# Patient Record
Sex: MALE | Race: WHITE | Employment: OTHER | URBAN - METROPOLITAN AREA
[De-identification: names, ages, dates, MRNs, and addresses within clinical notes are randomized per-mention and may not be internally consistent; named-entity substitution may affect disease eponyms.]

---

## 2018-02-11 PROBLEM — G45.4 TRANSIENT GLOBAL AMNESIA: Status: ACTIVE | Noted: 2018-02-11

## 2018-03-22 ENCOUNTER — HOSPITAL ENCOUNTER (OUTPATIENT)
Dept: OTHER | Age: 73
Discharge: HOME | End: 2018-03-22
Attending: UROLOGY | Admitting: UROLOGY

## 2018-03-22 DIAGNOSIS — N20.0 CALCULUS OF KIDNEY: ICD-10-CM

## 2018-04-10 ENCOUNTER — HOSPITAL ENCOUNTER (OUTPATIENT)
Dept: OTHER | Age: 73
Discharge: HOME | End: 2018-04-10
Attending: FAMILY MEDICINE | Admitting: FAMILY MEDICINE

## 2018-04-10 DIAGNOSIS — M54.5 LOW BACK PAIN, UNSPECIFIED BACK PAIN LATERALITY, UNSPECIFIED CHRONICITY, WITH SCIATICA PRESENCE UNSPECIFIED: ICD-10-CM

## 2018-04-17 ENCOUNTER — HOSPITAL ENCOUNTER (OUTPATIENT)
Dept: PHYSICAL THERAPY | Age: 73
Discharge: HOME | End: 2018-04-30
Admitting: FAMILY MEDICINE

## 2018-04-24 ENCOUNTER — HOSPITAL ENCOUNTER (OUTPATIENT)
Dept: PHYSICAL THERAPY | Age: 73
Discharge: HOME | End: 2018-04-25
Admitting: FAMILY MEDICINE

## 2018-05-01 ENCOUNTER — HOSPITAL ENCOUNTER (OUTPATIENT)
Dept: PHYSICAL THERAPY | Age: 73
Discharge: HOME | End: 2018-05-02
Admitting: FAMILY MEDICINE

## 2018-05-01 ENCOUNTER — HOSPITAL ENCOUNTER (OUTPATIENT)
Dept: OTHER | Age: 73
Discharge: HOME | End: 2018-05-08
Attending: FAMILY MEDICINE | Admitting: FAMILY MEDICINE

## 2018-05-08 ENCOUNTER — HOSPITAL ENCOUNTER (OUTPATIENT)
Dept: PHYSICAL THERAPY | Age: 73
Discharge: HOME | End: 2018-05-09
Admitting: FAMILY MEDICINE

## 2019-10-23 ENCOUNTER — HOSPITAL ENCOUNTER (OUTPATIENT)
Dept: CT IMAGING | Age: 74
Discharge: HOME | End: 2019-10-23
Payer: COMMERCIAL

## 2019-10-23 DIAGNOSIS — N20.0 CALCULUS OF KIDNEY: ICD-10-CM

## 2019-10-23 DIAGNOSIS — N23 UNSPECIFIED RENAL COLIC: ICD-10-CM

## 2019-10-23 DIAGNOSIS — N40.3 NODULAR PROSTATE WITH LOWER URINARY TRACT SYMPTOMS: ICD-10-CM

## 2019-10-23 PROCEDURE — 74176 CT ABD & PELVIS W/O CONTRAST: CPT

## 2019-12-15 ENCOUNTER — HOSPITAL ENCOUNTER (OUTPATIENT)
Age: 74
Discharge: HOME | End: 2019-12-15
Payer: COMMERCIAL

## 2019-12-15 ENCOUNTER — HOSPITAL ENCOUNTER (OUTPATIENT)
Dept: GENERAL RADIOLOGY | Age: 74
Discharge: HOME | End: 2019-12-15
Payer: COMMERCIAL

## 2019-12-15 DIAGNOSIS — N20.0 RENAL CALCULUS, RIGHT: ICD-10-CM

## 2019-12-15 PROCEDURE — 74018 RADEX ABDOMEN 1 VIEW: CPT

## 2020-09-16 ENCOUNTER — HOSPITAL ENCOUNTER (OUTPATIENT)
Dept: GENERAL RADIOLOGY | Age: 75
Discharge: HOME | End: 2020-09-16
Payer: COMMERCIAL

## 2020-09-16 ENCOUNTER — HOSPITAL ENCOUNTER (OUTPATIENT)
Age: 75
Discharge: HOME | End: 2020-09-16
Payer: COMMERCIAL

## 2020-09-16 PROCEDURE — 74018 RADEX ABDOMEN 1 VIEW: CPT

## 2020-11-07 ENCOUNTER — APPOINTMENT (OUTPATIENT)
Dept: CT IMAGING | Age: 75
DRG: 694 | End: 2020-11-07
Payer: MEDICARE

## 2020-11-07 ENCOUNTER — APPOINTMENT (OUTPATIENT)
Dept: GENERAL RADIOLOGY | Age: 75
DRG: 694 | End: 2020-11-07
Payer: MEDICARE

## 2020-11-07 ENCOUNTER — HOSPITAL ENCOUNTER (INPATIENT)
Age: 75
LOS: 2 days | Discharge: HOME | DRG: 694 | End: 2020-11-09
Attending: EMERGENCY MEDICINE | Admitting: FAMILY MEDICINE
Payer: MEDICARE

## 2020-11-07 PROBLEM — N20.9 UROLITHIASIS: Status: ACTIVE | Noted: 2020-11-07

## 2020-11-07 PROBLEM — Z87.442 FLANK PAIN WITH HISTORY OF UROLITHIASIS: Status: ACTIVE | Noted: 2020-11-07

## 2020-11-07 PROBLEM — R10.9 FLANK PAIN WITH HISTORY OF UROLITHIASIS: Status: ACTIVE | Noted: 2020-11-07

## 2020-11-07 LAB
A/G RATIO: 1.7 (ref 1.1–2.2)
ALBUMIN SERPL-MCNC: 4.6 G/DL (ref 3.4–5)
ALP BLD-CCNC: 128 U/L (ref 40–129)
ALT SERPL-CCNC: 25 U/L (ref 10–40)
ANION GAP SERPL CALCULATED.3IONS-SCNC: 10 MMOL/L (ref 3–16)
AST SERPL-CCNC: 22 U/L (ref 15–37)
BACTERIA: ABNORMAL /HPF
BASOPHILS ABSOLUTE: 0 K/UL (ref 0–0.2)
BASOPHILS RELATIVE PERCENT: 0.7 %
BILIRUB SERPL-MCNC: 0.7 MG/DL (ref 0–1)
BILIRUBIN URINE: NEGATIVE
BLOOD, URINE: ABNORMAL
BUN BLDV-MCNC: 13 MG/DL (ref 7–20)
CALCIUM SERPL-MCNC: 9 MG/DL (ref 8.3–10.6)
CHLORIDE BLD-SCNC: 103 MMOL/L (ref 99–110)
CLARITY: CLEAR
CO2: 28 MMOL/L (ref 21–32)
COLOR: YELLOW
CREAT SERPL-MCNC: 1 MG/DL (ref 0.8–1.3)
EOSINOPHILS ABSOLUTE: 0 K/UL (ref 0–0.6)
EOSINOPHILS RELATIVE PERCENT: 0.7 %
EPITHELIAL CELLS, UA: ABNORMAL /HPF (ref 0–5)
GFR AFRICAN AMERICAN: >60
GFR NON-AFRICAN AMERICAN: >60
GLOBULIN: 2.7 G/DL
GLUCOSE BLD-MCNC: 140 MG/DL (ref 70–99)
GLUCOSE BLD-MCNC: 167 MG/DL (ref 70–99)
GLUCOSE BLD-MCNC: 207 MG/DL (ref 70–99)
GLUCOSE BLD-MCNC: 254 MG/DL (ref 70–99)
GLUCOSE URINE: 500 MG/DL
HCT VFR BLD CALC: 42 % (ref 40.5–52.5)
HEMOGLOBIN: 14.6 G/DL (ref 13.5–17.5)
KETONES, URINE: NEGATIVE MG/DL
LEUKOCYTE ESTERASE, URINE: NEGATIVE
LIPASE: 27 U/L (ref 13–60)
LYMPHOCYTES ABSOLUTE: 0.7 K/UL (ref 1–5.1)
LYMPHOCYTES RELATIVE PERCENT: 11 %
MCH RBC QN AUTO: 31.5 PG (ref 26–34)
MCHC RBC AUTO-ENTMCNC: 34.7 G/DL (ref 31–36)
MCV RBC AUTO: 90.8 FL (ref 80–100)
MICROSCOPIC EXAMINATION: YES
MONOCYTES ABSOLUTE: 0.4 K/UL (ref 0–1.3)
MONOCYTES RELATIVE PERCENT: 6.5 %
NEUTROPHILS ABSOLUTE: 5.2 K/UL (ref 1.7–7.7)
NEUTROPHILS RELATIVE PERCENT: 81.1 %
NITRITE, URINE: NEGATIVE
PDW BLD-RTO: 13.3 % (ref 12.4–15.4)
PERFORMED ON: ABNORMAL
PH UA: 5.5 (ref 5–8)
PLATELET # BLD: 119 K/UL (ref 135–450)
PMV BLD AUTO: 7.8 FL (ref 5–10.5)
POTASSIUM SERPL-SCNC: 4 MMOL/L (ref 3.5–5.1)
PROTEIN UA: ABNORMAL MG/DL
RBC # BLD: 4.62 M/UL (ref 4.2–5.9)
RBC UA: ABNORMAL /HPF (ref 0–4)
SODIUM BLD-SCNC: 141 MMOL/L (ref 136–145)
SPECIFIC GRAVITY UA: >=1.03 (ref 1–1.03)
TOTAL PROTEIN: 7.3 G/DL (ref 6.4–8.2)
URINE TYPE: ABNORMAL
UROBILINOGEN, URINE: 0.2 E.U./DL
WBC # BLD: 6.5 K/UL (ref 4–11)
WBC UA: ABNORMAL /HPF (ref 0–5)

## 2020-11-07 PROCEDURE — 74176 CT ABD & PELVIS W/O CONTRAST: CPT

## 2020-11-07 PROCEDURE — 6360000002 HC RX W HCPCS: Performed by: EMERGENCY MEDICINE

## 2020-11-07 PROCEDURE — 2580000003 HC RX 258: Performed by: EMERGENCY MEDICINE

## 2020-11-07 PROCEDURE — 85025 COMPLETE CBC W/AUTO DIFF WBC: CPT

## 2020-11-07 PROCEDURE — 96375 TX/PRO/DX INJ NEW DRUG ADDON: CPT

## 2020-11-07 PROCEDURE — 81001 URINALYSIS AUTO W/SCOPE: CPT

## 2020-11-07 PROCEDURE — 96376 TX/PRO/DX INJ SAME DRUG ADON: CPT

## 2020-11-07 PROCEDURE — 1200000000 HC SEMI PRIVATE

## 2020-11-07 PROCEDURE — 96374 THER/PROPH/DIAG INJ IV PUSH: CPT

## 2020-11-07 PROCEDURE — 83690 ASSAY OF LIPASE: CPT

## 2020-11-07 PROCEDURE — 6370000000 HC RX 637 (ALT 250 FOR IP): Performed by: FAMILY MEDICINE

## 2020-11-07 PROCEDURE — 80053 COMPREHEN METABOLIC PANEL: CPT

## 2020-11-07 PROCEDURE — 99285 EMERGENCY DEPT VISIT HI MDM: CPT

## 2020-11-07 PROCEDURE — 2580000003 HC RX 258: Performed by: FAMILY MEDICINE

## 2020-11-07 PROCEDURE — 87086 URINE CULTURE/COLONY COUNT: CPT

## 2020-11-07 RX ORDER — MORPHINE SULFATE 2 MG/ML
2 INJECTION, SOLUTION INTRAMUSCULAR; INTRAVENOUS
Status: DISCONTINUED | OUTPATIENT
Start: 2020-11-07 | End: 2020-11-09 | Stop reason: HOSPADM

## 2020-11-07 RX ORDER — NICOTINE POLACRILEX 4 MG
15 LOZENGE BUCCAL PRN
Status: DISCONTINUED | OUTPATIENT
Start: 2020-11-07 | End: 2020-11-09 | Stop reason: HOSPADM

## 2020-11-07 RX ORDER — ONDANSETRON 2 MG/ML
4 INJECTION INTRAMUSCULAR; INTRAVENOUS EVERY 30 MIN PRN
Status: DISCONTINUED | OUTPATIENT
Start: 2020-11-07 | End: 2020-11-09 | Stop reason: HOSPADM

## 2020-11-07 RX ORDER — POLYETHYLENE GLYCOL 3350 17 G/17G
17 POWDER, FOR SOLUTION ORAL DAILY PRN
Status: DISCONTINUED | OUTPATIENT
Start: 2020-11-07 | End: 2020-11-09 | Stop reason: HOSPADM

## 2020-11-07 RX ORDER — AMLODIPINE BESYLATE 5 MG/1
5 TABLET ORAL DAILY
Status: DISCONTINUED | OUTPATIENT
Start: 2020-11-08 | End: 2020-11-09 | Stop reason: HOSPADM

## 2020-11-07 RX ORDER — ATORVASTATIN CALCIUM 10 MG/1
20 TABLET, FILM COATED ORAL NIGHTLY
Status: DISCONTINUED | OUTPATIENT
Start: 2020-11-08 | End: 2020-11-07

## 2020-11-07 RX ORDER — SODIUM CHLORIDE 9 MG/ML
INJECTION, SOLUTION INTRAVENOUS CONTINUOUS
Status: DISCONTINUED | OUTPATIENT
Start: 2020-11-07 | End: 2020-11-07 | Stop reason: SDUPTHER

## 2020-11-07 RX ORDER — KETOROLAC TROMETHAMINE 30 MG/ML
15 INJECTION, SOLUTION INTRAMUSCULAR; INTRAVENOUS EVERY 6 HOURS PRN
Status: DISCONTINUED | OUTPATIENT
Start: 2020-11-07 | End: 2020-11-09 | Stop reason: HOSPADM

## 2020-11-07 RX ORDER — SODIUM CHLORIDE 0.9 % (FLUSH) 0.9 %
10 SYRINGE (ML) INJECTION EVERY 12 HOURS SCHEDULED
Status: DISCONTINUED | OUTPATIENT
Start: 2020-11-07 | End: 2020-11-09 | Stop reason: HOSPADM

## 2020-11-07 RX ORDER — ATORVASTATIN CALCIUM 10 MG/1
20 TABLET, FILM COATED ORAL DAILY
Status: DISCONTINUED | OUTPATIENT
Start: 2020-11-07 | End: 2020-11-07

## 2020-11-07 RX ORDER — PROMETHAZINE HYDROCHLORIDE 25 MG/1
12.5 TABLET ORAL EVERY 6 HOURS PRN
Status: DISCONTINUED | OUTPATIENT
Start: 2020-11-07 | End: 2020-11-09 | Stop reason: HOSPADM

## 2020-11-07 RX ORDER — ONDANSETRON 2 MG/ML
4 INJECTION INTRAMUSCULAR; INTRAVENOUS EVERY 6 HOURS PRN
Status: DISCONTINUED | OUTPATIENT
Start: 2020-11-07 | End: 2020-11-09 | Stop reason: HOSPADM

## 2020-11-07 RX ORDER — KETOROLAC TROMETHAMINE 30 MG/ML
15 INJECTION, SOLUTION INTRAMUSCULAR; INTRAVENOUS EVERY 6 HOURS
Status: DISCONTINUED | OUTPATIENT
Start: 2020-11-07 | End: 2020-11-07

## 2020-11-07 RX ORDER — AMLODIPINE BESYLATE 5 MG/1
5 TABLET ORAL DAILY
Status: DISCONTINUED | OUTPATIENT
Start: 2020-11-08 | End: 2020-11-07

## 2020-11-07 RX ORDER — SODIUM CHLORIDE 9 MG/ML
INJECTION, SOLUTION INTRAVENOUS CONTINUOUS
Status: DISCONTINUED | OUTPATIENT
Start: 2020-11-07 | End: 2020-11-09 | Stop reason: HOSPADM

## 2020-11-07 RX ORDER — TAMSULOSIN HYDROCHLORIDE 0.4 MG/1
0.4 CAPSULE ORAL DAILY
Status: DISCONTINUED | OUTPATIENT
Start: 2020-11-07 | End: 2020-11-07

## 2020-11-07 RX ORDER — ACETAMINOPHEN 325 MG/1
650 TABLET ORAL EVERY 4 HOURS PRN
Status: DISCONTINUED | OUTPATIENT
Start: 2020-11-07 | End: 2020-11-09 | Stop reason: HOSPADM

## 2020-11-07 RX ORDER — POTASSIUM CITRATE 5 MEQ/1
10 TABLET, EXTENDED RELEASE ORAL
Status: DISCONTINUED | OUTPATIENT
Start: 2020-11-07 | End: 2020-11-09 | Stop reason: HOSPADM

## 2020-11-07 RX ORDER — AMLODIPINE BESYLATE AND ATORVASTATIN CALCIUM 5; 20 MG/1; MG/1
1 TABLET, FILM COATED ORAL DAILY
Status: DISCONTINUED | OUTPATIENT
Start: 2020-11-07 | End: 2020-11-07 | Stop reason: CLARIF

## 2020-11-07 RX ORDER — TAMSULOSIN HYDROCHLORIDE 0.4 MG/1
0.4 CAPSULE ORAL DAILY
Status: DISCONTINUED | OUTPATIENT
Start: 2020-11-07 | End: 2020-11-09 | Stop reason: HOSPADM

## 2020-11-07 RX ORDER — AMLODIPINE BESYLATE 5 MG/1
5 TABLET ORAL DAILY
Status: DISCONTINUED | OUTPATIENT
Start: 2020-11-07 | End: 2020-11-07

## 2020-11-07 RX ORDER — SODIUM CHLORIDE 0.9 % (FLUSH) 0.9 %
10 SYRINGE (ML) INJECTION PRN
Status: DISCONTINUED | OUTPATIENT
Start: 2020-11-07 | End: 2020-11-09 | Stop reason: HOSPADM

## 2020-11-07 RX ORDER — INSULIN GLARGINE 100 [IU]/ML
66 INJECTION, SOLUTION SUBCUTANEOUS NIGHTLY
Status: DISCONTINUED | OUTPATIENT
Start: 2020-11-07 | End: 2020-11-09 | Stop reason: HOSPADM

## 2020-11-07 RX ORDER — HYDROCODONE BITARTRATE AND ACETAMINOPHEN 5; 325 MG/1; MG/1
1 TABLET ORAL EVERY 4 HOURS PRN
Status: DISCONTINUED | OUTPATIENT
Start: 2020-11-07 | End: 2020-11-09 | Stop reason: HOSPADM

## 2020-11-07 RX ORDER — VALSARTAN 80 MG/1
160 TABLET ORAL DAILY
Status: DISCONTINUED | OUTPATIENT
Start: 2020-11-07 | End: 2020-11-07

## 2020-11-07 RX ORDER — ASPIRIN 81 MG/1
81 TABLET, CHEWABLE ORAL DAILY
Status: DISCONTINUED | OUTPATIENT
Start: 2020-11-07 | End: 2020-11-07

## 2020-11-07 RX ORDER — ASPIRIN 81 MG/1
81 TABLET, CHEWABLE ORAL DAILY
Status: DISCONTINUED | OUTPATIENT
Start: 2020-11-07 | End: 2020-11-09 | Stop reason: HOSPADM

## 2020-11-07 RX ORDER — VITAMIN B COMPLEX
1000 TABLET ORAL DAILY
Status: DISCONTINUED | OUTPATIENT
Start: 2020-11-07 | End: 2020-11-09 | Stop reason: HOSPADM

## 2020-11-07 RX ORDER — LOSARTAN POTASSIUM 25 MG/1
50 TABLET ORAL DAILY
Status: DISCONTINUED | OUTPATIENT
Start: 2020-11-07 | End: 2020-11-09 | Stop reason: HOSPADM

## 2020-11-07 RX ORDER — DEXTROSE MONOHYDRATE 25 G/50ML
12.5 INJECTION, SOLUTION INTRAVENOUS PRN
Status: DISCONTINUED | OUTPATIENT
Start: 2020-11-07 | End: 2020-11-09 | Stop reason: HOSPADM

## 2020-11-07 RX ORDER — ATORVASTATIN CALCIUM 10 MG/1
20 TABLET, FILM COATED ORAL NIGHTLY
Status: DISCONTINUED | OUTPATIENT
Start: 2020-11-07 | End: 2020-11-09 | Stop reason: HOSPADM

## 2020-11-07 RX ORDER — DEXTROSE MONOHYDRATE 50 MG/ML
100 INJECTION, SOLUTION INTRAVENOUS PRN
Status: DISCONTINUED | OUTPATIENT
Start: 2020-11-07 | End: 2020-11-09 | Stop reason: HOSPADM

## 2020-11-07 RX ADMIN — HYDROMORPHONE HYDROCHLORIDE 1 MG: 1 INJECTION, SOLUTION INTRAMUSCULAR; INTRAVENOUS; SUBCUTANEOUS at 08:21

## 2020-11-07 RX ADMIN — ONDANSETRON 4 MG: 2 INJECTION INTRAMUSCULAR; INTRAVENOUS at 10:58

## 2020-11-07 RX ADMIN — AMLODIPINE BESYLATE 5 MG: 5 TABLET ORAL at 13:01

## 2020-11-07 RX ADMIN — TAMSULOSIN HYDROCHLORIDE 0.4 MG: 0.4 CAPSULE ORAL at 21:00

## 2020-11-07 RX ADMIN — POTASSIUM CITRATE 10 MEQ: 5 TABLET ORAL at 18:25

## 2020-11-07 RX ADMIN — SODIUM CHLORIDE: 9 INJECTION, SOLUTION INTRAVENOUS at 15:02

## 2020-11-07 RX ADMIN — Medication 1000 UNITS: at 15:11

## 2020-11-07 RX ADMIN — INSULIN GLARGINE 66 UNITS: 100 INJECTION, SOLUTION SUBCUTANEOUS at 20:56

## 2020-11-07 RX ADMIN — ATORVASTATIN CALCIUM 20 MG: 10 TABLET, FILM COATED ORAL at 21:00

## 2020-11-07 RX ADMIN — LOSARTAN POTASSIUM 50 MG: 25 TABLET, FILM COATED ORAL at 16:11

## 2020-11-07 RX ADMIN — INSULIN HUMAN 10 UNITS: 100 INJECTION, SOLUTION PARENTERAL at 18:25

## 2020-11-07 RX ADMIN — HYDROMORPHONE HYDROCHLORIDE 1 MG: 1 INJECTION, SOLUTION INTRAMUSCULAR; INTRAVENOUS; SUBCUTANEOUS at 10:58

## 2020-11-07 RX ADMIN — SODIUM CHLORIDE: 9 INJECTION, SOLUTION INTRAVENOUS at 21:02

## 2020-11-07 RX ADMIN — SODIUM CHLORIDE: 9 INJECTION, SOLUTION INTRAVENOUS at 21:00

## 2020-11-07 RX ADMIN — CEFTRIAXONE SODIUM 1 G: 1 INJECTION, POWDER, FOR SOLUTION INTRAMUSCULAR; INTRAVENOUS at 10:58

## 2020-11-07 RX ADMIN — ONDANSETRON 4 MG: 2 INJECTION INTRAMUSCULAR; INTRAVENOUS at 08:21

## 2020-11-07 ASSESSMENT — PAIN DESCRIPTION - FREQUENCY: FREQUENCY: CONTINUOUS

## 2020-11-07 ASSESSMENT — ENCOUNTER SYMPTOMS
WHEEZING: 0
PHOTOPHOBIA: 0
SHORTNESS OF BREATH: 0
EYE REDNESS: 0
FACIAL SWELLING: 0
COUGH: 0
SINUS PRESSURE: 0
EYE PAIN: 0
TROUBLE SWALLOWING: 0
VOMITING: 1
ABDOMINAL DISTENTION: 0
ANAL BLEEDING: 0
BACK PAIN: 0
DIARRHEA: 0
NAUSEA: 1
RHINORRHEA: 0
BLOOD IN STOOL: 0
SORE THROAT: 0
EYE DISCHARGE: 0
VOICE CHANGE: 0
EYE ITCHING: 0
CONSTIPATION: 0
CHEST TIGHTNESS: 0
ABDOMINAL PAIN: 1
STRIDOR: 0

## 2020-11-07 ASSESSMENT — PAIN SCALES - GENERAL
PAINLEVEL_OUTOF10: 10
PAINLEVEL_OUTOF10: 10
PAINLEVEL_OUTOF10: 2
PAINLEVEL_OUTOF10: 4
PAINLEVEL_OUTOF10: 6
PAINLEVEL_OUTOF10: 0
PAINLEVEL_OUTOF10: 1
PAINLEVEL_OUTOF10: 8

## 2020-11-07 ASSESSMENT — PAIN DESCRIPTION - DESCRIPTORS: DESCRIPTORS: SHARP

## 2020-11-07 ASSESSMENT — PAIN DESCRIPTION - ORIENTATION: ORIENTATION: RIGHT;UPPER;MID

## 2020-11-07 ASSESSMENT — PAIN DESCRIPTION - LOCATION
LOCATION: ABDOMEN

## 2020-11-07 ASSESSMENT — PAIN DESCRIPTION - PAIN TYPE
TYPE: ACUTE PAIN

## 2020-11-07 NOTE — H&P
Inpatient Family Medicine Service   History & Physical        PCP: Kelsie Sadler DO    Date of Admission: 11/7/2020    Date of Service: Pt seen/examined on 11/7/2020 and admitted to Inpatient with expected LOS greater than two midnights due to medical therapy. Chief Complaint:  Right Flank Pain     History Of Present Illness:    Agusto Ness is a 67 y.o. male w/ pmhx DM, HLD, and HTN presents to [facility] with Right Flank pain since 6:00 this am.     His right sided pain began at 0600, when he woke up. He was simply sitting and relaxing when the pain came out of no where. He does have past medical history of kidney stones, but for the most part has never needed an intervention. Pt last saw urology 1 month ago and is scheduled for nephrostomy on 12/4/2020. Pt complaints today include dry heaving but no episodes of emesis and localized pain to the right lateral abdomen/ LRQ. No dysuria or gross hematuria. Most of pain is in the right flank. Had diarrhea/constipation. No CP/SOB. Ambulating without difficulty. Denies any dizziness today. Pain is rated 10/10 and constant. About 1 year ago he had \"US treatment\" of other kidney stones. History of multiple kidney stones as he has gotten older. While in the ED, pt was given pain and nausea medicine which significantly improved his symptoms.   - He received 2 g of Dilaudid and 8 mg of Zofran. Pt states that he had no pain at the time of examination and only slight nausea. Pt was started on Rocephin 1 g daily. Pt also received his home dose of Norvasc 5 mg.      Lab work included:   - UA RBC +, Glucose +  - Normal Lipase  - CBC wnl  - CMP wnl    CT imaging showed:  Very large calculus present inferiorly in the left kidney and   unchanged in appearance and position.  Moderate right-sided hydronephrosis   now present.  Small 4 mm calculus now present in the proximal right ureter   below the UPJ.  Calculus lies at the mid L4 level.  Moderate right   perinephric stranding present.  Tiny nonobstructing calculus in the mid right   kidney.  Other abdominal organs appear normal.       Past Medical History:          Diagnosis Date    Cancer ([address]     prostate    Diabetes mellitus ([address]     Hyperlipidemia     Hypertension     Kidney stone        Past Surgical History:          Procedure Laterality Date    PROSTATE SURGERY         Medications Prior to Admission:         Outpatient Medications Marked as Taking for the 8/10/20 encounter (Office Visit) with Brandi Andersen MD   Medication Sig    amlodipine-atorvastatin 5-20 MG TABS Take 1 tablet by mouth once daily    losartan (COZAAR) 50 MG TABS TAKE 1 TABLET BY MOUTH ONCE DAILY (REPLACES VALSARTAN)    insulin glargine, 1 Unit Dial, (TOUJEO SOLOSTAR) 300 unit/mL SOPN pen Inject 70 units each evening (Patient taking differently: Inject 66 units each evening)    insulin aspart (NOVOLOG FLEXPEN) 100 unit/mL SOPN pen Inject 15 units before each meal (Patient taking differently: Inject 20 units before each meal + 2 units for every 50 that glucose is > 150. Inject 10 units before bedtime snack if eaten.)    Continuous Blood Gluc Sensor (FREESTYLE MARGARITA 14 DAY SENSOR) [facility] Use as directed to monitor glucose levels. Change sensor every 14 days.  ONE TOUCH ULTRASOFT LANCETS MISC USE TO CHECK GLUCOSE TWICE DAILY    glucose blood (ONE TOUCH ULTRA BLUE) STRP Check glucose 2 times daily    Insulin Pen Needle (BD PEN NEEDLE CRYSTAL U/F) 32G X 4 MM MISC Inject insulin up to 3 times daily    potassium citrate (UROCIT-K) 10 MEQ (1080 MG) TBCR Take 10 mEq by mouth 3 (three) times daily with meals.  vitamin D (CHOLECALCIFEROL) 1000 units TABS Take 1,000 Units by mouth daily.  aspirin 81 MG PO CHEW 1 daily    Ibuprofen 600 MG OR TABS 1 tid    FLOMAX 0.4 MG OR CP24 1 cap daily     Allergies:  Patient has no known allergies. Social History:      The patient currently lives at home with wife.     TOBACCO: reports that he has quit smoking. He has never used smokeless tobacco.  ETOH:   reports no history of alcohol use. Family History:      History reviewed. No pertinent family history. REVIEW OF SYSTEMS:   Pertinent positives as noted in the HPI. All other systems reviewed and negative. PHYSICAL EXAM PERFORMED:    BP (!) 153/65   Pulse 66   Temp 97.9 °F (36.6 °C) (Oral)   Resp 16   Ht 5' 7\" (1.702 m)   Wt 210 lb (95.3 kg)   SpO2 91%   BMI 32.89 kg/m²     General appearance:  Pt resting in bed with eyes shut, sleepy after pain medication administration, able to answer questions    HEENT:  Normal cephalic, atraumatic without obvious deformity. Pupils equal, round, and reactive to light. Conjunctivae/corneas clear. Neck: Supple, with full range of motion. Trachea midline. Respiratory:  Normal respiratory effort. Clear to auscultation, bilaterally without Rales/Wheezes/Rhonchi. Cardiovascular:  Regular rate and rhythm with normal S1/S2 without murmurs, rubs or gallops. Abdomen: Soft, non-tender, non-distended with normal bowel sounds. Musculoskeletal:  No clubbing, cyanosis bilaterally. + 1 LE edema. Full range of motion without deformity. Back: No CVA tenderness  Skin: Skin color, texture, turgor normal.  No rashes or lesions. Neurologic:  Neurovascularly intact without any focal sensory/motor deficits. Psychiatric:  Alert and oriented, thought content appropriate, normal insight    Labs:     Recent Labs     11/07/20  0743   WBC 6.5   HGB 14.6   HCT 42.0   *     Recent Labs     11/07/20  0743      K 4.0      CO2 28   BUN 13   CREATININE 1.0   CALCIUM 9.0     Recent Labs     11/07/20  0743   AST 22   ALT 25   BILITOT 0.7   ALKPHOS 128     No results for input(s): INR in the last 72 hours. No results for input(s): Teresa Dalia in the last 72 hours.     Urinalysis:      Lab Results   Component Value Date    NITRU Negative 11/07/2020    WBCUA 3-5 11/07/2020    BACTERIA Rare 11/07/2020    RBCUA 21-50 11/07/2020    BLOODU LARGE 11/07/2020    SPECGRAV >=1.030 11/07/2020    GLUCOSEU 500 11/07/2020       Radiology:       CT ABDOMEN PELVIS WO CONTRAST Additional Contrast? None   Final Result   Moderate right-sided hydronephrosis due to 4 mm calculus in the proximal   right ureter. Large nonobstructing calculus remains inferiorly in the left   kidney. Normal appendix. No other acute abnormality. XR ABDOMEN (KUB) (SINGLE AP VIEW)    (Results Pending)         ASSESSMENT:    Active Hospital Problems    Diagnosis Date Noted    Flank pain with history of urolithiasis [R10.9, Z87.442] 11/07/2020    Urolithiasis [N20.9] 11/07/2020    HTN (hypertension), benign [I10]     Uncontrolled type 2 diabetes mellitus with complication, without long-term current use of insulin (HCC) [E11.8, E11.65]     Dyslipidemia [E78.5]        Glenis Urbina is a 67 y.o. Hospital Day #1 admitted for Flank pain with history of urolithiasis      PLAN:    Flank Pain  Urolithiasis  Patient evaluated by Urology in the ED  Will plan procedure for Monday if stone is not able to pass on own  Vitals and Labs stable  Hydration and Pain control   - Tylenol 650 prn   - Norco 5-325 prn  - Toradol 15 prn  - Zofran 4 prn  -  ml/hr      HTN  DM  HLD  - Continue Home Medications  > Amlodipine 5, Lipitor 20, ASA 81, Lantus 66 u, Novolin 20 TID, K+ 10 TID, Flomax, Vit D        DVT Prophylaxis: Lovenox   Diet: DIET CLEAR LIQUID; Carb Control: 3 carb choices (45 gms)/meal  Code Status: Full Code  PT/OT Eval Status: Pending  Dispo - Inpatient    This patient was seen and evaluated with the resident team and attending, Dr. Manpreet Jordan. Sandrine Rodriguez D.O.    [facility] [place] [address]. Service  [id number]-1  (available by [address] [place] [address])

## 2020-11-07 NOTE — ED PROVIDER NOTES
Justin Ramirez is a 67year old male with a history of kidney stones who presents to the ED with right sided abdominal pain that woke him from sleep at [place]. He has had nausea/vomiting followed by dry heaves since that time. He localizes the pain in the right para-colic gutter area, and he denies radiation or back pain, no testicular pain. He denies hematuria. He rates the pain 8/10. Analgesia is offered. He has a history of a very large left sided kidney stones that is \"too big for lithotripsy\" so he's scheduled to have this surgically removed on December 4th. BP (!) 168/74   Pulse 60   Temp 97.9 °F (36.6 °C) (Oral)   Resp 16   Ht 5' 7\" (1.702 m)   Wt 210 lb (95.3 kg)   SpO2 97%   BMI 32.89 kg/m²     I have reviewed the following from the nursing documentation:      Prior to Admission medications    Medication Sig Start Date End Date Taking?  Authorizing Provider   metFORMIN (GLUCOPHAGE) 500 MG tablet Take 500 mg by mouth 2 times daily (with meals)    Historical Provider, MD   tamsulosin (FLOMAX) 0.4 MG capsule Take 0.4 mg by mouth daily    Historical Provider, MD   glipiZIDE (GLUCOTROL) 10 MG tablet Take 10 mg by mouth 2 times daily (before meals)    Historical Provider, MD   amLODIPine-atorvastatatin (CADUET) 5-20 MG per tablet Take 1 tablet by mouth 6/8/17   Historical Provider, MD   aspirin 81 MG chewable tablet 1 daily    Historical Provider, MD   valsartan (DIOVAN) 160 MG tablet TAKE ONE TABLET BY MOUTH ONCE DAILY 5/9/17   Historical Provider, MD   insulin glargine (LANTUS) 100 UNIT/ML injection vial Inject into the skin 2 times daily    Historical Provider, MD       Allergies as of 11/07/2020    (No Known Allergies)       Past Medical History:   Diagnosis Date    Cancer ([address]     prostate    Diabetes mellitus ([address]     Hyperlipidemia     Hypertension     Kidney stone         Surgical History:   Past Surgical History:   Procedure Laterality Date    PROSTATE SURGERY          Family History:  History reviewed. No pertinent family history. Social History     Socioeconomic History    Marital status:      Spouse name: Not on file    Number of children: Not on file    Years of education: Not on file    Highest education level: Not on file   Occupational History    Not on file   Social Needs    Financial resource strain: Not on file    Food insecurity     Worry: Not on file     Inability: Not on file    Transportation needs     Medical: Not on file     Non-medical: Not on file   Tobacco Use    Smoking status: Former Smoker    Smokeless tobacco: Never Used   Substance and Sexual Activity    Alcohol use: No    Drug use: No    Sexual activity: Not on file   Lifestyle    Physical activity     Days per week: Not on file     Minutes per session: Not on file    Stress: Not on file   Relationships    Social connections     Talks on phone: Not on file     Gets together: Not on file     Attends [employer] service: Not on file     Active member of club or organization: Not on file     Attends meetings of clubs or organizations: Not on file     Relationship status: Not on file    Intimate partner violence     Fear of current or ex partner: Not on file     Emotionally abused: Not on file     Physically abused: Not on file     Forced sexual activity: Not on file   Other Topics Concern    Not on file   Social History Narrative    Not on file         Review of Systems   Constitutional: Negative for activity change, appetite change, chills, diaphoresis, fatigue and fever. HENT: Negative. Negative for congestion, dental problem, ear pain, facial swelling, rhinorrhea, sinus pressure, sneezing, sore throat, tinnitus, trouble swallowing and voice change. Eyes: Negative for photophobia, pain, discharge, redness, itching and visual disturbance. Respiratory: Negative for cough, chest tightness, shortness of breath, wheezing and stridor.     Cardiovascular: Negative for chest pain, palpitations and leg swelling. Gastrointestinal: Positive for abdominal pain (right sided without radiation), nausea and vomiting. Negative for abdominal distention, anal bleeding, blood in stool, constipation and diarrhea. Genitourinary: Negative for difficulty urinating, discharge, dysuria, frequency, hematuria, testicular pain and urgency. Musculoskeletal: Negative for back pain, joint swelling, neck pain and neck stiffness. Skin: Negative for rash and wound. Neurological: Negative for dizziness, syncope, facial asymmetry, speech difficulty, weakness, numbness and headaches. Hematological: Does not bruise/bleed easily. Psychiatric/Behavioral: Negative for agitation, confusion, hallucinations, self-injury, sleep disturbance and suicidal ideas. The patient is not nervous/anxious. All other systems reviewed and are negative. Physical Exam  Vitals signs and nursing note reviewed. Constitutional:       General: He is in acute distress (secondary to pain and nausea). Appearance: He is well-developed. HENT:      Head: Normocephalic and atraumatic. Right Ear: External ear normal.      Left Ear: External ear normal.      Nose: Nose normal.      Mouth/Throat:      Pharynx: No oropharyngeal exudate. Eyes:      General: No scleral icterus. Right eye: No discharge. Left eye: No discharge. Conjunctiva/sclera: Conjunctivae normal.      Pupils: Pupils are equal, round, and reactive to light. Neck:      Musculoskeletal: Normal range of motion and neck supple. Vascular: No JVD. Trachea: No tracheal deviation. Cardiovascular:      Rate and Rhythm: Normal rate and regular rhythm. Heart sounds: Normal heart sounds. No murmur. No friction rub. No gallop. Pulmonary:      Effort: Pulmonary effort is normal. No respiratory distress. Breath sounds: Normal breath sounds. No wheezing or rales.    Abdominal:      General: Bowel sounds are normal. There is no distension. Palpations: Abdomen is soft. There is no mass. Tenderness: There is abdominal tenderness in the right upper quadrant and right lower quadrant. There is no guarding or rebound. Musculoskeletal: Normal range of motion. General: No tenderness. Lymphadenopathy:      Cervical: No cervical adenopathy. Skin:     General: Skin is warm and dry. Coloration: Skin is not pale. Findings: No erythema or rash. Neurological:      Mental Status: He is alert and oriented to person, place, and time. Cranial Nerves: No cranial nerve deficit. Motor: No abnormal muscle tone. Coordination: Coordination normal.      Deep Tendon Reflexes: Reflexes are normal and symmetric. Reflexes normal.   Psychiatric:         Behavior: Behavior normal.         Thought Content:  Thought content normal.         Judgment: Judgment normal.          Procedures     MDM   Results for orders placed or performed during the hospital encounter of 11/07/20   Comprehensive metabolic panel   Result Value Ref Range    Sodium 141 136 - 145 mmol/L    Potassium 4.0 3.5 - 5.1 mmol/L    Chloride 103 99 - 110 mmol/L    CO2 28 21 - 32 mmol/L    Anion Gap 10 3 - 16    Glucose 207 (H) 70 - 99 mg/dL    BUN 13 7 - 20 mg/dL    CREATININE 1.0 0.8 - 1.3 mg/dL    GFR Non-African American >60 >60    GFR African American >60 >60    Calcium 9.0 8.3 - 10.6 mg/dL    Total Protein 7.3 6.4 - 8.2 g/dL    Alb 4.6 3.4 - 5.0 g/dL    Albumin/Globulin Ratio 1.7 1.1 - 2.2    Total Bilirubin 0.7 0.0 - 1.0 mg/dL    Alkaline Phosphatase 128 40 - 129 U/L    ALT 25 10 - 40 U/L    AST 22 15 - 37 U/L    Globulin 2.7 g/dL   CBC auto differential   Result Value Ref Range    WBC 6.5 4.0 - 11.0 K/uL    RBC 4.62 4.20 - 5.90 M/uL    Hemoglobin 14.6 13.5 - 17.5 g/dL    Hematocrit 42.0 40.5 - 52.5 %    MCV 90.8 80.0 - 100.0 fL    MCH 31.5 26.0 - 34.0 pg    MCHC 34.7 31.0 - 36.0 g/dL    RDW 13.3 12.4 - 15.4 %    Platelets 962 (L) 207 - 450 K/uL    MPV 7.8 5.0 - 10.5 fL    Neutrophils % 81.1 %    Lymphocytes % 11.0 %    Monocytes % 6.5 %    Eosinophils % 0.7 %    Basophils % 0.7 %    Neutrophils Absolute 5.2 1.7 - 7.7 K/uL    Lymphocytes Absolute 0.7 (L) 1.0 - 5.1 K/uL    Monocytes Absolute 0.4 0.0 - 1.3 K/uL    Eosinophils Absolute 0.0 0.0 - 0.6 K/uL    Basophils Absolute 0.0 0.0 - 0.2 K/uL   Urinalysis, reflex to microscopic   Result Value Ref Range    Color, UA Yellow Straw/Yellow    Clarity, UA Clear Clear    Glucose, Ur 500 (A) Negative mg/dL    Bilirubin Urine Negative Negative    Ketones, Urine Negative Negative mg/dL    Specific Gravity, UA >=1.030 1.005 - 1.030    Blood, Urine LARGE (A) Negative    pH, UA 5.5 5.0 - 8.0    Protein, UA TRACE (A) Negative mg/dL    Urobilinogen, Urine 0.2 <2.0 E.U./dL    Nitrite, Urine Negative Negative    Leukocyte Esterase, Urine Negative Negative    Microscopic Examination YES     Urine Type NotGiven    Microscopic Urinalysis   Result Value Ref Range    WBC, UA 3-5 0 - 5 /HPF    RBC, UA 21-50 (A) 0 - 4 /HPF    Epithelial Cells, UA 2-5 0 - 5 /HPF    Bacteria, UA Rare (A) None Seen /HPF   Lipase   Result Value Ref Range    Lipase 27.0 13.0 - 60.0 U/L       I spoke with Dr. Krystyna Youngblood resident, Dr. Emy Lynch. We thoroughly discussed the history, physical exam, laboratory and imaging studies, as well as, emergency department course. Based upon that discussion, we've decided to admit Agusto Ness for further observation and evaluation of Leanapryl Mooreayne Schneider's abdominal pain. As I have deemed necessary from their history, physical and studies, I have considered and evaluated Agusto Ness for the following diagnoses:  ACUTE APPENDICITIS, BOWEL OBSTRUCTION, CHOLECYSTITIS, DIVERTICULITIS, INCARCERATED HERNIA, PANCREATITIS, or PERFORATED BOWEL or ULCER. Urology consult is pending at this time. FINAL IMPRESSION  1. Renal colic    2. Ureterolithiasis    3. Obstructive uropathy    4.  Intractable vomiting with nausea, unspecified vomiting type    5. Abdominal pain, right lower quadrant        Vitals:  Blood pressure (!) 160/90, pulse 61, temperature 97.9 °F (36.6 °C), temperature source Oral, resp. rate 18, height 5' 7\" (1.702 m), weight 210 lb (95.3 kg), SpO2 95 %. Radiology  Ct Abdomen Pelvis Wo Contrast Additional Contrast? None    Result Date: 11/7/2020  Moderate right-sided hydronephrosis due to 4 mm calculus in the proximal right ureter. Large nonobstructing calculus remains inferiorly in the left kidney. Normal appendix. No other acute abnormality. 11:15 am Consultation with Dr. Charma Kawasaki. He agrees with current management and will see the patient in consultation.           Bennie Farley MD  11/07/20 Jered Luo MD  11/07/20 7941

## 2020-11-07 NOTE — ED NOTES
1059 Urology admission  [address] called back     Cinthia Wang  11/07/20 1101
342 @ [phone]
Dr. Jerardo Franco at bedside.      Eugenie Parra RN  11/07/20 6509
OK for visitor     Tayler Lester RN  11/07/20 7865
Patient identified as a positive fall risk on the ED triage fall screening. Patient placed in fall precautions which includes:  yellow fall risk bracelet on wrist, yellow socks on feet, \"Be Safe\" sign placed on patient's door, and bed alarm placed under patient/alarm turned on. Patient instructed on importance of not getting out of bed or ambulating without assistance for safety.           Angel Montgomery RN  11/07/20 6712
Patient identified as a positive fall risk on the ED triage fall screening. Patient placed in fall precautions which includes:  yellow fall risk bracelet on wrist, yellow socks on feet, \"Be Safe\" sign placed on patient's door, and bed alarm placed under patient/alarm turned on. Patient instructed on importance of not getting out of bed or ambulating without assistance for safety.           Belkis Fair RN  11/07/20 9680
Breathing spontaneous and unlabored. Breath sounds clear and equal bilaterally with regular rhythm.

## 2020-11-08 ENCOUNTER — APPOINTMENT (OUTPATIENT)
Dept: GENERAL RADIOLOGY | Age: 75
DRG: 694 | End: 2020-11-08
Payer: MEDICARE

## 2020-11-08 LAB
ANION GAP SERPL CALCULATED.3IONS-SCNC: 7 MMOL/L (ref 3–16)
BUN BLDV-MCNC: 12 MG/DL (ref 7–20)
CALCIUM SERPL-MCNC: 8.5 MG/DL (ref 8.3–10.6)
CHLORIDE BLD-SCNC: 108 MMOL/L (ref 99–110)
CO2: 27 MMOL/L (ref 21–32)
CREAT SERPL-MCNC: 0.8 MG/DL (ref 0.8–1.3)
GFR AFRICAN AMERICAN: >60
GFR NON-AFRICAN AMERICAN: >60
GLUCOSE BLD-MCNC: 106 MG/DL (ref 70–99)
GLUCOSE BLD-MCNC: 106 MG/DL (ref 70–99)
GLUCOSE BLD-MCNC: 117 MG/DL (ref 70–99)
GLUCOSE BLD-MCNC: 183 MG/DL (ref 70–99)
GLUCOSE BLD-MCNC: 193 MG/DL (ref 70–99)
GLUCOSE BLD-MCNC: 98 MG/DL (ref 70–99)
PERFORMED ON: ABNORMAL
POTASSIUM REFLEX MAGNESIUM: 3.8 MMOL/L (ref 3.5–5.1)
SODIUM BLD-SCNC: 142 MMOL/L (ref 136–145)
URINE CULTURE, ROUTINE: NORMAL

## 2020-11-08 PROCEDURE — 2580000003 HC RX 258: Performed by: FAMILY MEDICINE

## 2020-11-08 PROCEDURE — 6360000002 HC RX W HCPCS: Performed by: FAMILY MEDICINE

## 2020-11-08 PROCEDURE — 6370000000 HC RX 637 (ALT 250 FOR IP): Performed by: FAMILY MEDICINE

## 2020-11-08 PROCEDURE — 1200000000 HC SEMI PRIVATE

## 2020-11-08 PROCEDURE — 74018 RADEX ABDOMEN 1 VIEW: CPT

## 2020-11-08 PROCEDURE — 80048 BASIC METABOLIC PNL TOTAL CA: CPT

## 2020-11-08 PROCEDURE — 36415 COLL VENOUS BLD VENIPUNCTURE: CPT

## 2020-11-08 RX ADMIN — INSULIN HUMAN 10 UNITS: 100 INJECTION, SOLUTION PARENTERAL at 17:18

## 2020-11-08 RX ADMIN — POTASSIUM CITRATE 10 MEQ: 5 TABLET ORAL at 13:04

## 2020-11-08 RX ADMIN — POTASSIUM CITRATE 10 MEQ: 5 TABLET ORAL at 10:03

## 2020-11-08 RX ADMIN — Medication 10 ML: at 09:14

## 2020-11-08 RX ADMIN — POTASSIUM CITRATE 10 MEQ: 5 TABLET ORAL at 18:46

## 2020-11-08 RX ADMIN — SODIUM CHLORIDE: 9 INJECTION, SOLUTION INTRAVENOUS at 19:01

## 2020-11-08 RX ADMIN — AMLODIPINE BESYLATE 5 MG: 5 TABLET ORAL at 09:14

## 2020-11-08 RX ADMIN — TAMSULOSIN HYDROCHLORIDE 0.4 MG: 0.4 CAPSULE ORAL at 09:13

## 2020-11-08 RX ADMIN — ASPIRIN 81 MG: 81 TABLET, CHEWABLE ORAL at 09:14

## 2020-11-08 RX ADMIN — ATORVASTATIN CALCIUM 20 MG: 10 TABLET, FILM COATED ORAL at 20:03

## 2020-11-08 RX ADMIN — INSULIN HUMAN 10 UNITS: 100 INJECTION, SOLUTION PARENTERAL at 13:02

## 2020-11-08 RX ADMIN — ENOXAPARIN SODIUM 40 MG: 40 INJECTION SUBCUTANEOUS at 10:03

## 2020-11-08 RX ADMIN — CEFTRIAXONE SODIUM 1 G: 1 INJECTION, POWDER, FOR SOLUTION INTRAMUSCULAR; INTRAVENOUS at 00:10

## 2020-11-08 RX ADMIN — LOSARTAN POTASSIUM 50 MG: 25 TABLET, FILM COATED ORAL at 09:14

## 2020-11-08 RX ADMIN — POLYETHYLENE GLYCOL 3350 17 G: 17 POWDER, FOR SOLUTION ORAL at 10:03

## 2020-11-08 RX ADMIN — Medication 1000 UNITS: at 09:14

## 2020-11-08 NOTE — CONSULTS
Patient:  Jennifer Tapia  YOB: 1945   CSN:  [id number]    Referring Provider:  No ref. provider found   Primary Care Provider:  Jacinto Bryan DO       Urology Attending Consult Note     Reason for Consultation:  nephrolithiasis, flank pain    Chief Complaint: \"I was dry-heaving\"    HPI:  Sol Chisholm is a 67 y.o. male who presented with 1day history of severe abdominal pain which prompted visit to the ER. CT showed a 4mm right mid ureter stone. No fevers but +nausea and vomiting. The patient has had kidney stones before and planning LEFT PCNL by Dr. Shannon Kaur in Dec for large left renal, partial stag.         Past Medical History:   Diagnosis Date    Cancer [facility])     prostate    Diabetes mellitus ([facility] [address]     Hyperlipidemia     Hypertension     Kidney stone        Past Surgical History:   Procedure Laterality Date    PROSTATE SURGERY         Medication List reviewed:     Current Facility-Administered Medications   Medication Dose Route Frequency Provider Last Rate Last Dose    ondansetron (ZOFRAN) injection 4 mg  4 mg Intravenous Q30 Min PRN Faith Campoverde MD   4 mg at 11/07/20 0821    ondansetron (ZOFRAN) injection 4 mg  4 mg Intravenous Q30 Min PRN Faith Campoverde MD   4 mg at 11/07/20 1058    0.9 % sodium chloride infusion   Intravenous Continuous Gladies Daryl,  mL/hr at 11/07/20 2102      sodium chloride flush 0.9 % injection 10 mL  10 mL Intravenous 2 times per day Gladies Daryl, DO   10 mL at 11/08/20 0914    sodium chloride flush 0.9 % injection 10 mL  10 mL Intravenous PRN Gladies Daryl, DO        acetaminophen (TYLENOL) tablet 650 mg  650 mg Oral Q4H PRN Gladies Daryl, DO        polyethylene glycol (GLYCOLAX) packet 17 g  17 g Oral Daily PRN Gladies Daryl, DO   17 g at 11/08/20 1003    promethazine (PHENERGAN) tablet 12.5 mg  12.5 mg Oral Q6H PRN Gladies Daryl, DO        Or    ondansetron (ZOFRAN) injection 4 mg  4 mg Intravenous Q6H PRN Gladies Daryl, DO        enoxaparin (LOVENOX) injection 40 mg  40 mg Subcutaneous Daily [facility], DO   40 mg at 11/08/20 1003    cefTRIAXone (ROCEPHIN) 1 g IVPB in 50 mL D5W minibag  1 g Intravenous Q24H [facility], DO   Stopped at 11/08/20 0045    morphine (PF) injection 2 mg  2 mg Intravenous Q2H PRN [facility], DO        HYDROcodone-acetaminophen (NORCO) 5-325 MG per tablet 1 tablet  1 tablet Oral Q4H PRN [facility], DO        glucose (GLUTOSE) 40 % oral gel 15 g  15 g Oral PRN [facility], DO        dextrose 50 % IV solution  12.5 g Intravenous PRN [facility], DO        glucagon (rDNA) injection 1 mg  1 mg Intramuscular PRN [facility], DO        dextrose 5 % solution  100 mL/hr Intravenous PRN [facility], DO        losartan (COZAAR) tablet 50 mg  50 mg Oral Daily [facility], DO   50 mg at 11/08/20 0914    insulin glargine (LANTUS) injection vial 66 Units  66 Units Subcutaneous Nightly [facility], DO   66 Units at 11/07/20 2056    potassium citrate (UROCIT-K) extended release tablet 10 mEq  10 mEq Oral TID [facility], DO   10 mEq at 11/08/20 1003    Vitamin D (CHOLECALCIFEROL) tablet 1,000 Units  1,000 Units Oral Daily [facility], DO   1,000 Units at 11/08/20 0914    ketorolac (TORADOL) injection 15 mg  15 mg Intravenous Q6H PRN [facility], DO        insulin regular (HUMULIN R;NOVOLIN R) injection 10 Units  10 Units Subcutaneous TID [facility], DO   10 Units at 11/07/20 1825    aspirin chewable tablet 81 mg  81 mg Oral Daily [facility], DO   81 mg at 11/08/20 0914    atorvastatin (LIPITOR) tablet 20 mg  20 mg Oral Nightly [facility], DO   20 mg at 11/07/20 2100    And    amLODIPine (NORVASC) tablet 5 mg  5 mg Oral Daily [facility], DO   5 mg at 11/08/20 0914    tamsulosin (FLOMAX) capsule 0.4 mg  0.4 mg Oral Daily Chelo Doshi DO   0.4 mg at 11/08/20 0913       No Known Allergies    History reviewed. No pertinent family history.     Social History     Tobacco Use

## 2020-11-08 NOTE — PROGRESS NOTES
Pt did not need pain control overnight. He is not in any pain on examination. He is eating solid foods and tolerating well, had [place] toast for breakfast.    Denies passing any stones during urination. Still on maintenance fluids of 150 ml/hr NS. Urology to see for plans on Monday. Medications:  Reviewed    Infusion Medications    sodium chloride 150 mL/hr at 11/07/20 2102    dextrose       Scheduled Medications    sodium chloride flush  10 mL Intravenous 2 times per day    enoxaparin  40 mg Subcutaneous Daily    cefTRIAXone (ROCEPHIN) IV  1 g Intravenous Q24H    losartan  50 mg Oral Daily    insulin glargine  66 Units Subcutaneous Nightly    potassium citrate  10 mEq Oral TID WC    Vitamin D  1,000 Units Oral Daily    insulin regular  10 Units Subcutaneous TID AC    aspirin  81 mg Oral Daily    atorvastatin  20 mg Oral Nightly    And    amLODIPine  5 mg Oral Daily    tamsulosin  0.4 mg Oral Daily     PRN Meds: ondansetron, ondansetron, sodium chloride flush, acetaminophen, polyethylene glycol, promethazine **OR** ondansetron, morphine, HYDROcodone 5 mg - acetaminophen, glucose, dextrose, glucagon (rDNA), dextrose, ketorolac      Intake/Output Summary (Last 24 hours) at 11/8/2020 1017  Last data filed at 11/8/2020 1008  Gross per 24 hour   Intake 2389.99 ml   Output 2650 ml   Net -260.01 ml       Physical Exam Performed:    BP (!) 148/68   Pulse 69   Temp 98.1 °F (36.7 °C) (Oral)   Resp 16   Ht 5' 7\" (1.702 m)   Wt 210 lb (95.3 kg)   SpO2 94%   BMI 32.89 kg/m²     General appearance:  Sitting up in chair, breathing comfortably, without pain      HEENT:  Normal cephalic, atraumatic without obvious deformity. Pupils equal, round, and reactive to light. Conjunctivae/corneas clear. Neck: Supple, with full range of motion. Trachea midline. Respiratory:  Normal respiratory effort. Clear to auscultation, bilaterally without Rales/Wheezes/Rhonchi.   Cardiovascular:  Regular rate and rhythm with normal S1/S2 without murmurs, rubs or gallops. Abdomen: Soft, non-tender, non-distended with normal bowel sounds. Musculoskeletal:  No clubbing, cyanosis or LE edema bilaterally. Full range of motion without deformity. Back: No CVA tenderness  Skin: Skin color, texture, turgor normal.  No rashes or lesions. Neurologic:  Neurovascularly intact without any focal sensory/motor deficits. Psychiatric:  Alert and oriented, thought content appropriate, normal insight      Labs:   Recent Labs     11/07/20 0743   WBC 6.5   HGB 14.6   HCT 42.0   *     Recent Labs     11/07/20 0743 11/08/20  0547    142   K 4.0 3.8    108   CO2 28 27   BUN 13 12   CREATININE 1.0 0.8   CALCIUM 9.0 8.5     Recent Labs     11/07/20 0743   AST 22   ALT 25   BILITOT 0.7   ALKPHOS 128     No results for input(s): INR in the last 72 hours. No results for input(s): José Antonio Lager in the last 72 hours. Urinalysis:      Lab Results   Component Value Date    NITRU Negative 11/07/2020    WBCUA 3-5 11/07/2020    BACTERIA Rare 11/07/2020    RBCUA 21-50 11/07/2020    BLOODU LARGE 11/07/2020    SPECGRAV >=1.030 11/07/2020    GLUCOSEU 500 11/07/2020       Radiology:  XR ABDOMEN (KUB) (SINGLE AP VIEW)   Final Result   Poor visualization of previously identified right ureteral calculus, as there   is significant bowel gas in the region. Renal stone protocol CT could be   performed for reassessment as clinically warranted. CT ABDOMEN PELVIS WO CONTRAST Additional Contrast? None   Final Result   Moderate right-sided hydronephrosis due to 4 mm calculus in the proximal   right ureter. Large nonobstructing calculus remains inferiorly in the left   kidney. Normal appendix. No other acute abnormality.              Assessment/Plan:    Active Hospital Problems    Diagnosis Date Noted    Flank pain with history of urolithiasis [R10.9, Z87.442] 11/07/2020    Urolithiasis [N20.9] 11/07/2020    HTN (hypertension), benign [I10]     Uncontrolled type 2 diabetes mellitus with complication, without long-term current use of insulin (HCC) [E11.8, E11.65]     Dyslipidemia [E78.5]      Thong Menendez is a 67 y.o. Hospital Day #2 admitted for Flank pain with history of urolithiasis    Flank Pain  Urolithiasis  Patient evaluated by Urology in the ED  Will plan procedure for Monday if stone is not able to pass on own  Vitals and Labs stable  Hydration and Pain control   - Tylenol 650 prn   - Norco 5-325 prn  - Toradol 15 prn  - Zofran 4 prn  -  ml/hr  > No current pain         HTN  DM  HLD  - Continue Home Medications  > Amlodipine 5, Lipitor 20, ASA 81, Lantus 66 u, Novolin 20 TID, K+ 10 TID, Flomax, Vit D           DVT Prophylaxis: Lovenox   Diet: Carb Control: 3 carb choices (45 gms)/meal  Code Status: Full Code  PT/OT Eval Status: Pending  Dispo - Inpatient     This patient was seen and evaluated with the resident team and attending, Dr. Bee Ervin, D.O.    [facility] [place] [id number]. Service  PGY-1  (available by [facility])

## 2020-11-08 NOTE — PROGRESS NOTES
Pt alert and oriented. Assessment completed as charted. Up in chair; denies pain, nausea or any needs at present time. Call light and bedside table within reach. Bed locked and in lowest position. Will continue to monitor.

## 2020-11-09 ENCOUNTER — APPOINTMENT (OUTPATIENT)
Dept: GENERAL RADIOLOGY | Age: 75
DRG: 694 | End: 2020-11-09
Payer: MEDICARE

## 2020-11-09 ENCOUNTER — ANESTHESIA EVENT (OUTPATIENT)
Dept: OPERATING ROOM | Age: 75
DRG: 694 | End: 2020-11-09
Payer: MEDICARE

## 2020-11-09 ENCOUNTER — ANESTHESIA (OUTPATIENT)
Dept: OPERATING ROOM | Age: 75
DRG: 694 | End: 2020-11-09
Payer: MEDICARE

## 2020-11-09 VITALS
BODY MASS INDEX: 32.96 KG/M2 | HEART RATE: 60 BPM | HEIGHT: 67 IN | SYSTOLIC BLOOD PRESSURE: 152 MMHG | OXYGEN SATURATION: 96 % | WEIGHT: 210 LBS | DIASTOLIC BLOOD PRESSURE: 68 MMHG | TEMPERATURE: 97.6 F | RESPIRATION RATE: 18 BRPM

## 2020-11-09 VITALS
RESPIRATION RATE: 6 BRPM | SYSTOLIC BLOOD PRESSURE: 112 MMHG | DIASTOLIC BLOOD PRESSURE: 58 MMHG | OXYGEN SATURATION: 100 %

## 2020-11-09 PROBLEM — R10.9 FLANK PAIN WITH HISTORY OF UROLITHIASIS: Status: RESOLVED | Noted: 2020-11-07 | Resolved: 2020-11-09

## 2020-11-09 PROBLEM — Z87.442 FLANK PAIN WITH HISTORY OF UROLITHIASIS: Status: RESOLVED | Noted: 2020-11-07 | Resolved: 2020-11-09

## 2020-11-09 LAB
GLUCOSE BLD-MCNC: 147 MG/DL (ref 70–99)
GLUCOSE BLD-MCNC: 167 MG/DL (ref 70–99)
PERFORMED ON: ABNORMAL
PERFORMED ON: ABNORMAL

## 2020-11-09 PROCEDURE — 2580000003 HC RX 258: Performed by: FAMILY MEDICINE

## 2020-11-09 PROCEDURE — 7100000000 HC PACU RECOVERY - FIRST 15 MIN: Performed by: UROLOGY

## 2020-11-09 PROCEDURE — 3209999900 FLUORO FOR SURGICAL PROCEDURES

## 2020-11-09 PROCEDURE — 3600000014 HC SURGERY LEVEL 4 ADDTL 15MIN: Performed by: UROLOGY

## 2020-11-09 PROCEDURE — 3700000000 HC ANESTHESIA ATTENDED CARE: Performed by: UROLOGY

## 2020-11-09 PROCEDURE — 2580000003 HC RX 258: Performed by: NURSE ANESTHETIST, CERTIFIED REGISTERED

## 2020-11-09 PROCEDURE — 3600000012 HC SURGERY LEVEL 2 ADDTL 15MIN: Performed by: UROLOGY

## 2020-11-09 PROCEDURE — 6360000002 HC RX W HCPCS: Performed by: FAMILY MEDICINE

## 2020-11-09 PROCEDURE — 6360000002 HC RX W HCPCS: Performed by: NURSE ANESTHETIST, CERTIFIED REGISTERED

## 2020-11-09 PROCEDURE — 3700000001 HC ADD 15 MINUTES (ANESTHESIA): Performed by: UROLOGY

## 2020-11-09 PROCEDURE — 6360000004 HC RX CONTRAST MEDICATION: Performed by: UROLOGY

## 2020-11-09 PROCEDURE — 88300 SURGICAL PATH GROSS: CPT

## 2020-11-09 PROCEDURE — 2500000003 HC RX 250 WO HCPCS: Performed by: NURSE ANESTHETIST, CERTIFIED REGISTERED

## 2020-11-09 PROCEDURE — 82365 CALCULUS SPECTROSCOPY: CPT

## 2020-11-09 PROCEDURE — 3600000002 HC SURGERY LEVEL 2 BASE: Performed by: UROLOGY

## 2020-11-09 PROCEDURE — 2580000003 HC RX 258: Performed by: UROLOGY

## 2020-11-09 PROCEDURE — 3600000004 HC SURGERY LEVEL 4 BASE: Performed by: UROLOGY

## 2020-11-09 PROCEDURE — 0TCB8ZZ EXTIRPATION OF MATTER FROM BLADDER, VIA NATURAL OR ARTIFICIAL OPENING ENDOSCOPIC: ICD-10-PCS | Performed by: UROLOGY

## 2020-11-09 PROCEDURE — 74420 UROGRAPHY RTRGR +-KUB: CPT

## 2020-11-09 PROCEDURE — 2709999900 HC NON-CHARGEABLE SUPPLY: Performed by: UROLOGY

## 2020-11-09 PROCEDURE — 7100000001 HC PACU RECOVERY - ADDTL 15 MIN: Performed by: UROLOGY

## 2020-11-09 PROCEDURE — 0T7D8ZZ DILATION OF URETHRA, VIA NATURAL OR ARTIFICIAL OPENING ENDOSCOPIC: ICD-10-PCS | Performed by: UROLOGY

## 2020-11-09 PROCEDURE — BT1D1ZZ FLUOROSCOPY OF RIGHT KIDNEY, URETER AND BLADDER USING LOW OSMOLAR CONTRAST: ICD-10-PCS | Performed by: UROLOGY

## 2020-11-09 RX ORDER — MEPERIDINE HYDROCHLORIDE 50 MG/ML
12.5 INJECTION INTRAMUSCULAR; INTRAVENOUS; SUBCUTANEOUS EVERY 5 MIN PRN
Status: DISCONTINUED | OUTPATIENT
Start: 2020-11-09 | End: 2020-11-09 | Stop reason: HOSPADM

## 2020-11-09 RX ORDER — LABETALOL HYDROCHLORIDE 5 MG/ML
5 INJECTION, SOLUTION INTRAVENOUS EVERY 10 MIN PRN
Status: DISCONTINUED | OUTPATIENT
Start: 2020-11-09 | End: 2020-11-09 | Stop reason: HOSPADM

## 2020-11-09 RX ORDER — ONDANSETRON 2 MG/ML
4 INJECTION INTRAMUSCULAR; INTRAVENOUS EVERY 10 MIN PRN
Status: DISCONTINUED | OUTPATIENT
Start: 2020-11-09 | End: 2020-11-09 | Stop reason: HOSPADM

## 2020-11-09 RX ORDER — PROPOFOL 10 MG/ML
INJECTION, EMULSION INTRAVENOUS PRN
Status: DISCONTINUED | OUTPATIENT
Start: 2020-11-09 | End: 2020-11-09 | Stop reason: SDUPTHER

## 2020-11-09 RX ORDER — DEXAMETHASONE SODIUM PHOSPHATE 4 MG/ML
INJECTION, SOLUTION INTRA-ARTICULAR; INTRALESIONAL; INTRAMUSCULAR; INTRAVENOUS; SOFT TISSUE PRN
Status: DISCONTINUED | OUTPATIENT
Start: 2020-11-09 | End: 2020-11-09 | Stop reason: SDUPTHER

## 2020-11-09 RX ORDER — MAGNESIUM HYDROXIDE 1200 MG/15ML
LIQUID ORAL PRN
Status: DISCONTINUED | OUTPATIENT
Start: 2020-11-09 | End: 2020-11-09 | Stop reason: HOSPADM

## 2020-11-09 RX ORDER — ONDANSETRON 2 MG/ML
INJECTION INTRAMUSCULAR; INTRAVENOUS PRN
Status: DISCONTINUED | OUTPATIENT
Start: 2020-11-09 | End: 2020-11-09 | Stop reason: SDUPTHER

## 2020-11-09 RX ORDER — TAMSULOSIN HYDROCHLORIDE 0.4 MG/1
0.4 CAPSULE ORAL DAILY
Qty: 30 CAPSULE | Refills: 3 | Status: SHIPPED | OUTPATIENT
Start: 2020-11-10 | End: 2021-09-27

## 2020-11-09 RX ORDER — FENTANYL CITRATE 50 UG/ML
INJECTION, SOLUTION INTRAMUSCULAR; INTRAVENOUS PRN
Status: DISCONTINUED | OUTPATIENT
Start: 2020-11-09 | End: 2020-11-09 | Stop reason: SDUPTHER

## 2020-11-09 RX ORDER — OXYCODONE HYDROCHLORIDE AND ACETAMINOPHEN 5; 325 MG/1; MG/1
2 TABLET ORAL PRN
Status: DISCONTINUED | OUTPATIENT
Start: 2020-11-09 | End: 2020-11-09 | Stop reason: HOSPADM

## 2020-11-09 RX ORDER — LIDOCAINE HYDROCHLORIDE 10 MG/ML
INJECTION, SOLUTION INFILTRATION; PERINEURAL PRN
Status: DISCONTINUED | OUTPATIENT
Start: 2020-11-09 | End: 2020-11-09 | Stop reason: SDUPTHER

## 2020-11-09 RX ORDER — ASPIRIN 81 MG/1
81 TABLET, CHEWABLE ORAL DAILY
Qty: 30 TABLET | Refills: 3 | Status: SHIPPED | OUTPATIENT
Start: 2020-11-10 | End: 2020-12-01

## 2020-11-09 RX ORDER — GLYCOPYRROLATE 0.2 MG/ML
INJECTION INTRAMUSCULAR; INTRAVENOUS PRN
Status: DISCONTINUED | OUTPATIENT
Start: 2020-11-09 | End: 2020-11-09 | Stop reason: SDUPTHER

## 2020-11-09 RX ORDER — HYDRALAZINE HYDROCHLORIDE 20 MG/ML
5 INJECTION INTRAMUSCULAR; INTRAVENOUS EVERY 10 MIN PRN
Status: DISCONTINUED | OUTPATIENT
Start: 2020-11-09 | End: 2020-11-09 | Stop reason: HOSPADM

## 2020-11-09 RX ORDER — OXYCODONE HYDROCHLORIDE AND ACETAMINOPHEN 5; 325 MG/1; MG/1
1 TABLET ORAL PRN
Status: DISCONTINUED | OUTPATIENT
Start: 2020-11-09 | End: 2020-11-09 | Stop reason: HOSPADM

## 2020-11-09 RX ORDER — SODIUM CHLORIDE 9 MG/ML
INJECTION, SOLUTION INTRAVENOUS CONTINUOUS PRN
Status: DISCONTINUED | OUTPATIENT
Start: 2020-11-09 | End: 2020-11-09 | Stop reason: SDUPTHER

## 2020-11-09 RX ORDER — LOSARTAN POTASSIUM 50 MG/1
50 TABLET ORAL DAILY
Qty: 30 TABLET | Refills: 3 | Status: SHIPPED | OUTPATIENT
Start: 2020-11-10 | End: 2021-09-27

## 2020-11-09 RX ADMIN — ONDANSETRON 4 MG: 2 INJECTION INTRAMUSCULAR; INTRAVENOUS at 12:17

## 2020-11-09 RX ADMIN — GLYCOPYRROLATE 0.2 MG: 0.2 INJECTION, SOLUTION INTRAMUSCULAR; INTRAVENOUS at 12:17

## 2020-11-09 RX ADMIN — FENTANYL CITRATE 50 MCG: 50 INJECTION INTRAMUSCULAR; INTRAVENOUS at 12:07

## 2020-11-09 RX ADMIN — CEFTRIAXONE SODIUM 1 G: 1 INJECTION, POWDER, FOR SOLUTION INTRAMUSCULAR; INTRAVENOUS at 00:51

## 2020-11-09 RX ADMIN — PROPOFOL 200 MG: 10 INJECTION, EMULSION INTRAVENOUS at 12:07

## 2020-11-09 RX ADMIN — SODIUM CHLORIDE: 9 INJECTION, SOLUTION INTRAVENOUS at 09:17

## 2020-11-09 RX ADMIN — SODIUM CHLORIDE: 9 INJECTION, SOLUTION INTRAVENOUS at 11:53

## 2020-11-09 RX ADMIN — DEXAMETHASONE SODIUM PHOSPHATE 4 MG: 4 INJECTION, SOLUTION INTRAMUSCULAR; INTRAVENOUS at 12:17

## 2020-11-09 RX ADMIN — LIDOCAINE HYDROCHLORIDE 40 MG: 10 INJECTION, SOLUTION INFILTRATION; PERINEURAL at 12:07

## 2020-11-09 ASSESSMENT — PULMONARY FUNCTION TESTS
PIF_VALUE: 13
PIF_VALUE: 2
PIF_VALUE: 1
PIF_VALUE: 0
PIF_VALUE: 4
PIF_VALUE: 11
PIF_VALUE: 13
PIF_VALUE: 13
PIF_VALUE: 12
PIF_VALUE: 11
PIF_VALUE: 0
PIF_VALUE: 11
PIF_VALUE: 0
PIF_VALUE: 1
PIF_VALUE: 1
PIF_VALUE: 11
PIF_VALUE: 1
PIF_VALUE: 2
PIF_VALUE: 18
PIF_VALUE: 5
PIF_VALUE: 0
PIF_VALUE: 13
PIF_VALUE: 2
PIF_VALUE: 3
PIF_VALUE: 0
PIF_VALUE: 15
PIF_VALUE: 4
PIF_VALUE: 1
PIF_VALUE: 1
PIF_VALUE: 15
PIF_VALUE: 0
PIF_VALUE: 4
PIF_VALUE: 14
PIF_VALUE: 3
PIF_VALUE: 11
PIF_VALUE: 1
PIF_VALUE: 13
PIF_VALUE: 1
PIF_VALUE: 0
PIF_VALUE: 0
PIF_VALUE: 13
PIF_VALUE: 14
PIF_VALUE: 13
PIF_VALUE: 1
PIF_VALUE: 1
PIF_VALUE: 0

## 2020-11-09 ASSESSMENT — PAIN DESCRIPTION - ONSET: ONSET: OTHER (COMMENT)

## 2020-11-09 ASSESSMENT — PAIN SCALES - GENERAL
PAINLEVEL_OUTOF10: 0
PAINLEVEL_OUTOF10: 0

## 2020-11-09 ASSESSMENT — PAIN DESCRIPTION - PAIN TYPE: TYPE: OTHER (COMMENT)

## 2020-11-09 ASSESSMENT — PAIN DESCRIPTION - DESCRIPTORS: DESCRIPTORS: ACHING

## 2020-11-09 ASSESSMENT — PAIN DESCRIPTION - FREQUENCY: FREQUENCY: OTHER (COMMENT)

## 2020-11-09 ASSESSMENT — PAIN DESCRIPTION - LOCATION: LOCATION: OTHER (COMMENT)

## 2020-11-09 ASSESSMENT — PAIN DESCRIPTION - ORIENTATION: ORIENTATION: RIGHT

## 2020-11-09 NOTE — PROGRESS NOTES
Patient alert and oriented. Remains npo for surgery at noon. Consent signed and in chart. Oral medications held. Insulin held. Patient denies pain. Wife at bedside.

## 2020-11-09 NOTE — PROGRESS NOTES
Inpatient Family Medicine Progress Note      PCP: Crista Proctor DO    Date of Admission: 11/7/2020    Chief Complaint: Right Flank Pain      Hospital Course:   Ramón Mondragon a 67 y. o. male w/ pmhx DM, HLD, and HTN presents to [facility] with Right Flank pain starting at 6:00 am on 11/07/2020.     His right sided pain began at 0600, when he woke up. He was simply sitting and relaxing when the pain came out of no where. He does have past medical history of kidney stones, but for the most part has never needed an intervention.      Pt last saw urology 1 month ago and is scheduled for nephrostomy on 12/4/2020. Pt complaints today include dry heaving but no episodes of emesis and localized pain to the right lateral abdomen/ LRQ. No dysuria or gross hematuria. Most of pain is in the right flank. Had diarrhea/constipation. No CP/SOB. Ambulating without difficulty. Denies any dizziness today. Pain is rated 10/10 and constant. About 1 year ago he had \"US treatment\" of other kidney stones. History of multiple kidney stones as he has gotten older.      While in the ED, pt was given pain and nausea medicine which significantly improved his symptoms.   - He received 2 mg of Dilaudid and 8 mg of Zofran. Pt states that he had no pain at the time of examination and only slight nausea.    Pt was started on Rocephin 1 g daily.      Pt also received his home dose of Norvasc 5 mg.      Lab work included:   - UA RBC +, Glucose +  - Normal Lipase  - CBC wnl  - CMP wnl     CT imaging showed:  Very large calculus present inferiorly in the left kidney and   unchanged in appearance and position.  Moderate right-sided hydronephrosis   now present.  Small 4 mm calculus now present in the proximal right ureter   below the UPJ.  Calculus lies at the mid L4 level.  Moderate right   perinephric stranding present.  Tiny nonobstructing calculus in the mid right   kidney.  Other abdominal organs appear normal.         11/08 : Pt did not need pain control overnight. Eating solid foods and tolerating well. Denies passing any stones during urination. On maintenance fluids of 150 ml/hr NS. NPO overnight. Subjective:     Pt did not need pain control overnight. He is not in any pain on examination. He is NPO for PCNL at 12:00 pm. Evening Glargine held and morning Novolin d/t NPO status. Still on maintenance fluids - 75 ml/hr NS. LE edema improved from yesterday.     Medications:  Reviewed    Infusion Medications    sodium chloride 75 mL/hr at 11/08/20 1901    dextrose       Scheduled Medications    sodium chloride flush  10 mL Intravenous 2 times per day    enoxaparin  40 mg Subcutaneous Daily    cefTRIAXone (ROCEPHIN) IV  1 g Intravenous Q24H    losartan  50 mg Oral Daily    [Held by provider] insulin glargine  66 Units Subcutaneous Nightly    potassium citrate  10 mEq Oral TID WC    Vitamin D  1,000 Units Oral Daily    insulin regular  10 Units Subcutaneous TID AC    aspirin  81 mg Oral Daily    atorvastatin  20 mg Oral Nightly    And    amLODIPine  5 mg Oral Daily    tamsulosin  0.4 mg Oral Daily     PRN Meds: ondansetron, ondansetron, sodium chloride flush, acetaminophen, polyethylene glycol, promethazine **OR** ondansetron, morphine, HYDROcodone 5 mg - acetaminophen, glucose, dextrose, glucagon (rDNA), dextrose, ketorolac      Intake/Output Summary (Last 24 hours) at 11/9/2020 0800  Last data filed at 11/9/2020 0056  Gross per 24 hour   Intake 1920 ml   Output 1350 ml   Net 570 ml       Physical Exam Performed:    BP (!) 155/73   Pulse 66   Temp 97.9 °F (36.6 °C) (Oral)   Resp 16   Ht 5' 7\" (1.702 m)   Wt 210 lb (95.3 kg)   SpO2 97%   BMI 32.89 kg/m²     General appearance:  Sitting up in chair, breathing comfortably, without pain      HEENT:  Normal cephalic, atraumatic without obvious deformity. Pupils equal, round, and reactive to light. Conjunctivae/corneas clear. Neck: Supple, with full range of motion. Trachea midline. Respiratory:  Normal respiratory effort. Clear to auscultation, bilaterally without Rales/Wheezes/Rhonchi. Cardiovascular:  Regular rate and rhythm with normal S1/S2 without murmurs, rubs or gallops. Abdomen: Soft, non-tender, non-distended with normal bowel sounds. Musculoskeletal:  No clubbing or cyanosis. +1 LE edema bilaterally.  Full range of motion without deformity. Back: No CVA tenderness  Skin: Skin color, texture, turgor normal.  No rashes or lesions. Neurologic:  Neurovascularly intact without any focal sensory/motor deficits. Psychiatric:  Alert and oriented, thought content appropriate, normal insight       Labs:   Recent Labs     11/07/20 0743   WBC 6.5   HGB 14.6   HCT 42.0   *     Recent Labs     11/07/20 0743 11/08/20  0547    142   K 4.0 3.8    108   CO2 28 27   BUN 13 12   CREATININE 1.0 0.8   CALCIUM 9.0 8.5     Recent Labs     11/07/20 0743   AST 22   ALT 25   BILITOT 0.7   ALKPHOS 128     No results for input(s): INR in the last 72 hours. No results for input(s): Josh Seed in the last 72 hours. Urinalysis:      Lab Results   Component Value Date    NITRU Negative 11/07/2020    WBCUA 3-5 11/07/2020    BACTERIA Rare 11/07/2020    RBCUA 21-50 11/07/2020    BLOODU LARGE 11/07/2020    SPECGRAV >=1.030 11/07/2020    GLUCOSEU 500 11/07/2020       Radiology:  XR ABDOMEN (KUB) (SINGLE AP VIEW)   Final Result   Poor visualization of previously identified right ureteral calculus, as there   is significant bowel gas in the region. Renal stone protocol CT could be   performed for reassessment as clinically warranted. CT ABDOMEN PELVIS WO CONTRAST Additional Contrast? None   Final Result   Moderate right-sided hydronephrosis due to 4 mm calculus in the proximal   right ureter. Large nonobstructing calculus remains inferiorly in the left   kidney. Normal appendix. No other acute abnormality.            Assessment/Plan:    Active Hospital Problems Diagnosis Date Noted    Flank pain with history of urolithiasis [R10.9, Z87.442] 11/07/2020    Urolithiasis [N20.9] 11/07/2020    HTN (hypertension), benign [I10]     Uncontrolled type 2 diabetes mellitus with complication, without long-term current use of insulin (HCC) [E11.8, E11.65]     Dyslipidemia [E78.5]      Isis ko 75 y.o. Hospital Day #3 admitted for Flank pain with history of urolithiasis. Flank Pain  Urolithiasis  Patient evaluated by Urology in the ED  - > Right hydronephrosis secondary to obstruction from ureteral calculi, as described above  - > Uncontrolled flank pain and nausea    Vitals and Labs stable  Hydration and Pain control   - Tylenol 650 prn   - Norco 5-325 prn  - Toradol 15 prn  - Zofran 4 prn  - NS 75 ml/hr  > No current pain  > PCNL scheduled for 12:00 pm today        HTN  DM  HLD  - Continue Home Medications  > Amlodipine 5, Lipitor 20, ASA 81, Lantus 66 u, Novolin 20 TID, K+ 10 TID, Flomax, Vit D   - Held DM meds d/t NPO     DVT Prophylaxis: Lovenox   Diet: Carb Control: 3 carb choices (45 gms)/meal  Code Status: Full Code  PT/OT Eval Status: NA  Dispo - Inpatient     This patient was seen and evaluated with the resident team and attending, Dr. Roena Kayser, D.O.    [facility] [place] [id number]. Service  PGY-1  (available by [facility])

## 2020-11-09 NOTE — DISCHARGE SUMMARY
Inpatient Family Medicine Service Discharge Summary      Patient ID: Verónica Meza    Patient's PCP: Jhoan Bonilla DO    Admit Date: 11/7/2020   Discharge Date:   11/09/2020    Admitting Physician: Jhoan Bonilla DO  Discharge Physician: Catrachita Mcgee DO     Discharge Diagnoses: Active Hospital Problems    Diagnosis Date Noted    Urolithiasis [N20.9] 11/07/2020    HTN (hypertension), benign [I10]     Uncontrolled type 2 diabetes mellitus with complication, without long-term current use of insulin (HCC) [E11.8, E11.65]     Dyslipidemia [E78.5]        The patient was seen and examined on day of discharge and this discharge summary is in conjunction with any daily progress note from day of discharge. Hospital Course: See Urology Discharge Summary    Sandra ko 67 y. o. male w/ pmhx DM, HLD, and HTN presents to [facility] with Right Flank pain starting at 6:00 am on 11/07/2020. Admitted to [facility] for Pain Control, Nausea Prevention, and IV Fluids. Urology completed cystoscopy 11/09/2020. Pt doing well after procedure and stable for discharge. Physical Exam Performed:     BP (!) 152/68   Pulse 60   Temp 97.6 °F (36.4 °C) (Oral)   Resp 18   Ht 5' 7\" (1.702 m)   Wt 210 lb (95.3 kg)   SpO2 96%   BMI 32.89 kg/m²     General appearance:  Sitting up in chair, breathing comfortably, without pain      HEENT:  Normocephalic, atraumatic without obvious deformity. Pupils equal, round, and reactive to light. Conjunctivae/corneas clear. Neck: Supple, with full range of motion. Trachea midline. Respiratory:  Normal respiratory effort. Clear to auscultation, bilaterally   Cardiovascular:  Regular rate and rhythm with normal S1/S2 without murmurs  Abdomen: Soft, non-tender, non-distended with normal bowel sounds. Musculoskeletal:  No clubbing or cyanosis. +1 LE edema bilaterally.  Full range of motion without deformity.   Back: No CVA tenderness  Skin: Skin color, texture, turgor normal.  No rashes or lesions. Neurologic:  Neurovascularly intact without any focal sensory/motor deficits. Psychiatric:  Alert and oriented, thought content appropriate, normal insight      Labs: For convenience and continuity at follow-up the following most recent labs are provided:      CBC:    Lab Results   Component Value Date    WBC 6.5 11/07/2020    HGB 14.6 11/07/2020    HCT 42.0 11/07/2020     11/07/2020       Renal:    Lab Results   Component Value Date     11/08/2020    K 3.8 11/08/2020     11/08/2020    CO2 27 11/08/2020    BUN 12 11/08/2020    CREATININE 0.8 11/08/2020    CALCIUM 8.5 11/08/2020         Significant Diagnostic Studies    Radiology:   [facility] RETROGRADE PYELOGRAM RIGHT   Final Result   Intraprocedural fluoroscopic spot images as above. See separate procedure   report for more information. XR ABDOMEN (KUB) (SINGLE AP VIEW)   Final Result   Poor visualization of previously identified right ureteral calculus, as there   is significant bowel gas in the region. Renal stone protocol CT could be   performed for reassessment as clinically warranted. CT ABDOMEN PELVIS WO CONTRAST Additional Contrast? None   Final Result   Moderate right-sided hydronephrosis due to 4 mm calculus in the proximal   right ureter. Large nonobstructing calculus remains inferiorly in the left   kidney. Normal appendix. No other acute abnormality.          FLUORO FOR SURGICAL PROCEDURES    (Results Pending)          Consults:     IP CONSULT TO UROLOGY  IP CONSULT TO HOSPITALIST  IP CONSULT TO UROLOGY    Disposition:  Home    Condition at Discharge: Stable    Discharge Instructions/Follow-up:    Follow up PCP and urology     Code Status:  Full Code     Activity: activity as tolerated    Diet: diabetic diet      Discharge Medications:     Current Discharge Medication List           Details   losartan (COZAAR) 50 MG tablet Take 1 tablet by mouth daily  Qty: 30 tablet, Refills: 3

## 2020-11-09 NOTE — DISCHARGE SUMMARY
Urology Discharge Summary      Patient Identification  Liss Bingham is a 67 y.o. male. :  1945  Admit Date:  2020    Discharge date:   No discharge date for patient encounter. Disposition: home    Discharge Diagnoses: right ureteral calculus  Patient Active Problem List   Diagnosis    Transient global amnesia    Acute encephalopathy    Hemispheric carotid artery syndrome    Uncontrolled type 2 diabetes mellitus with complication, without long-term current use of insulin ([address]    HTN (hypertension), benign    Dyslipidemia    Flank pain with history of urolithiasis    Urolithiasis       Surgery: cystoscopy, right ureteroscopy, right retrograde pyelogram, removal of stone from bladder, urethral dilation    Activity:  activity as tolerated    Condition on discharge: good    Follow-up: patient will see Dr. Kami Strong to have a percutaneous nephrolithotomy on the left side in the near future    Hospital course: patient admitted with right flank pain and had a 4 mm stone in the proximal right ureter. He had the above procedure. He will be discharged home today if he is comfortable.     Cam Dangelo MD

## 2020-11-09 NOTE — PROGRESS NOTES
Urology Attending Progress Note      Subjective: Patient states \"no pain\"    67 y.o.male with hx of kidney stones, presented with severe abdominal pain, came to hospital found to have 4mm right mid ureter stone. Patient states he is no longer nauseous today. Denies abdominal pain. Planning for left PCNL by Dr. Marilu Thornton in December for large left renal, partial staghorn calculus. Vitals:  BP (!) 152/67   Pulse 58   Temp 98 °F (36.7 °C) (Oral)   Resp 16   Ht 5' 7\" (1.702 m)   Wt 210 lb (95.3 kg)   SpO2 94%   BMI 32.89 kg/m²   Temp  Av.1 °F (36.7 °C)  Min: 97.9 °F (36.6 °C)  Max: 98.5 °F (36.9 °C)    Intake/Output Summary (Last 24 hours) at 2020 0942  Last data filed at 2020 0056  Gross per 24 hour   Intake 1680 ml   Output 1350 ml   Net 330 ml       Exam:    Well developed, well nourished in no acute distress   Orientated to time and place   Neck is supple, trachea is midline   Respiratory effort is normal without distress   Cardiovascular show no extremity swelling   Abdomen soft, nontender, nondistended, no guarding. No masses or hernias are palpated.     Skin warm and dry, exposed skin shows no abnormal lesions, rashes   Musculoskeletal no digital cyanosis, head normocephalic   Psych shows normal mood and affect, alert and appropriately answers questions   no howe catheter    Labs:  WBC:    Lab Results   Component Value Date    WBC 6.5 2020     Hemoglobin/Hematocrit:    Lab Results   Component Value Date    HGB 14.6 2020    HCT 42.0 2020     BMP:    Lab Results   Component Value Date     2020    K 3.8 2020     2020    CO2 27 2020    BUN 12 2020    LABALBU 4.6 2020    CREATININE 0.8 2020    CALCIUM 8.5 2020    GFRAA >60 2020    LABGLOM >60 2020     PT/INR:    Lab Results   Component Value Date    PROTIME 12.0 2018    INR 1.06 2018     PTT:  No results found for: APTT[APTT    Impression/Plan:   67 y.o.male with hx of kidney stones presented to hospital with severe abdominal pain, has obstructing 4mm right mid ureteral stone. States has no abdominal pain, nausea.  Has been straining his urine     -NPO  -Will have cysto, uretero today with Dr. Wilda Parker PA-C

## 2020-11-09 NOTE — PROGRESS NOTES
Pt alert and oriented. Assessment completed as charted. Pt with no complaints of pain or nausea at present time. Up in chair, resting comfortably. Trace pitting edema to BLE, legs elevated. Denies any needs at present time. Call light and bedside table within reach. Bed locked and in lowest position. Will continue to monitor.

## 2020-11-09 NOTE — PROGRESS NOTES
Gave bedside report to floor nurse and performed 4 eye assessment. Patient vital signs are stable and patient denies any pain. 4 Eyes Skin Assessment     The patient is being assess for   Post-Op Surgical    I agree that 2 RN's have performed a thorough Head to Toe Skin Assessment on the patient. ALL assessment sites listed below have been assessed. Areas assessed by both nurses:   [x]   Head, Face, and Ears   [x]   Shoulders, Back, and Chest, Abdomen  [x]   Arms, Elbows, and Hands   [x]   Coccyx, Sacrum, and Ischium  [x]   Legs, Feet, and Heels        Sacrum area is red but blanchable. **SHARE this note so that the co-signing nurse is able to place an eSignature**    Co-signer eSignature: Electronically signed by Stacy Quiros RN on 11/9/20 at 4:26 PM EST    Does the Patient have Skin Breakdown?   No          Sigifredo Prevention initiated:  NA   Wound Care Orders initiated:  NA      [facility] nurse consulted for Pressure Injury (Stage 3,4, Unstageable, DTI, NWPT, Complex wounds)and New or Established Ostomies:  NA      Primary Nurse eSignature: Electronically signed by Cat Maldonado RN on 11/9/20 at 1:46 PM EST

## 2020-11-09 NOTE — ANESTHESIA POSTPROCEDURE EVALUATION
Department of Anesthesiology  Postprocedure Note    Patient: Jenise Verdin  MRN: [id number]  YOB: 1945  Date of evaluation: 11/9/2020  Time:  4:46 PM     Procedure Summary     Date:  11/09/20 Room / Location:  [address] / [facility]    Anesthesia Start:  5183 Anesthesia Stop:  0987    Procedure:  CYSTOSCOPY,  RIGHT URETEROSCOPY, RIGHT RETRO PYELOGRAM, REMOVAL STONE FROM BLADDER, URETHRAL DILITATION (Right Ureter) Diagnosis:  (RIGHT URETERAL CALCULUS)    Surgeon:  Vonda Poole MD Responsible Provider:  Mikki Barker MD    Anesthesia Type:  general ASA Status:  3          Anesthesia Type: general    Antonella Phase I: Antonella Score: 9    Antonella Phase II:      Last vitals: Reviewed and per EMR flowsheets.        Anesthesia Post Evaluation    Patient location during evaluation: PACU  Level of consciousness: awake  Airway patency: patent  Nausea & Vomiting: no nausea  Complications: no  Cardiovascular status: blood pressure returned to baseline  Respiratory status: acceptable  Hydration status: euvolemic

## 2020-11-09 NOTE — PROGRESS NOTES
Patient returned from surgery. Awake and alert. VSS. Meatus with scant bloody drainage. Voided 30 cc of bloody urine. C/o discomfort when urinating. Wife at bedside, ordered lunch.

## 2020-11-09 NOTE — PROGRESS NOTES
Patient arrived in PACU with an LMA still in place. Vital signs stable. Patient able to follow commands. Patient LMA removed within 3 minutes of arrival to Pacu.

## 2020-11-09 NOTE — ANESTHESIA PRE PROCEDURE
DO   Stopped at 11/09/20 0919       Allergies:  No Known Allergies    Problem List:    Patient Active Problem List   Diagnosis Code    Transient global amnesia G45.4    Acute encephalopathy G93.40    Hemispheric carotid artery syndrome G45.1    Uncontrolled type 2 diabetes mellitus with complication, without long-term current use of insulin (HCC) E11.8, E11.65    HTN (hypertension), benign I10    Dyslipidemia E78.5    Flank pain with history of urolithiasis R10.9, Z87.442    Urolithiasis N20.9       Past Medical History:        Diagnosis Date    Cancer ([facility] [address]     prostate    [facility]     Hyperlipidemia     Hypertension     Kidney stone        Past Surgical History:        Procedure Laterality Date    PROSTATE SURGERY         Social History:    Social History     Tobacco Use    Smoking status: Former Smoker    Smokeless tobacco: Never Used   Substance Use Topics    Alcohol use: No                                Counseling given: Not Answered      Vital Signs (Current):   Vitals:    11/08/20 1502 11/08/20 2251 11/09/20 0915 11/09/20 1110   BP:  (!) 155/73 (!) 152/67 (!) 153/70   Pulse: 65 66 58 60   Resp:  16 16 16   Temp:  97.9 °F (36.6 °C) 98 °F (36.7 °C) 98.3 °F (36.8 °C)   TempSrc:  Oral Oral Temporal   SpO2:  97% 94% 98%   Weight:       Height:                                                  BP Readings from Last 3 Encounters:   11/09/20 (!) 153/70   02/12/18 133/70       NPO Status: Time of last liquid consumption: 2000                        Time of last solid consumption: 2000                        Date of last liquid consumption: 11/08/20                        Date of last solid food consumption: 11/08/20    BMI:   Wt Readings from Last 3 Encounters:   11/07/20 210 lb (95.3 kg)   02/11/18 213 lb (96.6 kg)     Body mass index is 32.89 kg/m².     CBC:   Lab Results   Component Value Date    WBC 6.5 11/07/2020    RBC 4.62 11/07/2020    HGB 14.6 11/07/2020    HCT 42.0 11/07/2020 MCV 90.8 11/07/2020    RDW 13.3 11/07/2020     11/07/2020       CMP:   Lab Results   Component Value Date     11/08/2020    K 3.8 11/08/2020     11/08/2020    CO2 27 11/08/2020    BUN 12 11/08/2020    CREATININE 0.8 11/08/2020    GFRAA >60 11/08/2020    AGRATIO 1.7 11/07/2020    LABGLOM >60 11/08/2020    GLUCOSE 98 11/08/2020    PROT 7.3 11/07/2020    CALCIUM 8.5 11/08/2020    BILITOT 0.7 11/07/2020    ALKPHOS 128 11/07/2020    AST 22 11/07/2020    ALT 25 11/07/2020       POC Tests:   Recent Labs     11/09/20  0703   POCGLU 167*       Coags:   Lab Results   Component Value Date    PROTIME 12.0 02/11/2018    INR 1.06 02/11/2018       HCG (If Applicable): No results found for: PREGTESTUR, PREGSERUM, HCG, HCGQUANT     ABGs: No results found for: PHART, PO2ART, YZL6BKE, JJF1CUP, BEART, S3QPSDPN     Type & Screen (If Applicable):  No results found for: LABABO, LABRH    Drug/Infectious Status (If Applicable):  No results found for: HIV, HEPCAB    COVID-19 Screening (If Applicable): No results found for: COVID19      Anesthesia Evaluation  Patient summary reviewed and Nursing notes reviewed no history of anesthetic complications:   Airway: Mallampati: II  TM distance: >3 FB   Neck ROM: full  Mouth opening: > = 3 FB Dental:    (+) edentulous      Pulmonary:Negative Pulmonary ROS                              Cardiovascular:Negative CV ROS    (+) hypertension:,                   Neuro/Psych:   Negative Neuro/Psych ROS     (-) TIA           GI/Hepatic/Renal: Neg GI/Hepatic/Renal ROS       (-) GERD, liver disease and no renal disease       Endo/Other: Negative Endo/Other ROS   (+) DiabetesType II DM, using insulin, malignancy/cancer (prostate). Abdominal:           Vascular: negative vascular ROS. Anesthesia Plan      general     ASA 3     (I discussed with the patient the risks and benefits of PIV, general anesthesia, IV Narcotics, PACU.  All questions were answered the patient agrees with the plan)  Induction: intravenous. MIPS: Prophylactic antiemetics administered. Anesthetic plan and risks discussed with patient and spouse. Plan discussed with CRNA.                   Mirna Berman MD   11/9/2020

## 2020-11-09 NOTE — OP NOTE
On the right side, I was unable to  pass the ureteroscope any further up the ureter because of angulation of  the ureter over the iliac vessels. At this point, a retrograde pyelogram was performed through the  ureteroscope and this showed free filling of contrast into the renal  pelvis. The ureteroscope was removed and there was flow of contrast out  of the right ureteral orifice with no stone seen and no filling defects  in the right ureter. The bladder was emptied. No stent was placed. The patient tolerated the procedure well, was taken to the recovery room  in stable condition. FINDINGS:  1.  Meatal stenosis dilated with sounds of 24-Pashto. 2.  A 6-mm stone in the lumen of the bladder which was probably the  passed right ureteral calculus. This was removed from the bladder and  sent to Pathology. 3.  Right ureteroscopy could only be performed in the mid right ureter  because of angulation of the ureter over the iliac vessels. 4.  Right retrograde pyelogram showed no stone and no filling defects in  the proximal ureter on the right side. 5.  No stent was placed. FOLLOWUP:  The patient will be discharged home this afternoon if he is  comfortable and he is scheduled for a left percutaneous nephrolithotomy  by Dr. Armida Epstein.     EBL - minimal        Dylan Dorsey MD    D: 11/09/2020 15:00:01       T: 11/09/2020 16:12:07     [id number]  Job#: [id number]     Doc#: [id number]    CC:

## 2020-11-09 NOTE — BRIEF OP NOTE
Brief Postoperative Note      Patient: Sue Nam  YOB: 1945  MRN: [id number]    Date of Procedure: 11/9/2020    Pre-Op Diagnosis: RIGHT URETERAL CALCULUS    Post-Op Diagnosis: Same       Procedure(s):  CYSTOSCOPY; RIGHT URETEROSCOPY, URETHRAL DILATION, REMOVAL OF STONE FROM BLADDER, RIGHT RETROGRADE PYELOGRAM     Surgeon(s):  Rock Anderson MD    Assistant:  Surgical Assistant: Apryl Crocker    Anesthesia: General    Estimated Blood Loss (mL): Minimal    Complications: None    Specimens:   ID Type Source Tests Collected by Time Destination   A : RIGHT URETERAL STONE Tissue Tissue SURGICAL PATHOLOGY Rock Anderson MD 11/9/2020 1224        Implants:  * No implants in log *      Drains: * No LDAs found *    Findings: 6 mm stone in bladder removed  Right ureteroscopy performed to mid ureter but unable to advance scope any higher  Retrograde pyelogram did not show stone or filling defect  No stent placed    Could go home today if comfortable    Electronically signed by Rock Anderson MD on 11/9/2020 at 12:35 PM

## 2020-11-12 LAB
CALCULI COMPOSITION: NORMAL
MASS: 56 MG
STONE DESCRIPTION: NORMAL
STONE NUMBER: 1
STONE SIZE: NORMAL MM

## 2020-11-13 NOTE — PROGRESS NOTES
[address]   Cardiac Consultation    Referring Provider:  Chris Johnson DO     Chief Complaint   Patient presents with    New Patient    Shortness of Breath    Dizziness    Hypertension        History of Present Illness:    Carmelo Gardner is a 67 y.o. male who is here as a NEW patient consult for cardiology, referred by Chris Johnson DO for an abnormal EKG, hypertension, hyperlipidemia, and diabetes. Today he states he has SOB at times. No chest pain. SOB started a few months ago and he thinks this is related to inactivity. 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 SOB resolves when he stops and rest. No assoc chest pain. Has gotten worse. Occurs daily. He has had some leg swelling at times. Patient currently denies any weight gain, palpitations, chest pain, and syncope. He will be having a procedure for kidney stones soon. Past Medical History:   has a past medical history of Cancer ([facility] [address] Diabetes mellitus ([facility] [address] Hyperlipidemia, Hypertension, and Kidney stone. Surgical History:   has a past surgical history that includes Prostate surgery and Cystoscopy (Right, 11/9/2020). Social History:   reports that he has quit smoking. He has never used smokeless tobacco. He reports that he does not drink alcohol or use drugs. Family History:  family history is not on file. Home Medications:  Prior to Admission medications    Medication Sig Start Date End Date Taking?  Authorizing Provider   losartan (COZAAR) 50 MG tablet Take 1 tablet by mouth daily 11/10/20  Yes Radha Cox DO   tamsulosin (FLOMAX) 0.4 MG capsule Take 1 capsule by mouth daily 11/10/20  Yes Radha Cox DO   insulin aspart (NOVOLOG) 100 UNIT/ML injection vial Inject into the skin 3 times daily (before meals)   Yes Historical Provider, MD   Insulin Glargine (TOUJEO SOLOSTAR SC) Inject into the skin   Yes Historical Provider, MD   amLODIPine-atorvastatatin (CADUET) 5-20 MG per tablet Take 1 tablet by mouth 6/8/17  Yes Historical Provider, MD   aspirin 81 MG chewable tablet Take 1 tablet by mouth daily  Patient not taking: Reported on 11/16/2020 11/10/20   Denece Rough, DO        Allergies:  Patient has no known allergies. Review of Systems:   · Constitutional: there has been no unanticipated weight loss. There's been no change in energy level, sleep pattern, or activity level. · Eyes: No visual changes or diplopia. No scleral icterus. · ENT: No Headaches, hearing loss or vertigo. No mouth sores or sore throat. · Cardiovascular: Reviewed in HPI  · Respiratory: No cough or wheezing, no sputum production. No hematemesis. · Gastrointestinal: No abdominal pain, appetite loss, blood in stools. No change in bowel or bladder habits. · Genitourinary: No dysuria, trouble voiding, or hematuria. · Musculoskeletal:  No gait disturbance, weakness or joint complaints. · Integumentary: No rash or pruritis. · Neurological: No headache, diplopia, change in muscle strength, numbness or tingling. No change in gait, balance, coordination, mood, affect, memory, mentation, behavior. · Psychiatric: No anxiety, no depression. · Endocrine: No malaise, fatigue or temperature intolerance. No excessive thirst, fluid intake, or urination. No tremor. · Hematologic/Lymphatic: No abnormal bruising or bleeding, blood clots or swollen lymph nodes. · Allergic/Immunologic: No nasal congestion or hives.     Physical Examination:    Vitals:    11/16/20 1048   BP: 134/68   Pulse: 75   SpO2: 98%        Constitutional and General Appearance: NAD   Respiratory:  · Normal excursion and expansion without use of accessory muscles  · Resp Auscultation: Normal breath sounds without dullness  Cardiovascular:  · The apical impulses not displaced  · Heart tones are crisp, irregular   · Cervical veins are not engorged  · The carotid upstroke is normal in amplitude and contour without delay or bruit  · Normal S1S2, No S3, No Murmur  · Peripheral pulses are symmetrical and full  · There is no clubbing, cyanosis of the extremities. · No edema  · Femoral Arteries: 2+ and equal  · Pedal Pulses: 2+ and equal   Abdomen:  · No masses or tenderness  · Liver/Spleen: No Abnormalities Noted  Neurological/Psychiatric:  · Alert and oriented in all spheres  · Moves all extremities well  · Exhibits normal gait balance and coordination  · No abnormalities of mood, affect, memory, mentation, or behavior are noted    Stress test 2008  IMPRESSION-  Negative graded exercise test at a work load of 7 METS,  heart rate 133 (86% age-predicted maximum heart rate), and a rate  pressure product of 26.6 kilotor per minute.  Patient has no  clinical or electrocardiographic evidence of inducible ischemia and  the Duke treadmill score of 6 and normal recumbent heart rate  recovery both suggest low cardiovascular risk.  Simultaneous  echocardiographic imaging results are reported elsewhere.  It should  be noted the patient did have a very marked hypertensive systolic  blood pressure response for this level of exercise. Stress echocardiogram 9/2008  IMPRESSION-  Normal rest/stress echocardiogram.  Incidental  underlying left ventricular hypertrophy. Assessment:   SPRAGUE - possible angina equivalent or other cardiac   Abnormal EKG - reviewed. Appears to be multifocal atrial rhythm, not a-fib  Hypertension - controlled  Hyperlipemia   Diabetes mellitus       Plan:  Echocardiogram   Chest Xray   Stress test- [employer]   Labs- CBC, CMP, TSH, and BNP  One week cardiac event monitor   Continue current medications   Check blood pressure at home weekly  Regular exercise and following a healthy diet encouraged   Follow up with me in one month       Scribe's attestation:   This note was scribed in the presence of Dr. Kamla Purdy M.D. By Brandon Guy RN    The scribes documentation has been prepared under my direction and personally reviewed by me in its

## 2020-11-16 ENCOUNTER — HOSPITAL ENCOUNTER (OUTPATIENT)
Dept: GENERAL RADIOLOGY | Age: 75
Discharge: HOME | End: 2020-11-16
Payer: COMMERCIAL

## 2020-11-16 ENCOUNTER — OFFICE VISIT (OUTPATIENT)
Dept: CARDIOLOGY CLINIC | Age: 75
End: 2020-11-16
Payer: COMMERCIAL

## 2020-11-16 ENCOUNTER — TELEPHONE (OUTPATIENT)
Dept: CARDIOLOGY CLINIC | Age: 75
End: 2020-11-16

## 2020-11-16 ENCOUNTER — HOSPITAL ENCOUNTER (OUTPATIENT)
Age: 75
Discharge: HOME | End: 2020-11-16
Payer: COMMERCIAL

## 2020-11-16 VITALS
HEIGHT: 67 IN | OXYGEN SATURATION: 98 % | DIASTOLIC BLOOD PRESSURE: 68 MMHG | SYSTOLIC BLOOD PRESSURE: 134 MMHG | HEART RATE: 75 BPM | BODY MASS INDEX: 32.49 KG/M2 | WEIGHT: 207 LBS

## 2020-11-16 PROBLEM — R06.02 SOB (SHORTNESS OF BREATH): Status: ACTIVE | Noted: 2020-11-16

## 2020-11-16 LAB
A/G RATIO: 1.5 (ref 1.1–2.2)
ALBUMIN SERPL-MCNC: 4.3 G/DL (ref 3.4–5)
ALP BLD-CCNC: 124 U/L (ref 40–129)
ALT SERPL-CCNC: 29 U/L (ref 10–40)
ANION GAP SERPL CALCULATED.3IONS-SCNC: 12 MMOL/L (ref 3–16)
AST SERPL-CCNC: 20 U/L (ref 15–37)
BILIRUB SERPL-MCNC: 0.6 MG/DL (ref 0–1)
BUN BLDV-MCNC: 18 MG/DL (ref 7–20)
CALCIUM SERPL-MCNC: 9.3 MG/DL (ref 8.3–10.6)
CHLORIDE BLD-SCNC: 105 MMOL/L (ref 99–110)
CO2: 23 MMOL/L (ref 21–32)
CREAT SERPL-MCNC: 0.9 MG/DL (ref 0.8–1.3)
GFR AFRICAN AMERICAN: >60
GFR NON-AFRICAN AMERICAN: >60
GLOBULIN: 2.9 G/DL
GLUCOSE BLD-MCNC: 63 MG/DL (ref 70–99)
HCT VFR BLD CALC: 40.9 % (ref 40.5–52.5)
HEMOGLOBIN: 14.1 G/DL (ref 13.5–17.5)
MCH RBC QN AUTO: 31.3 PG (ref 26–34)
MCHC RBC AUTO-ENTMCNC: 34.5 G/DL (ref 31–36)
MCV RBC AUTO: 90.8 FL (ref 80–100)
PDW BLD-RTO: 13.5 % (ref 12.4–15.4)
PLATELET # BLD: 145 K/UL (ref 135–450)
PMV BLD AUTO: 8.7 FL (ref 5–10.5)
POTASSIUM SERPL-SCNC: 3.8 MMOL/L (ref 3.5–5.1)
PRO-BNP: 93 PG/ML (ref 0–449)
RBC # BLD: 4.5 M/UL (ref 4.2–5.9)
SODIUM BLD-SCNC: 140 MMOL/L (ref 136–145)
TOTAL PROTEIN: 7.2 G/DL (ref 6.4–8.2)
TSH REFLEX FT4: 3.06 UIU/ML (ref 0.27–4.2)
WBC # BLD: 9.5 K/UL (ref 4–11)

## 2020-11-16 PROCEDURE — 93000 ELECTROCARDIOGRAM COMPLETE: CPT | Performed by: INTERNAL MEDICINE

## 2020-11-16 PROCEDURE — 99204 OFFICE O/P NEW MOD 45 MIN: CPT | Performed by: INTERNAL MEDICINE

## 2020-11-16 PROCEDURE — 83880 ASSAY OF NATRIURETIC PEPTIDE: CPT

## 2020-11-16 PROCEDURE — 85027 COMPLETE CBC AUTOMATED: CPT

## 2020-11-16 PROCEDURE — 84443 ASSAY THYROID STIM HORMONE: CPT

## 2020-11-16 PROCEDURE — 36415 COLL VENOUS BLD VENIPUNCTURE: CPT

## 2020-11-16 PROCEDURE — 71046 X-RAY EXAM CHEST 2 VIEWS: CPT

## 2020-11-16 PROCEDURE — 80053 COMPREHEN METABOLIC PANEL: CPT

## 2020-11-16 NOTE — LETTER
[address]   Cardiac Consultation    Referring Provider:  Earl Greco DO     Chief Complaint   Patient presents with    New Patient    Shortness of Breath    Dizziness    Hypertension        History of Present Illness:    Mansoor Pereira is a 67 y.o. male who is here as a NEW patient consult for cardiology, referred by Earl Greco DO for an abnormal EKG, hypertension, hyperlipidemia, and diabetes. Today he states he has SOB at times. No chest pain. SOB started a few months ago and he thinks this is related to inactivity. 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 SOB resolves when he stops and rest. No assoc chest pain. Has gotten worse. Occurs daily. He has had some leg swelling at times. Patient currently denies any weight gain, palpitations, chest pain, and syncope. He will be having a procedure for kidney stones soon. Past Medical History:   has a past medical history of Cancer ([facility] [address] Diabetes mellitus ([facility] [address] Hyperlipidemia, Hypertension, and Kidney stone. Surgical History:   has a past surgical history that includes Prostate surgery and Cystoscopy (Right, 11/9/2020). Social History:   reports that he has quit smoking. He has never used smokeless tobacco. He reports that he does not drink alcohol or use drugs. Family History:  family history is not on file. Home Medications:  Prior to Admission medications    Medication Sig Start Date End Date Taking?  Authorizing Provider   losartan (COZAAR) 50 MG tablet Take 1 tablet by mouth daily 11/10/20  Yes Maxx Sanchez DO   tamsulosin (FLOMAX) 0.4 MG capsule Take 1 capsule by mouth daily 11/10/20  Yes Maxx Sanchez DO   insulin aspart (NOVOLOG) 100 UNIT/ML injection vial Inject into the skin 3 times daily (before meals)   Yes Historical Provider, MD   Insulin Glargine (TOUJEO SOLOSTAR SC) Inject into the skin   Yes Historical Provider, MD · The carotid upstroke is normal in amplitude and contour without delay or bruit  · Normal S1S2, No S3, No Murmur  · Peripheral pulses are symmetrical and full  · There is no clubbing, cyanosis of the extremities. · No edema  · Femoral Arteries: 2+ and equal  · Pedal Pulses: 2+ and equal   Abdomen:  · No masses or tenderness  · Liver/Spleen: No Abnormalities Noted  Neurological/Psychiatric:  · Alert and oriented in all spheres  · Moves all extremities well  · Exhibits normal gait balance and coordination  · No abnormalities of mood, affect, memory, mentation, or behavior are noted    Stress test 2008  IMPRESSION-  Negative graded exercise test at a work load of 7 METS,  heart rate 133 (86% age-predicted maximum heart rate), and a rate  pressure product of 26.6 kilotor per minute.  Patient has no  clinical or electrocardiographic evidence of inducible ischemia and  the Duke treadmill score of 6 and normal recumbent heart rate  recovery both suggest low cardiovascular risk.  Simultaneous  echocardiographic imaging results are reported elsewhere.  It should  be noted the patient did have a very marked hypertensive systolic  blood pressure response for this level of exercise. Stress echocardiogram 9/2008  IMPRESSION-  Normal rest/stress echocardiogram.  Incidental  underlying left ventricular hypertrophy. Assessment:   SPRAGUE - possible angina equivalent or other cardiac   Abnormal EKG - reviewed. Appears to be multifocal atrial rhythm, not a-fib  Hypertension - controlled  Hyperlipemia   Diabetes mellitus       Plan:  Echocardiogram   Chest Xray   Stress test- [employer]   Labs- CBC, CMP, TSH, and BNP  One week cardiac event monitor   Continue current medications   Check blood pressure at home weekly  Regular exercise and following a healthy diet encouraged   Follow up with me in one month       Scribe's attestation:   This note was scribed in the presence of Dr. Jairo Lester M.D. By Eda Vallejo RN

## 2020-11-17 ENCOUNTER — TELEPHONE (OUTPATIENT)
Dept: CARDIOLOGY CLINIC | Age: 75
End: 2020-11-17

## 2020-11-17 NOTE — TELEPHONE ENCOUNTER
----- Message from Víctor Felder MD sent at 11/16/2020  5:09 PM EST -----  Call  Labs all look ok  No sign CHF

## 2020-11-25 ENCOUNTER — HOSPITAL ENCOUNTER (OUTPATIENT)
Dept: NON INVASIVE DIAGNOSTICS | Age: 75
Discharge: HOME | End: 2020-11-25
Payer: COMMERCIAL

## 2020-11-25 ENCOUNTER — HOSPITAL ENCOUNTER (OUTPATIENT)
Dept: NUCLEAR MEDICINE | Age: 75
Discharge: HOME | End: 2020-11-25
Payer: COMMERCIAL

## 2020-11-25 LAB
LV EF: 55 %
LV EF: 61 %
LVEF MODALITY: NORMAL
LVEF MODALITY: NORMAL

## 2020-11-25 PROCEDURE — 3430000000 HC RX DIAGNOSTIC RADIOPHARMACEUTICAL: Performed by: INTERNAL MEDICINE

## 2020-11-25 PROCEDURE — 78452 HT MUSCLE IMAGE SPECT MULT: CPT

## 2020-11-25 PROCEDURE — 6360000002 HC RX W HCPCS: Performed by: INTERNAL MEDICINE

## 2020-11-25 PROCEDURE — 0298T PR EXT ECG > 48HR TO 21 DAY REVIEW AND INTERPRETATN: CPT | Performed by: INTERNAL MEDICINE

## 2020-11-25 PROCEDURE — A9502 TC99M TETROFOSMIN: HCPCS | Performed by: INTERNAL MEDICINE

## 2020-11-25 PROCEDURE — 93306 TTE W/DOPPLER COMPLETE: CPT

## 2020-11-25 PROCEDURE — 93017 CV STRESS TEST TRACING ONLY: CPT

## 2020-11-25 RX ADMIN — TETROFOSMIN 11.7 MILLICURIE: 1.38 INJECTION, POWDER, LYOPHILIZED, FOR SOLUTION INTRAVENOUS at 07:23

## 2020-11-25 RX ADMIN — REGADENOSON 0.4 MG: 0.08 INJECTION, SOLUTION INTRAVENOUS at 08:38

## 2020-11-25 RX ADMIN — TETROFOSMIN 34 MILLICURIE: 1.38 INJECTION, POWDER, LYOPHILIZED, FOR SOLUTION INTRAVENOUS at 08:38

## 2020-11-25 NOTE — PROGRESS NOTES
Pt completed stress portion of cardiac stress test. Pt is discharged to nuclear department for final scan. Nuclear tech will remove PIV. Discharge instructions given to pt. Pt verbalizes understanding to discharge instructions. Pt denies CP.

## 2020-11-27 NOTE — PATIENT INSTRUCTIONS

## 2020-11-27 NOTE — PROGRESS NOTES
Aman Iglesias received a viral test for COVID-19. They were educated on isolation and quarantine as appropriate. For any symptoms, they were directed to seek care from their PCP, given contact information to establish with a doctor, directed to an urgent care or the emergency room.

## 2020-11-30 ENCOUNTER — OFFICE VISIT (OUTPATIENT)
Dept: PRIMARY CARE CLINIC | Age: 75
End: 2020-11-30
Payer: COMMERCIAL

## 2020-11-30 PROCEDURE — 99211 OFF/OP EST MAY X REQ PHY/QHP: CPT | Performed by: NURSE PRACTITIONER

## 2020-12-01 LAB — SARS-COV-2: NOT DETECTED

## 2020-12-01 RX ORDER — OMEGA-3S/DHA/EPA/FISH OIL/D3 300MG-1000
400 CAPSULE ORAL DAILY
COMMUNITY

## 2020-12-01 NOTE — PROGRESS NOTES
Obstructive Sleep Apnea (ADRIANA) Screening     Patient:  Noemi Ruelas    YOB: 1945      Medical Record #:  [id number]                     Date:  12/1/2020     1. Are you a loud and/or regular snorer? []  Yes       [x] No    2. Have you been observed to gasp or stop breathing during sleep? []  Yes       [x] No    3. Do you feel tired or groggy upon awakening or do you awaken with a headache?           []  Yes       [] No    4. Are you often tired or fatigued during the wake time hours? []  Yes       [] No    5. Do you fall asleep sitting, reading, watching TV or driving? []  Yes       [] No    6. Do you often have problems with memory or concentration? []  Yes       [] No    **If patient's score is ? 3 they are considered high risk for ADRIANA. An Anesthesia provider will evaluate the patient and develop a plan of care the day of surgery. Note:  If the patient's BMI is more than 35 kg m¯² , has neck circumference > 40 cm, and/or high blood pressure the risk is greater (© American [employer]nea Association, 2006).

## 2020-12-03 ENCOUNTER — TELEPHONE (OUTPATIENT)
Dept: CARDIOLOGY CLINIC | Age: 75
End: 2020-12-03

## 2020-12-03 ENCOUNTER — ANESTHESIA EVENT (OUTPATIENT)
Dept: OPERATING ROOM | Age: 75
End: 2020-12-03
Payer: COMMERCIAL

## 2020-12-03 RX ORDER — METOPROLOL SUCCINATE 25 MG/1
25 TABLET, EXTENDED RELEASE ORAL DAILY
Qty: 30 TABLET | Refills: 11 | Status: SHIPPED | OUTPATIENT
Start: 2020-12-03 | End: 2021-12-23

## 2020-12-03 NOTE — TELEPHONE ENCOUNTER
Call  Again - stress test and echo both looked good  However - monitor result show occ fast HRs  Recommend toprol 25 mg daily to reduce episodes

## 2020-12-04 ENCOUNTER — HOSPITAL ENCOUNTER (OUTPATIENT)
Dept: INTERVENTIONAL RADIOLOGY/VASCULAR | Age: 75
Discharge: HOME | End: 2020-12-04
Payer: COMMERCIAL

## 2020-12-04 ENCOUNTER — ANESTHESIA (OUTPATIENT)
Dept: OPERATING ROOM | Age: 75
End: 2020-12-04
Payer: COMMERCIAL

## 2020-12-04 ENCOUNTER — HOSPITAL ENCOUNTER (OUTPATIENT)
Age: 75
Setting detail: OBSERVATION
Discharge: HOME | End: 2020-12-05
Attending: UROLOGY | Admitting: UROLOGY
Payer: COMMERCIAL

## 2020-12-04 ENCOUNTER — HOSPITAL ENCOUNTER (OUTPATIENT)
Dept: GENERAL RADIOLOGY | Age: 75
Discharge: HOME | End: 2020-12-04
Payer: COMMERCIAL

## 2020-12-04 VITALS
SYSTOLIC BLOOD PRESSURE: 119 MMHG | OXYGEN SATURATION: 100 % | RESPIRATION RATE: 7 BRPM | DIASTOLIC BLOOD PRESSURE: 67 MMHG

## 2020-12-04 PROBLEM — N20.0 LEFT RENAL STONE: Status: ACTIVE | Noted: 2020-12-04

## 2020-12-04 LAB
GLUCOSE BLD-MCNC: 187 MG/DL (ref 70–99)
GLUCOSE BLD-MCNC: 198 MG/DL (ref 70–99)
GLUCOSE BLD-MCNC: 249 MG/DL (ref 70–99)
GLUCOSE BLD-MCNC: 364 MG/DL (ref 70–99)
INR BLD: 1.03 (ref 0.86–1.14)
PERFORMED ON: ABNORMAL
PROTHROMBIN TIME: 11.9 SEC (ref 10–13.2)

## 2020-12-04 PROCEDURE — C1726 CATH, BAL DIL, NON-VASCULAR: HCPCS | Performed by: UROLOGY

## 2020-12-04 PROCEDURE — 88300 SURGICAL PATH GROSS: CPT

## 2020-12-04 PROCEDURE — 6370000000 HC RX 637 (ALT 250 FOR IP): Performed by: UROLOGY

## 2020-12-04 PROCEDURE — 2709999900 HC NON-CHARGEABLE SUPPLY: Performed by: UROLOGY

## 2020-12-04 PROCEDURE — 7100000001 HC PACU RECOVERY - ADDTL 15 MIN: Performed by: UROLOGY

## 2020-12-04 PROCEDURE — 2580000003 HC RX 258: Performed by: UROLOGY

## 2020-12-04 PROCEDURE — 3209999900 FLUORO FOR SURGICAL PROCEDURES

## 2020-12-04 PROCEDURE — 50432 PLMT NEPHROSTOMY CATHETER: CPT

## 2020-12-04 PROCEDURE — G0378 HOSPITAL OBSERVATION PER HR: HCPCS

## 2020-12-04 PROCEDURE — 3600000015 HC SURGERY LEVEL 5 ADDTL 15MIN: Performed by: UROLOGY

## 2020-12-04 PROCEDURE — 6360000004 HC RX CONTRAST MEDICATION: Performed by: UROLOGY

## 2020-12-04 PROCEDURE — 7100000000 HC PACU RECOVERY - FIRST 15 MIN: Performed by: UROLOGY

## 2020-12-04 PROCEDURE — 6360000002 HC RX W HCPCS: Performed by: UROLOGY

## 2020-12-04 PROCEDURE — 2500000003 HC RX 250 WO HCPCS: Performed by: NURSE ANESTHETIST, CERTIFIED REGISTERED

## 2020-12-04 PROCEDURE — 82365 CALCULUS SPECTROSCOPY: CPT

## 2020-12-04 PROCEDURE — 2720000010 HC SURG SUPPLY STERILE: Performed by: UROLOGY

## 2020-12-04 PROCEDURE — 3700000001 HC ADD 15 MINUTES (ANESTHESIA): Performed by: UROLOGY

## 2020-12-04 PROCEDURE — 3700000000 HC ANESTHESIA ATTENDED CARE: Performed by: UROLOGY

## 2020-12-04 PROCEDURE — C1894 INTRO/SHEATH, NON-LASER: HCPCS

## 2020-12-04 PROCEDURE — 3600000005 HC SURGERY LEVEL 5 BASE: Performed by: UROLOGY

## 2020-12-04 PROCEDURE — 85610 PROTHROMBIN TIME: CPT

## 2020-12-04 PROCEDURE — 6360000002 HC RX W HCPCS: Performed by: NURSE ANESTHETIST, CERTIFIED REGISTERED

## 2020-12-04 PROCEDURE — 2580000003 HC RX 258: Performed by: NURSE ANESTHETIST, CERTIFIED REGISTERED

## 2020-12-04 PROCEDURE — 6360000002 HC RX W HCPCS: Performed by: ANESTHESIOLOGY

## 2020-12-04 RX ORDER — DIPHENHYDRAMINE HYDROCHLORIDE 50 MG/ML
6.25 INJECTION INTRAMUSCULAR; INTRAVENOUS
Status: DISCONTINUED | OUTPATIENT
Start: 2020-12-04 | End: 2020-12-04 | Stop reason: HOSPADM

## 2020-12-04 RX ORDER — OXYCODONE HYDROCHLORIDE 5 MG/1
5 TABLET ORAL EVERY 4 HOURS PRN
Status: DISCONTINUED | OUTPATIENT
Start: 2020-12-04 | End: 2020-12-05 | Stop reason: HOSPADM

## 2020-12-04 RX ORDER — ONDANSETRON 2 MG/ML
INJECTION INTRAMUSCULAR; INTRAVENOUS PRN
Status: DISCONTINUED | OUTPATIENT
Start: 2020-12-04 | End: 2020-12-04 | Stop reason: SDUPTHER

## 2020-12-04 RX ORDER — PROMETHAZINE HYDROCHLORIDE 25 MG/1
12.5 TABLET ORAL EVERY 6 HOURS PRN
Status: DISCONTINUED | OUTPATIENT
Start: 2020-12-04 | End: 2020-12-05 | Stop reason: HOSPADM

## 2020-12-04 RX ORDER — SODIUM CHLORIDE 0.9 % (FLUSH) 0.9 %
10 SYRINGE (ML) INJECTION PRN
Status: DISCONTINUED | OUTPATIENT
Start: 2020-12-04 | End: 2020-12-05 | Stop reason: HOSPADM

## 2020-12-04 RX ORDER — ONDANSETRON 2 MG/ML
4 INJECTION INTRAMUSCULAR; INTRAVENOUS EVERY 6 HOURS PRN
Status: DISCONTINUED | OUTPATIENT
Start: 2020-12-04 | End: 2020-12-05 | Stop reason: HOSPADM

## 2020-12-04 RX ORDER — METOPROLOL SUCCINATE 25 MG/1
25 TABLET, EXTENDED RELEASE ORAL DAILY
Status: DISCONTINUED | OUTPATIENT
Start: 2020-12-04 | End: 2020-12-05 | Stop reason: HOSPADM

## 2020-12-04 RX ORDER — SODIUM CHLORIDE 0.9 % (FLUSH) 0.9 %
10 SYRINGE (ML) INJECTION EVERY 12 HOURS SCHEDULED
Status: DISCONTINUED | OUTPATIENT
Start: 2020-12-04 | End: 2020-12-05 | Stop reason: HOSPADM

## 2020-12-04 RX ORDER — HYDRALAZINE HYDROCHLORIDE 20 MG/ML
5 INJECTION INTRAMUSCULAR; INTRAVENOUS EVERY 30 MIN PRN
Status: DISCONTINUED | OUTPATIENT
Start: 2020-12-04 | End: 2020-12-04 | Stop reason: HOSPADM

## 2020-12-04 RX ORDER — MAGNESIUM HYDROXIDE 1200 MG/15ML
LIQUID ORAL CONTINUOUS PRN
Status: COMPLETED | OUTPATIENT
Start: 2020-12-04 | End: 2020-12-04

## 2020-12-04 RX ORDER — OXYCODONE HYDROCHLORIDE AND ACETAMINOPHEN 5; 325 MG/1; MG/1
2 TABLET ORAL PRN
Status: DISCONTINUED | OUTPATIENT
Start: 2020-12-04 | End: 2020-12-04 | Stop reason: HOSPADM

## 2020-12-04 RX ORDER — SODIUM CHLORIDE, SODIUM LACTATE, POTASSIUM CHLORIDE, CALCIUM CHLORIDE 600; 310; 30; 20 MG/100ML; MG/100ML; MG/100ML; MG/100ML
INJECTION, SOLUTION INTRAVENOUS CONTINUOUS
Status: DISCONTINUED | OUTPATIENT
Start: 2020-12-04 | End: 2020-12-05 | Stop reason: HOSPADM

## 2020-12-04 RX ORDER — OXYCODONE HYDROCHLORIDE AND ACETAMINOPHEN 5; 325 MG/1; MG/1
1 TABLET ORAL PRN
Status: DISCONTINUED | OUTPATIENT
Start: 2020-12-04 | End: 2020-12-04 | Stop reason: HOSPADM

## 2020-12-04 RX ORDER — SODIUM CHLORIDE, SODIUM LACTATE, POTASSIUM CHLORIDE, CALCIUM CHLORIDE 600; 310; 30; 20 MG/100ML; MG/100ML; MG/100ML; MG/100ML
INJECTION, SOLUTION INTRAVENOUS CONTINUOUS
Status: DISCONTINUED | OUTPATIENT
Start: 2020-12-04 | End: 2020-12-04

## 2020-12-04 RX ORDER — LOSARTAN POTASSIUM 25 MG/1
50 TABLET ORAL DAILY
Status: DISCONTINUED | OUTPATIENT
Start: 2020-12-04 | End: 2020-12-05 | Stop reason: HOSPADM

## 2020-12-04 RX ORDER — AMLODIPINE BESYLATE AND ATORVASTATIN CALCIUM 5; 20 MG/1; MG/1
1 TABLET, FILM COATED ORAL DAILY
Status: DISCONTINUED | OUTPATIENT
Start: 2020-12-04 | End: 2020-12-04 | Stop reason: SDUPTHER

## 2020-12-04 RX ORDER — ONDANSETRON 2 MG/ML
4 INJECTION INTRAMUSCULAR; INTRAVENOUS EVERY 30 MIN PRN
Status: DISCONTINUED | OUTPATIENT
Start: 2020-12-04 | End: 2020-12-04 | Stop reason: HOSPADM

## 2020-12-04 RX ORDER — ROCURONIUM BROMIDE 10 MG/ML
INJECTION, SOLUTION INTRAVENOUS PRN
Status: DISCONTINUED | OUTPATIENT
Start: 2020-12-04 | End: 2020-12-04 | Stop reason: SDUPTHER

## 2020-12-04 RX ORDER — TAMSULOSIN HYDROCHLORIDE 0.4 MG/1
0.4 CAPSULE ORAL DAILY
Status: DISCONTINUED | OUTPATIENT
Start: 2020-12-04 | End: 2020-12-05 | Stop reason: HOSPADM

## 2020-12-04 RX ORDER — MEPERIDINE HYDROCHLORIDE 50 MG/ML
12.5 INJECTION INTRAMUSCULAR; INTRAVENOUS; SUBCUTANEOUS EVERY 5 MIN PRN
Status: DISCONTINUED | OUTPATIENT
Start: 2020-12-04 | End: 2020-12-04 | Stop reason: HOSPADM

## 2020-12-04 RX ORDER — FENTANYL CITRATE 50 UG/ML
INJECTION, SOLUTION INTRAMUSCULAR; INTRAVENOUS PRN
Status: DISCONTINUED | OUTPATIENT
Start: 2020-12-04 | End: 2020-12-04 | Stop reason: SDUPTHER

## 2020-12-04 RX ORDER — LIDOCAINE HYDROCHLORIDE 20 MG/ML
INJECTION, SOLUTION INFILTRATION; PERINEURAL PRN
Status: DISCONTINUED | OUTPATIENT
Start: 2020-12-04 | End: 2020-12-04 | Stop reason: SDUPTHER

## 2020-12-04 RX ORDER — NICOTINE POLACRILEX 4 MG
15 LOZENGE BUCCAL PRN
Status: DISCONTINUED | OUTPATIENT
Start: 2020-12-04 | End: 2020-12-05 | Stop reason: HOSPADM

## 2020-12-04 RX ORDER — HYDROMORPHONE HCL 110MG/55ML
0.5 PATIENT CONTROLLED ANALGESIA SYRINGE INTRAVENOUS
Status: DISCONTINUED | OUTPATIENT
Start: 2020-12-04 | End: 2020-12-05 | Stop reason: HOSPADM

## 2020-12-04 RX ORDER — LABETALOL HYDROCHLORIDE 5 MG/ML
5 INJECTION, SOLUTION INTRAVENOUS
Status: DISCONTINUED | OUTPATIENT
Start: 2020-12-04 | End: 2020-12-04 | Stop reason: HOSPADM

## 2020-12-04 RX ORDER — ATORVASTATIN CALCIUM 10 MG/1
20 TABLET, FILM COATED ORAL NIGHTLY
Status: DISCONTINUED | OUTPATIENT
Start: 2020-12-04 | End: 2020-12-05 | Stop reason: HOSPADM

## 2020-12-04 RX ORDER — DEXTROSE MONOHYDRATE 25 G/50ML
12.5 INJECTION, SOLUTION INTRAVENOUS PRN
Status: DISCONTINUED | OUTPATIENT
Start: 2020-12-04 | End: 2020-12-05 | Stop reason: HOSPADM

## 2020-12-04 RX ORDER — DEXAMETHASONE SODIUM PHOSPHATE 4 MG/ML
INJECTION, SOLUTION INTRA-ARTICULAR; INTRALESIONAL; INTRAMUSCULAR; INTRAVENOUS; SOFT TISSUE PRN
Status: DISCONTINUED | OUTPATIENT
Start: 2020-12-04 | End: 2020-12-04 | Stop reason: SDUPTHER

## 2020-12-04 RX ORDER — AMLODIPINE BESYLATE 5 MG/1
5 TABLET ORAL NIGHTLY
Status: DISCONTINUED | OUTPATIENT
Start: 2020-12-04 | End: 2020-12-05 | Stop reason: HOSPADM

## 2020-12-04 RX ORDER — PROPOFOL 10 MG/ML
INJECTION, EMULSION INTRAVENOUS PRN
Status: DISCONTINUED | OUTPATIENT
Start: 2020-12-04 | End: 2020-12-04 | Stop reason: SDUPTHER

## 2020-12-04 RX ORDER — PHENYLEPHRINE HCL IN 0.9% NACL 1 MG/10 ML
SYRINGE (ML) INTRAVENOUS PRN
Status: DISCONTINUED | OUTPATIENT
Start: 2020-12-04 | End: 2020-12-04 | Stop reason: SDUPTHER

## 2020-12-04 RX ORDER — DEXTROSE MONOHYDRATE 50 MG/ML
100 INJECTION, SOLUTION INTRAVENOUS PRN
Status: DISCONTINUED | OUTPATIENT
Start: 2020-12-04 | End: 2020-12-05 | Stop reason: HOSPADM

## 2020-12-04 RX ORDER — SODIUM CHLORIDE, SODIUM LACTATE, POTASSIUM CHLORIDE, CALCIUM CHLORIDE 600; 310; 30; 20 MG/100ML; MG/100ML; MG/100ML; MG/100ML
INJECTION, SOLUTION INTRAVENOUS CONTINUOUS PRN
Status: DISCONTINUED | OUTPATIENT
Start: 2020-12-04 | End: 2020-12-04 | Stop reason: SDUPTHER

## 2020-12-04 RX ADMIN — DEXAMETHASONE SODIUM PHOSPHATE 8 MG: 4 INJECTION, SOLUTION INTRAMUSCULAR; INTRAVENOUS at 12:44

## 2020-12-04 RX ADMIN — METOPROLOL SUCCINATE 25 MG: 25 TABLET, EXTENDED RELEASE ORAL at 17:27

## 2020-12-04 RX ADMIN — Medication 100 MCG: at 14:19

## 2020-12-04 RX ADMIN — ONDANSETRON 4 MG: 2 INJECTION INTRAMUSCULAR; INTRAVENOUS at 12:44

## 2020-12-04 RX ADMIN — PROMETHAZINE HYDROCHLORIDE 12.5 MG: 25 TABLET ORAL at 23:05

## 2020-12-04 RX ADMIN — HYDROMORPHONE HYDROCHLORIDE 0.5 MG: 1 INJECTION, SOLUTION INTRAMUSCULAR; INTRAVENOUS; SUBCUTANEOUS at 15:15

## 2020-12-04 RX ADMIN — PROPOFOL 150 MG: 10 INJECTION, EMULSION INTRAVENOUS at 12:38

## 2020-12-04 RX ADMIN — IOVERSOL 5 ML: 741 INJECTION INTRA-ARTERIAL; INTRAVENOUS at 13:54

## 2020-12-04 RX ADMIN — INSULIN LISPRO 5 UNITS: 100 INJECTION, SOLUTION INTRAVENOUS; SUBCUTANEOUS at 20:50

## 2020-12-04 RX ADMIN — LOSARTAN POTASSIUM 50 MG: 25 TABLET, FILM COATED ORAL at 17:26

## 2020-12-04 RX ADMIN — SODIUM CHLORIDE, SODIUM LACTATE, POTASSIUM CHLORIDE, AND CALCIUM CHLORIDE: .6; .31; .03; .02 INJECTION, SOLUTION INTRAVENOUS at 12:31

## 2020-12-04 RX ADMIN — ATORVASTATIN CALCIUM 20 MG: 10 TABLET, FILM COATED ORAL at 20:50

## 2020-12-04 RX ADMIN — FENTANYL CITRATE 50 MCG: 50 INJECTION, SOLUTION INTRAMUSCULAR; INTRAVENOUS at 14:08

## 2020-12-04 RX ADMIN — ROCURONIUM BROMIDE 50 MG: 10 SOLUTION INTRAVENOUS at 12:38

## 2020-12-04 RX ADMIN — CEFAZOLIN SODIUM 2 G: 10 INJECTION, POWDER, FOR SOLUTION INTRAVENOUS at 12:48

## 2020-12-04 RX ADMIN — FENTANYL CITRATE 100 MCG: 50 INJECTION, SOLUTION INTRAMUSCULAR; INTRAVENOUS at 12:38

## 2020-12-04 RX ADMIN — SUGAMMADEX 200 MG: 100 INJECTION, SOLUTION INTRAVENOUS at 14:36

## 2020-12-04 RX ADMIN — AMLODIPINE BESYLATE 5 MG: 5 TABLET ORAL at 20:50

## 2020-12-04 RX ADMIN — INSULIN LISPRO 4 UNITS: 100 INJECTION, SOLUTION INTRAVENOUS; SUBCUTANEOUS at 18:33

## 2020-12-04 RX ADMIN — FENTANYL CITRATE 50 MCG: 50 INJECTION, SOLUTION INTRAMUSCULAR; INTRAVENOUS at 13:04

## 2020-12-04 RX ADMIN — TAMSULOSIN HYDROCHLORIDE 0.4 MG: 0.4 CAPSULE ORAL at 17:27

## 2020-12-04 RX ADMIN — SODIUM CHLORIDE, POTASSIUM CHLORIDE, SODIUM LACTATE AND CALCIUM CHLORIDE: 600; 310; 30; 20 INJECTION, SOLUTION INTRAVENOUS at 16:46

## 2020-12-04 RX ADMIN — LIDOCAINE HYDROCHLORIDE 100 MG: 20 INJECTION, SOLUTION INFILTRATION; PERINEURAL at 12:38

## 2020-12-04 RX ADMIN — CEFAZOLIN SODIUM 2 G: 10 INJECTION, POWDER, FOR SOLUTION INTRAVENOUS at 17:27

## 2020-12-04 RX ADMIN — ONDANSETRON 4 MG: 2 INJECTION INTRAMUSCULAR; INTRAVENOUS at 22:07

## 2020-12-04 RX ADMIN — ROCURONIUM BROMIDE 20 MG: 10 SOLUTION INTRAVENOUS at 13:44

## 2020-12-04 RX ADMIN — SODIUM CHLORIDE, PRESERVATIVE FREE 10 ML: 5 INJECTION INTRAVENOUS at 20:54

## 2020-12-04 RX ADMIN — OXYCODONE HYDROCHLORIDE 5 MG: 5 TABLET ORAL at 21:56

## 2020-12-04 ASSESSMENT — PULMONARY FUNCTION TESTS
PIF_VALUE: 20
PIF_VALUE: 19
PIF_VALUE: 20
PIF_VALUE: 20
PIF_VALUE: 19
PIF_VALUE: 19
PIF_VALUE: 24
PIF_VALUE: 20
PIF_VALUE: 20
PIF_VALUE: 3
PIF_VALUE: 20
PIF_VALUE: 20
PIF_VALUE: 23
PIF_VALUE: 20
PIF_VALUE: 19
PIF_VALUE: 24
PIF_VALUE: 19
PIF_VALUE: 20
PIF_VALUE: 19
PIF_VALUE: 19
PIF_VALUE: 17
PIF_VALUE: 19
PIF_VALUE: 1
PIF_VALUE: 19
PIF_VALUE: 24
PIF_VALUE: 20
PIF_VALUE: 18
PIF_VALUE: 18
PIF_VALUE: 19
PIF_VALUE: 19
PIF_VALUE: 20
PIF_VALUE: 19
PIF_VALUE: 20
PIF_VALUE: 3
PIF_VALUE: 21
PIF_VALUE: 18
PIF_VALUE: 19
PIF_VALUE: 19
PIF_VALUE: 18
PIF_VALUE: 32
PIF_VALUE: 19
PIF_VALUE: 20
PIF_VALUE: 19
PIF_VALUE: 20
PIF_VALUE: 20
PIF_VALUE: 10
PIF_VALUE: 17
PIF_VALUE: 19
PIF_VALUE: 18
PIF_VALUE: 19
PIF_VALUE: 22
PIF_VALUE: 19
PIF_VALUE: 22
PIF_VALUE: 18
PIF_VALUE: 18
PIF_VALUE: 16
PIF_VALUE: 20
PIF_VALUE: 20
PIF_VALUE: 18
PIF_VALUE: 20
PIF_VALUE: 19
PIF_VALUE: 1
PIF_VALUE: 3
PIF_VALUE: 16
PIF_VALUE: 18
PIF_VALUE: 19
PIF_VALUE: 20
PIF_VALUE: 21
PIF_VALUE: 20
PIF_VALUE: 19
PIF_VALUE: 20
PIF_VALUE: 15
PIF_VALUE: 20
PIF_VALUE: 1
PIF_VALUE: 13
PIF_VALUE: 20
PIF_VALUE: 18
PIF_VALUE: 20
PIF_VALUE: 19
PIF_VALUE: 20
PIF_VALUE: 20
PIF_VALUE: 18
PIF_VALUE: 20
PIF_VALUE: 19
PIF_VALUE: 20
PIF_VALUE: 19
PIF_VALUE: 19
PIF_VALUE: 20
PIF_VALUE: 19
PIF_VALUE: 23
PIF_VALUE: 18
PIF_VALUE: 19
PIF_VALUE: 18
PIF_VALUE: 22
PIF_VALUE: 19
PIF_VALUE: 20
PIF_VALUE: 4
PIF_VALUE: 2
PIF_VALUE: 1
PIF_VALUE: 17
PIF_VALUE: 20
PIF_VALUE: 20
PIF_VALUE: 17
PIF_VALUE: 19
PIF_VALUE: 17
PIF_VALUE: 19
PIF_VALUE: 20
PIF_VALUE: 19
PIF_VALUE: 19
PIF_VALUE: 3
PIF_VALUE: 19
PIF_VALUE: 17
PIF_VALUE: 19
PIF_VALUE: 17
PIF_VALUE: 20
PIF_VALUE: 23
PIF_VALUE: 20

## 2020-12-04 ASSESSMENT — PAIN DESCRIPTION - LOCATION: LOCATION: LEG

## 2020-12-04 ASSESSMENT — PAIN SCALES - GENERAL
PAINLEVEL_OUTOF10: 0
PAINLEVEL_OUTOF10: 2
PAINLEVEL_OUTOF10: 6
PAINLEVEL_OUTOF10: 7
PAINLEVEL_OUTOF10: 2

## 2020-12-04 ASSESSMENT — PAIN DESCRIPTION - FREQUENCY: FREQUENCY: CONTINUOUS

## 2020-12-04 ASSESSMENT — PAIN DESCRIPTION - ONSET: ONSET: ON-GOING

## 2020-12-04 ASSESSMENT — PAIN DESCRIPTION - DESCRIPTORS: DESCRIPTORS: ACHING

## 2020-12-04 ASSESSMENT — PAIN - FUNCTIONAL ASSESSMENT: PAIN_FUNCTIONAL_ASSESSMENT: 0-10

## 2020-12-04 ASSESSMENT — ENCOUNTER SYMPTOMS: SHORTNESS OF BREATH: 1

## 2020-12-04 ASSESSMENT — PAIN DESCRIPTION - ORIENTATION: ORIENTATION: RIGHT;LEFT

## 2020-12-04 NOTE — PROGRESS NOTES
D: Pt brought to PACU. Report obtained from [address] and CRNA. Pt placed on monitor, VSS, denies pain ir nausea at this time.

## 2020-12-04 NOTE — ANESTHESIA PRE PROCEDURE
Department of Anesthesiology  Preprocedure Note       Name:  Markos Chowdhury   Age:  67 y.o.  :  1945                                          MRN:  [id number]         Date:  2020      Surgeon: Edel Guillermo):  Landen Lara MD    Procedure: Procedure(s):  LEFT PERCUTANEOUS NEPHROLITHOTOMY WITH DR Israel Salamanca TO PLACE NEPHROSTOMY TUBE PRIOR    Medications prior to admission:   Prior to Admission medications    Medication Sig Start Date End Date Taking?  Authorizing Provider   vitamin D3 (CHOLECALCIFEROL) 10 MCG (400 UNIT) TABS tablet Take 400 Units by mouth daily   Yes Historical Provider, MD   metoprolol succinate (TOPROL XL) 25 MG extended release tablet Take 1 tablet by mouth daily 12/3/20   Juarez Wiley MD   losartan (COZAAR) 50 MG tablet Take 1 tablet by mouth daily 11/10/20   Gold Snyder DO   tamsulosin [facility]) 0.4 MG capsule Take 1 capsule by mouth daily 11/10/20   Gold Snyder DO   insulin aspart (NOVOLOG) 100 UNIT/ML injection vial Inject into the skin 3 times daily (before meals) Sliding scale    Historical Provider, MD   Insulin Glargine (TOUJEO SOLOSTAR SC) Inject 66 Units into the skin daily     Historical Provider, MD   amLODIPine-atorvastatatin (CADUET) 5-20 MG per tablet Take 1 tablet by mouth 17   Historical Provider, MD       Current medications:    Current Facility-Administered Medications   Medication Dose Route Frequency Provider Last Rate Last Dose    ceFAZolin (ANCEF) 2 g in dextrose 5 % 100 mL IVPB  2 g Intravenous On Call to [address] MD [place]        lactated ringers infusion   Intravenous Continuous Rodger Cazares MD        lidocaine 1 % (PF) injection 0.1 mL  0.1 mL Intradermal Once PRN Rodger Cazares MD        HYDROmorphone (DILAUDID) injection 0.5 mg  0.5 mg Intravenous Q10 Min PRN Felipe Torres MD        HYDROmorphone (DILAUDID) injection 0.5 mg  0.5 mg Intravenous Q5 Min PRN MD Sanjay Hester oxyCODONE-acetaminophen (PERCOCET) 5-325 MG per tablet 1 tablet  1 tablet Oral PRN Moise Farias MD        Or    oxyCODONE-acetaminophen (PERCOCET) 5-325 MG per tablet 2 tablet  2 tablet Oral PRN Moise Farias MD        diphenhydrAMINE (BENADRYL) injection 6.25 mg  6.25 mg Intravenous Once PRN Moise Farias MD        ondansetron [facility]) injection 4 mg  4 mg Intravenous Q30 Min PRN Moise Farias MD        labetalol (NORMODYNE;TRANDATE) injection 5 mg  5 mg Intravenous Q15 Min PRN Moise Farias MD        hydrALAZINE (APRESOLINE) injection 5 mg  5 mg Intravenous Q30 Min PRN Moise Farias MD        meperidine (DEMEROL) injection 12.5 mg  12.5 mg Intravenous Q5 Min PRN Moise Farias MD           Allergies:  No Known Allergies    Problem List:    Patient Active Problem List   Diagnosis Code    Transient global amnesia G45.4    Acute encephalopathy G93.40    Hemispheric carotid artery syndrome G45.1    Uncontrolled type 2 diabetes mellitus with complication, without long-term current use of insulin (HCC) E11.8, E11.65    HTN (hypertension), benign I10    Dyslipidemia E78.5    Urolithiasis N20.9    SOB (shortness of breath) R06.02       Past Medical History:        Diagnosis Date    Cancer ([facility]     prostate    Diabetes mellitus ([facility]     Hyperlipidemia     Hypertension     Kidney stone        Past Surgical History:        Procedure Laterality Date    CYSTOSCOPY Right 2020    CYSTOSCOPY,  RIGHT URETEROSCOPY, RIGHT RETRO PYELOGRAM, REMOVAL STONE FROM BLADDER, URETHRAL DILITATION performed by Rock Anderson MD at [facility]         Social History:    Social History     Tobacco Use    Smoking status: Former Smoker     Last attempt to quit: 1976     Years since quittin.0    Smokeless tobacco: Never Used   Substance Use Topics    Alcohol use:  No                                Counseling given: Not Answered      Vital Signs (Current):   Vitals:    12/01/20 1542 12/04/20 1033   BP:  (!) 151/71   Pulse:  73   Resp:  16   Temp:  98.4 °F (36.9 °C)   SpO2:  95%   Weight: 208 lb (94.3 kg) 208 lb (94.3 kg)   Height: 5' 8\" (1.727 m) 5' 8\" (1.727 m)                                              BP Readings from Last 3 Encounters:   12/04/20 (!) 151/71   11/16/20 134/68   11/09/20 (!) 152/68       NPO Status:                                                                                 BMI:   Wt Readings from Last 3 Encounters:   12/04/20 208 lb (94.3 kg)   11/16/20 207 lb (93.9 kg)   11/07/20 210 lb (95.3 kg)     Body mass index is 31.63 kg/m². CBC:   Lab Results   Component Value Date    WBC 9.5 11/16/2020    RBC 4.50 11/16/2020    HGB 14.1 11/16/2020    HCT 40.9 11/16/2020    MCV 90.8 11/16/2020    RDW 13.5 11/16/2020     11/16/2020       CMP:   Lab Results   Component Value Date     11/16/2020    K 3.8 11/16/2020    K 3.8 11/08/2020     11/16/2020    CO2 23 11/16/2020    BUN 18 11/16/2020    CREATININE 0.9 11/16/2020    GFRAA >60 11/16/2020    AGRATIO 1.5 11/16/2020    LABGLOM >60 11/16/2020    GLUCOSE 63 11/16/2020    PROT 7.2 11/16/2020    CALCIUM 9.3 11/16/2020    BILITOT 0.6 11/16/2020    ALKPHOS 124 11/16/2020    AST 20 11/16/2020    ALT 29 11/16/2020       POC Tests: No results for input(s): POCGLU, POCNA, POCK, POCCL, POCBUN, POCHEMO, POCHCT in the last 72 hours.     Coags:   Lab Results   Component Value Date    PROTIME 12.0 02/11/2018    INR 1.06 02/11/2018       HCG (If Applicable): No results found for: PREGTESTUR, PREGSERUM, HCG, HCGQUANT     ABGs: No results found for: PHART, PO2ART, IHG3VKP, ZZY7TQA, BEART, L1VSUMBK     Type & Screen (If Applicable):  No results found for: LABABO, LABRH    Drug/Infectious Status (If Applicable):  No results found for: HIV, HEPCAB    COVID-19 Screening (If Applicable):   Lab Results   Component Value Date    COVID19 Not Detected 11/30/2020         Anesthesia Evaluation  Patient summary reviewed and Nursing notes reviewed no history of anesthetic complications:   Airway: Mallampati: II     Neck ROM: full   Dental:          Pulmonary:   (+) shortness of breath:                             Cardiovascular:    (+) hypertension:,                   Neuro/Psych:   (+) TIA,             GI/Hepatic/Renal:             Endo/Other:    (+) Diabetes, . Abdominal:           Vascular:                                        Anesthesia Plan      general     ASA 3     (Medications & allergies reviewed  All available lab & EKG data reviewed)  Induction: intravenous. Anesthetic plan and risks discussed with patient. Plan discussed with CRNA.                   Jonna Anderson MD   12/4/2020

## 2020-12-04 NOTE — PROGRESS NOTES
4 Eyes Skin Assessment     The patient is being assess for   Post-Op Surgical    I agree that 2 RN's have performed a thorough Head to Toe Skin Assessment on the patient. ALL assessment sites listed below have been assessed. Areas assessed by both nurses:   []   Head, Face, and Ears   []   Shoulders, Back, and Chest, Abdomen  []   Arms, Elbows, and Hands   []   Coccyx, Sacrum, and Ischium  []   Legs, Feet, and Heels          **SHARE this note so that the co-signing nurse is able to place an eSignature**    Co-signer eSignature: {Esignature:[id number]}    Does the Patient have Skin Breakdown?   {Blank single:26800::\"No\",\"Yes LDA WOUND CARE was Initiated documentation include the Brenda-wound, Wound Assessment, Measurements, Dressing Treatment, Drainage, and Color\",\"}          Sigifredo Prevention initiated:  {YES/NO:19732}   Wound Care Orders initiated:  {YES/NO:19732}      WOC nurse consulted for Pressure Injury (Stage 3,4, Unstageable, DTI, NWPT, Complex wounds)and New or Established Ostomies:  {YES/NO:19732}      Primary Nurse eSignature: Electronically signed by Ruben Benito RN on 12/4/20 at 4:15 PM EST

## 2020-12-04 NOTE — ANESTHESIA POSTPROCEDURE EVALUATION
Department of Anesthesiology  Postprocedure Note    Patient: Mansoor Pereira  MRN: [id number]  YOB: 1945  Date of evaluation: 12/4/2020  Time:  5:31 PM     Procedure Summary     Date:  12/04/20 Room / Location:  [place] [address] ([facility]) / [facility]    Anesthesia Start:  7713 Anesthesia Stop:  0074    Procedure:  LEFT PERCUTANEOUS NEPHROLITHOTOMY WITH DR Honore Najjar TO PLACE NEPHROSTOMY TUBE PRIOR (Left Flank) Diagnosis:       Calculus of kidney      (CALCULUS OF KIDNEY)    Surgeon:  Lis Santamaria MD Responsible Provider:  Eulalia Moctezuma MD    Anesthesia Type:  general ASA Status:  3          Anesthesia Type: general    Antonella Phase I: Antonella Score: 10    Antonella Phase II:      Last vitals: Reviewed and per EMR flowsheets.        Anesthesia Post Evaluation    Patient location during evaluation: PACU  Level of consciousness: awake  Airway patency: patent  Nausea & Vomiting: no nausea  Complications: no  Cardiovascular status: blood pressure returned to baseline  Respiratory status: acceptable  Hydration status: euvolemic

## 2020-12-04 NOTE — BRIEF OP NOTE
Brief Postoperative Note      Patient: Louann Adorno  YOB: 1945  MRN: [id number]    Date of Procedure: 12/4/2020    Pre-Op Diagnosis: CALCULUS OF KIDNEY    Post-Op Diagnosis: Same       Procedure(s):  LEFT PERCUTANEOUS NEPHROLITHOTOMY WITH DR Lakeisha Mendoza TO PLACE NEPHROSTOMY TUBE PRIOR    Surgeon(s):  MD Moise Sawyer MD    Assistant:  Surgical Assistant: Bessy Núñez RN    Anesthesia: General    Estimated Blood Loss (mL): less than 012     Complications: None    Specimens:   ID Type Source Tests Collected by Time Destination   A : LEFT KIDNEY STONES Tissue Kidney SURGICAL PATHOLOGY Moise Whalen MD 12/4/2020 1422        Implants:  * No implants in log *      Drains:   Nephrostomy 1 Left 18 fr (Active)       Urethral Catheter 16 fr (Active)       Findings: Large renal stones    Electronically signed by Kerri Lerner MD on 12/4/2020 at 2:37 PM

## 2020-12-04 NOTE — PROGRESS NOTES
4 Eyes Skin Assessment     The patient is being assess for  {Blank single:31162::\"Admission\",\"Transfer to New Unit\",\"Post-Op Surgical\",\"Cath Lab Post-Op\",\"Shift Handoff\"}    I agree that 2 RN's have performed a thorough Head to Toe Skin Assessment on the patient. ALL assessment sites listed below have been assessed. Areas assessed by both nurses: [x]   Head, Face, and Ears   [x]   Shoulders, Back, and Chest  [x]   Arms, Elbows, and Hands   [x]   Coccyx, Sacrum, and Ischum  [x]   Legs, Feet, and Heels        Does the Patient have Skin Breakdown?   No         Sigifredo Prevention initiated:  No   Wound Care Orders initiated:  No      [facility] nurse consulted for Pressure Injury (Stage 3,4, Unstageable, DTI, NWPT, and Complex wounds):  No      Nurse 1 eSignature: Electronically signed by Faye Nguyễn RN on 12/4/20 at 5:07 PM EST    **SHARE this note so that the co-signing nurse is able to place an eSignature**    Nurse 2 eSignature: {Esignature:[id number]} Patient Information     Patient Name MRN Joni Escalona [id number] Male 1971      Telephone Encounter by Brenda Robles RN at 1/3/2017  4:31 PM     Author:  Brenda Robles RN Service:  (none) Author Type:  NURS- Registered Nurse     Filed:  1/3/2017  4:38 PM Encounter Date:  1/3/2017 Status:  Signed     :  Brenda Robles RN (NURS- Registered Nurse)            This is a Refill request from: [facility]  Name of Medication: Klonopin 0.5 mg  Quantity requested: 90  Last fill date: 16    Last visit with VU ESPINOZA was on: 2016 in [facility]  PCP:  Vu Espinoza MD  Controlled Substance Agreement:  Unable     Patient was seen on 16 when Klonopin RX was done unsure if was faxed or hard copy given to patient.   [facility] pharmacy state they have not received RX from 16.  Attempted to contact patient's wife and no answer.    Unable to complete prescription refill per RN Medication Refill Policy.................... BRENDA ROBLES RN ....................  1/3/2017   4:32 PM

## 2020-12-04 NOTE — PLAN OF CARE
Problem: Falls - Risk of:  Goal: Will remain free from falls  Description: Will remain free from falls  Outcome: Ongoing     Problem: Pain:  Goal: Pain level will decrease  Description: Pain level will decrease  Outcome: Ongoing    Patient calls appropriately. Bed in lowest position and locked. Nonskid socks on. Call light in reach. Patient will remain free off pain. Abdominal pain currently tolerable 2/10.

## 2020-12-05 VITALS
DIASTOLIC BLOOD PRESSURE: 62 MMHG | RESPIRATION RATE: 16 BRPM | HEIGHT: 68 IN | OXYGEN SATURATION: 95 % | BODY MASS INDEX: 31.52 KG/M2 | WEIGHT: 208 LBS | HEART RATE: 82 BPM | SYSTOLIC BLOOD PRESSURE: 144 MMHG | TEMPERATURE: 98.2 F

## 2020-12-05 LAB
ANION GAP SERPL CALCULATED.3IONS-SCNC: 13 MMOL/L (ref 3–16)
BUN BLDV-MCNC: 21 MG/DL (ref 7–20)
CALCIUM SERPL-MCNC: 8.8 MG/DL (ref 8.3–10.6)
CHLORIDE BLD-SCNC: 99 MMOL/L (ref 99–110)
CO2: 22 MMOL/L (ref 21–32)
CREAT SERPL-MCNC: 1.1 MG/DL (ref 0.8–1.3)
GFR AFRICAN AMERICAN: >60
GFR NON-AFRICAN AMERICAN: >60
GLUCOSE BLD-MCNC: 367 MG/DL (ref 70–99)
GLUCOSE BLD-MCNC: 389 MG/DL (ref 70–99)
HCT VFR BLD CALC: 40.1 % (ref 40.5–52.5)
HEMOGLOBIN: 13.7 G/DL (ref 13.5–17.5)
MCH RBC QN AUTO: 31.4 PG (ref 26–34)
MCHC RBC AUTO-ENTMCNC: 34.1 G/DL (ref 31–36)
MCV RBC AUTO: 92.2 FL (ref 80–100)
PDW BLD-RTO: 13.4 % (ref 12.4–15.4)
PERFORMED ON: ABNORMAL
PLATELET # BLD: 138 K/UL (ref 135–450)
PMV BLD AUTO: 8.2 FL (ref 5–10.5)
POTASSIUM SERPL-SCNC: 4.9 MMOL/L (ref 3.5–5.1)
RBC # BLD: 4.35 M/UL (ref 4.2–5.9)
SODIUM BLD-SCNC: 134 MMOL/L (ref 136–145)
WBC # BLD: 13 K/UL (ref 4–11)

## 2020-12-05 PROCEDURE — G0378 HOSPITAL OBSERVATION PER HR: HCPCS

## 2020-12-05 PROCEDURE — 85027 COMPLETE CBC AUTOMATED: CPT

## 2020-12-05 PROCEDURE — 6370000000 HC RX 637 (ALT 250 FOR IP): Performed by: UROLOGY

## 2020-12-05 PROCEDURE — 2580000003 HC RX 258: Performed by: UROLOGY

## 2020-12-05 PROCEDURE — 80048 BASIC METABOLIC PNL TOTAL CA: CPT

## 2020-12-05 PROCEDURE — 36415 COLL VENOUS BLD VENIPUNCTURE: CPT

## 2020-12-05 PROCEDURE — 6360000002 HC RX W HCPCS: Performed by: UROLOGY

## 2020-12-05 RX ORDER — CIPROFLOXACIN 250 MG/1
250 TABLET, FILM COATED ORAL 2 TIMES DAILY
Qty: 10 TABLET | Refills: 0 | Status: SHIPPED | OUTPATIENT
Start: 2020-12-05 | End: 2020-12-10

## 2020-12-05 RX ORDER — DOCUSATE SODIUM 100 MG/1
100 CAPSULE, LIQUID FILLED ORAL 2 TIMES DAILY
Qty: 60 CAPSULE | Refills: 0 | Status: SHIPPED | OUTPATIENT
Start: 2020-12-05 | End: 2021-01-04

## 2020-12-05 RX ORDER — OXYCODONE HYDROCHLORIDE AND ACETAMINOPHEN 5; 325 MG/1; MG/1
0.5 TABLET ORAL EVERY 4 HOURS PRN
Qty: 20 TABLET | Refills: 0 | Status: SHIPPED | OUTPATIENT
Start: 2020-12-05 | End: 2020-12-12

## 2020-12-05 RX ADMIN — INSULIN LISPRO 10 UNITS: 100 INJECTION, SOLUTION INTRAVENOUS; SUBCUTANEOUS at 08:11

## 2020-12-05 RX ADMIN — METOPROLOL SUCCINATE 25 MG: 25 TABLET, EXTENDED RELEASE ORAL at 08:11

## 2020-12-05 RX ADMIN — LOSARTAN POTASSIUM 50 MG: 25 TABLET, FILM COATED ORAL at 08:10

## 2020-12-05 RX ADMIN — TAMSULOSIN HYDROCHLORIDE 0.4 MG: 0.4 CAPSULE ORAL at 08:11

## 2020-12-05 RX ADMIN — CEFAZOLIN SODIUM 2 G: 10 INJECTION, POWDER, FOR SOLUTION INTRAVENOUS at 01:29

## 2020-12-05 NOTE — CARE COORDINATION
Pt with PCP and insurance. No sig CM hx or PT/OT eval to review. Please call [facility] [phone] if needs arise.

## 2020-12-11 ENCOUNTER — HOSPITAL ENCOUNTER (OUTPATIENT)
Age: 75
Discharge: HOME | End: 2020-12-11
Payer: COMMERCIAL

## 2020-12-11 ENCOUNTER — HOSPITAL ENCOUNTER (OUTPATIENT)
Dept: GENERAL RADIOLOGY | Age: 75
Discharge: HOME | End: 2020-12-11
Payer: COMMERCIAL

## 2020-12-11 PROCEDURE — 74018 RADEX ABDOMEN 1 VIEW: CPT

## 2020-12-11 NOTE — OP NOTE
[facility]                 [facility]                                 OPERATIVE REPORT    PATIENT NAME: Lester Osuna                   :        1945  MED REC NO:   [id number]                          ROOM:         ACCOUNT NO:   [de-identified]                           ADMIT DATE: 2020  PROVIDER:     Morenita Hare MD    DATE OF PROCEDURE:  2020    PREOPERATIVE DIAGNOSIS:  Left kidney stone. POSTOPERATIVE DIAGNOSIS:  Left kidney stone. OPERATIONS PERFORMED:  1. Left percutaneous nephrolithotomy. 2.  Cystourethroscopy with cannulization of the left ureteral orifice  and placement of occlusion balloon. PRIMARY SURGEON:  Morenita Hare MD    ANESTHESIA:  General.    OPERATIVE FINDINGS:  1.  Large kidney stone, left kidney. 2.  History of BPH with resection. 3.  Left hydronephrosis. ESTIMATED BLOOD LOSS:  ______ mL. HISTORY AND INDICATIONS:  This is a 76-year-old white male who has had a  long history of urologic-related issues including kidney stones and BPH. Most recently, his x-rays had shown a very large stone in the left  kidney filling up most of the renal pelvis and lower pole. Options have  been explained to the patient and he elected to proceed forward with a  left percutaneous nephrolithotomy. The risks, benefits and expected  outcomes of the procedure had been discussed. DETAILS OF THE PROCEDURE:  After obtaining informed consent, the patient  was taken to the operative suite. He was given a general anesthetic as  well as intravenous antibiotics. Pneumatic boots have been applied. The first part of the procedure was done while the patient was in a  supine position. After prepping and draping the genitalia, a flexible  cystoscopy was then performed. Evaluation of the patient's bladder had  shown a prior prostate surgery.   The left ureteral orifice was able to be cannulated with a Glidewire and the cystoscope was then backed off  with the Glidewire. Over top of this, a placement of a ureteral  catheter occlusion balloon was then done blindly. Repeat cystoscopy  revealed that the catheter was entering the left ureteral orifice  without complication and a small amount of fluid was placed into the  occlusion balloon. At this point, a Salas catheter was positioned and the patient was then  transferred to the actual operative table in a prone position. All  pressure points well padded. Again pneumatic boots have been applied. Prepping and draping on the left flank was then done. The case was then  handed over to the interventional radiologist.  He was able to proceed  forward with a technique of placing an access catheter and two  guidewires into the patient's kidney in the lower pole calyx. I then  resumed care of the patient after he had completed his procedure. At this point, the Super Stiff guidewire was then used to place an  occlusion balloon. A small cutdown was made in the patient's flank at  the entrance point of the guidewires. The occlusion balloon was then  placed into the patient's renal pelvis and advanced to this area under  fluoroscopic guidance. Fluoroscopy was used throughout this patient's  procedure to help to guide in the care and removal of the stone. After approximately 5 minutes of tamponading the tract, the access  sheath was then placed over the balloon. The balloon was then removed  and nephroscope was advanced. The scope was able to be advanced into  the patient's lower pole calyx of renal pelvis, where large amount of  stone material was identified. Using ultrasonic lithotripter, the stone  was fractured into multiple pieces and these were evacuated with the  suction technique.   Inspection of the calyces as best could be done was  performed and any residual stone material was again treated using ultrasonic lithotripsy. There was oozing of the patient's kidney from  the access site and placement of the occlusion balloon and access  sheath. This made visualization somewhat challenging with some  persistence, I was able to complete the percutaneous nephrolithotomy and  debulk the kidney ______ much stone material as could be identified. A nephrostogram was performed and showed a good passage of contrast down  the ureter with no evidence of any occlusion. Over top of the Super  Stiff guidewire, then a [place]-tip Salas catheter was then positioned  into the patient's renal pelvis and a small amount of fluid was injected  into the balloon to hold this in place. The extra safety guidewire was  then removed along with a Super Stiff guidewire. Repeat nephrostogram  was done and again it showed good placement of the Salas catheter into  the patient's renal pelvis with no significant extravasation. This was  then left to gravity drainage. The suture was sewn to the patient's  skin with 0 silk and dry sterile dressings were applied. Both the Salas  catheter and the nephrostomy tube were then left to gravity drainage. The occlusion balloon had already been removed by Radiology. Overall, the patient tolerated the procedure well. After reversing his  anesthesia, he was then brought back to the postoperative recovery room  in stable condition.         Pepper Chu MD    D: 12/11/2020 6:42:47       T: 12/11/2020 13:12:27     [email or web address]  Job#: [id number]     Doc#: [id number]    CC:

## 2020-12-14 LAB
CALCULI COMPOSITION: NORMAL
MASS: NORMAL MG
STONE DESCRIPTION: NORMAL
STONE NUMBER: NORMAL
STONE SIZE: NORMAL MM

## 2020-12-29 ENCOUNTER — TELEPHONE (OUTPATIENT)
Dept: CARDIOLOGY CLINIC | Age: 75
End: 2020-12-29

## 2021-05-28 ENCOUNTER — HOSPITAL ENCOUNTER (EMERGENCY)
Age: 76
Discharge: HOME | End: 2021-05-28
Payer: COMMERCIAL

## 2021-05-28 ENCOUNTER — APPOINTMENT (OUTPATIENT)
Dept: CT IMAGING | Age: 76
End: 2021-05-28
Payer: COMMERCIAL

## 2021-05-28 ENCOUNTER — APPOINTMENT (OUTPATIENT)
Dept: GENERAL RADIOLOGY | Age: 76
End: 2021-05-28
Payer: COMMERCIAL

## 2021-05-28 VITALS
RESPIRATION RATE: 14 BRPM | BODY MASS INDEX: 32.33 KG/M2 | HEART RATE: 55 BPM | OXYGEN SATURATION: 96 % | SYSTOLIC BLOOD PRESSURE: 148 MMHG | HEIGHT: 67 IN | TEMPERATURE: 97.7 F | DIASTOLIC BLOOD PRESSURE: 56 MMHG | WEIGHT: 206 LBS

## 2021-05-28 DIAGNOSIS — J06.9 UPPER RESPIRATORY TRACT INFECTION, UNSPECIFIED TYPE: ICD-10-CM

## 2021-05-28 DIAGNOSIS — R06.09 DYSPNEA ON EXERTION: Primary | ICD-10-CM

## 2021-05-28 LAB
A/G RATIO: 1.2 (ref 1.1–2.2)
ALBUMIN SERPL-MCNC: 3.8 G/DL (ref 3.4–5)
ALP BLD-CCNC: 134 U/L (ref 40–129)
ALT SERPL-CCNC: 14 U/L (ref 10–40)
AMORPHOUS: NORMAL /HPF
ANION GAP SERPL CALCULATED.3IONS-SCNC: 8 MMOL/L (ref 3–16)
AST SERPL-CCNC: 19 U/L (ref 15–37)
BASE EXCESS VENOUS: -1.3 MMOL/L (ref -3–3)
BASOPHILS ABSOLUTE: 0 K/UL (ref 0–0.2)
BASOPHILS RELATIVE PERCENT: 0.6 %
BILIRUB SERPL-MCNC: 0.3 MG/DL (ref 0–1)
BILIRUBIN URINE: NEGATIVE
BLOOD, URINE: ABNORMAL
BUN BLDV-MCNC: 12 MG/DL (ref 7–20)
CALCIUM SERPL-MCNC: 9.2 MG/DL (ref 8.3–10.6)
CARBOXYHEMOGLOBIN: 0.5 % (ref 0–1.5)
CHLORIDE BLD-SCNC: 103 MMOL/L (ref 99–110)
CLARITY: CLEAR
CO2: 26 MMOL/L (ref 21–32)
COLOR: YELLOW
CREAT SERPL-MCNC: 0.8 MG/DL (ref 0.8–1.3)
EOSINOPHILS ABSOLUTE: 0.1 K/UL (ref 0–0.6)
EOSINOPHILS RELATIVE PERCENT: 2.1 %
GFR AFRICAN AMERICAN: >60
GFR NON-AFRICAN AMERICAN: >60
GLOBULIN: 3.1 G/DL
GLUCOSE BLD-MCNC: 257 MG/DL (ref 70–99)
GLUCOSE URINE: >=1000 MG/DL
HCO3 VENOUS: 23.2 MMOL/L (ref 23–29)
HCT VFR BLD CALC: 35.9 % (ref 40.5–52.5)
HEMOGLOBIN: 12.6 G/DL (ref 13.5–17.5)
KETONES, URINE: NEGATIVE MG/DL
LEUKOCYTE ESTERASE, URINE: NEGATIVE
LYMPHOCYTES ABSOLUTE: 0.8 K/UL (ref 1–5.1)
LYMPHOCYTES RELATIVE PERCENT: 12.1 %
MCH RBC QN AUTO: 31.7 PG (ref 26–34)
MCHC RBC AUTO-ENTMCNC: 35.1 G/DL (ref 31–36)
MCV RBC AUTO: 90.3 FL (ref 80–100)
METHEMOGLOBIN VENOUS: 0.3 %
MICROSCOPIC EXAMINATION: YES
MONOCYTES ABSOLUTE: 0.5 K/UL (ref 0–1.3)
MONOCYTES RELATIVE PERCENT: 6.9 %
NEUTROPHILS ABSOLUTE: 5.2 K/UL (ref 1.7–7.7)
NEUTROPHILS RELATIVE PERCENT: 78.3 %
NITRITE, URINE: NEGATIVE
O2 CONTENT, VEN: 17 VOL %
O2 SAT, VEN: 89 %
O2 THERAPY: ABNORMAL
PCO2, VEN: 38.2 MMHG (ref 40–50)
PDW BLD-RTO: 13.8 % (ref 12.4–15.4)
PH UA: 5.5 (ref 5–8)
PH VENOUS: 7.4 (ref 7.35–7.45)
PLATELET # BLD: 139 K/UL (ref 135–450)
PMV BLD AUTO: 7.4 FL (ref 5–10.5)
PO2, VEN: 59.3 MMHG (ref 25–40)
POTASSIUM REFLEX MAGNESIUM: 4.1 MMOL/L (ref 3.5–5.1)
PRO-BNP: 233 PG/ML (ref 0–449)
PROTEIN UA: NEGATIVE MG/DL
RBC # BLD: 3.97 M/UL (ref 4.2–5.9)
RBC UA: NORMAL /HPF (ref 0–4)
SODIUM BLD-SCNC: 137 MMOL/L (ref 136–145)
SPECIFIC GRAVITY UA: 1.02 (ref 1–1.03)
TCO2 CALC VENOUS: 24 MMOL/L
TOTAL PROTEIN: 6.9 G/DL (ref 6.4–8.2)
TROPONIN: <0.01 NG/ML
URINE REFLEX TO CULTURE: ABNORMAL
URINE TYPE: ABNORMAL
UROBILINOGEN, URINE: 0.2 E.U./DL
WBC # BLD: 6.6 K/UL (ref 4–11)
WBC UA: NORMAL /HPF (ref 0–5)

## 2021-05-28 PROCEDURE — 84484 ASSAY OF TROPONIN QUANT: CPT

## 2021-05-28 PROCEDURE — 6360000004 HC RX CONTRAST MEDICATION: Performed by: NURSE PRACTITIONER

## 2021-05-28 PROCEDURE — 85025 COMPLETE CBC W/AUTO DIFF WBC: CPT

## 2021-05-28 PROCEDURE — 80053 COMPREHEN METABOLIC PANEL: CPT

## 2021-05-28 PROCEDURE — 83880 ASSAY OF NATRIURETIC PEPTIDE: CPT

## 2021-05-28 PROCEDURE — 6370000000 HC RX 637 (ALT 250 FOR IP): Performed by: NURSE PRACTITIONER

## 2021-05-28 PROCEDURE — 99284 EMERGENCY DEPT VISIT MOD MDM: CPT

## 2021-05-28 PROCEDURE — 71260 CT THORAX DX C+: CPT

## 2021-05-28 PROCEDURE — 82803 BLOOD GASES ANY COMBINATION: CPT

## 2021-05-28 PROCEDURE — 81001 URINALYSIS AUTO W/SCOPE: CPT

## 2021-05-28 PROCEDURE — 71045 X-RAY EXAM CHEST 1 VIEW: CPT

## 2021-05-28 RX ORDER — LEVOFLOXACIN 500 MG/1
500 TABLET, FILM COATED ORAL DAILY
Qty: 7 TABLET | Refills: 0 | Status: SHIPPED | OUTPATIENT
Start: 2021-05-28 | End: 2021-06-04

## 2021-05-28 RX ORDER — LEVOFLOXACIN 500 MG/1
500 TABLET, FILM COATED ORAL ONCE
Status: COMPLETED | OUTPATIENT
Start: 2021-05-28 | End: 2021-05-28

## 2021-05-28 RX ORDER — PREDNISONE 10 MG/1
TABLET ORAL
Qty: 44 TABLET | Refills: 0 | Status: SHIPPED | OUTPATIENT
Start: 2021-05-28 | End: 2021-06-07

## 2021-05-28 RX ORDER — ALBUTEROL SULFATE 90 UG/1
2 AEROSOL, METERED RESPIRATORY (INHALATION) EVERY 4 HOURS PRN
Qty: 1 INHALER | Refills: 0 | Status: ON HOLD | OUTPATIENT
Start: 2021-05-28 | End: 2022-08-23

## 2021-05-28 RX ORDER — BENZONATATE 100 MG/1
100 CAPSULE ORAL 3 TIMES DAILY PRN
Qty: 30 CAPSULE | Refills: 0 | Status: SHIPPED | OUTPATIENT
Start: 2021-05-28 | End: 2021-06-07

## 2021-05-28 RX ADMIN — IOPAMIDOL 75 ML: 755 INJECTION, SOLUTION INTRAVENOUS at 19:28

## 2021-05-28 RX ADMIN — MAGNESIUM HYDROXIDE/ALUMINUM HYDROXICE/SIMETHICONE: 120; 1200; 1200 SUSPENSION ORAL at 22:28

## 2021-05-28 RX ADMIN — LEVOFLOXACIN 500 MG: 500 TABLET, FILM COATED ORAL at 22:30

## 2021-05-28 ASSESSMENT — ENCOUNTER SYMPTOMS
BACK PAIN: 0
ABDOMINAL PAIN: 0
COUGH: 0
SHORTNESS OF BREATH: 1
NAUSEA: 0
VOMITING: 0
DIARRHEA: 0
COLOR CHANGE: 0
WHEEZING: 0

## 2021-05-28 NOTE — ED PROVIDER NOTES
**ADVANCED PRACTICE PROVIDER, I HAVE EVALUATED THIS [facility]  ED  EMERGENCY DEPARTMENT ENCOUNTER      Pt Name: Fidel Nelson  ZNS:[id number]  [place] 1945  Date of evaluation: 5/28/2021  Provider: Jackqueline Hammans, APRN - CNP      Chief Complaint:    Chief Complaint   Patient presents with    Shortness of Breath     patient with increasing shortness of breath over the last month or two. Has been seeing PCP, feels that it is worse and is scared.  Anxiety     patient states, \"just feel anxious\"         Nursing Notes, Past Medical Hx, Past Surgical Hx, Social Hx, Allergies, and Family Hx were all reviewed and agreed with or any disagreements were addressed in the HPI.    HPI: (Location, Duration, Timing, Severity, Quality, Assoc Sx, Context, Modifying factors)  This is a  67 y.o. male who presents today with complaints of shortness of breath, patient states he has been dealing with it off and on for the past 2 months, had chest pain about a week ago but is denying any chest pain today. Denies any cough, congestion, fever or chills. No nausea vomiting or diarrhea. 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 He denies any pain on exam.  Denies any concern for COVID-19. 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 He has a history of high blood pressure and hyperlipidemia along with diabetes he has been taking his home medicine as prescribed. He denies any pain in exam, no lightheadedness or dizziness, no headache, no additional complaints, no additional aggravating relieving factors. Patient presents awake, alert and in no acute respiratory distress or toxic appearance.     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 He denies any pain on exam.    Gastrointestinal: Negative for abdominal pain, diarrhea, nausea and vomiting. No nausea vomiting or diarrhea. Genitourinary: Negative for difficulty urinating, dysuria and frequency. Musculoskeletal: Negative for back pain. Skin: Negative for color change. Neurological: Negative for weakness, numbness and headaches. Physical Exam:  Physical Exam  Vitals and nursing note reviewed. Constitutional:       Appearance: He is well-developed. He is not diaphoretic. HENT:      Head: Normocephalic. Right Ear: External ear normal.      Left Ear: External ear normal.   Eyes:      General: No scleral icterus. Right eye: No discharge. Left eye: No discharge. Cardiovascular:      Rate and Rhythm: Normal rate. Comments: Normal S1 and 2, peripheral pulses are 2+, no edema observed  Pulmonary:      Effort: Pulmonary effort is normal. No respiratory distress. Breath sounds: Normal breath sounds. Comments: Lungs are clear anteriorly and posteriorly, patient is not tachypneic or dyspneic, saturations are 97% on room air. Abdominal:      Palpations: Abdomen is soft. Musculoskeletal:         General: Normal range of motion. Cervical back: Normal range of motion and neck supple. Skin:     General: Skin is warm. Capillary Refill: Capillary refill takes less than 2 seconds. Coloration: Skin is not pale. Neurological:      General: No focal deficit present. Mental Status: He is alert and oriented to person, place, and time. GCS: GCS eye subscore is 4. GCS verbal subscore is 5. GCS motor subscore is 6.    Psychiatric:         Behavior: Behavior normal.         MEDICAL DECISION MAKING    Vitals:    Vitals:    05/28/21 2021 05/28/21 2049 05/28/21 2141 05/28/21 2212   BP: (!) 145/56 (!) 150/63 (!) 192/66 (!) 148/56   Pulse: 54 56 56 55   Resp: 15 16 15 14   Temp:       TempSrc:       SpO2: 95% 94% 98% 96%   Weight:       Height:           LABS:  Labs Reviewed   CBC WITH AUTO DIFFERENTIAL - Abnormal; Notable for the following components:       Result Value    RBC 3.97 (*)     Hemoglobin 12.6 (*)     Hematocrit 35.9 (*)     Lymphocytes Absolute 0.8 (*)     All other components within normal limits    Narrative:     Performed at:  [address] [place] [address], Formerly named [facility] [employer]   Phone [phone]   COMPREHENSIVE METABOLIC PANEL W/ REFLEX TO MG FOR LOW K - Abnormal; Notable for the following components:    Glucose 257 (*)     Alkaline Phosphatase 134 (*)     All other components within normal limits    Narrative:     Performed at:  [address] [place] [address], Formerly named [facility] [employer]   Phone [phone]   URINE RT REFLEX TO CULTURE - Abnormal; Notable for the following components:    Glucose, Ur >=1000 (*)     Blood, Urine TRACE-INTACT (*)     All other components within normal limits    Narrative:     Performed at:  [address], Formerly named [facility] [employer]   Phone [phone]   BLOOD GAS, VENOUS - Abnormal; Notable for the following components:    pCO2, Pedro 38.2 (*)     pO2, Pedro 59.3 (*)     All other components within normal limits    Narrative:     Performed at:  [address] [place] [address], [address] [employer]   Phone [phone]   TROPONIN    Narrative:     Performed at:  [address] [place] [address], [address] [employer]   Phone [phone]    Narrative:     Performed at:  [address] [place] [address], Formerly named [facility] [employer]   Phone [phone]   MICROSCOPIC URINALYSIS    Narrative:     Performed at:  [address] [place] [address] Laboratory  [address] [place] [address], [address] [place] [address]   Phone  of labs reviewed and were negative at this time or not returned at the time of this note. RADIOLOGY:   Non-plain film images such as CT, Ultrasound and MRI are read by the radiologist. Jadyn RODRIGUES APRN - CNP have directly visualized the radiologic plain film image(s) with the below findings:      Interpretation per the Radiologist below, if available at the time of this note:    CT CHEST PULMONARY EMBOLISM W CONTRAST   Final Result   1. Partially limited evaluation for subsegmental pulmonary emboli near the   left lung base due to motion. Within this limitation, no findings of   pulmonary embolism are identified   2. Minimal to mild interstitial edema potentially due to congestive heart   failure given mild cardiomegaly. Asymmetric moderate to large left pleural   effusion may be related. 3. Patchy nodular groundglass opacities predominantly the bilateral upper   lobes measure up to 0.8 cm and could represent areas of alveolar edema. A   1.0 cm area of mixed nodular groundglass and consolidative opacity in the   left upper lobe may be related. Alternatively, this could be related to an   infectious, inflammatory, or neoplastic process. Recommend follow-up chest   CT in 3-6 months as below. 4. 1.7 cm nodular consolidative opacity in the right lower lobe potentially   due to rounded atelectasis, an infectious or inflammatory process, or   neoplasia. Recommend follow-up chest CT in 3 months as below. 5. Mild bronchial wall thickening potentially due to pulmonary vascular   congestion, reactive airways disease, or bronchitis.       RECOMMENDATIONS:   Incidental Pulmonary Nodules on CT      - Solid nodules, single, low risk      >8 mm:  Consider CT 3 months, PET/CT, or tissue sampling      - Solid nodules, single, high risk      >8 mm:  Consider CT 3 months, PET/CT, or tissue sampling      - Subsolid nodules, multiple      >=6 mm:  CT at 3-6 months; subsequent management based on the most suspicious   nodule(s)      Reference:  Saúl Boudreaux al.  Guidelines for management of incidental   pulmonary nodules detected on CT images: From the Fleischner Society 2017. Radiology 5106;[id number]. XR CHEST PORTABLE   Final Result   Minimal left basilar atelectasis or early infiltrate and a questionable small   left pleural effusion. Mild subsegmental atelectasis or small infiltrate along the right lung base. MEDICAL DECISION MAKING / ED COURSE:    Because of high probability of sudden clinical deterioration of the patient's condition and risk of further deterioration, critical care time required my full attention to the patient's condition; which included chart data review, documentation, medication ordering, reviewing the patient's old records, reevaluation patient's cardiac, pulmonary and neurological status. Reevaluation of vital signs. Consultations with ED attending and admitting physician. Ordering, interpreting reviewing diagnostic testing. Therefore a critical care time was 22 minutes of direct attention to the patient's condition did not include time spent on procedures. PROCEDURES:   Procedures    None    Patient was given:  Medications   iopamidol (ISOVUE-370) 76 % injection 75 mL (75 mLs Intravenous Given 5/28/21 1928)   aluminum & magnesium hydroxide-simethicone (MAALOX) 30 mL, lidocaine viscous hcl (XYLOCAINE) 5 mL (GI COCKTAIL) ( Oral Given 5/28/21 2228)   levoFLOXacin (LEVAQUIN) tablet 500 mg (500 mg Oral Given 5/28/21 2230)       Patient complaints of shortness of breath, patient states he has been dealing with it off and on for the past 2 months, had chest pain about a week ago but is denying any chest pain today. Denies any cough, congestion, fever or chills. No nausea vomiting or diarrhea.   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 He denies any pain on exam.     After evaluation and examination the patient IV access, blood work, chest x-ray were ordered by nursing staff ordered. Sinus rhythm 60 bpm on bedside monitor. Pulse ox is 97 to 100% on room air, he does not appear in respiratory distress. CBC shows no acute sepsis or anemia. Metabolic panel shows no significant electrolyte disturbances or renal failure however the patient's blood sugar is 257, he is diabetic, kidney function is normal though. Troponin is negative. Blood gas shows no acute acidosis or alkalosis. BNP is 233. Chest x-ray shows minimal left basilar atelectasis or early infiltrate concerning of a small left pleural effusion. Because of this acute findings I ordered a CT chest.  CT chest shows possibly limited evaluation for subsegmental pulmonary emboli. Minimal to mild interstitial edema concerning of possibly CHF however his lung sounds have no rales, he is also not hypoxic and his BNP is normal.  Patchy nodular groundglass opacity predominantly in bilateral upper lobes attentionally infectious inflammatory or neoplastic recommending CT follow-up in 3 to 6 months. However, he does have bronchial wall thickening concerning of bronchitis reactive airway disease. I did have the nursing staff ambulate the patient, he eagerly wants to go home, family is at the bedside, she informs patient was recently in the hospital, he was sent home he followed up with the PCP and was told that he had a UTI and has been on Macrobid since. However, I did add on urinalysis which shows some glucosuria but no infection, no signs of dehydration negative nitrates. I do however believe the patient has underlying reactive airway disease or upper respiratory developing symptoms, however he is not hypoxic, he eagerly wants to go home. I informed the patient's wife to stop the Macrobid and start antibiotic, he was given first dose of Levaquin here. The patient has URI, but does not have HYPOXIA, TACHYCARDIA, TACHYPNEA, VOMITING, SIGNIFICANT CO-MORBIDITIES, TOXICITY, OR SEVERE SEPSIS, thus I consider the discharge disposition reasonable. Therefore, shared medical decision was made to the patient, his family and myself we agreed that he will be discharged home with instructions to return immediately for any worsening or concerning symptoms. He was discharged home with prescription for antibiotics, albuterol inhaler, prednisone and Tessalon Perles. Educated to return immediately for worsening or concerning symptoms. Inform PCP about lung nodules for follow-up and repeat CT in 3 to 6 months. the patient tolerated their visit well. I evaluated the patient. The physician was available for consultation as needed. The patient and / or the family were informed of the results of any tests, a time was given to answer questions, a plan was proposed and they agreed with plan. Patient and family verbalized understanding of discharge instructions and the patient was discharged from the department in stable condition. CLINICAL IMPRESSION:  1. Dyspnea on exertion    2. Lung nodule < 6cm on CT    3.  Upper respiratory tract infection, unspecified type        DISPOSITION Decision To Discharge 05/28/2021 10:26:26 PM      PATIENT REFERRED TO:  Megan Joys, DO  [address] [facility]  [phone]    Schedule an appointment as soon as possible for a visit in 3 days  Follow-up with your family doctor in 3 days for reevaluation    [facility] [address]  [phone]  Go to   If symptoms worsen      DISCHARGE MEDICATIONS:  Discharge Medication List as of 5/28/2021 10:28 PM      START taking these medications    Details   levoFLOXacin (LEVAQUIN) 500 MG tablet Take 1 tablet by mouth daily for 7 days, Disp-7 tablet, R-0Print      albuterol sulfate HFA (PROVENTIL HFA) 108 (90 Base) MCG/ACT inhaler Inhale 2 puffs into the lungs every 4 hours as needed for Wheezing or Shortness of Breath (Space out to every 6 hours as symptoms improve) Space out to every 6 hours as symptoms improve., Disp-1 Inhaler, R-0Print      predniSONE (DELTASONE) 10 MG tablet 60 mg po x 5 days then   40 mg po x 2 days then  20 mg po x 2 days then  10 mg po x 2 days total of 11 days, Disp-44 tablet, R-0Print      benzonatate (TESSALON PERLES) 100 MG capsule Take 1 capsule by mouth 3 times daily as needed for Cough, Disp-30 capsule, R-0Print             DISCONTINUED MEDICATIONS:  Discharge Medication List as of 5/28/2021 10:28 PM                 (Please note the MDM and HPI sections of this note were completed with a voice recognition program.  Efforts were made to edit the dictations but occasionally words are mis-transcribed.)    Electronically signed, PUSHPA Johnson CNP,           PUSHPA Johnson CNP  05/28/21 9721

## 2021-05-29 NOTE — ED NOTES
Pt provided discharge instructions and Rx's - IV removed, VSS, Pt verbalized understanding and was able to ambulate under own power from ED escorted by wife.      Malaika Watson RN  05/28/21 4542

## 2021-05-29 NOTE — ED NOTES
Ambulated PT with Pulse Ox - Pt maintained O2 at 94% and HR at 87 - Upon return to room, Pt did state he felt a little SOB.   MARIALUISA Salamanca made aware     Donnell Quintanilla RN  05/28/21 654 Myron Lucero RN  05/28/21 5937

## 2021-06-03 ENCOUNTER — TELEPHONE (OUTPATIENT)
Dept: CASE MANAGEMENT | Age: 76
End: 2021-06-03

## 2021-06-03 NOTE — TELEPHONE ENCOUNTER
Imaging report CT CHEST PULM 5/28/2021 WITH F/U imaging recommendations routed to PCP Giorgi Gavin DO with [facility].     Thank you,  Josselyn Section RN  [facility]  [phone]

## 2021-09-01 ENCOUNTER — HOSPITAL ENCOUNTER (OUTPATIENT)
Dept: CT IMAGING | Age: 76
Discharge: HOME | End: 2021-09-01
Payer: COMMERCIAL

## 2021-09-01 DIAGNOSIS — R91.1 PULMONARY NODULE: ICD-10-CM

## 2021-09-01 PROCEDURE — 71250 CT THORAX DX C-: CPT

## 2021-09-20 ENCOUNTER — TELEPHONE (OUTPATIENT)
Dept: CARDIOLOGY CLINIC | Age: 76
End: 2021-09-20

## 2021-09-20 ENCOUNTER — TELEPHONE (OUTPATIENT)
Dept: PULMONOLOGY | Age: 76
End: 2021-09-20

## 2021-09-20 NOTE — TELEPHONE ENCOUNTER
Pt has a referral for pleural effusion. Called to schedule pt and spoke with pt's wife. Wife is requesting that pt be seen soon as possible because of the fluid in lungs. Please advise.

## 2021-09-21 NOTE — TELEPHONE ENCOUNTER
Bring him in early next week, I can do thoracentesis as an outpatient after seeing him in clinic.   Based on the patient's wife I have reviewed images, the pleural effusion is not very large

## 2021-09-27 ENCOUNTER — OFFICE VISIT (OUTPATIENT)
Dept: PULMONOLOGY | Age: 76
End: 2021-09-27
Payer: COMMERCIAL

## 2021-09-27 VITALS
SYSTOLIC BLOOD PRESSURE: 147 MMHG | TEMPERATURE: 97.2 F | WEIGHT: 209.4 LBS | BODY MASS INDEX: 32.87 KG/M2 | RESPIRATION RATE: 18 BRPM | HEIGHT: 67 IN | HEART RATE: 64 BPM | DIASTOLIC BLOOD PRESSURE: 63 MMHG | OXYGEN SATURATION: 97 %

## 2021-09-27 DIAGNOSIS — J90 PLEURAL EFFUSION, LEFT: Primary | ICD-10-CM

## 2021-09-27 DIAGNOSIS — R91.8 LUNG NODULES: ICD-10-CM

## 2021-09-27 DIAGNOSIS — Z87.891 FORMER SMOKER: ICD-10-CM

## 2021-09-27 PROCEDURE — 99204 OFFICE O/P NEW MOD 45 MIN: CPT | Performed by: INTERNAL MEDICINE

## 2021-09-27 RX ORDER — M-VIT,TX,IRON,MINS/CALC/FOLIC 27MG-0.4MG
1 TABLET ORAL DAILY
COMMUNITY

## 2021-09-27 RX ORDER — LOSARTAN POTASSIUM 100 MG/1
100 TABLET ORAL DAILY
COMMUNITY

## 2021-09-27 RX ORDER — ATORVASTATIN CALCIUM 20 MG/1
20 TABLET, FILM COATED ORAL DAILY
Status: ON HOLD | COMMUNITY
End: 2022-10-05 | Stop reason: HOSPADM

## 2021-09-27 NOTE — PROGRESS NOTES
Pulmonary Outpatient Note   Heike Bassett MD       2021    1. Pleural effusion, left    2. Lung nodules    3. Former smoker          ASSESSMENT/PLAN:  Left pleural effusion. Images shown to the patient. Definitely needs thoracentesis, will have IR perform thoracentesis. The procedure was discussed with patient including risks of pneumothorax, hemorrhage and respiratory insufficiency. He expressed understanding and agrees to proceed. Lung nodules. Subsolid groundglass nodule is concerning, particularly for adenocarcinoma. Will need follow-up even if pleural fluid is negative. Former smoker. No PFT in EMR, assess shortness of breath after therapeutic thoracentesis. May need PFT. Prophylaxis. Up-to-date with his Pneumovax, recommend COVID-19 vaccine and annual flu shots. RTC to discuss test results. Orders Placed This Encounter   Procedures    US THORACENTESIS    PROTEIN, BODY FLUID    LACTATE DEHYDROGENASE, BODY FLUID    BODY FLUID CELL COUNT WITH DIFFERENTIAL    GLUCOSE, BODY FLUID    CYTOLOGY, NON-GYN       No follow-ups on file. Chief Complaint:   New Patient (NPT pleura effusion r/b Kamilah Kim)       HPI: Landen Olsen is a 67y.o. year old male here for evaluation of pleural effusion. His PCP is Dr. Myra Stratton. Kamilah Kim. His endocrinologist is Dr. Corwin Landon, he sees Dr. Adia Pinon from cardiology. Jacobo Barrera lives in [facility]. He has a history of diabetes mellitus, hypertension, hyperlipidemia, kidney stones, hemispheric carotid syndrome. The patient's father had diabetes mellitus and hypertension, mother had dementia. He has a brother with diabetes mellitus, sister is  from lung cancer. He has a son and a daughter who are healthy. The patient worked as an . He was in the [employer] for 22 months, did not go overseas. The patient smoked up to age 39, quit almost 36 years ago. He started smoking at age 12, about 15-pack-year smoking history.   He does not identified   2. Minimal to mild interstitial edema potentially due to congestive heart   failure given mild cardiomegaly.  Asymmetric moderate to large left pleural   effusion may be related. 3. Patchy nodular groundglass opacities predominantly the bilateral upper   lobes measure up to 0.8 cm and could represent areas of alveolar edema.  A   1.0 cm area of mixed nodular groundglass and consolidative opacity in the   left upper lobe may be related.  Alternatively, this could be related to an   infectious, inflammatory, or neoplastic process.  Recommend follow-up chest   CT in 3-6 months as below. 4. 1.7 cm nodular consolidative opacity in the right lower lobe potentially   due to rounded atelectasis, an infectious or inflammatory process, or   neoplasia.  Recommend follow-up chest CT in 3 months as below. 5. Mild bronchial wall thickening potentially due to pulmonary vascular   congestion, reactive airways disease, or bronchitis. CXR 5/28/2021  Minimal left basilar atelectasis or early infiltrate and a questionable small   left pleural effusion.       Mild subsegmental atelectasis or small infiltrate along the right lung base. There is no PFT in EMR     Echocardiogram 11/25/2020   Left ventricular systolic function is normal with a visually estimated   ejection fraction of 55%. The left ventricle is normal in size with mild septal hypertrophy. No regional wall motion abnormalities are noted. Normal left ventricular diastolic function. Mild bi-atrial enlargement. Mild mitral regurgitation. The IVC is dilated in size (>2.1 cm) but collapses >50% with respiration   consistent with elevated right atrial pressures (8 mmHg) .       Past Medical History:   Diagnosis Date    Cancer [facility])     prostate    Diabetes mellitus ([address]     Hyperlipidemia     Hypertension     Kidney stone        Past Surgical History:   Procedure Laterality Date    BACK SURGERY  2012    lumber, no hardware, \"cleaned it SpO2: 97%   Weight: 209 lb 6.4 oz (95 kg)   Height: 5' 7\" (1.702 m)       Review of Systems   Respiratory: Positive for cough and shortness of breath. Gastrointestinal: Positive for diarrhea. Psychiatric/Behavioral: Positive for sleep disturbance. All other systems reviewed and are negative. Physical Exam  Vitals reviewed. Constitutional:       General: He is not in acute distress. Appearance: He is well-developed. He is not ill-appearing, toxic-appearing or diaphoretic. Comments: Very pleasant, overweight   HENT:      Head: Normocephalic and atraumatic. Nose: Nose normal. No congestion or rhinorrhea. Mouth/Throat:      Mouth: Mucous membranes are moist.      Pharynx: Oropharynx is clear. No oropharyngeal exudate. Comments: Upper and lower dentures, class III airway  Eyes:      General: No scleral icterus. Right eye: No discharge. Left eye: No discharge. Conjunctiva/sclera: Conjunctivae normal.      Pupils: Pupils are equal, round, and reactive to light. Neck:      Thyroid: No thyromegaly. Vascular: No JVD. Trachea: No tracheal deviation. Comments: Relatively short neck  Cardiovascular:      Rate and Rhythm: Normal rate and regular rhythm. Heart sounds: Normal heart sounds. No murmur heard. No friction rub. No gallop. Pulmonary:      Effort: Pulmonary effort is normal. No respiratory distress. Breath sounds: No stridor. No wheezing, rhonchi or rales. Comments: Reduced air entry and dullness to percussion left hemithorax  Abdominal:      Palpations: Abdomen is soft. Tenderness: There is no abdominal tenderness. There is no guarding or rebound. Comments: Protuberant abdomen   Musculoskeletal:      Right lower leg: No edema. Left lower leg: No edema. Comments: Stooped gait   Lymphadenopathy:      Cervical: No cervical adenopathy. Skin:     General: Skin is warm and dry.       Coloration: Skin is not jaundiced or pale. Findings: No bruising, erythema, lesion or rash. Neurological:      Mental Status: He is alert and oriented to person, place, and time. Gait: Gait abnormal (Stooped). Psychiatric:         Behavior: Behavior normal.         Thought Content:  Thought content normal.         Judgment: Judgment normal.

## 2021-09-27 NOTE — PROGRESS NOTES
MA Communication:   The following orders are received by verbal communication from Leo Carvajal MD    Orders include:    Thoracentesis: 09/27/2021 12:30 pm arrival at [facility]  Follow up: 10/07/2021 10:00 am

## 2021-09-27 NOTE — PATIENT INSTRUCTIONS
Remember to bring a list of pulmonary medications and any CPAP or BiPAP machines to your next appointment with the office. Please keep all of your future appointments scheduled by [facility] office. Out of respect for other patients and providers, you may be asked to reschedule your appointment if you arrive later than your scheduled appointment time. Appointments cancelled less than 24hrs in advance will be considered a no show. Patients with three missed appointments within 1 year or four missed appointments within 2 years can be dismissed from the practice. Please be aware that our physicians are required to work in the Intensive Care Unit at [facility].  Your appointment may need to be rescheduled if they are designated to work during your appointment time. You may receive a survey regarding the care you received during your visit. Your input is valuable to us. We encourage you to complete and return your survey. We hope you will choose us in the future for your healthcare needs. Pt instructed of all future appointment dates & times, including radiology, labs, procedures & referrals. If procedures were scheduled preparation instructions provided. Instructions on future appointments with [facility] Pul[facility] were given.

## 2021-09-28 ENCOUNTER — HOSPITAL ENCOUNTER (OUTPATIENT)
Dept: GENERAL RADIOLOGY | Age: 76
Discharge: HOME | End: 2021-09-28
Payer: COMMERCIAL

## 2021-09-28 ENCOUNTER — HOSPITAL ENCOUNTER (OUTPATIENT)
Dept: ULTRASOUND IMAGING | Age: 76
Discharge: HOME | End: 2021-09-28
Payer: COMMERCIAL

## 2021-09-28 ENCOUNTER — HOSPITAL ENCOUNTER (OUTPATIENT)
Age: 76
Discharge: HOME | End: 2021-09-28
Payer: COMMERCIAL

## 2021-09-28 DIAGNOSIS — R91.8 LUNG NODULES: ICD-10-CM

## 2021-09-28 DIAGNOSIS — Z87.891 FORMER SMOKER: ICD-10-CM

## 2021-09-28 DIAGNOSIS — J90 PLEURAL EFFUSION, LEFT: ICD-10-CM

## 2021-09-28 LAB
APPEARANCE FLUID: NORMAL
CELL COUNT FLUID TYPE: NORMAL
CLOT EVALUATION: NORMAL
COLOR FLUID: YELLOW
FLUID PATH CONSULT: YES
FLUID TYPE: NORMAL
GLUCOSE, FLUID: 148 MG/DL
HCT VFR BLD CALC: 41.1 % (ref 40.5–52.5)
HEMOGLOBIN: 14.3 G/DL (ref 13.5–17.5)
INR BLD: 1 (ref 0.88–1.12)
LD, FLUID: 209 U/L
LYMPHOCYTES, BODY FLUID: 33 %
MCH RBC QN AUTO: 31 PG (ref 26–34)
MCHC RBC AUTO-ENTMCNC: 34.8 G/DL (ref 31–36)
MCV RBC AUTO: 89.1 FL (ref 80–100)
MESOTHELIAL FLUID: 62 %
MONONUCLEAR UNIDENTIFIED CELLS FLUID: 1 %
NEUTROPHIL, FLUID: 4 %
NUCLEATED CELLS FLUID: 2024 /CUMM
NUMBER OF CELLS COUNTED FLUID: 100
PDW BLD-RTO: 13.6 % (ref 12.4–15.4)
PLATELET # BLD: 129 K/UL (ref 135–450)
PMV BLD AUTO: 7.8 FL (ref 5–10.5)
PROTEIN FLUID: 4.1 G/DL
PROTHROMBIN TIME: 11.3 SEC (ref 9.9–12.7)
RBC # BLD: 4.61 M/UL (ref 4.2–5.9)
RBC FLUID: 1600 /CUMM
WBC # BLD: 7.8 K/UL (ref 4–11)

## 2021-09-28 PROCEDURE — 83615 LACTATE (LD) (LDH) ENZYME: CPT

## 2021-09-28 PROCEDURE — 2709999900 US THORACENTESIS

## 2021-09-28 PROCEDURE — 32555 ASPIRATE PLEURA W/ IMAGING: CPT

## 2021-09-28 PROCEDURE — 85027 COMPLETE CBC AUTOMATED: CPT

## 2021-09-28 PROCEDURE — 71045 X-RAY EXAM CHEST 1 VIEW: CPT

## 2021-09-28 PROCEDURE — 88341 IMHCHEM/IMCYTCHM EA ADD ANTB: CPT

## 2021-09-28 PROCEDURE — 88184 FLOWCYTOMETRY/ TC 1 MARKER: CPT

## 2021-09-28 PROCEDURE — 36415 COLL VENOUS BLD VENIPUNCTURE: CPT

## 2021-09-28 PROCEDURE — 84157 ASSAY OF PROTEIN OTHER: CPT

## 2021-09-28 PROCEDURE — 85610 PROTHROMBIN TIME: CPT

## 2021-09-28 PROCEDURE — 88305 TISSUE EXAM BY PATHOLOGIST: CPT

## 2021-09-28 PROCEDURE — 88342 IMHCHEM/IMCYTCHM 1ST ANTB: CPT

## 2021-09-28 PROCEDURE — 82945 GLUCOSE OTHER FLUID: CPT

## 2021-09-28 PROCEDURE — 88112 CYTOPATH CELL ENHANCE TECH: CPT

## 2021-09-28 PROCEDURE — 89051 BODY FLUID CELL COUNT: CPT

## 2021-09-28 PROCEDURE — 88185 FLOWCYTOMETRY/TC ADD-ON: CPT

## 2021-09-28 NOTE — BRIEF OP NOTE
Brief Postoperative Note    Nita Mcardle  YOB: 1945  [id number]    Pre-operative Diagnosis:  left Pleural effusion    Post-operative Diagnosis: Same    Procedure: US Guided Thoracentesis    Anesthesia: Local    Surgeons/Assistants: Goldie Barnes MD, MD    Estimated Blood Loss: less than 5    Complications: None    Specimens: Was Obtained: serous Pleural Fluid    Findings: Technically successful left Thoracentesis    Electronically signed by Goldie Barnes MD on 9/28/2021 at 1:34 PM

## 2021-09-29 LAB — PATH CONSULT FLUID: NORMAL

## 2021-09-30 ASSESSMENT — ENCOUNTER SYMPTOMS
SHORTNESS OF BREATH: 1
DIARRHEA: 1
COUGH: 1

## 2021-10-18 ENCOUNTER — HOSPITAL ENCOUNTER (EMERGENCY)
Age: 76
Discharge: HOME | End: 2021-10-18
Attending: EMERGENCY MEDICINE
Payer: MEDICARE

## 2021-10-18 ENCOUNTER — APPOINTMENT (OUTPATIENT)
Dept: CT IMAGING | Age: 76
End: 2021-10-18
Payer: MEDICARE

## 2021-10-18 VITALS
BODY MASS INDEX: 31.67 KG/M2 | OXYGEN SATURATION: 95 % | HEART RATE: 63 BPM | RESPIRATION RATE: 16 BRPM | TEMPERATURE: 98.4 F | DIASTOLIC BLOOD PRESSURE: 89 MMHG | SYSTOLIC BLOOD PRESSURE: 124 MMHG | WEIGHT: 209 LBS | HEIGHT: 68 IN

## 2021-10-18 DIAGNOSIS — N20.0 KIDNEY STONE: Primary | ICD-10-CM

## 2021-10-18 DIAGNOSIS — J90 PLEURAL EFFUSION: ICD-10-CM

## 2021-10-18 LAB
A/G RATIO: 1.4 (ref 1.1–2.2)
ALBUMIN SERPL-MCNC: 4.3 G/DL (ref 3.4–5)
ALP BLD-CCNC: 179 U/L (ref 40–129)
ALT SERPL-CCNC: 24 U/L (ref 10–40)
ANION GAP SERPL CALCULATED.3IONS-SCNC: 13 MMOL/L (ref 3–16)
AST SERPL-CCNC: 27 U/L (ref 15–37)
BACTERIA: ABNORMAL /HPF
BASOPHILS ABSOLUTE: 0 K/UL (ref 0–0.2)
BASOPHILS RELATIVE PERCENT: 0.3 %
BILIRUB SERPL-MCNC: 0.7 MG/DL (ref 0–1)
BILIRUBIN URINE: NEGATIVE
BLOOD, URINE: ABNORMAL
BUN BLDV-MCNC: 20 MG/DL (ref 7–20)
CALCIUM SERPL-MCNC: 9.2 MG/DL (ref 8.3–10.6)
CHLORIDE BLD-SCNC: 102 MMOL/L (ref 99–110)
CLARITY: CLEAR
CO2: 26 MMOL/L (ref 21–32)
COLOR: ABNORMAL
CREAT SERPL-MCNC: 1.2 MG/DL (ref 0.8–1.3)
EOSINOPHILS ABSOLUTE: 0.1 K/UL (ref 0–0.6)
EOSINOPHILS RELATIVE PERCENT: 0.7 %
EPITHELIAL CELLS, UA: ABNORMAL /HPF (ref 0–5)
GFR AFRICAN AMERICAN: >60
GFR NON-AFRICAN AMERICAN: 59
GLOBULIN: 3 G/DL
GLUCOSE BLD-MCNC: 178 MG/DL (ref 70–99)
GLUCOSE URINE: 100 MG/DL
HCT VFR BLD CALC: 42 % (ref 40.5–52.5)
HEMOGLOBIN: 14.7 G/DL (ref 13.5–17.5)
KETONES, URINE: NEGATIVE MG/DL
LEUKOCYTE ESTERASE, URINE: NEGATIVE
LYMPHOCYTES ABSOLUTE: 0.9 K/UL (ref 1–5.1)
LYMPHOCYTES RELATIVE PERCENT: 10 %
MCH RBC QN AUTO: 31.2 PG (ref 26–34)
MCHC RBC AUTO-ENTMCNC: 35.1 G/DL (ref 31–36)
MCV RBC AUTO: 88.8 FL (ref 80–100)
MICROSCOPIC EXAMINATION: YES
MONOCYTES ABSOLUTE: 0.7 K/UL (ref 0–1.3)
MONOCYTES RELATIVE PERCENT: 7.4 %
NEUTROPHILS ABSOLUTE: 7.6 K/UL (ref 1.7–7.7)
NEUTROPHILS RELATIVE PERCENT: 81.6 %
NITRITE, URINE: POSITIVE
PDW BLD-RTO: 13.7 % (ref 12.4–15.4)
PH UA: 5.5 (ref 5–8)
PLATELET # BLD: 124 K/UL (ref 135–450)
PMV BLD AUTO: 7.4 FL (ref 5–10.5)
POTASSIUM REFLEX MAGNESIUM: 4.1 MMOL/L (ref 3.5–5.1)
PROTEIN UA: ABNORMAL MG/DL
RBC # BLD: 4.73 M/UL (ref 4.2–5.9)
RBC UA: >100 /HPF (ref 0–4)
SODIUM BLD-SCNC: 141 MMOL/L (ref 136–145)
SPECIFIC GRAVITY UA: 1.02 (ref 1–1.03)
TOTAL PROTEIN: 7.3 G/DL (ref 6.4–8.2)
URINE TYPE: ABNORMAL
UROBILINOGEN, URINE: 0.2 E.U./DL
WBC # BLD: 9.3 K/UL (ref 4–11)
WBC UA: ABNORMAL /HPF (ref 0–5)

## 2021-10-18 PROCEDURE — 81001 URINALYSIS AUTO W/SCOPE: CPT

## 2021-10-18 PROCEDURE — 74177 CT ABD & PELVIS W/CONTRAST: CPT

## 2021-10-18 PROCEDURE — 51798 US URINE CAPACITY MEASURE: CPT

## 2021-10-18 PROCEDURE — 80053 COMPREHEN METABOLIC PANEL: CPT

## 2021-10-18 PROCEDURE — 6360000004 HC RX CONTRAST MEDICATION: Performed by: EMERGENCY MEDICINE

## 2021-10-18 PROCEDURE — 85025 COMPLETE CBC W/AUTO DIFF WBC: CPT

## 2021-10-18 PROCEDURE — 99283 EMERGENCY DEPT VISIT LOW MDM: CPT

## 2021-10-18 PROCEDURE — 2580000003 HC RX 258: Performed by: EMERGENCY MEDICINE

## 2021-10-18 RX ORDER — CEPHALEXIN 500 MG/1
500 CAPSULE ORAL 2 TIMES DAILY
Qty: 14 CAPSULE | Refills: 0 | Status: SHIPPED | OUTPATIENT
Start: 2021-10-18 | End: 2021-10-25

## 2021-10-18 RX ORDER — 0.9 % SODIUM CHLORIDE 0.9 %
1000 INTRAVENOUS SOLUTION INTRAVENOUS ONCE
Status: COMPLETED | OUTPATIENT
Start: 2021-10-18 | End: 2021-10-18

## 2021-10-18 RX ADMIN — IOPAMIDOL 75 ML: 755 INJECTION, SOLUTION INTRAVENOUS at 05:23

## 2021-10-18 RX ADMIN — SODIUM CHLORIDE 1000 ML: 9 INJECTION, SOLUTION INTRAVENOUS at 05:33

## 2021-10-18 NOTE — ED NOTES
Bladder scan completed. Scan showed 75 ml of urine. Sharath Haji MD made aware.      Meti Lemu  10/18/21 0529

## 2021-10-18 NOTE — ED PROVIDER NOTES
[address] [place] [address]  ED    CHIEF COMPLAINT  Emesis (\"Woke up with the dry heaves\") and Urinary Retention       HISTORY OF PRESENT ILLNESS  Yun West is a 67 y.o. male who presents to the ED with complaint of \"I think I'm having a kidney stone\". Symptoms started around 0200 today. Complains of suprapubic pain, dull, was 5/10 intensity, improved on arrival to ED, no exacerbating or ameliorating factors, associated with difficulty urinating. Denies fever, chest pain, SOB. Complains of nausea and dry heaves. Denies dysuria, hematuria. Feels similar to previous episodes of kidney stones. Has required surgical intervention for kidney stones previously. No other complaints, modifying factors or associated symptoms. I have reviewed the following from the nursing documentation:    Past Medical History:   Diagnosis Date    Cancer [facility])     prostate    Diabetes mellitus ([facility] [address]     Hyperlipidemia     Hypertension     Kidney stone     Lung cancer ([facility] [address] 10/02/2021    Stage 4     Past Surgical History:   Procedure Laterality Date    BACK SURGERY  2012    lumber, no hardware, \"cleaned it out\"    CYSTOSCOPY Right 2020    CYSTOSCOPY,  RIGHT URETEROSCOPY, RIGHT RETRO PYELOGRAM, REMOVAL STONE FROM BLADDER, URETHRAL DILITATION performed by Vitor Boo MD at [facility] NEPHROLITHOTOMY Left 2020    LEFT PERCUTANEOUS NEPHROLITHOTOMY WITH DR Tavares Rodriguez TO PLACE NEPHROSTOMY TUBE PRIOR performed by Zakia Ramirez MD at [place] [address]       History reviewed. No pertinent family history.   Social History     Socioeconomic History    Marital status:      Spouse name: Not on file    Number of children: Not on file    Years of education: Not on file    Highest education level: Not on file   Occupational History    Not on file   Tobacco Use    Smoking status: Former Smoker     Start date: 3/22/1960     Quit date: 1976     Years since quittin.9    Smokeless tobacco: Never Used   Vaping Use    Vaping Use: Never used   Substance and Sexual Activity    Alcohol use: No    Drug use: No    Sexual activity: Not on file   Other Topics Concern    Not on file   Social History Narrative    Not on file     Social Determinants of Health     Financial Resource Strain:     Difficulty of Paying Living Expenses:    Food Insecurity:     Worried About Running Out of Food in the Last Year:     [address] in the Last Year:    Transportation Needs:     Lack of Transportation (Medical):  Lack of Transportation (Non-Medical):    Physical Activity:     Days of Exercise per Week:     Minutes of Exercise per Session:    Stress:     Feeling of Stress :    Social Connections:     Frequency of Communication with Friends and Family:     Frequency of Social Gatherings with Friends and Family:     Attends Buddhism Services:     Active Member of Clubs or Organizations:     Attends Club or Organization Meetings:     Marital Status:    Intimate Partner Violence:     Fear of Current or Ex-Partner:     Emotionally Abused:     Physically Abused:     Sexually Abused:      No current facility-administered medications for this encounter. Current Outpatient Medications   Medication Sig Dispense Refill    cephALEXin (KEFLEX) 500 MG capsule Take 1 capsule by mouth 2 times daily for 7 days 14 capsule 0    atorvastatin (LIPITOR) 20 MG tablet Take 25 mg by mouth daily      losartan (COZAAR) 100 MG tablet Take 100 mg by mouth daily      Multiple Vitamins-Minerals (THERAPEUTIC MULTIVITAMIN-MINERALS) tablet Take 1 tablet by mouth daily      albuterol sulfate HFA (PROVENTIL HFA) 108 (90 Base) MCG/ACT inhaler Inhale 2 puffs into the lungs every 4 hours as needed for Wheezing or Shortness of Breath (Space out to every 6 hours as symptoms improve) Space out to every 6 hours as symptoms improve.  1 Inhaler 0    metoprolol succinate (TOPROL XL) 25 MG extended release tablet Take 1 tablet by mouth daily 30 tablet 11    vitamin D3 (CHOLECALCIFEROL) 10 MCG (400 UNIT) TABS tablet Take 400 Units by mouth daily      insulin aspart (NOVOLOG) 100 UNIT/ML injection vial Inject into the skin 3 times daily (before meals) Sliding scale      Insulin Glargine (TOUJEO SOLOSTAR SC) Inject 70 Units into the skin daily        No Known Allergies    REVIEW OF SYSTEMS  10 systems reviewed, pertinent positives and negatives per HPI, otherwise noted to be negative. PHYSICAL EXAM  ED Triage Vitals [10/18/21 0433]   BP Temp Temp src Pulse Resp SpO2 Height Weight   (!) 185/89 98.4 °F (36.9 °C) -- 77 18 96 % 5' 8\" (1.727 m) 209 lb (94.8 kg)     General appearance: Awake and alert. Cooperative. No acute distress. HENT: Normocephalic. Atraumatic. Mucous membranes are moist.  Neck: Supple. Eyes: PERRL. EOMI. Heart/Chest: RRR. No murmurs. Lungs: Respirations unlabored. CTAB. Good air exchange. Speaking comfortably in full sentences. Abdomen: Soft. Non-tender. Non-distended. No rebound or guarding. Musculoskeletal: No extremity edema. No deformity. No tenderness in the extremities. All extremities neurovascularly intact. Skin: Warm and dry. No acute rashes. Neurological: Alert and oriented. CN II-XII intact. Strength 5/5 bilateral upper and lower extremities. Sensation intact to light touch. Gait normal.  Psychiatric: Mood/affect: normal      LABS  I have reviewed all labs for this visit.    Results for orders placed or performed during the hospital encounter of 10/18/21   CBC auto differential   Result Value Ref Range    WBC 9.3 4.0 - 11.0 K/uL    RBC 4.73 4.20 - 5.90 M/uL    Hemoglobin 14.7 13.5 - 17.5 g/dL    Hematocrit 42.0 40.5 - 52.5 %    MCV 88.8 80.0 - 100.0 fL    MCH 31.2 26.0 - 34.0 pg    MCHC 35.1 31.0 - 36.0 g/dL    RDW 13.7 12.4 - 15.4 %    Platelets 574 (L) 590 - 450 K/uL    MPV 7.4 5.0 - 10.5 fL    Neutrophils % 81.6 %    Lymphocytes % 10.0 %    Monocytes % 7.4 %    Eosinophils % 0.7 % Basophils % 0.3 %    Neutrophils Absolute 7.6 1.7 - 7.7 K/uL    Lymphocytes Absolute 0.9 (L) 1.0 - 5.1 K/uL    Monocytes Absolute 0.7 0.0 - 1.3 K/uL    Eosinophils Absolute 0.1 0.0 - 0.6 K/uL    Basophils Absolute 0.0 0.0 - 0.2 K/uL   Comprehensive Metabolic Panel w/ Reflex to MG   Result Value Ref Range    Sodium 141 136 - 145 mmol/L    Potassium reflex Magnesium 4.1 3.5 - 5.1 mmol/L    Chloride 102 99 - 110 mmol/L    CO2 26 21 - 32 mmol/L    Anion Gap 13 3 - 16    Glucose 178 (H) 70 - 99 mg/dL    BUN 20 7 - 20 mg/dL    CREATININE 1.2 0.8 - 1.3 mg/dL    GFR Non- 59 (A) >60    GFR African American >60 >60    Calcium 9.2 8.3 - 10.6 mg/dL    Total Protein 7.3 6.4 - 8.2 g/dL    Albumin 4.3 3.4 - 5.0 g/dL    Albumin/Globulin Ratio 1.4 1.1 - 2.2    Total Bilirubin 0.7 0.0 - 1.0 mg/dL    Alkaline Phosphatase 179 (H) 40 - 129 U/L    ALT 24 10 - 40 U/L    AST 27 15 - 37 U/L    Globulin 3.0 Not Established g/dL   Urinalysis, reflex to microscopic   Result Value Ref Range    Color, UA DARK YELLOW (A) Straw/Yellow    Clarity, UA Clear Clear    Glucose, Ur 100 (A) Negative mg/dL    Bilirubin Urine Negative Negative    Ketones, Urine Negative Negative mg/dL    Specific Gravity, UA 1.025 1.005 - 1.030    Blood, Urine LARGE (A) Negative    pH, UA 5.5 5.0 - 8.0    Protein, UA TRACE (A) Negative mg/dL    Urobilinogen, Urine 0.2 <2.0 E.U./dL    Nitrite, Urine POSITIVE (A) Negative    Leukocyte Esterase, Urine Negative Negative    Microscopic Examination YES     Urine Type NotGiven    Microscopic Urinalysis   Result Value Ref Range    WBC, UA 6-9 (A) 0 - 5 /HPF    RBC, UA >100 (A) 0 - 4 /HPF    Epithelial Cells, UA 2-5 0 - 5 /HPF    Bacteria, UA 2+ (A) None Seen /HPF       RADIOLOGY  I have reviewed all radiographic studies for this visit. CT ABDOMEN PELVIS W IV CONTRAST Additional Contrast? None   Final Result   1. Left hydronephrosis and hydroureter are present with delayed enhancement   left kidney.   There are 2 small calculi within the urinary bladder suggesting   recently passed left calculi. Cannot assess for urinary bladder wall   thickness and correlate with urinalysis. 2.  Multiple calculi remain in the left kidney and punctate calculus in the   right kidney. 3.  The mild stranding adjacent to the descending and sigmoid colon is   thought to be secondary to the left renal obstruction rather than primary   diverticulitis. 4.  Severe degenerative changes in the lumbar spine with previous laminectomy   at L4 and L5. There is still bony narrowing of the canal at these levels due   to the severity of the facet arthropathy. 5.  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 6.  Moderate left pleural effusion. ED COURSE/MDM  Patient seen and evaluated. Old records reviewed. Labs and imaging reviewed and results discussed with patient/family to extent possible. This is a 71-year-old male who presents with suprapubic abdominal pain, nausea, and dry heaves. On arrival the patient is somewhat hypertensive with otherwise reassuring vital signs. Afebrile and nontoxic. Abdominal exam is benign. Renal panel creatinine 1.2 from baseline 0.8. Favor prerenal.  IV fluids administered. CBC no leukocytosis or anemia. Urinalysis nitrite positive but only 6-9 white blood cells. Greater than 100 red blood cells. CT abdomen / pelvis shows left hydronephrosis and hydroureter with 2 small calculi in the urinary bladder. This suggests recently passed left ureteral calculus. There are additional calculi in the left kidney and right kidney. I note mild stranding adjacent to the descending and sigmoid colon favored secondary to left-sided renal obstruction as opposed to diverticulitis. I note degenerative changes in the lumbar spine with previous laminectomy. I note moderate left pleural effusion.   Patient recently underwent thoracentesis which was unfortunately found to

## 2021-10-25 ENCOUNTER — HOSPITAL ENCOUNTER (OUTPATIENT)
Dept: MRI IMAGING | Age: 76
Discharge: HOME | End: 2021-10-25
Payer: COMMERCIAL

## 2021-10-25 DIAGNOSIS — C34.82 MALIGNANT NEOPLASM OF OVERLAPPING SITES OF LEFT BRONCHUS AND LUNG (HCC): ICD-10-CM

## 2021-10-25 PROCEDURE — A9579 GAD-BASE MR CONTRAST NOS,1ML: HCPCS | Performed by: INTERNAL MEDICINE

## 2021-10-25 PROCEDURE — 70553 MRI BRAIN STEM W/O & W/DYE: CPT

## 2021-10-25 PROCEDURE — 6360000004 HC RX CONTRAST MEDICATION: Performed by: INTERNAL MEDICINE

## 2021-10-25 RX ADMIN — GADOTERIDOL 19 ML: 279.3 INJECTION, SOLUTION INTRAVENOUS at 13:21

## 2021-10-28 ENCOUNTER — TELEPHONE (OUTPATIENT)
Dept: SURGERY | Age: 76
End: 2021-10-28

## 2021-10-28 NOTE — TELEPHONE ENCOUNTER
Called and spoke w/ pts wife - Pt scheduled for a Surgical Port Placement [facility]) w/ Dr Artis Driver on 11/4/21 @ 10:45am arrival 8:45am [facility] - NPO after midnight romina b/f surgery - Pt will need  day of surgery - Cardiac Clearance faxed to Dr Yong Carter office - [facility] completed pre-op H&P (see media) - Pts wife understood and agreed to above noted - Surgery instructions emailed to pt: [email or web address] (see media)

## 2021-10-28 NOTE — PROGRESS NOTES
Carrie Loup    Age 67 y.o.    male    1945    MRN [id number]    11/4/2021  Arrival Time_____________  OR Time____________[address]     Procedure(s):  SURGICAL PORT PLACEMENT                      Monitor Anesthesia Care     Surgeon(s):  Osmar Mota, MD      DAY ADMIT ___  SDS/OP ___  OUTPT IN BED ___         Phone [phone] (home)    PCP _____________________ Phone_________________ Epic ( ) Epic CE ( ) Appt ________    ADDITIONAL INFO __________________________________ Cardio/Consult _____________    NOTES _____________________________________________________________________    ____________________________________________________________________________    PAT APPT DATE:________ TIME: ________  FAXED QAD: _______  (__) H&P w/ hospitalist  ____________________________________________________________________________    COVID TEST: Date/Location______________        NURSING HISTORY COMPLETE: _______  (__) CBC       (__) W/ DIFF ___________  (__)  ECHO    __________  (__) Hgb A1C    ___________  (__) CHEST X RAY   __________  (__) LIPID PROFILE  ___________  (__) EKG   __________  (__) PT/PTT   ___________  (__) PFT's   __________  (__) BMP   ___________  (__) CAROTIDS  __________  (__) CMP   ___________  (__) VEIN MAPPING  __________  (__) U/A   ___________  (__) HISTORY & PHYSICAL __________  (__) URINE C & S  ___________  (__) CARDIAC CLEARANCE __________  (__) U/A W/ FLEX  ___________  (__) PULM.  CLEARANCE __________  (__) SERUM PREGNANCY ___________  (__) Check Epic DOS orders __________  (__) TYPE & SCREEN ________ repeat ( ) (__)  __________________ __________  (__) ALBUMIN   ___________  (__)  __________________ __________  (__) TRANSFERRIN  ___________  (__)  __________________ __________  (__) LIVER PROFILE  ___________  (__)  __________________ __________  (__) CARBOXY HGB  ___________  (__) URINE PREG DOS __________  (__) NICOTINE & MET.  ___________  (__) BLOOD SUGAR DOS __________  (__) PREALBUMIN  ___________    (__) MRSA NASAL SWAB ___________  (__) BLOOD THINNERS __________  (__) ACE/ ARBS: _____________________    (__) BETABLOCKERS ___________________

## 2021-10-29 ENCOUNTER — HOSPITAL ENCOUNTER (OUTPATIENT)
Age: 76
Discharge: HOME | End: 2021-10-29
Payer: COMMERCIAL

## 2021-10-29 ENCOUNTER — TELEPHONE (OUTPATIENT)
Dept: CARDIOLOGY CLINIC | Age: 76
End: 2021-10-29

## 2021-10-29 PROCEDURE — U0005 INFEC AGEN DETEC AMPLI PROBE: HCPCS

## 2021-10-29 PROCEDURE — U0003 INFECTIOUS AGENT DETECTION BY NUCLEIC ACID (DNA OR RNA); SEVERE ACUTE RESPIRATORY SYNDROME CORONAVIRUS 2 (SARS-COV-2) (CORONAVIRUS DISEASE [COVID-19]), AMPLIFIED PROBE TECHNIQUE, MAKING USE OF HIGH THROUGHPUT TECHNOLOGIES AS DESCRIBED BY CMS-2020-01-R: HCPCS

## 2021-10-29 NOTE — TELEPHONE ENCOUNTER
[facility] requests clearance for upcoming port placement on 11/4/21. MAC anesthesia will be used. Please advise on clearance.     Fax letter [phone]

## 2021-10-30 LAB — SARS-COV-2, PCR: NOT DETECTED

## 2021-11-01 ENCOUNTER — TELEPHONE (OUTPATIENT)
Dept: SURGERY | Age: 76
End: 2021-11-01

## 2021-11-01 NOTE — TELEPHONE ENCOUNTER
Pts wife called asking if Dr Delmy Jung could do pts H&P for his upcoming Port Placement - I explained pt did not need a pre-op physical since JENNIFFER TIAN already did one when they sent his Port Placement order over and we will just use that one - She asked if pt needed an EKG - I informed that she would need to call [facility]/PCP and ask them if they want an EKG b/f pts scheduled surgery - Cardiac Clearance received from Dr Cardenas's office (see notes)

## 2021-11-02 ENCOUNTER — HOSPITAL ENCOUNTER (OUTPATIENT)
Dept: GENERAL RADIOLOGY | Age: 76
Discharge: HOME | End: 2021-11-02
Payer: COMMERCIAL

## 2021-11-02 ENCOUNTER — HOSPITAL ENCOUNTER (OUTPATIENT)
Dept: ULTRASOUND IMAGING | Age: 76
Discharge: HOME | End: 2021-11-02
Payer: COMMERCIAL

## 2021-11-02 ENCOUNTER — ANESTHESIA EVENT (OUTPATIENT)
Dept: OPERATING ROOM | Age: 76
End: 2021-11-02
Payer: COMMERCIAL

## 2021-11-02 ENCOUNTER — HOSPITAL ENCOUNTER (OUTPATIENT)
Age: 76
Discharge: HOME | End: 2021-11-02
Payer: COMMERCIAL

## 2021-11-02 DIAGNOSIS — J91.0 PLEURAL EFFUSION, MALIGNANT: ICD-10-CM

## 2021-11-02 DIAGNOSIS — C34.82 MALIGNANT NEOPLASM OF OVERLAPPING SITES OF LEFT BRONCHUS AND LUNG (HCC): ICD-10-CM

## 2021-11-02 LAB
INR BLD: 1.04 (ref 0.88–1.12)
PLATELET # BLD: 124 K/UL (ref 135–450)
PROTHROMBIN TIME: 11.8 SEC (ref 9.9–12.7)

## 2021-11-02 PROCEDURE — 36415 COLL VENOUS BLD VENIPUNCTURE: CPT

## 2021-11-02 PROCEDURE — 85049 AUTOMATED PLATELET COUNT: CPT

## 2021-11-02 PROCEDURE — 85610 PROTHROMBIN TIME: CPT

## 2021-11-02 PROCEDURE — 71045 X-RAY EXAM CHEST 1 VIEW: CPT

## 2021-11-02 PROCEDURE — 2709999900 US THORACENTESIS

## 2021-11-02 NOTE — BRIEF OP NOTE
Brief Postoperative Note    Marcia Flores  YOB: 1945  [id number]    Pre-operative Diagnosis:  left Pleural effusion    Post-operative Diagnosis: Same    Procedure: US Guided Thoracentesis    Anesthesia: Local    Surgeons/Assistants: Kyle Lewis MD, MD    Estimated Blood Loss: less than 5    Complications: None    Specimens: Was Obtained: serous Pleural Fluid    Findings: Technically successful left Thoracentesis    Electronically signed by Kyle Lewis MD on 11/2/2021 at 11:06 AM

## 2021-11-02 NOTE — PROGRESS NOTES
Jerene Kind    Age 67 y.o.    male    1945    MRN [id number]    11/4/2021  Arrival Time_____________  OR Time____________[address]     Procedure(s):  SURGICAL PORT PLACEMENT                      Monitor Anesthesia Care    Surgeon(s):  Ulyssesisra Vitalezuleyma, MD       Phone [phone] (home)     [address]  Cell         Work  _____________________________________________________________________  _____________________________________________________________________  _____________________________________________________________________  _____________________________________________________________________  _____________________________________________________________________    PCP _____________________________ Phone_________________     H&P__________________Bringing      Chart            Epic   DOS      Called________  EKG__________________Bringing      Chart            Epic   DOS      Called________  LAB__________________ Bringing      Chart            Epic   DOS      Called________  Cardiac Clearance_______Bringing      Chart            Epic      DOS      Called________    Cardiologist________________________ Phone___________________________    ? Spiritism concerns / Waiver on Chart            PAT Communications________________  ? Pre-op Instructions Given [place]          _________________________________  ? Directions to Surgery Center                          _________________________________  ? Transportation Home_______________      __________________________________  ?  Crutches/Walker__________________        __________________________________    ________Pre-op Orders   _______Transcribed    _______Wt.  ________Pharmacy          _______SCD  ______VTE     ______TED Rachel Damme  _______  Surgery Consent    _______  Anesthesia Consent         COVID DATE______________LOCATION________________ RESULT__________

## 2021-11-04 ENCOUNTER — ANESTHESIA (OUTPATIENT)
Dept: OPERATING ROOM | Age: 76
End: 2021-11-04
Payer: COMMERCIAL

## 2021-11-04 ENCOUNTER — HOSPITAL ENCOUNTER (OUTPATIENT)
Age: 76
Setting detail: OUTPATIENT SURGERY
Discharge: HOME | End: 2021-11-04
Attending: SURGERY | Admitting: SURGERY
Payer: COMMERCIAL

## 2021-11-04 ENCOUNTER — APPOINTMENT (OUTPATIENT)
Dept: GENERAL RADIOLOGY | Age: 76
End: 2021-11-04
Attending: SURGERY
Payer: COMMERCIAL

## 2021-11-04 VITALS — SYSTOLIC BLOOD PRESSURE: 106 MMHG | DIASTOLIC BLOOD PRESSURE: 56 MMHG | OXYGEN SATURATION: 92 %

## 2021-11-04 VITALS
WEIGHT: 206 LBS | BODY MASS INDEX: 29.49 KG/M2 | RESPIRATION RATE: 16 BRPM | OXYGEN SATURATION: 96 % | DIASTOLIC BLOOD PRESSURE: 56 MMHG | TEMPERATURE: 98.2 F | HEIGHT: 70 IN | HEART RATE: 61 BPM | SYSTOLIC BLOOD PRESSURE: 116 MMHG

## 2021-11-04 DIAGNOSIS — G89.18 POSTOPERATIVE PAIN: Primary | ICD-10-CM

## 2021-11-04 PROBLEM — C34.90 LUNG CANCER (HCC): Status: ACTIVE | Noted: 2021-10-02

## 2021-11-04 LAB
EKG ATRIAL RATE: 64 BPM
EKG DIAGNOSIS: NORMAL
EKG P AXIS: 44 DEGREES
EKG P-R INTERVAL: 172 MS
EKG Q-T INTERVAL: 430 MS
EKG QRS DURATION: 104 MS
EKG QTC CALCULATION (BAZETT): 443 MS
EKG R AXIS: 53 DEGREES
EKG T AXIS: 28 DEGREES
EKG VENTRICULAR RATE: 64 BPM
GLUCOSE BLD-MCNC: 125 MG/DL (ref 70–99)
GLUCOSE BLD-MCNC: 143 MG/DL (ref 70–99)
PERFORMED ON: ABNORMAL
PERFORMED ON: ABNORMAL

## 2021-11-04 PROCEDURE — 2580000003 HC RX 258: Performed by: NURSE ANESTHETIST, CERTIFIED REGISTERED

## 2021-11-04 PROCEDURE — 2500000003 HC RX 250 WO HCPCS: Performed by: NURSE ANESTHETIST, CERTIFIED REGISTERED

## 2021-11-04 PROCEDURE — 3600000012 HC SURGERY LEVEL 2 ADDTL 15MIN: Performed by: SURGERY

## 2021-11-04 PROCEDURE — 2709999900 HC NON-CHARGEABLE SUPPLY: Performed by: SURGERY

## 2021-11-04 PROCEDURE — 6370000000 HC RX 637 (ALT 250 FOR IP)

## 2021-11-04 PROCEDURE — 76937 US GUIDE VASCULAR ACCESS: CPT | Performed by: SURGERY

## 2021-11-04 PROCEDURE — 93010 ELECTROCARDIOGRAM REPORT: CPT | Performed by: INTERNAL MEDICINE

## 2021-11-04 PROCEDURE — 77001 FLUOROGUIDE FOR VEIN DEVICE: CPT

## 2021-11-04 PROCEDURE — 2580000003 HC RX 258: Performed by: SURGERY

## 2021-11-04 PROCEDURE — 6360000002 HC RX W HCPCS: Performed by: SURGERY

## 2021-11-04 PROCEDURE — C1788 PORT, INDWELLING, IMP: HCPCS | Performed by: SURGERY

## 2021-11-04 PROCEDURE — 36561 INSERT TUNNELED CV CATH: CPT | Performed by: SURGERY

## 2021-11-04 PROCEDURE — 3700000000 HC ANESTHESIA ATTENDED CARE: Performed by: SURGERY

## 2021-11-04 PROCEDURE — 2580000003 HC RX 258: Performed by: ANESTHESIOLOGY

## 2021-11-04 PROCEDURE — 7100000010 HC PHASE II RECOVERY - FIRST 15 MIN: Performed by: SURGERY

## 2021-11-04 PROCEDURE — 6360000002 HC RX W HCPCS: Performed by: NURSE ANESTHETIST, CERTIFIED REGISTERED

## 2021-11-04 PROCEDURE — 3600000002 HC SURGERY LEVEL 2 BASE: Performed by: SURGERY

## 2021-11-04 PROCEDURE — 93005 ELECTROCARDIOGRAM TRACING: CPT | Performed by: ANESTHESIOLOGY

## 2021-11-04 PROCEDURE — 3700000001 HC ADD 15 MINUTES (ANESTHESIA): Performed by: SURGERY

## 2021-11-04 PROCEDURE — 77001 FLUOROGUIDE FOR VEIN DEVICE: CPT | Performed by: SURGERY

## 2021-11-04 PROCEDURE — 2500000003 HC RX 250 WO HCPCS: Performed by: SURGERY

## 2021-11-04 PROCEDURE — 7100000011 HC PHASE II RECOVERY - ADDTL 15 MIN: Performed by: SURGERY

## 2021-11-04 DEVICE — PORT INFUS OD2.7MM ID1.5MM INTRO 8FR TI POLYUR CATH DETACH CT80STPD] ANGIODYNAMICS INC]
Type: IMPLANTABLE DEVICE | Status: NON-FUNCTIONAL
Removed: 2022-10-04

## 2021-11-04 RX ORDER — MIDAZOLAM HYDROCHLORIDE 1 MG/ML
INJECTION INTRAMUSCULAR; INTRAVENOUS PRN
Status: DISCONTINUED | OUTPATIENT
Start: 2021-11-04 | End: 2021-11-04 | Stop reason: SDUPTHER

## 2021-11-04 RX ORDER — SODIUM CHLORIDE 0.9 % (FLUSH) 0.9 %
10 SYRINGE (ML) INJECTION EVERY 12 HOURS SCHEDULED
Status: DISCONTINUED | OUTPATIENT
Start: 2021-11-04 | End: 2021-11-04 | Stop reason: HOSPADM

## 2021-11-04 RX ORDER — LABETALOL HYDROCHLORIDE 5 MG/ML
5 INJECTION, SOLUTION INTRAVENOUS EVERY 10 MIN PRN
Status: DISCONTINUED | OUTPATIENT
Start: 2021-11-04 | End: 2021-11-04 | Stop reason: HOSPADM

## 2021-11-04 RX ORDER — HYDRALAZINE HYDROCHLORIDE 20 MG/ML
5 INJECTION INTRAMUSCULAR; INTRAVENOUS EVERY 10 MIN PRN
Status: DISCONTINUED | OUTPATIENT
Start: 2021-11-04 | End: 2021-11-04 | Stop reason: HOSPADM

## 2021-11-04 RX ORDER — METOPROLOL SUCCINATE 25 MG/1
25 TABLET, EXTENDED RELEASE ORAL DAILY
Status: DISCONTINUED | OUTPATIENT
Start: 2021-11-04 | End: 2021-11-04 | Stop reason: HOSPADM

## 2021-11-04 RX ORDER — DIPHENHYDRAMINE HYDROCHLORIDE 50 MG/ML
12.5 INJECTION INTRAMUSCULAR; INTRAVENOUS
Status: DISCONTINUED | OUTPATIENT
Start: 2021-11-04 | End: 2021-11-04 | Stop reason: HOSPADM

## 2021-11-04 RX ORDER — SODIUM CHLORIDE 9 MG/ML
25 INJECTION, SOLUTION INTRAVENOUS PRN
Status: DISCONTINUED | OUTPATIENT
Start: 2021-11-04 | End: 2021-11-04 | Stop reason: HOSPADM

## 2021-11-04 RX ORDER — SODIUM CHLORIDE 0.9 % (FLUSH) 0.9 %
5-40 SYRINGE (ML) INJECTION PRN
Status: DISCONTINUED | OUTPATIENT
Start: 2021-11-04 | End: 2021-11-04 | Stop reason: HOSPADM

## 2021-11-04 RX ORDER — SODIUM CHLORIDE 0.9 % (FLUSH) 0.9 %
5-40 SYRINGE (ML) INJECTION EVERY 12 HOURS SCHEDULED
Status: DISCONTINUED | OUTPATIENT
Start: 2021-11-04 | End: 2021-11-04 | Stop reason: HOSPADM

## 2021-11-04 RX ORDER — OXYCODONE HYDROCHLORIDE AND ACETAMINOPHEN 5; 325 MG/1; MG/1
1 TABLET ORAL PRN
Status: DISCONTINUED | OUTPATIENT
Start: 2021-11-04 | End: 2021-11-04 | Stop reason: HOSPADM

## 2021-11-04 RX ORDER — GLYCOPYRROLATE 0.2 MG/ML
INJECTION INTRAMUSCULAR; INTRAVENOUS PRN
Status: DISCONTINUED | OUTPATIENT
Start: 2021-11-04 | End: 2021-11-04 | Stop reason: SDUPTHER

## 2021-11-04 RX ORDER — PROPOFOL 10 MG/ML
INJECTION, EMULSION INTRAVENOUS CONTINUOUS PRN
Status: DISCONTINUED | OUTPATIENT
Start: 2021-11-04 | End: 2021-11-04 | Stop reason: SDUPTHER

## 2021-11-04 RX ORDER — SODIUM CHLORIDE, SODIUM LACTATE, POTASSIUM CHLORIDE, CALCIUM CHLORIDE 600; 310; 30; 20 MG/100ML; MG/100ML; MG/100ML; MG/100ML
INJECTION, SOLUTION INTRAVENOUS CONTINUOUS
Status: DISCONTINUED | OUTPATIENT
Start: 2021-11-04 | End: 2021-11-04 | Stop reason: HOSPADM

## 2021-11-04 RX ORDER — OXYCODONE HYDROCHLORIDE AND ACETAMINOPHEN 5; 325 MG/1; MG/1
2 TABLET ORAL PRN
Status: DISCONTINUED | OUTPATIENT
Start: 2021-11-04 | End: 2021-11-04 | Stop reason: HOSPADM

## 2021-11-04 RX ORDER — LIDOCAINE HYDROCHLORIDE 10 MG/ML
1 INJECTION, SOLUTION EPIDURAL; INFILTRATION; INTRACAUDAL; PERINEURAL
Status: DISCONTINUED | OUTPATIENT
Start: 2021-11-04 | End: 2021-11-04 | Stop reason: HOSPADM

## 2021-11-04 RX ORDER — MORPHINE SULFATE 2 MG/ML
2 INJECTION, SOLUTION INTRAMUSCULAR; INTRAVENOUS EVERY 5 MIN PRN
Status: DISCONTINUED | OUTPATIENT
Start: 2021-11-04 | End: 2021-11-04 | Stop reason: HOSPADM

## 2021-11-04 RX ORDER — MORPHINE SULFATE 2 MG/ML
1 INJECTION, SOLUTION INTRAMUSCULAR; INTRAVENOUS EVERY 5 MIN PRN
Status: DISCONTINUED | OUTPATIENT
Start: 2021-11-04 | End: 2021-11-04 | Stop reason: HOSPADM

## 2021-11-04 RX ORDER — LIDOCAINE HYDROCHLORIDE 20 MG/ML
INJECTION, SOLUTION INFILTRATION; PERINEURAL PRN
Status: DISCONTINUED | OUTPATIENT
Start: 2021-11-04 | End: 2021-11-04 | Stop reason: SDUPTHER

## 2021-11-04 RX ORDER — SODIUM CHLORIDE, SODIUM LACTATE, POTASSIUM CHLORIDE, CALCIUM CHLORIDE 600; 310; 30; 20 MG/100ML; MG/100ML; MG/100ML; MG/100ML
INJECTION, SOLUTION INTRAVENOUS CONTINUOUS PRN
Status: DISCONTINUED | OUTPATIENT
Start: 2021-11-04 | End: 2021-11-04 | Stop reason: SDUPTHER

## 2021-11-04 RX ORDER — OXYCODONE HYDROCHLORIDE AND ACETAMINOPHEN 5; 325 MG/1; MG/1
1 TABLET ORAL EVERY 4 HOURS PRN
Qty: 8 TABLET | Refills: 0 | Status: SHIPPED | OUTPATIENT
Start: 2021-11-04 | End: 2021-11-07

## 2021-11-04 RX ORDER — PROMETHAZINE HYDROCHLORIDE 25 MG/ML
6.25 INJECTION, SOLUTION INTRAMUSCULAR; INTRAVENOUS
Status: DISCONTINUED | OUTPATIENT
Start: 2021-11-04 | End: 2021-11-04 | Stop reason: HOSPADM

## 2021-11-04 RX ORDER — ONDANSETRON 2 MG/ML
4 INJECTION INTRAMUSCULAR; INTRAVENOUS PRN
Status: DISCONTINUED | OUTPATIENT
Start: 2021-11-04 | End: 2021-11-04 | Stop reason: HOSPADM

## 2021-11-04 RX ORDER — METOPROLOL SUCCINATE 25 MG/1
TABLET, EXTENDED RELEASE ORAL
Status: COMPLETED
Start: 2021-11-04 | End: 2021-11-04

## 2021-11-04 RX ORDER — MEPERIDINE HYDROCHLORIDE 50 MG/ML
12.5 INJECTION INTRAMUSCULAR; INTRAVENOUS; SUBCUTANEOUS EVERY 5 MIN PRN
Status: DISCONTINUED | OUTPATIENT
Start: 2021-11-04 | End: 2021-11-04 | Stop reason: HOSPADM

## 2021-11-04 RX ORDER — SODIUM CHLORIDE 0.9 % (FLUSH) 0.9 %
10 SYRINGE (ML) INJECTION PRN
Status: DISCONTINUED | OUTPATIENT
Start: 2021-11-04 | End: 2021-11-04 | Stop reason: HOSPADM

## 2021-11-04 RX ADMIN — GLYCOPYRROLATE 0.1 MG: 0.2 INJECTION, SOLUTION INTRAMUSCULAR; INTRAVENOUS at 11:46

## 2021-11-04 RX ADMIN — CEFAZOLIN 2000 MG: 10 INJECTION, POWDER, FOR SOLUTION INTRAVENOUS at 11:47

## 2021-11-04 RX ADMIN — METOPROLOL SUCCINATE 25 MG: 25 TABLET, EXTENDED RELEASE ORAL at 10:03

## 2021-11-04 RX ADMIN — SODIUM CHLORIDE, SODIUM LACTATE, POTASSIUM CHLORIDE, AND CALCIUM CHLORIDE: .6; .31; .03; .02 INJECTION, SOLUTION INTRAVENOUS at 11:47

## 2021-11-04 RX ADMIN — MIDAZOLAM HYDROCHLORIDE 0.5 MG: 2 INJECTION, SOLUTION INTRAMUSCULAR; INTRAVENOUS at 11:49

## 2021-11-04 RX ADMIN — LIDOCAINE HYDROCHLORIDE 60 MG: 20 INJECTION, SOLUTION INFILTRATION; PERINEURAL at 11:49

## 2021-11-04 RX ADMIN — SODIUM CHLORIDE, POTASSIUM CHLORIDE, SODIUM LACTATE AND CALCIUM CHLORIDE: 600; 310; 30; 20 INJECTION, SOLUTION INTRAVENOUS at 09:55

## 2021-11-04 RX ADMIN — PROPOFOL 100 MCG/KG/MIN: 10 INJECTION, EMULSION INTRAVENOUS at 11:49

## 2021-11-04 ASSESSMENT — PULMONARY FUNCTION TESTS
PIF_VALUE: 0
PIF_VALUE: 1
PIF_VALUE: 0
PIF_VALUE: 1
PIF_VALUE: 0
PIF_VALUE: 0
PIF_VALUE: 1
PIF_VALUE: 1
PIF_VALUE: 0
PIF_VALUE: 1
PIF_VALUE: 1
PIF_VALUE: 0

## 2021-11-04 ASSESSMENT — ENCOUNTER SYMPTOMS: SHORTNESS OF BREATH: 1

## 2021-11-04 ASSESSMENT — PAIN - FUNCTIONAL ASSESSMENT: PAIN_FUNCTIONAL_ASSESSMENT: 0-10

## 2021-11-04 NOTE — OP NOTE
Operative Note      Patient: Yun West  YOB: 1945  MRN: [id number]    Date of Procedure: 11/4/2021    Pre-Op Diagnosis: NON SMALL CELL LUNG CANCER    Post-Op Diagnosis: Same       Procedure(s):  SURGICAL PORT PLACEMENT    Surgeon(s):  Arelis Savage MD    Assistant:   Surgical Assistant: Kendrick Thacker    Anesthesia: Monitor Anesthesia Care    Estimated Blood Loss (mL): less than 50     Complications: None    Specimens:   * No specimens in log *    Implants:  Implant Name Type Inv. Item Serial No.  Lot No. LRB No. Used Action   PORT INFUS OD2.7MM ID1. 5MM INTRO 8FR TI POLYUR CATH DETACH [[id number]] [[employer]]  PORT INFUS OD2.7MM ID1. 5MM INTRO 8FR TI POLYUR CATH DETACH [[id number]] [[employer]]  [employer] [id number] N/A 1 Implanted         Drains: * No LDAs found *    Findings: R IJ portacath, tip in distal SVC    Detailed Description of Procedure:         PROCEDURE IN DETAIL: The patient was taken to the operative suite and was placed on the table in a supine position, the arms pulled downward, and the head turned to the left. The neck, chest, and shoulders were prepped with Betadine and draped in a sterile fashion. Using the portable ultrasound, the right internal jugular vein was readily identified. Local anesthesia was infiltrated over the vein. The jugular vein was then accessed without difficulty under ultrasound guidance. The guidewire passed readily into vein. Fluoroscopy confirmed the wire to be passing down the vena cava past the heart. A 3cm incision was now made on the chest wall below the junction of the middle and lateral thirds of the clavicle after infiltrating with local anesthesia. Dissection was carried down to the pectoral fascia and a pocket created along the fascia. Next, the subcutaneous tissues from the chest incision up over the clavicle were infiltrated with local anesthesia.   We continued infiltrating with local up to the wire site on the neck. The tunnelling device was then passed from the chest incision up to the wire site in the subcutaneous plane. The catheter tubing was now brought down from the neck to the chest incision. The dilator and sheath were then passed over the wire into the vein without difficulty. The dilator and wire were removed. The catheter tubing was inserted into the sheath and then the sheath cracked and removed. Fluoroscopy was used to position the catheter such that the tip was in the distal superior vena cava just above the heart. The catheter easily aspirated blood and was flushed with heparinized saline. The catheter was then cut at 24 cm and attached to the port. The port was placed into the pocket and secured to the chest wall w/ two 2-0 Prolene sutures. The port was accessed with a Tirado needle, and once again it aspirated easily and was flushed again w/ heparinized saline. Final fluoroscopic imaging confirmed good position of the port and catheter. Chest wall incision was closed with 3-0 Vicryl subcutaneous followed by 4-0 Monocryl subcuticular sutures. The patient tolerated the procedure well. Sponge, needle, and instrument counts were correct.      Antony Verdugo MD  11/4/2021  12:45 PM          Electronically signed by Antony Verdugo MD on 11/4/2021 at 12:45 PM

## 2021-11-04 NOTE — ANESTHESIA PRE PROCEDURE
injection 10 mL  10 mL IntraVENous PRN Marlena Gonzalez MD        0.9 % sodium chloride infusion  25 mL IntraVENous PRN Marlena Gonzalez MD        lidocaine PF 1 % injection 1 mL  1 mL IntraDERmal Once PRN Marlena Gonzalez MD        sodium chloride flush 0.9 % injection 5-40 mL  5-40 mL IntraVENous 2 times per day Sheron Keller MD        sodium chloride flush 0.9 % injection 5-40 mL  5-40 mL IntraVENous PRN Sheron Keller MD        0.9 % sodium chloride infusion  25 mL IntraVENous PRN Sheron Keller MD        ceFAZolin (ANCEF) 2000 mg in dextrose 5 % 100 mL IVPB  2,000 mg IntraVENous On Call to [address] [place] [address], MD           Allergies:  No Known Allergies    Problem List:    Patient Active Problem List   Diagnosis Code    Transient global amnesia G45.4    Acute encephalopathy G93.40    Hemispheric carotid artery syndrome G45.1    Uncontrolled type 2 diabetes mellitus with complication, without long-term current use of insulin (HCC) E11.8, E11.65    HTN (hypertension), benign I10    Dyslipidemia E78.5    Urolithiasis N20.9    SOB (shortness of breath) R06.02    Left renal stone N20.0       Past Medical History:        Diagnosis Date    Cancer ([facility] [address]     prostate    Diabetes mellitus ([facility] [address]     Hyperlipidemia     Hypertension     Kidney stone     Lung cancer ([facility] [address] 10/02/2021    Stage 4    Lung cancer ([facility] [address]        Past Surgical History:        Procedure Laterality Date    BACK SURGERY  2012    lumber, no hardware, \"cleaned it out\"    CYSTOSCOPY Right 11/9/2020    CYSTOSCOPY,  RIGHT URETEROSCOPY, RIGHT RETRO PYELOGRAM, REMOVAL STONE FROM BLADDER, URETHRAL DILITATION performed by Nino Mcburney, MD at [facility] NEPHROLITHOTOMY Left 12/4/2020    LEFT PERCUTANEOUS NEPHROLITHOTOMY WITH DR Toby Muller TO PLACE NEPHROSTOMY TUBE PRIOR performed by Edgar Connelly MD at [address] [place] [address] Right     ROTATOR CUFF       Social History:    Social History     Tobacco Use    Smoking status: Former Smoker     Start date: 3/22/1960     Quit date: 1976     Years since quittin.9    Smokeless tobacco: Never Used   Substance Use Topics    Alcohol use: No                                Counseling given: Not Answered      Vital Signs (Current):   Vitals:    21 1239 21 0936   BP:  (!) 150/74   Pulse:  64   Resp:  18   Temp:  98.6 °F (37 °C)   TempSrc:  Temporal   SpO2:  96%   Weight: 208 lb (94.3 kg) 206 lb (93.4 kg)   Height: 5' 7\" (1.702 m) 5' 10\" (1.778 m)                                              BP Readings from Last 3 Encounters:   21 (!) 150/74   10/18/21 124/89   21 (!) 147/63       NPO Status: Time of last liquid consumption: 2200                        Time of last solid consumption: 1800                        Date of last liquid consumption: 21                        Date of last solid food consumption: 21    BMI:   Wt Readings from Last 3 Encounters:   21 206 lb (93.4 kg)   10/18/21 209 lb (94.8 kg)   21 209 lb 6.4 oz (95 kg)     Body mass index is 29.56 kg/m². CBC:   Lab Results   Component Value Date    WBC 9.3 10/18/2021    RBC 4.73 10/18/2021    HGB 14.7 10/18/2021    HCT 42.0 10/18/2021    MCV 88.8 10/18/2021    RDW 13.7 10/18/2021     2021       CMP:   Lab Results   Component Value Date     10/18/2021    K 4.1 10/18/2021     10/18/2021    CO2 26 10/18/2021    BUN 20 10/18/2021    CREATININE 1.2 10/18/2021    GFRAA >60 10/18/2021    AGRATIO 1.4 10/18/2021    LABGLOM 59 10/18/2021    GLUCOSE 178 10/18/2021    PROT 7.3 10/18/2021    CALCIUM 9.2 10/18/2021    BILITOT 0.7 10/18/2021    ALKPHOS 179 10/18/2021    AST 27 10/18/2021    ALT 24 10/18/2021       POC Tests: No results for input(s): POCGLU, POCNA, POCK, POCCL, POCBUN, POCHEMO, POCHCT in the last 72 hours.     Coags:   Lab Results   Component Value Date    PROTIME 11.8 2021    INR 1.04 2021 HCG (If Applicable): No results found for: PREGTESTUR, PREGSERUM, HCG, HCGQUANT     ABGs: No results found for: PHART, PO2ART, XVC5PKY, JKZ1SLI, BEART, U2WCIUON     Type & Screen (If Applicable):  No results found for: LABABO, LABRH    Drug/Infectious Status (If Applicable):  No results found for: HIV, HEPCAB    COVID-19 Screening (If Applicable):   Lab Results   Component Value Date    COVID19 Not Detected 10/29/2021           Anesthesia Evaluation  Patient summary reviewed no history of anesthetic complications:   Airway: Mallampati: III  TM distance: >3 FB   Neck ROM: full  Mouth opening: > = 3 FB Dental: normal exam   (+) upper dentures and lower dentures      Pulmonary:Negative Pulmonary ROS and normal exam  breath sounds clear to auscultation  (+) shortness of breath:                             Cardiovascular:Negative CV ROS  Exercise tolerance: good (>4 METS),   (+) hypertension:,         Rhythm: regular  Rate: normal                    Neuro/Psych:   Negative Neuro/Psych ROS  (+) TIA,             GI/Hepatic/Renal: Neg GI/Hepatic/Renal ROS            Endo/Other: Negative Endo/Other ROS   (+) Diabetes, malignancy/cancer. Abdominal:             Vascular: negative vascular ROS. Other Findings:          Pre-Operative Diagnosis: Malignant neoplasm of overlapping sites of left lung ([facility] [address] [C34.82]    67 y.o.   BMI:  Body mass index is 29.56 kg/m².      Vitals:    21 1239 21 0936   BP:  (!) 150/74   Pulse:  64   Resp:  18   Temp:  98.6 °F (37 °C)   TempSrc:  Temporal   SpO2:  96%   Weight: 208 lb (94.3 kg) 206 lb (93.4 kg)   Height: 5' 7\" (1.702 m) 5' 10\" (1.778 m)       No Known Allergies    Social History     Tobacco Use    Smoking status: Former Smoker     Start date: 3/22/1960     Quit date: 1976     Years since quittin.9    Smokeless tobacco: Never Used   Substance Use Topics    Alcohol use: No       LABS:    CBC  Lab Results   Component Value Date/Time WBC 9.3 10/18/2021 04:35 AM    HGB 14.7 10/18/2021 04:35 AM    HCT 42.0 10/18/2021 04:35 AM     (L) 11/02/2021 10:13 AM     RENAL  Lab Results   Component Value Date/Time     10/18/2021 04:35 AM    K 4.1 10/18/2021 04:35 AM     10/18/2021 04:35 AM    CO2 26 10/18/2021 04:35 AM    BUN 20 10/18/2021 04:35 AM    CREATININE 1.2 10/18/2021 04:35 AM    GLUCOSE 178 (H) 10/18/2021 04:35 AM     COAGS  Lab Results   Component Value Date/Time    PROTIME 11.8 11/02/2021 10:13 AM    INR 1.04 11/02/2021 10:13 AM          Anesthesia Plan      MAC     ASA 3                                 Jeramy West MD   11/4/2021

## 2021-11-04 NOTE — H&P
I have reviewed the H&P (from [facility] in Media tab) and examined this patient. I have reviewed with the patient and /or family the risks, benefits and alternatives to this procedure and they seem to understand and agree to proceed.   Sydnie Reyes MD

## 2021-11-04 NOTE — PROGRESS NOTES
POCT      Blood Glucose: 143        (Normal Range 70-99)    PT:      (Normal Range 9.8 - 13)    INR:      (Normal Range 0.86-1.14)

## 2021-11-04 NOTE — PROGRESS NOTES
POCT      Blood Glucose:125      (Normal Range 70-99)    PT:      (Normal Range 9.8 - 13)    INR:      (Normal Range 0.86-1.14)

## 2021-11-04 NOTE — ANESTHESIA POSTPROCEDURE EVALUATION
Department of Anesthesiology  Postprocedure Note    Patient: Tori Cochran  MRN: [id number]  YOB: 1945  Date of evaluation: 11/4/2021  Time:  4:26 PM     Procedure Summary     Date: 11/04/21 Room / Location: [address] [place] [address] 01 / [facility]    Anesthesia Start: 1147 Anesthesia Stop: 7295    Procedure: SURGICAL PORT PLACEMENT (N/A ) Diagnosis:       Malignant neoplasm of overlapping sites of left lung ([address]      (NON SMALL CELL LUNG CANCER)    Surgeons: Annabelle Yuen MD Responsible Provider: Marc Parikh MD    Anesthesia Type: MAC ASA Status: 3          Anesthesia Type: MAC    Antonella Phase I: Antonella Score: 10    Antonella Phase II: Antonella Score: 7    Last vitals: Reviewed and per EMR flowsheets. Anesthesia Post Evaluation    Patient location during evaluation: PACU  Patient participation: complete - patient participated  Level of consciousness: awake and alert  Airway patency: patent  Nausea & Vomiting: no nausea and no vomiting  Complications: no  Cardiovascular status: blood pressure returned to baseline  Respiratory status: acceptable  Hydration status: euvolemic  Comments: VSS on transfer to phase 2 recovery. No anesthetic complications.

## 2021-11-29 ENCOUNTER — HOSPITAL ENCOUNTER (OUTPATIENT)
Dept: GENERAL RADIOLOGY | Age: 76
Discharge: HOME | End: 2021-11-29
Payer: COMMERCIAL

## 2021-11-29 ENCOUNTER — HOSPITAL ENCOUNTER (OUTPATIENT)
Age: 76
Discharge: HOME | End: 2021-11-29
Payer: COMMERCIAL

## 2021-11-29 DIAGNOSIS — C34.90 NON-SMALL CELL LUNG CANCER, UNSPECIFIED LATERALITY (HCC): ICD-10-CM

## 2021-11-29 DIAGNOSIS — J91.0 MALIGNANT PLEURAL EFFUSION: ICD-10-CM

## 2021-11-29 PROCEDURE — 71046 X-RAY EXAM CHEST 2 VIEWS: CPT

## 2021-11-30 ENCOUNTER — HOSPITAL ENCOUNTER (OUTPATIENT)
Dept: GENERAL RADIOLOGY | Age: 76
Discharge: HOME | End: 2021-11-30
Payer: COMMERCIAL

## 2021-11-30 ENCOUNTER — HOSPITAL ENCOUNTER (OUTPATIENT)
Dept: ULTRASOUND IMAGING | Age: 76
Discharge: HOME | End: 2021-11-30
Payer: COMMERCIAL

## 2021-11-30 DIAGNOSIS — C34.32 MALIGNANT NEOPLASM OF LOWER LOBE, LEFT BRONCHUS OR LUNG (HCC): ICD-10-CM

## 2021-11-30 PROCEDURE — 71045 X-RAY EXAM CHEST 1 VIEW: CPT

## 2021-11-30 PROCEDURE — 2709999900 US THORACENTESIS

## 2021-12-20 ENCOUNTER — HOSPITAL ENCOUNTER (OUTPATIENT)
Dept: GENERAL RADIOLOGY | Age: 76
Discharge: HOME | End: 2021-12-20
Payer: COMMERCIAL

## 2021-12-20 ENCOUNTER — HOSPITAL ENCOUNTER (OUTPATIENT)
Age: 76
Discharge: HOME | End: 2021-12-20
Payer: COMMERCIAL

## 2021-12-20 DIAGNOSIS — C34.90 NON-SMALL CELL LUNG CANCER, UNSPECIFIED LATERALITY (HCC): ICD-10-CM

## 2021-12-20 PROCEDURE — 71046 X-RAY EXAM CHEST 2 VIEWS: CPT

## 2021-12-21 ENCOUNTER — TELEPHONE (OUTPATIENT)
Dept: CARDIOLOGY CLINIC | Age: 76
End: 2021-12-21

## 2021-12-21 NOTE — TELEPHONE ENCOUNTER
Called and left a message for patient to call back. Needs a one year follow up for refills. Also want to verify diose of Metoprolol.

## 2021-12-23 RX ORDER — METOPROLOL SUCCINATE 25 MG/1
TABLET, EXTENDED RELEASE ORAL
Qty: 30 TABLET | Refills: 11 | Status: ON HOLD | OUTPATIENT
Start: 2021-12-23 | End: 2022-10-05 | Stop reason: HOSPADM

## 2022-01-03 ENCOUNTER — HOSPITAL ENCOUNTER (OUTPATIENT)
Age: 77
Discharge: HOME | End: 2022-01-03
Payer: COMMERCIAL

## 2022-01-03 ENCOUNTER — HOSPITAL ENCOUNTER (OUTPATIENT)
Dept: GENERAL RADIOLOGY | Age: 77
Discharge: HOME | End: 2022-01-03
Payer: COMMERCIAL

## 2022-01-03 DIAGNOSIS — C34.90 NON-SMALL CELL LUNG CANCER, UNSPECIFIED LATERALITY (HCC): ICD-10-CM

## 2022-01-03 DIAGNOSIS — R06.02 SOBOE (SHORTNESS OF BREATH ON EXERTION): ICD-10-CM

## 2022-01-03 PROCEDURE — 71046 X-RAY EXAM CHEST 2 VIEWS: CPT

## 2022-01-05 ENCOUNTER — HOSPITAL ENCOUNTER (OUTPATIENT)
Dept: GENERAL RADIOLOGY | Age: 77
Discharge: HOME | End: 2022-01-05
Payer: COMMERCIAL

## 2022-01-05 ENCOUNTER — HOSPITAL ENCOUNTER (OUTPATIENT)
Dept: ULTRASOUND IMAGING | Age: 77
Discharge: HOME | End: 2022-01-05
Payer: COMMERCIAL

## 2022-01-05 ENCOUNTER — HOSPITAL ENCOUNTER (OUTPATIENT)
Age: 77
Discharge: HOME | End: 2022-01-05
Payer: COMMERCIAL

## 2022-01-05 DIAGNOSIS — C34.82 MALIGNANT NEOPLASM OF OVERLAPPING SITES OF LEFT BRONCHUS AND LUNG (HCC): ICD-10-CM

## 2022-01-05 DIAGNOSIS — J91.0 MALIGNANT PLEURAL EFFUSION: ICD-10-CM

## 2022-01-05 LAB
INR BLD: 1.06 (ref 0.88–1.12)
PLATELET # BLD: 105 K/UL (ref 135–450)
PROTHROMBIN TIME: 12 SEC (ref 9.9–12.7)

## 2022-01-05 PROCEDURE — 85610 PROTHROMBIN TIME: CPT

## 2022-01-05 PROCEDURE — 71045 X-RAY EXAM CHEST 1 VIEW: CPT

## 2022-01-05 PROCEDURE — 36415 COLL VENOUS BLD VENIPUNCTURE: CPT

## 2022-01-05 PROCEDURE — 85049 AUTOMATED PLATELET COUNT: CPT

## 2022-01-05 PROCEDURE — 32555 ASPIRATE PLEURA W/ IMAGING: CPT

## 2022-01-05 NOTE — BRIEF OP NOTE
Brief Postoperative Note    Gene Emery  YOB: 1945  [id number]    Pre-operative Diagnosis:  left Pleural effusion    Post-operative Diagnosis: Same    Procedure: US Guided Thoracentesis    Anesthesia: Local    Surgeons/Assistants: Kennedy Sanchez MD, MD    Estimated Blood Loss: less than 5    Complications: None    Specimens: Was Obtained: serosanguinous Pleural Fluid    Findings: Technically successful left Thoracentesis    Electronically signed by Kennedy Sanchez MD on 1/5/2022 at 1:29 PM

## 2022-01-18 ENCOUNTER — HOSPITAL ENCOUNTER (OUTPATIENT)
Age: 77
Discharge: HOME | End: 2022-01-18
Payer: COMMERCIAL

## 2022-01-18 LAB
ABO/RH: NORMAL
ANTIBODY SCREEN: NORMAL

## 2022-01-18 PROCEDURE — 86923 COMPATIBILITY TEST ELECTRIC: CPT

## 2022-01-18 PROCEDURE — 86850 RBC ANTIBODY SCREEN: CPT

## 2022-01-18 PROCEDURE — 86900 BLOOD TYPING SEROLOGIC ABO: CPT

## 2022-01-18 PROCEDURE — 86901 BLOOD TYPING SEROLOGIC RH(D): CPT

## 2022-01-19 ENCOUNTER — HOSPITAL ENCOUNTER (OUTPATIENT)
Dept: NURSING | Age: 77
Setting detail: INFUSION SERIES
Discharge: HOME | End: 2022-01-19
Payer: COMMERCIAL

## 2022-01-19 VITALS
RESPIRATION RATE: 18 BRPM | SYSTOLIC BLOOD PRESSURE: 144 MMHG | TEMPERATURE: 98.6 F | DIASTOLIC BLOOD PRESSURE: 53 MMHG | HEART RATE: 77 BPM | HEIGHT: 67 IN | WEIGHT: 204 LBS | BODY MASS INDEX: 32.02 KG/M2

## 2022-01-19 LAB
BLOOD BANK DISPENSE STATUS: NORMAL
BLOOD BANK PRODUCT CODE: NORMAL
BPU ID: NORMAL
DESCRIPTION BLOOD BANK: NORMAL

## 2022-01-19 PROCEDURE — 99203 OFFICE O/P NEW LOW 30 MIN: CPT

## 2022-01-19 PROCEDURE — G0463 HOSPITAL OUTPT CLINIC VISIT: HCPCS

## 2022-01-19 PROCEDURE — P9016 RBC LEUKOCYTES REDUCED: HCPCS

## 2022-01-19 PROCEDURE — 6370000000 HC RX 637 (ALT 250 FOR IP): Performed by: NURSE PRACTITIONER

## 2022-01-19 PROCEDURE — 36430 TRANSFUSION BLD/BLD COMPNT: CPT

## 2022-01-19 RX ORDER — ACETAMINOPHEN 325 MG/1
650 TABLET ORAL ONCE
Status: COMPLETED | OUTPATIENT
Start: 2022-01-19 | End: 2022-01-19

## 2022-01-19 RX ORDER — DIPHENHYDRAMINE HCL 25 MG
25 TABLET ORAL ONCE
Status: DISCONTINUED | OUTPATIENT
Start: 2022-01-19 | End: 2022-01-20 | Stop reason: HOSPADM

## 2022-01-19 RX ADMIN — ACETAMINOPHEN 650 MG: 325 TABLET ORAL at 08:18

## 2022-01-19 ASSESSMENT — PAIN SCALES - GENERAL: PAINLEVEL_OUTOF10: 0

## 2022-01-19 NOTE — PROGRESS NOTES
Blood tubing clear  Portacath  flushed with 20 cc NS using start stop method then port was de accessed without difficulty  Dressing applied to site  Site unremarkable  Pt skyla well   pt was up to BR with stand by assist  Pt reports the dizziness is gone but he still feels weak   Discharge instructions reviewed with pt  Understanding verbalized and copy was given  Pt was then discharged via w/c per myself in stable condition to waiting ride with wife

## 2022-01-19 NOTE — PROGRESS NOTES
Have been with pt for first 15 mins Blood infusing well without any signs of adverse reactions  Pt without c/o's  Pt watching TV  pt refused offer for breakfast stating that he has already eaten  Will continue to [employer]

## 2022-01-19 NOTE — PROGRESS NOTES
No changes noted  Blood continues to infuse  without any signs of adverse reactions   Pt without c/o's  Pt has been resting quietly with eyes closed when not disturbed  Will continue to monitor  Wife remains at side

## 2022-01-19 NOTE — PROGRESS NOTES
Pt here via w/c for 1unit of blood Current H&H is 6.8 & 20.1  Pt reports being very weak and tired and very dizzy  Pt reports \" I'm afraid I am going to fall when I walk I am so dizzy \"   Pt without any c/o's at this time  Blood transfusion process explained to patient and his wife and all questions were answered to his satisfaction then blood consent was obtained  Pt has a portacath in his right chest  Port was accessed per policy without difficulty with a 20 ga 1 in felipe needle   Excellent blood return obtained and it  flushes easily with 20 cc NS using start stop method  Pt skyla well  Pre meds of tylenol 650 mg given  Pt refused the benadryl but stated if he develops any issues he will take it then  Unit of PRBC's started  Pt skyla well  Will stay with pt for 15 mins and monitor for any adverse reactions  Wife at side

## 2022-01-22 ENCOUNTER — APPOINTMENT (OUTPATIENT)
Dept: CT IMAGING | Age: 77
DRG: 180 | End: 2022-01-22
Payer: MEDICARE

## 2022-01-22 ENCOUNTER — HOSPITAL ENCOUNTER (INPATIENT)
Age: 77
LOS: 2 days | Discharge: HOME | DRG: 180 | End: 2022-01-24
Attending: STUDENT IN AN ORGANIZED HEALTH CARE EDUCATION/TRAINING PROGRAM | Admitting: INTERNAL MEDICINE
Payer: MEDICARE

## 2022-01-22 ENCOUNTER — HOSPITAL ENCOUNTER (OUTPATIENT)
Dept: ONCOLOGY | Age: 77
End: 2022-01-22

## 2022-01-22 ENCOUNTER — APPOINTMENT (OUTPATIENT)
Dept: GENERAL RADIOLOGY | Age: 77
DRG: 180 | End: 2022-01-22
Payer: MEDICARE

## 2022-01-22 DIAGNOSIS — J90 LOCULATED PLEURAL EFFUSION: Primary | ICD-10-CM

## 2022-01-22 DIAGNOSIS — D64.9 ANEMIA, UNSPECIFIED TYPE: ICD-10-CM

## 2022-01-22 PROBLEM — R06.02 SHORTNESS OF BREATH: Status: ACTIVE | Noted: 2022-01-22

## 2022-01-22 LAB
ABO/RH: NORMAL
ANION GAP SERPL CALCULATED.3IONS-SCNC: 9 MMOL/L (ref 3–16)
ANTIBODY SCREEN: NORMAL
BASOPHILS ABSOLUTE: 0 K/UL (ref 0–0.2)
BASOPHILS RELATIVE PERCENT: 0.1 %
BLOOD BANK DISPENSE STATUS: NORMAL
BLOOD BANK PRODUCT CODE: NORMAL
BPU ID: NORMAL
BUN BLDV-MCNC: 25 MG/DL (ref 7–20)
CALCIUM SERPL-MCNC: 9 MG/DL (ref 8.3–10.6)
CHLORIDE BLD-SCNC: 102 MMOL/L (ref 99–110)
CO2: 24 MMOL/L (ref 21–32)
CREAT SERPL-MCNC: 1.2 MG/DL (ref 0.8–1.3)
DESCRIPTION BLOOD BANK: NORMAL
EOSINOPHILS ABSOLUTE: 0 K/UL (ref 0–0.6)
EOSINOPHILS RELATIVE PERCENT: 0.6 %
GFR AFRICAN AMERICAN: >60
GFR NON-AFRICAN AMERICAN: 59
GLUCOSE BLD-MCNC: 366 MG/DL (ref 70–99)
GLUCOSE BLD-MCNC: 381 MG/DL (ref 70–99)
GLUCOSE BLD-MCNC: 89 MG/DL (ref 70–99)
HCT VFR BLD CALC: 20.6 % (ref 40.5–52.5)
HEMOGLOBIN: 7 G/DL (ref 13.5–17.5)
INR BLD: 1.14 (ref 0.88–1.12)
LYMPHOCYTES ABSOLUTE: 0.4 K/UL (ref 1–5.1)
LYMPHOCYTES RELATIVE PERCENT: 16.2 %
MCH RBC QN AUTO: 31.1 PG (ref 26–34)
MCHC RBC AUTO-ENTMCNC: 33.8 G/DL (ref 31–36)
MCV RBC AUTO: 92.2 FL (ref 80–100)
MONOCYTES ABSOLUTE: 0.4 K/UL (ref 0–1.3)
MONOCYTES RELATIVE PERCENT: 15.6 %
NEUTROPHILS ABSOLUTE: 1.8 K/UL (ref 1.7–7.7)
NEUTROPHILS RELATIVE PERCENT: 67.5 %
PDW BLD-RTO: 17.3 % (ref 12.4–15.4)
PERFORMED ON: ABNORMAL
PERFORMED ON: ABNORMAL
PLATELET # BLD: 57 K/UL (ref 135–450)
PMV BLD AUTO: 7.2 FL (ref 5–10.5)
POTASSIUM REFLEX MAGNESIUM: 4.1 MMOL/L (ref 3.5–5.1)
PROCALCITONIN: 0.12 NG/ML (ref 0–0.15)
PROTHROMBIN TIME: 13 SEC (ref 9.9–12.7)
RBC # BLD: 2.24 M/UL (ref 4.2–5.9)
SARS-COV-2, NAAT: NOT DETECTED
SODIUM BLD-SCNC: 135 MMOL/L (ref 136–145)
WBC # BLD: 2.7 K/UL (ref 4–11)

## 2022-01-22 PROCEDURE — 6360000002 HC RX W HCPCS: Performed by: STUDENT IN AN ORGANIZED HEALTH CARE EDUCATION/TRAINING PROGRAM

## 2022-01-22 PROCEDURE — 71045 X-RAY EXAM CHEST 1 VIEW: CPT

## 2022-01-22 PROCEDURE — 6370000000 HC RX 637 (ALT 250 FOR IP): Performed by: STUDENT IN AN ORGANIZED HEALTH CARE EDUCATION/TRAINING PROGRAM

## 2022-01-22 PROCEDURE — 86901 BLOOD TYPING SEROLOGIC RH(D): CPT

## 2022-01-22 PROCEDURE — P9016 RBC LEUKOCYTES REDUCED: HCPCS

## 2022-01-22 PROCEDURE — 2580000003 HC RX 258: Performed by: STUDENT IN AN ORGANIZED HEALTH CARE EDUCATION/TRAINING PROGRAM

## 2022-01-22 PROCEDURE — 2060000000 HC ICU INTERMEDIATE R&B

## 2022-01-22 PROCEDURE — 85025 COMPLETE CBC W/AUTO DIFF WBC: CPT

## 2022-01-22 PROCEDURE — 87635 SARS-COV-2 COVID-19 AMP PRB: CPT

## 2022-01-22 PROCEDURE — 36415 COLL VENOUS BLD VENIPUNCTURE: CPT

## 2022-01-22 PROCEDURE — 36430 TRANSFUSION BLD/BLD COMPNT: CPT

## 2022-01-22 PROCEDURE — 87040 BLOOD CULTURE FOR BACTERIA: CPT

## 2022-01-22 PROCEDURE — 86850 RBC ANTIBODY SCREEN: CPT

## 2022-01-22 PROCEDURE — 86900 BLOOD TYPING SEROLOGIC ABO: CPT

## 2022-01-22 PROCEDURE — 80048 BASIC METABOLIC PNL TOTAL CA: CPT

## 2022-01-22 PROCEDURE — 99283 EMERGENCY DEPT VISIT LOW MDM: CPT

## 2022-01-22 PROCEDURE — 85610 PROTHROMBIN TIME: CPT

## 2022-01-22 PROCEDURE — 84145 PROCALCITONIN (PCT): CPT

## 2022-01-22 PROCEDURE — 86923 COMPATIBILITY TEST ELECTRIC: CPT

## 2022-01-22 PROCEDURE — 71250 CT THORAX DX C-: CPT

## 2022-01-22 RX ORDER — POLYETHYLENE GLYCOL 3350 17 G/17G
17 POWDER, FOR SOLUTION ORAL DAILY PRN
Status: DISCONTINUED | OUTPATIENT
Start: 2022-01-22 | End: 2022-01-24 | Stop reason: HOSPADM

## 2022-01-22 RX ORDER — METOPROLOL SUCCINATE 25 MG/1
25 TABLET, EXTENDED RELEASE ORAL DAILY
Status: DISCONTINUED | OUTPATIENT
Start: 2022-01-23 | End: 2022-01-24 | Stop reason: HOSPADM

## 2022-01-22 RX ORDER — DEXTROSE MONOHYDRATE 25 G/50ML
12.5 INJECTION, SOLUTION INTRAVENOUS PRN
Status: DISCONTINUED | OUTPATIENT
Start: 2022-01-22 | End: 2022-01-22 | Stop reason: SDUPTHER

## 2022-01-22 RX ORDER — METOPROLOL SUCCINATE 25 MG/1
25 TABLET, EXTENDED RELEASE ORAL DAILY
Status: DISCONTINUED | OUTPATIENT
Start: 2022-01-22 | End: 2022-01-22

## 2022-01-22 RX ORDER — ACETAMINOPHEN 325 MG/1
650 TABLET ORAL EVERY 6 HOURS PRN
Status: DISCONTINUED | OUTPATIENT
Start: 2022-01-22 | End: 2022-01-24 | Stop reason: HOSPADM

## 2022-01-22 RX ORDER — SODIUM CHLORIDE 9 MG/ML
INJECTION, SOLUTION INTRAVENOUS PRN
Status: DISCONTINUED | OUTPATIENT
Start: 2022-01-22 | End: 2022-01-24 | Stop reason: HOSPADM

## 2022-01-22 RX ORDER — ATORVASTATIN CALCIUM 20 MG/1
20 TABLET, FILM COATED ORAL NIGHTLY
Status: DISCONTINUED | OUTPATIENT
Start: 2022-01-22 | End: 2022-01-22

## 2022-01-22 RX ORDER — INSULIN LISPRO 100 [IU]/ML
0-6 INJECTION, SOLUTION INTRAVENOUS; SUBCUTANEOUS NIGHTLY
Status: DISCONTINUED | OUTPATIENT
Start: 2022-01-22 | End: 2022-01-24 | Stop reason: HOSPADM

## 2022-01-22 RX ORDER — HEPARIN SODIUM 5000 [USP'U]/ML
5000 INJECTION, SOLUTION INTRAVENOUS; SUBCUTANEOUS EVERY 8 HOURS SCHEDULED
Status: DISCONTINUED | OUTPATIENT
Start: 2022-01-22 | End: 2022-01-24 | Stop reason: HOSPADM

## 2022-01-22 RX ORDER — ONDANSETRON 2 MG/ML
4 INJECTION INTRAMUSCULAR; INTRAVENOUS EVERY 6 HOURS PRN
Status: DISCONTINUED | OUTPATIENT
Start: 2022-01-22 | End: 2022-01-24 | Stop reason: HOSPADM

## 2022-01-22 RX ORDER — DEXTROSE MONOHYDRATE 50 MG/ML
100 INJECTION, SOLUTION INTRAVENOUS PRN
Status: DISCONTINUED | OUTPATIENT
Start: 2022-01-22 | End: 2022-01-24 | Stop reason: HOSPADM

## 2022-01-22 RX ORDER — NICOTINE POLACRILEX 4 MG
15 LOZENGE BUCCAL PRN
Status: DISCONTINUED | OUTPATIENT
Start: 2022-01-22 | End: 2022-01-24 | Stop reason: HOSPADM

## 2022-01-22 RX ORDER — LOSARTAN POTASSIUM 100 MG/1
100 TABLET ORAL DAILY
Status: DISCONTINUED | OUTPATIENT
Start: 2022-01-23 | End: 2022-01-24 | Stop reason: HOSPADM

## 2022-01-22 RX ORDER — ONDANSETRON 4 MG/1
4 TABLET, ORALLY DISINTEGRATING ORAL EVERY 8 HOURS PRN
Status: DISCONTINUED | OUTPATIENT
Start: 2022-01-22 | End: 2022-01-24 | Stop reason: HOSPADM

## 2022-01-22 RX ORDER — ACETAMINOPHEN 650 MG/1
650 SUPPOSITORY RECTAL EVERY 6 HOURS PRN
Status: DISCONTINUED | OUTPATIENT
Start: 2022-01-22 | End: 2022-01-24 | Stop reason: HOSPADM

## 2022-01-22 RX ORDER — SODIUM CHLORIDE 0.9 % (FLUSH) 0.9 %
5-40 SYRINGE (ML) INJECTION EVERY 12 HOURS SCHEDULED
Status: DISCONTINUED | OUTPATIENT
Start: 2022-01-22 | End: 2022-01-24 | Stop reason: HOSPADM

## 2022-01-22 RX ORDER — LOSARTAN POTASSIUM 100 MG/1
100 TABLET ORAL DAILY
Status: DISCONTINUED | OUTPATIENT
Start: 2022-01-22 | End: 2022-01-22

## 2022-01-22 RX ORDER — SODIUM CHLORIDE 0.9 % (FLUSH) 0.9 %
5-40 SYRINGE (ML) INJECTION PRN
Status: DISCONTINUED | OUTPATIENT
Start: 2022-01-22 | End: 2022-01-24 | Stop reason: HOSPADM

## 2022-01-22 RX ORDER — SODIUM CHLORIDE 9 MG/ML
25 INJECTION, SOLUTION INTRAVENOUS PRN
Status: DISCONTINUED | OUTPATIENT
Start: 2022-01-22 | End: 2022-01-24 | Stop reason: HOSPADM

## 2022-01-22 RX ORDER — INSULIN LISPRO 100 [IU]/ML
0-12 INJECTION, SOLUTION INTRAVENOUS; SUBCUTANEOUS
Status: DISCONTINUED | OUTPATIENT
Start: 2022-01-22 | End: 2022-01-24 | Stop reason: HOSPADM

## 2022-01-22 RX ORDER — ATORVASTATIN CALCIUM 20 MG/1
20 TABLET, FILM COATED ORAL DAILY
Status: DISCONTINUED | OUTPATIENT
Start: 2022-01-23 | End: 2022-01-24 | Stop reason: HOSPADM

## 2022-01-22 RX ADMIN — SODIUM CHLORIDE, PRESERVATIVE FREE 10 ML: 5 INJECTION INTRAVENOUS at 22:41

## 2022-01-22 RX ADMIN — INSULIN GLARGINE 46 UNITS: 100 INJECTION, SOLUTION SUBCUTANEOUS at 22:22

## 2022-01-22 RX ADMIN — VANCOMYCIN HYDROCHLORIDE 1500 MG: 10 INJECTION, POWDER, LYOPHILIZED, FOR SOLUTION INTRAVENOUS at 20:00

## 2022-01-22 RX ADMIN — INSULIN LISPRO 5 UNITS: 100 INJECTION, SOLUTION INTRAVENOUS; SUBCUTANEOUS at 22:21

## 2022-01-22 RX ADMIN — CEFEPIME HYDROCHLORIDE 1000 MG: 1 INJECTION, POWDER, FOR SOLUTION INTRAMUSCULAR; INTRAVENOUS at 19:10

## 2022-01-22 ASSESSMENT — ENCOUNTER SYMPTOMS
ALLERGIC/IMMUNOLOGIC NEGATIVE: 1
VOMITING: 0
NAUSEA: 0
EYES NEGATIVE: 1
ABDOMINAL PAIN: 0
BLOOD IN STOOL: 0
DIARRHEA: 0
WHEEZING: 0
SHORTNESS OF BREATH: 1

## 2022-01-22 ASSESSMENT — PAIN SCALES - GENERAL
PAINLEVEL_OUTOF10: 0
PAINLEVEL_OUTOF10: 0

## 2022-01-22 NOTE — ED NOTES
Pt sent to ED from [facility] for low hgb and needing blood transfusion. Right chest wall portacath accessed by [facility]. Blood drawn from port after 10 ml waste.       Arun Singh RN  01/22/22 [id number]

## 2022-01-22 NOTE — ED PROVIDER NOTES
[address] [facility] [address] ENCOUNTER          EM RESIDENT NOTE       Date of evaluation: 1/22/2022    Chief Complaint     Shortness of Breath (pt states that he has a HX of lung CA and has frequent infusions and flud being drawn off of the spots in lungs.)      History of Present Illness     Michael Moise is a 67 y.o. male history of lung cancer actively undergoing chemotherapy, chronic anemia, chronic pler hypertension, hyperlipidemia, diabetes who presents to the emergency department with complaint of shortness of breath in the setting of anemia. The patient reports that on Wednesday of last week he received a blood transfusion. He states that he was feeling well for approximately 24 to 48 hours after the blood transfusion, but is now feeling short of breath again, he feels like he felt before getting his transfusion. He also feels very unsteady due to lightheadedness. No dark tarry stools or blood in emesis. He has not had any vomiting. He has not had any syncope. No chest pain at this time. No fevers. Per RN report, the patient was seen at [facility] and had Hb of 6.8. Review of Systems     Review of Systems   Constitutional: Positive for activity change and fatigue. Negative for fever. HENT: Negative. Eyes: Negative. Respiratory: Positive for shortness of breath. Cardiovascular: Negative for chest pain and leg swelling. Gastrointestinal: Negative for blood in stool. Endocrine: Negative. Genitourinary: Negative. Musculoskeletal: Negative. Allergic/Immunologic: Negative. Neurological: Positive for light-headedness. Negative for syncope. Hematological: Negative. Past Medical, Surgical, Family, and Social History     He has a past medical history of Cancer ([address] Diabetes mellitus ([address] Hyperlipidemia, Hypertension, Kidney stone, Lung cancer ([facility] [address] and Lung cancer ([facility] [address] He has a past surgical history that includes Prostate surgery;  Cystoscopy (Right, 11/9/2020); back surgery (2012); NEPHROLITHOTOMY (Left, 12/4/2020); shoulder surgery (Right); and Port Surgery (N/A, 11/4/2021). His family history is not on file. He reports that he quit smoking about 45 years ago. He started smoking about 61 years ago. He has never used smokeless tobacco. He reports that he does not drink alcohol and does not use drugs. Medications     Previous Medications    ALBUTEROL SULFATE HFA (PROVENTIL HFA) 108 (90 BASE) MCG/ACT INHALER    Inhale 2 puffs into the lungs every 4 hours as needed for Wheezing or Shortness of Breath (Space out to every 6 hours as symptoms improve) Space out to every 6 hours as symptoms improve. ATORVASTATIN (LIPITOR) 20 MG TABLET    Take 20 mg by mouth daily     INSULIN ASPART (NOVOLOG) 100 UNIT/ML INJECTION VIAL    Inject into the skin 3 times daily (before meals) Sliding scale    INSULIN GLARGINE (TOUJEO SOLOSTAR SC)    Inject 70 Units into the skin nightly     LOSARTAN (COZAAR) 100 MG TABLET    Take 100 mg by mouth daily    METOPROLOL SUCCINATE (TOPROL XL) 25 MG EXTENDED RELEASE TABLET    Take 1 tablet by mouth once daily    MULTIPLE VITAMINS-MINERALS (THERAPEUTIC MULTIVITAMIN-MINERALS) TABLET    Take 1 tablet by mouth daily    VITAMIN D3 (CHOLECALCIFEROL) 10 MCG (400 UNIT) TABS TABLET    Take 400 Units by mouth daily       Allergies     He has No Known Allergies. Physical Exam     INITIAL VITALS: BP: (!) 175/59, Temp: 97.7 °F (36.5 °C), Pulse: 85, Resp: 18, SpO2: 100 %   Physical Exam  Constitutional:       General: He is not in acute distress. Comments: Chronically ill-appearing. HENT:      Head: Normocephalic and atraumatic. Mouth/Throat:      Mouth: Mucous membranes are moist.   Eyes:      Extraocular Movements: Extraocular movements intact. Pupils: Pupils are equal, round, and reactive to light. Comments: Conjunctival pallor. Cardiovascular:      Rate and Rhythm: Normal rate and regular rhythm.    Pulmonary:      Effort: Pulmonary effort is normal.      Breath sounds: Normal breath sounds. Comments: Port right chest is clean/dry/intact. Musculoskeletal:      Right lower leg: No edema. Left lower leg: No edema. Skin:     General: Skin is warm and dry. Capillary Refill: Capillary refill takes less than 2 seconds. Coloration: Skin is pale. Neurological:      General: No focal deficit present. Mental Status: He is alert and oriented to person, place, and time. Psychiatric:         Mood and Affect: Mood normal.         Behavior: Behavior normal.         DiagnosticResults       RADIOLOGY:  CT CHEST WO CONTRAST   Final Result      1. Moderate loculated left pleural effusion at the left lung base and along the left upper lobe. This may represent a malignant effusion and/or metastatic disease. 2.  Widespread sclerotic lesions throughout the visualized osseous structures likely representing metastatic disease. 3.  Mild left hydronephrosis, partially imaged. 4.  1.3 cm hypodense lesion in the right hepatic lobe highly suspicious for metastatic disease. XR CHEST PORTABLE   Final Result   1. Left-sided effusion with possible loculation along left lateral chest. Associated left lower lung airspace opacity.           LABS:   Results for orders placed or performed during the hospital encounter of 01/22/22   CBC Auto Differential   Result Value Ref Range    WBC 2.7 (L) 4.0 - 11.0 K/uL    RBC 2.24 (L) 4.20 - 5.90 M/uL    Hemoglobin 7.0 (L) 13.5 - 17.5 g/dL    Hematocrit 20.6 (LL) 40.5 - 52.5 %    MCV 92.2 80.0 - 100.0 fL    MCH 31.1 26.0 - 34.0 pg    MCHC 33.8 31.0 - 36.0 g/dL    RDW 17.3 (H) 12.4 - 15.4 %    Platelets 57 (L) 901 - 450 K/uL    MPV 7.2 5.0 - 10.5 fL    Neutrophils % 67.5 %    Lymphocytes % 16.2 %    Monocytes % 15.6 %    Eosinophils % 0.6 %    Basophils % 0.1 %    Neutrophils Absolute 1.8 1.7 - 7.7 K/uL    Lymphocytes Absolute 0.4 (L) 1.0 - 5.1 K/uL    Monocytes Absolute 0.4 0.0 - 1.3 K/uL Eosinophils Absolute 0.0 0.0 - 0.6 K/uL    Basophils Absolute 0.0 0.0 - 0.2 K/uL   Basic Metabolic Panel w/ Reflex to MG   Result Value Ref Range    Sodium 135 (L) 136 - 145 mmol/L    Potassium reflex Magnesium 4.1 3.5 - 5.1 mmol/L    Chloride 102 99 - 110 mmol/L    CO2 24 21 - 32 mmol/L    Anion Gap 9 3 - 16    Glucose 89 70 - 99 mg/dL    BUN 25 (H) 7 - 20 mg/dL    CREATININE 1.2 0.8 - 1.3 mg/dL    GFR Non- 59 (A) >60    GFR African American >60 >60    Calcium 9.0 8.3 - 10.6 mg/dL   PT - INR   Result Value Ref Range    Protime 13.0 (H) 9.9 - 12.7 sec    INR 1.14 (H) 0.88 - 1.12   TYPE AND SCREEN   Result Value Ref Range    ABO/Rh O POS     Antibody Screen NEG    PREPARE RBC (CROSSMATCH), 1 Units   Result Value Ref Range    Product Code Blood Bank [id number]     Description Blood Bank Red Blood Cells, Leuko-reduced     Unit Number [id number]     Dispense Status Blood Bank issued          RECENT VITALS:  BP: (!) 151/58, Temp: 97.6 °F (36.4 °C), Pulse: 88,Resp: 15, SpO2: 96 %     Procedures     None    ED Course     Nursing Notes, Past Medical Hx, Past Surgical Hx, Social Hx, Allergies, and Family Hx were reviewed. The patient was given the followingmedications:  Orders Placed This Encounter   Medications    0.9 % sodium chloride infusion    cefepime (MAXIPIME) 1000 mg IVPB minibag     Order Specific Question:   Antimicrobial Indications     Answer:   Pneumonia (CAP)    vancomycin (VANCOCIN) 1,500 mg in dextrose 5 % 250 mL IVPB     Order Specific Question:   Antimicrobial Indications     Answer:   Pneumonia (CAP)       CONSULTS:  IP CONSULT TO INTERVENTIONAL RADIOLOGY  IP CONSULT TO ONCOLOGY  IP CONSULT TO GENERAL SURGERY  IP CONSULT TO HOSPITALIST    MEDICAL DECISION MAKING / ASSESSMENT / Jairo Mia is a 67 y.o. male with a history of non-small cell lung cancer of the lung with recurrent pleural effusions who presents to the emergency department with anemia.  Patient has a hemoglobin of seven, was 6.8 per report at outside laboratory studies. Given that the patient is symptomatic, will treat with 1 unit of blood. Patient is also noted to be leukopenic to 2.6 and thrombocytopenic to 57. Chest x-ray was obtained given his shortness of breath in the setting of known pleural effusion. Pleural effusion noted to be loculated on chest x-ray, therefore CT scan without contrast was obtained which showed evidence of moderate loculated fluid. Given that the patient is leukopenic and has a moderate loculated fluid collection of the left pleura, I am concerned for possible infection and the patient was given IV antibiotics with cefepime and vancomycin. I spoke with interventional radiology, who recommends discussion for a large bore chest tube with general surgery. I do not feel that the patient emergently needs a chest tube at this time as he is hemodynamically stable without oxygen requirement. General surgery will evaluate the patient for surgical chest tube. I spoke with hospitalist, who will admit the patient to their service. This patient was also evaluated by the attending physician. All care plans were discussed and agreed upon. Clinical Impression     1. Loculated pleural effusion    2. Anemia, unspecified type        Disposition     PATIENT REFERRED TO:  No follow-up provider specified.     DISCHARGE MEDICATIONS:  New Prescriptions    No medications on file       [facility] hospitalist     Clara Horvath MD  Resident  01/22/22 [id number]

## 2022-01-22 NOTE — H&P
Internal Medicine  PGY 1  History & Physical      CC: SOB    History Obtained From:  patient, electronic medical record    HISTORY OF PRESENT ILLNESS:  Aparna Fong is a 77M with a PMH significant for lung cancer actively undergoing chemotherapy, chronic anemia, hypertension, HLD, DMT2 who presents with SOB in the setting of anemia. On Wednesday last week he received a blood transfusion. And he was feeling well for 24-48 hours until his SOB returned. Endoreses lightheadedness. Patient was seen at [facility] and had a hgb of 6.8. Denies dark, tarry stools, hematemesis, emesis, nausea, syncope, CP, fever. In the ED, Patient had Hgb of 7, but due to being symptomatic was given 1unit pRBC. CXR obtained which reveals loculation pleural effusion, leading to obtaining a CT without contrast obtained which showed evidence of moderate loculated fluid. Patient is leukopenic and with the fluid collection, concern for pneumonia, patient was started with IV cefepime and vancomycin. Consulted IR who recommended discussion for chest tube with general surgery. Not emergent at this time for chest tube placement. Patient to be admitted for further evaluation and management.      Past Medical History:        Diagnosis Date    Cancer [facility])     prostate    Diabetes mellitus ([facility]     Hyperlipidemia     Hypertension     Kidney stone     Lung cancer ([facility] 10/02/2021    Stage 4    Lung cancer ([facility]    ·     Past Surgical History:        Procedure Laterality Date    BACK SURGERY  2012    lumber, no hardware, \"cleaned it out\"    CYSTOSCOPY Right 11/9/2020    CYSTOSCOPY,  RIGHT URETEROSCOPY, RIGHT RETRO PYELOGRAM, REMOVAL STONE FROM BLADDER, URETHRAL DILITATION performed by Sushant Torre MD at [facility] NEPHROLITHOTOMY Left 12/4/2020    LEFT PERCUTANEOUS NEPHROLITHOTOMY WITH DR Mandie Blackwell TO PLACE NEPHROSTOMY TUBE PRIOR performed by Theron Saldana MD at [address] [address] N/A 11/4/2021    SURGICAL PORT PLACEMENT performed by James Ramires MD at [address] Right     ROTATOR CUFF   ·     Medications Priorto Admission:    · Not in a hospital admission. Allergies:  Patient has no known allergies. Social History:   · TOBACCO:   reports that he quit smoking about 45 years ago. He started smoking about 61 years ago. He has never used smokeless tobacco.  · ETOH:   reports no history of alcohol use. · DRUGS : None  · Patient currently lives alone  ·   Family History:   · History reviewed. No pertinent family history. Review of Systems   Constitutional: Positive for activity change and fatigue. HENT: Negative. Eyes: Negative. Respiratory: Positive for shortness of breath. Negative for wheezing. Cardiovascular: Negative for chest pain. Gastrointestinal: Negative for abdominal pain, diarrhea, nausea and vomiting. Genitourinary: Negative for dysuria. Neurological: Positive for light-headedness. Negative for dizziness, weakness and headaches. ROS: A 10 point review of systems was conducted, significant findings as noted in HPI. Physical Exam  Constitutional:       General: He is not in acute distress. Appearance: He is not ill-appearing or toxic-appearing. HENT:      Head: Normocephalic and atraumatic. Right Ear: External ear normal.      Left Ear: External ear normal.      Nose: Nose normal.      Mouth/Throat:      Mouth: Mucous membranes are moist.      Pharynx: Oropharynx is clear. Eyes:      General:         Right eye: No discharge. Left eye: No discharge. Pupils: Pupils are equal, round, and reactive to light. Cardiovascular:      Rate and Rhythm: Normal rate and regular rhythm. Heart sounds: Normal heart sounds. No murmur heard. No friction rub. No gallop. Pulmonary:      Effort: Pulmonary effort is normal. No respiratory distress. Breath sounds: No stridor. No wheezing, rhonchi or rales. Abdominal:      General: Bowel sounds are normal. There is no distension. Palpations: There is no mass. Tenderness: There is no abdominal tenderness. There is no guarding or rebound. Hernia: No hernia is present. Musculoskeletal:      Cervical back: Normal range of motion. Right lower leg: No edema. Left lower leg: No edema. Skin:     General: Skin is warm and dry. Neurological:      Mental Status: He is alert and oriented to person, place, and time. Mental status is at baseline. Psychiatric:         Mood and Affect: Mood normal.         Behavior: Behavior normal.         Thought Content: Thought content normal.         Judgment: Judgment normal.       Physical exam:       Vitals:    01/22/22 1705   BP: (!) 151/58   Pulse: 88   Resp: 15   Temp: 97.6 °F (36.4 °C)   SpO2: 96%       DATA:    Labs:  CBC:   Recent Labs     01/22/22  1326   WBC 2.7*   HGB 7.0*   HCT 20.6*   PLT 57*       BMP:   Recent Labs     01/22/22  1600   *   K 4.1      CO2 24   BUN 25*   CREATININE 1.2   GLUCOSE 89     LFT's: No results for input(s): AST, ALT, ALB, BILITOT, ALKPHOS in the last 72 hours. Troponin: No results for input(s): TROPONINI in the last 72 hours. BNP:No results for input(s): BNP in the last 72 hours. ABGs: No results for input(s): PHART, YHI4YGB, PO2ART in the last 72 hours. INR:   Recent Labs     01/22/22  1600   INR 1.14*       U/A:No results for input(s): NITRITE, COLORU, PHUR, LABCAST, WBCUA, RBCUA, MUCUS, TRICHOMONAS, YEAST, BACTERIA, CLARITYU, SPECGRAV, LEUKOCYTESUR, UROBILINOGEN, BILIRUBINUR, BLOODU, GLUCOSEU, AMORPHOUS in the last 72 hours. Invalid input(s): KETONESU    CT CHEST WO CONTRAST   Final Result      1. Moderate loculated left pleural effusion at the left lung base and along the left upper lobe. This may represent a malignant effusion and/or metastatic disease.    2.  Widespread sclerotic lesions throughout the visualized osseous structures likely representing metastatic disease. 3.  Mild left hydronephrosis, partially imaged. 4.  1.3 cm hypodense lesion in the right hepatic lobe highly suspicious for metastatic disease. XR CHEST PORTABLE   Final Result   1. Left-sided effusion with possible loculation along left lateral chest. Associated left lower lung airspace opacity. ASSESSMENT AND PLAN:  Adalid Chapa is a 67 y.o. male, who presented with Shortness of Breath    Shortness of Breath; Loculated L sided pleural effusion 2/2 malignancy vs chemotherapy vs low suspicion infectious  Hx NSCLC of lung with recurrent pleural effusion  CXR reveals loculation pleural effusion  CT without contrast obtained which showed evidence of L sided moderate loculated fluid. Widespread sclerotic lesions deangelo of metastasis disease. R hepatic lobe deangelo of metastatic disease.   - Continue Vancomycin and Cefepime  - BCx2, legionella, Strep pneumo, procalcitonin pending  - IR consulted by ED who recommended GS placement of large bore chest tube. - General Surgery Consulted, appreciate recs if chest tube is warranted. - HemeOnc Consulted, appreciate recs. follows with Dr. Jd Fang [facility]    Acute on Chronic Normocytic Anemia; Pancytopenia 2/2 to likely chemotherapy  Hgb of 7, 6.8 per outside lab studies. WBC at 2.7 on presentation  - Given 1u pRBC due to symptomatic SOB  - Continue to monitor q8hr H&H, transfuse hgb<8 and symptomatic.   - Continue to monitor daily labs    HTN  - Continue home losartan, metoprolol    HLD   - Continue home Statin    Diabetes Mellitus Type 2  Holding home DM medications  - Started on 46u Lantus   - MDSSI  - Hypoglycemia protocol  - POCT glucose check q6hr AC/HS    Will discuss with attending physician Dr. Janet Rolon MD    Code Status:Full code  FEN: No diet orders on file  PPX: SQH  DISPO: [address] [place] [address],  PGY-1  1/22/2022,  6:16 PM

## 2022-01-22 NOTE — CONSULTS
Cardiothoracic Surgery   Resident Consult Note    Reason for Consult: SOB in setting of anemia and known small loculated pleural effusion    History of Present Illness:   Murali Collins is a 67 y.o. male with stage 4 non-small cell lung cancer for which he is undergoing chemotherapy with pembrolizumab, pemetrexed, and carboplatin (last treatment on 1/10/22) who presents today secondary to shortness of breath. The patient reports dyspnea on exertion after walking only short distances, which he has experienced in the past with relief following thoracentesis. He has undergone thoracentesis on three previous occasions (9/28/21, 11/2/21, and 1/5/22). The patient reports overall fatigue and weakness in addition to his dyspnea on exertion since his last chemotherapy session. He reports no new cough, no fevers, no chills, no nausea, and no vomiting. Past Medical History:        Diagnosis Date    Cancer [facility])     prostate    Diabetes mellitus ([facility]     Hyperlipidemia     Hypertension     Kidney stone     Lung cancer ([facility] 10/02/2021    Stage 4    Lung cancer [facility])      Past Surgical History:        Procedure Laterality Date    BACK SURGERY  2012    lumber, no hardware, \"cleaned it out\"    CYSTOSCOPY Right 11/9/2020    CYSTOSCOPY,  RIGHT URETEROSCOPY, RIGHT RETRO PYELOGRAM, REMOVAL STONE FROM BLADDER, URETHRAL DILITATION performed by Bianca Easley MD at [facility] NEPHROLITHOTOMY Left 12/4/2020    LEFT PERCUTANEOUS NEPHROLITHOTOMY WITH DR Kitty Mills TO PLACE NEPHROSTOMY TUBE PRIOR performed by Sigifredo Cummings MD at [address] N/A 11/4/2021    SURGICAL PORT PLACEMENT performed by Marcin De La Rosa MD at [address] [facility] Right     ROTATOR CUFF     Allergies:  Patient has no known allergies. Medications:   Home Meds  No current facility-administered medications on file prior to encounter.      Current Outpatient Medications on File Prior to Encounter   Medication Sig Dispense Refill    metoprolol succinate (TOPROL XL) 25 MG extended release tablet Take 1 tablet by mouth once daily 30 tablet 11    atorvastatin (LIPITOR) 20 MG tablet Take 20 mg by mouth daily       losartan (COZAAR) 100 MG tablet Take 100 mg by mouth daily      Multiple Vitamins-Minerals (THERAPEUTIC MULTIVITAMIN-MINERALS) tablet Take 1 tablet by mouth daily      albuterol sulfate HFA (PROVENTIL HFA) 108 (90 Base) MCG/ACT inhaler Inhale 2 puffs into the lungs every 4 hours as needed for Wheezing or Shortness of Breath (Space out to every 6 hours as symptoms improve) Space out to every 6 hours as symptoms improve. 1 Inhaler 0    vitamin D3 (CHOLECALCIFEROL) 10 MCG (400 UNIT) TABS tablet Take 400 Units by mouth daily      insulin aspart (NOVOLOG) 100 UNIT/ML injection vial Inject into the skin 3 times daily (before meals) Sliding scale      Insulin Glargine (TOUJEO SOLOSTAR SC) Inject 70 Units into the skin nightly          Current Meds  0.9 % sodium chloride infusion, PRN  cefepime (MAXIPIME) 1000 mg IVPB minibag, Once  vancomycin (VANCOCIN) 1,500 mg in dextrose 5 % 250 mL IVPB, Once      Family History:   History reviewed. No pertinent family history. Social History:   TOBACCO:   reports that he quit smoking about 45 years ago. He started smoking about 61 years ago. He has never used smokeless tobacco.  ETOH:   reports no history of alcohol use. DRUGS:   reports no history of drug use. Review of Systems:   A 14 point review of systems was conducted and all pertinent positives and negatives were included in the HPI.     Physical exam:    Vitals:    01/22/22 1643 01/22/22 1645 01/22/22 1705 01/22/22 1820   BP: (!) 142/60 (!) 147/64 (!) 151/58 (!) 161/65   Pulse: 85 85 88 85   Resp: 17 18 15 18   Temp: 97.4 °F (36.3 °C)  97.6 °F (36.4 °C) 97.6 °F (36.4 °C)   TempSrc:   Oral Oral   SpO2:  99% 96% 100%   Weight:       Height:         General Appearance: Alert, no acute distress  HEENT: Normocephalic/atraumatic, no scleral icterus, EOM grossly intact, trachea midline, airway patent, neck supple  Chest/Lungs: Normal effort, breathing room air, no conversational dyspnea, dullness to percussion of left lateral chest wall  Cardiovascular: Regular rate and rhythm, hypertensive   Abdomen: Soft, non-tender, non-distended  Skin: warm and dry, no rashes  Extremities: no edema, no cyanosis  Neuro: A&Ox3, no gross focal deficits, sensation intact    Labs:    CBC:   Recent Labs     01/22/22  1326   WBC 2.7*   HGB 7.0*   HCT 20.6*   MCV 92.2   PLT 57*     BMP:   Recent Labs     01/22/22  1600   *   K 4.1      CO2 24   BUN 25*   CREATININE 1.2     PT/INR:   Recent Labs     01/22/22  1600   PROTIME 13.0*   INR 1.14*     APTT: No results for input(s): APTT in the last 72 hours. Liver Profile:   Lab Results   Component Value Date    AST 27 10/18/2021    ALT 24 10/18/2021    BILITOT 0.7 10/18/2021    ALKPHOS 179 10/18/2021   No results found for: CHOL, HDL, TRIG  UA:   Lab Results   Component Value Date    COLORU DARK YELLOW 10/18/2021    PHUR 5.5 10/18/2021    WBCUA 6-9 10/18/2021    RBCUA >100 10/18/2021    BACTERIA 2+ 10/18/2021    CLARITYU Clear 10/18/2021    SPECGRAV 1.025 10/18/2021    LEUKOCYTESUR Negative 10/18/2021    UROBILINOGEN 0.2 10/18/2021    BILIRUBINUR Negative 10/18/2021    BLOODU LARGE 10/18/2021    GLUCOSEU 100 10/18/2021    AMORPHOUS Rare 05/28/2021     Imaging:   CT CHEST WO CONTRAST   Final Result      1. Moderate loculated left pleural effusion at the left lung base and along the left upper lobe. This may represent a malignant effusion and/or metastatic disease. 2.  Widespread sclerotic lesions throughout the visualized osseous structures likely representing metastatic disease. 3.  Mild left hydronephrosis, partially imaged. 4.  1.3 cm hypodense lesion in the right hepatic lobe highly suspicious for metastatic disease.          XR CHEST PORTABLE   Final Result   1. Left-sided effusion with possible loculation along left lateral chest. Associated left lower lung airspace opacity. Assessment/Plan: This is a 67 y.o. male with stage 4 non-small cell lung cancer for which he is undergoing chemotherapy with pembrolizumab, pemetrexed, and carboplatin (last treatment on 1/10/22) and has undergone thoracentesis on three previous occasions for recurrent malignant pleural effusions (9/28/21, 11/2/21, and 1/5/22) who presents today secondary to shortness of breath and fatigue since his last chemotherapy treatment.  The patient was found to have a recurrent loculated pleural effusion on non-contrast CT of the chest performed by the ED for which Cardiothoracic Surgery has been consulted to evaluate for chest tube placement.     - No indication for emergent or urgent chest tube placement, as the patient is saturating 100% on room air.   - Given recurrent nature of malignant pleural effusions and loculation would recommend VATS with PluerX catheter placement early next week  - The patient was discussed with Dr. Fili Vega who is in agreement   - Patient will need to have his thrombocytopenia corrected prior to any surgical intervention      Erika Sandoval MD, MPH  PGY-3 General Surgery  01/22/22  7:36 PM

## 2022-01-22 NOTE — ED PROVIDER NOTES
ED Attending Attestation Note     Date of evaluation: 1/22/2022    This patient was seen by the resident. I have seen and examined the patient, agree with the workup, evaluation, management and diagnosis. The care plan has been discussed. I have reviewed the ECG and concur with the resident's interpretation. My assessment reveals 39-year-old male with past medical history of lung cancer on chemotherapy presenting with chief complaint of fatigue, shortness of breath, generalized weakness. Patient states that he required a blood transfusion due to anemia from the chemotherapy on Wednesday, got 1 unit. States symptoms reoccurred today, hemoglobin drawn by [facility] demonstrated hemoglobin of less than 7 referred to the ED. With a history of recurrent pleural effusions, some shortness of breath with exertion.   Blood require transfusion, possible admission for recurrent pleural effusions if worsening on imaging     Jenifer Colby MD  01/22/22 0711

## 2022-01-23 ENCOUNTER — APPOINTMENT (OUTPATIENT)
Dept: CT IMAGING | Age: 77
DRG: 180 | End: 2022-01-23
Payer: MEDICARE

## 2022-01-23 LAB
ANION GAP SERPL CALCULATED.3IONS-SCNC: 11 MMOL/L (ref 3–16)
BASOPHILS ABSOLUTE: 0 K/UL (ref 0–0.2)
BASOPHILS RELATIVE PERCENT: 0.1 %
BUN BLDV-MCNC: 23 MG/DL (ref 7–20)
CALCIUM SERPL-MCNC: 8.6 MG/DL (ref 8.3–10.6)
CHLORIDE BLD-SCNC: 101 MMOL/L (ref 99–110)
CO2: 23 MMOL/L (ref 21–32)
CREAT SERPL-MCNC: 1.2 MG/DL (ref 0.8–1.3)
EOSINOPHILS ABSOLUTE: 0 K/UL (ref 0–0.6)
EOSINOPHILS RELATIVE PERCENT: 0.9 %
GFR AFRICAN AMERICAN: >60
GFR NON-AFRICAN AMERICAN: 59
GLUCOSE BLD-MCNC: 208 MG/DL (ref 70–99)
GLUCOSE BLD-MCNC: 222 MG/DL (ref 70–99)
GLUCOSE BLD-MCNC: 241 MG/DL (ref 70–99)
GLUCOSE BLD-MCNC: 291 MG/DL (ref 70–99)
HCT VFR BLD CALC: 22.2 % (ref 40.5–52.5)
HCT VFR BLD CALC: 22.8 % (ref 40.5–52.5)
HEMOGLOBIN: 7.7 G/DL (ref 13.5–17.5)
HEMOGLOBIN: 7.8 G/DL (ref 13.5–17.5)
LYMPHOCYTES ABSOLUTE: 0.5 K/UL (ref 1–5.1)
LYMPHOCYTES RELATIVE PERCENT: 17.4 %
MCH RBC QN AUTO: 31.2 PG (ref 26–34)
MCHC RBC AUTO-ENTMCNC: 34.3 G/DL (ref 31–36)
MCV RBC AUTO: 90.8 FL (ref 80–100)
MONOCYTES ABSOLUTE: 0.4 K/UL (ref 0–1.3)
MONOCYTES RELATIVE PERCENT: 13.9 %
NEUTROPHILS ABSOLUTE: 1.8 K/UL (ref 1.7–7.7)
NEUTROPHILS RELATIVE PERCENT: 67.7 %
PDW BLD-RTO: 17 % (ref 12.4–15.4)
PERFORMED ON: ABNORMAL
PLATELET # BLD: 55 K/UL (ref 135–450)
PMV BLD AUTO: 7.1 FL (ref 5–10.5)
POTASSIUM SERPL-SCNC: 4.2 MMOL/L (ref 3.5–5.1)
RBC # BLD: 2.51 M/UL (ref 4.2–5.9)
SODIUM BLD-SCNC: 135 MMOL/L (ref 136–145)
VANCOMYCIN RANDOM: 14.6 UG/ML
WBC # BLD: 2.6 K/UL (ref 4–11)

## 2022-01-23 PROCEDURE — 6360000002 HC RX W HCPCS: Performed by: STUDENT IN AN ORGANIZED HEALTH CARE EDUCATION/TRAINING PROGRAM

## 2022-01-23 PROCEDURE — 2060000000 HC ICU INTERMEDIATE R&B

## 2022-01-23 PROCEDURE — 85025 COMPLETE CBC W/AUTO DIFF WBC: CPT

## 2022-01-23 PROCEDURE — 2580000003 HC RX 258: Performed by: STUDENT IN AN ORGANIZED HEALTH CARE EDUCATION/TRAINING PROGRAM

## 2022-01-23 PROCEDURE — 71260 CT THORAX DX C+: CPT

## 2022-01-23 PROCEDURE — 6370000000 HC RX 637 (ALT 250 FOR IP): Performed by: INTERNAL MEDICINE

## 2022-01-23 PROCEDURE — 87449 NOS EACH ORGANISM AG IA: CPT

## 2022-01-23 PROCEDURE — 80048 BASIC METABOLIC PNL TOTAL CA: CPT

## 2022-01-23 PROCEDURE — 6360000004 HC RX CONTRAST MEDICATION: Performed by: INTERNAL MEDICINE

## 2022-01-23 PROCEDURE — 80202 ASSAY OF VANCOMYCIN: CPT

## 2022-01-23 PROCEDURE — 85014 HEMATOCRIT: CPT

## 2022-01-23 PROCEDURE — 36591 DRAW BLOOD OFF VENOUS DEVICE: CPT

## 2022-01-23 PROCEDURE — 6370000000 HC RX 637 (ALT 250 FOR IP): Performed by: STUDENT IN AN ORGANIZED HEALTH CARE EDUCATION/TRAINING PROGRAM

## 2022-01-23 PROCEDURE — 85018 HEMOGLOBIN: CPT

## 2022-01-23 RX ORDER — FOLIC ACID 1 MG/1
1 TABLET ORAL DAILY
COMMUNITY

## 2022-01-23 RX ORDER — FOLIC ACID 1 MG/1
1 TABLET ORAL DAILY
Status: DISCONTINUED | OUTPATIENT
Start: 2022-01-23 | End: 2022-01-24 | Stop reason: HOSPADM

## 2022-01-23 RX ADMIN — HEPARIN SODIUM 5000 UNITS: 5000 INJECTION INTRAVENOUS; SUBCUTANEOUS at 21:40

## 2022-01-23 RX ADMIN — INSULIN LISPRO 4 UNITS: 100 INJECTION, SOLUTION INTRAVENOUS; SUBCUTANEOUS at 08:20

## 2022-01-23 RX ADMIN — METOPROLOL SUCCINATE 25 MG: 25 TABLET, EXTENDED RELEASE ORAL at 08:17

## 2022-01-23 RX ADMIN — SODIUM CHLORIDE, PRESERVATIVE FREE 10 ML: 5 INJECTION INTRAVENOUS at 08:25

## 2022-01-23 RX ADMIN — SODIUM CHLORIDE, PRESERVATIVE FREE 10 ML: 5 INJECTION INTRAVENOUS at 21:46

## 2022-01-23 RX ADMIN — LOSARTAN POTASSIUM 100 MG: 100 TABLET, FILM COATED ORAL at 08:18

## 2022-01-23 RX ADMIN — CEFEPIME HYDROCHLORIDE 2000 MG: 2 INJECTION, POWDER, FOR SOLUTION INTRAVENOUS at 03:22

## 2022-01-23 RX ADMIN — INSULIN GLARGINE 46 UNITS: 100 INJECTION, SOLUTION SUBCUTANEOUS at 21:39

## 2022-01-23 RX ADMIN — SODIUM CHLORIDE 100 ML: 9 INJECTION, SOLUTION INTRAVENOUS at 03:21

## 2022-01-23 RX ADMIN — FOLIC ACID 1 MG: 1 TABLET ORAL at 10:47

## 2022-01-23 RX ADMIN — INSULIN LISPRO 3 UNITS: 100 INJECTION, SOLUTION INTRAVENOUS; SUBCUTANEOUS at 21:39

## 2022-01-23 RX ADMIN — INSULIN LISPRO 4 UNITS: 100 INJECTION, SOLUTION INTRAVENOUS; SUBCUTANEOUS at 12:16

## 2022-01-23 RX ADMIN — HEPARIN SODIUM 5000 UNITS: 5000 INJECTION INTRAVENOUS; SUBCUTANEOUS at 15:00

## 2022-01-23 RX ADMIN — ATORVASTATIN CALCIUM 20 MG: 20 TABLET, FILM COATED ORAL at 08:17

## 2022-01-23 RX ADMIN — IOPAMIDOL 80 ML: 755 INJECTION, SOLUTION INTRAVENOUS at 18:36

## 2022-01-23 ASSESSMENT — ENCOUNTER SYMPTOMS
BACK PAIN: 0
COLOR CHANGE: 0
COUGH: 0
CONSTIPATION: 0
ABDOMINAL DISTENTION: 0
GASTROINTESTINAL NEGATIVE: 1
BLOOD IN STOOL: 0
PHOTOPHOBIA: 0
SHORTNESS OF BREATH: 1
CHEST TIGHTNESS: 0
EYES NEGATIVE: 1
DIARRHEA: 0
NAUSEA: 0
VOMITING: 0
RHINORRHEA: 0
ABDOMINAL PAIN: 0

## 2022-01-23 ASSESSMENT — PAIN SCALES - GENERAL
PAINLEVEL_OUTOF10: 0

## 2022-01-23 NOTE — PROGRESS NOTES
Pharmacy Note - Extended Infusion Beta-Lactam Adjustment    Cefepime ordered for treatment of Pneumonia (HAP). Per [facility] Renal Dose Adjustment Policy and Extended Infusion Beta-Lactam Policy, Cefepime will be changed to Cefepime mg every 12 hours. Estimated Creatinine Clearance: Estimated Creatinine Clearance: 56 mL/min (based on SCr of 1.2 mg/dL). Rationale for Adjustment: Agent is renally eliminated and demonstrates time-dependent effect on bacterial eradication. Extended-infusion dosing strategy aims to enhance microbiologic and clinical efficacy. Pharmacy will continue to monitor cultures and sensitivities (where available) and adjust dose as necessary. Please call with any questions.   Oscar Hernandez, PharmD  Ext [phone]

## 2022-01-23 NOTE — PROGRESS NOTES
4 Eyes Admission Assessment     I agree as the admission nurse that 2 RN's have performed a thorough Head to Toe Skin Assessment on the patient. ALL assessment sites listed below have been assessed on admission. Areas assessed by both nurses: ***  [x]   Head, Face, and Ears   [x]   Shoulders, Back, and Chest  [x]   Arms, Elbows, and Hands   [x]   Coccyx, Sacrum, and Ischium  [x]   Legs, Feet, and Heels        Does the Patient have Skin Breakdown?   No         Sigifredo Prevention initiated:  NA   Wound Care Orders initiated:  NA      [facility] nurse consulted for Pressure Injury (Stage 3,4, Unstageable, DTI, NWPT, and Complex wounds) or Sigifredo score 18 or lower:  NA      Nurse 1 eSignature: Electronically signed by Shadi Mancini RN on 1/23/22 at 6:05 AM EST    **SHARE this note so that the co-signing nurse is able to place an eSignature**    Nurse 2 eSignature: {Esignature:[id number]}

## 2022-01-23 NOTE — CONSULTS
[facility]  Consult Note      Requesting Physician: Mariana Jackson MD    CHIEF COMPLAINT:  SOB    HISTORY OF PRESENT ILLNESS:  Mr. Aleena Rich  is a 67 y.o. male we are seeing in consultation for metastatic lung cancer. Past Medical History:        Diagnosis Date    Cancer [facility])     prostate    Diabetes mellitus ([facility] [place] [address]     Hyperlipidemia     Hypertension     Kidney stone     Lung cancer ([facility] [place] [address] 10/02/2021    Stage 4    Lung cancer [facility])        Past Surgical History:        Procedure Laterality Date    BACK SURGERY  2012    lumber, no hardware, \"cleaned it out\"    CYSTOSCOPY Right 2020    CYSTOSCOPY,  RIGHT URETEROSCOPY, RIGHT RETRO PYELOGRAM, REMOVAL STONE FROM BLADDER, URETHRAL DILITATION performed by Jaskaran Segundo MD at [facility] NEPHROLITHOTOMY Left 2020    LEFT PERCUTANEOUS NEPHROLITHOTOMY WITH DR Chetna Rubi TO PLACE NEPHROSTOMY TUBE PRIOR performed by Jeanette Mcneill MD at [address] N/A 2021    SURGICAL PORT PLACEMENT performed by Bladimir Dubois MD at [address] [place] [address] Right     ROTATOR CUFF     Oncology History  (Recent Office Note)  Patient Name: Sindy Keenan  Patient : 1945  Patient MRN: [id number]    Primary Oncologist: Rashad Perez  Referring Physician: Tellis Runner, DO, Ena Reels    Date of Service: 2022      Chief Complaint  Non-small cell lung cancer (disorder) ( Stage Date: 2021, Stage IVB (Primary, Unspecified part of left bronchus or lung, TX, NX, M1c)-Clinical  Date of Dx:2021 Disease State: Initial diagnosis; Metastatic Sites: Bone; Metastatic Sites: Liver; Metastatic Sites: Pleura;  Histopathologic Type: Adenocarcinoma; ROS1 Gene: Negative; KRAS gene: G12V; RET gene mutation: RET fusion negative; BRAF Mutation: Wild-type; MET gene mutation: Not detected; PD-L1: 1-49% Expression; EGFR Expression: Negative; ALK (FISH): Negative; TRK gene: Unknown; )      Problem List  Non-small cell lung cancer (disorder) ( Stage Date: 2021, Stage IVB (Primary, Unspecified part of left bronchus or lung, TX, NX, M1c)-Clinical  Date of Dx:2021 Disease State: Initial diagnosis; Metastatic Sites: Bone; Metastatic Sites: Liver; Metastatic Sites: Pleura; Histopathologic Type: Adenocarcinoma; ROS1 Gene: Negative; KRAS gene: G12V; RET gene mutation: RET fusion negative; BRAF Mutation: Wild-type; MET gene mutation: Not detected; PD-L1: 1-49% Expression; EGFR Expression: Negative; ALK (FISH): Negative; TRK gene: Unknown; )  Agranulocytosis due to cancer chemotherapy  Anemia  Effects of immunotherapy  Malignant pleural effusion  SOBOE - Shortness of breath on exertion  Secondary malignant neoplasm of bone (disorder)  Secondary malignant neoplasm of liver (disorder)  Thrombocytopenia    HPI  Stage IV adenocarcinoma of the lun. Seen in the [facility] ER for dyspnea, 2021. He was discharged from the ER. A. Labs:  i. WBC 6.6 K/mcL, HGB 12.6 g/dL, HCT 35.9%,  K/mcL, ANC 5.2 K/mcL, MCV 90.3 fL.  ii. Creat 0.8 mg/dL, Ca 9.2 mg/dL, alb 3.8 g/dL, t bili 0.3 mg/dL. B. Imaging:  i. CTPA, 2021, showed no evidence of a PE. A large left pleural effusion was present. A 1.7 cm area of rounded atelectasis was present in the LLL. Scattered granulomas were present as well as calcified left hilar and left lower paratracheal LNs.    2. Follow up CT, 2021, showed stable findings including a large left pleural effusion. 3. Seen in consultation by Dr. Rosamaria Gorman, 2021. Quit smoking at age 39. An ultrasound-guided left-sided thoracentesis was ordered. A. Ultrasound-guided thoracentesis, 2021, yielded 1 L of clear yellow fluid. Cytology showed metastatic adenocarcinoma. IHC was consistent with a lung primary. 4. Seen for initial medical oncology consultation, 10/13/2021.   Ordered a PET/CT, MRI of the brain, and Caris tumor profiling of the cytology from the thoracentesis on 2021 was ordered. A. PET/CT, 10/21/2021, showed a 2.2 x 1.4 cm opacity in the superior segment of the left lower lobe with max SUV 8.0.  A 1.7 x 1.4 cm right hepatic lobe metastasis with peak SUV 13.6 was present. Multiple hypermetabolic bone mets involving the thoracolumbar spine and pelvis were present. B. MRI of the brain, 10/25/2021, was negative. 5. Right IJ port placed, 2021, with Dr. Arlyn Pickering. Molecular testin. Caris tumor profiling of the specimen collected on 2021 showed:  A. MSI-Stable. B. MMR proficient by IHC  C. TMB-Low (2 Muts/Mb)  D. PD-L1 (22c3, TPS) 5%. PD-L1 (28-8) 5%. PD-L1 () Neg.  E. ALEX [id number]/ (pathogenic variant)  F. FGF3 amplified  G. KRAS p.G12V (pathogenic variant)      PMHx:  1. Essential hypertension  2. Hyperlipidemia  3. DM2  4. GERD     Previous Therapies  As above. Current Therapy  Pembrolizumab + Pemetrexed + Carboplatin Q21D Cycle Length: 21 Number Cycles: 8 Start: C1D1 on 2021 Assoc Dx: Non-small cell lung cancer (disorder) LOT: 1st Line Metastatic or Recurrent Stage: IVB 2021 C4 D1  Pembrolizumab IV, 200 mg  Prochlorperazine Oral, 10 mg q6h PRN  Ondansetron Oral, 8 mg q8h PRN  Pemetrexed IV,  mg, Dose modified  Carboplatin IV,  mg, Dose modified  Sodium Chloride IV 0.9 %,  mL, Dose modified  Cyanocobalamin IM, HELD: 1000 mcg  Cyanocobalamin IM,  mcg  Dexamethasone Oral, 4 mg bid  Folic Acid Oral, 1 mg qday  Vitamin B12 with Pemetrexed Cycle Length: 1 Number Cycles: 1 Start: Santhosh Sa on 10/27/2021 Assoc Dx: Non-small cell lung cancer (disorder) LOT: Other 10/27/2021 C1 D1  Cyanocobalamin IM,  mcg  Zoledronic acid (Zometa) Q28D Cycle Length: 42 Number Cycles: 4 Start: Santhosh Lockett on 2021 Assoc Dx:  Secondary malignant neoplasm of bone (disorder) LOT: Other 2021 C3 D1  Zoledronic Acid IV (Zometa), 3 mg      Interval History  He is here today in the weekend clinic for a sick visit. He is feeling dizzy and weak. He had PRBC's on 1/18/2022 but still does not feel better. Appetite is good, he is staying hydrated. No falls. He is short of breath with exertion. Spo2 100% RA. He feels unsteady on his feet. No fevers or infectious complaints. No chest pains. No nausea, vomiting or diarrhea. No blood per patient bowels or urine. Review of Systems  Constitutional:  No weight loss, No fever, No chills, No night sweats.   Energy level fair   Eyes:  No impairment or change in vision  ENT / Mouth:  No pain, abnormal ulceration, bleeding, nasal drip or change in voice or hearing  Cardiovascular:  No chest pain, palpitations, new edema, or calf discomfort  Respiratory:  No pain, hemoptysis, change to breathing  Breast:  No pain, discharge, change in appearance or texture  Gastrointestinal:  No pain, cramping, jaundice, change to eating and bowel habits  Urinary:  No pain, bleeding or change in continence  Genitalia: No pain, bleeding or discharge  Musculoskeletal:  No redness, pain, edema or weakness  Skin:  No pruritus, rash, change to nodules or lesions  Neurologic:  No discomfort, change in mental status, speech, sensory or motor activity  Psychiatric:  No change in concentration or change to affect or mood  Endocrine:  No hot flashes, increased thirst, or change to urine production  Hematologic: No petechiae, ecchymosis or bleeding  Lymphatic:  No lymphadenopathy or lymphedema  Allergy / Immunologic:  No eczema, hives, frequent or recurrent infections      Vital Signs  Blood pressure: 122/60, Pulse: 76, Temperature: 98 F, Respirations: 16, O2 sat: 100%, At Rest, Room Air, Pain Scale: 0, Height: 68 in, Weight: 209.8 lb, BSA: 2.14, BMI: 31.9 kg/m2    Physical Exam    CONSTITUTIONAL: awake, alert, cooperative  EYES:  sclera clear and conjunctiva normal  ENT: Normocephalic, without obvious abnormality, atraumatic  NECK: supple, symmetrical, no jugular venous distension and mg/dL 198 (H) 222 (H) 284 (H)   364 (H) 306 (H)      BUN mg/dL 24 (H) 22 22   18 23 (H)      Creatinine mg/dL 1.3 (H) 1.1 1.6 (H)   1.1 1.0      Sodium mmol/L 135 140 137   141 138      Potassium mmol/L 4.5 5.3 (H) 4.9   4.3 4.2      Chloride mmol/L 108 107 103   106 107      CO2 mmol/L 25 26 30   27 28      Calcium mg/dL 9.5 8.8 9.6   9.7 9.4      Albumin g/dL 3.1 (L) 3.1 (L) 3.0 (L)   3.3 3.4      Total protein g/dL 6.6 6.6 6.7   6.7 6.8      Bilirubin, total mg/dL 0.7 0.7 0.7   0.7 0.7      Alkaline phosphatase U/L 130 (H) 128 127   132 (H) 147 (H)      AST/SGOT U/L 32 38 33   44 (H) 32      ALT/SGPT U/L 37 35 28   62 (H) 40      GFR non-African American, estimated mL/min/1.73m2 53.5 (L) >60.0 42.1 (L)   >60.0 >60.0      GFR African American, estimated mL/min/1.73m2 >60.0 >60.0 51.0 (L)   >60.0 >60.0    Lab Results 01/22/2022 01/18/2022 01/10/2022 01/05/2022 12/20/2021 11/29/2021    Anemia Labs         Iron / FE ug/dL 78      TIBC ug/dL 253      Ferritin ng/mL 207.2      Iron, % saturation % 31      Vitamin B12 pg/mL 313      Folate, serum ng/mL 24.00    Imaging  CT CHEST PULMONARY EMBOLISM W CONTRAST (5/28/2021): Impression  1. Partially limited evaluation for subsegmental pulmonary emboli near the  left lung base due to motion. Within this limitation, no findings of  pulmonary embolism are identified  2. Minimal to mild interstitial edema potentially due to congestive heart  failure given mild cardiomegaly. Asymmetric moderate to large left pleural  effusion may be related. 3. Patchy nodular groundglass opacities predominantly the bilateral upper  lobes measure up to 0.8 cm and could represent areas of alveolar edema. A  1.0 cm area of mixed nodular groundglass and consolidative opacity in the  left upper lobe may be related. Alternatively, this could be related to an  infectious, inflammatory, or neoplastic process. Recommend follow-up chest  CT in 3-6 months as below.   4. 1.7 cm nodular consolidative opacity in the right lower lobe potentially  due to rounded atelectasis, an infectious or inflammatory process, or  neoplasia. Recommend follow-up chest CT in 3 months as below. 5. Mild bronchial wall thickening potentially due to pulmonary vascular  congestion, reactive airways disease, or bronchitis. CT CHEST WO CONTRAST (9/01/2021): Impression  1. Stable bilateral upper lobe nodules, the largest a 1.2 cm sub solid nodule  in the left upper lobe. Recommend follow-up chest CT in 1 year  2. Persistent large left pleural effusion. Stable rounded opacity in the  superior segment of the left lower lobe strongly favored to represent rounded  atelectasis related to adjacent pleural disease      PET/CT (10/21/2021): IMPRESSION:  1. Hypermetabolic ovoid nodular opacity in the superior segment left lower lobe  consistent with primary malignancy marginating the left hilum. 3.  Hypermetabolic right neck lesion likely metastatic disease, favored over primary  malignancy. 3.  Numerous hypermetabolic osseous lesions consistent with metastatic disease. 4.  Moderate left pleural effusion  5. Renal calculi. MRI BRAIN W WO CONTRAST (10/25/2021): Impression  1. No acute intracranial abnormality. No acute infarct. 2. No evidence of intracranial metastatic disease. 3. Mild global parenchymal volume loss with mild chronic microvascular  ischemic changes. 4. Mucosal thickening of the right maxillary sinus. OCM - Patient Care Management    Pain, if applicable:        Performance Status: ECOG 2 In bed <50% of the time. Ambulatory and capable of all self-care, but unable to carry out any work activities. Up and about more than 50% of waking hours. (Date: 01/22/2022)     Depression Status: Was screened;  Outcome positive: No; Screening Date: 01/10/2022; Screening Tool: Patient Health Questionnaire (PHQ9)    Psycho-social PHQ-9 Follow-up Plan (if applicable): NA    Research    Would you like this patient screened for clinical trial eligibility? If yes, please enter the order for \"Research: Screen for Eligibility\"    Assessment & Plan      1. Pancytopenia. He is here today in the weekend clinic with continued weakness and dizziness. No syncopal episodes. No fevers. No obvious bleeding. He received PRBC's on 1/18/2022 for a HGB of 6.8. He is HGB 6.6 today and symptomatic. PLT 55. WBC 2.6.  BP stable. SPO2 100% RA. Related to time constraints with the infusion center he was sent to the ER for PRBC's and further work up. Charge RN notified in the ER. I felt he was unsafe to go home and return tomorrow, he was unsteady upon rising and attempting to take one step. His wife is with him. 2. Stage IV adenocarcinoma of the LLL of the lung - This patient was seen for dyspnea in the [facility] ER on 5/28/2021. A CTPA at that time showed no evidence of a PE. A large left-sided pleural effusion was present. A 1.7 cm area of rounded atelectasis in the LLL was present. Scattered granulomas were present as well as calcified left hilar and left lower lobe paratracheal lymph nodes. He was discharged home from the ER. A follow-up CT of the chest on 9/01/2021 showed stable findings. As a consequence, he was seen in consultation by Dr. Paco Eden on 9/27/2021 and an ultrasound-guided paracentesis was ordered. A left-sided ultrasound-guided paracentesis was performed on 9/28/2021, which yielded 1 L of clear yellow fluid. Cytology showed a metastatic adenocarcinoma. IHC was consistent with a lung primary. A PET/CT on 10/21/2021 showed a 2.2 x 1.4 cm opacity in the superior segment of the left lower lobe with max SUV 8.0.  A 1.7 x 1.4 cm right hepatic lobe metastasis with peak SUV 13.6 was present. Multiple hypermetabolic bone mets involving the thoracolumbar spine and pelvis were present. An MRI of the brain on 10/25/2021 was negative. He started Carbo/Alimta/Pembro 11/8/21.      3. Prevention of SRE - Started Zometa 11/08/2021.    4. Malignant left-sided pleural effusion - PRN thoracentesis for now. So far, he has had a thoracentesis on 9/28 (1L), 11/02/2021 (1.2L). 1/5/2022 (1.5 L). He has crackles left lung mid-base. Assess in the ER. Time Based Itemization    A total of 40 minutes was spent on today's patient encounter. If applicable, non-patient-facing activities:     (  x )   Preparing to see the patient and reviewing records     (  x )   Individual interpretation of results      (  x )   Discussion or coordination of care with other health care professionals- ER, G2      ( x  )   Ordering of unique tests, medications, or procedures     ( x  )   Documentation within the EHR   . Recent imaging and labs were reviewed and discussed with the patient. Return to clinic:  Follow up after ER. Patient was instructed to stop at our  to make their next appointment before leaving. They will call in the interim with any questions/concerns/new symptoms. This plan was discussed with the patient and they verbalized understanding and acceptance of the plan. No MD was available for consultation for this visit. I have recommended that the patient follow CDC guidelines for prevention of COVID-19 infection. Rosalba Mckeon NP  [facility], [facility]  Phone: [phone]  Fax: [phone]  Online: [email or web address]     Note Recipient:  Yuridia Zarate (Referring)  [address] [facility] [address] [place],[facility] [address] MD Gerry Swain DO (Referring)    Electronically signed by Jules Capellan MD 01/22/2022 16:56 EST      Current Medications:  Current Facility-Administered Medications: 0.9 % sodium chloride infusion, , IntraVENous, PRN  sodium chloride flush 0.9 % injection 5-40 mL, 5-40 mL, IntraVENous, 2 times per day  sodium chloride flush 0.9 % injection 5-40 mL, 5-40 mL, IntraVENous, PRN  0.9 % sodium chloride infusion, 25 mL, IntraVENous, PRN  ondansetron (ZOFRAN-ODT) disintegrating tablet 4 mg, 4 mg, Oral, Q8H PRN **OR** ondansetron (ZOFRAN) injection 4 mg, 4 mg, IntraVENous, Q6H PRN  polyethylene glycol (GLYCOLAX) packet 17 g, 17 g, Oral, Daily PRN  acetaminophen (TYLENOL) tablet 650 mg, 650 mg, Oral, Q6H PRN **OR** acetaminophen (TYLENOL) suppository 650 mg, 650 mg, Rectal, Q6H PRN  cefepime (MAXIPIME) 2000 mg IVPB minibag, 2,000 mg, IntraVENous, Q12H  heparin (porcine) injection 5,000 Units, 5,000 Units, SubCUTAneous, 3 times per day  glucose (GLUTOSE) 40 % oral gel 15 g, 15 g, Oral, PRN  glucagon (rDNA) injection 1 mg, 1 mg, IntraMUSCular, PRN  dextrose 5 % solution, 100 mL/hr, IntraVENous, PRN  insulin lispro (1 Unit Dial) 0-12 Units, 0-12 Units, SubCUTAneous, TID WC  insulin lispro (1 Unit Dial) 0-6 Units, 0-6 Units, SubCUTAneous, Nightly  insulin glargine (LANTUS;BASAGLAR) injection pen 46 Units, 46 Units, SubCUTAneous, Nightly  dextrose bolus (hypoglycemia) 10% 250 mL, 250 mL, IntraVENous, PRN  atorvastatin (LIPITOR) tablet 20 mg, 20 mg, Oral, Daily  losartan (COZAAR) tablet 100 mg, 100 mg, Oral, Daily  metoprolol succinate (TOPROL XL) extended release tablet 25 mg, 25 mg, Oral, Daily  vancomycin (VANCOCIN) 1,500 mg in dextrose 5 % 250 mL IVPB, 1,500 mg, IntraVENous, Q24H    Allergies:  Patient has no known allergies.     Social History:  Social History     Socioeconomic History    Marital status:      Spouse name: Not on file    Number of children: Not on file    Years of education: Not on file    Highest education level: Not on file   Occupational History    Not on file   Tobacco Use    Smoking status: Former Smoker     Start date: 3/22/1960     Quit date: 1976     Years since quittin.1    Smokeless tobacco: Never Used   Vaping Use    Vaping Use: Never used   Substance and Sexual Activity    Alcohol use: No    Drug use: No    Sexual activity: Not on file   Other Topics Concern    Not on file   Social History Narrative    Not on file     Social Determinants of Health     Financial Resource Strain:     Difficulty of Paying Living Expenses: Not on file   Food Insecurity:     Worried About Running Out of Food in the Last Year: Not on file    Ran Out of Food in the Last Year: Not on file   Transportation Needs:     Lack of Transportation (Medical): Not on file    Lack of Transportation (Non-Medical): Not on file   Physical Activity:     Days of Exercise per Week: Not on file    Minutes of Exercise per Session: Not on file   Stress:     Feeling of Stress : Not on file   Social Connections:     Frequency of Communication with Friends and Family: Not on file    Frequency of Social Gatherings with Friends and Family: Not on file    Attends [employer] Services: Not on file    Active Member of [address] [place] [address] [email or web address] or Organizations: Not on file    Attends Club or Organization Meetings: Not on file    Marital Status: Not on file   Intimate Partner Violence:     Fear of Current or Ex-Partner: Not on file    Emotionally Abused: Not on file    Physically Abused: Not on file    Sexually Abused: Not on file   Housing Stability:     Unable to Pay for Housing in the Last Year: Not on file    Number of [place] in the Last Year: Not on file    Unstable Housing in the Last Year: Not on file       Family History:     History reviewed. No pertinent family history. REVIEW OF SYSTEMS:    Review of Systems   Constitutional: Positive for fatigue (improved). Negative for fever. HENT: Negative. Eyes: Negative. Respiratory: Positive for shortness of breath (improved). Cardiovascular: Negative. Gastrointestinal: Negative. Genitourinary: Negative. Musculoskeletal: Negative. Neurological: Negative. Hematological: Negative.          PHYSICAL EXAM:      Vitals:  BP (!) 159/74   Pulse 79   Temp 98.6 °F (37 °C) (Oral)   Resp 20   Ht 5' 7\" (1.702 m)   Wt 204 lb (92.5 kg)   SpO2 97%   BMI 31.95 kg/m²       Intake/Output Summary (Last 24 hours) at 1/23/2022 0835  Last data filed at 1/23/2022 0322  Gross per 24 hour   Intake 660.83 ml   Output 200 ml   Net 460.83 ml       Physical Exam  Constitutional:       General: He is not in acute distress. Eyes:      General: No scleral icterus. Cardiovascular:      Rate and Rhythm: Normal rate and regular rhythm. Heart sounds: No murmur heard. No gallop. Pulmonary:      Breath sounds: No wheezing or rales. Abdominal:      Palpations: Abdomen is soft. Tenderness: There is no abdominal tenderness. Musculoskeletal:         General: No swelling. Lymphadenopathy:      Cervical: No cervical adenopathy. Skin:     General: Skin is warm and dry. Findings: No rash. Neurological:      General: No focal deficit present. Mental Status: He is alert and oriented to person, place, and time.          DATA:  Recent Labs     01/23/22  0332 01/22/22  1326 01/05/22  1217   WBC 2.6* 2.7*  --    NEUTROABS 1.8 1.8  --    LYMPHOPCT 17.4 16.2  --    RBC 2.51* 2.24*  --    HGB 7.8* 7.0*  --    HCT 22.8* 20.6*  --    MCV 90.8 92.2  --    MCH 31.2 31.1  --    MCHC 34.3 33.8  --    RDW 17.0* 17.3*  --    PLT 55* 57* 105*       Lab Results   Component Value Date     (L) 01/23/2022    K 4.2 01/23/2022     01/23/2022    CO2 23 01/23/2022    GLUCOSE 222 (H) 01/23/2022    BUN 23 (H) 01/23/2022    CREATININE 1.2 01/23/2022    LABGLOM 59 (A) 01/23/2022    GFRAA >60 01/23/2022    CALCIUM 8.6 01/23/2022    PROT 7.3 10/18/2021    LABALBU 4.3 10/18/2021    AGRATIO 1.4 10/18/2021    BILITOT 0.7 10/18/2021    ALKPHOS 179 (H) 10/18/2021    ALT 24 10/18/2021    AST 27 10/18/2021    GLOB 3.0 10/18/2021    MG 1.70 (L) 02/12/2018       Lab Results   Component Value Date    PROT 7.3 10/18/2021    PROT 6.9 05/28/2021    PROT 7.2 11/16/2020    INR 1.14 (H) 01/22/2022    INR 1.06 01/05/2022    INR 1.04 11/02/2021     No results found for: APTT    TUMOR MARKERS: No results found for: PSA, CEA, , RS9314,       Radiology Review:  XR CHEST (2 VW)    Result Date: 1/3/2022  EXAM: XR Chest, 2 Views EXAM DATE/TIME: 1/3/2022 1:37 pm CLINICAL HISTORY: ORDERING SYSTEM PROVIDED  Non-small cell lung cancer, unspecified laterality ([facility] [address]  TECHNOLOGIST PROVIDED HISTORY:  Reason for Exam: lung ca, sob, eval for chronic fluid in left lung TECHNIQUE: Frontal and lateral views of the chest. COMPARISON: 12/20/2021 FINDINGS: Limitations:  A suboptimal inspiration limits the study. Lungs:  See below. Pleural space:  Left basilar opacity suggest probable pleural effusion with left basilar lung atelectasis and not significantly changed. New masslike opacity in the lateral left mid lung field likely represents loculated pleural fluid or fluid within the fissure given the short interval since the prior study and appearance on lateral view. No pneumothorax. Heart:  No acute findings. No cardiomegaly. Mediastinum:  No acute findings. Bones/joints:  No acute findings. Tubes, lines and devices:  Right-sided chest port is unchanged. 1.  Left basilar opacity suggest probable pleural effusion with left basilar lung atelectasis and not significantly changed. 2.  New masslike opacity in the lateral left mid lung field likely represents loculated pleural fluid or fluid within the fissure given the short interval since the prior study and appearance on lateral view. CT CHEST WO CONTRAST    Result Date: 1/22/2022  EXAM: CT CHEST WITHOUT CONTRAST INDICATION: Loculated pleural effusion COMPARISON: None TECHNIQUE: CT of the chest was performed without contrast. Axial images, multiplanar reformatted images and axial maximum intensity projection images provided for review. Up-to-date CT equipment and radiation dose reduction techniques were employed. CONTRAST: None. FINDINGS: There is a moderate loculated pleural effusion along the posterolateral left upper chest and left fissure as well as along the left base. There is mild left lower lobe atelectasis. No focal pulmonary nodule is seen. There is no pneumothorax.  Right IJ approach port terminates in the distal SVC. The heart size is normal without pericardial effusion. The trachea is patent and midline. The nonenhanced mediastinal vascular structures are unremarkable. No mediastinal lymphadenopathy is seen. The osseous thorax is intact. Numerous small sclerotic lesions are present along the bilateral ribs and thoracic spine, most prominently along the left seventh lateral rib and T6 and L2 vertebral bodies likely representing metastatic disease. There is partially is mild left hydronephrosis. A 1.3 cm hyperdense lesion in the right hepatic lobe is suspicious for metastatic disease. 1.  Moderate loculated left pleural effusion at the left lung base and along the left upper lobe. This may represent a malignant effusion and/or metastatic disease. 2.  Widespread sclerotic lesions throughout the visualized osseous structures likely representing metastatic disease. 3.  Mild left hydronephrosis, partially imaged. 4.  1.3 cm hypodense lesion in the right hepatic lobe highly suspicious for metastatic disease. XR CHEST PORTABLE    Result Date: 1/22/2022  History: Evaluate pleural effusion. AP chest. COMPARISON: None. FINDINGS: Right-sided power injectable port with tip in the midsuperior vena cava. Right lung is clear. Blunting left costophrenic angle compatible with left-sided effusion. Opacity along the left lateral chest along the pleura suggesting a loculated effusion or pleural mass. Left basilar opacity which may reflect fluid along the fissure or left basilar atelectasis or pneumonia. 1. Left-sided effusion with possible loculation along left lateral chest. Associated left lower lung airspace opacity. XR CHEST PORTABLE    Result Date: 1/5/2022  EXAMINATION: ONE XRAY VIEW OF THE CHEST 1/5/2022 1:21 pm COMPARISON: Chest radiograph January 3, 2022 and priors.  HISTORY: ORDERING SYSTEM PROVIDED HISTORY: Malignant neoplasm of overlapping sites of left bronchus and lung [facility]) TECHNOLOGIST PROVIDED HISTORY: Reason for Exam: post thoracentesis FINDINGS: Left-sided pleural effusion is decreased status post thoracentesis. Persistent small left pleural effusion and left basilar consolidation is again seen. No pneumothorax. Right lung remains clear. Right internal jugular central venous catheter tip overlies the upper SVC. Cardiac and mediastinal contours are without acute process. No acute osseous abnormality. No evidence of pneumothorax status post left thoracentesis. US THORACENTESIS Which side should the procedure be performed? Left    Result Date: 1/5/2022  PROCEDURE: Nichelle Lord LEFT THORACENTESIS 1/5/2022 HISTORY: ORDERING SYSTEM PROVIDED HISTORY: Malignant neoplasm of overlapping sites of left bronchus and lung [facility]) TECHNOLOGIST PROVIDED HISTORY: Which side should the procedure be performed? ->Left Reason for exam:-> increased SOB with exertion TECHNIQUE: Informed consent was obtained after a detailed explanation of the procedure including risks, benefits, and alternatives. Universal protocol was performed. The left chest was prepped and draped in sterile fashion and local anesthesia was achieved with lidocaine. A 5 Polish needle sheath was advanced under ultrasound guidance into pleural effusion and thoracentesis was performed. The patient tolerated the procedure well. FINDINGS: A total of 1.5 L shahriar fluid was removed. Successful ultrasound guided left thoracentesis.          Problem List  Patient Active Problem List   Diagnosis    Transient global amnesia    Acute encephalopathy    Hemispheric carotid artery syndrome    Uncontrolled type 2 diabetes mellitus with complication, without long-term current use of insulin (HCC)    HTN (hypertension), benign    Dyslipidemia    Urolithiasis    SOB (shortness of breath)    Left renal stone    Lung cancer ([facility] [address]    Shortness of breath         IMPRESSION/RECOMMENDATIONS:  Metastatic lung cancer  - Bone and liver metastases; malignant pleural effusion  - Initial metastatic therapy with Carbo/Alimta/Pembrolizumab. Cycle 4 was delivered Dalton 10, 2022. He is currently pancytopenic emerging from his sugar. He did not receive Neulasta. - Current scans not compared to previous (Sept 2021 CT; Oct 2021 PET)  - Need comparison for an accurate assessment of response to treatment    Pleural Effusion  - Malignant; secondary to metastatic lung cancer  - Contributing to dyspnea  - He has had 3 previous thoracenteses, most recent Jan 5, 2022  - He may benefit from PleurX vs VATS w/ PleurX    Pancytopenia  - Secondary to chemotherapy  - Did not receive Neulasta  - Not currently neutropenic ([address])  - He appears to be emerging from his sugar  - Transfuse for Hgb less than 7    ID  - Cultures pending; pyuria and nitrites in urine  - Receiving empiric Cefepime    Discussed with patient. Thank you for the consultation.     Percy Severino MD  1/23/2022  8:35 AM

## 2022-01-23 NOTE — PROGRESS NOTES
Progress Note    Admit Date: 1/22/2022  Day: 2  Diet: ADULT DIET; Regular    CC: SOB    Interval history: NAEON. Patient received a unit of PRBCs with improvement of symptoms. Currently patient still getting SOB on ambulation, no SOB at rest. Lung sounds distant over left lung. Patient prefers to have thoracentesis over PleurX. Patient is aware that his effusion is likely to re accumulate. HPI: Bambi Reeder is a 77M with a PMH significant for lung cancer actively undergoing chemotherapy, chronic anemia, hypertension, HLD, DMT2 who presents with SOB in the setting of anemia. On Wednesday last week he received a blood transfusion. And he was feeling well for 24-48 hours until his SOB returned. Endoreses lightheadedness. Patient was seen at [facility] ER and had a hgb of 6.8. Denies dark, tarry stools, hematemesis, emesis, nausea, syncope, CP, fever.      In the ED, Patient had Hgb of 7, but due to being symptomatic was given 1unit pRBC. CXR obtained which reveals loculation pleural effusion, leading to obtaining a CT without contrast obtained which showed evidence of moderate loculated fluid. Patient is leukopenic and with the fluid collection, concern for pneumonia, patient was started with IV cefepime and vancomycin. Consulted IR who recommended discussion for chest tube with general surgery. Not emergent at this time for chest tube placement. Patient to be admitted for further evaluation and management.      Medications:     Scheduled Meds:   sodium chloride flush  5-40 mL IntraVENous 2 times per day    cefepime  2,000 mg IntraVENous Q12H    heparin (porcine)  5,000 Units SubCUTAneous 3 times per day    insulin lispro  0-12 Units SubCUTAneous TID     insulin lispro  0-6 Units SubCUTAneous Nightly    insulin glargine  46 Units SubCUTAneous Nightly    atorvastatin  20 mg Oral Daily    losartan  100 mg Oral Daily    metoprolol succinate  25 mg Oral Daily    vancomycin  1,500 mg IntraVENous Q24H     Continuous Infusions:   sodium chloride      sodium chloride 100 mL (01/23/22 0321)    dextrose       PRN Meds:sodium chloride, sodium chloride flush, sodium chloride, ondansetron **OR** ondansetron, polyethylene glycol, acetaminophen **OR** acetaminophen, glucose, glucagon (rDNA), dextrose, dextrose bolus (hypoglycemia)    Objective:   Vitals:   T-max:  Patient Vitals for the past 8 hrs:   BP Temp Temp src Pulse Resp SpO2   01/23/22 0314 (!) 167/74 97.9 °F (36.6 °C) Oral 82 16 97 %   01/22/22 2320 (!) 155/79 97.5 °F (36.4 °C) Oral 86 20 98 %       Intake/Output Summary (Last 24 hours) at 1/23/2022 0715  Last data filed at 1/23/2022 0322  Gross per 24 hour   Intake 660.83 ml   Output 200 ml   Net 460.83 ml       Review of Systems   Constitutional: Negative for appetite change, chills, diaphoresis and fatigue. HENT: Negative for congestion, ear pain, rhinorrhea and tinnitus. Eyes: Negative for photophobia and visual disturbance. Respiratory: Positive for shortness of breath. Negative for cough and chest tightness. Cardiovascular: Negative for chest pain, palpitations and leg swelling. Gastrointestinal: Negative for abdominal distention, abdominal pain, blood in stool, constipation, diarrhea, nausea and vomiting. Genitourinary: Negative for difficulty urinating and dysuria. Musculoskeletal: Negative for arthralgias, back pain, joint swelling and myalgias. Skin: Negative for color change, pallor and rash. Neurological: Negative for dizziness, facial asymmetry, weakness, light-headedness, numbness and headaches. Psychiatric/Behavioral: Negative for agitation and confusion. The patient is not nervous/anxious. Physical Exam  Constitutional:       General: He is not in acute distress. Appearance: Normal appearance. He is not ill-appearing or toxic-appearing. HENT:      Head: Normocephalic and atraumatic.       Right Ear: External ear normal.      Left Ear: External ear normal.      Nose: Nose normal. No rhinorrhea. Mouth/Throat:      Mouth: Mucous membranes are moist.   Eyes:      General: No scleral icterus. Extraocular Movements: Extraocular movements intact. Pupils: Pupils are equal, round, and reactive to light. Cardiovascular:      Rate and Rhythm: Normal rate and regular rhythm. Pulses: Normal pulses. Heart sounds: Normal heart sounds. No murmur heard. Pulmonary:      Effort: Pulmonary effort is normal. No respiratory distress. Breath sounds: No stridor. No wheezing, rhonchi or rales. Comments: Lung sounds distant over left lung  Abdominal:      General: Abdomen is flat. There is no distension. Palpations: Abdomen is soft. Tenderness: There is no abdominal tenderness. There is no guarding or rebound. Musculoskeletal:         General: No swelling. Normal range of motion. Cervical back: Normal range of motion. No rigidity. Right lower leg: Edema present. Left lower leg: Edema present. Lymphadenopathy:      Cervical: No cervical adenopathy. Skin:     General: Skin is warm and dry. Capillary Refill: Capillary refill takes less than 2 seconds. Coloration: Skin is not jaundiced or pale. Findings: No bruising. Neurological:      Mental Status: He is alert and oriented to person, place, and time. Cranial Nerves: No cranial nerve deficit. Sensory: No sensory deficit. Motor: No weakness. Gait: Gait normal.   Psychiatric:         Mood and Affect: Mood normal.         Behavior: Behavior normal.         Thought Content:  Thought content normal.         Judgment: Judgment normal.         LABS:    CBC:   Recent Labs     01/22/22  1326 01/23/22  0332   WBC 2.7* 2.6*   HGB 7.0* 7.8*   HCT 20.6* 22.8*   PLT 57* 55*   MCV 92.2 90.8     Renal:    Recent Labs     01/22/22  1600 01/23/22  0333   * 135*   K 4.1 4.2    101   CO2 24 23   BUN 25* 23*   CREATININE 1.2 1.2   GLUCOSE 89 222*   CALCIUM 9.0 8.6 ANIONGAP 9 11       Cultures: SARS-CoV2 not detected by NAAT; Legionella Ag pending; BCx pending    -----------------------------------------------------------------       RAD:   CT CHEST WO CONTRAST   Final Result      1. Moderate loculated left pleural effusion at the left lung base and along the left upper lobe. This may represent a malignant effusion and/or metastatic disease. 2.  Widespread sclerotic lesions throughout the visualized osseous structures likely representing metastatic disease. 3.  Mild left hydronephrosis, partially imaged. 4.  1.3 cm hypodense lesion in the right hepatic lobe highly suspicious for metastatic disease. XR CHEST PORTABLE   Final Result   1. Left-sided effusion with possible loculation along left lateral chest. Associated left lower lung airspace opacity. Assessment/Plan:   Saadia Sebastian is a 67 y.o. male with a PMH significant for lung cancer actively undergoing chemotherapy, chronic anemia, hypertension, HLD and DMT2 who presented with Shortness of Breath. Shortness of Breath  Loculated L pleural effusion 2/2 malignancy vs chemotherapy vs low suspicion infectious  Hx NSCLC of lung with recurrent pleural effusion  CXR reveals loculation pleural effusion. CT without contrast obtained which showed evidence of L sided moderate loculated fluid. Widespread sclerotic lesions deangelo of metastasis disease. R hepatic lobe suspicious of metastatic disease. Very low index of suspicion for PNA - lungs sound clear, though left lung sounds are distant. The patient does not appear acutely ill. Procalcitonin 0.12.  - Discontinue Vancomycin and Cefepime  - BCx2, legionella, Strep pneumo pending  - IR consulted, to perform thoracentesis  - HemeOnc Consulted, appreciate recs. follows with Dr. Aniceto [facility]     Acute on Chronic Normocytic Anemia  Pancytopenia 2/2 likely chemotherapy  Hgb of 7, 6.8 per outside lab studies. WBC at 2.7 on presentation.  Given 1u pRBC d/t SOB.  - Continue to monitor q8hr H&H, transfuse hgb<8 and symptomatic.   - Continue to monitor daily labs     HTN  - Continue home losartan, metoprolol     HLD   - Continue home Statin     Diabetes Mellitus Type 2  Holding home DM medications  - 46u Lantus   - MDSSI  - Hypoglycemia protocol   - POCT glucose check q6hr AC/HS       Code Status:Full code  FEN: adult regular diet, 3cc  PPX: SQH  DISPO: MARY Muller MD, PGY-1  01/23/22  7:15 AM    This patient has been staffed and discussed with Dr. Rosalia Kramer MD.

## 2022-01-23 NOTE — CONSULTS
Clinical Pharmacy Progress Note    Vancomycin has been discontinued. Pharmacy will sign off. Please re-consult pharmacy if Vancomycin dosing is wanted in the future. Thanks for consulting pharmacy!   Oskar Raines PharmD  Pharmacy Resident   Please call with questions [phone]  1/23/2022 1:18 PM

## 2022-01-23 NOTE — PLAN OF CARE
Problem: Falls - Risk of:  Goal: Will remain free from falls  Description: Will remain free from falls  Outcome: Ongoing  Note: Orthostatic vital signs obtained at start of shift - see flowsheet for details. Pt does not meet criteria for orthostasis. Pt is a High fall risk. See Marlyce Gander Fall Score and ABCDS Injury Risk assessments.   + Screening for Orthostasis and/or + High Fall Risk per LAGUERRE/ABCDS: Explained fall risk precautions to pt and family and rationale behind their use to keep the patient safe. Pt bed is in low position, side rails up, call light and belongings are in reach. Fall wristband applied and present on pts wrist.  Bed alarm on. Pt encouraged to call for assistance. Will continue with hourly rounds for PO intake, pain needs, toileting and repositioning as needed. Problem: Bleeding:  Goal: Will show no signs and symptoms of excessive bleeding  Description: Will show no signs and symptoms of excessive bleeding  Outcome: Ongoing  Note: Patient's hemoglobin this AM:   Recent Labs     01/23/22  0332   HGB 7.8*     Patient's platelet count this AM:   Recent Labs     01/23/22  0332   PLT 55*    Thrombocytopenia Precautions in place. Patient showing no signs or symptoms of active bleeding. Transfusion not indicated at this time. Patient verbalizes understanding of all instructions. Will continue to assess and implement POC. Call light within reach and hourly rounding in place. Problem: Infection - Central Venous Catheter-Associated Bloodstream Infection:  Goal: Will show no infection signs and symptoms  Description: Will show no infection signs and symptoms  Outcome: Ongoing  Note: CVC site remains free of signs/symptoms of infection. No drainage, edema, erythema, pain, or warmth noted at site. Dressing changes continue per protocol and on an as needed basis - see flowsheet.           Problem: Discharge Planning:  Goal: Discharged to appropriate level of care  Description: Discharged to appropriate level of care  Outcome: Ongoing  Note: Pt aware of current POC. Will continue to monitor. Problem: Breathing Pattern - Ineffective:  Goal: Ability to achieve and maintain a regular respiratory rate will improve  Description: Ability to achieve and maintain a regular respiratory rate will improve  Note: Pt remains >90% SPO2 on room air with spot checks. Pt exhibits dyspnea upon movement. Will continue to monitor.

## 2022-01-23 NOTE — PROGRESS NOTES
Clinical Pharmacy Progress Note    Vancomycin - Management by Pharmacy    Consult Date(s): 1/22/22  Consulting Provider(s): Mj Dick Do    Assessment / Plan    HAP - Vancomycin   Concurrent Antimicrobials: Cefepime   Day of Vanc Therapy: 1   Current Dosing Method: Bayesian-Guided AUC Dosing   Therapeutic Goal: 400-600 mg/L*hr   Current Dose / Frequency: N/A   Plan / Rationale:  o Will order vancomycin 1500 mg IV Q24H scheduled tomorrow at 2000.  o Patient received vancomycin 1500 mg IV at 2000 on 1/22 while in ED.  o Bayesian-guided software predicts  mg/L*hr and trough 15.6 mg/L on this regimen.  Will continue to monitor clinical condition and make adjustments to regimen as appropriate. Thank you for consulting Pharmacy! Scar Duron - PharmD Candidate [id number] [id number]  1/22/2022 8:51 PM        Interval update:     Subjective/Objective: Mr. Tomasz Ramachandran is a 67 y.o. male with a PMHx significant for lung cancer (actively undergoing chemo), chronic anemia, HTN, HLD, and T2DM, admitted for shortness of breath w/ anemia. Pharmacy has been consulted to manage vancomycin. Height:   Ht Readings from Last 1 Encounters:   01/22/22 5' 7\" (1.702 m)     Weight:   Wt Readings from Last 1 Encounters:   01/22/22 204 lb (92.5 kg)       Current & Prior Antimicrobial Regimen(s):     Cefepime 2 g Q12H    Level(s) / Doses:    Date Time Dose Level / Type of Level Interpretation                 Note: Serum levels collected for AUC-based dosing may be high if collected in close proximity to the dose administered. This is not necessarily an indicator of toxicity. Cultures & Sensitivities:    Date Site Micro Susceptibility / Result   1/22 Nasal COVID-19, Rapid Not detected   1/22 Blood x 2  In process   1/22 Nasal MRSA DNA swab In process     Labs / Ancillary Data:    Estimated Creatinine Clearance: 56 mL/min (based on SCr of 1.2 mg/dL).     Recent Labs     01/22/22  1326 01/22/22  1600   CREATININE --  1.2   BUN  --  25*   WBC 2.7*  --        Additional Lab Values / Findings of Note:    Procalcitonin:   Recent Labs     01/22/22  1600   PROCAL 0.12

## 2022-01-23 NOTE — PROGRESS NOTES
Cardiothoracic Surgery   Daily Progress Note  Patient: Saul Gloria      CC: SOB in setting of anemia and known small loculated pleural effusion    SUBJECTIVE:   Patient rested well overnight. Patient denies any SOB, afebrile and HDS.     ROS:   A 14 point review of systems was conducted, significant findings as noted above. All other systems negative. OBJECTIVE:    PHYSICAL EXAM:    Vitals:    01/22/22 2030 01/22/22 2112 01/22/22 2320 01/23/22 0314   BP: (!) 142/59 (!) 176/60 (!) 155/79 (!) 167/74   Pulse: 85 88 86 82   Resp: 14 16 20 16   Temp:  98.2 °F (36.8 °C) 97.5 °F (36.4 °C) 97.9 °F (36.6 °C)   TempSrc:  Oral Oral Oral   SpO2: 98% 98% 98% 97%   Weight:       Height:           General Appearance: Alert, no acute distress  HEENT: Normocephalic/atraumatic, no scleral icterus, EOM grossly intact, trachea midline, airway patent, neck supple  Chest/Lungs: Normal effort, breathing room air, no conversational dyspnea, dullness to percussion of left lateral chest wall  Cardiovascular: Regular rate and rhythm, hypertensive   Abdomen: Soft, non-tender, non-distended  Skin: warm and dry, no rashes  Extremities: no edema, no cyanosis  Neuro: A&Ox3, no gross focal deficits, sensation intact    LABS:   Recent Labs     01/22/22  1326 01/23/22  0332   WBC 2.7* 2.6*   HGB 7.0* 7.8*   HCT 20.6* 22.8*   MCV 92.2 90.8   PLT 57* 55*        Recent Labs     01/22/22  1600 01/23/22  0333   * 135*   K 4.1 4.2    101   CO2 24 23   BUN 25* 23*   CREATININE 1.2 1.2      No results for input(s): AST, ALT, ALB, BILIDIR, BILITOT, ALKPHOS in the last 72 hours. No results for input(s): LIPASE, AMYLASE in the last 72 hours. Recent Labs     01/22/22  1600   INR 1.14*      No results for input(s): CKTOTAL, CKMB, CKMBINDEX, TROPONINI in the last 72 hours.       ASSESSMENT & PLAN:   This is a 67 y.o. male with stage 4 non-small cell lung cancer for which he is undergoing chemotherapy with pembrolizumab, pemetrexed, and carboplatin (last treatment on 1/10/22) and has undergone thoracentesis on three previous occasions for recurrent malignant pleural effusions (9/28/21, 11/2/21, and 1/5/22) who presents today secondary to shortness of breath and fatigue since his last chemotherapy treatment. The patient was found to have a recurrent loculated pleural effusion on non-contrast CT of the chest performed by the ED for which Cardiothoracic Surgery has been consulted to evaluate for chest tube placement.     - No indication for emergent or urgent chest tube placement, as the patient is saturating 100% on room air.   - Given recurrent nature of malignant pleural effusions and loculation will discuss possible VATS with PluerX catheter placement early next week, however patient states he is okay with continuing thoracentesis as needed.    - Patient will need to have his thrombocytopenia corrected prior to any surgical intervention     Syeda Acosta DO, MS  PGY1, General Surgery  01/23/22  7:44 AM  [phone]

## 2022-01-23 NOTE — PROGRESS NOTES
POC:     Malignant pleural effusion management options were discussed with patients and Dr. Issac Martinez. These include intermittent thoracocentesis, pleurX catheter placement, or VATS with pleurodesis. Risks and benefits of these options were discussed and the patient opted for thoracocentesis if needed. If patient would like to consider pleurX catheter placement in the future, please contact us. Discharge instruction with Dr. Melania Castillo clinic information were provided.      Lisa Palomo MD  General Surgery Resident  01/23/22 9:57 AM  [phone]

## 2022-01-24 ENCOUNTER — APPOINTMENT (OUTPATIENT)
Dept: ULTRASOUND IMAGING | Age: 77
DRG: 180 | End: 2022-01-24
Payer: MEDICARE

## 2022-01-24 ENCOUNTER — APPOINTMENT (OUTPATIENT)
Dept: VASCULAR LAB | Age: 77
DRG: 180 | End: 2022-01-24
Payer: MEDICARE

## 2022-01-24 VITALS
TEMPERATURE: 97.5 F | SYSTOLIC BLOOD PRESSURE: 124 MMHG | HEIGHT: 67 IN | OXYGEN SATURATION: 97 % | DIASTOLIC BLOOD PRESSURE: 59 MMHG | WEIGHT: 204 LBS | HEART RATE: 69 BPM | BODY MASS INDEX: 32.02 KG/M2 | RESPIRATION RATE: 16 BRPM

## 2022-01-24 LAB
ANION GAP SERPL CALCULATED.3IONS-SCNC: 8 MMOL/L (ref 3–16)
BASOPHILS ABSOLUTE: 0 K/UL (ref 0–0.2)
BASOPHILS RELATIVE PERCENT: 0.1 %
BUN BLDV-MCNC: 24 MG/DL (ref 7–20)
CALCIUM SERPL-MCNC: 8.5 MG/DL (ref 8.3–10.6)
CHLORIDE BLD-SCNC: 102 MMOL/L (ref 99–110)
CO2: 23 MMOL/L (ref 21–32)
CREAT SERPL-MCNC: 1.2 MG/DL (ref 0.8–1.3)
EOSINOPHILS ABSOLUTE: 0 K/UL (ref 0–0.6)
EOSINOPHILS RELATIVE PERCENT: 0.9 %
GFR AFRICAN AMERICAN: >60
GFR NON-AFRICAN AMERICAN: 59
GLUCOSE BLD-MCNC: 113 MG/DL (ref 70–99)
GLUCOSE BLD-MCNC: 135 MG/DL (ref 70–99)
GLUCOSE BLD-MCNC: 137 MG/DL (ref 70–99)
GLUCOSE BLD-MCNC: 175 MG/DL (ref 70–99)
HCT VFR BLD CALC: 21.5 % (ref 40.5–52.5)
HEMOGLOBIN: 7.4 G/DL (ref 13.5–17.5)
INR BLD: 1.11 (ref 0.88–1.12)
L. PNEUMOPHILA SEROGP 1 UR AG: NORMAL
LYMPHOCYTES ABSOLUTE: 0.4 K/UL (ref 1–5.1)
LYMPHOCYTES RELATIVE PERCENT: 13.6 %
MAGNESIUM: 1.7 MG/DL (ref 1.8–2.4)
MCH RBC QN AUTO: 31.2 PG (ref 26–34)
MCHC RBC AUTO-ENTMCNC: 34.3 G/DL (ref 31–36)
MCV RBC AUTO: 91 FL (ref 80–100)
MONOCYTES ABSOLUTE: 0.4 K/UL (ref 0–1.3)
MONOCYTES RELATIVE PERCENT: 12.8 %
NEUTROPHILS ABSOLUTE: 2.2 K/UL (ref 1.7–7.7)
NEUTROPHILS RELATIVE PERCENT: 72.6 %
PDW BLD-RTO: 16.7 % (ref 12.4–15.4)
PERFORMED ON: ABNORMAL
PHOSPHORUS: 2.7 MG/DL (ref 2.5–4.9)
PLATELET # BLD: 56 K/UL (ref 135–450)
PMV BLD AUTO: 7.2 FL (ref 5–10.5)
POTASSIUM SERPL-SCNC: 4.2 MMOL/L (ref 3.5–5.1)
PROTHROMBIN TIME: 12.6 SEC (ref 9.9–12.7)
RBC # BLD: 2.36 M/UL (ref 4.2–5.9)
SODIUM BLD-SCNC: 133 MMOL/L (ref 136–145)
WBC # BLD: 3.1 K/UL (ref 4–11)

## 2022-01-24 PROCEDURE — 85610 PROTHROMBIN TIME: CPT

## 2022-01-24 PROCEDURE — 6360000002 HC RX W HCPCS: Performed by: INTERNAL MEDICINE

## 2022-01-24 PROCEDURE — 6370000000 HC RX 637 (ALT 250 FOR IP): Performed by: STUDENT IN AN ORGANIZED HEALTH CARE EDUCATION/TRAINING PROGRAM

## 2022-01-24 PROCEDURE — 93970 EXTREMITY STUDY: CPT

## 2022-01-24 PROCEDURE — 6360000002 HC RX W HCPCS: Performed by: STUDENT IN AN ORGANIZED HEALTH CARE EDUCATION/TRAINING PROGRAM

## 2022-01-24 PROCEDURE — 85025 COMPLETE CBC W/AUTO DIFF WBC: CPT

## 2022-01-24 PROCEDURE — 6370000000 HC RX 637 (ALT 250 FOR IP): Performed by: INTERNAL MEDICINE

## 2022-01-24 PROCEDURE — 2580000003 HC RX 258: Performed by: STUDENT IN AN ORGANIZED HEALTH CARE EDUCATION/TRAINING PROGRAM

## 2022-01-24 PROCEDURE — 99223 1ST HOSP IP/OBS HIGH 75: CPT | Performed by: INTERNAL MEDICINE

## 2022-01-24 PROCEDURE — 36591 DRAW BLOOD OFF VENOUS DEVICE: CPT

## 2022-01-24 PROCEDURE — 83735 ASSAY OF MAGNESIUM: CPT

## 2022-01-24 PROCEDURE — 84100 ASSAY OF PHOSPHORUS: CPT

## 2022-01-24 PROCEDURE — 80048 BASIC METABOLIC PNL TOTAL CA: CPT

## 2022-01-24 RX ORDER — HEPARIN SODIUM (PORCINE) LOCK FLUSH IV SOLN 100 UNIT/ML 100 UNIT/ML
500 SOLUTION INTRAVENOUS PRN
Status: DISCONTINUED | OUTPATIENT
Start: 2022-01-24 | End: 2022-01-24 | Stop reason: HOSPADM

## 2022-01-24 RX ORDER — LANOLIN ALCOHOL/MO/W.PET/CERES
400 CREAM (GRAM) TOPICAL 2 TIMES DAILY
Status: DISCONTINUED | OUTPATIENT
Start: 2022-01-24 | End: 2022-01-24

## 2022-01-24 RX ORDER — MAGNESIUM SULFATE IN WATER 40 MG/ML
2000 INJECTION, SOLUTION INTRAVENOUS ONCE
Status: COMPLETED | OUTPATIENT
Start: 2022-01-24 | End: 2022-01-24

## 2022-01-24 RX ADMIN — MAGNESIUM SULFATE HEPTAHYDRATE 2000 MG: 2 INJECTION, SOLUTION INTRAVENOUS at 11:58

## 2022-01-24 RX ADMIN — Medication 500 UNITS: at 17:54

## 2022-01-24 RX ADMIN — INSULIN LISPRO 2 UNITS: 100 INJECTION, SOLUTION INTRAVENOUS; SUBCUTANEOUS at 16:38

## 2022-01-24 RX ADMIN — LOSARTAN POTASSIUM 100 MG: 100 TABLET, FILM COATED ORAL at 08:31

## 2022-01-24 RX ADMIN — HEPARIN SODIUM 5000 UNITS: 5000 INJECTION INTRAVENOUS; SUBCUTANEOUS at 06:23

## 2022-01-24 RX ADMIN — METOPROLOL SUCCINATE 25 MG: 25 TABLET, EXTENDED RELEASE ORAL at 08:31

## 2022-01-24 RX ADMIN — ATORVASTATIN CALCIUM 20 MG: 20 TABLET, FILM COATED ORAL at 08:31

## 2022-01-24 RX ADMIN — FOLIC ACID 1 MG: 1 TABLET ORAL at 08:31

## 2022-01-24 RX ADMIN — HEPARIN SODIUM 5000 UNITS: 5000 INJECTION INTRAVENOUS; SUBCUTANEOUS at 14:49

## 2022-01-24 RX ADMIN — SODIUM CHLORIDE, PRESERVATIVE FREE 10 ML: 5 INJECTION INTRAVENOUS at 08:32

## 2022-01-24 ASSESSMENT — PAIN SCALES - GENERAL
PAINLEVEL_OUTOF10: 0

## 2022-01-24 ASSESSMENT — ENCOUNTER SYMPTOMS
COLOR CHANGE: 0
PHOTOPHOBIA: 0
RHINORRHEA: 0
DIARRHEA: 0
CHEST TIGHTNESS: 0
NAUSEA: 0
COUGH: 0
BACK PAIN: 0
BLOOD IN STOOL: 0
CONSTIPATION: 0
ABDOMINAL PAIN: 0
VOMITING: 0
SHORTNESS OF BREATH: 1
ABDOMINAL DISTENTION: 0

## 2022-01-24 NOTE — PROGRESS NOTES
PT'S WIFE WOULD LIKE TO DISCUSS THORACENTESIS OPTIONS WITH MD PRIOR TO ANY INTERVENTION. SHE PLANS TO SPEAK WITH ONCOLOGIST DR. Francesca Dillon IN THE AM TO OBTAIN HIS RECOMMENDATIONS FOR PLEURX DRAIN.

## 2022-01-24 NOTE — DISCHARGE SUMMARY
INTERNAL MEDICINE DEPARTMENT AT [address] [place] [address]  DISCHARGE SUMMARY    Patient ID: Olivia Kumar                                             Discharge Date: 1/24/2022   Patient's PCP: Shonda Field DO                                          Discharge Physician: Meet Muller MD MD  Admit Date: 1/22/2022   Admitting Physician: Rosalia Kramer MD    PROBLEMS DURING HOSPITALIZATION:  Present on Admission:   Shortness of breath      DISCHARGE DIAGNOSES:  1. Shortness of breath secondary to loculated left pleural effusion likely secondary to malignancy  2. Non-small cell lung cancer with recurrent pleural effusion  3. Acute on chronic normocytic anemia  4. Pancytopenia likely secondary to chemotherapy  5. Hypertension  6. Hyperlipidemia  7. Diabetes mellitus type 2    HPI: Olivia Kumar is a 77M with a PMH significant for lung cancer actively undergoing chemotherapy, chronic anemia, hypertension, HLD, DMT2 who presents with SOB in the setting of anemia. On Wednesday last week he received a blood transfusion. And he was feeling well for 24-48 hours until his SOB returned. Endoreses lightheadedness. Patient was seen at [facility] and had a hgb of 6.8. Denies dark, tarry stools, hematemesis, emesis, nausea, syncope, CP, fever.      In the ED, Patient had Hgb of 7, but due to being symptomatic was given 1unit pRBC. CXR obtained which reveals loculation pleural effusion, leading to obtaining a CT without contrast obtained which showed evidence of moderate loculated fluid. Patient is leukopenic and with the fluid collection, concern for pneumonia, patient was started with IV cefepime and vancomycin. Consulted IR who recommended discussion for chest tube with general surgery.  Not emergent at this time for chest tube placement. Patient was admitted for further evaluation and management.     The following issues were addressed during hospitalization:    Shortness of Breath  Loculated L pleural effusion 2/2 malignancy vs chemotherapy vs low suspicion infectious  Hx NSCLC of lung with recurrent pleural effusion  CXR reveals loculation pleural effusion. CT without contrast obtained which showed evidence of L sided moderate loculated fluid. Widespread sclerotic lesions deangelo of metastasis disease. R hepatic lobe suspicious of metastatic disease. Very low index of suspicion for PNA - lungs sound clear, though left lung sounds are distant. The patient does not appear acutely ill. Procalcitonin 0.12.  - Patient is electing to pursue surgical drainage of his pleural effusion as an outpatient; please contact Dr. Charmain Oppenheim at [phone]     Acute on Chronic Normocytic Anemia  Pancytopenia 2/2 likely chemotherapy  Hgb of 7, 6.8 per outside lab studies. WBC at 2.7 on presentation. Given 1u pRBC d/t SOB. - Patient received 1 unit of packed red blood cells with improvement of symptoms     HTN  - Continued home losartan, metoprolol     HLD   - Continued home Statin     Diabetes Mellitus Type 2  Holding home DM medications  - 46u Lantus   - Medium dose sliding scale insulin  - Hypoglycemia protocol   - POCT glucose check q6hr AC/HS      The patient is discharged home in stable medical condition to follow up with his pulmonologist within 1 week. Please contact Dr. Charmain Oppenheim at [phone] to plan thoracentesis in 2-3 weeks or if symptoms worsen. You will likely require thoracentesis every 4-6 weeks without a PleurX catheter. Please follow up with your primary care physician in 1 week as well. Please continue taking your medications as prescribed. Thank you for visiting [facility]!      Physical Exam:  BP (!) 124/59   Pulse 69   Temp 97.5 °F (36.4 °C) (Oral)   Resp 16   Ht 5' 7\" (1.702 m)   Wt 204 lb (92.5 kg)   SpO2 97%   BMI 31.95 kg/m²     Physical Exam  Constitutional:       General: He is not in acute distress. Appearance: Normal appearance. He is not ill-appearing or toxic-appearing.    HENT:      Head: Normocephalic and atraumatic. Right Ear: External ear normal.      Left Ear: External ear normal.      Nose: Nose normal. No rhinorrhea. Mouth/Throat:      Mouth: Mucous membranes are moist.   Eyes:      General: No scleral icterus. Extraocular Movements: Extraocular movements intact. Pupils: Pupils are equal, round, and reactive to light. Cardiovascular:      Rate and Rhythm: Normal rate and regular rhythm. Pulses: Normal pulses. Heart sounds: Normal heart sounds. No murmur heard. Pulmonary:      Effort: Pulmonary effort is normal. No respiratory distress. Breath sounds: No stridor. No wheezing, rhonchi or rales. Comments: Lung sounds distant over left lung  Abdominal:      General: Abdomen is flat. There is no distension. Palpations: Abdomen is soft. Tenderness: There is no abdominal tenderness. There is no guarding or rebound. Musculoskeletal:         General: No swelling. Normal range of motion. Cervical back: Normal range of motion. No rigidity. Right lower leg: Edema present. Left lower leg: Edema present. Lymphadenopathy:      Cervical: No cervical adenopathy. Skin:     General: Skin is warm and dry. Capillary Refill: Capillary refill takes less than 2 seconds. Coloration: Skin is not jaundiced or pale. Findings: No bruising. Neurological:      Mental Status: He is alert and oriented to person, place, and time. Cranial Nerves: No cranial nerve deficit. Sensory: No sensory deficit. Motor: No weakness. Gait: Gait normal.   Psychiatric:         Mood and Affect: Mood normal.         Behavior: Behavior normal.         Thought Content:  Thought content normal.         Judgment: Judgment normal.        Consults: pulmonary/intensive care  Significant Diagnostic Studies:  chest x-ray, chest CT without contrast, CT chest/abdomen/pelvis  Treatments: IV hydration, insulin: Lispro and Lantus and packed red blood cells  Disposition: home  Discharged Condition: Stable  Follow Up: Primary Care Physician & pulmonologist in one week, thoracic surgeon in 2-3 weeks    DISCHARGE MEDICATION:     Medication List      CONTINUE taking these medications    albuterol sulfate  (90 Base) MCG/ACT inhaler  Commonly known as: Proventil HFA  Inhale 2 puffs into the lungs every 4 hours as needed for Wheezing or Shortness of Breath (Space out to every 6 hours as symptoms improve) Space out to every 6 hours as symptoms improve.      atorvastatin 20 MG tablet  Commonly known as: LIPITOR     folic acid 1 MG tablet  Commonly known as: FOLVITE     losartan 100 MG tablet  Commonly known as: COZAAR     metoprolol succinate 25 MG extended release tablet  Commonly known as: TOPROL XL  Take 1 tablet by mouth once daily     NovoLOG 100 UNIT/ML injection vial  Generic drug: insulin aspart     therapeutic multivitamin-minerals tablet     TOUJEO SOLOSTAR SC     vitamin D3 10 MCG (400 UNIT) Tabs tablet  Commonly known as: CHOLECALCIFEROL          Activity: activity as tolerated  Diet: diabetic diet  Wound Care: as directed    Time Spent on discharge is more than 30 minutes    Signed:  Britney Tong MD,  PGY-1  Internal Medicine Resident  1/24/2022

## 2022-01-24 NOTE — PLAN OF CARE
Problem: Falls - Risk of:  Goal: Will remain free from falls  Description: Will remain free from falls  1/24/2022 0050 by Devyn Olson RN  Outcome: Ongoing  Note:  Pt is a High fall risk. See Marlyce Gander Fall Score and ABCDS Injury Risk assessments.   + Screening for Orthostasis and/or + High Fall Risk per LAGUERRE/ABCDS: Explained fall risk precautions to pt and rationale behind their use to keep the patient safe. Pt bed is in low position, side rails up, call light and belongings are in reach. Chair Alarm on. Pt encouraged to call for assistance. Will continue with hourly rounds for PO intake, pain needs, toileting and repositioning as needed. Problem: Infection - Central Venous Catheter-Associated Bloodstream Infection:  Goal: Will show no infection signs and symptoms  Description: Will show no infection signs and symptoms  1/24/2022 0050 by Devyn Olson RN  Outcome: Ongoing  Note: CVC site remains free of signs/symptoms of infection. No drainage, edema, erythema, pain, or warmth noted at site. Dressing changes continue per protocol and on an as needed basis - see flowsheet. Problem: Breathing Pattern - Ineffective:  Goal: Ability to achieve and maintain a regular respiratory rate will improve  Description: Ability to achieve and maintain a regular respiratory rate will improve  1/24/2022 0050 by Devyn Olson RN  Note: Pt respiratory is WNL. Pt instructed to call for new/increasing shortness of breath/difficulty breathing. Pt verbalized understanding. Will continue to monitor. Problem: Skin Integrity:  Goal: Will show no infection signs and symptoms  Description: Will show no infection signs and symptoms  1/24/2022 0050 by Devyn Olson RN  Outcome: Ongoing  Note: CVC site remains free of signs/symptoms of infection. No drainage, edema, erythema, pain, or warmth noted at site. Dressing changes continue per protocol and on an as needed basis - see flowsheet.

## 2022-01-24 NOTE — PLAN OF CARE
Problem: Falls - Risk of:  Goal: Will remain free from falls  Description: Will remain free from falls  Outcome: Ongoing  Pt is a High fall risk. See [employer] Fall Score and ABCDS Injury Risk assessments.   + Screening for Orthostasis and/or + High Fall Risk per LAGUERRE/ABCDS: Explained fall risk precautions to pt and family and rationale behind their use to keep the patient safe. Pt bed is in low position, side rails up, call light and belongings are in reach. Fall wristband applied and present on pts wrist.  Chair Alarm on. Pt encouraged to call for assistance. Will continue with hourly rounds for PO intake, pain needs, toileting and repositioning as needed. Problem: Bleeding:  Goal: Will show no signs and symptoms of excessive bleeding  Description: Will show no signs and symptoms of excessive bleeding  Outcome: Ongoing   Patient's hemoglobin this AM:   Recent Labs     01/23/22  1140   HGB 7.7*     Patient's platelet count this AM:   Recent Labs     01/23/22  0332   PLT 55*    Thrombocytopenia Precautions in place. Patient showing no signs or symptoms of active bleeding. Transfusion not indicated at this time. Patient verbalizes understanding of all instructions. Will continue to assess and implement POC. Call light within reach and hourly rounding in place. REVIEWED BLEEDING PRECAUTIONS WITH PT AND HIS WIFE. BOTH VERBALIZE UNDERSTANDING. Problem: Infection - Central Venous Catheter-Associated Bloodstream Infection:  Goal: Will show no infection signs and symptoms  Description: Will show no infection signs and symptoms  Outcome: Ongoing   CVC site remains free of signs/symptoms of infection. No drainage, edema, erythema, pain, or warmth noted at site. Dressing changes continue per protocol and on an as needed basis - see flowsheet. Compliant with [facility] Bath Protocol:  Performed CHG bath today per [facility] protocol utilizing CHG solution in the shower.   CVC site cleansed with CHG wipe over dressing, skin

## 2022-01-24 NOTE — PLAN OF CARE
Problem: Falls - Risk of:  Goal: Will remain free from falls  Description: Will remain free from falls  Outcome: Ongoing  Orthostatic vital signs obtained at start of shift - see flowsheet for details. Pt does not meet criteria for orthostasis. Pt is a Med fall risk. See [facility] Fall Score and ABCDS Injury Risk assessments. Problem: Bleeding:  Goal: Will show no signs and symptoms of excessive bleeding  Description: Will show no signs and symptoms of excessive bleeding  Outcome: Ongoing  Patient's hemoglobin this AM:   Recent Labs     01/24/22  0355   HGB 7.4*     Patient's platelet count this AM:   Recent Labs     01/24/22  0355   PLT 56*    Thrombocytopenia Precautions in place. Patient showing no signs or symptoms of active bleeding. Transfusion not indicated at this time. Patient verbalizes understanding of all instructions. Will continue to assess and implement POC. Call light within reach and hourly rounding in place. Problem: Infection - Central Venous Catheter-Associated Bloodstream Infection:  Goal: Will show no infection signs and symptoms  Description: Will show no infection signs and symptoms  Outcome: Ongoing  CVC site remains free of signs/symptoms of infection. No drainage, edema, erythema, pain, or warmth noted at site. Dressing changes continue per protocol and on an as needed basis - see flowsheet. Compliant with [facility] Bath Protocol:  Performed CHG bath today per [facility] protocol utilizing Bed bath with CHG wipes. CVC site cleansed with CHG wipe over dressing, skin surrounding dressing, and first 6\" of IV tubing. Pt tolerated well. Continued to encourage daily CHG bathing per [employer] protocol. Problem: Discharge Planning:  Goal: Discharged to appropriate level of care  Description: Discharged to appropriate level of care  Outcome: Ongoing  Pt verbalized understanding of POC. Will update patient on any changes to POC.       Problem: Breathing Pattern - Ineffective:  Goal: Ability to achieve and maintain a regular respiratory rate will improve  Description: Ability to achieve and maintain a regular respiratory rate will improve  Outcome: Ongoing  Pt breathing was normal this shift. Will continue to monitor. Problem: Skin Integrity:  Goal: Will show no infection signs and symptoms  Description: Will show no infection signs and symptoms  Outcome: Ongoing  Pt did not show signs of new infection this shift. Will continue to monitor. Problem: Venous Thromboembolism:  Goal: Will show no signs or symptoms of venous thromboembolism  Description: Will show no signs or symptoms of venous thromboembolism  Outcome: Ongoing  Pt did not show signs of thromboembolism this shift. Will continue to monitor.

## 2022-01-24 NOTE — PROGRESS NOTES
Progress Note    Admit Date: 1/22/2022  Day: 3  Diet: ADULT DIET; Regular; 3 carb choices (45 gm/meal)    CC: SOB    Interval history: NAEON. Patient agrees today to PleurX catheter placement. Discussed benefits and risks. No acute complaints this morning. He is resting comfortably in his recliner. No SOB at rest, CP, HA, N/V/D/C. HPI: Sanjana Chavarria is a 77M with a PMH significant for lung cancer actively undergoing chemotherapy, chronic anemia, hypertension, HLD, DMT2 who presents with SOB in the setting of anemia. On Wednesday last week he received a blood transfusion. And he was feeling well for 24-48 hours until his SOB returned. Endoreses lightheadedness. Patient was seen at [facility] and had a hgb of 6.8. Denies dark, tarry stools, hematemesis, emesis, nausea, syncope, CP, fever.      In the ED, Patient had Hgb of 7, but due to being symptomatic was given 1unit pRBC. CXR obtained which reveals loculation pleural effusion, leading to obtaining a CT without contrast obtained which showed evidence of moderate loculated fluid. Patient is leukopenic and with the fluid collection, concern for pneumonia, patient was started with IV cefepime and vancomycin. Consulted IR who recommended discussion for chest tube with general surgery. Not emergent at this time for chest tube placement. Patient to be admitted for further evaluation and management.      Medications:     Scheduled Meds:   magnesium oxide  400 mg Oral BID    folic acid  1 mg Oral Daily    sodium chloride flush  5-40 mL IntraVENous 2 times per day    heparin (porcine)  5,000 Units SubCUTAneous 3 times per day    insulin lispro  0-12 Units SubCUTAneous TID     insulin lispro  0-6 Units SubCUTAneous Nightly    insulin glargine  46 Units SubCUTAneous Nightly    atorvastatin  20 mg Oral Daily    losartan  100 mg Oral Daily    metoprolol succinate  25 mg Oral Daily     Continuous Infusions:   sodium chloride      sodium chloride 100 mL (01/23/22 0321)    dextrose       PRN Meds:iohexol, sodium chloride, sodium chloride flush, sodium chloride, ondansetron **OR** ondansetron, polyethylene glycol, acetaminophen **OR** acetaminophen, glucose, glucagon (rDNA), dextrose, dextrose bolus (hypoglycemia)    Objective:   Vitals:   T-max:  Patient Vitals for the past 8 hrs:   BP Temp Temp src Pulse Resp SpO2   01/24/22 0825 (!) 129/56 98.8 °F (37.1 °C) Oral 80 16 96 %   01/24/22 0348 (!) 149/62 97.9 °F (36.6 °C) Oral 80 18 98 %       Intake/Output Summary (Last 24 hours) at 1/24/2022 0834  Last data filed at 1/24/2022 0348  Gross per 24 hour   Intake 1720 ml   Output 200 ml   Net 1520 ml       Review of Systems   Constitutional: Negative for appetite change, chills, diaphoresis and fatigue. HENT: Negative for congestion, ear pain, rhinorrhea and tinnitus. Eyes: Negative for photophobia and visual disturbance. Respiratory: Positive for shortness of breath. Negative for cough and chest tightness. Cardiovascular: Negative for chest pain, palpitations and leg swelling. Gastrointestinal: Negative for abdominal distention, abdominal pain, blood in stool, constipation, diarrhea, nausea and vomiting. Genitourinary: Negative for difficulty urinating and dysuria. Musculoskeletal: Negative for arthralgias, back pain, joint swelling and myalgias. Skin: Negative for color change, pallor and rash. Neurological: Negative for dizziness, facial asymmetry, weakness, light-headedness, numbness and headaches. Psychiatric/Behavioral: Negative for agitation and confusion. The patient is not nervous/anxious. Physical Exam  Constitutional:       General: He is not in acute distress. Appearance: Normal appearance. He is not ill-appearing or toxic-appearing. HENT:      Head: Normocephalic and atraumatic. Right Ear: External ear normal.      Left Ear: External ear normal.      Nose: Nose normal. No rhinorrhea.       Mouth/Throat:      Mouth: Mucous 01/24/22  0355   * 135* 133*   K 4.1 4.2 4.2    101 102   CO2 24 23 23   BUN 25* 23* 24*   CREATININE 1.2 1.2 1.2   GLUCOSE 89 222* 137*   CALCIUM 9.0 8.6 8.5   MG  --   --  1.70*   PHOS  --   --  2.7   ANIONGAP 9 11 8       Cultures: SARS-CoV2 not detected by NAAT; Legionella Ag pending; BCx pending    -----------------------------------------------------------------       RAD:   CT CHEST ABDOMEN PELVIS W CONTRAST   Final Result      CHEST:   1.  1.8 x 1.5 cm nodule in the left lower lobe superior segment correlating with hypermetabolic nodule on prior PET/CT. Not significantly changed in size compared with 10/21/2021, but remains concerning for malignancy. 2.  No lymphadenopathy. 3.  Left moderate loculated pleural effusion with associated left basilar atelectasis. 4.  Multifocal sclerotic osseous metastatic disease similar to recent priors. ABDOMEN AND PELVIS:   1.  Obstructing calculus in the proximal ureter with mild upstream hydronephrosis and slightly diminished left nephrogram.   2.  Additional bilateral nonobstructive nephrolithiasis. 3.  1.8 cm hypodense lesion in the anterior right hepatic lobe consistent with hepatic metastasis and is not significantly changed. A few additional smaller hypodense lesions are new/more conspicuous compared to prior CT from 10/18/2021 concerning for    worsening hepatic metastasis. 4.  Multifocal sclerotic osseous metastasis. These lesions demonstrate increased sclerosis. This is indeterminate for worsening metastasis or healing changes related to treatment. No definite new lesions. CT CHEST WO CONTRAST   Final Result      1. Moderate loculated left pleural effusion at the left lung base and along the left upper lobe. This may represent a malignant effusion and/or metastatic disease. 2.  Widespread sclerotic lesions throughout the visualized osseous structures likely representing metastatic disease.    3.  Mild left hydronephrosis, partially imaged. 4.  1.3 cm hypodense lesion in the right hepatic lobe highly suspicious for metastatic disease. XR CHEST PORTABLE   Final Result   1. Left-sided effusion with possible loculation along left lateral chest. Associated left lower lung airspace opacity. US THORACENTESIS Which side should the procedure be performed? Left    (Results Pending)   VL Extremity Venous Bilateral    (Results Pending)           Assessment/Plan:   Kelli Andrews is a 67 y.o. male with a PMH significant for lung cancer actively undergoing chemotherapy, chronic anemia, hypertension, HLD and DMT2 who presented with Shortness of Breath. Shortness of Breath  Loculated L pleural effusion 2/2 malignancy vs chemotherapy vs low suspicion infectious  Hx NSCLC of lung with recurrent pleural effusion  CXR reveals loculation pleural effusion. CT without contrast obtained which showed evidence of L sided moderate loculated fluid. Widespread sclerotic lesions deangelo of metastasis disease. R hepatic lobe suspicious of metastatic disease. Very low index of suspicion for PNA - lungs sound clear, though left lung sounds are distant. The patient does not appear acutely ill. Procalcitonin 0.12.  - Discontinue Vancomycin and Cefepime  - BCx2, legionella, Strep pneumo pending  - IR consulted, to perform thoracentesis and PleurX placement  - pH level of pleuritic fluid  - HemeOnc Consulted, appreciate recs. follows with Dr. Zaheer ABAD [place]     Acute on Chronic Normocytic Anemia  Pancytopenia 2/2 likely chemotherapy  Hgb of 7, 6.8 per outside lab studies. WBC at 2.7 on presentation. Given 1u pRBC d/t SOB.   - Continue to monitor q8hr H&H, transfuse hgb<8 and symptomatic.   - Continue to monitor daily labs     HTN  - Continue home losartan, metoprolol     HLD   - Continue home Statin     Diabetes Mellitus Type 2  Holding home DM medications  - 46u Lantus   - MDSSI  - Hypoglycemia protocol   - POCT glucose check q6hr AC/HS       Code Status:Full code  FEN: adult regular diet, 3cc  PPX: SQH  DISPO: IP    Anjali Hauser MD, PGY-1  01/24/22  8:34 AM    This patient has been staffed and discussed with Dr. Luis Carlos Vilchis MD.    Patient seen and examined, labs and imaging studies reviewed, agree with assessment and plan as outlined above. Continue with current care and plan. Discussed case with patients nurse, discussed case with care team, discussed plan.       Luis Carlos Vilchis MD [id number]

## 2022-01-24 NOTE — CONSULTS
Pulmonary Consult Note      Reason for Consult: malignant pleural effusion  Requesting Physician: Dr. Pastor Jenkins  Subjective:   No acute events overnight. Patient seen and examined at been side. Patient's chart and notes were reviewed. Currently on ORA SpO2 >95%. Patient reports SOB has Improved. Patient denies chest pain, chills, nausea, vomiting He denies urinary frequency, dysuria. Patient is hemodynamically stable and afebrile. CHIEF COMPLAINT / HPI:                Chinyere Langston is a 67 y.o. male with PMH as below notable for lung cancer, actively undergoing chemotherapy, chronic anemia, HTN, HLD, DM2 who presented 1/22 with SOB in the setting of anemia. In the ED, CT without contrast showed moderate loculated fluid. Pt was leukopenic with the fluid collection, concern for pneumonia, he was started on cefepime and vanc.  CT wo contrast showed evidence of moderate loculated, he wa luekopenic    On admission Hg was 7, he was transfused 1 u of pRBCs    Pulmonology service was consulted for further workup and management of malignant pleural effusion    Past Medical History:      Diagnosis Date    Cancer [facility])     prostate    [facility] mellitus ([facility] [address]     Hyperlipidemia     Hypertension     Kidney stone     Lung cancer ([facility] [address] 10/02/2021    Stage 4    Lung cancer ([facility] [address]       Past Surgical History:        Procedure Laterality Date    BACK SURGERY  2012    lumber, no hardware, \"cleaned it out\"    CYSTOSCOPY Right 11/9/2020    CYSTOSCOPY,  RIGHT URETEROSCOPY, RIGHT RETRO PYELOGRAM, REMOVAL STONE FROM BLADDER, URETHRAL DILITATION performed by Spenser Mark MD at [facility] NEPHROLITHOTOMY Left 12/4/2020    LEFT PERCUTANEOUS NEPHROLITHOTOMY WITH DR Christian Camargo TO PLACE NEPHROSTOMY TUBE PRIOR performed by Katie Macdonald MD at [address] N/A 11/4/2021    SURGICAL PORT PLACEMENT performed by Jignesh Trujillo MD at [facility] normal.         DATA:    Old records have been reviewed  CBC with Differential:    Lab Results   Component Value Date    WBC 3.1 01/24/2022    RBC 2.36 01/24/2022    HGB 7.4 01/24/2022    HCT 21.5 01/24/2022    PLT 56 01/24/2022    MCV 91.0 01/24/2022    MCH 31.2 01/24/2022    MCHC 34.3 01/24/2022    RDW 16.7 01/24/2022    LYMPHOPCT 13.6 01/24/2022    MONOPCT 12.8 01/24/2022    BASOPCT 0.1 01/24/2022    MONOSABS 0.4 01/24/2022    LYMPHSABS 0.4 01/24/2022    EOSABS 0.0 01/24/2022    BASOSABS 0.0 01/24/2022     BMP:    Lab Results   Component Value Date     01/24/2022    K 4.2 01/24/2022    K 4.1 01/22/2022     01/24/2022    CO2 23 01/24/2022    BUN 24 01/24/2022    CREATININE 1.2 01/24/2022    CALCIUM 8.5 01/24/2022    GFRAA >60 01/24/2022    LABGLOM 59 01/24/2022    GLUCOSE 137 01/24/2022     Hepatic Function Panel:    Lab Results   Component Value Date    ALKPHOS 179 10/18/2021    ALT 24 10/18/2021    AST 27 10/18/2021    PROT 7.3 10/18/2021    BILITOT 0.7 10/18/2021     ABG:  No results found for: BVJ5QEM, BEART, U6QJGKWK, PHART, THGBART, MEQ6MNM, PO2ART, ECQ4ARS    Cultures:   Blood Culture:    Sputum Culture:        Radiology Review:  All pertinent images / reports were reviewed as a part of this visit. Imaging reveals the following:  CT CHEST ABDOMEN PELVIS W CONTRAST   Final Result      CHEST:   1.  1.8 x 1.5 cm nodule in the left lower lobe superior segment correlating with hypermetabolic nodule on prior PET/CT. Not significantly changed in size compared with 10/21/2021, but remains concerning for malignancy. 2.  No lymphadenopathy. 3.  Left moderate loculated pleural effusion with associated left basilar atelectasis. 4.  Multifocal sclerotic osseous metastatic disease similar to recent priors.       ABDOMEN AND PELVIS:   1.  Obstructing calculus in the proximal ureter with mild upstream hydronephrosis and slightly diminished left nephrogram.   2.  Additional bilateral nonobstructive nephrolithiasis. 3.  1.8 cm hypodense lesion in the anterior right hepatic lobe consistent with hepatic metastasis and is not significantly changed. A few additional smaller hypodense lesions are new/more conspicuous compared to prior CT from 10/18/2021 concerning for    worsening hepatic metastasis. 4.  Multifocal sclerotic osseous metastasis. These lesions demonstrate increased sclerosis. This is indeterminate for worsening metastasis or healing changes related to treatment. No definite new lesions. CT CHEST WO CONTRAST   Final Result      1. Moderate loculated left pleural effusion at the left lung base and along the left upper lobe. This may represent a malignant effusion and/or metastatic disease. 2.  Widespread sclerotic lesions throughout the visualized osseous structures likely representing metastatic disease. 3.  Mild left hydronephrosis, partially imaged. 4.  1.3 cm hypodense lesion in the right hepatic lobe highly suspicious for metastatic disease. XR CHEST PORTABLE   Final Result   1. Left-sided effusion with possible loculation along left lateral chest. Associated left lower lung airspace opacity. US THORACENTESIS Which side should the procedure be performed? Left    (Results Pending)   VL Extremity Venous Bilateral    (Results Pending)      Other diagnostic test:      Doppler Studies: Venous Doppler: no evidence of of superficial or deep DVT    Assessment/Plan   67 y.o. male with stage 4 small cell lung cancer with malignant pleural effusion. Pt had to have 3 thoracentesis in the past. He comes in with SOB and anemia with recurrent left moderated loculated pleural effusion with assoc left basilar ateletasis. Pt agrees to have Pleurx catheter placed. IR consulted for placement per primary team.    Await fluid studies       Thank you for the opportunity to participate in [address]. Plan and assessment discussed with attending, Dr. Daniel Robbins.      Sera Stringer Aries Brady MD, PGY1  1/24/2022  10:23 AM       Patient was seen, examined and discussed with Dr. Becky Segovia. I agree with the history of present illness, past medical/surgical histories, family history, social history, medication list and allergies as listed. The review of systems is as noted above. My physical exam confirms the findings listed   Chart was reviewed including labs, CT scan and medical records confirm the findings noted      Malignant pleural effusion, it is moderate in size and recurrent. However it is requiring thoracentesis infrequently. He had thoracentesis on 9/28/21, 11/2/21 and 1/5/22. SOB is better after blood transfusion. I discussed with him pros and cons of repeated thoracentesis vs pleurx catheter placement. He prefers not to have pleurx at this time, however he is agreeable to any recommendation we give him. Since reaccumulation rate is not fast it is OK to go with thoracentesis once every 4-6 weeks for now. It it is needed more frequently it is always possible to change plans and place pleurx catheter. Discussed with Dr. Gunner Khanna and with Dr. Alicia Almaraz  OK to d/c home from my perspective. Will arrange for f/u in 2 weeks.

## 2022-01-25 ENCOUNTER — TELEPHONE (OUTPATIENT)
Dept: PULMONOLOGY | Age: 77
End: 2022-01-25

## 2022-01-25 NOTE — TELEPHONE ENCOUNTER
----- Message from Quiana Bell MD sent at 1/24/2022  5:27 PM EST -----  Can you please arrange for f/u in 2 weeks    Thanks

## 2022-01-26 LAB — BLOOD CULTURE, ROUTINE: NORMAL

## 2022-01-27 LAB — CULTURE, BLOOD 2: NORMAL

## 2022-02-14 ENCOUNTER — TELEPHONE (OUTPATIENT)
Dept: CARDIOLOGY CLINIC | Age: 77
End: 2022-02-14

## 2022-03-01 ENCOUNTER — OFFICE VISIT (OUTPATIENT)
Dept: CARDIOLOGY CLINIC | Age: 77
End: 2022-03-01
Payer: COMMERCIAL

## 2022-03-01 VITALS
WEIGHT: 200 LBS | DIASTOLIC BLOOD PRESSURE: 58 MMHG | HEIGHT: 67 IN | SYSTOLIC BLOOD PRESSURE: 150 MMHG | HEART RATE: 90 BPM | BODY MASS INDEX: 31.39 KG/M2 | OXYGEN SATURATION: 99 %

## 2022-03-01 DIAGNOSIS — R06.02 SHORTNESS OF BREATH: Primary | ICD-10-CM

## 2022-03-01 DIAGNOSIS — I10 HTN (HYPERTENSION), BENIGN: ICD-10-CM

## 2022-03-01 PROCEDURE — 99214 OFFICE O/P EST MOD 30 MIN: CPT | Performed by: INTERNAL MEDICINE

## 2022-03-01 NOTE — PATIENT INSTRUCTIONS
Plan:  Cardiac medications reviewed including indications and pertinent side effects- no refills needed    Check blood pressure and heart rate at home a few times per week- keep a log with dates and times and bring to office visit   Regular exercise and following a healthy diet encouraged   Follow up with me in 1 year

## 2022-03-01 NOTE — PROGRESS NOTES
[facility]   Cardiac Consultation    Referring Provider:  Sanjay Fabian DO     No chief complaint on file. Danette Bobby   1945    History of Present Illness:    Danette Bobby is a 67 y.o. male who is here today for follow up for a past medical medical history of an abnormal EKG, hypertension, hyperlipidemia, and diabetes. An echocardiogram from 11/25/2020 showed an EF of 55%. Stress test was normal.    Today he states he has been diagnosed with lung cancer since his last visit. He states he continues to have SOB which is worse since his cancer diagnosis. He is currently in phase 2  Of chemotherapy. Blood pressure at home and other visits is less than 140/90. Patient currently denies any weight gain, edema, palpitations, chest pain, dizziness, and syncope. Past Medical History:   has a past medical history of Cancer ([facility] [address] Diabetes mellitus ([facility] [address] Hyperlipidemia, Hypertension, Kidney stone, Lung cancer ([facility] [address] and Lung cancer ([facility] [address] Surgical History:   has a past surgical history that includes Prostate surgery; Cystoscopy (Right, 11/9/2020); back surgery (2012); NEPHROLITHOTOMY (Left, 12/4/2020); shoulder surgery (Right); and Port Surgery (N/A, 11/4/2021). Social History:   reports that he quit smoking about 45 years ago. He started smoking about 61 years ago. He has never used smokeless tobacco. He reports that he does not drink alcohol and does not use drugs. Family History:  family history is not on file. Home Medications:  Prior to Admission medications    Medication Sig Start Date End Date Taking?  Authorizing Provider   folic acid (FOLVITE) 1 MG tablet Take 1 mg by mouth daily    Historical Provider, MD   metoprolol succinate (TOPROL XL) 25 MG extended release tablet Take 1 tablet by mouth once daily 12/23/21   Yefri Naranjo MD   atorvastatin (LIPITOR) 20 MG tablet Take 20 mg by mouth daily     Historical Provider, MD   losartan (COZAAR) 100 MG tablet Take 100 mg by mouth daily    Historical Provider, MD   Multiple Vitamins-Minerals (THERAPEUTIC MULTIVITAMIN-MINERALS) tablet Take 1 tablet by mouth daily    Historical Provider, MD   albuterol sulfate HFA (PROVENTIL HFA) 108 (90 Base) MCG/ACT inhaler Inhale 2 puffs into the lungs every 4 hours as needed for Wheezing or Shortness of Breath (Space out to every 6 hours as symptoms improve) Space out to every 6 hours as symptoms improve. 5/28/21   Jadyn Arnold, APRN - CNP   vitamin D3 (CHOLECALCIFEROL) 10 MCG (400 UNIT) TABS tablet Take 400 Units by mouth daily    Historical Provider, MD   insulin aspart (NOVOLOG) 100 UNIT/ML injection vial Inject into the skin 3 times daily (before meals) Sliding scale    Historical Provider, MD   Insulin Glargine (TOUJEO SOLOSTAR SC) Inject 70 Units into the skin nightly     Historical Provider, MD        Allergies:  Patient has no known allergies. Review of Systems:   · Constitutional: there has been no unanticipated weight loss. There's been no change in energy level, sleep pattern, or activity level. · Eyes: No visual changes or diplopia. No scleral icterus. · ENT: No Headaches, hearing loss or vertigo. No mouth sores or sore throat. · Cardiovascular: Reviewed in HPI  · Respiratory: No cough or wheezing, no sputum production. No hematemesis. · Gastrointestinal: No abdominal pain, appetite loss, blood in stools. No change in bowel or bladder habits. · Genitourinary: No dysuria, trouble voiding, or hematuria. · Musculoskeletal:  No gait disturbance, weakness or joint complaints. · Integumentary: No rash or pruritis. · Neurological: No headache, diplopia, change in muscle strength, numbness or tingling. No change in gait, balance, coordination, mood, affect, memory, mentation, behavior. · Psychiatric: No anxiety, no depression. · Endocrine: No malaise, fatigue or temperature intolerance. No excessive thirst, fluid intake, or urination.  No tremor. · Hematologic/Lymphatic: No abnormal bruising or bleeding, blood clots or swollen lymph nodes. · Allergic/Immunologic: No nasal congestion or hives. Physical Examination:    There were no vitals filed for this visit. Constitutional and General Appearance: NAD   Respiratory:  · Normal excursion and expansion without use of accessory muscles  · Resp Auscultation: Normal breath sounds without dullness  Cardiovascular:  · The apical impulses not displaced  · Heart tones are crisp, irregular   · Cervical veins are not engorged  · The carotid upstroke is normal in amplitude and contour without delay or bruit  · Normal S1S2, No S3, No Murmur  · Peripheral pulses are symmetrical and full  · There is no clubbing, cyanosis of the extremities. · No edema  · Femoral Arteries: 2+ and equal  · Pedal Pulses: 2+ and equal   Abdomen:  · No masses or tenderness  · Liver/Spleen: No Abnormalities Noted  Neurological/Psychiatric:  · Alert and oriented in all spheres  · Moves all extremities well  · Exhibits normal gait balance and coordination  · No abnormalities of mood, affect, memory, mentation, or behavior are noted    Stress test 2008  IMPRESSION-  Negative graded exercise test at a work load of 7 METS,  heart rate 133 (86% age-predicted maximum heart rate), and a rate  pressure product of 26.6 kilotor per minute.  Patient has no  clinical or electrocardiographic evidence of inducible ischemia and  the Duke treadmill score of 6 and normal recumbent heart rate  recovery both suggest low cardiovascular risk.  Simultaneous  echocardiographic imaging results are reported elsewhere.  It should  be noted the patient did have a very marked hypertensive systolic  blood pressure response for this level of exercise. Stress echocardiogram 9/2008  IMPRESSION-  Normal rest/stress echocardiogram.  Incidental  underlying left ventricular hypertrophy.     Echocardiogram 11/25/2020  Summary   Technically difficult examination due to poor apical windows. Left ventricular systolic function is normal with a visually estimated   ejection fraction of 55%. The left ventricle is normal in size with mild septal hypertrophy. No regional wall motion abnormalities are noted. Normal left ventricular diastolic function. Mild bi-atrial enlargement. Mild mitral regurgitation. The IVC is dilated in size (>2.1 cm) but collapses >50% with respiration   consistent with elevated right atrial pressures (8 mmHg) . Stress test 11/25/2020  Conclusions    Summary  There is uniform isotope uptake at stress and rest. There is no evidence of myocardial ischemia or scar. Normal post-stress LVEF of 61%. Chest Xray 1/22/2022  Left-sided effusion with possible loculation along left lateral chest. Associated left lower lung airspace opacity. Cardiac event monitor 11/16-11/23/2020  Average heart rate 49 ()       Assessment:   SOB - due to pulm disease. worse  Abnormal EKG -  Appears to be multifocal atrial rhythm, not a-fib  Hypertension - elev today but controlled at home  Hyperlipemia - stable  Diabetes mellitus   Mild mitral regurgitation - stable. Pleural effusion- Left-sided effusion with possible loculation along left lateral chest  Lung cancer - currently chemo    Plan:  Cardiac medications reviewed including indications and pertinent side effects- no changes to BP meds at this time. Will monitor. no refills needed    Check blood pressure and heart rate at home a few times per week- keep a log with dates and times and bring to office visit   Regular exercise and following a healthy diet encouraged   Follow up with me in 1 year     Jazlynibalejandrina's attestation: This note was scribed in the presence of Dr. Thor Oleary M.D. By Noemi De Luna RN    The scribes documentation has been prepared under my direction and personally reviewed by me in its entirety.   I confirm that the note above accurately reflects all work, treatment, procedures, and medical decision making performed by me. Dr. Lizabeth Enamorado MD    Thank you for allowing me to participate in the care of this individual.      Awa Clark.  Efrem Walters M.D., Mady Schaffer

## 2022-04-22 ENCOUNTER — HOSPITAL ENCOUNTER (OUTPATIENT)
Dept: CT IMAGING | Age: 77
Discharge: HOME | End: 2022-04-22
Payer: COMMERCIAL

## 2022-04-22 DIAGNOSIS — C34.82 MALIGNANT NEOPLASM OF OVERLAPPING SITES OF LEFT BRONCHUS AND LUNG (HCC): ICD-10-CM

## 2022-04-22 PROCEDURE — 71260 CT THORAX DX C+: CPT

## 2022-04-22 PROCEDURE — 6360000004 HC RX CONTRAST MEDICATION: Performed by: INTERNAL MEDICINE

## 2022-04-22 RX ADMIN — IOHEXOL 50 ML: 240 INJECTION, SOLUTION INTRATHECAL; INTRAVASCULAR; INTRAVENOUS; ORAL at 09:51

## 2022-04-22 RX ADMIN — IOPAMIDOL 75 ML: 755 INJECTION, SOLUTION INTRAVENOUS at 09:51

## 2022-08-04 ENCOUNTER — HOSPITAL ENCOUNTER (OUTPATIENT)
Dept: CT IMAGING | Age: 77
Discharge: HOME | End: 2022-08-04
Payer: COMMERCIAL

## 2022-08-04 DIAGNOSIS — C34.82 MALIGNANT NEOPLASM OF OVERLAPPING SITES OF LEFT BRONCHUS AND LUNG (HCC): ICD-10-CM

## 2022-08-04 PROCEDURE — 74177 CT ABD & PELVIS W/CONTRAST: CPT

## 2022-08-04 PROCEDURE — 6360000004 HC RX CONTRAST MEDICATION: Performed by: NURSE PRACTITIONER

## 2022-08-04 RX ADMIN — IOPAMIDOL 75 ML: 755 INJECTION, SOLUTION INTRAVENOUS at 10:13

## 2022-08-04 RX ADMIN — IOHEXOL 50 ML: 240 INJECTION, SOLUTION INTRATHECAL; INTRAVASCULAR; INTRAVENOUS; ORAL at 10:13

## 2022-08-22 ENCOUNTER — HOSPITAL ENCOUNTER (INPATIENT)
Age: 77
LOS: 2 days | Discharge: HOME HEALTH | DRG: 871 | End: 2022-08-25
Attending: EMERGENCY MEDICINE | Admitting: INTERNAL MEDICINE
Payer: MEDICARE

## 2022-08-22 ENCOUNTER — APPOINTMENT (OUTPATIENT)
Dept: GENERAL RADIOLOGY | Age: 77
DRG: 871 | End: 2022-08-22
Payer: MEDICARE

## 2022-08-22 DIAGNOSIS — D61.818 PANCYTOPENIA (HCC): ICD-10-CM

## 2022-08-22 DIAGNOSIS — A41.9 SEPTICEMIA (HCC): Primary | ICD-10-CM

## 2022-08-22 DIAGNOSIS — D70.9 NEUTROPENIA, UNSPECIFIED TYPE (HCC): ICD-10-CM

## 2022-08-22 DIAGNOSIS — J18.9 PNEUMONIA OF LEFT LOWER LOBE DUE TO INFECTIOUS ORGANISM: ICD-10-CM

## 2022-08-22 DIAGNOSIS — N39.0 URINARY TRACT INFECTION WITH HEMATURIA, SITE UNSPECIFIED: ICD-10-CM

## 2022-08-22 DIAGNOSIS — R00.0 TACHYCARDIA: ICD-10-CM

## 2022-08-22 DIAGNOSIS — C34.90 MALIGNANT NEOPLASM OF LUNG, UNSPECIFIED LATERALITY, UNSPECIFIED PART OF LUNG (HCC): ICD-10-CM

## 2022-08-22 DIAGNOSIS — R31.9 URINARY TRACT INFECTION WITH HEMATURIA, SITE UNSPECIFIED: ICD-10-CM

## 2022-08-22 LAB
A/G RATIO: 1.3 (ref 1.1–2.2)
ALBUMIN SERPL-MCNC: 3.9 G/DL (ref 3.4–5)
ALP BLD-CCNC: 155 U/L (ref 40–129)
ALT SERPL-CCNC: 23 U/L (ref 10–40)
ANION GAP SERPL CALCULATED.3IONS-SCNC: 13 MMOL/L (ref 3–16)
AST SERPL-CCNC: 28 U/L (ref 15–37)
BASOPHILS ABSOLUTE: 0 K/UL (ref 0–0.2)
BASOPHILS RELATIVE PERCENT: 0.3 %
BILIRUB SERPL-MCNC: 0.8 MG/DL (ref 0–1)
BUN BLDV-MCNC: 17 MG/DL (ref 7–20)
CALCIUM SERPL-MCNC: 8.8 MG/DL (ref 8.3–10.6)
CHLORIDE BLD-SCNC: 99 MMOL/L (ref 99–110)
CO2: 21 MMOL/L (ref 21–32)
CREAT SERPL-MCNC: 1 MG/DL (ref 0.8–1.3)
EOSINOPHILS ABSOLUTE: 0 K/UL (ref 0–0.6)
EOSINOPHILS RELATIVE PERCENT: 0.7 %
GFR AFRICAN AMERICAN: >60
GFR NON-AFRICAN AMERICAN: >60
GLUCOSE BLD-MCNC: 215 MG/DL (ref 70–99)
GLUCOSE BLD-MCNC: 225 MG/DL (ref 70–99)
HCT VFR BLD CALC: 29.9 % (ref 40.5–52.5)
HEMATOLOGY PATH CONSULT: YES
HEMOGLOBIN: 10.2 G/DL (ref 13.5–17.5)
LYMPHOCYTES ABSOLUTE: 0.2 K/UL (ref 1–5.1)
LYMPHOCYTES RELATIVE PERCENT: 11.2 %
MCH RBC QN AUTO: 29.8 PG (ref 26–34)
MCHC RBC AUTO-ENTMCNC: 34.3 G/DL (ref 31–36)
MCV RBC AUTO: 86.8 FL (ref 80–100)
MONOCYTES ABSOLUTE: 0.3 K/UL (ref 0–1.3)
MONOCYTES RELATIVE PERCENT: 15.9 %
NEUTROPHILS ABSOLUTE: 1.3 K/UL (ref 1.7–7.7)
NEUTROPHILS RELATIVE PERCENT: 71.9 %
PDW BLD-RTO: 15.8 % (ref 12.4–15.4)
PERFORMED ON: ABNORMAL
PLATELET # BLD: 77 K/UL (ref 135–450)
PLATELET SLIDE REVIEW: ABNORMAL
PMV BLD AUTO: 6.9 FL (ref 5–10.5)
POTASSIUM REFLEX MAGNESIUM: 4.7 MMOL/L (ref 3.5–5.1)
RBC # BLD: 3.44 M/UL (ref 4.2–5.9)
SLIDE REVIEW: ABNORMAL
SODIUM BLD-SCNC: 133 MMOL/L (ref 136–145)
TOTAL PROTEIN: 7 G/DL (ref 6.4–8.2)
WBC # BLD: 1.8 K/UL (ref 4–11)

## 2022-08-22 PROCEDURE — 99285 EMERGENCY DEPT VISIT HI MDM: CPT

## 2022-08-22 PROCEDURE — 74018 RADEX ABDOMEN 1 VIEW: CPT

## 2022-08-22 PROCEDURE — 96365 THER/PROPH/DIAG IV INF INIT: CPT

## 2022-08-22 PROCEDURE — 80053 COMPREHEN METABOLIC PANEL: CPT

## 2022-08-22 PROCEDURE — 96375 TX/PRO/DX INJ NEW DRUG ADDON: CPT

## 2022-08-22 PROCEDURE — 85025 COMPLETE CBC W/AUTO DIFF WBC: CPT

## 2022-08-22 PROCEDURE — 36415 COLL VENOUS BLD VENIPUNCTURE: CPT

## 2022-08-22 ASSESSMENT — PAIN - FUNCTIONAL ASSESSMENT: PAIN_FUNCTIONAL_ASSESSMENT: NONE - DENIES PAIN

## 2022-08-23 ENCOUNTER — APPOINTMENT (OUTPATIENT)
Dept: GENERAL RADIOLOGY | Age: 77
DRG: 871 | End: 2022-08-23
Payer: MEDICARE

## 2022-08-23 PROBLEM — J15.69 GRAM-NEGATIVE PNEUMONIA: Status: ACTIVE | Noted: 2022-08-23

## 2022-08-23 PROBLEM — N39.0 UTI (URINARY TRACT INFECTION): Status: ACTIVE | Noted: 2022-08-23

## 2022-08-23 PROBLEM — J15.6 GRAM-NEGATIVE PNEUMONIA (HCC): Status: ACTIVE | Noted: 2022-08-23

## 2022-08-23 PROBLEM — A41.9 SEPSIS (HCC): Status: ACTIVE | Noted: 2022-08-23

## 2022-08-23 LAB
ANION GAP SERPL CALCULATED.3IONS-SCNC: 13 MMOL/L (ref 3–16)
BACTERIA: ABNORMAL /HPF
BILIRUBIN URINE: NEGATIVE
BLOOD, URINE: ABNORMAL
BUN BLDV-MCNC: 22 MG/DL (ref 7–20)
CALCIUM SERPL-MCNC: 8.5 MG/DL (ref 8.3–10.6)
CHLORIDE BLD-SCNC: 99 MMOL/L (ref 99–110)
CLARITY: CLEAR
CO2: 20 MMOL/L (ref 21–32)
COLOR: YELLOW
CREAT SERPL-MCNC: 1.4 MG/DL (ref 0.8–1.3)
EPITHELIAL CELLS, UA: ABNORMAL /HPF (ref 0–5)
GFR AFRICAN AMERICAN: 59
GFR NON-AFRICAN AMERICAN: 49
GLUCOSE BLD-MCNC: 202 MG/DL (ref 70–99)
GLUCOSE BLD-MCNC: 260 MG/DL (ref 70–99)
GLUCOSE BLD-MCNC: 264 MG/DL (ref 70–99)
GLUCOSE BLD-MCNC: 354 MG/DL (ref 70–99)
GLUCOSE URINE: 100 MG/DL
HEMATOLOGY PATH CONSULT: NORMAL
KETONES, URINE: NEGATIVE MG/DL
LACTIC ACID: 3.5 MMOL/L (ref 0.4–2)
LACTIC ACID: 3.6 MMOL/L (ref 0.4–2)
LEUKOCYTE ESTERASE, URINE: ABNORMAL
MAGNESIUM: 1.4 MG/DL (ref 1.8–2.4)
MICROSCOPIC EXAMINATION: YES
MUCUS: ABNORMAL /LPF
NITRITE, URINE: NEGATIVE
PERFORMED ON: ABNORMAL
PH UA: 5.5 (ref 5–8)
POTASSIUM REFLEX MAGNESIUM: 3.1 MMOL/L (ref 3.5–5.1)
PROCALCITONIN: 53.52 NG/ML (ref 0–0.15)
PROTEIN UA: 30 MG/DL
RBC UA: ABNORMAL /HPF (ref 0–4)
REPORT: NORMAL
SODIUM BLD-SCNC: 132 MMOL/L (ref 136–145)
SPECIFIC GRAVITY UA: 1.02 (ref 1–1.03)
URINE REFLEX TO CULTURE: YES
URINE TYPE: ABNORMAL
UROBILINOGEN, URINE: 0.2 E.U./DL
WBC UA: ABNORMAL /HPF (ref 0–5)

## 2022-08-23 PROCEDURE — 6370000000 HC RX 637 (ALT 250 FOR IP): Performed by: INTERNAL MEDICINE

## 2022-08-23 PROCEDURE — 94640 AIRWAY INHALATION TREATMENT: CPT

## 2022-08-23 PROCEDURE — 84145 PROCALCITONIN (PCT): CPT

## 2022-08-23 PROCEDURE — 6360000002 HC RX W HCPCS: Performed by: INTERNAL MEDICINE

## 2022-08-23 PROCEDURE — 2060000000 HC ICU INTERMEDIATE R&B

## 2022-08-23 PROCEDURE — 97166 OT EVAL MOD COMPLEX 45 MIN: CPT

## 2022-08-23 PROCEDURE — 80048 BASIC METABOLIC PNL TOTAL CA: CPT

## 2022-08-23 PROCEDURE — 87186 SC STD MICRODIL/AGAR DIL: CPT

## 2022-08-23 PROCEDURE — 87150 DNA/RNA AMPLIFIED PROBE: CPT

## 2022-08-23 PROCEDURE — 97110 THERAPEUTIC EXERCISES: CPT

## 2022-08-23 PROCEDURE — 87086 URINE CULTURE/COLONY COUNT: CPT

## 2022-08-23 PROCEDURE — 97162 PT EVAL MOD COMPLEX 30 MIN: CPT

## 2022-08-23 PROCEDURE — 2580000003 HC RX 258: Performed by: INTERNAL MEDICINE

## 2022-08-23 PROCEDURE — 2580000003 HC RX 258: Performed by: EMERGENCY MEDICINE

## 2022-08-23 PROCEDURE — 6360000002 HC RX W HCPCS: Performed by: EMERGENCY MEDICINE

## 2022-08-23 PROCEDURE — 87040 BLOOD CULTURE FOR BACTERIA: CPT

## 2022-08-23 PROCEDURE — 83735 ASSAY OF MAGNESIUM: CPT

## 2022-08-23 PROCEDURE — 83605 ASSAY OF LACTIC ACID: CPT

## 2022-08-23 PROCEDURE — 97116 GAIT TRAINING THERAPY: CPT

## 2022-08-23 PROCEDURE — 97535 SELF CARE MNGMENT TRAINING: CPT

## 2022-08-23 PROCEDURE — 81001 URINALYSIS AUTO W/SCOPE: CPT

## 2022-08-23 PROCEDURE — 71045 X-RAY EXAM CHEST 1 VIEW: CPT

## 2022-08-23 RX ORDER — OMEGA-3S/DHA/EPA/FISH OIL/D3 300MG-1000
400 CAPSULE ORAL DAILY
Status: DISCONTINUED | OUTPATIENT
Start: 2022-08-23 | End: 2022-08-25 | Stop reason: HOSPADM

## 2022-08-23 RX ORDER — ENOXAPARIN SODIUM 100 MG/ML
40 INJECTION SUBCUTANEOUS DAILY
Status: CANCELLED | OUTPATIENT
Start: 2022-08-23

## 2022-08-23 RX ORDER — DEXTROSE MONOHYDRATE 100 MG/ML
INJECTION, SOLUTION INTRAVENOUS CONTINUOUS PRN
Status: DISCONTINUED | OUTPATIENT
Start: 2022-08-23 | End: 2022-08-25 | Stop reason: HOSPADM

## 2022-08-23 RX ORDER — INSULIN LISPRO 100 [IU]/ML
0-4 INJECTION, SOLUTION INTRAVENOUS; SUBCUTANEOUS NIGHTLY
Status: DISCONTINUED | OUTPATIENT
Start: 2022-08-23 | End: 2022-08-25 | Stop reason: HOSPADM

## 2022-08-23 RX ORDER — LOSARTAN POTASSIUM 100 MG/1
100 TABLET ORAL DAILY
Status: DISCONTINUED | OUTPATIENT
Start: 2022-08-23 | End: 2022-08-25 | Stop reason: HOSPADM

## 2022-08-23 RX ORDER — SODIUM CHLORIDE 9 MG/ML
INJECTION, SOLUTION INTRAVENOUS PRN
Status: DISCONTINUED | OUTPATIENT
Start: 2022-08-23 | End: 2022-08-25 | Stop reason: HOSPADM

## 2022-08-23 RX ORDER — ONDANSETRON 2 MG/ML
4 INJECTION INTRAMUSCULAR; INTRAVENOUS EVERY 6 HOURS PRN
Status: DISCONTINUED | OUTPATIENT
Start: 2022-08-23 | End: 2022-08-25 | Stop reason: HOSPADM

## 2022-08-23 RX ORDER — FOLIC ACID 1 MG/1
1 TABLET ORAL DAILY
Status: DISCONTINUED | OUTPATIENT
Start: 2022-08-23 | End: 2022-08-25 | Stop reason: HOSPADM

## 2022-08-23 RX ORDER — SODIUM CHLORIDE 9 MG/ML
INJECTION, SOLUTION INTRAVENOUS CONTINUOUS
Status: DISCONTINUED | OUTPATIENT
Start: 2022-08-23 | End: 2022-08-24

## 2022-08-23 RX ORDER — METOPROLOL SUCCINATE 25 MG/1
25 TABLET, EXTENDED RELEASE ORAL DAILY
Status: DISCONTINUED | OUTPATIENT
Start: 2022-08-23 | End: 2022-08-25 | Stop reason: HOSPADM

## 2022-08-23 RX ORDER — ONDANSETRON 2 MG/ML
4 INJECTION INTRAMUSCULAR; INTRAVENOUS ONCE
Status: COMPLETED | OUTPATIENT
Start: 2022-08-23 | End: 2022-08-23

## 2022-08-23 RX ORDER — 0.9 % SODIUM CHLORIDE 0.9 %
1000 INTRAVENOUS SOLUTION INTRAVENOUS ONCE
Status: COMPLETED | OUTPATIENT
Start: 2022-08-23 | End: 2022-08-23

## 2022-08-23 RX ORDER — POTASSIUM CHLORIDE 20 MEQ/1
40 TABLET, EXTENDED RELEASE ORAL ONCE
Status: COMPLETED | OUTPATIENT
Start: 2022-08-23 | End: 2022-08-23

## 2022-08-23 RX ORDER — ACETAMINOPHEN 325 MG/1
650 TABLET ORAL EVERY 6 HOURS PRN
Status: DISCONTINUED | OUTPATIENT
Start: 2022-08-23 | End: 2022-08-25 | Stop reason: HOSPADM

## 2022-08-23 RX ORDER — ACETAMINOPHEN 650 MG/1
650 SUPPOSITORY RECTAL EVERY 6 HOURS PRN
Status: DISCONTINUED | OUTPATIENT
Start: 2022-08-23 | End: 2022-08-25 | Stop reason: HOSPADM

## 2022-08-23 RX ORDER — IPRATROPIUM BROMIDE AND ALBUTEROL SULFATE 2.5; .5 MG/3ML; MG/3ML
1 SOLUTION RESPIRATORY (INHALATION)
Status: DISCONTINUED | OUTPATIENT
Start: 2022-08-23 | End: 2022-08-25

## 2022-08-23 RX ORDER — POLYETHYLENE GLYCOL 3350 17 G/17G
17 POWDER, FOR SOLUTION ORAL DAILY PRN
Status: DISCONTINUED | OUTPATIENT
Start: 2022-08-23 | End: 2022-08-25 | Stop reason: HOSPADM

## 2022-08-23 RX ORDER — MAGNESIUM SULFATE IN WATER 40 MG/ML
4000 INJECTION, SOLUTION INTRAVENOUS ONCE
Status: COMPLETED | OUTPATIENT
Start: 2022-08-23 | End: 2022-08-23

## 2022-08-23 RX ORDER — ATORVASTATIN CALCIUM 10 MG/1
20 TABLET, FILM COATED ORAL DAILY
Status: DISCONTINUED | OUTPATIENT
Start: 2022-08-23 | End: 2022-08-25 | Stop reason: HOSPADM

## 2022-08-23 RX ORDER — ONDANSETRON 4 MG/1
4 TABLET, ORALLY DISINTEGRATING ORAL EVERY 8 HOURS PRN
Status: DISCONTINUED | OUTPATIENT
Start: 2022-08-23 | End: 2022-08-25 | Stop reason: HOSPADM

## 2022-08-23 RX ORDER — INSULIN GLARGINE 100 [IU]/ML
70 INJECTION, SOLUTION SUBCUTANEOUS NIGHTLY
Status: DISCONTINUED | OUTPATIENT
Start: 2022-08-23 | End: 2022-08-25 | Stop reason: HOSPADM

## 2022-08-23 RX ORDER — INSULIN LISPRO 100 [IU]/ML
0-8 INJECTION, SOLUTION INTRAVENOUS; SUBCUTANEOUS
Status: DISCONTINUED | OUTPATIENT
Start: 2022-08-23 | End: 2022-08-25 | Stop reason: HOSPADM

## 2022-08-23 RX ORDER — M-VIT,TX,IRON,MINS/CALC/FOLIC 27MG-0.4MG
1 TABLET ORAL DAILY
Status: DISCONTINUED | OUTPATIENT
Start: 2022-08-23 | End: 2022-08-25 | Stop reason: HOSPADM

## 2022-08-23 RX ORDER — SODIUM CHLORIDE 0.9 % (FLUSH) 0.9 %
5-40 SYRINGE (ML) INJECTION EVERY 12 HOURS SCHEDULED
Status: DISCONTINUED | OUTPATIENT
Start: 2022-08-23 | End: 2022-08-25 | Stop reason: HOSPADM

## 2022-08-23 RX ORDER — LEVOFLOXACIN 5 MG/ML
750 INJECTION, SOLUTION INTRAVENOUS EVERY 24 HOURS
Status: DISCONTINUED | OUTPATIENT
Start: 2022-08-23 | End: 2022-08-25 | Stop reason: HOSPADM

## 2022-08-23 RX ORDER — SODIUM CHLORIDE 0.9 % (FLUSH) 0.9 %
5-40 SYRINGE (ML) INJECTION PRN
Status: DISCONTINUED | OUTPATIENT
Start: 2022-08-23 | End: 2022-08-25 | Stop reason: HOSPADM

## 2022-08-23 RX ADMIN — IPRATROPIUM BROMIDE AND ALBUTEROL SULFATE 1 AMPULE: 2.5; .5 SOLUTION RESPIRATORY (INHALATION) at 15:28

## 2022-08-23 RX ADMIN — FOLIC ACID 1 MG: 1 TABLET ORAL at 08:48

## 2022-08-23 RX ADMIN — SODIUM CHLORIDE: 9 INJECTION, SOLUTION INTRAVENOUS at 08:52

## 2022-08-23 RX ADMIN — SODIUM CHLORIDE: 9 INJECTION, SOLUTION INTRAVENOUS at 20:32

## 2022-08-23 RX ADMIN — CEFEPIME 2000 MG: 2 INJECTION, POWDER, FOR SOLUTION INTRAVENOUS at 14:42

## 2022-08-23 RX ADMIN — SODIUM CHLORIDE: 9 INJECTION, SOLUTION INTRAVENOUS at 14:40

## 2022-08-23 RX ADMIN — CHOLECALCIFEROL TAB 10 MCG (400 UNIT) 400 UNITS: 10 TAB at 08:48

## 2022-08-23 RX ADMIN — Medication 1 TABLET: at 08:48

## 2022-08-23 RX ADMIN — LEVOFLOXACIN 750 MG: 5 INJECTION, SOLUTION INTRAVENOUS at 05:54

## 2022-08-23 RX ADMIN — INSULIN LISPRO 4 UNITS: 100 INJECTION, SOLUTION INTRAVENOUS; SUBCUTANEOUS at 17:14

## 2022-08-23 RX ADMIN — ONDANSETRON 4 MG: 2 INJECTION INTRAMUSCULAR; INTRAVENOUS at 01:33

## 2022-08-23 RX ADMIN — POTASSIUM CHLORIDE 40 MEQ: 1500 TABLET, EXTENDED RELEASE ORAL at 10:31

## 2022-08-23 RX ADMIN — SODIUM CHLORIDE 1000 ML: 9 INJECTION, SOLUTION INTRAVENOUS at 01:33

## 2022-08-23 RX ADMIN — INSULIN GLARGINE 70 UNITS: 100 INJECTION, SOLUTION SUBCUTANEOUS at 20:34

## 2022-08-23 RX ADMIN — CEFEPIME 2000 MG: 2 INJECTION, POWDER, FOR SOLUTION INTRAVENOUS at 08:53

## 2022-08-23 RX ADMIN — VANCOMYCIN HYDROCHLORIDE 1000 MG: 1 INJECTION, POWDER, LYOPHILIZED, FOR SOLUTION INTRAVENOUS at 03:47

## 2022-08-23 RX ADMIN — SODIUM CHLORIDE: 9 INJECTION, SOLUTION INTRAVENOUS at 05:52

## 2022-08-23 RX ADMIN — VANCOMYCIN HYDROCHLORIDE 750 MG: 750 INJECTION, POWDER, LYOPHILIZED, FOR SOLUTION INTRAVENOUS at 18:55

## 2022-08-23 RX ADMIN — ACETAMINOPHEN 650 MG: 325 TABLET ORAL at 05:39

## 2022-08-23 RX ADMIN — IPRATROPIUM BROMIDE AND ALBUTEROL SULFATE 1 AMPULE: 2.5; .5 SOLUTION RESPIRATORY (INHALATION) at 20:00

## 2022-08-23 RX ADMIN — ATORVASTATIN CALCIUM 20 MG: 10 TABLET, FILM COATED ORAL at 08:48

## 2022-08-23 RX ADMIN — POLYETHYLENE GLYCOL 3350 17 G: 17 POWDER, FOR SOLUTION ORAL at 10:49

## 2022-08-23 RX ADMIN — SODIUM CHLORIDE: 9 INJECTION, SOLUTION INTRAVENOUS at 10:28

## 2022-08-23 RX ADMIN — Medication 10 ML: at 08:48

## 2022-08-23 RX ADMIN — MAGNESIUM SULFATE HEPTAHYDRATE 4000 MG: 40 INJECTION, SOLUTION INTRAVENOUS at 10:31

## 2022-08-23 RX ADMIN — INSULIN LISPRO 4 UNITS: 100 INJECTION, SOLUTION INTRAVENOUS; SUBCUTANEOUS at 20:34

## 2022-08-23 RX ADMIN — CEFTRIAXONE SODIUM 1000 MG: 1 INJECTION, POWDER, FOR SOLUTION INTRAMUSCULAR; INTRAVENOUS at 02:14

## 2022-08-23 ASSESSMENT — PAIN SCALES - GENERAL
PAINLEVEL_OUTOF10: 0

## 2022-08-23 ASSESSMENT — ENCOUNTER SYMPTOMS
SHORTNESS OF BREATH: 0
ABDOMINAL PAIN: 0
COUGH: 1
NAUSEA: 0
VOMITING: 0

## 2022-08-23 NOTE — PROGRESS NOTES
Paged Dr. Bandar Fong \"blood cultures positive \"Serratia marcescens\" in all four tubes.  Thanks\"

## 2022-08-23 NOTE — PROGRESS NOTES
Occupational Therapy  [address]  Occupational Therapy Initial Assessment    Name: Liz Sheldon  : 1945  MRN: [id number]  Date of Service: 2022    Discharge Recommendations:  Home with Home health OT, 24 hour supervision or assist  OT Equipment Recommendations  Equipment Needed: No       Patient Diagnosis(es): The primary encounter diagnosis was Septicemia ([address] Diagnoses of Pancytopenia ([address] Neutropenia, unspecified type ([address] Tachycardia, Malignant neoplasm of lung, unspecified laterality, unspecified part of lung ([address] Pneumonia of left lower lobe due to infectious organism, and Urinary tract infection with hematuria, site unspecified were also pertinent to this visit. Past Medical History:  has a past medical history of Cancer ([address] Diabetes mellitus ([address] Hyperlipidemia, Hypertension, Kidney stone, Lung cancer ([address] and Lung cancer ([address] Past Surgical History:  has a past surgical history that includes Prostate surgery; Cystoscopy (Right, 2020); back surgery (2012); NEPHROLITHOTOMY (Left, 2020); shoulder surgery (Right); and Port Surgery (N/A, 2021). Treatment Diagnosis: impaired mobility      Assessment   Performance deficits / Impairments: Decreased functional mobility ; Decreased strength;Decreased endurance;Decreased safe awareness;Decreased ADL status; Decreased balance;Decreased fine motor control;Decreased coordination  Assessment: Pt is a 64 yo male admitted to [facility] with a dx of pneumonia. Pt lives in a 2 story home with his wife and reports being min-mod A for dressing/toileting and IND with bathing and mobility without AD at baseline. Currently pt requiring CGA for functional mobility with [facility] and B  strength defiits noted. Pt most limited by fatigue. Pt would benefit from skilled occupational therapy services during acute hospital stay.  Recommend pt to return home with 24H supv/assist + HHOT once medically stable. Treatment Diagnosis: impaired mobility  Prognosis: Good  Decision Making: Medium Complexity  REQUIRES OT FOLLOW-UP: Yes  Activity Tolerance  Activity Tolerance: Patient limited by fatigue  Activity Tolerance Comments: Pt demo's SOB and fatigue throughout session. Vitals WFL.         Plan   Plan  Times per Week: 3-5x  Current Treatment Recommendations: Strengthening, Balance training, Gait training, Functional mobility training, Endurance training, Safety education & training, Pain management, Equipment evaluation, education, & procurement, Positioning, Patient/Caregiver education & training, Home management training, Self-Care / ADL     Restrictions  Restrictions/Precautions  Restrictions/Precautions: Fall Risk, General Precautions  Position Activity Restriction  Other position/activity restrictions: tele, IV, up with assist    Subjective   General  Patient assessed for rehabilitation services?: Yes     Social/Functional History  Social/Functional History  Lives With: Spouse  Type of Home: House  Home Layout: Two level, Able to Live on Main level with bedroom/bathroom (does not go upstairs)  Home Access: Stairs to enter without rails  Entrance Stairs - Number of Steps: 2 ANAMARIA  Bathroom Shower/Tub: Walk-in shower  Bathroom Toilet: Handicap height  Bathroom Equipment: Built-in shower seat, Grab bars in shower, Hand-held shower  Home Equipment: Rosy Goldmann, standard (does not use AD)  Has the patient had two or more falls in the past year or any fall with injury in the past year?: No  Receives Help From: Family  ADL Assistance: Needs assistance  Bath: Independent  Dressing: Minimal assistance (sock management)  Grooming: Independent  Feeding: Independent  Toileting: Needs assistance (min A for posterior pericare)  Homemaking Responsibilities:  (wife completes IADLs)  Meal Prep Responsibility: No  Laundry Responsibility: No  Cleaning Responsibility: No  Shopping Responsibility: No  Ambulation Assistance: Independent  Transfer Assistance: Independent  Active : Yes  Mode of Transportation: Truck, SUV (typically ride in [place], difficulty performing transfers for truck)  Occupation: Retired  Type of Occupation: iron work  Leisure & Hobbies: watch tv  Additional Comments: Pt reports he receives chemo treatments every 3 weeks with most recent treatment 1 week ago. Pt endorsing generalized fatigue and weakness. Objective   Heart Rate: 82  Heart Rate Source: Monitor  BP: (!) 122/44  BP Location: Right upper arm  BP Method: Automatic  Patient Position: Sitting  MAP (Calculated): 70  Resp: 16  SpO2: 97 %  O2 Device: None (Room air)          Observation/Palpation  Posture: Fair  Body Mechanics: flexed posture, pt reporting baseline d/t arthritis  Safety Devices  Type of Devices: All fall risk precautions in place;Call light within reach; Chair alarm in place;Gait belt;Nurse notified; Left in chair  Bed Mobility Training  Bed Mobility Training: No (pt seated at start and end of session)  Balance  Sitting: Intact  Standing: Impaired  Standing - Static: Good  Standing - Dynamic: Fair  Transfer Training  Transfer Training: Yes  Overall Level of Assistance: Contact-guard assistance  Interventions: Safety awareness training; Tactile cues; Verbal cues  Sit to Stand: Contact-guard assistance (from EOB and chair)  Stand to Sit: Contact-guard assistance  Gait Training  Gait Training: Yes  Gait  Overall Level of Assistance: Contact-guard assistance  Interventions: Safety awareness training; Tactile cues; Verbal cues  Base of Support: Narrowed  Step Length: Right shortened; Other (comment)  Gait Abnormalities:  (Forward flexed posture)  Distance (ft): 45 Feet (10 + 20 + 45)     AROM: Generally decreased, functional (decreased AROM in digits, pt unable to perform opposition with digits 1 and 4/5 in B hands)  Strength: Generally decreased, functional (Grossly WFL(4/5) with the excepetion of  strength 3+/5 (difficulty with container management))  Coordination: Generally decreased, functional  Tone: Normal  Sensation: Impaired (N/T in R digits 3 and 5 finger tips only)  ADL  Grooming: Contact guard assistance  Grooming Skilled Clinical Factors: in stance at sink to brush teeth and hands  LE Dressing: Minimal assistance  LE Dressing Skilled Clinical Factors: threading B feet through pants, pt able to reach towards feet while seated to pull up to thighs and pull over waist in stance with CGA provided for dynamic standing balane  Toileting Skilled Clinical Factors: Pt declining toileting this date  Additional Comments: Pt declining further ADLs     Activity Tolerance  Activity Tolerance: Patient tolerated treatment well        Vision  Vision: Within Functional Limits  Hearing  Hearing: Exceptions to [facility]  Hearing Exceptions: Hard of hearing/hearing concerns; No hearing aid  Cognition  Overall Cognitive Status: WFL  Orientation  Overall Orientation Status: Within Normal Limits  Orientation Level: Oriented X4               A/AROM Exercises: x10 BUE exercises while seated in chair  Education Given To: Patient  Education Provided: Role of Therapy;Plan of Care;Transfer Training  Education Method: Verbal  Barriers to Learning: None  Education Outcome: Verbalized understanding          AM-PAC Score  AM-[facility] Inpatient Daily Activity Raw Score: 16 (08/23/22 1622)  AM-PAC Inpatient ADL T-Scale Score : 35.96 (08/23/22 1622)  ADL Inpatient CMS 0-100% Score: 53.32 (08/23/22 1622)  ADL Inpatient CMS G-Code Modifier : CK (08/23/22 1622)      Goals  Short Term Goals  Time Frame for Short term goals: 1 week (8/30) unless noted  Short Term Goal 1: Pt will complete x15 BUE exercises  Short Term Goal 2: Pt will complete ambulatory toilet transfers with supv  Short Term Goal 3: Pt will be educated on energy conservation techinques to implement upon discharge  Patient Goals   Patient goals : to return home       Therapy Time   Individual Concurrent Group Co-treatment Time In 1347         Time Out 1420         Minutes 33         Timed Code Treatment Minutes: 23 Minutes (10 min eval)     If pt is unable to be seen after this session, please let this note serve as discharge summary. Please see case management note for discharge disposition. Thank you.       Titi Hernandez, OT

## 2022-08-23 NOTE — ED PROVIDER NOTES
Emergency Department Provider Note  Location: [facility] A2 CARD TELEMETRY  8/22/2022     Patient Identification  Marilin Garcia is a 65 y.o. male    Chief Complaint  Fatigue (Fatigue, weakness over the last week since chemo treatment for lung cancer on last Monday, sent in by PCP) and Nausea      HPI    (History provided by patient)    Patient is a 65 y.o. male with a significant PMHx of lung disease metastatic lung cancer, diabetes, hyperlipidemia, and other comorbidities as listed below, that presents emergency department today with concerns of neutropenia by his hematology oncologist.  Patient has had a slight cough, and what wife describes as rigors, denies any known fevers. No nausea, vomiting, diarrhea. Poor p.o. intake, and has not drink much fluid over the past couple of days. No injuries. No falls. Patient is not confused, per wife bedside. Not in any pain today in the emergency department. Patient is alert, conversational, in no acute pulmonary distress. Recent antibiotics. Recently restarted mouth; last round last week. I ;have reviewed the following nursing documentation:  Allergies: No Known Allergies    Past medical history:  has a past medical history of Cancer ([address] Diabetes mellitus ([facility] Hyperlipidemia, Hypertension, Kidney stone, Lung cancer ([address] (10/02/2021), and Lung cancer ([facility] [address] [facility] Past surgical history:  has a past surgical history that includes Prostate surgery; Cystoscopy (Right, 11/9/2020); back surgery (2012); NEPHROLITHOTOMY (Left, 12/4/2020); shoulder surgery (Right); and Port Surgery (N/A, 11/4/2021). Home medications:   Prior to Admission medications    Medication Sig Start Date End Date Taking?  Authorizing Provider   folic acid (FOLVITE) 1 MG tablet Take 1 mg by mouth daily    Historical Provider, MD   metoprolol succinate (TOPROL XL) 25 MG extended release tablet Take 1 tablet by mouth once daily 12/23/21   Megan Nam MD   atorvastatin (LIPITOR) 20 MG tablet Take 20 mg by mouth daily     Historical Provider, MD   losartan (COZAAR) 100 MG tablet Take 100 mg by mouth daily    Historical Provider, MD   Multiple Vitamins-Minerals (THERAPEUTIC MULTIVITAMIN-MINERALS) tablet Take 1 tablet by mouth daily    Historical Provider, MD   albuterol sulfate HFA (PROVENTIL HFA) 108 (90 Base) MCG/ACT inhaler Inhale 2 puffs into the lungs every 4 hours as needed for Wheezing or Shortness of Breath (Space out to every 6 hours as symptoms improve) Space out to every 6 hours as symptoms improve. Patient not taking: Reported on 3/1/2022 5/28/21   Jadyn Arnold, APRN - CNP   vitamin D3 (CHOLECALCIFEROL) 10 MCG (400 UNIT) TABS tablet Take 400 Units by mouth daily    Historical Provider, MD   insulin aspart (NOVOLOG) 100 UNIT/ML injection vial Inject into the skin 3 times daily (before meals) Sliding scale    Historical Provider, MD   Insulin Glargine (TOUJEO SOLOSTAR SC) Inject 70 Units into the skin nightly     Historical Provider, MD       Social history:  reports that he quit smoking about 45 years ago. He started smoking about 62 years ago. He has never used smokeless tobacco. He reports that he does not drink alcohol and does not use drugs. Family history:  History reviewed. No pertinent family history. ROS  Review of Systems   Constitutional:  Positive for appetite change, chills and fatigue. Negative for activity change and fever. HENT:  Negative for congestion and postnasal drip. Respiratory:  Positive for cough. Negative for shortness of breath. Cardiovascular:  Negative for chest pain. Gastrointestinal:  Negative for abdominal pain, nausea and vomiting. Genitourinary:  Negative for difficulty urinating and flank pain. Skin:  Negative for rash and wound. Neurological:  Negative for dizziness and light-headedness.        Exam  Vitals:    08/23/22 0356 08/23/22 0430 08/23/22 0512 08/23/22 0810   BP: (!) 107/53 (!) 111/55 (!) 102/49 91/61   Pulse: David Hogan 103 (!) 101 (!) 101 95   Resp: 24 23 18 18   Temp:  (!) 100.6 °F (38.1 °C) 99.1 °F (37.3 °C) 97.5 °F (36.4 °C)   TempSrc:  Oral Oral Oral   SpO2: 94% 94% 95% 96%   Weight:       Height:             Physical Exam  Constitutional:       Appearance: Normal appearance. He is normal weight. He is ill-appearing. He is not diaphoretic. Comments: Appears as if not feeling well, however interactive, conversational, pleasant, and in no acute clinical cardiopulmonary distress. HENT:      Head: Normocephalic and atraumatic. Right Ear: External ear normal.      Left Ear: External ear normal.      Nose: Nose normal.      Mouth/Throat:      Mouth: Mucous membranes are moist.      Pharynx: Oropharynx is clear. Eyes:      General:         Right eye: No discharge. Left eye: No discharge. Conjunctiva/sclera: Conjunctivae normal.      Pupils: Pupils are equal, round, and reactive to light. Cardiovascular:      Rate and Rhythm: Regular rhythm. Tachycardia present. Comments: Intermittently tachycardic to 105 on cardiac monitoring  Pulmonary:      Effort: Pulmonary effort is normal. No respiratory distress. Breath sounds: Rhonchi (R>L, mild, bases) present. Abdominal:      General: Abdomen is flat. There is no distension. Palpations: Abdomen is soft. Musculoskeletal:         General: No swelling or tenderness. Cervical back: Normal range of motion and neck supple. Skin:     General: Skin is warm and dry. Findings: No rash. Neurological:      General: No focal deficit present. Mental Status: He is alert and oriented to person, place, and time. Psychiatric:         Mood and Affect: Mood normal.         Thought Content:  Thought content normal.     ED Course    ED Medication Orders (From admission, onward)      Start Ordered     Status Ordering Provider    08/23/22 2100 08/23/22 0503  insulin glargine (LANTUS) injection vial 70 Units  NIGHTLY         Acknowledged EVELIN NOREEN    08/23/22 0530 08/23/22 0509  0.9 % sodium chloride infusion  CONTINUOUS         Last MAR action: New Bag - by CALIN DUPONT on 08/23/22 at [address], [place]    08/23/22 0530 08/23/22 0509  levoFLOXacin (LEVAQUIN) 750 MG/150ML infusion 750 mg  EVERY 24 HOURS        Question Answer Comment   Antimicrobial Indications Pneumonia (CAP)    CAP duration of therapy 7 days        Last MAR action: Stopped - by Sofía Mike on 08/23/22 at [address] NOREEN    08/23/22 0509 08/23/22 0509  sodium chloride flush 0.9 % injection 5-40 mL  PRN         Acknowledged WATERS CORAZONSIMIN LEIGHEE    08/23/22 0509 08/23/22 0509  0.9 % sodium chloride infusion  PRN         Acknowledged WATERS CORAZON NOREEN    08/23/22 0509 08/23/22 0509  ondansetron (ZOFRAN-ODT) disintegrating tablet 4 mg  EVERY 8 HOURS PRN        See Hyperspace for full Linked Orders Report. Acknowledged WATERS CORAZON NOREEN    08/23/22 0509 08/23/22 0509  ondansetron (ZOFRAN) injection 4 mg  EVERY 6 HOURS PRN        See Hyperspace for full Linked Orders Report. Acknowledged WATERS CORAZON NOREEN    08/23/22 0509 08/23/22 0509  polyethylene glycol (GLYCOLAX) packet 17 g  DAILY PRN         Acknowledged WATERS CORAZON NOREEN    08/23/22 0509 08/23/22 0509  acetaminophen (TYLENOL) tablet 650 mg  EVERY 6 HOURS PRN        See Hyperspace for full Linked Orders Report. Last MAR action: Given - by CALIN DUPONT on 08/23/22 at [address], [place]    08/23/22 0509 08/23/22 0509  acetaminophen (TYLENOL) suppository 650 mg  EVERY 6 HOURS PRN        See Hyperspace for full Linked Orders Report.     Last MAR action: See Alternative - by CALIN DUPONT on 08/23/22 at [address] NOREEN    08/23/22 0330 08/23/22 0322  vancomycin 1000 mg IVPB in 250 mL D5W addavial  ONCE        Question:  Antimicrobial Indications  Answer:  Pneumonia (CAP)    Last MAR action: Stopped - by CALIN DUPONT on 08/23/22 at [facility], [place]    08/23/22 5563 08/23/22 0152  cefTRIAXone (ROCEPHIN) 1,000 mg in dextrose 5 % 50 mL IVPB mini-bag  ONCE        Question:  Antimicrobial Indications  Answer:  Bloodstream Infection    Last MAR action: Stopped - by CHARLEY CRAIN on 08/23/22 at [address]    08/23/22 0130 08/23/22 0125  0.9 % sodium chloride bolus  ONCE         Last MAR action: Stopped - by CHARLEY CRAIN on 08/23/22 at [id number]    08/23/22 0130 08/23/22 0125  ondansetron (ZOFRAN) injection 4 mg  ONCE         Last MAR action: Given - by CHARLEY CRAIN on 08/23/22 at [address], [place]              Radiology  XR ABDOMEN (KUB) (SINGLE AP VIEW)    Result Date: 8/23/2022  EXAMINATION: ONE SUPINE XRAY VIEW(S) OF THE ABDOMEN 8/22/2022 11:46 pm COMPARISON: 12/11/2020 HISTORY: ORDERING SYSTEM PROVIDED HISTORY: possible constipation TECHNOLOGIST PROVIDED HISTORY: Reason for exam:->possible constipation FINDINGS: Degenerative changes are seen in the spine and pelvis. No acute fracture is identified. No osseous destructive process. No significant stool volume. No free air is identified. Mild stool noted. Osseous metastatic disease is again identified as demonstrated on recent CT imaging. Mild stool volume in the colon, without free air or constipation. XR CHEST PORTABLE    Result Date: 8/23/2022  EXAMINATION: ONE XRAY VIEW OF THE CHEST 8/23/2022 2:23 am COMPARISON: 06/05/2022, recent CT chest on 08/04/2022 HISTORY: ORDERING SYSTEM PROVIDED HISTORY: r/o PNA TECHNOLOGIST PROVIDED HISTORY: Reason for exam:->r/o PNA Reason for Exam: r/o PNA FINDINGS: Cardiac and mediastinal silhouettes appear overall stable. There is a right-sided chest port. Right basilar atelectasis. Within the left lung, there is interval improvement in a left basilar pleural effusion when compared to the previous examination. The known mass in the medial lung base at the left lower lobe is not well seen radiographically.   There is a new focal nodular opacity projecting over the left lung base which may represent a small volume of fluid within the left major fissure or possible focal pneumonic infiltrate. 1. Interval development of discoid atelectasis in the right lung base. 2. Improved pleural effusion at the left lung base, with a focal left basilar infiltrate which may represent atelectasis or pneumonia. There is a lateral nodular component which could represent a small amount of fluid within the fissure. This can be reassessed on follow-up radiographs or later CT imaging for patient's cancer follow-up studies.       Labs  Results for orders placed or performed during the hospital encounter of 08/22/22   Comprehensive Metabolic Panel w/ Reflex to MG   Result Value Ref Range    Sodium 133 (L) 136 - 145 mmol/L    Potassium reflex Magnesium 4.7 3.5 - 5.1 mmol/L    Chloride 99 99 - 110 mmol/L    CO2 21 21 - 32 mmol/L    Anion Gap 13 3 - 16    Glucose 215 (H) 70 - 99 mg/dL    BUN 17 7 - 20 mg/dL    Creatinine 1.0 0.8 - 1.3 mg/dL    GFR Non-African American >60 >60    GFR African American >60 >60    Calcium 8.8 8.3 - 10.6 mg/dL    Total Protein 7.0 6.4 - 8.2 g/dL    Albumin 3.9 3.4 - 5.0 g/dL    Albumin/Globulin Ratio 1.3 1.1 - 2.2    Total Bilirubin 0.8 0.0 - 1.0 mg/dL    Alkaline Phosphatase 155 (H) 40 - 129 U/L    ALT 23 10 - 40 U/L    AST 28 15 - 37 U/L   Urinalysis with Reflex to Culture    Specimen: Urine   Result Value Ref Range    Color, UA Yellow Straw/Yellow    Clarity, UA Clear Clear    Glucose, Ur 100 (A) Negative mg/dL    Bilirubin Urine Negative Negative    Ketones, Urine Negative Negative mg/dL    Specific Gravity, UA 1.020 1.005 - 1.030    Blood, Urine TRACE-LYSED (A) Negative    pH, UA 5.5 5.0 - 8.0    Protein, UA 30 (A) Negative mg/dL    Urobilinogen, Urine 0.2 <2.0 E.U./dL    Nitrite, Urine Negative Negative    Leukocyte Esterase, Urine LARGE (A) Negative    Microscopic Examination YES     Urine Type NotGiven     Urine Reflex to Culture Yes    CBC with Auto Differential reviewed. - Patient is a 65 y.o. male with symptoms of the low white blood cell count in the setting of recent chemotherapy initiation, sent in by hematology oncology. Presenting with a cough. Slightly tachycardic today in the ED. Not febrile. Patient's white blood cell count is down to 1.8, but pancytopenic, with low platelets at 77, low hematocrit at 29.9. Neutropenic with an absolute neutrophil count of 1.3. Chest x-ray with a small volume of fluid in the left lung base with possible focal pneumonia. Lactic acidosis to 3.6. Patient is septic, neutropenic, chemotherapy patient. KUB ordered as patient has been having some constipation, however without free air or obstruction. urinalysis eventually results with concerns for UTI. IV hydration provided, though not in septic shock not requiring 30 cc/kg fluid bolus. He is fluid responsive today in the emergency department with 1 L. Discussed with hospitalist, who is available to evaluate patient, and place admission orders today in the ED. Remained stable for transfer to the floor. Patient and family agree with plan.       Medications   atorvastatin (LIPITOR) tablet 20 mg (has no administration in time range)   folic acid (FOLVITE) tablet 1 mg (has no administration in time range)   insulin glargine (LANTUS) injection vial 70 Units (has no administration in time range)   losartan (COZAAR) tablet 100 mg (has no administration in time range)   metoprolol succinate (TOPROL XL) extended release tablet 25 mg (has no administration in time range)   therapeutic multivitamin-minerals 1 tablet (has no administration in time range)   vitamin D3 (CHOLECALCIFEROL) tablet 400 Units (has no administration in time range)   sodium chloride flush 0.9 % injection 5-40 mL (has no administration in time range)   sodium chloride flush 0.9 % injection 5-40 mL (has no administration in time range)   0.9 % sodium chloride infusion (has no administration in time range) ondansetron (ZOFRAN-ODT) disintegrating tablet 4 mg (has no administration in time range)     Or   ondansetron (ZOFRAN) injection 4 mg (has no administration in time range)   polyethylene glycol (GLYCOLAX) packet 17 g (has no administration in time range)   acetaminophen (TYLENOL) tablet 650 mg (650 mg Oral Given 8/23/22 0539)     Or   acetaminophen (TYLENOL) suppository 650 mg ( Rectal See Alternative 8/23/22 0539)   0.9 % sodium chloride infusion ( IntraVENous New Bag 8/23/22 0552)   ipratropium-albuterol (DUONEB) nebulizer solution 1 ampule (1 ampule Inhalation Not Given 8/23/22 0748)   cefepime (MAXIPIME) 2000 mg IVPB minibag (has no administration in time range)   cefepime (MAXIPIME) 2000 mg IVPB minibag (has no administration in time range)   levoFLOXacin (LEVAQUIN) 750 MG/150ML infusion 750 mg (0 mg IntraVENous Stopped 8/23/22 0755)   vancomycin (VANCOCIN) 750 mg in dextrose 5 % 250 mL IVPB (has no administration in time range)   0.9 % sodium chloride bolus (0 mLs IntraVENous Stopped 8/23/22 0404)   ondansetron (ZOFRAN) injection 4 mg (4 mg IntraVENous Given 8/23/22 0133)   cefTRIAXone (ROCEPHIN) 1,000 mg in dextrose 5 % 50 mL IVPB mini-bag (0 mg IntraVENous Stopped 8/23/22 0250)   vancomycin 1000 mg IVPB in 250 mL D5W addavial (0 mg IntraVENous Stopped 8/23/22 0510)       - I discussed the results, including any incidental findings, with patient and spouse/SO. Questions answered. Patient/family agreeable to plan and express understanding of plan. Disposition:  Admit to telemetry in fair condition. Is this patient to be included in the SEP-1 Core Measure due to severe sepsis or septic shock? No   Exclusion criteria - the patient is NOT to be included for SEP-1 Core Measure due to:  May have criteria for sepsis, but does not meet criteria for severe sepsis or septic shock    Consult this encounter: hospitalist    Clinical Impression:  1. Septicemia ([address]    2. Pancytopenia ([address]    3.  Neutropenia, unspecified type ([address]    4. Tachycardia    5. Malignant neoplasm of lung, unspecified laterality, unspecified part of lung ([address]    6. Pneumonia of left lower lobe due to infectious organism    7. Urinary tract infection with hematuria, site unspecified        Blood pressure 91/61, pulse 95, temperature 97.5 °F (36.4 °C), temperature source Oral, resp. rate 18, height 5' 9\" (1.753 m), weight 198 lb (89.8 kg), SpO2 96 %. Maria R Ramos, DO, am the primary attending of record and contributed the majority of evaluation and treatment of emergent care for this encounter. This chart was generated in part by using Dragon Dictation system and may contain errors related to that system including errors in grammar, punctuation, and spelling, as well as words and phrases that may be inappropriate. If there are any questions or concerns please feel free to contact the dictating provider for clarification.      CRISTY RAMOS, [address] DO  08/23/22 9221

## 2022-08-23 NOTE — ED NOTES
Patient called out needing to use bathroom. Patient ambulated to restroom and used call light in rest room. Patient reports \"I cannot go to bathroom I am in too much pain and shaking\" Patient requesting pain medication, writer informed patient and wife that they need MD to see to be able to order medications. Patient oral temp 98.9 F, skin warm and dry to touch. Provider aware.      Michelle Hameed RN  08/22/22 7925

## 2022-08-23 NOTE — CARE COORDINATION
CASE MANAGEMENT INITIAL ASSESSMENT      Reviewed chart and completed assessment with patient and wife       Health Care Decision Maker :   Primary Decision Maker: Mikey Thayer - Spouse - [phone]          Can this person be reached and be able to respond quickly, such as within a few minutes or hours? Yes      Admit date/status: 8/23/22 Inpatient   Diagnosis: PNA   Is this a Readmission?:  No      Insurance: Medicare, [place]    Precert required for SNF: No       3 night stay required: Yes    Living arrangements, Adls, care needs, prior to admission: live in a house with his wife     Durable Medical Equipment at home: none    Services in the home and/or outpatient, prior to admission: none    Current PCP: Judy Souza MD                     Transportation needs:  patient drives      Dialysis Facility (if applicable)   Name:  Address:  Dialysis Schedule:  Phone:  Fax:    PT/OT recs: home care     Hospital Exemption Notification (HEN): not initiated     Barriers to discharge: none    Plan/comments: Patient is independent at home. Discussed rec's of home care and they are in agreement with this plan and have no preference in home care agency. Spoke to Shea Mark with [facility] and notified of referral.  She states they are able to follow at discharge for home care needs.      ECOC on chart for MD signature

## 2022-08-23 NOTE — VCC REMOTE MONITORING
Attempted to contact primary RN regarding the sepsis bundle.      Rafael Scheuermann MSN, RN, [address] [place] [address] RN   Call back # [phone]

## 2022-08-23 NOTE — CONSULTS
Pharmacy to Dose Vancomycin    Dx: PNA  Goal trough = 15-20  Pt wt = 89.8 kg  Recent Labs     08/22/22  2043   CREATININE 1.0     Recent Labs     08/22/22  2234   WBC 1.8*     Regimen: 750 mg IV every 12 hours.   Exposure target: AUC24 (range)400-600 mg/L.hr   AUC24,ss: 453 mg/L.hr  Probability of AUC24 > 400: 64 %  Ctrough,ss: 15.4 mg/L  Probability of Ctrough,ss > 20: 24 %  Probability of nephrotoxicity (Lodise SAL 2009): 11 %  Vancomycin trough: 8/24 Fito Antonio [facility] 8/23/2022 5:36 AM

## 2022-08-23 NOTE — VCC REMOTE MONITORING
Attempted to contact primary RN regarding this patient. Left message with Bedside RN.      Erasto Dial MSN, RN, [address] [place] [address] RN   Call back # [phone]

## 2022-08-23 NOTE — PLAN OF CARE
Problem: Discharge Planning  Goal: Discharge to home or other facility with appropriate resources  Outcome: Progressing  Flowsheets (Taken 8/23/2022 0546 by Jessica Carpenter RN)  Discharge to home or other facility with appropriate resources: Identify barriers to discharge with patient and caregiver     Problem: Safety - Adult  Goal: Free from fall injury  Outcome: Progressing  Note: Pt will remain free from falls throughout hospital stay. Fall precautions in place, bed alarm on, bed in lowest position with wheels locked and side rails 2/4 up. Room door open and hourly rounding completed. Will continue to monitor throughout shift. Problem: Pain  Goal: Verbalizes/displays adequate comfort level or baseline comfort level  Outcome: Progressing  Note: Pt will be satisfied with pain control. Pt uses numeric pain rating scale with reassessments after pain med administration. Will continue to monitor progression throughout shift.

## 2022-08-23 NOTE — PROGRESS NOTES
Physical Therapy  Facility/Department: [facility]  Physical Therapy Initial Assessment    Name: Rosa Jordan  : 1945  MRN: [id number]  Date of Service: 2022    Discharge Recommendations:  Home with Home health PT, 24 hour supervision or assist   PT Equipment Recommendations  Equipment Needed: No      Patient Diagnosis(es): The primary encounter diagnosis was Septicemia ([address] Diagnoses of Pancytopenia ([address] Neutropenia, unspecified type ([address] Tachycardia, Malignant neoplasm of lung, unspecified laterality, unspecified part of lung ([address] Pneumonia of left lower lobe due to infectious organism, and Urinary tract infection with hematuria, site unspecified were also pertinent to this visit. Past Medical History:  has a past medical history of Cancer ([address] Diabetes mellitus ([address] Hyperlipidemia, Hypertension, Kidney stone, Lung cancer ([address] and Lung cancer ([address] Past Surgical History:  has a past surgical history that includes Prostate surgery; Cystoscopy (Right, 2020); back surgery (2012); NEPHROLITHOTOMY (Left, 2020); shoulder surgery (Right); and Port Surgery (N/A, 2021). Assessment   Body Structures, Functions, Activity Limitations Requiring Skilled Therapeutic Intervention: Decreased functional mobility ; Decreased endurance;Decreased strength;Decreased balance;Decreased posture  Assessment: Pt is 64 yo male who presents with diagnosis of PNA. Pt indep with mobility at baseline. Increased weakness and fatigue d/t chemo treatments recently. Grossly CGA for mobility without device. Pt would benefit from continued skilled therapy to address deficits. Recommend home with home PT at d/c.   Treatment Diagnosis: impaired functional mobility  Specific Instructions for Next Treatment: progress mobility as tolerated  Therapy Prognosis: Good  Decision Making: Medium Complexity  Requires PT Follow-Up: Yes  Activity Tolerance  Activity Tolerance: Patient tolerated treatment well     Plan   Plan  Plan: 3-5 times per week  Specific Instructions for Next Treatment: progress mobility as tolerated  Current Treatment Recommendations: Strengthening, ROM, Balance training, Functional mobility training, Patient/Caregiver education & training, Therapeutic activities, Safety education & training, Home exercise program, Neuromuscular re-education, Stair training, Gait training, Equipment evaluation, education, & procurement  Safety Devices  Type of Devices: All fall risk precautions in place, Call light within reach, Chair alarm in place, Gait belt, Nurse notified, Left in chair     Restrictions  Restrictions/Precautions  Restrictions/Precautions: Fall Risk, General Precautions  Position Activity Restriction  Other position/activity restrictions: tele, IV, up with assist     Subjective   General  Chart Reviewed: Yes  Patient assessed for rehabilitation services?: Yes  Additional Pertinent Hx: Lung Cancer with chemo  Response To Previous Treatment: Not applicable  Family / Caregiver Present: Yes (wife)  Referring Practitioner: Dr. Jerad Castaneda MD  Referral Date : 08/23/22  Diagnosis: PNA  Follows Commands: Within Functional Limits  General Comment  Comments: Pt seated EOB at start of session.  RN  cleared pt for therapy  Subjective  Subjective: pt agreeable to therapy, Denies pain         Social/Functional History  Social/Functional History  Lives With: Spouse  Type of Home: House  Home Layout: Two level, Able to Live on Main level with bedroom/bathroom (does not go upstairs)  Home Access: Stairs to enter without rails  Entrance Stairs - Number of Steps: 2 ANAMARIA  Bathroom Shower/Tub: Walk-in shower  Bathroom Toilet: Handicap height  Bathroom Equipment: Built-in shower seat, Grab bars in shower, Hand-held shower  Home Equipment: deven [place]ley (does not use AD)  Has the patient had two or more falls in the past year or any fall with injury in the past year?: No  Receives Help From: Family  ADL Assistance: Needs assistance  Bath: Independent  Dressing: Minimal assistance (sock management)  Grooming: Independent  Feeding: Independent  Toileting: Needs assistance (min A for posterior pericare)  Homemaking Responsibilities:  (wife completes IADLs)  Meal Prep Responsibility: No  Laundry Responsibility: No  Cleaning Responsibility: No  Shopping Responsibility: No  Ambulation Assistance: Independent  Transfer Assistance: Independent  Active : Yes  Mode of Transportation: Truck, SUV (typically ride in [employer], difficulty performing transfers for truck)  Occupation: Retired  Type of Occupation: iron work  Leisure & Hobbies: watch tv  Additional Comments: Pt reports he receives chemo treatments every 3 weeks with most recent treatment 1 week ago. Pt endorsing generalized fatigue and weakness. Vision/Hearing  Vision  Vision: Within Functional Limits  Hearing  Hearing: Exceptions to [facility]  Hearing Exceptions: Hard of hearing/hearing concerns; No hearing aid    Cognition   Orientation  Overall Orientation Status: Within Normal Limits     Objective   Heart Rate: 84  Heart Rate Source: Monitor  BP: (!) 108/50  BP Location: Right upper arm  BP Method: Automatic  Patient Position: Sitting  MAP (Calculated): 69.33  Resp: 18  SpO2: 94 %  O2 Device: None (Room air)        Gross Assessment  AROM: Generally decreased, functional  Strength: Generally decreased, functional                 Bed Mobility Training  Bed Mobility Training: No (pt seated at start and end of session)    Balance  Sitting: Intact  Standing: Impaired  Standing - Static: Good  Standing - Dynamic: Fair    Transfer Training  Transfer Training: Yes  Overall Level of Assistance: Contact-guard assistance  Interventions: Safety awareness training; Tactile cues; Verbal cues  Sit to Stand: Contact-guard assistance (from EOB and chair)  Stand to Sit: Contact-guard assistance  Cues for safe and hand placement with sitting and standing and for eccentric control with sitting. Gait Training  Gait Training: Yes  Gait  Overall Level of Assistance: Contact-guard assistance  Interventions: Safety awareness training; Tactile cues; Verbal cues  Base of Support: Narrowed  Step Length: Right shortened; Other (comment)  Gait Abnormalities:  (Forward flexed posture)  Distance (ft): 45 Feet (10 + 20 + 45)  Pt ambulated second trial of 20 ft with RW. Max cues for safety including posture and to maintain DEYVI within RW. Improved balance and safety without device this date. AM-PAC Score  AM-PAC Inpatient Mobility Raw Score : 18 (08/23/22 1515)  AM-PAC Inpatient T-Scale Score : 43.63 (08/23/22 1515)  Mobility Inpatient CMS 0-100% Score: 46.58 (08/23/22 1515)  Mobility Inpatient CMS G-Code Modifier : CK (08/23/22 1515)          Goals  Short Term Goals  Time Frame for Short term goals: 1 week (8/30) unless otherwise specified  Short term goal 1: Pt will be mod I with bed mobility. Short term goal 2: Pt will be supervision with transfers. Short term goal 3: Pt will ambulate 50 ft with supervision and LRAD. Short term goal 4: Pt will negotiate 2 stairs with rail and SBA. Short term goal 5: 8/27: Pt will participate in 12-15 reps of BLE exercises to promote strength and activity tolerance. Patient Goals   Patient goals : \"to go home\"       Education  Patient Education  Education Given To: Patient  Education Provided: Role of Therapy;Plan of Care;Transfer Training  Education Method: Verbal  Barriers to Learning: None  Education Outcome: Verbalized understanding      Therapy Time   Individual Concurrent Group Co-treatment   Time In [phone]         Time Out 1420         Minutes 33         Timed Code Treatment Minutes: Sukhwinder, PT, DPT  If pt is unable to be seen after this session, please let this note serve as discharge summary. Please see case management note for discharge disposition. Thank you.

## 2022-08-23 NOTE — H&P
Internal Medicine History and Physical    Pt evaluated on:  8/23/2022    CHIEF COMPLAINT:  cough, weakness    History of Present Illness: This is a 65 y.o. WM with PMHx sig for DM2, HTN, HLD, lung cancer stage IV who presents with weakness and worsening cough. Denies fevers, but feels weak and unable to eat. He last had chemo a week ago and his counts have been dropping.     Past Medical History:   Diagnosis Date    Cancer [facility])     prostate    Diabetes mellitus ([address]     Hyperlipidemia     Hypertension     Kidney stone     Lung cancer ([address] 10/02/2021    Stage 4    Lung cancer ([address]      Active Ambulatory Problems     Diagnosis Date Noted    Transient global amnesia 02/11/2018    Acute encephalopathy     Hemispheric carotid artery syndrome     Uncontrolled type 2 diabetes mellitus with complication, without long-term current use of insulin (HCC)     HTN (hypertension), benign     Dyslipidemia     Urolithiasis 11/07/2020    SOB (shortness of breath) 11/16/2020    Left renal stone 12/04/2020    Lung cancer ([address] 10/02/2021    Shortness of breath 01/22/2022     Resolved Ambulatory Problems     Diagnosis Date Noted    Flank pain with history of urolithiasis 11/07/2020     Past Medical History:   Diagnosis Date    Cancer ([address]     Diabetes mellitus ([address]     Hyperlipidemia     Hypertension     Kidney stone          Past Medical History:   Diagnosis Date    Cancer ([address]     prostate    Diabetes mellitus ([address]     Hyperlipidemia     Hypertension     Kidney stone     Lung cancer ([address] 10/02/2021    Stage 4    Lung cancer ([address]      Past Surgical History:   Procedure Laterality Date    BACK SURGERY  2012    lumber, no hardware, \"cleaned it out\"    CYSTOSCOPY Right 11/9/2020    CYSTOSCOPY,  RIGHT URETEROSCOPY, RIGHT RETRO PYELOGRAM, REMOVAL STONE FROM BLADDER, URETHRAL DILITATION performed by Victoriano Lion MD at [facility]    NEPHROLITHOTOMY Left 12/4/2020    LEFT PERCUTANEOUS NEPHROLITHOTOMY WITH DR Dulce Barrett TO PLACE NEPHROSTOMY TUBE PRIOR performed by Marilu Castro MD at [facility] N/A 11/4/2021    SURGICAL PORT PLACEMENT performed by Jacobo Connolly MD at [facility] Right     ROTATOR CUFF         Medications Prior to Admission:    Not in a hospital admission. Current Facility-Administered Medications   Medication Dose Route Frequency Provider Last Rate Last Admin    vancomycin 1000 mg IVPB in 250 mL D5W addavial  1,000 mg IntraVENous Once Hoda Green,  mL/hr at 08/23/22 0347 1,000 mg at 08/23/22 2496     Current Outpatient Medications   Medication Sig Dispense Refill    folic acid (FOLVITE) 1 MG tablet Take 1 mg by mouth daily      metoprolol succinate (TOPROL XL) 25 MG extended release tablet Take 1 tablet by mouth once daily 30 tablet 11    atorvastatin (LIPITOR) 20 MG tablet Take 20 mg by mouth daily       losartan (COZAAR) 100 MG tablet Take 100 mg by mouth daily      Multiple Vitamins-Minerals (THERAPEUTIC MULTIVITAMIN-MINERALS) tablet Take 1 tablet by mouth daily      albuterol sulfate HFA (PROVENTIL HFA) 108 (90 Base) MCG/ACT inhaler Inhale 2 puffs into the lungs every 4 hours as needed for Wheezing or Shortness of Breath (Space out to every 6 hours as symptoms improve) Space out to every 6 hours as symptoms improve. (Patient not taking: Reported on 3/1/2022) 1 Inhaler 0    vitamin D3 (CHOLECALCIFEROL) 10 MCG (400 UNIT) TABS tablet Take 400 Units by mouth daily      insulin aspart (NOVOLOG) 100 UNIT/ML injection vial Inject into the skin 3 times daily (before meals) Sliding scale      Insulin Glargine (TOUJEO SOLOSTAR SC) Inject 70 Units into the skin nightly          Prior to Admission medications    Medication Sig Start Date End Date Taking?  Authorizing Provider   folic acid (FOLVITE) 1 MG tablet Take 1 mg by mouth daily    Historical Provider, MD   metoprolol succinate (TOPROL XL) 25 MG extended release tablet Take 1 tablet by mouth once daily 12/23/21   Darnell Black MD   atorvastatin (LIPITOR) 20 MG tablet Take 20 mg by mouth daily     Historical Provider, MD   losartan (COZAAR) 100 MG tablet Take 100 mg by mouth daily    Historical Provider, MD   Multiple Vitamins-Minerals (THERAPEUTIC MULTIVITAMIN-MINERALS) tablet Take 1 tablet by mouth daily    Historical Provider, MD   albuterol sulfate HFA (PROVENTIL HFA) 108 (90 Base) MCG/ACT inhaler Inhale 2 puffs into the lungs every 4 hours as needed for Wheezing or Shortness of Breath (Space out to every 6 hours as symptoms improve) Space out to every 6 hours as symptoms improve. Patient not taking: Reported on 3/1/2022 5/28/21   Jadyn Arnold, APRN - CNP   vitamin D3 (CHOLECALCIFEROL) 10 MCG (400 UNIT) TABS tablet Take 400 Units by mouth daily    Historical Provider, MD   insulin aspart (NOVOLOG) 100 UNIT/ML injection vial Inject into the skin 3 times daily (before meals) Sliding scale    Historical Provider, MD   Insulin Glargine (TOUJEO SOLOSTAR SC) Inject 70 Units into the skin nightly     Historical Provider, MD         Allergies:    Patient has no known allergies. Social History:      reports that he quit smoking about 45 years ago. He started smoking about 62 years ago. He has never used smokeless tobacco. He reports that he does not drink alcohol and does not use drugs. Family History:   History reviewed. No pertinent family history. Fam hx reviewed and not contributing towards current medical issues    REVIEW OF SYSTEMS:  As above in the HPI. All other review of systems were asked and were negative except for cough, no sputum, weakness, chills. PHYSICAL EXAM:    Vitals:  BP (!) 107/53   Pulse (!) 103   Temp 98.6 °F (37 °C) (Oral)   Resp 24   Ht 5' 9\" (1.753 m)   Wt 198 lb (89.8 kg)   SpO2 94%   BMI 29.24 kg/m²     General:  ill appearing  HEENT:  Normocephalic, atraumatic. Pupils equal, round, reactive to light. No scleral icterus.   No conjunctival injection. Normal lips, teeth, and gums. No nasal discharge. Neck:  Supple  Heart:  Normal s1/s2, RRR, no murmurs, gallops, or rubs. Trace pitting leg edema  Lungs:  diminished bilaterally, no wheeze, no rales, some rhonchi, no use of accessory muscles  Abd: bowel sounds present, soft, nontender, nondistended, no masses  Extrem:  No clubbing, cyanosis,  trace pitting leg edema, 2+ pedal pulses, brisk capillary refill  Skin:  Warm and dry, no open lesions or rash,  pale color/perfusion  Psych:  A&O x 3, appropriate mood and affect. Neuro: grossly intact, moves all four extremities. .    Breast: deferred  Rectal: deferred  Genitalia:  deferred    LABS:  Lab Results   Component Value Date/Time    WBC 1.8 08/22/2022 10:34 PM    RBC 3.44 08/22/2022 10:34 PM    HGB 10.2 08/22/2022 10:34 PM    HCT 29.9 08/22/2022 10:34 PM    MCV 86.8 08/22/2022 10:34 PM    MCH 29.8 08/22/2022 10:34 PM    MCHC 34.3 08/22/2022 10:34 PM    RDW 15.8 08/22/2022 10:34 PM    PLT 77 08/22/2022 10:34 PM    MPV 6.9 08/22/2022 10:34 PM     Lab Results   Component Value Date/Time     08/22/2022 08:43 PM    K 4.7 08/22/2022 08:43 PM    CL 99 08/22/2022 08:43 PM    CO2 21 08/22/2022 08:43 PM    BUN 17 08/22/2022 08:43 PM    CREATININE 1.0 08/22/2022 08:43 PM    GFRAA >60 08/22/2022 08:43 PM    AGRATIO 1.3 08/22/2022 08:43 PM    LABGLOM >60 08/22/2022 08:43 PM    GLUCOSE 215 08/22/2022 08:43 PM    PROT 7.0 08/22/2022 08:43 PM    LABALBU 3.9 08/22/2022 08:43 PM    CALCIUM 8.8 08/22/2022 08:43 PM    BILITOT 0.8 08/22/2022 08:43 PM    ALKPHOS 155 08/22/2022 08:43 PM    AST 28 08/22/2022 08:43 PM    ALT 23 08/22/2022 08:43 PM     Lab Results   Component Value Date/Time    PROTIME 12.6 01/24/2022 02:13 PM    INR 1.11 01/24/2022 02:13 PM     No results for input(s): CKTOTAL, CKMB, CKMBINDEX, TROPONINI in the last 72 hours.   Lab Results   Component Value Date/Time    NITRU Negative 08/23/2022 03:17 AM    COLORU Yellow 08/23/2022 03:17 AM    PHUR 5.5 08/23/2022 03:17 AM    WBCUA  08/23/2022 03:17 AM    RBCUA 0-2 08/23/2022 03:17 AM    MUCUS 1+ 08/23/2022 03:17 AM    BACTERIA 2+ 08/23/2022 03:17 AM    CLARITYU Clear 08/23/2022 03:17 AM    SPECGRAV 1.020 08/23/2022 03:17 AM    LEUKOCYTESUR LARGE 08/23/2022 03:17 AM    UROBILINOGEN 0.2 08/23/2022 03:17 AM    BILIRUBINUR Negative 08/23/2022 03:17 AM    BLOODU TRACE-LYSED 08/23/2022 03:17 AM    GLUCOSEU 100 08/23/2022 03:17 AM    KETUA Negative 08/23/2022 03:17 AM    AMORPHOUS Rare 05/28/2021 07:06 PM     Lab Results   Component Value Date/Time    MG 1.70 01/24/2022 03:55 AM    PHOS 2.7 01/24/2022 03:55 AM     Lab Results   Component Value Date/Time    LABA1C 7.7 02/11/2018 02:48 PM     No results found for: TSH, UXEMMXHI95, FOLATE, FERRITIN, IRON, TIBC, PTRFSAT, RETICCOUNT, TSH, FREET4  No results found for: TRIG, HDL, LDLCALC, LDLDIRECT, LABVLDL  Lab Results   Component Value Date/Time    LIPASE 27.0 11/07/2020 07:43 AM     No results found for: BNP  Lab Results   Component Value Date/Time    LACTA 3.6 08/23/2022 02:14 AM     No results found for: PHART, PH, HBW8GNF, PCO2, PO2ART, PO2, FOG3JQB, HCO3, BEART, BE, THGBART, THB, ONZ4PHG, G7WEBOTC, O2SAT  No results found for: LABAMPH, BARBSCNU, LABBENZ, CANNAB, COCAINESCRN, LABMETH, OPIATESCREENURINE, PHENCYCLIDINESCREENURINE, PPXUR, ETOH  No results found for: DDIMER  No results found for: VITD25      RADIOLOGY:  XR ABDOMEN (KUB) (SINGLE AP VIEW)    Result Date: 8/23/2022  EXAMINATION: ONE SUPINE XRAY VIEW(S) OF THE ABDOMEN 8/22/2022 11:46 pm COMPARISON: 12/11/2020 HISTORY: ORDERING SYSTEM PROVIDED HISTORY: possible constipation TECHNOLOGIST PROVIDED HISTORY: Reason for exam:->possible constipation FINDINGS: Degenerative changes are seen in the spine and pelvis. No acute fracture is identified. No osseous destructive process. No significant stool volume. No free air is identified. Mild stool noted.   Osseous metastatic disease is again identified as demonstrated on recent CT imaging. Mild stool volume in the colon, without free air or constipation. XR CHEST PORTABLE    Result Date: 8/23/2022  EXAMINATION: ONE XRAY VIEW OF THE CHEST 8/23/2022 2:23 am COMPARISON: 06/05/2022, recent CT chest on 08/04/2022 HISTORY: ORDERING SYSTEM PROVIDED HISTORY: r/o PNA TECHNOLOGIST PROVIDED HISTORY: Reason for exam:->r/o PNA Reason for Exam: r/o PNA FINDINGS: Cardiac and mediastinal silhouettes appear overall stable. There is a right-sided chest port. Right basilar atelectasis. Within the left lung, there is interval improvement in a left basilar pleural effusion when compared to the previous examination. The known mass in the medial lung base at the left lower lobe is not well seen radiographically. There is a new focal nodular opacity projecting over the left lung base which may represent a small volume of fluid within the left major fissure or possible focal pneumonic infiltrate. 1. Interval development of discoid atelectasis in the right lung base. 2. Improved pleural effusion at the left lung base, with a focal left basilar infiltrate which may represent atelectasis or pneumonia. There is a lateral nodular component which could represent a small amount of fluid within the fissure. This can be reassessed on follow-up radiographs or later CT imaging for patient's cancer follow-up studies. CT CHEST ABDOMEN PELVIS W CONTRAST    Result Date: 8/10/2022  EXAMINATION: CT OF THE CHEST, ABDOMEN, AND PELVIS WITH CONTRAST 8/4/2022 8:42 am TECHNIQUE: CT of the chest, abdomen and pelvis was performed with the administration of intravenous contrast. Multiplanar reformatted images are provided for review. Automated exposure control, iterative reconstruction, and/or weight based adjustment of the mA/kV was utilized to reduce the radiation dose to as low as reasonably achievable.  COMPARISON: 04/22/2022, 10/21/2021, 10/18/2021, 09/01/2021, 05/28/2021, 02/11/2018 nodules, consistent with progressive metastatic disease. As an example, there is a now 9 mm nodule along the left major fissure, image 60 of series 2, increased in size from 04/22/2022 when it measured approximately 7 mm. There has been increase in size of a now 10 mm metastatic nodule within the left lower lobe, image 73 of series 2, formerly measuring approximately 8 mm on 04/22/2022. Additional scattered subcentimeter pleural and parenchymal nodules have also slightly increased in size. Multiple scattered subcentimeter ground-glass and solid nodules throughout the right lung are stable and unchanged from 04/22/2022. Partial left lower lobe airspace consolidation likely reflects passive atelectasis, less likely aspiration or pneumonia. No additional focus of airspace consolidation is identified. There is no evidence of a pneumothorax. 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 However, there has been progression of multiple sclerotic foci within the T7 vertebral body, which have increased in size in comparison with the prior exam of 04/22/2022. There are multiple stable osteoblastic lesions throughout the sternum, bilateral clavicles, proximal right humerus, right scapula, and bilateral ribs, which are all stable and unchanged. There is no pathologic fracture. There is a stable right-sided Port-A-Cath. Abdomen/Pelvis: Organs: There has been slight interval progression of intrahepatic metastatic disease in comparison with the study of 04/22/2022. Namely, a majority of the patient's scattered intrahepatic nodules have slightly increased in size. As an example, there is a 2.8 x 1.9 cm intrahepatic metastasis within the lateral right hepatic lobe, image 50 of series 3, which measured approximately 2.2 x 1.3 cm on 04/22/2022. The portal vein is patent.   There is a stable small low-attenuation lesion within the inferior spleen, unchanged from prior studies, likely a small cyst or hemangioma. The spleen is otherwise unremarkable. The pancreas is normal.  The gallbladder is normal.  There is a stable 11 mm right adrenal adenoma, unchanged from 02/11/2018. The left adrenal gland is unremarkable. There are multiple stable scattered low-attenuation foci throughout the bilateral renal parenchyma, most likely benign cysts. There are stable nonobstructing calculi within both kidneys, The largest measuring approximately 10 mm within the left kidney lower pole. There is no evidence of a ureteral calculus or hydronephrosis. GI/Bowel: Evaluation of the hollow GI tract demonstrates no evidence of abnormal bowel wall thickening, dilatation, or obstruction. The appendix is normal.  There is mild colonic diverticulosis. Pelvis: The urinary bladder is normal.  There are brachytherapy seeds within the prostate, which is otherwise unremarkable. There is no free pelvic fluid. No pathologic pelvic lymphadenopathy is identified. Incidental note is made of bilateral hydroceles. Peritoneum/Retroperitoneum: No intraperitoneal free air or free fluid is identified. There is no evidence of ascites, omental caking, or peritoneal implants. No pathologic lymphadenopathy is identified. There is atherosclerotic disease of the abdominal aorta, without evidence of aneurysm. Bones/Soft Tissues: There are postsurgical changes involving the lumbar spine. Multiple osteoblastic lesions throughout the lumbar spine and pelvis are similar to 04/22/2022. No pathologic fracture is identified. There is multilevel degenerative change throughout the lumbar spine.      1. Findings are most consistent with slight interval progression of the patient's metastatic lung cancer, with slight increase in size of the patient's primary left lower lobe nodule, as well as progression of lymphangitic spread of tumor throughout the left lung base, and slight increase in size of multiple left-sided metastatic pleural and parenchymal metastases. 2. Slight interval progression of suspected metastatic left hilar and left cardiophrenic angle lymphadenopathy. 3. Stable moderate primarily subpulmonic left pleural effusion. 4. Slight interval progression of intrahepatic metastatic disease. 5. Slight interval progression of osteoblastic metastases within the thoracic spine. PHYSICIAN CERTIFICATION    I certify that Marina Coronado is expected to be hospitalized for >2 midnights based on the following assessment and plan:      ASSESSMENT:      Patient Active Problem List   Diagnosis    Transient global amnesia    Acute encephalopathy    Hemispheric carotid artery syndrome    Uncontrolled type 2 diabetes mellitus with complication, without long-term current use of insulin (HCC)    HTN (hypertension), benign    Dyslipidemia    Urolithiasis    SOB (shortness of breath)    Left renal stone    Lung cancer ([facility]    Shortness of breath    Gram-negative pneumonia (HCC)    Sepsis ([facility]    UTI (urinary tract infection)       Principal Problem:    Gram-negative pneumonia (HCC)  Active Problems:    Sepsis ([facility]    UTI (urinary tract infection)    Uncontrolled type 2 diabetes mellitus with complication, without long-term current use of insulin (HCC)    HTN (hypertension), benign    Lung cancer ([facility]  Resolved Problems:    * No resolved hospital problems.  *      PLAN:     Gram neg pneumonia in immunocompromised pt - cefepime, levaquin and vanco  Sepsis POA based on tachycardia, leukopenia, tachypnea, lactic acidosis, documented UTI And possible pneumonia  UTI - may be more of culprit than pneumonia  HTN - continue with toprol XL and cozaar  DM2 on insulin - uncontrolled presently  Pancytopenia due to chemo - continue to monitor counts daily      DVT prophylaxis Yes:  SCDs due to thrombocytopenia  GI prophylaxis no  Antibiotic prophylaxis:  No due to immunocompromised state    Code status Full code        Please note that over 50 minutes was spent in evaluating the patient, review of records and results, discussion with staff/family, etc.    Electronically signed by Vivienne Waller MD on 8/23/2022 at 4:25 AM

## 2022-08-23 NOTE — RT PROTOCOL NOTE
RT Inhaler-Nebulizer Bronchodilator Protocol Note    There is a bronchodilator order in the chart from a provider indicating to follow the RT Bronchodilator Protocol and there is an Initiate RT Inhaler-Nebulizer Bronchodilator Protocol order as well (see protocol at bottom of note). CXR Findings:  XR CHEST PORTABLE    Result Date: 8/23/2022  1. Interval development of discoid atelectasis in the right lung base. 2. Improved pleural effusion at the left lung base, with a focal left basilar infiltrate which may represent atelectasis or pneumonia. There is a lateral nodular component which could represent a small amount of fluid within the fissure. This can be reassessed on follow-up radiographs or later CT imaging for patient's cancer follow-up studies. The findings from the last RT Protocol Assessment were as follows:   History Pulmonary Disease: Smoker 15 pack years or more  Respiratory Pattern: Regular pattern and RR 12-20 bpm  Breath Sounds: Slightly diminished and/or crackles  Cough: Strong, spontaneous, non-productive  Indication for Bronchodilator Therapy: None  Bronchodilator Assessment Score: 3    Aerosolized bronchodilator medication orders have been revised according to the RT Inhaler-Nebulizer Bronchodilator Protocol below. Respiratory Therapist to perform RT Therapy Protocol Assessment initially then follow the protocol. Repeat RT Therapy Protocol Assessment PRN for score 0-3 or on second treatment, BID, and PRN for scores above 3. No Indications - adjust the frequency to every 6 hours PRN wheezing or bronchospasm, if no treatments needed after 48 hours then discontinue using Per Protocol order mode. If indication present, adjust the RT bronchodilator orders based on the Bronchodilator Assessment Score as indicated below.   Use Inhaler orders unless patient has one or more of the following: on home nebulizer, not able to hold breath for 10 seconds, is not alert and oriented, cannot activate and use MDI correctly, or respiratory rate 25 breaths per minute or more, then use the equivalent nebulizer order(s) with same Frequency and PRN reasons based on the score. If a patient is on this medication at home then do not decrease Frequency below that used at home. 0-3 - enter or revise RT bronchodilator order(s) to equivalent RT Bronchodilator order with Frequency of every 4 hours PRN for wheezing or increased work of breathing using Per Protocol order mode. 4-6 - enter or revise RT Bronchodilator order(s) to two equivalent RT bronchodilator orders with one order with BID Frequency and one order with Frequency of every 4 hours PRN wheezing or increased work of breathing using Per Protocol order mode. 7-10 - enter or revise RT Bronchodilator order(s) to two equivalent RT bronchodilator orders with one order with TID Frequency and one order with Frequency of every 4 hours PRN wheezing or increased work of breathing using Per Protocol order mode. 11-13 - enter or revise RT Bronchodilator order(s) to one equivalent RT bronchodilator order with QID Frequency and an Albuterol order with Frequency of every 4 hours PRN wheezing or increased work of breathing using Per Protocol order mode. Greater than 13 - enter or revise RT Bronchodilator order(s) to one equivalent RT bronchodilator order with every 4 hours Frequency and an Albuterol order with Frequency of every 2 hours PRN wheezing or increased work of breathing using Per Protocol order mode. RT to enter RT Home Evaluation for COPD & MDI Assessment order using Per Protocol order mode.     Electronically signed by Kimberlee Curry on 8/23/2022 at 3:38 PM

## 2022-08-23 NOTE — PROGRESS NOTES
Hospitalist Progress Note    Date of Admission: 8/22/2022    Chief Complaint: Cough, weakness    Hospital Course: This is a 65 y.o. WM with PMHx sig for DM2, HTN, HLD, lung cancer stage IV who presents with weakness and worsening cough. Denies fevers, but feels weak and unable to eat. He last had chemo a week ago and his counts have been dropping. Subjective: Ongoing cough but no SOB or hypoxia. No CP, abdominal pain, N/V or diarrhea. Labs:   Recent Labs     08/22/22  2234 08/24/22  0505   WBC 1.8* 2.8*   HGB 10.2* 7.5*   HCT 29.9* 21.9*   PLT 77* 52*     Recent Labs     08/22/22 2043 08/23/22  0550 08/24/22  0505   * 132* 134*   K 4.7 3.1* 3.8   CL 99 99 104   CO2 21 20* 23   BUN 17 22* 20   CREATININE 1.0 1.4* 1.0   CALCIUM 8.8 8.5 8.2*     Recent Labs     08/22/22 2043   AST 28   ALT 23   BILITOT 0.8   ALKPHOS 155*     No results for input(s): INR in the last 72 hours. Physical Exam Performed:    BP (!) 114/47   Pulse 88   Temp 98.2 °F (36.8 °C) (Oral)   Resp 18   Ht 5' 9\" (1.753 m)   Wt 198 lb 3.2 oz (89.9 kg)   SpO2 98%   BMI 29.27 kg/m²     General appearance:  No apparent distress, appears stated age and cooperative. HEENT:  Pupils equal, round, and reactive to light. Conjunctivae/corneas clear. Neck:  Supple, no jugular venous distention. Trachea midline with full range of motion. Respiratory:  Normal respiratory effort. Clear to auscultation, bilaterally without Rales/Wheezes/Rhonchi. Cardiovascular:  Regular rate and rhythm with normal S1/S2 without murmurs, rubs or gallops. Abdomen:  Soft, non-tender, non-distended with normal bowel sounds. Musculoskelatal:  No clubbing, cyanosis or edema bilaterally. Full range of motion without deformity. Neurologic:  Neurovascularly intact without any focal sensory/motor deficits.  Cranial nerves: II-XII intact, grossly non-focal.  Psychiatric:  Alert and oriented, thought content appropriate, normal insight  Skin:  Skin color, texture, turgor normal.  No rashes or lesions. Capillary Refill:  Brisk,< 3 seconds   Peripheral Pulses:  +2 palpable, equal bilaterally       Assessment/Plan:    Active Hospital Problems    Diagnosis     Gram-negative pneumonia ([facility] [J15.6]      Priority: Medium    Sepsis ([facility] [address] [A41.9]      Priority: Medium    UTI (urinary tract infection) [N39.0]      Priority: Medium    Lung cancer ([facility] [address] [C34.90]     Uncontrolled type 2 diabetes mellitus with complication, without long-term current use of insulin (HCC) [E11.8, E11.65]     HTN (hypertension), benign [I10]      Sepsis d/t Serratia Bacteremia in setting of Neutropenia and recent Chemotherapy:   Underlying etiology uncertain; although does have some subacute cough, does not have convincing imaging findings. Bacterial translocation in setting of neutropenia seems most plausible given organism identified. UTI possible given UA findings, however, final UCx was negative. Continue empiric Abx pending final BCx  Fevers improved  Neutropenia resolved    HTN, controlled - continue with toprol XL and cozaar    DM2 on insulin - uncontrolled. Continue basal bolus Insulin regimen    Pancytopenia due to chemo - Improving. Monitor    [facility] - Oncology following. DVT prophylaxis:  SCDs due to thrombocytopenia  Diet: ADULT DIET;  Regular; 4 carb choices (60 gm/meal)  Code Status: Full Code  PT/OT Eval Status: NA    Dispo - Anticipate DC home tomorrow after BCx sensitivities finalized     Dev Guan MD

## 2022-08-23 NOTE — ED NOTES
Pt transported to A2 with all belongings in stable condition at this time     Leticia Verdugo RN  08/23/22 1529

## 2022-08-24 LAB
ANION GAP SERPL CALCULATED.3IONS-SCNC: 7 MMOL/L (ref 3–16)
BASOPHILS ABSOLUTE: 0 K/UL (ref 0–0.2)
BASOPHILS RELATIVE PERCENT: 0 %
BUN BLDV-MCNC: 20 MG/DL (ref 7–20)
CALCIUM SERPL-MCNC: 8.2 MG/DL (ref 8.3–10.6)
CHLORIDE BLD-SCNC: 104 MMOL/L (ref 99–110)
CO2: 23 MMOL/L (ref 21–32)
CREAT SERPL-MCNC: 1 MG/DL (ref 0.8–1.3)
EOSINOPHILS ABSOLUTE: 0 K/UL (ref 0–0.6)
EOSINOPHILS RELATIVE PERCENT: 1 %
GFR AFRICAN AMERICAN: >60
GFR NON-AFRICAN AMERICAN: >60
GLUCOSE BLD-MCNC: 171 MG/DL (ref 70–99)
GLUCOSE BLD-MCNC: 179 MG/DL (ref 70–99)
GLUCOSE BLD-MCNC: 208 MG/DL (ref 70–99)
GLUCOSE BLD-MCNC: 297 MG/DL (ref 70–99)
GLUCOSE BLD-MCNC: 297 MG/DL (ref 70–99)
HCT VFR BLD CALC: 21.9 % (ref 40.5–52.5)
HEMATOLOGY PATH CONSULT: NO
HEMOGLOBIN: 7.5 G/DL (ref 13.5–17.5)
LYMPHOCYTES ABSOLUTE: 0.4 K/UL (ref 1–5.1)
LYMPHOCYTES RELATIVE PERCENT: 14 %
MCH RBC QN AUTO: 29.2 PG (ref 26–34)
MCHC RBC AUTO-ENTMCNC: 34.1 G/DL (ref 31–36)
MCV RBC AUTO: 85.6 FL (ref 80–100)
MONOCYTES ABSOLUTE: 0 K/UL (ref 0–1.3)
MONOCYTES RELATIVE PERCENT: 0 %
NEUTROPHILS ABSOLUTE: 2.4 K/UL (ref 1.7–7.7)
NEUTROPHILS RELATIVE PERCENT: 85 %
PDW BLD-RTO: 15.8 % (ref 12.4–15.4)
PERFORMED ON: ABNORMAL
PLATELET # BLD: 52 K/UL (ref 135–450)
PLATELET SLIDE REVIEW: ABNORMAL
PMV BLD AUTO: 7.5 FL (ref 5–10.5)
POLYCHROMASIA: ABNORMAL
POTASSIUM REFLEX MAGNESIUM: 3.8 MMOL/L (ref 3.5–5.1)
RBC # BLD: 2.56 M/UL (ref 4.2–5.9)
SLIDE REVIEW: ABNORMAL
SODIUM BLD-SCNC: 134 MMOL/L (ref 136–145)
URINE CULTURE, ROUTINE: NORMAL
VANCOMYCIN TROUGH: 9.7 UG/ML (ref 10–20)
WBC # BLD: 2.8 K/UL (ref 4–11)

## 2022-08-24 PROCEDURE — 6360000002 HC RX W HCPCS: Performed by: INTERNAL MEDICINE

## 2022-08-24 PROCEDURE — 6370000000 HC RX 637 (ALT 250 FOR IP): Performed by: INTERNAL MEDICINE

## 2022-08-24 PROCEDURE — 97110 THERAPEUTIC EXERCISES: CPT

## 2022-08-24 PROCEDURE — 94640 AIRWAY INHALATION TREATMENT: CPT

## 2022-08-24 PROCEDURE — 2580000003 HC RX 258: Performed by: INTERNAL MEDICINE

## 2022-08-24 PROCEDURE — 85025 COMPLETE CBC W/AUTO DIFF WBC: CPT

## 2022-08-24 PROCEDURE — 80202 ASSAY OF VANCOMYCIN: CPT

## 2022-08-24 PROCEDURE — 97116 GAIT TRAINING THERAPY: CPT

## 2022-08-24 PROCEDURE — 80048 BASIC METABOLIC PNL TOTAL CA: CPT

## 2022-08-24 PROCEDURE — 97530 THERAPEUTIC ACTIVITIES: CPT

## 2022-08-24 PROCEDURE — 2060000000 HC ICU INTERMEDIATE R&B

## 2022-08-24 RX ORDER — INSULIN LISPRO 100 [IU]/ML
5 INJECTION, SOLUTION INTRAVENOUS; SUBCUTANEOUS
Status: DISCONTINUED | OUTPATIENT
Start: 2022-08-24 | End: 2022-08-25 | Stop reason: HOSPADM

## 2022-08-24 RX ADMIN — INSULIN LISPRO 4 UNITS: 100 INJECTION, SOLUTION INTRAVENOUS; SUBCUTANEOUS at 12:02

## 2022-08-24 RX ADMIN — SODIUM CHLORIDE: 9 INJECTION, SOLUTION INTRAVENOUS at 15:05

## 2022-08-24 RX ADMIN — FOLIC ACID 1 MG: 1 TABLET ORAL at 09:45

## 2022-08-24 RX ADMIN — INSULIN LISPRO 5 UNITS: 100 INJECTION, SOLUTION INTRAVENOUS; SUBCUTANEOUS at 16:49

## 2022-08-24 RX ADMIN — IPRATROPIUM BROMIDE AND ALBUTEROL SULFATE 1 AMPULE: 2.5; .5 SOLUTION RESPIRATORY (INHALATION) at 07:38

## 2022-08-24 RX ADMIN — CHOLECALCIFEROL TAB 10 MCG (400 UNIT) 400 UNITS: 10 TAB at 09:45

## 2022-08-24 RX ADMIN — CEFEPIME 2000 MG: 2 INJECTION, POWDER, FOR SOLUTION INTRAVENOUS at 15:06

## 2022-08-24 RX ADMIN — Medication 1 TABLET: at 09:44

## 2022-08-24 RX ADMIN — INSULIN GLARGINE 70 UNITS: 100 INJECTION, SOLUTION SUBCUTANEOUS at 20:27

## 2022-08-24 RX ADMIN — Medication 10 ML: at 09:45

## 2022-08-24 RX ADMIN — ATORVASTATIN CALCIUM 20 MG: 10 TABLET, FILM COATED ORAL at 09:45

## 2022-08-24 RX ADMIN — VANCOMYCIN HYDROCHLORIDE 750 MG: 750 INJECTION, POWDER, LYOPHILIZED, FOR SOLUTION INTRAVENOUS at 09:47

## 2022-08-24 RX ADMIN — INSULIN LISPRO 2 UNITS: 100 INJECTION, SOLUTION INTRAVENOUS; SUBCUTANEOUS at 16:49

## 2022-08-24 RX ADMIN — IPRATROPIUM BROMIDE AND ALBUTEROL SULFATE 1 AMPULE: 2.5; .5 SOLUTION RESPIRATORY (INHALATION) at 19:50

## 2022-08-24 RX ADMIN — IPRATROPIUM BROMIDE AND ALBUTEROL SULFATE 1 AMPULE: 2.5; .5 SOLUTION RESPIRATORY (INHALATION) at 11:32

## 2022-08-24 RX ADMIN — LEVOFLOXACIN 750 MG: 5 INJECTION, SOLUTION INTRAVENOUS at 05:08

## 2022-08-24 RX ADMIN — CEFEPIME 2000 MG: 2 INJECTION, POWDER, FOR SOLUTION INTRAVENOUS at 03:56

## 2022-08-24 RX ADMIN — VANCOMYCIN HYDROCHLORIDE 750 MG: 750 INJECTION, POWDER, LYOPHILIZED, FOR SOLUTION INTRAVENOUS at 20:36

## 2022-08-24 NOTE — PLAN OF CARE
Problem: Safety - Adult  Goal: Free from fall injury  8/23/2022 2229 by Pam Galaviz RN  Outcome: Progressing  Note: Pt will remain free from falls throughout hospital stay. Fall precautions in place, bed alarm on, bed in lowest position with wheels locked and side rails 2/4 up. Room door open and hourly rounding completed. Will continue to monitor throughout shift. 8/23/2022 1111 by Franky Rodriguez RN  Outcome: Progressing  Note: Pt will remain free from falls throughout hospital stay. Fall precautions in place, bed alarm on, bed in lowest position with wheels locked and side rails 2/4 up. Room door open and hourly rounding completed. Will continue to monitor throughout shift.

## 2022-08-24 NOTE — CONSULTS
ONCOLOGY HEMATOLOGY CARE CONSULT NOTE      Requesting Physician:  Dr. Melissa Goddard:  Met NSCLC        HISTORY OF PRESENT ILLNESS:      Mr. Mandie Brown  is a 65 y.o. male we are seeing in consultation for metastatic NSCLC. Relevant oncologic history is outlined below:    Stage IV adenocarcinoma of the lun. Seen in the [facility] ER for dyspnea, 2021. He was discharged from the ER. A. Labs:  i. WBC 6.6 K/mcL, HGB 12.6 g/dL, HCT 35.9%,  K/mcL, ANC 5.2 K/mcL, MCV 90.3 fL.  ii. Creat 0.8 mg/dL, Ca 9.2 mg/dL, alb 3.8 g/dL, t bili 0.3 mg/dL. B. Imaging:  i. CTPA, 2021, showed no evidence of a PE. A large left pleural effusion was present. A 1.7 cm area of rounded atelectasis was present in the LLL. Scattered granulomas were present as well as calcified left hilar and left lower paratracheal LNs.    2. Follow up CT, 2021, showed stable findings including a large left pleural effusion. 3. Seen in consultation by Dr. Isabelle Nice, 2021. Quit smoking at age 39. An ultrasound-guided left-sided thoracentesis was ordered. A. Ultrasound-guided thoracentesis, 2021, yielded 1 L of clear yellow fluid. Cytology showed metastatic adenocarcinoma. IHC was consistent with a lung primary. 4. Seen for initial medical oncology consultation, 10/13/2021. Ordered a PET/CT, MRI of the brain, and Caris tumor profiling of the cytology from the thoracentesis on 2021 was ordered. A. PET/CT, 10/21/2021, showed a 2.2 x 1.4 cm opacity in the superior segment of the left lower lobe with max SUV 8.0.  A 1.7 x 1.4 cm right hepatic lobe metastasis with peak SUV 13.6 was present. Multiple hypermetabolic bone mets involving the thoracolumbar spine and pelvis were present. B. MRI of the brain, 10/25/2021, was negative. 5. Right IJ port placed, 2021, with Dr. Lindsay Kahn. 6. Received Carbo/Alimta/Pembro x 4 between 2021 - 1/10/2022.   Received maintenance Pembro between  - 2022. Treatment was discontinued after a CT on 2022 showed slight interval progression in the LLL, left hilum, liver, thoracic spine. 7. Started Alimta, 8/15/2022. Molecular testin. Daniel tumor profiling of the specimen collected on 2021 showed:  A. MSI-Stable. B. MMR proficient by IHC  C. TMB-Low (2 Muts/Mb)  D. PD-L1 (22c3, TPS) 5%. PD-L1 (28-8) 5%. PD-L1 () Neg.  E. ALEX [id number]/ (pathogenic variant)  F. FGF3 amplified  G. KRAS p.G12V (pathogenic variant)    He was admitted to [facility] on 2022 with fever. Blood cultures x 2 collected on 2022 grew Serratia marcescens. He is on Cefepime and Levaquin. He denies mouth sores or diarrhea. He reports that his rigors are better.     Past Medical History:    Past Medical History:   Diagnosis Date    Cancer ([facility]     prostate    Diabetes mellitus ([facility]     Hyperlipidemia     Hypertension     Kidney stone     Lung cancer ([facility] 10/02/2021    Stage 4    Lung cancer [facility])      Past Surgical History:    Past Surgical History:   Procedure Laterality Date    BACK SURGERY      lumber, no hardware, \"cleaned it out\"    CYSTOSCOPY Right 2020    CYSTOSCOPY,  RIGHT URETEROSCOPY, RIGHT RETRO PYELOGRAM, REMOVAL STONE FROM BLADDER, URETHRAL DILITATION performed by Bipin Jeff MD at [facility]    NEPHROLITHOTOMY Left 2020    LEFT PERCUTANEOUS NEPHROLITHOTOMY WITH DR Krysten Wolfe TO PLACE NEPHROSTOMY TUBE PRIOR performed by Jennifer Ram MD at [facility] N/A 2021    SURGICAL PORT PLACEMENT performed by Ty Moreno MD at [address] [place] [address] Right     ROTATOR CUFF       Current Medications:    Current Facility-Administered Medications   Medication Dose Route Frequency Provider Last Rate Last Admin    atorvastatin (LIPITOR) tablet 20 mg  20 mg Oral Daily Norberto Severs, MD   20 mg at  2637    folic acid (FOLVITE) tablet 1 mg  1 mg Oral Daily Vivienne Waller MD   1 mg at 08/23/22 0848    insulin glargine (LANTUS) injection vial 70 Units  70 Units SubCUTAneous Nightly Vivienne Waller MD   70 Units at 08/23/22 2034    losartan (COZAAR) tablet 100 mg  100 mg Oral Daily Vivienne Waller MD        metoprolol succinate (TOPROL XL) extended release tablet 25 mg  25 mg Oral Daily Vivienne Waller MD        therapeutic multivitamin-minerals 1 tablet  1 tablet Oral Daily Vivienne Waller MD   1 tablet at 08/23/22 0848    vitamin D3 (CHOLECALCIFEROL) tablet 400 Units  400 Units Oral Daily Vivienne Waller MD   400 Units at 08/23/22 0848    sodium chloride flush 0.9 % injection 5-40 mL  5-40 mL IntraVENous 2 times per day Vivienne Waller MD   10 mL at 08/23/22 0848    sodium chloride flush 0.9 % injection 5-40 mL  5-40 mL IntraVENous PRN Vivienne Waller MD        0.9 % sodium chloride infusion   IntraVENous PRN Vivienne Waller MD   Stopped at 08/23/22 1442    ondansetron (ZOFRAN-ODT) disintegrating tablet 4 mg  4 mg Oral Q8H PRN Vivienne Waller MD        Or    ondansetron [facility]) injection 4 mg  4 mg IntraVENous Q6H PRN Vivienne Waller MD        polyethylene glycol (GLYCOLAX) packet 17 g  17 g Oral Daily PRN Vivienne Waller MD   17 g at 08/23/22 1049    acetaminophen (TYLENOL) tablet 650 mg  650 mg Oral Q6H PRN Vivienne Waller MD   650 mg at 08/23/22 6246    Or    acetaminophen (TYLENOL) suppository 650 mg  650 mg Rectal Q6H PRN Vivienne Waller MD        0.9 % sodium chloride infusion   IntraVENous Continuous Vivienne Waller MD 75 mL/hr at 08/23/22 2032 New Bag at 08/23/22 2032    ipratropium-albuterol (DUONEB) nebulizer solution 1 ampule  1 ampule Inhalation Q4H WA Vivienne Waller MD   1 ampule at 08/23/22 2000    levoFLOXacin (LEVAQUIN) 750 MG/150ML infusion 750 mg  750 mg IntraVENous Q24H Vivienne Waller  mL/hr at 08/24/22 0508 750 mg at 08/24/22 7198 vancomycin (VANCOCIN) 750 mg in dextrose 5 % 250 mL IVPB  750 mg IntraVENous Q12H Felipe Campo MD   Stopped at 22    cefepime (MAXIPIME) 2000 mg IVPB minibag  2,000 mg IntraVENous Q12H Fabian Parra MD 12.5 mL/hr at 22 0356 2,000 mg at 22 0356    glucose chewable tablet 16 g  4 tablet Oral PRN Fabian Parra MD        dextrose bolus 10% 125 mL  125 mL IntraVENous PRN Fabian Parra MD        Or    dextrose bolus 10% 250 mL  250 mL IntraVENous PRN Fabian Parra MD        glucagon (rDNA) injection 1 mg  1 mg SubCUTAneous PRN Fabian Parra MD        dextrose 10 % infusion   IntraVENous Continuous PRN Fabian Parra MD        insulin lispro (HUMALOG) injection vial 0-8 Units  0-8 Units SubCUTAneous TID WC Fabian Parra MD   4 Units at 22 1714    insulin lispro (HUMALOG) injection vial 0-4 Units  0-4 Units SubCUTAneous Nightly Fabian Parra MD   4 Units at 224     Allergies:  No Known Allergies    Social History:   Social History     Socioeconomic History    Marital status:      Spouse name: Not on file    Number of children: Not on file    Years of education: Not on file    Highest education level: Not on file   Occupational History    Not on file   Tobacco Use    Smoking status: Former     Types: Cigarettes     Start date: 3/22/1960     Quit date: 1976     Years since quittin.7    Smokeless tobacco: Never   Vaping Use    Vaping Use: Never used   Substance and Sexual Activity    Alcohol use: No    Drug use: No    Sexual activity: Not on file   Other Topics Concern    Not on file   Social History Narrative    Not on file     Social Determinants of Health     Financial Resource Strain: Not on file   Food Insecurity: Not on file   Transportation Needs: Not on file   Physical Activity: Not on file   Stress: Not on file   Social Connections: Not on file   Intimate Partner Violence: Not on file   Housing Stability: Not on file Family History:     History reviewed. No pertinent family history. REVIEW OF SYSTEMS:    Review of Systems    PHYSICAL EXAM:      Vitals:  /64   Pulse 85   Temp 98.2 °F (36.8 °C) (Oral)   Resp 16   Ht 5' 9\" (1.753 m)   Wt 198 lb 3.2 oz (89.9 kg)   SpO2 95%   BMI 29.27 kg/m²     CONSTITUTIONAL:  awake, alert, cooperative, no apparent distress, and appears stated age NAD  EYES:  pupils equal, round and reactive to light, extra ocular muscles intact,sclera clear, conjunctiva normal  NECK:  Supple, symmetrical, trachea midline, no adenopathy, thyroid symmetric, not enlarged and no tenderness, skin normal  HEMATOLOGIC/LYMPHATICS:  no cervical lymphadenopathy, nosupraclavicular lymphadenopathy, no axillary lymphadenopathy and no inguinal lymphadenopathy  LUNGS:  No increased work of breathing, good air exchange, clear to auscultation bilaterally, no crackles or wheezing  CARDIOVASCULAR:  , regular rate and rhythm, normal S1 and S2, no S3 or S4, and no murmur noted  ABDOMEN:  No scars, normal bowel sounds, soft, non-distended, non-tender, no masses palpated, no hepatosplenomegally      MUSCULOSKELETAL:  There is no redness, warmth, or swelling of the joints. Full range of motion noted. Motor strength is 5 out of 5 all extremities bilaterally. NEUROLOGIC:  Awake, alert, oriented to name, place andtime. Cranial nerves II-XII are grossly intact. Motor is 5 out of 5 bilaterally. SKIN:  no bruising or bleeding      DATA:    PT/INR:    Recent Labs     08/22/22 2043   PROT 7.0     PTT:No results for input(s): APTT in the last 72 hours.   CMP:    Lab Results   Component Value Date/Time     08/24/2022 05:05 AM    K 3.8 08/24/2022 05:05 AM     08/24/2022 05:05 AM    CO2 23 08/24/2022 05:05 AM    BUN 20 08/24/2022 05:05 AM    PROT 7.0 08/22/2022 08:43 PM     Magnesium:    Lab Results   Component Value Date/Time    MG 1.40 08/23/2022 05:50 AM     Phosphorus:  No components found for: PO4  Calcium:  No results found for: CA  CBC:    Lab Results   Component Value Date/Time    WBC 2.8 08/24/2022 05:05 AM    RBC 2.56 08/24/2022 05:05 AM    HGB 7.5 08/24/2022 05:05 AM    HCT 21.9 08/24/2022 05:05 AM    MCV 85.6 08/24/2022 05:05 AM    RDW 15.8 08/24/2022 05:05 AM    PLT 52 08/24/2022 05:05 AM     DIFF:    Lab Results   Component Value Date/Time    MCV 85.6 08/24/2022 05:05 AM    RDW 15.8 08/24/2022 05:05 AM      LDH:  @labcrnt(LDH)@  Uric Acid:  @labcrnt(URIC)@    Radiology Review:  XR ABDOMEN (KUB) (8/22/2022): Impression   Mild stool volume in the colon, without free air or constipation. XR CHEST PORTABLE (8/23/2022): Impression   1. Interval development of discoid atelectasis in the right lung base. 2. Improved pleural effusion at the left lung base, with a focal left basilar   infiltrate which may represent atelectasis or pneumonia. There is a lateral   nodular component which could represent a small amount of fluid within the   fissure. This can be reassessed on follow-up radiographs or later CT imaging   for patient's cancer follow-up studies. Problem List  Patient Active Problem List   Diagnosis    Transient global amnesia    Acute encephalopathy    Hemispheric carotid artery syndrome    Uncontrolled type 2 diabetes mellitus with complication, without long-term current use of insulin (HCC)    HTN (hypertension), benign    Dyslipidemia    Urolithiasis    SOB (shortness of breath)    Left renal stone    Lung cancer (HCC)    Shortness of breath    Gram-negative pneumonia (HCC)    Sepsis ([facility] [address]    UTI (urinary tract infection)       IMPRESSION/RECOMMENDATIONS:  1.) Met adenocarcinoma of the left lung  - Just started Alimta on 8/15/2022. - Due for Alimta #2 on 9/06/2022.    2.) Chemo-induced pancytopenia  - Transfused for HGB <7 g/dL or PLT <10 K/mcL. - Continue folic acid daily, as Alimta is a folic acid inhibitor. 3.) Serratia marcescens bacteremia  - On Levaquin.   - Afebrile last 24

## 2022-08-24 NOTE — CARE COORDINATION
[facility]    Referral received from  to follow for home care services. I will follow for needs, and speak with patient to verify demos.     Sarah Steven RN, BSN CTN  [facility] [phone]

## 2022-08-24 NOTE — PROGRESS NOTES
NP paged, \"FYI pt blood sugar was over 349, it was 354. He got 4 units of humalog and 70 lantus.  Thank you\"

## 2022-08-24 NOTE — PROGRESS NOTES
Dr. Colletta Gallery paged \"Patient /47. He has been running soft bp's since yesterday as well. Would you like me to hold any medications this morning? Please advise. Thank you. \" Awaiting response and orders.  Electronically signed by Rahel Wells RN on 8/24/22 at 8:43 AM EDT

## 2022-08-24 NOTE — PLAN OF CARE
Problem: Discharge Planning  Goal: Discharge to home or other facility with appropriate resources  Outcome: Progressing     Problem: Safety - Adult  Goal: Free from fall injury  8/24/2022 1042 by Tanja Payne RN  Outcome: Progressing     Problem: Pain  Goal: Verbalizes/displays adequate comfort level or baseline comfort level  Outcome: Progressing

## 2022-08-24 NOTE — DISCHARGE INSTR - COC
Continuity of Care Form    Patient Name: Yun West   :  1945  MRN:  [id number]    Admit date:  2022  Discharge date:  ***    Code Status Order: Full Code   Advance Directives:     Admitting Physician:  Peter Salazar MD  PCP: Joaquina Zurita DO    Discharging Nurse: [facility] Unit/Room#: 0205/0205-02  Discharging Unit Phone Number: ***    Emergency Contact:   Extended Emergency Contact Information  Primary Emergency Contact: [place], [place]  Address: [facility] [address] Phone: [phone]  Work Phone: [phone]  Mobile Phone: [phone]  Relation: Spouse  Secondary Emergency Contact: [address] [place] [address] Phone: [phone]  Relation: Child    Past Surgical History:  Past Surgical History:   Procedure Laterality Date    BACK SURGERY      lumber, no hardware, \"cleaned it out\"    CYSTOSCOPY Right 2020    CYSTOSCOPY,  RIGHT URETEROSCOPY, RIGHT RETRO PYELOGRAM, REMOVAL STONE FROM BLADDER, URETHRAL DILITATION performed by Vitor Boo MD at [facility]    NEPHROLITHOTOMY Left 2020    LEFT PERCUTANEOUS NEPHROLITHOTOMY WITH DR Tavares Rodriguez TO PLACE NEPHROSTOMY TUBE PRIOR performed by Zakia Ramirez MD at [facility] N/A 2021    SURGICAL PORT PLACEMENT performed by Arelis Savage MD at [address] [place] [address] Right     ROTATOR CUFF       Immunization History:   Immunization History   Administered Date(s) Administered    Pneumococcal Polysaccharide (Cxlijinqp74) 2008, 2012    Td vaccine (adult) 2016    Tdap (Boostrix, Adacel) 2008       Active Problems:  Patient Active Problem List   Diagnosis Code    Transient global amnesia G45.4    Acute encephalopathy G93.40    Hemispheric carotid artery syndrome G45.1    Uncontrolled type 2 diabetes mellitus with complication, without long-term current use of insulin ([facility] [address] E11.8, E11.65    HTN (hypertension), benign I10    Dyslipidemia E78.5    Urolithiasis N20.9    SOB (shortness of breath) R06.02    Left renal stone N20.0    Lung cancer (HCC) C34.90    Shortness of breath R06.02    Gram-negative pneumonia (HCC) J15.6    Sepsis (HCC) A41.9    UTI (urinary tract infection) N39.0       Isolation/Infection:   Isolation            No Isolation          Patient Infection Status       Infection Onset Added Last Indicated Last Indicated By Review Planned Expiration Resolved Resolved By    None active    Resolved    COVID-19 (Rule Out) 01/22/22 01/22/22 01/22/22 COVID-19, Rapid (Ordered)   01/22/22 Rule-Out Test Resulted    COVID-19 (Rule Out) 10/29/21 10/29/21 10/29/21 COVID-19 (Ordered)   10/30/21 Rule-Out Test Resulted            Nurse Assessment:  Last Vital Signs: BP (!) 125/52   Pulse 93   Temp 98.1 °F (36.7 °C) (Oral)   Resp 20   Ht 5' 9\" (1.753 m)   Wt 198 lb 3.2 oz (89.9 kg)   SpO2 98%   BMI 29.27 kg/m²     Last documented pain score (0-10 scale): Pain Level: 0  Last Weight:   Wt Readings from Last 1 Encounters:   08/24/22 198 lb 3.2 oz (89.9 kg)     Mental Status:  {IP PT MENTAL STATUS:20030}    IV Access:  { FAN IV ACCESS:712825078}    Nursing Mobility/ADLs:  Walking   {P DME DMIT:839879158}  Transfer  {CHP DME KPAO:879607561}  Bathing  {CHP DME KWFU:262146364}  Dressing  {CHP DME GGAN:879827618}  Toileting  {P DME DRVT:268398234}  Feeding  {P DME OIFO:134796347}  Med Admin  {P DME LEHT:316079861}  Med Delivery   { FAN MED Delivery:913463948}    Wound Care Documentation and Therapy:  Incision 12/04/20 Back Left (Active)   Number of days: 970       Incision 11/04/21 Internal jugular Right (Active)   Number of days: 292        Elimination:  Continence:    Bowel: {YES / ZL:99273}  Bladder: {YES / UE:28835}  Urinary Catheter: {Urinary Catheter:857551693}   Colostomy/Ileostomy/Ileal Conduit: {YES / EY:99501}       Date of Last BM: ***    Intake/Output Summary (Last 24 hours) at 2022 1143  Last data filed at 2022 0912  Gross per 24 hour   Intake 1006.34 ml   Output 0 ml   Net 1006.34 ml     I/O last 3 completed shifts: In: 2262 [P. O.:600;  I.V.:8.5; IV Piggyback:1653.5]  Out: 0     Safety Concerns:     { FAN Safety Concerns:338067381}    Impairments/Disabilities:      [facility] Impairments/Disabilities:412163222}    Nutrition Therapy:  Current Nutrition Therapy:   [facility] Diet List:393823256}    Routes of Feeding: {CHP DME Other Feedings:489157595}  Liquids: {Slp liquid thickness:69217}  Daily Fluid Restriction: {CHP DME Yes amt example:678638941}  Last Modified Barium Swallow with Video (Video Swallowing Test): {Done Not Done UFOA:101201184}    Treatments at the Time of Hospital Discharge:   Respiratory Treatments: ***  Oxygen Therapy:  {Therapy; copd oxygen:39516}  Ventilator:    { CC Vent JVBV:192503548}    Rehab Therapies: Physical Therapy, Occupational Therapy, and Nurse; HRS if patient agreeable; therapy soc approved if needed  Weight Bearing Status/Restrictions: [facility] Weight Bearin}  Other Medical Equipment (for information only, NOT a DME order):  {EQUIPMENT:190466249}  Other Treatments: HOME HEALTH CARE: LEVEL 3 [address] [place] [address], [address] agency to establish plan of care for patient over 60 day period   Nursing  Initial home SN evaluation visit to occur within 24-48 hours for:  1)  medication management  2)  VS and clinical assessment  3)  S&S chronic disease exacerbation education + when to contact MD/NP  4)  care coordination  Medication Reconciliation during 1st SN visit  PT/OT/Speech   Evaluations in home within 24-48 hours of discharge to include DME and home safety   Frontload therapy 5 days, then 3x a week   OT to evaluate if patient has [address] needs for personal care    evaluation within 24-48 hours to evaluate resources & insurance for potential AL, [place], LTC, and Medicaid options   Palliative Care referral within 5 days of hospital discharge   PCP Visit scheduled within 3 - 7 days of hospital discharge    [address] (If patient is agreeable and meets guidelines)      Patient's personal belongings (please select all that are sent with patient):  {CHP DME Belongings:638263094}    RN SIGNATURE:  {Esignature:610559174}    CASE MANAGEMENT/SOCIAL WORK SECTION    Inpatient Status Date: ***    Readmission Risk Assessment Score:  Readmission Risk              Risk of Unplanned Readmission:  14           Discharging to Facility/ Agency   Name: [facility]   Address:  Phone: [phone]  Fax:    Dialysis Facility (if applicable)   Name:  Address:  Dialysis Schedule:  Phone:  Fax:    / signature: Electronically signed by Radha Singer RN on 8/25/22 at 1:08 PM EDT    PHYSICIAN SECTION    Prognosis: Good    Condition at Discharge: Stable    Rehab Potential (if transferring to Rehab): Good    Recommended Follow-up, Labs or Other Treatments After Discharge:    [address] for 4 more days         Physician Certification: I certify the above information and transfer of Adrian Lee  is necessary for the continuing treatment of the diagnosis listed and that he requires Home Care for greater 30 days.      Update Admission H&P: No change in H&P    PHYSICIAN SIGNATURE:  Electronically signed by Santo Gonzalez MD on 8/25/22 at 11:16 AM EDT

## 2022-08-24 NOTE — PROGRESS NOTES
Occupational Therapy  Facility/Department: [facility] A2 CARD TELEMETRY  Daily Treatment Note  NAME: Maame Adams  : 1945  MRN: [id number]    Date of Service: 2022    Discharge Recommendations:  Home with Home health OT, 24 hour supervision or assist  OT Equipment Recommendations  Equipment Needed: No      Patient Diagnosis(es): The primary encounter diagnosis was Septicemia ([facility] [address] Diagnoses of Pancytopenia ([facility] [address] Neutropenia, unspecified type ([facility] [address] Tachycardia, Malignant neoplasm of lung, unspecified laterality, unspecified part of lung ([facility] [address] Pneumonia of left lower lobe due to infectious organism, and Urinary tract infection with hematuria, site unspecified were also pertinent to this visit. Assessment    Assessment: Maame Adams is progressing in therapy steadily, currently limited by generalized weakness. Demo'd fair safety awareness, requiring cues for pacing and energy conservation. Functional Mobility: SBA sit <>stand, up to supervision while ambulating household distance  ADL: declined  TA: Shoulder ROM with static hold at wall, BUE TherEx while in chair  Activity was tolerated fairly well, pt required frequent rest breaks but able to cont with therapy. Continue OT POC to address performance deficits in ADLs and functional mobility. AM-PAC Score  AM-PAC Inpatient Daily Activity Raw Score: 18 (22)  AM-PAC Inpatient ADL T-Scale Score : 38.66 (22)  ADL Inpatient CMS 0-100% Score: 46.65 (22)  ADL Inpatient CMS G-Code Modifier : CK (22)    Activity Tolerance: Patient tolerated treatment well  Discharge Recommendations: Home with Home health OT;24 hour supervision or assist  Equipment Needed: No      Plan   Plan  Times per Week: 3-5x  Current Treatment Recommendations: Strengthening;Balance training;Gait training;Functional mobility training; Endurance training; Safety education & training;Pain management;Equipment evaluation, education, & procurement;Positioning;Patient/Caregiver education & training;Home management training;Self-Care / ADL     Restrictions  Restrictions/Precautions  Restrictions/Precautions: Fall Risk;General Precautions  Position Activity Restriction  Other position/activity restrictions: tele, IV, up with assist    Subjective   Subjective  Subjective: pt in chair at beginning of session, declining need to complete ADLs but agreeable to ther ex; RN approved  Pain: denied pain  Orientation  Overall Orientation Status: Within Normal Limits  Orientation Level: Oriented X4  Pain: Pt does not indicate pain  Cognition  Overall Cognitive Status: WFL        Objective    Vitals: BP: 128/55, SPO2 99% on RA, HR 87      Bed Mobility Training  Bed Mobility Training: No  Transfer Training  Transfer Training: Yes  Overall Level of Assistance: Stand-by assistance  Sit to Stand: Stand-by assistance  Stand to Sit: Stand-by assistance  Gait Training  Gait Training: Yes  Gait  Overall Level of Assistance: Contact-guard assistance;Stand-by assistance (CGA-SBA)  Interventions: Safety awareness training;Verbal cues  Base of Support: Narrowed  Speed/Latisha: Slow  Step Length: Left shortened;Right shortened  Distance (ft):  (2x 75 ft)  Assistive Device: Gait belt     ADL  Additional Comments: Pt declining ADLs  OT Exercises  A/AROM Exercises: x5 shoulder flexion at wall to ~120* with 10 second hold; x5 horizontal abduction at wall with 10 second hold BUE; x15 elbow flexion/extension and wrist flexion/extension seated in chair  Dynamic Standing Balance Exercises: ambulation of houshold distance with up to supervision level of A, pt will good insight into energy level and when to return to room  Postural Correction Exercises: tactile and verbal cues for upright posture including chin tuck and neck extension into therapist's hand held near occipital area  Breathing Techniques: cues for PLB with exertion     Safety Devices  Type of Devices:  All fall risk precautions in place;Call light within reach; Chair alarm in place;Gait belt;Nurse notified; Left in chair;Patient at risk for falls  Restraints  Restraints Initially in Place: No     Patient Education  Education Given To: Patient  Education Provided: Role of Therapy;Plan of Care;Transfer Training;Precautions  Education Provided Comments: disease specific: posture correct techniques  Education Method: Verbal  Barriers to Learning: None  Education Outcome: Verbalized understanding    Goals  Short Term Goals  Time Frame for Short term goals: 1 week (8/30) unless noted  Short Term Goal 1: Pt will complete x15 BUE exercises- goal progressing 8/24  Short Term Goal 2: Pt will complete ambulatory toilet transfers with supv  Short Term Goal 3: Pt will be educated on energy conservation techinques to implement upon discharge- goal progressing 8/24  Patient Goals   Patient goals : to return home       Therapy Time   Individual Concurrent Group Co-treatment   Time In 1505         Time Out 1528         Minutes 23         Timed Code Treatment Minutes: 23 Minutes     If pt is unable to be seen after this session, please let this note serve as discharge summary. Please see case management note for discharge disposition. Thank you.    Peter Landry OT

## 2022-08-24 NOTE — PROGRESS NOTES
Physical Therapy  Facility/Department: [facility] A2 CARD TELEMETRY  Daily Treatment Note  NAME: Shayan Cheng  : 1945  MRN: [id number]    Date of Service: 2022    Discharge Recommendations:  Home with Home health PT, 24 hour supervision or assist   PT Equipment Recommendations  Equipment Needed: No    Patient Diagnosis(es): The primary encounter diagnosis was Septicemia ([facility] [address] Diagnoses of Pancytopenia ([facility] [address] Neutropenia, unspecified type ([facility] [address] Tachycardia, Malignant neoplasm of lung, unspecified laterality, unspecified part of lung ([facility] [address] Pneumonia of left lower lobe due to infectious organism, and Urinary tract infection with hematuria, site unspecified were also pertinent to this visit. Assessment   Assessment: Patient seen for gait and LE strengthening. Patient cleared by RN for therapy participation this date. Patient agreeable to therapy. Patient completed transfers with CGA and ambulates 2x 75 ft without AD with CGA-SBA and increased trunk flexion. Pt completes seated HEP for strengthening and endurance. P.T .will continue to follow throughout LOS. Recommending DC to home with 24/ and home PT. Activity Tolerance: Patient tolerated treatment well  Equipment Needed: No     Plan    Plan  Plan: 3-5 times per week  Specific Instructions for Next Treatment: progress mobility as tolerated  Current Treatment Recommendations: Strengthening;ROM;Balance training;Functional mobility training;Patient/Caregiver education & training; Therapeutic activities; Safety education & training;Home exercise program;Neuromuscular re-education;Stair training;Gait training;Equipment evaluation, education, & procurement     Restrictions  Restrictions/Precautions  Restrictions/Precautions: Fall Risk, General Precautions  Position Activity Restriction  Other position/activity restrictions: tele, IV, up with assist     Subjective    Subjective  Subjective: Pt in recliner, agreeable to therapy  Pain: Pt does not indicate pain  Orientation  Overall Orientation Status: Within Normal Limits     Objective   Vitals 98%, 98 bpm, 127/49     Bed Mobility Training  Bed Mobility Training: No  Transfer Training  Transfer Training: Yes  Overall Level of Assistance: Contact-guard assistance  Sit to Stand: Contact-guard assistance  Stand to Sit: Contact-guard assistance  Gait Training  Gait Training: Yes  Gait  Overall Level of Assistance: Contact-guard assistance;Stand-by assistance (CGA-SBA)  Interventions: Safety awareness training;Verbal cues  Base of Support: Narrowed  Speed/Latisha: Slow  Step Length: Left shortened;Right shortened  Distance (ft):  (2x 75 ft)  Assistive Device: Gait belt     PT Exercises  Exercise Treatment: 1x 15 BLE LAQ, seated marches, and AP     Safety Devices  Type of Devices: All fall risk precautions in place;Call light within reach; Chair alarm in place;Gait belt;Nurse notified; Left in chair;Patient at risk for falls  Restraints  Restraints Initially in Place: No       Goals  Short Term Goals  Time Frame for Short term goals: 1 week (8/30) unless otherwise specified  Short term goal 1: Pt will be mod I with bed mobility. Short term goal 2: Pt will be supervision with transfers. Short term goal 3: Pt will ambulate 50 ft with supervision and LRAD. Short term goal 4: Pt will negotiate 2 stairs with rail and SBA. Short term goal 5: 8/27: Pt will participate in 12-15 reps of BLE exercises to promote strength and activity tolerance.   Patient Goals   Patient goals : \"to go home\"    Education  Patient Education  Education Given To: Patient  Education Provided: Role of Therapy;Plan of Care;Transfer Training;Home Exercise Program;Precautions;Orientation  Education Provided Comments: Pt educated on importance of progressive ambulation and HEP - demos understanding  Education Method: Verbal  Barriers to Learning: None  Education Outcome: Verbalized understanding    Therapy Time   Individual Concurrent Group Co-treatment Time In 0950         Time Out 1013         Minutes 23         Timed Code Treatment Minutes: 23 Minutes     If pt is unable to be seen after this session, please let this note serve as discharge summary. Please see case management note for discharge disposition. Thank you.     Marge Victoria, PT

## 2022-08-24 NOTE — PLAN OF CARE
Problem: Discharge Planning  Goal: Discharge to home or other facility with appropriate resources  8/24/2022 1045 by Pedro Holcomb RN  Outcome: Progressing   Pt will have accuchecks before meals and at bedtime with sliding scale insulin in place for coverage. Will continue to monitor for signs and symptoms of hypoglycemia and hyperglycemia throughout shift.

## 2022-08-25 VITALS
HEART RATE: 91 BPM | OXYGEN SATURATION: 100 % | HEIGHT: 69 IN | SYSTOLIC BLOOD PRESSURE: 131 MMHG | RESPIRATION RATE: 16 BRPM | TEMPERATURE: 98.2 F | BODY MASS INDEX: 29.18 KG/M2 | WEIGHT: 197 LBS | DIASTOLIC BLOOD PRESSURE: 56 MMHG

## 2022-08-25 LAB
ANION GAP SERPL CALCULATED.3IONS-SCNC: 8 MMOL/L (ref 3–16)
ANISOCYTOSIS: ABNORMAL
BANDED NEUTROPHILS RELATIVE PERCENT: 4 % (ref 0–7)
BASOPHILS ABSOLUTE: 0 K/UL (ref 0–0.2)
BASOPHILS RELATIVE PERCENT: 0 %
BLOOD CULTURE, ROUTINE: ABNORMAL
BLOOD CULTURE, ROUTINE: ABNORMAL
BUN BLDV-MCNC: 17 MG/DL (ref 7–20)
CALCIUM SERPL-MCNC: 7.6 MG/DL (ref 8.3–10.6)
CHLORIDE BLD-SCNC: 103 MMOL/L (ref 99–110)
CO2: 24 MMOL/L (ref 21–32)
CREAT SERPL-MCNC: 0.9 MG/DL (ref 0.8–1.3)
CULTURE, BLOOD 2: ABNORMAL
EKG ATRIAL RATE: 83 BPM
EKG DIAGNOSIS: NORMAL
EKG P AXIS: 74 DEGREES
EKG P-R INTERVAL: 152 MS
EKG Q-T INTERVAL: 388 MS
EKG QRS DURATION: 100 MS
EKG QTC CALCULATION (BAZETT): 455 MS
EKG R AXIS: 76 DEGREES
EKG T AXIS: 53 DEGREES
EKG VENTRICULAR RATE: 83 BPM
EOSINOPHILS ABSOLUTE: 0 K/UL (ref 0–0.6)
EOSINOPHILS RELATIVE PERCENT: 1 %
GFR AFRICAN AMERICAN: >60
GFR NON-AFRICAN AMERICAN: >60
GLUCOSE BLD-MCNC: 172 MG/DL (ref 70–99)
GLUCOSE BLD-MCNC: 184 MG/DL (ref 70–99)
GLUCOSE BLD-MCNC: 240 MG/DL (ref 70–99)
HCT VFR BLD CALC: 23.6 % (ref 40.5–52.5)
HEMATOLOGY PATH CONSULT: NO
HEMOGLOBIN: 7.9 G/DL (ref 13.5–17.5)
LYMPHOCYTES ABSOLUTE: 0.4 K/UL (ref 1–5.1)
LYMPHOCYTES RELATIVE PERCENT: 11 %
MCH RBC QN AUTO: 28.7 PG (ref 26–34)
MCHC RBC AUTO-ENTMCNC: 33.6 G/DL (ref 31–36)
MCV RBC AUTO: 85.5 FL (ref 80–100)
MONOCYTES ABSOLUTE: 0.3 K/UL (ref 0–1.3)
MONOCYTES RELATIVE PERCENT: 8 %
NEUTROPHILS ABSOLUTE: 2.6 K/UL (ref 1.7–7.7)
NEUTROPHILS RELATIVE PERCENT: 76 %
ORGANISM: ABNORMAL
OVALOCYTES: ABNORMAL
PDW BLD-RTO: 15.8 % (ref 12.4–15.4)
PERFORMED ON: ABNORMAL
PERFORMED ON: ABNORMAL
PLATELET # BLD: 57 K/UL (ref 135–450)
PLATELET SLIDE REVIEW: ABNORMAL
PMV BLD AUTO: 7.8 FL (ref 5–10.5)
POLYCHROMASIA: ABNORMAL
POTASSIUM REFLEX MAGNESIUM: 4.2 MMOL/L (ref 3.5–5.1)
RBC # BLD: 2.77 M/UL (ref 4.2–5.9)
SODIUM BLD-SCNC: 135 MMOL/L (ref 136–145)
WBC # BLD: 3.2 K/UL (ref 4–11)

## 2022-08-25 PROCEDURE — 6360000002 HC RX W HCPCS: Performed by: INTERNAL MEDICINE

## 2022-08-25 PROCEDURE — 80048 BASIC METABOLIC PNL TOTAL CA: CPT

## 2022-08-25 PROCEDURE — 93010 ELECTROCARDIOGRAM REPORT: CPT | Performed by: INTERNAL MEDICINE

## 2022-08-25 PROCEDURE — 94640 AIRWAY INHALATION TREATMENT: CPT

## 2022-08-25 PROCEDURE — 85025 COMPLETE CBC W/AUTO DIFF WBC: CPT

## 2022-08-25 PROCEDURE — 2580000003 HC RX 258: Performed by: INTERNAL MEDICINE

## 2022-08-25 PROCEDURE — 6370000000 HC RX 637 (ALT 250 FOR IP): Performed by: INTERNAL MEDICINE

## 2022-08-25 PROCEDURE — 93005 ELECTROCARDIOGRAM TRACING: CPT | Performed by: INTERNAL MEDICINE

## 2022-08-25 RX ORDER — IPRATROPIUM BROMIDE AND ALBUTEROL SULFATE 2.5; .5 MG/3ML; MG/3ML
1 SOLUTION RESPIRATORY (INHALATION) EVERY 4 HOURS PRN
Status: DISCONTINUED | OUTPATIENT
Start: 2022-08-25 | End: 2022-08-25 | Stop reason: HOSPADM

## 2022-08-25 RX ORDER — LEVOFLOXACIN 750 MG/1
750 TABLET ORAL DAILY
Qty: 4 TABLET | Refills: 0 | Status: SHIPPED | OUTPATIENT
Start: 2022-08-25 | End: 2022-08-29

## 2022-08-25 RX ORDER — LACTOBACILLUS RHAMNOSUS GG 10B CELL
1 CAPSULE ORAL DAILY
Qty: 7 CAPSULE | Refills: 0 | Status: SHIPPED | OUTPATIENT
Start: 2022-08-25 | End: 2022-09-01

## 2022-08-25 RX ADMIN — VANCOMYCIN HYDROCHLORIDE 750 MG: 750 INJECTION, POWDER, LYOPHILIZED, FOR SOLUTION INTRAVENOUS at 08:41

## 2022-08-25 RX ADMIN — FOLIC ACID 1 MG: 1 TABLET ORAL at 08:36

## 2022-08-25 RX ADMIN — CEFEPIME 2000 MG: 2 INJECTION, POWDER, FOR SOLUTION INTRAVENOUS at 04:08

## 2022-08-25 RX ADMIN — Medication 1 TABLET: at 08:36

## 2022-08-25 RX ADMIN — INSULIN LISPRO 5 UNITS: 100 INJECTION, SOLUTION INTRAVENOUS; SUBCUTANEOUS at 12:03

## 2022-08-25 RX ADMIN — IPRATROPIUM BROMIDE AND ALBUTEROL SULFATE 1 AMPULE: 2.5; .5 SOLUTION RESPIRATORY (INHALATION) at 07:48

## 2022-08-25 RX ADMIN — CHOLECALCIFEROL TAB 10 MCG (400 UNIT) 400 UNITS: 10 TAB at 08:35

## 2022-08-25 RX ADMIN — LEVOFLOXACIN 750 MG: 5 INJECTION, SOLUTION INTRAVENOUS at 06:39

## 2022-08-25 RX ADMIN — ATORVASTATIN CALCIUM 20 MG: 10 TABLET, FILM COATED ORAL at 08:36

## 2022-08-25 RX ADMIN — INSULIN LISPRO 2 UNITS: 100 INJECTION, SOLUTION INTRAVENOUS; SUBCUTANEOUS at 12:03

## 2022-08-25 NOTE — PROGRESS NOTES
Pt d/c'd home. Removed R chest port and stopped bleeding. Catheter intact. Pt tolerated well. No redness noted at site. Notified [facility] and removed tele box. Reviewed d/c instructions, home meds, and  f/u information utilizing teach-back method. Scripts for  given to patient. Patient verbalized understanding. Patient wheeled to front entrance with wife and all belongings.

## 2022-08-25 NOTE — ONCOLOGY
ONCOLOGY HEMATOLOGY CARE PROGRESS NOTE      SUBJECTIVE:     Afebrile and on room air. ROS:   The remaining 10 point review of symptoms is unremarkable. OBJECTIVE        Physical    VITALS:  /71   Pulse 87   Temp 98 °F (36.7 °C) (Oral)   Resp 16   Ht 5' 9\" (1.753 m)   Wt 197 lb (89.4 kg)   SpO2 98%   BMI 29.09 kg/m²   TEMPERATURE:  Current - Temp: 98 °F (36.7 °C); Max - Temp  Av.2 °F (36.8 °C)  Min: 97.9 °F (36.6 °C)  Max: 98.5 °F (36.9 °C)  PULSE OXIMETRY RANGE: SpO2  Av.6 %  Min: 96 %  Max: 98 %  24HR INTAKE/OUTPUT:    Intake/Output Summary (Last 24 hours) at 2022 5125  Last data filed at 2022 1735  Gross per 24 hour   Intake 600 ml   Output 0 ml   Net 600 ml       CONSTITUTIONAL:  awake, alert, cooperative, no apparent distress, HEENT oral pharynx , no scleral icterus  HEMATOLOGIC/LYMPHATICS:  no cervical lymphadenopathy, no supraclavicular lymphadenopathy, no axillary lymphadenopathy and no inguinal lymphadenopathy  LUNGS:  No increased work of breathing, good air exchange, clear to auscultation bilaterally, no crackles or wheezing  CARDIOVASCULAR:  , regular rate and rhythm, normal S1 and S2, no S3 or S4, and no murmur noted  ABDOMEN:  No scars, normal bowel sounds, soft, non-distended, non-tender, no masses palpated, no hepatosplenomegally  MUSCULOSKELETAL:  There is no redness, warmth, or swelling of the joints. EXTREMETIES: No clubbing cynosis or edema  NEUROLOGIC:  Awake, alert, oriented to name, place and time. Cranial nerves II-XII are grossly intact. Motor is 5 out of 5 bilaterally.    SKIN:  no bruising or bleeding      Data      Recent Labs     22  2234 22  0505 22  0400   WBC 1.8* 2.8* 3.2*   HGB 10.2* 7.5* 7.9*   HCT 29.9* 21.9* 23.6*   PLT 77* 52* 57*   MCV 86.8 85.6 85.5        Recent Labs     22  0550 22  0505 22  0400   * 134* 135*   K 3.1* 3.8 4.2   CL 99 104 103   CO2 20* 23 24 BUN 22* 20 17   CREATININE 1.4* 1.0 0.9     Recent Labs     08/22/22 2043   AST 28   ALT 23   BILITOT 0.8   ALKPHOS 155*       Magnesium:    Lab Results   Component Value Date/Time    MG 1.40 08/23/2022 05:50 AM    MG 1.70 01/24/2022 03:55 AM    MG 1.70 02/12/2018 05:37 AM         Problem List  Patient Active Problem List   Diagnosis    Transient global amnesia    Acute encephalopathy    Hemispheric carotid artery syndrome    Uncontrolled type 2 diabetes mellitus with complication, without long-term current use of insulin (HCC)    HTN (hypertension), benign    Dyslipidemia    Urolithiasis    SOB (shortness of breath)    Left renal stone    Lung cancer (HCC)    Shortness of breath    Gram-negative pneumonia (HCC)    Sepsis ([facility] [address]    UTI (urinary tract infection)       ASSESSMENT AND PLAN    1.) Met adenocarcinoma of the left lung  - Just started Alimta on 8/15/2022. - Due for Alimta #2 on 9/06/2022.     2.) Chemo-induced pancytopenia  - Transfused for HGB <7 g/dL or PLT <10 K/mcL. - Continue folic acid daily, as Alimta is a folic acid inhibitor.  - Counts are improving daily. 3.) Serratia marcescens bacteremia  - On Levaquin. Based on the susceptibility, this should be fine. - Afebrile last 24 hours. 4.) DM2  - On Lantus and Humalog. ONCOLOGIC DISPOSITION:  The WBC/ANC appear to be improving and he is afebrile. Could be safely discharged on Levaquin x7 days from my point of view.     Chiki Santizo MD  Please contact through [facility]

## 2022-08-25 NOTE — PLAN OF CARE
Problem: Safety - Adult  Goal: Free from fall injury  8/24/2022 2143 by Néstor Garcia RN  Outcome: Progressing  Note: Pt will remain free from falls throughout hospital stay. Fall precautions in place, bed alarm on, bed in lowest position with wheels locked and side rails 2/4 up. Room door open and hourly rounding completed. Will continue to monitor throughout shift.

## 2022-08-25 NOTE — PROGRESS NOTES
08/25/22 1111   RT Protocol   History Pulmonary Disease 1   Respiratory pattern 0   Breath sounds 0   Cough 0   Indications for Bronchodilator Therapy None   Bronchodilator Assessment Score 1

## 2022-08-25 NOTE — CARE COORDINATION
CASE MANAGEMENT DISCHARGE SUMMARY      Discharge to: home with [facility]     IMM given: (date) 8/23/22    New Durable Medical Equipment ordered/agency:     Transportation: private - wife       Confirmed discharge plan with: patient/wife/RN/Ariela with [facility]      Patient: yes     Family:  yes    Name:Miranda  Contact number: [phone]     Facility/Agency, name:  [facility] faxed   Phone number for report to facility: [phone]     RN, name: Destini Pope     Note: Discharging nurse to complete [facility], reconcile AVS, and place final copy with patient's discharge packet.

## 2022-08-25 NOTE — DISCHARGE SUMMARY
Hospital Medicine Discharge Summary    Patient: Tamir Sanz     Age: 65 y.o. Gender: male  : 1945   MRN: [id number]  Code status: Full code    Admit Date: 2022   Discharge Date: 2022    Disposition:  Home    Condition at Discharge: Stable    Primary Care Provider: Gayle Patel DO    Admitting Physician: Steve Baxter MD  Discharge Physician: Lamonte Bliss MD     Discharge Diagnoses: Active Hospital Problems    Diagnosis     Gram-negative pneumonia ([facility] [address] [J15.6]      Priority: Medium    Sepsis ([facility] [address] [A41.9]      Priority: Medium    Lung cancer ([facility] [address] [C34.90]     Uncontrolled type 2 diabetes mellitus with complication, without long-term current use of insulin (HCC) [E11.8, E11.65]     HTN (hypertension), benign [I10]        Hospital Course:   65 y.o. WM with PMHx sig for DM2, HTN, HLD, lung cancer stage IV who presents with weakness and worsening cough. Denies fevers, but feels weak and unable to eat. He last had chemo a week ago and his counts have been dropping. Assessment/Plan:    Sepsis d/t Serratia Bacteremia in setting of Neutropenia and recent Chemotherapy:   Underlying etiology uncertain; although does have some subacute cough, does not have convincing imaging findings. Bacterial translocation in setting of neutropenia seems most plausible given organism identified. UTI possible given UA findings, however, final UCx was negative. Recommend course of Levaquin at PR. Fevers improved  Neutropenia resolved    ? Arrhythmia: Tele reported as Afib, however, EKG showed only SR with sinus arrhythmia. HTN, controlled - continue with toprol XL and cozaar    DM2 on insulin - uncontrolled. Continue basal bolus Insulin regimen    Pancytopenia due to chemo - Improving. Monitor    Lung Cancer - Oncology following.        Exam:   BP (!) 131/56   Pulse 91   Temp 98.2 °F (36.8 °C) (Oral)   Resp 16   Ht 5' 9\" (1.753 m)   Wt 197 lb (89.4 kg)   SpO2 100%   BMI 29.09 kg/m²   General appearance:  No apparent distress, appears stated age and cooperative. HEENT:  Pupils equal, round, and reactive to light. Conjunctivae/corneas clear. Neck:  Supple, no jugular venous distention. Trachea midline with full range of motion. Respiratory:  Normal respiratory effort. Clear to auscultation, bilaterally without Rales/Wheezes/Rhonchi. Cardiovascular:  Regular rate and rhythm with normal S1/S2 without murmurs, rubs or gallops. Abdomen:  Soft, non-tender, non-distended with normal bowel sounds. Musculoskelatal:  No clubbing, cyanosis or edema bilaterally. Full range of motion without deformity. Neurologic:  Neurovascularly intact without any focal sensory/motor deficits. Cranial nerves: II-XII intact, grossly non-focal.  Psychiatric:  Alert and oriented, thought content appropriate, normal insight  Skin:  Skin color, texture, turgor normal.  No rashes or lesions. Capillary Refill:  Brisk,< 3 seconds   Peripheral Pulses:  +2 palpable, equal bilaterally     Patient Discharge Instructions: Follow up:  1. Primary Care Provider Adriana Morrissey DO in the next 1-2 weeks.       Discharge Medications:   Discharge Medication List as of 8/25/2022 12:47 PM        START taking these medications    Details   levoFLOXacin (LEVAQUIN) 750 MG tablet Take 1 tablet by mouth daily for 4 days, Disp-4 tablet, R-0Normal      lactobacillus (CULTURELLE) capsule Take 1 capsule by mouth daily for 7 days, Disp-7 capsule, R-0Normal           Discharge Medication List as of 8/25/2022 12:47 PM        Discharge Medication List as of 8/25/2022 12:47 PM        CONTINUE these medications which have NOT CHANGED    Details   folic acid (FOLVITE) 1 MG tablet Take 1 mg by mouth dailyHistorical Med      metoprolol succinate (TOPROL XL) 25 MG extended release tablet Take 1 tablet by mouth once daily, Disp-30 tablet, R-11Normal      atorvastatin (LIPITOR) 20 MG tablet Take 20 mg by mouth daily Historical Med      losartan (COZAAR) 100 MG tablet Take 100 mg by mouth dailyHistorical Med      Multiple Vitamins-Minerals (THERAPEUTIC MULTIVITAMIN-MINERALS) tablet Take 1 tablet by mouth dailyHistorical Med      vitamin D3 (CHOLECALCIFEROL) 10 MCG (400 UNIT) TABS tablet Take 400 Units by mouth dailyHistorical Med      insulin aspart (NOVOLOG) 100 UNIT/ML injection vial Inject into the skin 3 times daily (before meals) Sliding scaleHistorical Med      Insulin Glargine (TOUJEO SOLOSTAR SC) Inject 70 Units into the skin nightly Historical Med           Discharge Medication List as of 8/25/2022 12:47 PM        STOP taking these medications       albuterol sulfate HFA (PROVENTIL HFA) 108 (90 Base) MCG/ACT inhaler Comments:   Reason for Stopping:                 Significant Test Results    No results found. Consults:     IP CONSULT TO ONCOLOGY  IP CONSULT TO PHARMACY  IP CONSULT TO HOME CARE NEEDS    Labs: For convenience and continuity at follow-up the following most recent labs are provided:    Lab Results   Component Value Date/Time    WBC 3.2 08/25/2022 04:00 AM    HGB 7.9 08/25/2022 04:00 AM    HCT 23.6 08/25/2022 04:00 AM    MCV 85.5 08/25/2022 04:00 AM    PLT 57 08/25/2022 04:00 AM     08/25/2022 04:00 AM    K 4.2 08/25/2022 04:00 AM     08/25/2022 04:00 AM    CO2 24 08/25/2022 04:00 AM    BUN 17 08/25/2022 04:00 AM    CREATININE 0.9 08/25/2022 04:00 AM    CALCIUM 7.6 08/25/2022 04:00 AM    PHOS 2.7 01/24/2022 03:55 AM    TROPONINI <0.01 05/28/2021 05:51 PM    ALKPHOS 155 08/22/2022 08:43 PM    ALT 23 08/22/2022 08:43 PM    AST 28 08/22/2022 08:43 PM    BILITOT 0.8 08/22/2022 08:43 PM    LABALBU 3.9 08/22/2022 08:43 PM    LABA1C 7.7 02/11/2018 02:48 PM     Lab Results   Component Value Date    INR 1.11 01/24/2022    INR 1.14 (H) 01/22/2022    INR 1.06 01/05/2022         The patient was seen and examined on day of discharge and this discharge summary is in conjunction with any daily progress note from day of discharge.  Time spent on discharge is more than 30 minutes in the examination, evaluation, counseling and review of medications and discharge plan.       Signed:    Gavino Mack MD   9/25/2022

## 2022-08-25 NOTE — CARE COORDINATION
[facility]    DC order noted, all docs needed have been faxed to [facility] for home care services.     Home care to see patient within 24-48 hrs    Rinaldo Mohs RN, BSN CTN  [facility] [phone]

## 2022-08-25 NOTE — PROGRESS NOTES
Dr. Colletta Gallery paged, \"At 0600 I noticed that pt was in Afib per Tele monitor. I called [facility] and they said pt had been going in and out of AFib all day and all night. Pt does not have history of Afib so I got an EKG that read Normal sinus with sinus arrythmia and right bundle branch block. Pt stated that he was having some sharp chest pain on and off with movement overnight, pt stated that it felt muscular in nature. I just wanted to make you aware. Thanks!  \"

## 2022-08-25 NOTE — PLAN OF CARE
Problem: Safety - Adult  Goal: Free from fall injury  8/24/2022 2143 by Nery Hoffman RN  Outcome: Progressing  Note: Pt will remain free from falls throughout hospital stay. Fall precautions in place, bed alarm on, bed in lowest position with wheels locked and side rails 2/4 up. Room door open and hourly rounding completed. Will continue to monitor throughout shift.

## 2022-08-25 NOTE — HOME CARE
Marcelino Vee will require the following home care treatments or therapies: Skilled Nursing, vital signs, medication compliance and education, PT/OT, wound care, teaching and management of medical conditions, etc.  Home care will be necessary because of Bacteremia, Lung Cancer and deconditioning. The patient is in agreement to receiving home care.

## 2022-08-25 NOTE — RT PROTOCOL NOTE
RT Inhaler-Nebulizer Bronchodilator Protocol Note    There is a bronchodilator order in the chart from a provider indicating to follow the RT Bronchodilator Protocol and there is an Initiate RT Inhaler-Nebulizer Bronchodilator Protocol order as well (see protocol at bottom of note). CXR Findings:  No results found. The findings from the last RT Protocol Assessment were as follows:   History Pulmonary Disease: Smoker 15 pack years or more  Respiratory Pattern: Regular pattern and RR 12-20 bpm  Breath Sounds: Clear breath sounds  Cough: Strong, spontaneous, non-productive  Indication for Bronchodilator Therapy: None  Bronchodilator Assessment Score: 1    Aerosolized bronchodilator medication orders have been revised according to the RT Inhaler-Nebulizer Bronchodilator Protocol below. Respiratory Therapist to perform RT Therapy Protocol Assessment initially then follow the protocol. Repeat RT Therapy Protocol Assessment PRN for score 0-3 or on second treatment, BID, and PRN for scores above 3. No Indications - adjust the frequency to every 6 hours PRN wheezing or bronchospasm, if no treatments needed after 48 hours then discontinue using Per Protocol order mode. If indication present, adjust the RT bronchodilator orders based on the Bronchodilator Assessment Score as indicated below. Use Inhaler orders unless patient has one or more of the following: on home nebulizer, not able to hold breath for 10 seconds, is not alert and oriented, cannot activate and use MDI correctly, or respiratory rate 25 breaths per minute or more, then use the equivalent nebulizer order(s) with same Frequency and PRN reasons based on the score. If a patient is on this medication at home then do not decrease Frequency below that used at home.     0-3 - enter or revise RT bronchodilator order(s) to equivalent RT Bronchodilator order with Frequency of every 4 hours PRN for wheezing or increased work of breathing using Per Protocol order mode. 4-6 - enter or revise RT Bronchodilator order(s) to two equivalent RT bronchodilator orders with one order with BID Frequency and one order with Frequency of every 4 hours PRN wheezing or increased work of breathing using Per Protocol order mode. 7-10 - enter or revise RT Bronchodilator order(s) to two equivalent RT bronchodilator orders with one order with TID Frequency and one order with Frequency of every 4 hours PRN wheezing or increased work of breathing using Per Protocol order mode. 11-13 - enter or revise RT Bronchodilator order(s) to one equivalent RT bronchodilator order with QID Frequency and an Albuterol order with Frequency of every 4 hours PRN wheezing or increased work of breathing using Per Protocol order mode. Greater than 13 - enter or revise RT Bronchodilator order(s) to one equivalent RT bronchodilator order with every 4 hours Frequency and an Albuterol order with Frequency of every 2 hours PRN wheezing or increased work of breathing using Per Protocol order mode. RT to enter RT Home Evaluation for COPD & MDI Assessment order using Per Protocol order mode.     Electronically signed by Agustina Bauman RCP on 8/25/2022 at 11:12 AM

## 2022-09-22 PROBLEM — N39.0 UTI (URINARY TRACT INFECTION): Status: RESOLVED | Noted: 2022-08-23 | Resolved: 2022-09-22

## 2022-10-01 ENCOUNTER — APPOINTMENT (OUTPATIENT)
Dept: CT IMAGING | Age: 77
DRG: 314 | End: 2022-10-01
Payer: MEDICARE

## 2022-10-01 ENCOUNTER — APPOINTMENT (OUTPATIENT)
Dept: GENERAL RADIOLOGY | Age: 77
DRG: 314 | End: 2022-10-01
Payer: MEDICARE

## 2022-10-01 ENCOUNTER — HOSPITAL ENCOUNTER (INPATIENT)
Age: 77
LOS: 4 days | Discharge: HOME | DRG: 314 | End: 2022-10-06
Attending: EMERGENCY MEDICINE | Admitting: INTERNAL MEDICINE
Payer: MEDICARE

## 2022-10-01 DIAGNOSIS — T80.212A PORT OR RESERVOIR INFECTION, INITIAL ENCOUNTER: ICD-10-CM

## 2022-10-01 DIAGNOSIS — D61.818 PANCYTOPENIA (HCC): ICD-10-CM

## 2022-10-01 DIAGNOSIS — R78.81 GRAM-NEGATIVE BACTEREMIA: ICD-10-CM

## 2022-10-01 DIAGNOSIS — R50.9 FEVER, UNSPECIFIED FEVER CAUSE: ICD-10-CM

## 2022-10-01 DIAGNOSIS — Z79.899 IMMUNOCOMPROMISED STATE DUE TO DRUG THERAPY (HCC): Primary | ICD-10-CM

## 2022-10-01 DIAGNOSIS — D84.821 IMMUNOCOMPROMISED STATE DUE TO DRUG THERAPY (HCC): Primary | ICD-10-CM

## 2022-10-01 DIAGNOSIS — R77.8 ELEVATED TROPONIN: ICD-10-CM

## 2022-10-01 DIAGNOSIS — J90 PLEURAL EFFUSION: ICD-10-CM

## 2022-10-01 LAB
A/G RATIO: 1.1 (ref 1.1–2.2)
ALBUMIN SERPL-MCNC: 3 G/DL (ref 3.4–5)
ALP BLD-CCNC: 122 U/L (ref 40–129)
ALT SERPL-CCNC: 18 U/L (ref 10–40)
ANION GAP SERPL CALCULATED.3IONS-SCNC: 12 MMOL/L (ref 3–16)
AST SERPL-CCNC: 25 U/L (ref 15–37)
BASOPHILS ABSOLUTE: 0 K/UL (ref 0–0.2)
BASOPHILS RELATIVE PERCENT: 0.3 %
BILIRUB SERPL-MCNC: 0.7 MG/DL (ref 0–1)
BILIRUBIN URINE: ABNORMAL
BLOOD, URINE: ABNORMAL
BUN BLDV-MCNC: 17 MG/DL (ref 7–20)
CALCIUM SERPL-MCNC: 7.6 MG/DL (ref 8.3–10.6)
CHLORIDE BLD-SCNC: 100 MMOL/L (ref 99–110)
CLARITY: CLEAR
CO2: 24 MMOL/L (ref 21–32)
COLOR: YELLOW
CREAT SERPL-MCNC: 1 MG/DL (ref 0.8–1.3)
EOSINOPHILS ABSOLUTE: 0 K/UL (ref 0–0.6)
EOSINOPHILS RELATIVE PERCENT: 0.6 %
EPITHELIAL CELLS, UA: ABNORMAL /HPF (ref 0–5)
FINE CASTS, UA: ABNORMAL /LPF (ref 0–2)
GFR AFRICAN AMERICAN: >60
GFR NON-AFRICAN AMERICAN: >60
GLUCOSE BLD-MCNC: 166 MG/DL (ref 70–99)
GLUCOSE URINE: 100 MG/DL
HCT VFR BLD CALC: 25.1 % (ref 40.5–52.5)
HEMOGLOBIN: 8.2 G/DL (ref 13.5–17.5)
HYALINE CASTS: ABNORMAL /LPF (ref 0–2)
INFLUENZA A: NOT DETECTED
INFLUENZA B: NOT DETECTED
KETONES, URINE: NEGATIVE MG/DL
LACTIC ACID: 1.9 MMOL/L (ref 0.4–2)
LEUKOCYTE ESTERASE, URINE: NEGATIVE
LYMPHOCYTES ABSOLUTE: 0.2 K/UL (ref 1–5.1)
LYMPHOCYTES RELATIVE PERCENT: 8.2 %
MCH RBC QN AUTO: 28 PG (ref 26–34)
MCHC RBC AUTO-ENTMCNC: 32.6 G/DL (ref 31–36)
MCV RBC AUTO: 86 FL (ref 80–100)
MICROSCOPIC EXAMINATION: YES
MONOCYTES ABSOLUTE: 0 K/UL (ref 0–1.3)
MONOCYTES RELATIVE PERCENT: 1 %
MUCUS: ABNORMAL /LPF
NEUTROPHILS ABSOLUTE: 2.2 K/UL (ref 1.7–7.7)
NEUTROPHILS RELATIVE PERCENT: 89.9 %
NITRITE, URINE: NEGATIVE
PDW BLD-RTO: 17.4 % (ref 12.4–15.4)
PH UA: 5.5 (ref 5–8)
PLATELET # BLD: 126 K/UL (ref 135–450)
PMV BLD AUTO: 6.9 FL (ref 5–10.5)
POTASSIUM SERPL-SCNC: 3.6 MMOL/L (ref 3.5–5.1)
PRO-BNP: 257 PG/ML (ref 0–449)
PROTEIN UA: 100 MG/DL
RBC # BLD: 2.92 M/UL (ref 4.2–5.9)
RBC UA: ABNORMAL /HPF (ref 0–4)
SARS-COV-2 RNA, RT PCR: NOT DETECTED
SODIUM BLD-SCNC: 136 MMOL/L (ref 136–145)
SPECIFIC GRAVITY UA: >=1.03 (ref 1–1.03)
TOTAL PROTEIN: 5.7 G/DL (ref 6.4–8.2)
TROPONIN: 0.02 NG/ML
URINE TYPE: ABNORMAL
UROBILINOGEN, URINE: 0.2 E.U./DL
WBC # BLD: 2.4 K/UL (ref 4–11)
WBC UA: ABNORMAL /HPF (ref 0–5)

## 2022-10-01 PROCEDURE — 87040 BLOOD CULTURE FOR BACTERIA: CPT

## 2022-10-01 PROCEDURE — 83880 ASSAY OF NATRIURETIC PEPTIDE: CPT

## 2022-10-01 PROCEDURE — 87186 SC STD MICRODIL/AGAR DIL: CPT

## 2022-10-01 PROCEDURE — 87150 DNA/RNA AMPLIFIED PROBE: CPT

## 2022-10-01 PROCEDURE — 87636 SARSCOV2 & INF A&B AMP PRB: CPT

## 2022-10-01 PROCEDURE — 81001 URINALYSIS AUTO W/SCOPE: CPT

## 2022-10-01 PROCEDURE — 93005 ELECTROCARDIOGRAM TRACING: CPT | Performed by: PHYSICIAN ASSISTANT

## 2022-10-01 PROCEDURE — 96367 TX/PROPH/DG ADDL SEQ IV INF: CPT

## 2022-10-01 PROCEDURE — 84484 ASSAY OF TROPONIN QUANT: CPT

## 2022-10-01 PROCEDURE — 85025 COMPLETE CBC W/AUTO DIFF WBC: CPT

## 2022-10-01 PROCEDURE — 83605 ASSAY OF LACTIC ACID: CPT

## 2022-10-01 PROCEDURE — 80053 COMPREHEN METABOLIC PANEL: CPT

## 2022-10-01 PROCEDURE — 71045 X-RAY EXAM CHEST 1 VIEW: CPT

## 2022-10-01 PROCEDURE — 96365 THER/PROPH/DIAG IV INF INIT: CPT

## 2022-10-01 PROCEDURE — 99285 EMERGENCY DEPT VISIT HI MDM: CPT

## 2022-10-01 RX ORDER — 0.9 % SODIUM CHLORIDE 0.9 %
1000 INTRAVENOUS SOLUTION INTRAVENOUS ONCE
Status: COMPLETED | OUTPATIENT
Start: 2022-10-01 | End: 2022-10-02

## 2022-10-01 RX ORDER — TAMSULOSIN HYDROCHLORIDE 0.4 MG/1
0.4 CAPSULE ORAL DAILY
COMMUNITY

## 2022-10-01 RX ORDER — ASPIRIN 81 MG/1
324 TABLET, CHEWABLE ORAL ONCE
Status: COMPLETED | OUTPATIENT
Start: 2022-10-01 | End: 2022-10-02

## 2022-10-01 ASSESSMENT — PAIN - FUNCTIONAL ASSESSMENT: PAIN_FUNCTIONAL_ASSESSMENT: NONE - DENIES PAIN

## 2022-10-02 ENCOUNTER — APPOINTMENT (OUTPATIENT)
Dept: CT IMAGING | Age: 77
DRG: 314 | End: 2022-10-02
Payer: MEDICARE

## 2022-10-02 PROBLEM — D70.9 NEUTROPENIA (HCC): Status: ACTIVE | Noted: 2022-10-02

## 2022-10-02 PROBLEM — R79.89 ELEVATED TROPONIN: Status: ACTIVE | Noted: 2022-10-02

## 2022-10-02 PROBLEM — C34.91 CANCER OF RIGHT LUNG (HCC): Status: ACTIVE | Noted: 2022-10-02

## 2022-10-02 PROBLEM — E11.65 HYPERGLYCEMIA DUE TO TYPE 2 DIABETES MELLITUS (HCC): Status: ACTIVE | Noted: 2022-10-02

## 2022-10-02 PROBLEM — R77.8 ELEVATED TROPONIN: Status: ACTIVE | Noted: 2022-10-02

## 2022-10-02 LAB
ANION GAP SERPL CALCULATED.3IONS-SCNC: 9 MMOL/L (ref 3–16)
ANTI-XA UNFRAC HEPARIN: 0.41 IU/ML (ref 0.3–0.7)
ANTI-XA UNFRAC HEPARIN: 0.43 IU/ML (ref 0.3–0.7)
ANTI-XA UNFRAC HEPARIN: <0.1 IU/ML (ref 0.3–0.7)
APTT: 35.3 SEC (ref 23–34.3)
BUN BLDV-MCNC: 16 MG/DL (ref 7–20)
CALCIUM SERPL-MCNC: 7.3 MG/DL (ref 8.3–10.6)
CHLORIDE BLD-SCNC: 101 MMOL/L (ref 99–110)
CO2: 23 MMOL/L (ref 21–32)
CREAT SERPL-MCNC: 0.8 MG/DL (ref 0.8–1.3)
EKG ATRIAL RATE: 91 BPM
EKG DIAGNOSIS: NORMAL
EKG P AXIS: 92 DEGREES
EKG P-R INTERVAL: 160 MS
EKG Q-T INTERVAL: 380 MS
EKG QRS DURATION: 102 MS
EKG QTC CALCULATION (BAZETT): 467 MS
EKG R AXIS: 67 DEGREES
EKG T AXIS: 40 DEGREES
EKG VENTRICULAR RATE: 91 BPM
GFR AFRICAN AMERICAN: >60
GFR NON-AFRICAN AMERICAN: >60
GLUCOSE BLD-MCNC: 128 MG/DL (ref 70–99)
GLUCOSE BLD-MCNC: 149 MG/DL (ref 70–99)
GLUCOSE BLD-MCNC: 198 MG/DL (ref 70–99)
GLUCOSE BLD-MCNC: 235 MG/DL (ref 70–99)
GLUCOSE BLD-MCNC: 336 MG/DL (ref 70–99)
INR BLD: 1.15 (ref 0.87–1.14)
LACTIC ACID, SEPSIS: 1.9 MMOL/L (ref 0.4–1.9)
LACTIC ACID: 1.7 MMOL/L (ref 0.4–2)
PERFORMED ON: ABNORMAL
POTASSIUM REFLEX MAGNESIUM: 3.6 MMOL/L (ref 3.5–5.1)
PROCALCITONIN: 2.14 NG/ML (ref 0–0.15)
PROTHROMBIN TIME: 14.6 SEC (ref 11.7–14.5)
REPORT: NORMAL
SODIUM BLD-SCNC: 133 MMOL/L (ref 136–145)
TROPONIN: 0.11 NG/ML

## 2022-10-02 PROCEDURE — 6360000002 HC RX W HCPCS: Performed by: NURSE PRACTITIONER

## 2022-10-02 PROCEDURE — 36591 DRAW BLOOD OFF VENOUS DEVICE: CPT

## 2022-10-02 PROCEDURE — 6370000000 HC RX 637 (ALT 250 FOR IP): Performed by: PHYSICIAN ASSISTANT

## 2022-10-02 PROCEDURE — 2580000003 HC RX 258: Performed by: PHYSICIAN ASSISTANT

## 2022-10-02 PROCEDURE — 6370000000 HC RX 637 (ALT 250 FOR IP): Performed by: INTERNAL MEDICINE

## 2022-10-02 PROCEDURE — 71260 CT THORAX DX C+: CPT | Performed by: PHYSICIAN ASSISTANT

## 2022-10-02 PROCEDURE — 87040 BLOOD CULTURE FOR BACTERIA: CPT

## 2022-10-02 PROCEDURE — 84145 PROCALCITONIN (PCT): CPT

## 2022-10-02 PROCEDURE — 99222 1ST HOSP IP/OBS MODERATE 55: CPT | Performed by: INTERNAL MEDICINE

## 2022-10-02 PROCEDURE — 36415 COLL VENOUS BLD VENIPUNCTURE: CPT

## 2022-10-02 PROCEDURE — 6370000000 HC RX 637 (ALT 250 FOR IP): Performed by: NURSE PRACTITIONER

## 2022-10-02 PROCEDURE — 6360000004 HC RX CONTRAST MEDICATION: Performed by: PHYSICIAN ASSISTANT

## 2022-10-02 PROCEDURE — 84484 ASSAY OF TROPONIN QUANT: CPT

## 2022-10-02 PROCEDURE — 85610 PROTHROMBIN TIME: CPT

## 2022-10-02 PROCEDURE — 2580000003 HC RX 258: Performed by: NURSE PRACTITIONER

## 2022-10-02 PROCEDURE — 85730 THROMBOPLASTIN TIME PARTIAL: CPT

## 2022-10-02 PROCEDURE — 83605 ASSAY OF LACTIC ACID: CPT

## 2022-10-02 PROCEDURE — 6360000002 HC RX W HCPCS: Performed by: PHYSICIAN ASSISTANT

## 2022-10-02 PROCEDURE — 93010 ELECTROCARDIOGRAM REPORT: CPT | Performed by: INTERNAL MEDICINE

## 2022-10-02 PROCEDURE — 80048 BASIC METABOLIC PNL TOTAL CA: CPT

## 2022-10-02 PROCEDURE — 85520 HEPARIN ASSAY: CPT

## 2022-10-02 PROCEDURE — 1200000000 HC SEMI PRIVATE

## 2022-10-02 RX ORDER — INSULIN LISPRO 100 [IU]/ML
0-4 INJECTION, SOLUTION INTRAVENOUS; SUBCUTANEOUS
Status: DISCONTINUED | OUTPATIENT
Start: 2022-10-02 | End: 2022-10-06 | Stop reason: HOSPADM

## 2022-10-02 RX ORDER — SODIUM CHLORIDE 0.9 % (FLUSH) 0.9 %
5-40 SYRINGE (ML) INJECTION EVERY 12 HOURS SCHEDULED
Status: DISCONTINUED | OUTPATIENT
Start: 2022-10-02 | End: 2022-10-05

## 2022-10-02 RX ORDER — SODIUM CHLORIDE 9 MG/ML
INJECTION, SOLUTION INTRAVENOUS CONTINUOUS
Status: DISCONTINUED | OUTPATIENT
Start: 2022-10-02 | End: 2022-10-05

## 2022-10-02 RX ORDER — HEPARIN SODIUM 1000 [USP'U]/ML
4000 INJECTION, SOLUTION INTRAVENOUS; SUBCUTANEOUS ONCE
Status: COMPLETED | OUTPATIENT
Start: 2022-10-02 | End: 2022-10-02

## 2022-10-02 RX ORDER — ACETAMINOPHEN 650 MG/1
650 SUPPOSITORY RECTAL EVERY 6 HOURS PRN
Status: DISCONTINUED | OUTPATIENT
Start: 2022-10-02 | End: 2022-10-06 | Stop reason: HOSPADM

## 2022-10-02 RX ORDER — HEPARIN SODIUM 10000 [USP'U]/100ML
1360 INJECTION, SOLUTION INTRAVENOUS CONTINUOUS
Status: DISPENSED | OUTPATIENT
Start: 2022-10-02 | End: 2022-10-04

## 2022-10-02 RX ORDER — HEPARIN SODIUM 1000 [USP'U]/ML
2000 INJECTION, SOLUTION INTRAVENOUS; SUBCUTANEOUS PRN
Status: DISCONTINUED | OUTPATIENT
Start: 2022-10-02 | End: 2022-10-04 | Stop reason: ALTCHOICE

## 2022-10-02 RX ORDER — SODIUM CHLORIDE 0.9 % (FLUSH) 0.9 %
5-40 SYRINGE (ML) INJECTION PRN
Status: DISCONTINUED | OUTPATIENT
Start: 2022-10-02 | End: 2022-10-05

## 2022-10-02 RX ORDER — INSULIN LISPRO 100 [IU]/ML
0-4 INJECTION, SOLUTION INTRAVENOUS; SUBCUTANEOUS NIGHTLY
Status: DISCONTINUED | OUTPATIENT
Start: 2022-10-02 | End: 2022-10-06 | Stop reason: HOSPADM

## 2022-10-02 RX ORDER — METOPROLOL SUCCINATE 25 MG/1
25 TABLET, EXTENDED RELEASE ORAL DAILY
Status: DISCONTINUED | OUTPATIENT
Start: 2022-10-02 | End: 2022-10-04

## 2022-10-02 RX ORDER — ACETAMINOPHEN 325 MG/1
650 TABLET ORAL EVERY 6 HOURS PRN
Status: DISCONTINUED | OUTPATIENT
Start: 2022-10-02 | End: 2022-10-06 | Stop reason: HOSPADM

## 2022-10-02 RX ORDER — SODIUM CHLORIDE 9 MG/ML
INJECTION, SOLUTION INTRAVENOUS PRN
Status: DISCONTINUED | OUTPATIENT
Start: 2022-10-02 | End: 2022-10-06 | Stop reason: HOSPADM

## 2022-10-02 RX ORDER — ASPIRIN 81 MG/1
81 TABLET ORAL DAILY
Status: DISCONTINUED | OUTPATIENT
Start: 2022-10-02 | End: 2022-10-06 | Stop reason: HOSPADM

## 2022-10-02 RX ORDER — LOSARTAN POTASSIUM 100 MG/1
100 TABLET ORAL DAILY
Status: DISCONTINUED | OUTPATIENT
Start: 2022-10-02 | End: 2022-10-06 | Stop reason: HOSPADM

## 2022-10-02 RX ORDER — ENOXAPARIN SODIUM 100 MG/ML
40 INJECTION SUBCUTANEOUS DAILY
Status: DISCONTINUED | OUTPATIENT
Start: 2022-10-02 | End: 2022-10-02

## 2022-10-02 RX ORDER — ATORVASTATIN CALCIUM 40 MG/1
40 TABLET, FILM COATED ORAL NIGHTLY
Status: DISCONTINUED | OUTPATIENT
Start: 2022-10-02 | End: 2022-10-06 | Stop reason: HOSPADM

## 2022-10-02 RX ORDER — FOLIC ACID 1 MG/1
1 TABLET ORAL DAILY
Status: DISCONTINUED | OUTPATIENT
Start: 2022-10-02 | End: 2022-10-06 | Stop reason: HOSPADM

## 2022-10-02 RX ORDER — HEPARIN SODIUM 1000 [USP'U]/ML
4000 INJECTION, SOLUTION INTRAVENOUS; SUBCUTANEOUS PRN
Status: DISCONTINUED | OUTPATIENT
Start: 2022-10-02 | End: 2022-10-04 | Stop reason: ALTCHOICE

## 2022-10-02 RX ORDER — INSULIN GLARGINE 100 [IU]/ML
56 INJECTION, SOLUTION SUBCUTANEOUS NIGHTLY
Status: DISCONTINUED | OUTPATIENT
Start: 2022-10-02 | End: 2022-10-05

## 2022-10-02 RX ORDER — DEXTROSE MONOHYDRATE 100 MG/ML
INJECTION, SOLUTION INTRAVENOUS CONTINUOUS PRN
Status: DISCONTINUED | OUTPATIENT
Start: 2022-10-02 | End: 2022-10-06 | Stop reason: HOSPADM

## 2022-10-02 RX ADMIN — HEPARIN SODIUM 1000 UNITS/HR: 10000 INJECTION, SOLUTION INTRAVENOUS at 05:17

## 2022-10-02 RX ADMIN — VANCOMYCIN HYDROCHLORIDE 1000 MG: 1 INJECTION, POWDER, LYOPHILIZED, FOR SOLUTION INTRAVENOUS at 00:55

## 2022-10-02 RX ADMIN — CEFEPIME 2000 MG: 2 INJECTION, POWDER, FOR SOLUTION INTRAVENOUS at 08:25

## 2022-10-02 RX ADMIN — ATORVASTATIN CALCIUM 40 MG: 40 TABLET, FILM COATED ORAL at 21:10

## 2022-10-02 RX ADMIN — LOSARTAN POTASSIUM 100 MG: 100 TABLET, FILM COATED ORAL at 08:23

## 2022-10-02 RX ADMIN — Medication 10 ML: at 21:10

## 2022-10-02 RX ADMIN — SODIUM CHLORIDE 1000 ML: 9 INJECTION, SOLUTION INTRAVENOUS at 00:00

## 2022-10-02 RX ADMIN — ASPIRIN 81 MG: 81 TABLET, COATED ORAL at 13:19

## 2022-10-02 RX ADMIN — IOPAMIDOL 75 ML: 755 INJECTION, SOLUTION INTRAVENOUS at 00:47

## 2022-10-02 RX ADMIN — FOLIC ACID 1 MG: 1 TABLET ORAL at 10:45

## 2022-10-02 RX ADMIN — CEFEPIME 2000 MG: 2 INJECTION, POWDER, FOR SOLUTION INTRAVENOUS at 16:05

## 2022-10-02 RX ADMIN — SODIUM CHLORIDE: 9 INJECTION, SOLUTION INTRAVENOUS at 03:38

## 2022-10-02 RX ADMIN — METOPROLOL SUCCINATE 25 MG: 25 TABLET, EXTENDED RELEASE ORAL at 08:23

## 2022-10-02 RX ADMIN — CEFEPIME 2000 MG: 2 INJECTION, POWDER, FOR SOLUTION INTRAVENOUS at 00:01

## 2022-10-02 RX ADMIN — INSULIN LISPRO 3 UNITS: 100 INJECTION, SOLUTION INTRAVENOUS; SUBCUTANEOUS at 17:01

## 2022-10-02 RX ADMIN — VANCOMYCIN HYDROCHLORIDE 1000 MG: 1 INJECTION, POWDER, LYOPHILIZED, FOR SOLUTION INTRAVENOUS at 13:23

## 2022-10-02 RX ADMIN — INSULIN GLARGINE 56 UNITS: 100 INJECTION, SOLUTION SUBCUTANEOUS at 21:10

## 2022-10-02 RX ADMIN — ASPIRIN 81 MG 324 MG: 81 TABLET ORAL at 00:02

## 2022-10-02 RX ADMIN — HEPARIN SODIUM 4000 UNITS: 1000 INJECTION INTRAVENOUS; SUBCUTANEOUS at 05:15

## 2022-10-02 NOTE — ED PROVIDER NOTES
I independently performed a history and physical on Kimberlee Robles. All diagnostic, treatment, and disposition decisions were made by myself in conjunction with the advanced practice provider. See ELANA's note for complete documentation for this encounter. I reviewed pertinent nurse's notes, triage notes, vital signs, past medical history, family and social history, medications, and allergies. Complete review of systems was conducted by the mid-level provider and/or myself. Review of systems is negative except as documented in the history of present illness. EKG: Twelve-lead EKG as read and interpreted by myself shows: Normal sinus rhythm at a rate of 91 bpm, FL interval and QTc normal.  Slightly prolonged QRS. No acute ischemic findings, there might be slight changes ST segment morphology in the anterior and precordial leads compared to prior EKG. MDM:    Elderly male who comes in for fever. The patient is immunocompromised. Basic laboratory studies are ordered he is placed on cardiac low-vision pulse oximetry monitoring. IV fluids given. Patient is given antipyretics. Because of his immunocompromise state broad-spectrum antibiotics are initiated. Patient is followed in no acute distress he is not septic appearing his respirations are nonlabored his heart rate is tachycardic on my exam.  His work-up is remarkable for pancytopenia, the patient has a pleural effusion. This is likely the source of the patient's symptoms however this is uncertain. In any event this patient will need to be admitted to the hospital for further care as he is high risk for decompensation. Is this patient to be included in the SEP-1 Core Measure due to severe sepsis or septic shock?    No   Exclusion criteria - the patient is NOT to be included for SEP-1 Core Measure due to:  May have criteria for sepsis, but does not meet criteria for severe sepsis or septic shock    Total Critical Care time was 20 minutes, excluding separately reportable procedures. There was a high probability of clinically significant/life threatening deterioration in the patient's condition which required my urgent intervention. Not concomitant with critical care time provided by ELANA. FINAL IMPRESSION     1. Immunocompromised state due to drug therapy ([facility] [address]    2. Fever, unspecified fever cause    3. Pleural effusion    4. Elevated troponin    5. Pancytopenia ([facility] [address]         DISPOSITION/FOLLOW-UP PLAN    DISPOSITION Admitted 10/02/2022 02:31:31 AM      No follow-up provider specified. Electronically signed by: Bethanie Seay M.D.   I am the primary physician of record         Brando Nam MD  10/02/22 0322

## 2022-10-02 NOTE — CONSULTS
[id number]    Fever  SOB  PNA  Sepsis  Elev trop  HTN  HLD  Pleural eff - chronic   DM  NSCLC  Pancytopenia   Echo and stress test 11/2020 w/o ischemia.  Normal LVEF    Echo  Repeat trop  Stress test

## 2022-10-02 NOTE — CONSULTS
Pharmacy to Dose Vancomycin    Dx: sepsis  Goal trough = 15-20  Pt wt = 89.2 kg  Recent Labs     10/01/22  2156   CREATININE 1.0     Recent Labs     10/01/22  2156   WBC 2.4*     Regimen: 750 mg IV every 12 hours. Start time: 12:55 on 10/02/2022  Exposure target: AUC24 (range)400-600 mg/L.hr   AUC24,ss: 456 mg/L.hr  Probability of AUC24 > 400: 65 %  Ctrough,ss: 15.5 mg/L  Probability of Ctrough,ss > 20: 25 %  Probability of nephrotoxicity (Lodise SAL 2009): 11 %  Vancomycin trough: 10/3 1200  Katrin [facility], [employer] 10/2/2022 3:47 AM    Day 1  Estimated Creatinine Clearance: 84 mL/min (based on SCr of 0.8 mg/dL). Current regimen: vancomycin 750 mg q12h  Calc AUC: 390  Plan: Increase dose to 1000 mg q12h and check trough 10/3 @ 1200  AUC: Austin BelloD 10/2/2022 9:41 AM    Day 2  Estimated Creatinine Clearance: 75 mL/min (based on SCr of 0.9 mg/dL).   Vancomycin 1000 mg q12h    Level today at 210 Mandeep Wilbertolew Cochran PharmD  10/3/2022 at 10:03 AM

## 2022-10-02 NOTE — ED NOTES
Pt transported to A2 with all belongings in stable condition at this time     Vimal Rebollar RN  10/02/22 9517

## 2022-10-02 NOTE — VCC REMOTE MONITORING
Attempted to contact primary RN regarding this patient. Left message with Marlyse Apley RN. Thank you.         Hamlet Knight RN, [address]  Call Back # [phone]

## 2022-10-02 NOTE — CONSULTS
HEMATOLOGY/ONCOLOGY CONSULTATION:     10/2/2022 11:05 AM    REASON FOR CONSULT: Lung cancer    PROVIDERS:  Shellie Guzman DO    CHIEF COMPLAINT:     Chief Complaint   Patient presents with    Fever     States chemo earlier in the week. Today \"feeling warm and my temperature was 100. 9. \"        HISTORY OF PRESENT ILLNESS:     HPI:  65 WM with stage IV NSCLC who is followed by my partner Dr. Tanisha Hernández. He is currently on maintenance chemotherapy with Alimta. He last had treatment on 9/27/22. He also has a pmh HTN, HLD, DM2 and prostate cancer. He presented to the ER with fever, chills and generalized weakness. His WBC is 2.4 but he is not neutropenic. He has been admitted for pneumonia and mild troponin elevation.      PAST MEDICAL HISTORY:     Past Medical History:   Diagnosis Date    Cancer ([facility] [address]     prostate    Diabetes mellitus ([facility] [address]     Hyperlipidemia     Hypertension     Kidney stone     Lung cancer ([facility] [address] 10/02/2021    Stage 4    Lung cancer ([facility] [address]        PAST SURGICAL HISTORY:        Past Surgical History:   Procedure Laterality Date    BACK SURGERY  2012    lumber, no hardware, \"cleaned it out\"    CYSTOSCOPY Right 11/9/2020    CYSTOSCOPY,  RIGHT URETEROSCOPY, RIGHT RETRO PYELOGRAM, REMOVAL STONE FROM BLADDER, URETHRAL DILITATION performed by Tierra Singh MD at [facility] 1    NEPHROLITHOTOMY Left 12/4/2020    LEFT PERCUTANEOUS NEPHROLITHOTOMY WITH DR Ramsey Later TO PLACE NEPHROSTOMY TUBE PRIOR performed by Geneva Reeder MD at [address] N/A 11/4/2021    SURGICAL PORT PLACEMENT performed by Ana Maria Rolon MD at [address] [facility] Right     ROTATOR CUFF       SOCIAL HISTORY:     Social History     Socioeconomic History    Marital status:      Spouse name: Not on file    Number of children: Not on file    Years of education: Not on file    Highest education level: Not on file   Occupational History    Not on file   Tobacco Use    Smoking status: Former     Types: Cigarettes     Start date: 3/22/1960     Quit date: 1976     Years since quittin.8    Smokeless tobacco: Never   Vaping Use    Vaping Use: Never used   Substance and Sexual Activity    Alcohol use: No    Drug use: No    Sexual activity: Not on file   Other Topics Concern    Not on file   Social History Narrative    Not on file     Social Determinants of Health     Financial Resource Strain: Not on file   Food Insecurity: Not on file   Transportation Needs: Not on file   Physical Activity: Not on file   Stress: Not on file   Social Connections: Not on file   Intimate Partner Violence: Not on file   Housing Stability: Not on file       FAMILY HISTORY:     History reviewed. No pertinent family history. ALLERGIES:     Allergies as of 10/01/2022    (No Known Allergies)       MEDICATIONS:     No current facility-administered medications on file prior to encounter. Current Outpatient Medications on File Prior to Encounter   Medication Sig Dispense Refill    tamsulosin (FLOMAX) 0.4 MG capsule Take 0.4 mg by mouth daily      folic acid (FOLVITE) 1 MG tablet Take 1 mg by mouth daily      metoprolol succinate (TOPROL XL) 25 MG extended release tablet Take 1 tablet by mouth once daily 30 tablet 11    atorvastatin (LIPITOR) 20 MG tablet Take 20 mg by mouth daily       losartan (COZAAR) 100 MG tablet Take 100 mg by mouth daily      Multiple Vitamins-Minerals (THERAPEUTIC MULTIVITAMIN-MINERALS) tablet Take 1 tablet by mouth daily      vitamin D3 (CHOLECALCIFEROL) 10 MCG (400 UNIT) TABS tablet Take 400 Units by mouth daily      insulin aspart (NOVOLOG) 100 UNIT/ML injection vial Inject into the skin 3 times daily (before meals) Sliding scale      Insulin Glargine (TOUJEO SOLOSTAR SC) Inject 70 Units into the skin nightly        REVIEW OF SYSTEMS:       Constitutional: Denies fever, sweats, weight loss     Eyes: No visual changes or diplopia. No scleral icterus.   ENT: No Headaches, hearing loss or vertigo. No mouth sores or sore throat. Cardiovascular: No chest pain, dyspnea on exertion, palpitations or loss of consciousness. Respiratory: No cough or wheezing, no sputum production. No hemoptysis. .    Gastrointestinal: No abdominal pain, appetite loss, blood in stools. No change in bowel habits. Genitourinary: No dysuria, trouble voiding, or hematuria. Musculoskeletal:  Generalized weakness. No joint complaints. Integumentary: No rash or pruritis. Neurological: No headache, diplopia. No change in gait, balance, or coordination. No paresthesias. Endocrine: No temperature intolerance. No excessive thirst, fluid intake, or urination. Hematologic/Lymphatic: No abnormal bruising or ecchymoses, blood clots or swollen lymph nodes. Allergic/Immunologic: No nasal congestion or hives.        PHYSICAL EXAM:       Vitals:    10/02/22 0823   BP: (!) 115/51   Pulse: 77   Resp:    Temp:    SpO2:        General appearance: alert and cooperative  Head: Normocephalic, without obvious abnormality, atraumatic  Neck: No palpable lymphadenopathy in supraclavicular or cervical chains  Lungs: Clear to auscultation bilaterally, no audible rales, wheezes or crackles  Heart: Regular rate and rhythm, S1, S2 normal  Abdomen: Soft, non-tender; bowel sounds normal; no masses,  no organomegaly  Extremities: without cyanosis, clubbing, edema or asymmetry  Skin: No jaundice, purpura or petechiae      LABS:     Lab Results   Component Value Date    WBC 2.4 (L) 10/01/2022    HGB 8.2 (L) 10/01/2022    HCT 25.1 (L) 10/01/2022    MCV 86.0 10/01/2022     (L) 10/01/2022       Lab Results   Component Value Date    GLUCOSE 149 (H) 10/02/2022    BUN 16 10/02/2022    CREATININE 0.8 10/02/2022    K 3.6 10/02/2022    PHOS 2.7 01/24/2022       Lab Results   Component Value Date    ALKPHOS 122 10/01/2022    ALT 18 10/01/2022    AST 25 10/01/2022    BILITOT 0.7 10/01/2022    PROT 5.7 (L) 10/01/2022       IMAGING:     XR CHEST PORTABLE  Result Date: 10/1/2022  Stable small left pleural effusion with adjacent left basilar opacity. CT CHEST PULMONARY EMBOLISM W CONTRAST  Result Date: 10/2/2022  No evidence of pulmonary embolism. Moderate size pleural effusion which may be loculated at the left lower pleural cavity. Mild area of consolidation at the basal segment of the left lower lobe which could represent atelectasis versus airspace disease process. Mild scattered patchy opacities which are nonspecific but could indicate an infectious/inflammatory etiology. RECOMMENDATIONS: Unavailable       STAGING:     Cancer Staging  IV    ASSESSMENT:     Problem List Items Addressed This Visit       Elevated troponin     Other Visit Diagnoses       Immunocompromised state due to drug therapy ([facility] [address]    -  Primary    Fever, unspecified fever cause        Pleural effusion        Pancytopenia (HCC)                      PLAN:     Pneumonia  - abx per IM  - on Vanc and Cefepime  - FU cultures  - not currently neutropenic    2. Stage IV NSCLC  - on maintenance chemotherapy with Alimta  - last treatment 9/27/22  - needs to continue home folic acid - ordered  - monitor CBC after chemo  - transfuse if Hgb < 7    3.  Elevated troponin  - cardiology consult pending    Ibrahima Simmons MD

## 2022-10-02 NOTE — PLAN OF CARE
HOSPITAL MEDICINE  - BRIEF NOTE    Seen by my colleague overnight. History/record reviewed, patient seen and examined. - CT chest showed loculated left-sided pleural effusion. Patient reports that he had multiple as needed thoracentesis in the past.  Chart review suggested last thoracentesis was on January 5, 2022 as per oncology. Patient currently saturating mid 90s on room air. Reportedly malignant pleural effusion in the past.  Will discuss with oncology regarding need for repeat as needed thoracentesis. Continue Current care .  Will follow subspeciality Andrew Gregg MD  Hospitalist Physician

## 2022-10-02 NOTE — H&P
Hospital Medicine History & Physical      PCP: Tamiko Mahan DO    Date of Admission: 10/2/2022    Time of Service: 0230    Date of Service: Pt seen/examined on 10/02/2022 and admitted to Inpatient with expected LOS greater than two midnights due to medical therapy. Historian: Self, ED documentation    Chief Concern:    Chief Complaint   Patient presents with    Fever     States chemo earlier in the week. Today \"feeling warm and my temperature was 100. 9. \"        History Of Present Illness:      Jennifer Shipman is a 41-year-old male with a significant past medical history of hypertension, hyperlipidemia, type 2 diabetes, remote history of prostate cancer, active non-small cell lung adenocarcinoma right lung who is undergoing chemotherapy with single-agent pemetrexed and being followed by Dr. Skye Us. His last round of chemotherapy was this past Tuesday, states he tolerated well, and usually has some symptoms by the following Saturday. He states yesterday he woke up feeling fatigued, developed fevers throughout the day, and then some shortness of breath. He decided to come here for an evaluation due to the shortness of breath. He denies any chest pain, chest congestion, or cough. Upon arrival, he was evaluated with laboratory studies, EKG, chest x-ray, and CT of the chest PE protocol. CT of the chest was negative for any PE, moderate sized pleural effusion which may be loculated left lower pleural cavity, mild area of consolidation at the basal segment of left lower lobe representing possible atelectasis versus disease process. EKG was negative for any acute findings; incomplete right bundle branch block present which is not new. Pertinent findings on laboratory studies tonight show an unremarkable chemistry panel, elevated blood sugar 166, serum calcium 7.6, proBNP 257, initial troponin 0.02, albumin 3.0. Cell count showed leukopenia at 2.4, H/H 8.2/25.1, and platelets 914.   Flu and COVID testing were both negative. Moderate blood was noted on urinalysis with 11-20 urinary RBCs on microscopy. Given the clinically present sepsis and neutropenic, broad spectrum antibiotics were initiated with Vancomycin and cefepime. Hospital team was consulted to admit this patient for highly-suspicious left lower lobe pneumonia and sepsis. Past Medical History:          Diagnosis Date    Cancer [facility])     prostate    Diabetes mellitus ([facility]     Hyperlipidemia     Hypertension     Kidney stone     Lung cancer ([facility] 10/02/2021    Stage 4    Lung cancer [facility])      Past Surgical History:          Procedure Laterality Date    BACK SURGERY  2012    lumber, no hardware, \"cleaned it out\"    CYSTOSCOPY Right 11/9/2020    CYSTOSCOPY,  RIGHT URETEROSCOPY, RIGHT RETRO PYELOGRAM, REMOVAL STONE FROM BLADDER, URETHRAL DILITATION performed by Neil Carpenter MD at [facility]    NEPHROLITHOTOMY Left 12/4/2020    LEFT PERCUTANEOUS NEPHROLITHOTOMY WITH DR Evette Diego TO PLACE NEPHROSTOMY TUBE PRIOR performed by Blanca Alfaro MD at [facility] N/A 11/4/2021    SURGICAL PORT PLACEMENT performed by Janette Randall MD at [address] [place] [address] Right     ROTATOR CUFF       Medications Prior to Admission:      Prior to Admission medications    Medication Sig Start Date End Date Taking?  Authorizing Provider   tamsulosin (FLOMAX) 0.4 MG capsule Take 0.4 mg by mouth daily   Yes Historical Provider, MD   folic acid (FOLVITE) 1 MG tablet Take 1 mg by mouth daily    Historical Provider, MD   metoprolol succinate (TOPROL XL) 25 MG extended release tablet Take 1 tablet by mouth once daily 12/23/21   Viral Sandoval MD   atorvastatin (LIPITOR) 20 MG tablet Take 20 mg by mouth daily     Historical Provider, MD   losartan (COZAAR) 100 MG tablet Take 100 mg by mouth daily    Historical Provider, MD   Multiple Vitamins-Minerals (THERAPEUTIC MULTIVITAMIN-MINERALS) tablet Take 1 tablet by mouth daily Historical Provider, MD   vitamin D3 (CHOLECALCIFEROL) 10 MCG (400 UNIT) TABS tablet Take 400 Units by mouth daily    Historical Provider, MD   insulin aspart (NOVOLOG) 100 UNIT/ML injection vial Inject into the skin 3 times daily (before meals) Sliding scale    Historical Provider, MD   Insulin Glargine (TOUJEO SOLOSTAR SC) Inject 70 Units into the skin nightly     Historical Provider, MD       Allergies:  Patient has no known allergies. Social History:      The patient currently lives at home with family. TOBACCO:   reports that he quit smoking about 45 years ago. He started smoking about 62 years ago. He has never used smokeless tobacco.  ETOH:   reports no history of alcohol use. E-cigarette/Vaping       Questions Responses    E-cigarette/Vaping Use Never User    Start Date     Passive Exposure     Quit Date     Counseling Given     Comments           Family History:      Reviewed in detail at bedside with patient, none to report. History reviewed. No pertinent family history. REVIEW OF SYSTEMS COMPLETED:   Pertinent positives as noted in the HPI. All other systems reviewed and negative. PHYSICAL EXAM PERFORMED:    BP (!) 116/48   Pulse 97   Temp 99.9 °F (37.7 °C) (Oral)   Resp 22   SpO2 96%     General appearance:  No apparent distress, appears stated age and cooperative. HEENT:  Normal cephalic, atraumatic without obvious deformity. Pupils equal, round, and reactive to light. Extra ocular muscles intact. Conjunctivae/corneas clear. Neck: Supple, with full range of motion. No jugular venous distention. Trachea midline. Respiratory:  Normal respiratory effort. Diminished in all airfields, bibasilar crackles appreciated  Cardiovascular:  Regular rate and rhythm with normal S1/S2 without murmurs, rubs or gallops. No LE edema. Abdomen: Soft, non-tender, non-distended with normal bowel sounds. Musculoskeletal:  No clubbing, cyanosis or edema bilaterally.   Full range of motion without deformity. Skin: Skin color, texture, turgor normal.  No rashes or lesions. Neurologic:  Neurovascularly intact without any focal sensory/motor deficits. Cranial nerves: II-XII intact, grossly non-focal.  Psychiatric:  Alert and oriented, thought content appropriate, normal insight  Capillary Refill: Brisk,3 seconds, normal  Peripheral Pulses: +2 palpable, equal bilaterally       Labs:     Recent Labs     10/01/22  2156   WBC 2.4*   HGB 8.2*   HCT 25.1*   *     Recent Labs     10/01/22  2156      K 3.6      CO2 24   BUN 17   CREATININE 1.0   CALCIUM 7.6*     Recent Labs     10/01/22  2156   AST 25   ALT 18   BILITOT 0.7   ALKPHOS 122     No results for input(s): INR in the last 72 hours. Recent Labs     10/01/22  2156 10/02/22  0256   TROPONINI 0.02* 0.11*       Urinalysis:      Lab Results   Component Value Date/Time    NITRU Negative 10/01/2022 11:27 PM    [address] 10/01/2022 11:27 PM    BACTERIA 2+ 08/23/2022 03:17 AM    RBCUA 11-20 10/01/2022 11:27 PM    BLOODU MODERATE 10/01/2022 11:27 PM    SPECGRAV >=1.030 10/01/2022 11:27 PM    GLUCOSEU 100 10/01/2022 11:27 PM     Radiology:     I have reviewed the radiographic results for this encounter with the following interpretation(s); see report(s) below:    CT CHEST PULMONARY EMBOLISM W CONTRAST   Final Result   No evidence of pulmonary embolism. Moderate size pleural effusion which may be loculated at the left lower   pleural cavity. Mild area of consolidation at the basal segment of the left   lower lobe which could represent atelectasis versus airspace disease process. Mild scattered patchy opacities which are nonspecific but could indicate an   infectious/inflammatory etiology. RECOMMENDATIONS:   Unavailable         XR CHEST PORTABLE   Final Result   Stable small left pleural effusion with adjacent left basilar opacity.              EKG:  I have reviewed the EKG with the following interpretation in the absence of a cardiologist: NSR, 91 bpm, no acute ST-segment or T-wave deviations, iRBBB noted, chronic; non-acute EKG. Consults:    IP CONSULT TO HOSPITALIST  PHARMACY TO DOSE VANCOMYCIN  IP CONSULT TO CARDIOLOGY  IP CONSULT TO ONCOLOGY    ASSESSMENT:    Active Hospital Problems    Diagnosis Date Noted    Neutropenia ([facility] [D70.9] 10/02/2022     Priority: High    Elevated troponin [R77.8] 10/02/2022     Priority: High    Sepsis ([facility] [A41.9] 08/23/2022     Priority: High    Hyperglycemia due to type 2 diabetes mellitus ([facility] [E11.65] 10/02/2022     Priority: Medium    Cancer of right lung ([facility] [C34.91] 10/02/2022     Priority: Medium    HTN (hypertension), benign [I10]      Priority: Medium       PLAN:    Left Lung Pneumonia/Sepsis  - Probably loculated pneumonia of LLL  - +SIRS with tachycardia, fever, leukopenia; source: lung  - Continue Vancocin and cefepime; pharmacy to follow Vancocin  - Blood and sputum cultures pending  - Lactic negative, no hypotension, no evidence of hypoperfusion at this time    Hyperglycemia/Type 2 DM  - Continue home dose of Lantus  - Low dose SSI AC/HS   - BG goal < 180 in the setting of sepsis    Elevated Troponin  - Troponin 0.02 -> 0.11  - Clinically equivocal, no active CP, overall symptoms global, more consistent with infectious process  - Given the  LLL PNA, possibly demand ischemia  - Start heparin gtt for presumed NSTEMI  - Consulted Cardiology for AM  - Trend troponin values    Right Lung NSCLC  - Consulted Oncology to follow, followed by Dr. Marleny Spence  - Last treatment Tuesday, 9/27/2022  Pancytopenia  - Monitor CBC closely, particularly while on heparin infusion    Hematuria  - Noted on UA, chronic      DVT Prophylaxis: Heparin gtt  SRMB Prophylaxis: Protonix  Diet: ADULT DIET; Regular; 4 carb choices (60 gm/meal);  Low Fat/Low Chol/High Fiber/2 gm Na  Code Status: Full Code    PT/OT Eval Status: N/A at this time    Dispo - 2-3 days per clinical improvement    Angelia Grade, APRN - CNP    Thank you Sheryl Garcia, DO for the opportunity to be involved in this patient's care.  If you have any questions or concerns please feel free to contact me at [phone].---Anticipated co-signer, Dr. Genie Sheikh    (Please note that portions of this note were completed with a voice recognition program. Efforts were made to edit the dictations but occasionally words are mis-transcribed.)

## 2022-10-02 NOTE — PLAN OF CARE
Problem: Discharge Planning  Goal: Discharge to home or other facility with appropriate resources  Outcome: Progressing  Flowsheets (Taken 10/2/2022 0747)  Discharge to home or other facility with appropriate resources:   Identify barriers to discharge with patient and caregiver   Arrange for needed discharge resources and transportation as appropriate   Identify discharge learning needs (meds, wound care, etc)   Arrange for interpreters to assist at discharge as needed   Refer to discharge planning if patient needs post-hospital services based on physician order or complex needs related to functional status, cognitive ability or social support system     Problem: Safety - Adult  Goal: Free from fall injury  10/2/2022 0934 by Mathieu Campo RN  Outcome: Progressing  10/2/2022 0617 by Bonnie Estrella RN  Outcome: Progressing     Problem: Skin/Tissue Integrity  Goal: Absence of new skin breakdown  Description: 1. Monitor for areas of redness and/or skin breakdown  2. Assess vascular access sites hourly  3. Every 4-6 hours minimum:  Change oxygen saturation probe site  4. Every 4-6 hours:  If on nasal continuous positive airway pressure, respiratory therapy assess nares and determine need for appliance change or resting period.   10/2/2022 0934 by Mathieu Campo RN  Outcome: Progressing  10/2/2022 0617 by Bonnie Estrella RN  Outcome: Progressing

## 2022-10-02 NOTE — PROGRESS NOTES
LAST [facility] OFFICE NOTE 22:    Patient Name: Prema Mathis  Patient : 1945  Patient MRN: [id number]    Primary Oncologist: Kanu Tyler  Referring Physician: Surekha Castaneda DO, Jeryl Lovings    Date of Service: 2022      Chief Complaint  Non-small cell lung cancer (disorder) ( Stage Date: 2021, Stage IVB (Primary, Unspecified part of left bronchus or lung, TX, NX, M1c)-Clinical  Date of Dx:2021 Disease State: Initial diagnosis; Metastatic Sites: Bone; Metastatic Sites: Liver; Metastatic Sites: Pleura; Histopathologic Type: Adenocarcinoma; ROS1 Gene: Negative; KRAS gene: G12V; RET gene mutation: RET fusion negative; BRAF Mutation: Wild-type; MET gene mutation: Not detected; PD-L1: 1-49% Expression; EGFR Expression: Negative; ALK (FISH): Negative; TRK gene: Unknown; )      Problem List  Non-small cell lung cancer (disorder) ( Stage Date: 2021, Stage IVB (Primary, Unspecified part of left bronchus or lung, TX, NX, M1c)-Clinical  Date of Dx:2021 Disease State: Initial diagnosis; Metastatic Sites: Bone; Metastatic Sites: Liver; Metastatic Sites: Pleura; Histopathologic Type: Adenocarcinoma; ROS1 Gene: Negative; KRAS gene: G12V; RET gene mutation: RET fusion negative; BRAF Mutation: Wild-type; MET gene mutation: Not detected; PD-L1: 1-49% Expression; EGFR Expression: Negative; ALK (FISH): Negative; TRK gene: Unknown; )  Abscess of buttock (disorder)  Agranulocytosis due to cancer chemotherapy  Anemia  Cough  Diabetes mellitus (disorder)  Effects of immunotherapy  Fingernail infection  General observation of patient  Hyperlipidemia (disorder)  Malignant pleural effusion  Pancytopenia (disorder)  SOBOE - Shortness of breath on exertion  Secondary malignant neoplasm of bone (disorder)  Secondary malignant neoplasm of liver (disorder)  Thrombocytopenia    HPI  Stage IV adenocarcinoma of the lun. Seen in the [facility] ER for dyspnea, 2021. He was discharged from the ER.   A. Labs:  i. WBC 6.6 K/mcL, HGB 12.6 g/dL, HCT 35.9%,  K/mcL, ANC 5.2 K/mcL, MCV 90.3 fL.  ii. Creat 0.8 mg/dL, Ca 9.2 mg/dL, alb 3.8 g/dL, t bili 0.3 mg/dL. B. Imaging:  i. CTPA, 2021, showed no evidence of a PE. A large left pleural effusion was present. A 1.7 cm area of rounded atelectasis was present in the LLL. Scattered granulomas were present as well as calcified left hilar and left lower paratracheal LNs.    2. Follow up CT, 2021, showed stable findings including a large left pleural effusion. 3. Seen in consultation by Dr. Pawel Spence, 2021. Quit smoking at age 39. An ultrasound-guided left-sided thoracentesis was ordered. A. Ultrasound-guided thoracentesis, 2021, yielded 1 L of clear yellow fluid. Cytology showed metastatic adenocarcinoma. IHC was consistent with a lung primary. 4. Seen for initial medical oncology consultation, 10/13/2021. Ordered a PET/CT, MRI of the brain, and Caris tumor profiling of the cytology from the thoracentesis on 2021 was ordered. A. PET/CT, 10/21/2021, showed a 2.2 x 1.4 cm opacity in the superior segment of the left lower lobe with max SUV 8.0.  A 1.7 x 1.4 cm right hepatic lobe metastasis with peak SUV 13.6 was present. Multiple hypermetabolic bone mets involving the thoracolumbar spine and pelvis were present. B. MRI of the brain, 10/25/2021, was negative. 5. Right IJ port placed, 2021, with Dr. Indy Noyola. 6. Received Carbo/Alimta/Pembro x 4 between 2021 - 1/10/2022. Received maintenance Pembro between  - 2022. Treatment was discontinued after a CT on 2022 showed slight interval progression in the LLL, left hilum, liver, thoracic spine. Molecular testin. Caris tumor profiling of the specimen collected on 2021 showed:  A. MSI-Stable. B. MMR proficient by IHC  C. TMB-Low (2 Muts/Mb)  D. PD-L1 (22c3, TPS) 5%. PD-L1 (28-8) 5%.   PD-L1 () Neg.  E. ALEX [id number]/ (pathogenic variant)  F. FGF3 amplified  G. KRAS p.G12V (pathogenic variant)      PMHx:  1. Essential hypertension  2. Hyperlipidemia  3. DM2  4. GERD     Previous Therapies  As above. Current Therapy  Pembrolizumab + Pemetrexed + Carboplatin Q21D Cycle Length: 21 Number Cycles: 20 Start: C1D1 on 2021 Assoc Dx: Non-small cell lung cancer (disorder) LOT: 1st Line Metastatic or Recurrent Stage: IVB 2021 C4 D1  Pembrolizumab IV,  mg  Prochlorperazine Oral, 10 mg q6h PRN  Ondansetron Oral, 8 mg q8h PRN  Pemetrexed IV, 832 mg (400 mg/m2)  Pemetrexed IV,  mg, Dose modified  Carboplatin IV,  mg, Dose modified  Sodium Chloride IV 0.9 %,  mL, Dose modified  Cyanocobalamin IM, 1000 mcg qday  Cyanocobalamin IM, HELD: 1000 mcg  Cyanocobalamin IM,  mcg  Dexamethasone Oral, 4 mg bid  Folic Acid Oral, 1 mg qday  Vitamin B12 with Pemetrexed Cycle Length: 1 Number Cycles: 1 Start: Jerri aMyer on 10/27/2021 Assoc Dx: Non-small cell lung cancer (disorder) LOT: Other 10/27/2021 C1 D1  Cyanocobalamin IM,  mcg  Zoledronic acid (Zometa) Q28D Cycle Length: 42 Number Cycles: 11 Start: Jerri Mayer on 2021 Assoc Dx: Secondary malignant neoplasm of bone (disorder) LOT: Other 2022 C5 D1  Zoledronic Acid IV (Zometa), 3 mg  Vitamin B12 with Pemetrexed Cycle Length: 1 Number Cycles: 1 Start: Jerri Mayer on 2022 Assoc Dx: Non-small cell lung cancer (disorder) LOT: Other 2022 C1 D1  Cyanocobalamin IM,  mcg  Folic Acid Oral, 1 mg qday  Dexamethasone Oral, 4 mg bid      Interval History  The patient is a 76-year-old male who is here today for follow-up and treatment of an adenocarcinoma of the LLL of the lung. He last had a CT of the chest/abdomen/pelvis on 2022, which showed slight interval progression in the lungs, liver, bones. He started 2nd line Alimta on 8/15/2022. He is here today (22) for Alimta #3. Donna Santo feels well today.   He complains of fatigue with exertion which has not changed significantly. He denies cough or chest pain. He denies N/V/D. No rash or skin changes. Review of Systems  Constitutional:  No weight loss, No fever, No chills, No night sweats. Energy level good.   Eyes:  No impairment or change in vision  ENT / Mouth:  No pain, abnormal ulceration, bleeding, nasal drip or change in voice or hearing  Cardiovascular:  No chest pain, palpitations, new edema, or calf discomfort  Respiratory:  No pain, hemoptysis, change to breathing  Breast:  No pain, discharge, change in appearance or texture  Gastrointestinal:  No pain, cramping, jaundice, change to eating and bowel habits  Urinary:  No pain, bleeding or change in continence  Genitalia: No pain, bleeding or discharge  Musculoskeletal:  No redness, pain, edema or weakness  Skin:  No pruritus, rash, change to nodules or lesions  Neurologic:  No discomfort, change in mental status, speech, sensory or motor activity  Psychiatric:  No change in concentration or change to affect or mood  Endocrine:  No hot flashes, increased thirst, or change to urine production  Hematologic: No petechiae, ecchymosis or bleeding  Lymphatic:  No lymphadenopathy or lymphedema  Allergy / Immunologic:  No eczema, hives, frequent or recurrent infections      Vital Signs  Blood pressure: 165/71, Pulse: 74, Temperature: 97 F, Respirations: 12, O2 sat: , Pain Scale: 0, Height: 68 in, Weight: 201.4 lb, BSA: 2.09, BMI: 30.62 kg/m2    Physical Exam    CONSTITUTIONAL: awake, alert, cooperative, no apparent distress   EYES: pupils equal, round, sclera clear and conjunctiva normal  ENT: Normocephalic, without obvious abnormality, atraumatic  NECK: supple, symmetrical  HEMATOLOGIC/LYMPHATIC: no cervical, supraclavicular or axillary lymphadenopathy   LUNGS: no increased work of breathing and clear to auscultation   CARDIOVASCULAR: regular rate and rhythm, normal S1 and S2, no murmur noted  ABDOMEN: normal bowel sounds x 4, soft, non-distended, non-tender, no masses palpated, no hepatosplenomegaly   MUSCULOSKELETAL: full range of motion noted  NEUROLOGIC: awake, alert, oriented to name, place and time. Motor skills grossly intact. SKIN: Normal skin color, texture, turgor and no jaundice.  appears intact  EXTREMITIES: Without cyanosis, clubbing, edema or asymmetry        Labs  CBC  Lab Results 09/27/2022 09/15/2022 09/06/2022 08/25/2022 08/15/2022 08/08/2022    CBC                 WBC x 10^3/uL 8.0 2.9 (L) 9.9 (H)   13.4 (H) 6.0      RBC x 10^6/uL 2.93 (L) 3.35 (L) 3.32 (L)   3.61 (L) 3.50 (L)      HGB g/dL 8.5 (L) 9.5 (L) 9.5 (L)   10.5 (L) 10.2 (L)      HCT % 26.8 (L) 29.3 (L) 29.2 (L)   31.7 (L) 31.2 (L)      MCV fL 91.5 87.5 88.0   87.8 89.1      MCH pg 29.0 28.4 28.6   29.1 29.1      MCHC g/dL 31.7 (L) 32.4 32.5   33.1 32.7      RDW-CV, % 15.9 (H) 14.9 (H) 14.9 (H)   14.3 14.7 (H)      PLT x 10^3/uL 182.0 106.0 (L) 261.0   157.0 (L) 149.0 (L)      Matt % 90.4 (H) 55.1 90.5 (H)   92.2 (H) 78.3 (H)      LY % 6.1 (L) 24.4 5.1 (L)   4.3 (L) 13.2 (L)      MO % 3.4 (L) 19.5 (H) 4.4 (L)   3.4 (L) 7.0      EO % 0.0 (L) 0.7 (L) 0.0 (L)   0.0 (L) 1.3      Matt # (ANC) x 10^3/uL 7.26 (H) 1.58 (L) 8.96 (H)   12.38 (H) 4.67      BA % 0.1 (L) 0.3 0.0 (L)   0.1 (L) 0.2      MO # x 10^3/uL 0.27 (L) 0.56 0.44   0.45 0.42      EO # x 10^3/uL 0.00 (L) 0.02 (L) 0.00 (L)   0.00 (L) 0.08      BA # x 10^3/uL 0.01 0.01 0.00 (L)   0.01 0.01      LY # x 10^3/uL 0.49 (L) 0.70 (L) 0.50 (L)   0.57 (L) 0.79 (L)  CMP  Lab Results 09/27/2022 09/15/2022 09/06/2022 08/25/2022 08/15/2022 08/08/2022    Chemistries                 Glucose mg/dL   188 (H) 291 (H)   230 (H) 115      BUN mg/dL   13 17   21 14      Creatinine mg/dL   0.88 0.8   0.9 0.8      BUN/Creatinine ratio   14.8             Sodium mmol/L   140 138   140 142      Potassium mmol/L   3.9 4.7   4.4 4.3      Chloride mmol/L   104 101   100 104      CO2 mmol/L   25.3 26   27 30      Anion gap, mmol/L   10.7             Calcium mg/dL   8.5 (L) 9.6   9.0 9.6      Albumin g/dL   2.9 (L) 3.2 (L)   3.5 3.4      Total protein g/dL   6.2 7.3   7.3 6.9      A/G ratio   0.9 (L)             Bilirubin, total mg/dL   0.5 0.6   0.6 0.5      Alkaline phosphatase U/L   142 (H) 152 (H)   133 (H) 126      AST/SGOT U/L   50 (H) 36   26 23      ALT/SGPT U/L   42 44   25 21      GFR non-African American, estimated mL/min/1.73m2   >60.0 >60.0   >60.0 >60.0      GFR African American, estimated mL/min/1.73m2   >60.0 >60.0   >60.0 >60.0  Lab Results 09/27/2022 09/15/2022 09/06/2022 08/25/2022 08/15/2022 08/08/2022    Tumor Markers             Lab Results 09/27/2022 09/15/2022 09/06/2022 08/25/2022 08/15/2022 08/08/2022    Coags             Lab Results 09/27/2022 09/15/2022 09/06/2022 08/25/2022 08/15/2022 08/08/2022    Immunohematology             Lab Results 09/27/2022 09/15/2022 09/06/2022 08/25/2022 08/15/2022 08/08/2022    Anemia Labs               Imaging  CT CHEST PULMONARY EMBOLISM W CONTRAST (5/28/2021): Impression  1. Partially limited evaluation for subsegmental pulmonary emboli near the  left lung base due to motion. Within this limitation, no findings of  pulmonary embolism are identified  2. Minimal to mild interstitial edema potentially due to congestive heart  failure given mild cardiomegaly. Asymmetric moderate to large left pleural  effusion may be related. 3. Patchy nodular groundglass opacities predominantly the bilateral upper  lobes measure up to 0.8 cm and could represent areas of alveolar edema. A  1.0 cm area of mixed nodular groundglass and consolidative opacity in the  left upper lobe may be related. Alternatively, this could be related to an  infectious, inflammatory, or neoplastic process. Recommend follow-up chest  CT in 3-6 months as below. 4. 1.7 cm nodular consolidative opacity in the right lower lobe potentially  due to rounded atelectasis, an infectious or inflammatory process, or  neoplasia.   Recommend follow-up chest metastatic left hilar and left  cardiophrenic angle lymphadenopathy. 3. Stable moderate primarily subpulmonic left pleural effusion. 4. Slight interval progression of intrahepatic metastatic disease. 5. Slight interval progression of osteoblastic metastases within the thoracic  spine. OCM - Patient Care Management    Pain, if applicable:        Performance Status: ECOG 1 Symptoms, but ambulatory. Restricted in physically strenuous activity, but ambulatory and able to carry out work of a light or sedentary nature (e.g., light housework, office work). (Date: 09/27/2022)     Depression Status: Was screened; Outcome positive: No; Screening Date: 07/18/2022; Screening Tool: Patient Health Questionnaire [facility]); Total depression score: 2    Psycho-social PHQ-9 Follow-up Plan (if applicable):     Research    Would you like this patient screened for clinical trial eligibility? If yes, please enter the order for \"Research: Screen for Eligibility\"    Assessment & Plan    1. Stage IV adenocarcinoma of the LLL of the lung - This patient was seen for dyspnea in the [facility] ER on 5/28/2021. A CTPA at that time showed no evidence of a PE. A large left-sided pleural effusion was present. A 1.7 cm area of rounded atelectasis in the LLL was present. Scattered granulomas were present as well as calcified left hilar and left lower lobe paratracheal lymph nodes. He was discharged home from the ER. A follow-up CT of the chest on 9/01/2021 showed stable findings. As a consequence, he was seen in consultation by Dr. Mary Jane Elder on 9/27/2021 and an ultrasound-guided paracentesis was ordered. A left-sided ultrasound-guided paracentesis was performed on 9/28/2021, which yielded 1 L of clear yellow fluid. Cytology showed a metastatic adenocarcinoma. IHC was consistent with a lung primary.     A PET/CT on 10/21/2021 showed a 2.2 x 1.4 cm opacity in the superior segment of the left lower lobe with max SUV 8.0.  A 1.7 x 1.4 cm right hepatic lobe metastasis with peak SUV 13.6 was present. Multiple hypermetabolic bone mets involving the thoracolumbar spine and pelvis were present. An MRI of the brain on 10/25/2021 was negative. He received Carbo/Alimta/Pembro x 4 cycles between 11/08/2021 - 1/10/2022. He received maintenance Pembrolizumab between 1/31 - 7/19/2022. Treatment was discontinued after a CT on 8/12/2022 showed slight interval progression in the LLL, left hilum, liver, thoracic spine. He started Alimta on 8/15/2022. Status post Alimta #3. CBC reviewed and acceptable today. Hemoglobin is stable decreased at 8.5. Iron studies ordered. 2. Prevention of SRE - Started Zoledronic acid on 11/08/2021. Last received on 8/15/2022. Due next today, 9/27/2022.     3. Malignant left-sided pleural effusion - Has been receiving PRN thoracentesis. So far, he has had a thoracentesis on 9/28 (1L), 11/02/2021 (1.2L), 11/30/2021 (1.6 L), 1/05/2022 (1.5 L). 4. Skin tear/boil of the left buttocks - He was seen by dermatologist and was prescribed topical medication. This issues is resolved. 5. Fingernail infection - Following with ortho for nail avulsion, debridement, culture. Follow up scheduled 07/20/22, advised to keep gauze intact. 6.  Generalized pain-Occurred for 2-3 days after treatment. Will call if pain returns. 7. Plan -  Give Alimta #3. Continue folic acid. RTO 3 weeks for MD and Alimta #4.

## 2022-10-02 NOTE — CONSULTS
Pharmacy to Manage Heparin Infusion per [facility]    Dx: elevated troponin  Pt wt = 89.2 kg  Baseline aPTT =     Oral factor Xa-inhibitors may alter and elevate anti-Xa levels used for unfractionated heparin monitoring. As a result, anti-Xa monitoring is not accurate while Xa-inhibitor activity is detectable. Utilize aPTT monitoring when patient received an oral factor Xa-inhibitor (apixaban, betrixaban, edoxaban or rivaroxaban) within 72 hours prior to admission (please document last administration time). The goal is to allow a washout of oral factor Xa-inhibitors by using aPTT for 72 hours, then change to ant-Xa levels for UFH. Heparin (weight-based) Infusion: CAD/STEMI/NSTEMI/UA/AFib)   Heparin 60 units/kg IVP bolus (max 4,000 units) followed by Heparin infusion at 12 units/kg/hr (recommended max initial rate: 1000 units/hr). Recheck anti-Xa (unless aPTT being used) in 6 hours. Goal anti-Xa 0.3-0.7 IU/mL  Goal aPTT =  seconds. Mil Brown Pharm D.10/2/2022 4:32 AM    10/2 1055  Anti-Xa - 0.43  Continue heparin infusion at 1000 units/hr  Next anti-Xa at [address], PharmD 10/2/2022 11:49 AM    10/2  Anti-Xa = 0.41 at 1705. Continue heparin gtt at 1000 units/hr. Check Anti-Xa daily starting 10/3.   Poornima Hawthorne, PharmD  10/2/2022 5:55 PM    10/3 0700  Anti-Xa - 0.28  Give 2000 unit heparin bolus and increase heparin infusion to 1180 units/hr  Next anti-X at [address], PharmD 10/3/2022 8:06 AM    10/3 1348  Anti-xa - 0.34  Continue heparin infusion at 1180 units/hr  Next anti-Xa at [employer], PharmD 10/3/2022 2:40 PM    10/3 2048  Anti-Xa level = 0.16  Give 2000 unit bolus and increase rate to 1360 units/hr  Heparin is scheduled to stop 10/4 @ [address]

## 2022-10-02 NOTE — PROGRESS NOTES
VSS on RA. Assessment as charted. Hep gtt at 10ml\hr   NS gtt at 50ml\hr   NSR w\ PACs on tele. Cardiology\Oncology to see pt today.    Will continue to monitor

## 2022-10-02 NOTE — [facility] REMOTE MONITORING
Attempted to contact primary RN regarding the sepsis bundle @ [phone]. Thank you.         Hayde Cintron RN, [address]  Call Back # [phone]

## 2022-10-02 NOTE — CONSULTS
[address] [place] [address] [facility]                                  CONSULTATION    PATIENT NAME: River Pickering                   :        1945  MED REC NO:   [id number]                          ROOM:       0214  ACCOUNT NO:   [de-identified]                           ADMIT DATE: 10/01/2022  PROVIDER:     Basil Chester MD    CONSULT DATE:  10/02/2022    REASON FOR CONSULTATION:  Elevated troponin. HISTORY OF PRESENT ILLNESS:  A 65-year-old male who presented to the  hospital with chief complaint of fever. He is undergoing chemotherapy  for lung cancer. He developed fever and for this reason, he came to the  hospital.  He also complains of fatigue. He has had similar fatigue in  the past.  He also noticed shortness of breath. He had shortness of  breath in the past.  No associated chest pain, cough, or congestion. Shortness of breath is mild, worse with activity. He has had similar  symptoms in the past.  It is now improved. PAST MEDICAL HISTORY:  1.  Non-small cell lung cancer for which he is undergoing chemotherapy. 2.  Hypertension. 3.  Hyperlipidemia. 4.  Diabetes. 5.  Chronic pleural effusion. SOCIAL HISTORY:  Quit smoking over 40 years ago. FAMILY HISTORY:  Positive for heart disease, nonspecific. REVIEW OF SYSTEMS:  No chills. No focal neurologic symptoms. No  headache or visual changes. No palpitations. No syncope. No pain. No  rash. All other systems are negative except as present illness. ALLERGIES:  See list in the chart, which I have reviewed. MEDICATIONS:  See list in the chart, which I have reviewed. PHYSICAL EXAMINATION:  VITALS:  Blood pressure 121/51, heart rate 76, respirations 18, and  temperature 98.7. He is 99% saturated on room air. GENERAL:  A well-developed, well-nourished white male, in no acute  distress. HEENT:  Normocephalic and atraumatic.   Oropharynx clear.  Moist mucous  membranes. NECK:  Supple. CHEST:  Clear. CARDIAC:  Regular S1 and S2. There is no S3 or S4 gallop. There is no  significant murmur. Jugular venous pressure is normal.  Carotids are 2+  and symmetric without bruit. ABDOMEN:  Soft and nontender. Positive bowel sounds. EXTREMITIES:  No cyanosis or edema. NEUROLOGIC:  Grossly nonfocal.  SKIN:  Warm and dry. PSYCHIATRIC:  Affect, calm. Peripheral pulses are intact. Capillary  refill is normal.    DIAGNOSTIC DATA:  Telemetry, normal sinus rhythm. EKG, normal sinus  rhythm, incomplete right bundle branch block, nonspecific ST-segment  abnormalities. Troponin 0.11. Hemoglobin 8.2. Creatinine 0.8. IMPRESSION:  1. Shortness of breath likely multifactorial, consider angina  equivalent. 2.  Fever. 3.  Possible pneumonia. 4.  Possible sepsis. 5.  Elevated troponin. He has had an elevated troponin in the past when  he has been ill. However, on this episode, his troponin is out of  proportion to what expect as well as what he has had in the past.   Therefore, need to consider the possibility of acute coronary syndrome. The patient has no angina. His EKG does not show acute ischemia or  injury pattern. 6.  Hypertension. 7.  Hyperlipidemia. 8.  Chronic pleural effusion. 9.  Diabetes. 10.  Non-small cell lung cancer for which he is undergoing chemotherapy. 11.  Pancytopenia. 12.  Stress test and echocardiogram in 11/2020 shows normal LV function  and no ischemia. RECOMMENDATION:  Aspirin and statin. Stress test.        JOSELYN Rosa MD    D: 10/02/2022 12:15:56       T: 10/02/2022 14:24:14     [id number]  Job#: [id number]     Doc#: [id number]    CC:

## 2022-10-02 NOTE — ED PROVIDER NOTES
[facility] Emergency Department    CHIEF COMPLAINT  Fever (States chemo earlier in the week. Today \"feeling warm and my temperature was 100. 9. \" )      [address] [facility]  I have seen and evaluated this patient with my supervising physician, Dr. Bharat Bettencourt. HISTORY OF PRESENT ILLNESS  Lynsey Dorsey is a 65 y.o. male who presents to the ED complaining of 1 day history of fatigue and chills. Patient accompanied by wife today for evaluation. Patient states that throughout the day he has had chills. Wife reports low-grade fever which she was given Tylenol for earlier today. He denies any complaints outside of fatigue and chills otherwise. No headaches, lightheadedness, dizziness or confusion. Denies nasal congestion or sore throat. Does have history of stage IV lung cancer. Currently undergoing chemotherapy. He does not feel short of breath. He denies cough or congestion. No chest pain. He is not denies any abdominal pain. Has had no nausea, vomiting or diarrhea. No recent travel, trauma or surgery. No sick contacts. No urinary symptoms. No other complaints, modifying factors or associated symptoms. Nursing notes reviewed.    Past Medical History:   Diagnosis Date    Cancer [facility])     prostate    Diabetes mellitus ([facility]     Hyperlipidemia     Hypertension     Kidney stone     Lung cancer ([facility] 10/02/2021    Stage 4    Lung cancer [facility])      Past Surgical History:   Procedure Laterality Date    BACK SURGERY  2012    lumber, no hardware, \"cleaned it out\"    CYSTOSCOPY Right 11/9/2020    CYSTOSCOPY,  RIGHT URETEROSCOPY, RIGHT RETRO PYELOGRAM, REMOVAL STONE FROM BLADDER, URETHRAL DILITATION performed by Elijah Bustamante MD at [facility] 1    NEPHROLITHOTOMY Left 12/4/2020    LEFT PERCUTANEOUS NEPHROLITHOTOMY WITH DR Meka Hernandez TO PLACE NEPHROSTOMY TUBE PRIOR performed by Trudy Alvarado MD at [facility] 5 11/4/2021    SURGICAL PORT PLACEMENT performed by Danyell Dougherty MD at [address] [place] [address] Right     ROTATOR CUFF     History reviewed. No pertinent family history. Social History     Socioeconomic History    Marital status:      Spouse name: Not on file    Number of children: Not on file    Years of education: Not on file    Highest education level: Not on file   Occupational History    Not on file   Tobacco Use    Smoking status: Former     Types: Cigarettes     Start date: 3/22/1960     Quit date: 1976     Years since quittin.8    Smokeless tobacco: Never   Vaping Use    Vaping Use: Never used   Substance and Sexual Activity    Alcohol use: No    Drug use: No    Sexual activity: Not on file   Other Topics Concern    Not on file   Social History Narrative    Not on file     Social Determinants of Health     Financial Resource Strain: Not on file   Food Insecurity: Not on file   Transportation Needs: Not on file   Physical Activity: Not on file   Stress: Not on file   Social Connections: Not on file   Intimate Partner Violence: Not on file   Housing Stability: Not on file     No current facility-administered medications for this encounter.      Current Outpatient Medications   Medication Sig Dispense Refill    tamsulosin (FLOMAX) 0.4 MG capsule Take 0.4 mg by mouth daily      folic acid (FOLVITE) 1 MG tablet Take 1 mg by mouth daily      metoprolol succinate (TOPROL XL) 25 MG extended release tablet Take 1 tablet by mouth once daily 30 tablet 11    atorvastatin (LIPITOR) 20 MG tablet Take 20 mg by mouth daily       losartan (COZAAR) 100 MG tablet Take 100 mg by mouth daily      Multiple Vitamins-Minerals (THERAPEUTIC MULTIVITAMIN-MINERALS) tablet Take 1 tablet by mouth daily      vitamin D3 (CHOLECALCIFEROL) 10 MCG (400 UNIT) TABS tablet Take 400 Units by mouth daily      insulin aspart (NOVOLOG) 100 UNIT/ML injection vial Inject into the skin 3 times daily (before meals) Sliding scale Insulin Glargine (TOUJEO SOLOSTAR SC) Inject 70 Units into the skin nightly        No Known Allergies    REVIEW OF SYSTEMS  10 systems reviewed, pertinent positives per HPI otherwise noted to be negative    PHYSICAL EXAM  BP (!) 147/60   Pulse (!) 104   Temp 99 °F (37.2 °C) (Oral)   Resp 22   SpO2 96%   GENERAL APPEARANCE: Awake and alert. Cooperative. No acute distress. HEAD: Normocephalic. Atraumatic. EYES: PERRL. EOM's grossly intact. ENT: Mucous membranes are moist.   NECK: Supple. No meningismus. HEART: Tachycardic. No murmurs. No murmurs, rubs or gallops. LUNGS: Respirations unlabored. CTAB. Good air exchange. Speaking comfortably in full sentences. No wheezing, rhonchi, rales. ABDOMEN: Soft. Non-distended. Non-tender. No guarding or rebound. Negative Newman's, McBurney's and Rovsing's. No fluid waves or ascites. No hernias or masses. Bowel sounds normal in all quadrants. No CVA tenderness. EXTREMITIES: +1 pitting peripheral edema bilaterally. No associated redness or warmth. No palpable cords or masses. . Moves all extremities equally. All extremities neurovascularly intact. SKIN: Warm and dry. No acute rashes. NEUROLOGICAL: Alert and oriented. CN's 2-12 intact. No gross facial drooping. Strength 5/5, sensation intact. PSYCHIATRIC: Normal mood and affect. RADIOLOGY  XR CHEST PORTABLE    Result Date: 10/1/2022  EXAMINATION: ONE XRAY VIEW OF THE CHEST 10/1/2022 9:30 pm COMPARISON: 08/23/2022 HISTORY: ORDERING SYSTEM PROVIDED HISTORY: sob TECHNOLOGIST PROVIDED HISTORY: Reason for exam:->sob Reason for Exam: Fever Initial encounter FINDINGS: Right chest port is stable in positioning. The patient is rotated. Trace left pleural effusion with stable left basilar opacity. Right lung is clear. No pneumothorax. No new focal consolidation. Stable cardiac silhouette. No overt pulmonary edema. The osseous structures are stable.      Stable small left pleural effusion with adjacent left basilar opacity. ED COURSE  Pain control was not required while here in the emergency department. Triage vitals showing tachycardia 115. Blood pressure of 99/46. Repeat 147/60. I CBC with a leukopenia at 2.4.  H&H at 8.2 and 25.1 respectively. Platelets of 338 with absolute neutrophils of 0.2. CMP unremarkable. Lactate negative and blood cultures x2 pending. Initiated on broad-spectrum antibiotics for neutropenic fever. COVID and flu negative. BNP was negative. Patient did have an elevated troponin at 0.02. Given dose of aspirin. Became tachycardic and was given IV fluids. Remains afebrile. Chest x-ray is showing small left pleural effusion with adjacent basilar opacity. EKG consistent with a normal sinus rhythm. No evidence for acute ischemic change. CT PE study pending. Suspect patient will require admission. Please refer to Dr. Diane Benitez documentation regarding ED course, evaluation, treatment, medical decision making, and disposition for this patient. CRITICAL CARE TIME  35 Minutes of critical care time spent not including separately billable procedures.     [facility]  Results for orders placed or performed during the hospital encounter of 10/01/22   COVID-19 & Influenza Combo    Specimen: Nasopharyngeal Swab   Result Value Ref Range    SARS-CoV-2 RNA, RT PCR NOT DETECTED NOT DETECTED    INFLUENZA A NOT DETECTED NOT DETECTED    INFLUENZA B NOT DETECTED NOT DETECTED   CBC with Auto Differential   Result Value Ref Range    WBC 2.4 (L) 4.0 - 11.0 K/uL    RBC 2.92 (L) 4.20 - 5.90 M/uL    Hemoglobin 8.2 (L) 13.5 - 17.5 g/dL    Hematocrit 25.1 (L) 40.5 - 52.5 %    MCV 86.0 80.0 - 100.0 fL    MCH 28.0 26.0 - 34.0 pg    MCHC 32.6 31.0 - 36.0 g/dL    RDW 17.4 (H) 12.4 - 15.4 %    Platelets 574 (L) 610 - 450 K/uL    MPV 6.9 5.0 - 10.5 fL    Neutrophils % 89.9 %    Lymphocytes % 8.2 %    Monocytes % 1.0 %    Eosinophils % 0.6 %    Basophils % 0.3 %    Neutrophils Absolute 2.2 1.7 - 7.7 K/uL Lymphocytes Absolute 0.2 (L) 1.0 - 5.1 K/uL    Monocytes Absolute 0.0 0.0 - 1.3 K/uL    Eosinophils Absolute 0.0 0.0 - 0.6 K/uL    Basophils Absolute 0.0 0.0 - 0.2 K/uL   Comprehensive Metabolic Panel   Result Value Ref Range    Sodium 136 136 - 145 mmol/L    Potassium 3.6 3.5 - 5.1 mmol/L    Chloride 100 99 - 110 mmol/L    CO2 24 21 - 32 mmol/L    Anion Gap 12 3 - 16    Glucose 166 (H) 70 - 99 mg/dL    BUN 17 7 - 20 mg/dL    Creatinine 1.0 0.8 - 1.3 mg/dL    GFR Non-African American >60 >60    GFR African American >60 >60    Calcium 7.6 (L) 8.3 - 10.6 mg/dL    Total Protein 5.7 (L) 6.4 - 8.2 g/dL    Albumin 3.0 (L) 3.4 - 5.0 g/dL    Albumin/Globulin Ratio 1.1 1.1 - 2.2    Total Bilirubin 0.7 0.0 - 1.0 mg/dL    Alkaline Phosphatase 122 40 - 129 U/L    ALT 18 10 - 40 U/L    AST 25 15 - 37 U/L   Urinalysis   Result Value Ref Range    Color, UA Yellow Straw/Yellow    Clarity, UA Clear Clear    Glucose, Ur 100 (A) Negative mg/dL    Bilirubin Urine SMALL (A) Negative    Ketones, Urine Negative Negative mg/dL    Specific Gravity, UA >=1.030 1.005 - 1.030    Blood, Urine MODERATE (A) Negative    pH, UA 5.5 5.0 - 8.0    Protein,  (A) Negative mg/dL    Urobilinogen, Urine 0.2 <2.0 E.U./dL    Nitrite, Urine Negative Negative    Leukocyte Esterase, Urine Negative Negative    Microscopic Examination YES     Urine Type NotGiven    Lactic Acid   Result Value Ref Range    Lactic Acid 1.9 0.4 - 2.0 mmol/L   Troponin   Result Value Ref Range    Troponin 0.02 (H) <0.01 ng/mL   Brain Natriuretic Peptide   Result Value Ref Range    Pro- 0 - 449 pg/mL   Microscopic Urinalysis   Result Value Ref Range    Hyaline Casts, UA 0-2 0 - 2 /LPF    Fine Casts, UA 3-5 (A) 0 - 2 /LPF    Mucus, UA Rare (A) None Seen /LPF    WBC, UA 3-5 0 - 5 /HPF    RBC, UA 11-20 (A) 0 - 4 /HPF    Epithelial Cells, UA 2-5 0 - 5 /HPF   Lactic Acid   Result Value Ref Range    Lactic Acid 1.7 0.4 - 2.0 mmol/L   Troponin   Result Value Ref Range    Troponin 0.11 (H) <0.01 ng/mL   Lactate, Sepsis   Result Value Ref Range    Lactic Acid, Sepsis 1.9 0.4 - 1.9 mmol/L   Basic Metabolic Panel w/ Reflex to MG   Result Value Ref Range    Sodium 133 (L) 136 - 145 mmol/L    Potassium reflex Magnesium 3.6 3.5 - 5.1 mmol/L    Chloride 101 99 - 110 mmol/L    CO2 23 21 - 32 mmol/L    Anion Gap 9 3 - 16    Glucose 149 (H) 70 - 99 mg/dL    BUN 16 7 - 20 mg/dL    Creatinine 0.8 0.8 - 1.3 mg/dL    GFR Non-African American >60 >60    GFR African American >60 >60    Calcium 7.3 (L) 8.3 - 10.6 mg/dL   Procalcitonin   Result Value Ref Range    Procalcitonin 2.14 (H) 0.00 - 0.15 ng/mL   APTT   Result Value Ref Range    aPTT 35.3 (H) 23.0 - 34.3 sec   Protime-INR   Result Value Ref Range    Protime 14.6 (H) 11.7 - 14.5 sec    INR 1.15 (H) 0.87 - 1.14   Anti-Xa, Unfractionated Heparin   Result Value Ref Range    Anti-XA Unfrac Heparin <0.10 (L) 0.30 - 0.70 IU/mL   Anti-Xa, Unfractionated Heparin   Result Value Ref Range    Anti-XA Unfrac Heparin 0.43 0.30 - 0.70 IU/mL   POCT Glucose   Result Value Ref Range    POC Glucose 128 (H) 70 - 99 mg/dl    Performed on ACCU-CHEK    POCT Glucose   Result Value Ref Range    POC Glucose 198 (H) 70 - 99 mg/dl    Performed on ACCU-CHEK    EKG 12 Lead   Result Value Ref Range    Ventricular Rate 91 BPM    Atrial Rate 91 BPM    P-R Interval 160 ms    QRS Duration 102 ms    Q-T Interval 380 ms    QTc Calculation (Bazett) 467 ms    P Axis 92 degrees    R Axis 67 degrees    T Axis 40 degrees    Diagnosis       Normal sinus rhythm with sinus arrhythmiaRSR' or QR pattern in V1 suggests right ventricular conduction delayConfirmed by Pj Bradshaw MD ([id number]) on 10/2/2022 2:35:03 PM     I spoke with Dr. Eyad Villafana who spoke with the hospitalist. We thoroughly discussed the history, physical exam, laboratory and imaging studies, as well as, emergency department course.  Based upon that discussion, we've decided to discharge Emilee Soriano to the hospital for further observation, evaluation, and treatment. Final Impression  1. Immunocompromised state due to drug therapy ([address]    2. Fever, unspecified fever cause    3. Pleural effusion    4. Elevated troponin    5. Pancytopenia (HCC)      Blood pressure (!) 121/51, pulse 76, temperature 97.7 °F (36.5 °C), temperature source Oral, resp. rate 18, weight 196 lb 10.4 oz (89.2 kg), SpO2 99 %. DISPOSITION  Patient was admitted to the hospital in stable condition.           Isiah Nava  10/02/22 1526

## 2022-10-03 ENCOUNTER — APPOINTMENT (OUTPATIENT)
Dept: NUCLEAR MEDICINE | Age: 77
DRG: 314 | End: 2022-10-03
Payer: MEDICARE

## 2022-10-03 PROBLEM — Z79.899 IMMUNOCOMPROMISED STATE DUE TO DRUG THERAPY (HCC): Status: ACTIVE | Noted: 2022-10-03

## 2022-10-03 PROBLEM — D61.818 PANCYTOPENIA (HCC): Status: ACTIVE | Noted: 2022-10-03

## 2022-10-03 PROBLEM — D84.821 IMMUNOCOMPROMISED STATE DUE TO DRUG THERAPY (HCC): Status: ACTIVE | Noted: 2022-10-03

## 2022-10-03 LAB
ANION GAP SERPL CALCULATED.3IONS-SCNC: 9 MMOL/L (ref 3–16)
ANTI-XA UNFRAC HEPARIN: 0.16 IU/ML (ref 0.3–0.7)
ANTI-XA UNFRAC HEPARIN: 0.28 IU/ML (ref 0.3–0.7)
ANTI-XA UNFRAC HEPARIN: 0.34 IU/ML (ref 0.3–0.7)
BASOPHILS ABSOLUTE: 0 K/UL (ref 0–0.2)
BASOPHILS RELATIVE PERCENT: 0 %
BUN BLDV-MCNC: 12 MG/DL (ref 7–20)
CALCIUM SERPL-MCNC: 7.4 MG/DL (ref 8.3–10.6)
CHLORIDE BLD-SCNC: 102 MMOL/L (ref 99–110)
CO2: 24 MMOL/L (ref 21–32)
CREAT SERPL-MCNC: 0.9 MG/DL (ref 0.8–1.3)
EOSINOPHILS ABSOLUTE: 0 K/UL (ref 0–0.6)
EOSINOPHILS RELATIVE PERCENT: 3 %
GFR AFRICAN AMERICAN: >60
GFR NON-AFRICAN AMERICAN: >60
GLUCOSE BLD-MCNC: 105 MG/DL (ref 70–99)
GLUCOSE BLD-MCNC: 115 MG/DL (ref 70–99)
GLUCOSE BLD-MCNC: 204 MG/DL (ref 70–99)
GLUCOSE BLD-MCNC: 325 MG/DL (ref 70–99)
HCT VFR BLD CALC: 21.4 % (ref 40.5–52.5)
HEMATOLOGY PATH CONSULT: NORMAL
HEMATOLOGY PATH CONSULT: YES
HEMOGLOBIN: 7.2 G/DL (ref 13.5–17.5)
LV EF: 60 %
LVEF MODALITY: NORMAL
LYMPHOCYTES ABSOLUTE: 0.4 K/UL (ref 1–5.1)
LYMPHOCYTES RELATIVE PERCENT: 34 %
MCH RBC QN AUTO: 28.3 PG (ref 26–34)
MCHC RBC AUTO-ENTMCNC: 33.6 G/DL (ref 31–36)
MCV RBC AUTO: 84.3 FL (ref 80–100)
MONOCYTES ABSOLUTE: 0 K/UL (ref 0–1.3)
MONOCYTES RELATIVE PERCENT: 4 %
NEUTROPHILS ABSOLUTE: 0.7 K/UL (ref 1.7–7.7)
NEUTROPHILS RELATIVE PERCENT: 59 %
PDW BLD-RTO: 17.7 % (ref 12.4–15.4)
PERFORMED ON: ABNORMAL
PLATELET # BLD: 96 K/UL (ref 135–450)
PMV BLD AUTO: 6.9 FL (ref 5–10.5)
POTASSIUM REFLEX MAGNESIUM: 3.7 MMOL/L (ref 3.5–5.1)
RBC # BLD: 2.54 M/UL (ref 4.2–5.9)
SLIDE REVIEW: ABNORMAL
SODIUM BLD-SCNC: 135 MMOL/L (ref 136–145)
WBC # BLD: 1.2 K/UL (ref 4–11)

## 2022-10-03 PROCEDURE — 6370000000 HC RX 637 (ALT 250 FOR IP): Performed by: INTERNAL MEDICINE

## 2022-10-03 PROCEDURE — 93017 CV STRESS TEST TRACING ONLY: CPT

## 2022-10-03 PROCEDURE — 6370000000 HC RX 637 (ALT 250 FOR IP): Performed by: NURSE PRACTITIONER

## 2022-10-03 PROCEDURE — 85520 HEPARIN ASSAY: CPT

## 2022-10-03 PROCEDURE — 78452 HT MUSCLE IMAGE SPECT MULT: CPT

## 2022-10-03 PROCEDURE — 1200000000 HC SEMI PRIVATE

## 2022-10-03 PROCEDURE — 99222 1ST HOSP IP/OBS MODERATE 55: CPT | Performed by: INTERNAL MEDICINE

## 2022-10-03 PROCEDURE — 36415 COLL VENOUS BLD VENIPUNCTURE: CPT

## 2022-10-03 PROCEDURE — 6360000002 HC RX W HCPCS: Performed by: INTERNAL MEDICINE

## 2022-10-03 PROCEDURE — 6360000002 HC RX W HCPCS: Performed by: NURSE PRACTITIONER

## 2022-10-03 PROCEDURE — 3430000000 HC RX DIAGNOSTIC RADIOPHARMACEUTICAL: Performed by: INTERNAL MEDICINE

## 2022-10-03 PROCEDURE — 85025 COMPLETE CBC W/AUTO DIFF WBC: CPT

## 2022-10-03 PROCEDURE — A9502 TC99M TETROFOSMIN: HCPCS | Performed by: INTERNAL MEDICINE

## 2022-10-03 PROCEDURE — 80048 BASIC METABOLIC PNL TOTAL CA: CPT

## 2022-10-03 PROCEDURE — 99232 SBSQ HOSP IP/OBS MODERATE 35: CPT | Performed by: NURSE PRACTITIONER

## 2022-10-03 PROCEDURE — 2580000003 HC RX 258: Performed by: NURSE PRACTITIONER

## 2022-10-03 RX ORDER — HEPARIN SODIUM 1000 [USP'U]/ML
2000 INJECTION, SOLUTION INTRAVENOUS; SUBCUTANEOUS ONCE
Status: COMPLETED | OUTPATIENT
Start: 2022-10-03 | End: 2022-10-03

## 2022-10-03 RX ADMIN — ASPIRIN 81 MG: 81 TABLET, COATED ORAL at 08:47

## 2022-10-03 RX ADMIN — TETROFOSMIN 32.2 MILLICURIE: 1.38 INJECTION, POWDER, LYOPHILIZED, FOR SOLUTION INTRAVENOUS at 13:19

## 2022-10-03 RX ADMIN — HEPARIN SODIUM 2000 UNITS: 1000 INJECTION INTRAVENOUS; SUBCUTANEOUS at 08:33

## 2022-10-03 RX ADMIN — INSULIN LISPRO 4 UNITS: 100 INJECTION, SOLUTION INTRAVENOUS; SUBCUTANEOUS at 20:57

## 2022-10-03 RX ADMIN — FOLIC ACID 1 MG: 1 TABLET ORAL at 08:47

## 2022-10-03 RX ADMIN — Medication 10 ML: at 21:03

## 2022-10-03 RX ADMIN — Medication 10 ML: at 08:47

## 2022-10-03 RX ADMIN — ATORVASTATIN CALCIUM 40 MG: 40 TABLET, FILM COATED ORAL at 20:52

## 2022-10-03 RX ADMIN — VANCOMYCIN HYDROCHLORIDE 1000 MG: 1 INJECTION, POWDER, LYOPHILIZED, FOR SOLUTION INTRAVENOUS at 04:31

## 2022-10-03 RX ADMIN — REGADENOSON 0.4 MG: 0.08 INJECTION, SOLUTION INTRAVENOUS at 13:19

## 2022-10-03 RX ADMIN — CEFEPIME 2000 MG: 2 INJECTION, POWDER, FOR SOLUTION INTRAVENOUS at 00:28

## 2022-10-03 RX ADMIN — LOSARTAN POTASSIUM 100 MG: 100 TABLET, FILM COATED ORAL at 08:47

## 2022-10-03 RX ADMIN — INSULIN GLARGINE 56 UNITS: 100 INJECTION, SOLUTION SUBCUTANEOUS at 20:58

## 2022-10-03 RX ADMIN — HEPARIN SODIUM 2000 UNITS: 1000 INJECTION INTRAVENOUS; SUBCUTANEOUS at 22:18

## 2022-10-03 RX ADMIN — TETROFOSMIN 11.2 MILLICURIE: 1.38 INJECTION, POWDER, LYOPHILIZED, FOR SOLUTION INTRAVENOUS at 09:45

## 2022-10-03 RX ADMIN — HEPARIN SODIUM 1000 UNITS/HR: 10000 INJECTION, SOLUTION INTRAVENOUS at 06:28

## 2022-10-03 NOTE — PROGRESS NOTES
Physician Progress Note      PATIENT:               Jessica Ledbetter  CSN #:                  [id number]  :                       1945  ADMIT DATE:       10/1/2022 8:21 PM  [address] [place] DATE:  RESPONDING  PROVIDER #:        Opal Albert MD          QUERY TEXT:    Patient admitted with sepsis and has documented pancytopenia. If possible,   please document in progress notes and discharge summary further specificity   regarding pancytopenia:    The medical record reflects the following:  Risk Factors: s/p chemo , Stage IV Lung cancer, sepsis  Clinical Indicators: WBC 2.4, RBC 2.9, PLT 96, pancytopenia noted  Treatment: serial labs, Oncology consult, supportive care    Thank you,  Jesus Muse RN, CDS  [email or web address]  Options provided:  -- Antineoplastic (chemotherapy) induced pancytopenia  -- Other - I will add my own diagnosis  -- Disagree - Not applicable / Not valid  -- Disagree - Clinically unable to determine / Unknown  -- Refer to Clinical Documentation Reviewer    PROVIDER RESPONSE TEXT:    Patient has antineoplastic (chemotherapy) induced pancytopenia.     Query created by: Adriana Castro on 10/3/2022 2:26 PM      Electronically signed by:  Opal Albert MD 10/3/2022 2:59 PM

## 2022-10-03 NOTE — PROGRESS NOTES
A [facility] [facility] Myoview stress test was completed on this patient. The patient tolerated the procedure well.

## 2022-10-03 NOTE — PROGRESS NOTES
VSS on RA  Assessment as charted   NSR on tele   IV abx given this shift   NS gtt at 50ml\hr   NPO since midnight   Stress test\Echo today  Uneventful shift

## 2022-10-03 NOTE — PROGRESS NOTES
[address]   Daily Progress Note      Admit Date:  10/1/2022    Reason for follow up visit: Elevated troponin    CC: \"I feel okay. I haven't had any chest pain or SOB. \"    66 y/o male with PMH significant for [place] lung cancer, HTN, hyperlipidemia and diabetes mellitus who was admitted to [facility] after presenting with fever, fatigue and SOB. He is currently undergoing chemo for lung CA. He was noted to have an elevated troponin and cardiology asked to evaluate. Interval History:  Pt. seen and examined; records reviewed  BP stable. Remains on room air  Denies chest pain or SOB  Remains SR-ST with PVC's  Remains on heparin    Subjective:  Pt with no acute overnight cardiac events. Denies chest pain, SOB, cough, palpitations or dizziness    Review of Systems:   Constitutional: no unanticipated weight loss. There's been no change in energy level, sleep pattern, or activity level. No fevers, chills. Eyes: No visual changes or diplopia. No scleral icterus. ENT: No Headaches, hearing loss or vertigo. No mouth sores or sore throat. Cardiovascular: as reviewed in HPI  Respiratory: No cough or wheezing, no sputum production. No hematemesis. Gastrointestinal: No abdominal pain, appetite loss, blood in stools. No change in bowel or bladder habits. Genitourinary: No dysuria, trouble voiding, or hematuria. Musculoskeletal:  No gait disturbance, no joint complaints. Integumentary: No rash or pruritis. Neurological: No headache, diplopia, change in muscle strength, numbness or tingling. Psychiatric: No anxiety or depression. Endocrine: No temperature intolerance. No excessive thirst, fluid intake, or urination. No tremor. Hematologic/Lymphatic: No abnormal bruising or bleeding, blood clots or swollen lymph nodes. Allergic/Immunologic: No nasal congestion or hives.     Objective:   /63   Pulse 86   Temp 98.2 °F (36.8 °C) (Oral)   Resp 16   Wt 196 lb 10.4 oz (89.2 kg)   SpO2 98%   BMI 29.04 kg/m²     Intake/Output Summary (Last 24 hours) at 10/3/2022 1456  Last data filed at 10/2/2022 2100  Gross per 24 hour   Intake 158 ml   Output --   Net 158 ml     Wt Readings from Last 3 Encounters:   10/02/22 196 lb 10.4 oz (89.2 kg)   08/25/22 197 lb (89.4 kg)   03/01/22 200 lb (90.7 kg)       Physical Exam:  General: In no acute distress. Awake, alert, and oriented X4. Resting in bed  Skin:  Warm and dry. No new appearing rashes or lesions. Neck:  Supple. No JVD or carotid bruit appreciated. Chest: Lungs clear to auscultation. No wheezes/rhonchi/rales  Cardiovascular:  RRR. Normal S1 and S2. No murmur/gallop  or rub  Abdomen:  soft, nontender, nondistended, +bowel sounds. No hepatomegaly  Extremities:  No LE edema. No clubbing or cyanosis. 2+ bilateral radial/carotid/DP/PT pulses.  Cap refill brisk    Medications:    [START ON 10/4/2022] cefepime  2,000 mg IntraVENous Q12H    insulin glargine  56 Units SubCUTAneous Nightly    losartan  100 mg Oral Daily    metoprolol succinate  25 mg Oral Daily    sodium chloride flush  5-40 mL IntraVENous 2 times per day    insulin lispro  0-4 Units SubCUTAneous TID WC    insulin lispro  0-4 Units SubCUTAneous Nightly    folic acid  1 mg Oral Daily    aspirin  81 mg Oral Daily    atorvastatin  40 mg Oral Nightly      sodium chloride      dextrose      sodium chloride 50 mL/hr at 10/02/22 0338    heparin (PORCINE) Infusion 1,180 Units/hr (10/03/22 0835)     sodium chloride flush, sodium chloride, acetaminophen **OR** acetaminophen, glucose, dextrose bolus **OR** dextrose bolus, glucagon (rDNA), dextrose, heparin (porcine), heparin (porcine), perflutren lipid microspheres    Lab Data:  CBC:   Recent Labs     10/01/22  2156 10/03/22  0700   WBC 2.4* 1.2*   HGB 8.2* 7.2*   * 96*     BMP:    Recent Labs     10/01/22  2156 10/02/22  0520 10/03/22  0700    133* 135*   K 3.6 3.6 3.7   CO2 24 23 24   BUN 17 16 12   CREATININE 1.0 0.8 0.9     LIVR:   Recent Labs 10/01/22  2156   AST 25   ALT 18     INR:    Recent Labs     10/02/22  0520   INR 1.15*     APTT:   Recent Labs     10/02/22  0520   APTT 35.3*      Latest Reference Range & Units 10/1/22 21:56 10/2/22 02:56   Troponin <0.01 ng/mL 0.02 (H) 0.11 (H)   (H): Data is abnormally high    ECG:  Sinus rhythm with RBBB; nonspecific ST segment    10/3/2022 Lexiscan-Myoview: pending    10/3/2022 Echo: pending    10/2/2022 CTPA:  No evidence of pulmonary embolism. Moderate size pleural effusion which may be loculated at the left lower   pleural cavity. Mild area of consolidation at the basal segment of the left   lower lobe which could represent atelectasis versus airspace disease process. Mild scattered patchy opacities which are nonspecific but could indicate an   infectious/inflammatory etiology. 11/25/2020 Lexiscan-Myoview: There is uniform isotope uptake at stress and rest. There is no evidence of    myocardial ischemia or scar. Normal post-stress LVEF of 61%. 11/25/2020 Echo:   Technically difficult examination due to poor apical windows. Left ventricular systolic function is normal with a visually estimated   ejection fraction of 55%. The left ventricle is normal in size with mild septal hypertrophy. No regional wall motion abnormalities are noted. Normal left ventricular diastolic function. Mild bi-atrial enlargement. Mild mitral regurgitation. The IVC is dilated in size (>2.1 cm) but collapses >50% with respiration   consistent with elevated right atrial pressures (8 mmHg) . Telemetry: Sinus rhythm-sinus tachycardia; PVC's  (Personally reviewed)    Assessment/Plan:    1) Elevated troponin  -c/w ?  NSTEMI  -has H/O elevated troponin  -stress test and echo pending  -remains on heparin (will plan to discontinue in AM)  -no ischemic changes on ECG  -nothing to suggest angina  -continue ASA,  statin and BB    2) Fever  -resolved  -ID now following    3) Hypertension  -BP controlled  -continue medical management    4) Anemia  -Hgb decreasing  -will stop heparin    5) Hyperlipidemia  -continue statin    6) NSC lung CA-stage IV  -undergoing chemo      Electronically signed by PUSHPA Staples CNP on 10/3/2022 at 2:56 PM

## 2022-10-03 NOTE — CONSULTS
Infectious Diseases   Consult Note      Reason for Consult:   Serratia bacteremia     Requesting Physician:    Dr. Andi Verdugo    Date of Admission: 10/1/2022  Subjective:   CHIEF COMPLAINT:   none given       HPI:    Rhett Sheldon is a 78yoM with history of stage IV lung cancer actively treated with pemetrexed last given 9/27/22  History of prostate cancer, DM, HLD, HTN, nephrolithiasis                 Admission 8/22-8/25/22 with sepsis syndrome and Serratia bacteremia. Source of bacteremia uncertain. No repeat BC collected. Improved with IV abx and discharged with 4d po levofloxacin. ED 10/1/22 - cc fever, chills x1d duration. WBC was low at 2.4 with ANC 2200 and   Procalcitonin was elevated. UA negative, no pyuria   CT with moderate, possible loculated L pleural effusion     Today, AF   On RA   WBC continues to fall, today is 1.2 with     Two of 2 sets of BC collected on admission are again positive for Serratia marcescens, CAT Pending. Both sets were collected through the port. A BC collected peripherally early omn 10/2/22 is negative to date    He denies chest pain, cough, GI/ symptoms. Port not tender, red.          Current abx:  Cefepime 2g q8  Vanc 1g q12       Past Surgical History:       Diagnosis Date    Cancer ([address]     prostate    Diabetes mellitus ([address]     Hyperlipidemia     Hypertension     Kidney stone     Lung cancer ([address] 10/02/2021    Stage 4    Lung cancer ([address]          Procedure Laterality Date    BACK SURGERY  2012    lumber, no hardware, \"cleaned it out\"    CYSTOSCOPY Right 11/9/2020    CYSTOSCOPY,  RIGHT URETEROSCOPY, RIGHT RETRO PYELOGRAM, REMOVAL STONE FROM BLADDER, URETHRAL DILITATION performed by Keyla Barrios MD at [facility] 1    NEPHROLITHOTOMY Left 12/4/2020    LEFT PERCUTANEOUS NEPHROLITHOTOMY WITH DR Sunday Barber TO PLACE NEPHROSTOMY TUBE PRIOR performed by Eugenio Pastrana MD at [facility] 5 11/4/2021    SURGICAL PORT PLACEMENT performed by Lencho Jackson MD at [facility] Right     ROTATOR CUFF       Social History:    TOBACCO:   reports that he quit smoking about 45 years ago. He started smoking about 62 years ago. He has never used smokeless tobacco.  ETOH:   reports no history of alcohol use. There is no history of illicit drug use or other significant epidemiologic exposures. Family History:   History reviewed. No pertinent family history. There is no family history of autoimmune diseases or significant infectious diseases.       Current Medications:    Current Facility-Administered Medications: insulin glargine (LANTUS) injection vial 56 Units, 56 Units, SubCUTAneous, Nightly  losartan (COZAAR) tablet 100 mg, 100 mg, Oral, Daily  metoprolol succinate (TOPROL XL) extended release tablet 25 mg, 25 mg, Oral, Daily  sodium chloride flush 0.9 % injection 5-40 mL, 5-40 mL, IntraVENous, 2 times per day  sodium chloride flush 0.9 % injection 5-40 mL, 5-40 mL, IntraVENous, PRN  0.9 % sodium chloride infusion, , IntraVENous, PRN  acetaminophen (TYLENOL) tablet 650 mg, 650 mg, Oral, Q6H PRN **OR** acetaminophen (TYLENOL) suppository 650 mg, 650 mg, Rectal, Q6H PRN  glucose chewable tablet 16 g, 4 tablet, Oral, PRN  dextrose bolus 10% 125 mL, 125 mL, IntraVENous, PRN **OR** dextrose bolus 10% 250 mL, 250 mL, IntraVENous, PRN  glucagon (rDNA) injection 1 mg, 1 mg, SubCUTAneous, PRN  dextrose 10 % infusion, , IntraVENous, Continuous PRN  0.9 % sodium chloride infusion, , IntraVENous, Continuous  cefepime (MAXIPIME) 2000 mg IVPB minibag, 2,000 mg, IntraVENous, Q8H  insulin lispro (HUMALOG) injection vial 0-4 Units, 0-4 Units, SubCUTAneous, TID WC  insulin lispro (HUMALOG) injection vial 0-4 Units, 0-4 Units, SubCUTAneous, Nightly  heparin (porcine) injection 4,000 Units, 4,000 Units, IntraVENous, PRN  heparin (porcine) injection 2,000 Units, 2,000 Units, IntraVENous, PRN  heparin 25,000 units in dextrose 5% 250 mL (premix) infusion, 1,180 Units/hr, IntraVENous, Continuous  vancomycin 1000 mg IVPB in 250 mL D5W addavial, 1,000 mg, IntraVENous, G72D  folic acid (FOLVITE) tablet 1 mg, 1 mg, Oral, Daily  aspirin EC tablet 81 mg, 81 mg, Oral, Daily  atorvastatin (LIPITOR) tablet 40 mg, 40 mg, Oral, Nightly  perflutren lipid microspheres (DEFINITY) injection 1.65 mg, 1.5 mL, IntraVENous, ONCE PRN      No Known Allergies       REVIEW OF SYSTEMS:    CONSTITUTIONAL:   per HPI  EYES:  negative for blurred vision, eye discharge, visual disturbance and icterus  HEENT:  negative for acute hearing loss, tinnitus, ear drainage, sinus pressure, nasal congestion, epistaxis and snoring  RESPIRATORY:  No cough, shortness of breath, hemoptysis  CARDIOVASCULAR:  negative for chest pain, palpitations, exertional chest pressure/discomfort, syncope  GASTROINTESTINAL:  negative for nausea, vomiting, diarrhea, constipation, blood in stool and abdominal pain  GENITOURINARY:  negative for frequency, dysuria, urinary incontinence, decreased urine volume, and hematuria  HEMATOLOGIC/LYMPHATIC:  negative for easy bruising, bleeding and lymphadenopathy  ALLERGIC/IMMUNOLOGIC:  negative for angioedema, anaphylaxis and drug reactions  ENDOCRINE:  negative for weight changes and diabetic symptoms including polyuria, polydipsia and polyphagia  MUSCULOSKELETAL:  negative for acute joint swelling, decreased range of motion and muscle weakness  NEUROLOGICAL:  negative for headaches, slurred speech, unilateral weakness  PSYCHIATRIC/BEHAVIORAL: negative for hallucinations, behavioral problems, confusion and agitation.        Objective:   PHYSICAL EXAM:      VITALS:  /61   Pulse 73   Temp 98 °F (36.7 °C) (Oral)   Resp 18   Wt 196 lb 10.4 oz (89.2 kg)   SpO2 98%   BMI 29.04 kg/m²      24HR INTAKE/OUTPUT:    Intake/Output Summary (Last 24 hours) at 10/3/2022 1324  Last data filed at 10/2/2022 2100  Gross per 24 hour   Intake 939.58 ml Output 400 ml   Net 539.58 ml     CONSTITUTIONAL:  Awake, alert, cooperative, no apparent distress, and appears stated age  [de-identified]: NCAT, PERRL, EOMI. Sclera white, conjunctiva full. OP with moist mucosal membranes, no thrush, tongue protrudes midline  NECK:  Supple, symmetrical, trachea midline, no adenopathy  LUNGS:  no increased work of breathing  Decreased breath sounds L chest  Upper lobes clear   CARDIOVASCULAR:  RRR without murmur  ABDOMEN:  normal bowel sounds, soft, flat, NT  PSYCHIATRIC: Oriented to person place and time. No obvious depression or anxiety. MUSCULOSKELETAL: No obvious misalignment or effusion of the joints. No clubbing, cyanosis of the digits. SKIN:  normal skin color, texture, turgor and no redness, warmth, or swelling. No palpable nodules or stigmata of embolic phenomenon  NEUROLOGIC: nonfocal exam  ACCESS:  port accessed R chest.  No local erythema or tenderness       DATA:    Old records have been reviewed    CBC:  Recent Labs     10/01/22  2156 10/03/22  0700   WBC 2.4* 1.2*   RBC 2.92* 2.54*   HGB 8.2* 7.2*   HCT 25.1* 21.4*   * 96*   MCV 86.0 84.3   MCH 28.0 28.3   MCHC 32.6 33.6   RDW 17.4* 17.7*      BMP:  Recent Labs     10/01/22  2156 10/02/22  0520 10/03/22  0700    133* 135*   K 3.6 3.6 3.7    101 102   CO2 24 23 24   BUN 17 16 12   CREATININE 1.0 0.8 0.9   CALCIUM 7.6* 7.3* 7.4*   GLUCOSE 166* 149* 115*        Cultures:   8/22 BC x2 w Serratia marcescens R only to cefazolin   10/1 BC x2 S marcescens, CAT pending, collected form port  10/2 BC x1 NGTD, collected peripheral site       Radiology Review:  All pertinent images / reports were reviewed as a part of this visit. CT chest 10/2/22  Impression   No evidence of pulmonary embolism. Moderate size pleural effusion which may be loculated at the left lower   pleural cavity.  Mild area of consolidation at the basal segment of the left   lower lobe which could represent atelectasis versus airspace disease process. Mild scattered patchy opacities which are nonspecific but could indicate an   infectious/inflammatory etiology. Assessment:     Patient Active Problem List   Diagnosis    Transient global amnesia    Acute encephalopathy    Hemispheric carotid artery syndrome    Uncontrolled type 2 diabetes mellitus with complication, without long-term current use of insulin    HTN (hypertension), benign    Dyslipidemia    Urolithiasis    SOB (shortness of breath)    Left renal stone    Lung cancer (HCC)    Shortness of breath    Gram-negative pneumonia (HCC)    Sepsis (HCC)    Hyperglycemia due to type 2 diabetes mellitus (HCC)    Neutropenia (HCC)    Cancer of right lung (HCC)    Elevated troponin       Stage IV lung cancer treated with pemetrexed last given 9/27/22    Admitted 10/1/22 with acute febrile illness   Found to have relapsed Serratia bacteremia   Same problem back in 8/2022 treated with short course IV/po abx   Lacking focal symptoms to suggest an alternative source of bacteremia, this appears to be primary CLABSI from the port     Evolving neutropenia from antineoplastic therapy     L pleural effusion, chronic, malignant     NKDA       -continue cefepime, dose reduced   -recommend port removal since he has failed medical treatment once already. He wants to discuss with Oncology prior to involving a Surgeon    -echo for completeness of evaluation, though GN endocarditis is unusual   -follow-up pending BC, last set collected (peripherally) is negative to date   -G-SCF if ok w Oncology     All questions addressed  Will follow          Sasha Michele M.D. Thank you for the opportunity to participate in the care of your patient.     Please do not hesitate to contact me:   [phone] office

## 2022-10-03 NOTE — ONCOLOGY
ONCOLOGY HEMATOLOGY CARE PROGRESS NOTE      SUBJECTIVE:     He feels improved. ROS:   The remaining 10 point review of symptoms is unremarkable. OBJECTIVE        Physical    VITALS:  BP (!) 132/55   Pulse 75   Temp 98.1 °F (36.7 °C)   Resp 18   Wt 196 lb 10.4 oz (89.2 kg)   SpO2 96%   BMI 29.04 kg/m²   TEMPERATURE:  Current - Temp: 98.1 °F (36.7 °C); Max - Temp  Av.2 °F (36.8 °C)  Min: 97.7 °F (36.5 °C)  Max: 98.8 °F (37.1 °C)  PULSE OXIMETRY RANGE: SpO2  Av.3 %  Min: 95 %  Max: 99 %  24HR INTAKE/OUTPUT:    Intake/Output Summary (Last 24 hours) at 10/3/2022 6101  Last data filed at 10/2/2022 2100  Gross per 24 hour   Intake 1259.58 ml   Output 400 ml   Net 859.58 ml       CONSTITUTIONAL:  awake, alert, cooperative, no apparent distress, HEENT oral pharynx , no scleral icterus  HEMATOLOGIC/LYMPHATICS:  no cervical lymphadenopathy, no supraclavicular lymphadenopathy, no axillary lymphadenopathy and no inguinal lymphadenopathy  LUNGS:  No increased work of breathing, good air exchange, clear to auscultation bilaterally, no crackles or wheezing  CARDIOVASCULAR:  , regular rate and rhythm, normal S1 and S2, no S3 or S4, and no murmur noted  ABDOMEN:  No scars, normal bowel sounds, soft, non-distended, non-tender, no masses palpated, no hepatosplenomegally  MUSCULOSKELETAL:  There is no redness, warmth, or swelling of the joints. EXTREMETIES: No clubbing cynosis or edema  NEUROLOGIC:  Awake, alert, oriented to name, place and time. Cranial nerves II-XII are grossly intact. Motor is 5 out of 5 bilaterally.    SKIN:  no bruising or bleeding      Data      Recent Labs     10/01/22  2156   WBC 2.4*   HGB 8.2*   HCT 25.1*   *   MCV 86.0        Recent Labs     10/01/22  2156 10/02/22  0520    133*   K 3.6 3.6    101   CO2 24 23   BUN 17 16   CREATININE 1.0 0.8     Recent Labs     10/01/22  2156   AST 25   ALT 18   BILITOT 0.7   ALKPHOS 122 Magnesium:    Lab Results   Component Value Date/Time    MG 1.40 08/23/2022 05:50 AM    MG 1.70 01/24/2022 03:55 AM    MG 1.70 02/12/2018 05:37 AM         Problem List  Patient Active Problem List   Diagnosis    Transient global amnesia    Acute encephalopathy    Hemispheric carotid artery syndrome    Uncontrolled type 2 diabetes mellitus with complication, without long-term current use of insulin    HTN (hypertension), benign    Dyslipidemia    Urolithiasis    SOB (shortness of breath)    Left renal stone    Lung cancer (HCC)    Shortness of breath    Gram-negative pneumonia (HCC)    Sepsis ([facility]    Hyperglycemia due to type 2 diabetes mellitus ([facility]    Neutropenia ([facility]    Cancer of right lung (HCC)    Elevated troponin       ASSESSMENT AND PLAN     Pneumonia  - abx per IM  - on Vanc and Cefepime  - blood culture collected on 10/01/2022 grew Serratia marcescens  - not currently neutropenic  - check cbc daily     2. Stage IV NSCLC  - on maintenance chemotherapy with Alim[facility]  - last treatment 9/27/22  - due next on 10/18/2022  - needs to continue home folic acid - ordered  - monitor CBC after chemo  - transfuse if Hgb < 7     3.  Elevated troponin  - cardiology recommended ASA/statin and stress test      ONCOLOGIC DISPOSITION:    Manohar Adams MD  Please contact through [address]

## 2022-10-03 NOTE — ACP (ADVANCE CARE PLANNING)
Advance Care Planning     Advance Care Planning Activator (Inpatient)  Conversation Note      Date of ACP Conversation: 10/3/2022     Conversation Conducted with: patient and wife     ACP Activator: Annie Alvarado RN      Health Care Decision Maker:     Current Designated Health Care Decision Maker:     Primary Decision Maker: Kym Hawkins - Spouse - [phone]        Care Preferences    Ventilation: \"If you were in your present state of health and suddenly became very ill and were unable to breathe on your own, what would your preference be about the use of a ventilator (breathing machine) if it were available to you? \"      Would the patient desire the use of ventilator (breathing machine)?: yes    \"If your health worsens and it becomes clear that your chance of recovery is unlikely, what would your preference be about the use of a ventilator (breathing machine) if it were available to you? \"     Would the patient desire the use of ventilator (breathing machine)?: Yes      Resuscitation  \"CPR works best to restart the heart when there is a sudden event, like a heart attack, in someone who is otherwise healthy. Unfortunately, CPR does not typically restart the heart for people who have serious health conditions or who are very sick. \"    \"In the event your heart stopped as a result of an underlying serious health condition, would you want attempts to be made to restart your heart (answer \"yes\" for attempt to resuscitate) or would you prefer a natural death (answer \"no\" for do not attempt to resuscitate)? \" yes       [x] Yes   [] No   Educated Patient / Katarzyna Grooms regarding differences between Advance Directives and portable DNR orders.     Length of ACP Conversation in minutes:  5    Conversation Outcomes:  [x] ACP discussion completed  [] Existing advance directive reviewed with patient; no changes to patient's previously recorded wishes  [] New Advance Directive completed  [] Portable Do Not Rescitate prepared for Provider review and signature  [] POLST/POST/MOLST/MOST prepared for Provider review and signature      Follow-up plan:    [] Schedule follow-up conversation to continue planning  [x] Referred individual to Provider for additional questions/concerns   [] Advised patient/agent/surrogate to review completed ACP document and update if needed with changes in condition, patient preferences or care setting    [] This note routed to one or more involved healthcare providers

## 2022-10-03 NOTE — CARE COORDINATION
CASE MANAGEMENT INITIAL ASSESSMENT      Reviewed chart and completed assessment with patient and wife       Health Care Decision Maker :   Primary Decision Maker: Shukri Conway - Spouse - [phone]          Can this person be reached and be able to respond quickly, such as within a few minutes or hours? Yes      Admit date/status: 10/2/22 Inpatient   Diagnosis: pleural effustion    Is this a Readmission?:  Yes      Insurance: Medicare, [address]    Precert required for SNF: No       3 night stay required: No    Living arrangements, Adls, care needs, prior to admission: lives in a house with his wife    Durable Medical Equipment at home: none    Services in the home and/or outpatient, prior to admission: none    Current PCP: Kassandra Valentino MD                     Transportation needs:  patient drives      Dialysis Facility (if applicable)   Name:  Address:  Dialysis Schedule:  Phone:  Fax:    PT/OT recs: [address] [place] Notification (HEN): not initiated     Barriers to discharge: none    Plan/comments: Patient is independent at home. Patient was discharged last admission with [facility], however, patient was not  homebound so was not appropriate for home care. Patient and wife declining needing home care following this hospitalization. Encouraged them to please notify CM should they change their mind. They verbalized understanding. Please notify CM should any additional needs arise.      ECOC on chart for MD signature

## 2022-10-03 NOTE — PROGRESS NOTES
Hospitalist Progress Note      PCP: Waldo Hines DO    Date of Admission: 10/1/2022    Chief Complaint: Fever     Hospital Course: Reviewed H&P     Subjective: Patient seen and examined with wife at bedside. Reports feeling slightly better this morning. Occasional cough but denies any sputum production. Denies nausea vomiting . diarrhea. Noted blood cultures positive for Serratia -currently on empiric vancomycin/cefepime. Requested ID consultation to assist with management . -Reviewed oncology note-patient has history of multiple left-sided ultrasound-guided thoracentesis in the past last in January 2022 and was found to be malignant pleural effusion secondary to adenocarcinoma of the lung. Given no symptoms (patient currently saturating fine on room air would not consider repeat thoracentesis at this time) . We will follow clinically.   -Noted cardiology evaluated and recommended stress test -follow results.       Medications:  Reviewed    Infusion Medications    sodium chloride      dextrose      sodium chloride 50 mL/hr at 10/02/22 0338    heparin (PORCINE) Infusion 1,180 Units/hr (10/03/22 0835)     Scheduled Medications    [START ON 10/4/2022] cefepime  2,000 mg IntraVENous Q12H    insulin glargine  56 Units SubCUTAneous Nightly    losartan  100 mg Oral Daily    metoprolol succinate  25 mg Oral Daily    sodium chloride flush  5-40 mL IntraVENous 2 times per day    insulin lispro  0-4 Units SubCUTAneous TID WC    insulin lispro  0-4 Units SubCUTAneous Nightly    folic acid  1 mg Oral Daily    aspirin  81 mg Oral Daily    atorvastatin  40 mg Oral Nightly     PRN Meds: sodium chloride flush, sodium chloride, acetaminophen **OR** acetaminophen, glucose, dextrose bolus **OR** dextrose bolus, glucagon (rDNA), dextrose, heparin (porcine), heparin (porcine), perflutren lipid microspheres      Intake/Output Summary (Last 24 hours) at 10/3/2022 1500  Last data filed at 10/2/2022 2100  Gross per 24 hour Intake 158 ml   Output --   Net 158 ml       Physical Exam Performed:    /63   Pulse 86   Temp 98.2 °F (36.8 °C) (Oral)   Resp 16   Wt 196 lb 10.4 oz (89.2 kg)   SpO2 98%   BMI 29.04 kg/m²     General appearance: Elderly male in No apparent distress, appears stated age and cooperative. HEENT: Pupils equal, round, and reactive to light. Conjunctivae/corneas clear. Neck: Supple, with full range of motion. No jugular venous distention. Trachea midline. Respiratory:  Normal respiratory effort. Diminished breath sounds at bases  (L > R )  cardiovascular: Regular rate and rhythm with normal S1/S2 without murmurs, rubs or gallops. Abdomen: Soft, non-tender, non-distended with normal bowel sounds. Musculoskeletal: No clubbing, cyanosis or edema bilaterally. Full range of motion without deformity. Skin: Skin color, texture, turgor normal.  No rashes or lesions. Neurologic:  Neurovascularly intact without any focal sensory/motor deficits.  Cranial nerves: II-XII intact, grossly non-focal.  Psychiatric: Alert and oriented, thought content appropriate, normal insight  Capillary Refill: Brisk,< 3 seconds   Peripheral Pulses: +2 palpable, equal bilaterally       Labs:   Recent Labs     10/01/22  2156 10/03/22  0700   WBC 2.4* 1.2*   HGB 8.2* 7.2*   HCT 25.1* 21.4*   * 96*     Recent Labs     10/01/22  2156 10/02/22  0520 10/03/22  0700    133* 135*   K 3.6 3.6 3.7    101 102   CO2 24 23 24   BUN 17 16 12   CREATININE 1.0 0.8 0.9   CALCIUM 7.6* 7.3* 7.4*     Recent Labs     10/01/22  2156   AST 25   ALT 18   BILITOT 0.7   ALKPHOS 122     Recent Labs     10/02/22  0520   INR 1.15*     Recent Labs     10/01/22  2156 10/02/22  0256   TROPONINI 0.02* 0.11*       Urinalysis:    Lab Results   Component Value Date/Time    NITRU Negative 10/01/2022 11:27 PM    [address] 10/01/2022 11:27 PM    BACTERIA 2+ 08/23/2022 03:17 AM    RBCUA 11-20 10/01/2022 11:27 PM    BLOODU MODERATE 10/01/2022 11:27 PM SPECGRAV >=1.030 10/01/2022 11:27 PM    GLUCOSEU 100 10/01/2022 11:27 PM       Radiology:  CT CHEST PULMONARY EMBOLISM W CONTRAST   Final Result   No evidence of pulmonary embolism. Moderate size pleural effusion which may be loculated at the left lower   pleural cavity. Mild area of consolidation at the basal segment of the left   lower lobe which could represent atelectasis versus airspace disease process. Mild scattered patchy opacities which are nonspecific but could indicate an   infectious/inflammatory etiology. RECOMMENDATIONS:   Unavailable         XR CHEST PORTABLE   Final Result   Stable small left pleural effusion with adjacent left basilar opacity. NM Cardiac Stress Test Nuclear Imaging    (Results Pending)     Consults:  IP CONSULT TO HOSPITALIST  PHARMACY TO DOSE VANCOMYCIN  IP CONSULT TO CARDIOLOGY  IP CONSULT TO ONCOLOGY  IP CONSULT TO INFECTIOUS DISEASES      Active Hospital Problems    Diagnosis Date Noted    Hyperglycemia due to type 2 diabetes mellitus ([address] [E11.65] 10/02/2022     Priority: Medium    Neutropenia ([address] [D70.9] 10/02/2022     Priority: Medium    Cancer of right lung ([address] [C34.91] 10/02/2022     Priority: Medium    Elevated troponin [R77.8] 10/02/2022     Priority: Medium    Sepsis ([address] [A41.9] 08/23/2022     Priority: Medium    HTN (hypertension), benign [I10]      Assessment/Plan:  Left Lung Pneumonia/Sepsis  - Probably loculated pneumonia of LLL  - +SIRS with tachycardia, fever, leukopenia; source: lung  - Continue Vancocin and cefepime; pharmacy to dose Vancomycin  - Blood cultures positive for Serratia marcescens. - Lactic negative, no hypotension, no evidence of hypoperfusion at this time  -Patient noted to have left-sided malignant pleural effusion with history of thoracentesis in the past.  No need of any thoracentesis at this time given patient w/o hypoxia and saturating fine on room air.      Hyperglycemia/Type 2 DM  - Continue home dose of Lantus  - Low dose SSI AC/HS   - BG goal < 180 in the setting of sepsis     Elevated Troponin POA  - Troponin 0.02 -> 0.11  - Clinically equivocal, no active CP, overall symptoms global, more consistent with infectious process  - Given the  LLL PNA, possibly demand ischemia  - Started heparin gtt for presumed NSTEMI  - Consulted Cardiology -  evaluated and recommended NM stress test - Will follow       Right Lung NSCLC  - Consulted Oncology to follow, followed by Dr. Bull Leroy  - Last treatment Tuesday, 9/27/2022    Chemotherapy-induced pancytopenia  - Monitor CBC closely, particularly while on heparin infusion     Hematuria  - Noted on UA, chronic     DVT Prophylaxis: IV heparin GTT   Diet: Diet NPO   Code Status: Full Code    PT/OT Eval Status: Consulted    Dispo -likely 2 days pending clinical improvement       The note was completed using Dragon -speech recognition software & EMR  . Every effort was made to ensure accuracy; however, inadvertent computerized transcription errors may be present.     Elpidio De Leon MD

## 2022-10-04 ENCOUNTER — ANESTHESIA (OUTPATIENT)
Dept: OPERATING ROOM | Age: 77
DRG: 314 | End: 2022-10-04
Payer: MEDICARE

## 2022-10-04 ENCOUNTER — ANESTHESIA EVENT (OUTPATIENT)
Dept: OPERATING ROOM | Age: 77
DRG: 314 | End: 2022-10-04
Payer: MEDICARE

## 2022-10-04 PROBLEM — R78.81 GRAM-NEGATIVE BACTEREMIA: Status: ACTIVE | Noted: 2022-10-04

## 2022-10-04 LAB
ABO/RH: NORMAL
ANION GAP SERPL CALCULATED.3IONS-SCNC: 8 MMOL/L (ref 3–16)
ANISOCYTOSIS: ABNORMAL
ANTI-XA UNFRAC HEPARIN: <0.1 IU/ML (ref 0.3–0.7)
ANTI-XA UNFRAC HEPARIN: >1.1 IU/ML (ref 0.3–0.7)
ANTIBODY SCREEN: NORMAL
BANDED NEUTROPHILS RELATIVE PERCENT: 4 % (ref 0–7)
BASOPHILS ABSOLUTE: 0 K/UL (ref 0–0.2)
BASOPHILS RELATIVE PERCENT: 1 %
BLOOD BANK DISPENSE STATUS: NORMAL
BLOOD BANK PRODUCT CODE: NORMAL
BLOOD CULTURE, ROUTINE: ABNORMAL
BLOOD CULTURE, ROUTINE: ABNORMAL
BPU ID: NORMAL
BUN BLDV-MCNC: 12 MG/DL (ref 7–20)
CALCIUM SERPL-MCNC: 7.6 MG/DL (ref 8.3–10.6)
CHLORIDE BLD-SCNC: 103 MMOL/L (ref 99–110)
CO2: 25 MMOL/L (ref 21–32)
CREAT SERPL-MCNC: 0.7 MG/DL (ref 0.8–1.3)
CULTURE, BLOOD 2: ABNORMAL
DESCRIPTION BLOOD BANK: NORMAL
EOSINOPHILS ABSOLUTE: 0 K/UL (ref 0–0.6)
EOSINOPHILS RELATIVE PERCENT: 4 %
GFR AFRICAN AMERICAN: >60
GFR NON-AFRICAN AMERICAN: >60
GLUCOSE BLD-MCNC: 123 MG/DL (ref 70–99)
GLUCOSE BLD-MCNC: 221 MG/DL (ref 70–99)
GLUCOSE BLD-MCNC: 235 MG/DL (ref 70–99)
GLUCOSE BLD-MCNC: 56 MG/DL (ref 70–99)
GLUCOSE BLD-MCNC: 58 MG/DL (ref 70–99)
GLUCOSE BLD-MCNC: 63 MG/DL (ref 70–99)
GLUCOSE BLD-MCNC: 87 MG/DL (ref 70–99)
HCT VFR BLD CALC: 18.4 % (ref 40.5–52.5)
HCT VFR BLD CALC: 25.4 % (ref 40.5–52.5)
HEMATOLOGY PATH CONSULT: NO
HEMOGLOBIN: 6.3 G/DL (ref 13.5–17.5)
HEMOGLOBIN: 8.6 G/DL (ref 13.5–17.5)
LV EF: 55 %
LVEF MODALITY: NORMAL
LYMPHOCYTES ABSOLUTE: 0.2 K/UL (ref 1–5.1)
LYMPHOCYTES RELATIVE PERCENT: 23 %
MAGNESIUM: 1.7 MG/DL (ref 1.8–2.4)
MCH RBC QN AUTO: 28.5 PG (ref 26–34)
MCHC RBC AUTO-ENTMCNC: 34.3 G/DL (ref 31–36)
MCV RBC AUTO: 83.1 FL (ref 80–100)
MONOCYTES ABSOLUTE: 0.1 K/UL (ref 0–1.3)
MONOCYTES RELATIVE PERCENT: 13 %
NEUTROPHILS ABSOLUTE: 0.5 K/UL (ref 1.7–7.7)
NEUTROPHILS RELATIVE PERCENT: 55 %
ORGANISM: ABNORMAL
PDW BLD-RTO: 17.7 % (ref 12.4–15.4)
PERFORMED ON: ABNORMAL
PERFORMED ON: NORMAL
PLATELET # BLD: 76 K/UL (ref 135–450)
PLATELET SLIDE REVIEW: ABNORMAL
PMV BLD AUTO: 6.7 FL (ref 5–10.5)
POTASSIUM REFLEX MAGNESIUM: 3.1 MMOL/L (ref 3.5–5.1)
RBC # BLD: 2.21 M/UL (ref 4.2–5.9)
SLIDE REVIEW: ABNORMAL
SODIUM BLD-SCNC: 136 MMOL/L (ref 136–145)
WBC # BLD: 0.8 K/UL (ref 4–11)

## 2022-10-04 PROCEDURE — 6360000002 HC RX W HCPCS: Performed by: INTERNAL MEDICINE

## 2022-10-04 PROCEDURE — 6370000000 HC RX 637 (ALT 250 FOR IP): Performed by: NURSE PRACTITIONER

## 2022-10-04 PROCEDURE — 0JPV0WZ REMOVAL OF TOTALLY IMPLANTABLE VASCULAR ACCESS DEVICE FROM UPPER EXTREMITY SUBCUTANEOUS TISSUE AND FASCIA, OPEN APPROACH: ICD-10-PCS | Performed by: SURGERY

## 2022-10-04 PROCEDURE — 2580000003 HC RX 258: Performed by: INTERNAL MEDICINE

## 2022-10-04 PROCEDURE — 86900 BLOOD TYPING SEROLOGIC ABO: CPT

## 2022-10-04 PROCEDURE — 36590 REMOVAL TUNNELED CV CATH: CPT | Performed by: SURGERY

## 2022-10-04 PROCEDURE — 6370000000 HC RX 637 (ALT 250 FOR IP): Performed by: SURGERY

## 2022-10-04 PROCEDURE — 7100000011 HC PHASE II RECOVERY - ADDTL 15 MIN: Performed by: SURGERY

## 2022-10-04 PROCEDURE — 99232 SBSQ HOSP IP/OBS MODERATE 35: CPT | Performed by: INTERNAL MEDICINE

## 2022-10-04 PROCEDURE — 85025 COMPLETE CBC W/AUTO DIFF WBC: CPT

## 2022-10-04 PROCEDURE — 80048 BASIC METABOLIC PNL TOTAL CA: CPT

## 2022-10-04 PROCEDURE — 83735 ASSAY OF MAGNESIUM: CPT

## 2022-10-04 PROCEDURE — 2709999900 HC NON-CHARGEABLE SUPPLY: Performed by: SURGERY

## 2022-10-04 PROCEDURE — 87070 CULTURE OTHR SPECIMN AEROBIC: CPT

## 2022-10-04 PROCEDURE — 3600000012 HC SURGERY LEVEL 2 ADDTL 15MIN: Performed by: SURGERY

## 2022-10-04 PROCEDURE — C8929 TTE W OR WO FOL WCON,DOPPLER: HCPCS

## 2022-10-04 PROCEDURE — 6360000002 HC RX W HCPCS: Performed by: NURSE ANESTHETIST, CERTIFIED REGISTERED

## 2022-10-04 PROCEDURE — 7100000010 HC PHASE II RECOVERY - FIRST 15 MIN: Performed by: SURGERY

## 2022-10-04 PROCEDURE — 6370000000 HC RX 637 (ALT 250 FOR IP): Performed by: INTERNAL MEDICINE

## 2022-10-04 PROCEDURE — 2580000003 HC RX 258: Performed by: NURSE ANESTHETIST, CERTIFIED REGISTERED

## 2022-10-04 PROCEDURE — 3700000001 HC ADD 15 MINUTES (ANESTHESIA): Performed by: SURGERY

## 2022-10-04 PROCEDURE — 2580000003 HC RX 258: Performed by: NURSE PRACTITIONER

## 2022-10-04 PROCEDURE — 86850 RBC ANTIBODY SCREEN: CPT

## 2022-10-04 PROCEDURE — 3700000000 HC ANESTHESIA ATTENDED CARE: Performed by: SURGERY

## 2022-10-04 PROCEDURE — 86923 COMPATIBILITY TEST ELECTRIC: CPT

## 2022-10-04 PROCEDURE — 36415 COLL VENOUS BLD VENIPUNCTURE: CPT

## 2022-10-04 PROCEDURE — 3600000002 HC SURGERY LEVEL 2 BASE: Performed by: SURGERY

## 2022-10-04 PROCEDURE — 85014 HEMATOCRIT: CPT

## 2022-10-04 PROCEDURE — 2500000003 HC RX 250 WO HCPCS: Performed by: SURGERY

## 2022-10-04 PROCEDURE — 36430 TRANSFUSION BLD/BLD COMPNT: CPT

## 2022-10-04 PROCEDURE — 85018 HEMOGLOBIN: CPT

## 2022-10-04 PROCEDURE — 85520 HEPARIN ASSAY: CPT

## 2022-10-04 PROCEDURE — 99233 SBSQ HOSP IP/OBS HIGH 50: CPT | Performed by: NURSE PRACTITIONER

## 2022-10-04 PROCEDURE — 2500000003 HC RX 250 WO HCPCS: Performed by: NURSE ANESTHETIST, CERTIFIED REGISTERED

## 2022-10-04 PROCEDURE — 2580000003 HC RX 258: Performed by: ANESTHESIOLOGY

## 2022-10-04 PROCEDURE — 86901 BLOOD TYPING SEROLOGIC RH(D): CPT

## 2022-10-04 PROCEDURE — P9016 RBC LEUKOCYTES REDUCED: HCPCS

## 2022-10-04 PROCEDURE — 99221 1ST HOSP IP/OBS SF/LOW 40: CPT | Performed by: SURGERY

## 2022-10-04 PROCEDURE — 1200000000 HC SEMI PRIVATE

## 2022-10-04 RX ORDER — BUPIVACAINE HYDROCHLORIDE AND EPINEPHRINE 5; 5 MG/ML; UG/ML
INJECTION, SOLUTION PERINEURAL PRN
Status: DISCONTINUED | OUTPATIENT
Start: 2022-10-04 | End: 2022-10-04 | Stop reason: ALTCHOICE

## 2022-10-04 RX ORDER — ONDANSETRON 2 MG/ML
4 INJECTION INTRAMUSCULAR; INTRAVENOUS EVERY 30 MIN PRN
Status: DISCONTINUED | OUTPATIENT
Start: 2022-10-04 | End: 2022-10-05

## 2022-10-04 RX ORDER — MEPERIDINE HYDROCHLORIDE 50 MG/ML
12.5 INJECTION INTRAMUSCULAR; INTRAVENOUS; SUBCUTANEOUS EVERY 5 MIN PRN
Status: DISCONTINUED | OUTPATIENT
Start: 2022-10-04 | End: 2022-10-05

## 2022-10-04 RX ORDER — OXYCODONE HYDROCHLORIDE 5 MG/1
5 TABLET ORAL
Status: DISCONTINUED | OUTPATIENT
Start: 2022-10-04 | End: 2022-10-05

## 2022-10-04 RX ORDER — SODIUM CHLORIDE 9 MG/ML
INJECTION, SOLUTION INTRAVENOUS PRN
Status: DISCONTINUED | OUTPATIENT
Start: 2022-10-04 | End: 2022-10-06 | Stop reason: HOSPADM

## 2022-10-04 RX ORDER — DIPHENHYDRAMINE HYDROCHLORIDE 50 MG/ML
6.25 INJECTION INTRAMUSCULAR; INTRAVENOUS
Status: DISCONTINUED | OUTPATIENT
Start: 2022-10-04 | End: 2022-10-05

## 2022-10-04 RX ORDER — PROPOFOL 10 MG/ML
INJECTION, EMULSION INTRAVENOUS PRN
Status: DISCONTINUED | OUTPATIENT
Start: 2022-10-04 | End: 2022-10-04 | Stop reason: SDUPTHER

## 2022-10-04 RX ORDER — SODIUM CHLORIDE 0.9 % (FLUSH) 0.9 %
5-40 SYRINGE (ML) INJECTION EVERY 12 HOURS SCHEDULED
Status: DISCONTINUED | OUTPATIENT
Start: 2022-10-04 | End: 2022-10-06 | Stop reason: HOSPADM

## 2022-10-04 RX ORDER — HYDROMORPHONE HCL 110MG/55ML
0.5 PATIENT CONTROLLED ANALGESIA SYRINGE INTRAVENOUS EVERY 5 MIN PRN
Status: DISCONTINUED | OUTPATIENT
Start: 2022-10-04 | End: 2022-10-05

## 2022-10-04 RX ORDER — HYDROMORPHONE HCL 110MG/55ML
0.5 PATIENT CONTROLLED ANALGESIA SYRINGE INTRAVENOUS EVERY 10 MIN PRN
Status: DISCONTINUED | OUTPATIENT
Start: 2022-10-04 | End: 2022-10-05

## 2022-10-04 RX ORDER — MIDAZOLAM HYDROCHLORIDE 1 MG/ML
2 INJECTION INTRAMUSCULAR; INTRAVENOUS
Status: DISCONTINUED | OUTPATIENT
Start: 2022-10-04 | End: 2022-10-05

## 2022-10-04 RX ORDER — SODIUM CHLORIDE 9 MG/ML
25 INJECTION, SOLUTION INTRAVENOUS PRN
Status: DISCONTINUED | OUTPATIENT
Start: 2022-10-04 | End: 2022-10-06 | Stop reason: HOSPADM

## 2022-10-04 RX ORDER — METOPROLOL SUCCINATE 50 MG/1
50 TABLET, EXTENDED RELEASE ORAL DAILY
Status: DISCONTINUED | OUTPATIENT
Start: 2022-10-04 | End: 2022-10-06 | Stop reason: HOSPADM

## 2022-10-04 RX ORDER — SODIUM CHLORIDE 0.9 % (FLUSH) 0.9 %
5-40 SYRINGE (ML) INJECTION PRN
Status: DISCONTINUED | OUTPATIENT
Start: 2022-10-04 | End: 2022-10-05

## 2022-10-04 RX ORDER — AMLODIPINE BESYLATE 5 MG/1
5 TABLET ORAL DAILY
Status: DISCONTINUED | OUTPATIENT
Start: 2022-10-04 | End: 2022-10-06 | Stop reason: HOSPADM

## 2022-10-04 RX ORDER — SODIUM CHLORIDE, SODIUM LACTATE, POTASSIUM CHLORIDE, CALCIUM CHLORIDE 600; 310; 30; 20 MG/100ML; MG/100ML; MG/100ML; MG/100ML
INJECTION, SOLUTION INTRAVENOUS CONTINUOUS PRN
Status: DISCONTINUED | OUTPATIENT
Start: 2022-10-04 | End: 2022-10-04 | Stop reason: SDUPTHER

## 2022-10-04 RX ORDER — LIDOCAINE HYDROCHLORIDE 20 MG/ML
INJECTION, SOLUTION INFILTRATION; PERINEURAL PRN
Status: DISCONTINUED | OUTPATIENT
Start: 2022-10-04 | End: 2022-10-04 | Stop reason: SDUPTHER

## 2022-10-04 RX ORDER — OXYCODONE HYDROCHLORIDE 5 MG/1
5 TABLET ORAL EVERY 4 HOURS PRN
Status: DISCONTINUED | OUTPATIENT
Start: 2022-10-04 | End: 2022-10-06 | Stop reason: HOSPADM

## 2022-10-04 RX ADMIN — ATORVASTATIN CALCIUM 40 MG: 40 TABLET, FILM COATED ORAL at 21:18

## 2022-10-04 RX ADMIN — PROPOFOL 30 MG: 10 INJECTION, EMULSION INTRAVENOUS at 15:05

## 2022-10-04 RX ADMIN — HEPARIN SODIUM 1360 UNITS/HR: 10000 INJECTION, SOLUTION INTRAVENOUS at 04:17

## 2022-10-04 RX ADMIN — PROPOFOL 30 MG: 10 INJECTION, EMULSION INTRAVENOUS at 15:13

## 2022-10-04 RX ADMIN — FILGRASTIM-AAFI 480 MCG: 480 INJECTION, SOLUTION SUBCUTANEOUS at 10:07

## 2022-10-04 RX ADMIN — LOSARTAN POTASSIUM 100 MG: 100 TABLET, FILM COATED ORAL at 08:59

## 2022-10-04 RX ADMIN — CEFEPIME 2000 MG: 2 INJECTION, POWDER, FOR SOLUTION INTRAVENOUS at 01:24

## 2022-10-04 RX ADMIN — Medication 16 G: at 08:44

## 2022-10-04 RX ADMIN — SODIUM CHLORIDE: 9 INJECTION, SOLUTION INTRAVENOUS at 01:19

## 2022-10-04 RX ADMIN — FOLIC ACID 1 MG: 1 TABLET ORAL at 08:59

## 2022-10-04 RX ADMIN — CEFEPIME 2000 MG: 2 INJECTION, POWDER, FOR SOLUTION INTRAVENOUS at 14:03

## 2022-10-04 RX ADMIN — Medication 10 ML: at 21:22

## 2022-10-04 RX ADMIN — SODIUM CHLORIDE, SODIUM LACTATE, POTASSIUM CHLORIDE, AND CALCIUM CHLORIDE: .6; .31; .03; .02 INJECTION, SOLUTION INTRAVENOUS at 15:00

## 2022-10-04 RX ADMIN — LIDOCAINE HYDROCHLORIDE 60 MG: 20 INJECTION, SOLUTION INFILTRATION; PERINEURAL at 15:05

## 2022-10-04 RX ADMIN — INSULIN GLARGINE 56 UNITS: 100 INJECTION, SOLUTION SUBCUTANEOUS at 21:19

## 2022-10-04 RX ADMIN — ASPIRIN 81 MG: 81 TABLET, COATED ORAL at 08:59

## 2022-10-04 RX ADMIN — PROPOFOL 20 MG: 10 INJECTION, EMULSION INTRAVENOUS at 15:09

## 2022-10-04 RX ADMIN — AMLODIPINE BESYLATE 5 MG: 5 TABLET ORAL at 08:59

## 2022-10-04 RX ADMIN — METOPROLOL SUCCINATE 50 MG: 50 TABLET, EXTENDED RELEASE ORAL at 08:59

## 2022-10-04 ASSESSMENT — ENCOUNTER SYMPTOMS: SHORTNESS OF BREATH: 1

## 2022-10-04 ASSESSMENT — PAIN - FUNCTIONAL ASSESSMENT: PAIN_FUNCTIONAL_ASSESSMENT: 0-10

## 2022-10-04 NOTE — ANESTHESIA PRE PROCEDURE
Department of Anesthesiology  Preprocedure Note       Name:  Dolly Coker   Age:  65 y.o.  :  1945                                          MRN:  [id number]         Date:  10/4/2022      Surgeon: Erica Rodriguez):  Erik Shea MD    Procedure: Procedure(s):  PORT REMOVAL    Medications prior to admission:   Prior to Admission medications    Medication Sig Start Date End Date Taking?  Authorizing Provider   tamsulosin (FLOMAX) 0.4 MG capsule Take 0.4 mg by mouth daily   Yes Historical Provider, MD   folic acid (FOLVITE) 1 MG tablet Take 1 mg by mouth daily    Historical Provider, MD   metoprolol succinate (TOPROL XL) 25 MG extended release tablet Take 1 tablet by mouth once daily 21   Idania Andrea MD   atorvastatin (LIPITOR) 20 MG tablet Take 20 mg by mouth daily     Historical Provider, MD   losartan (COZAAR) 100 MG tablet Take 100 mg by mouth daily    Historical Provider, MD   Multiple Vitamins-Minerals (THERAPEUTIC MULTIVITAMIN-MINERALS) tablet Take 1 tablet by mouth daily    Historical Provider, MD   vitamin D3 (CHOLECALCIFEROL) 10 MCG (400 UNIT) TABS tablet Take 400 Units by mouth daily    Historical Provider, MD   insulin aspart (NOVOLOG) 100 UNIT/ML injection vial Inject into the skin 3 times daily (before meals) Sliding scale    Historical Provider, MD   Insulin Glargine (TOUJEO SOLOSTAR SC) Inject 70 Units into the skin nightly     Historical Provider, MD       Current medications:    Current Facility-Administered Medications   Medication Dose Route Frequency Provider Last Rate Last Admin    filgrastim-aafi (NIVESTYM) injection 480 mcg  480 mcg SubCUTAneous Daily Brianne Proctor MD   480 mcg at 10/04/22 1007    metoprolol succinate (TOPROL XL) extended release tablet 50 mg  50 mg Oral Daily Rubina Nik, APRN - CNP   50 mg at 10/04/22 0859    amLODIPine (NORVASC) tablet 5 mg  5 mg Oral Daily Rubina Nik, APRN - CNP   5 mg at 10/04/22 0859    0.9 % sodium chloride mg at 10/04/22 0859    aspirin EC tablet 81 mg  81 mg Oral Daily Blanca Posey MD   81 mg at 10/04/22 7527    atorvastatin (LIPITOR) tablet 40 mg  40 mg Oral Nightly Blanca Posey MD   40 mg at 10/03/22 2052    perflutren lipid microspheres (DEFINITY) injection 1.65 mg  1.5 mL IntraVENous ONCE PRN Blanca Posey MD           Allergies:  No Known Allergies    Problem List:    Patient Active Problem List   Diagnosis Code    Transient global amnesia G45.4    Acute encephalopathy G93.40    Hemispheric carotid artery syndrome G45.1    Uncontrolled type 2 diabetes mellitus with complication, without long-term current use of insulin [id number]    HTN (hypertension), benign I10    Dyslipidemia E78.5    Urolithiasis N20.9    SOB (shortness of breath) R06.02    Left renal stone N20.0    Lung cancer ([facility] C34.90    Shortness of breath R06.02    Gram-negative pneumonia (HCC) J15.6    Sepsis ([facility] A41.9    Hyperglycemia due to type 2 diabetes mellitus ([address] E11.65    Neutropenia (HCC) D70.9    Cancer of right lung (HCC) C34.91    Elevated troponin R77.8    Immunocompromised state due to drug therapy ([address] D84.821, Z79.899    Pancytopenia ([address] D61.818    Gram-negative bacteremia R78.81       Past Medical History:        Diagnosis Date    Cancer ([facility]     prostate    Diabetes mellitus ([facility]     Hyperlipidemia     Hypertension     Kidney stone     Lung cancer ([facility] 10/02/2021    Stage 4    Lung cancer ([facility]        Past Surgical History:        Procedure Laterality Date    BACK SURGERY      lumber, no hardware, \"cleaned it out\"    CYSTOSCOPY Right 2020    CYSTOSCOPY,  RIGHT URETEROSCOPY, RIGHT RETRO PYELOGRAM, REMOVAL STONE FROM BLADDER, URETHRAL DILITATION performed by Moses Herman MD at [facility] NEPHROLITHOTOMY Left 2020    LEFT PERCUTANEOUS NEPHROLITHOTOMY WITH DR Heriberto Burch TO PLACE NEPHROSTOMY TUBE PRIOR performed by Milo Morgan MD at [facility] PORT SURGERY N/A 2021    SURGICAL PORT PLACEMENT performed by Michelle Tobar MD at [address] Right     ROTATOR CUFF       Social History:    Social History     Tobacco Use    Smoking status: Former     Types: Cigarettes     Start date: 3/22/1960     Quit date: 1976     Years since quittin.8    Smokeless tobacco: Never   Substance Use Topics    Alcohol use: No                                Counseling given: Not Answered      Vital Signs (Current):   Vitals:    10/04/22 0512 10/04/22 0745 10/04/22 1222 10/04/22 1245   BP:  (!) 159/65 (!) 118/50 (!) 134/57   Pulse:  83 75 77   Resp:  18 16 16   Temp:  97.9 °F (36.6 °C) 98.2 °F (36.8 °C) 97.7 °F (36.5 °C)   TempSrc:  Oral Oral Oral   SpO2:  96% 99% 100%   Weight: 194 lb 12.8 oz (88.4 kg)                                                 BP Readings from Last 3 Encounters:   10/04/22 (!) 134/57   22 (!) 131/56   22 (!) 150/58       NPO Status:                                                                                 BMI:   Wt Readings from Last 3 Encounters:   10/04/22 194 lb 12.8 oz (88.4 kg)   22 197 lb (89.4 kg)   22 200 lb (90.7 kg)     Body mass index is 28.77 kg/m².     CBC:   Lab Results   Component Value Date/Time    WBC 0.8 10/04/2022 05:50 AM    RBC 2.21 10/04/2022 05:50 AM    HGB 6.3 10/04/2022 05:50 AM    HCT 18.4 10/04/2022 05:50 AM    MCV 83.1 10/04/2022 05:50 AM    RDW 17.7 10/04/2022 05:50 AM    PLT 76 10/04/2022 05:50 AM       CMP:   Lab Results   Component Value Date/Time     10/04/2022 05:50 AM    K 3.1 10/04/2022 05:50 AM     10/04/2022 05:50 AM    CO2 25 10/04/2022 05:50 AM    BUN 12 10/04/2022 05:50 AM    CREATININE 0.7 10/04/2022 05:50 AM    GFRAA >60 10/04/2022 05:50 AM    AGRATIO 1.1 10/01/2022 09:56 PM    LABGLOM >60 10/04/2022 05:50 AM    GLUCOSE 63 10/04/2022 05:50 AM    PROT 5.7 10/01/2022 09:56 PM    CALCIUM 7.6 10/04/2022 05:50 AM BILITOT 0.7 10/01/2022 09:56 PM    ALKPHOS 122 10/01/2022 09:56 PM    AST 25 10/01/2022 09:56 PM    ALT 18 10/01/2022 09:56 PM       POC Tests:   Recent Labs     10/04/22  1149   POCGLU 235*       Coags:   Lab Results   Component Value Date/Time    PROTIME 14.6 10/02/2022 05:20 AM    INR 1.15 10/02/2022 05:20 AM    APTT 35.3 10/02/2022 05:20 AM       HCG (If Applicable): No results found for: PREGTESTUR, PREGSERUM, HCG, HCGQUANT     ABGs: No results found for: PHART, PO2ART, SQL7TFN, KGK3LRE, BEART, S8TQJNBM     Type & Screen (If Applicable):  No results found for: LABABO, LABRH    Drug/Infectious Status (If Applicable):  No results found for: HIV, HEPCAB    COVID-19 Screening (If Applicable):   Lab Results   Component Value Date/Time    COVID19 NOT DETECTED 10/01/2022 09:56 PM    COVID19 Not Detected 10/29/2021 11:44 AM           Anesthesia Evaluation  Patient summary reviewed and Nursing notes reviewed no history of anesthetic complications:   Airway: Mallampati: II     Neck ROM: full     Dental:          Pulmonary:   (+) pneumonia:  shortness of breath:                             Cardiovascular:    (+) hypertension:,                   Neuro/Psych:   (+) TIA,             GI/Hepatic/Renal:             Endo/Other:    (+) Diabetes, . Abdominal:             Vascular: Other Findings:           Anesthesia Plan      MAC     ASA 4     (Medications & allergies reviewed  All available lab & EKG data reviewed)  Induction: intravenous. Anesthetic plan and risks discussed with patient. Plan discussed with CRNA.                     Tg Coleman MD   10/4/2022

## 2022-10-04 NOTE — PROGRESS NOTES
Hospitalist Progress Note      PCP: Juan Pablo Gan DO    Date of Admission: 10/1/2022    Chief Complaint: Fever     Hospital Course: Reviewed H&P     Subjective: Patient seen and examined with wife at bedside. Reports feeling slightly better this morning. Occasional cough but denies any sputum production. Denies nausea vomiting . diarrhea. Noted ID recs to DC Port . Oncology consulted General surgery for port removal . blood cultures positive for Serratia -currently on empiric vancomycin/cefepime. ID recommended  to  Continue cefepime and dose reduced . Noted oncology started on Neupogen since 10/3/2022. Pancytopenia worsened this morning with hemoglobin 6.7. Ordered 1 unit of PRBC transfusion.  -Reviewed oncology note-patient has history of multiple left-sided ultrasound-guided thoracentesis in the past last in January 2022 and was found to be malignant pleural effusion secondary to adenocarcinoma of the lung. Given no symptoms (patient currently saturating fine on room air would not consider repeat thoracentesis at this time) . We will follow clinically. -Reviewed stress test results -reported abnormal with moderate basal to mid inferior ischemia extending into basal lateral wall. Will defer further evaluation/management to cardiology. Noted echocardiogram order will follow.       Medications:  Reviewed    Infusion Medications    sodium chloride      sodium chloride      dextrose      sodium chloride 50 mL/hr at 10/04/22 0749     Scheduled Medications    filgrastim-aafi  480 mcg SubCUTAneous Daily    metoprolol succinate  50 mg Oral Daily    amLODIPine  5 mg Oral Daily    cefepime  2,000 mg IntraVENous Q12H    insulin glargine  56 Units SubCUTAneous Nightly    losartan  100 mg Oral Daily    sodium chloride flush  5-40 mL IntraVENous 2 times per day    insulin lispro  0-4 Units SubCUTAneous TID WC    insulin lispro  0-4 Units SubCUTAneous Nightly    folic acid  1 mg Oral Daily    aspirin  81 mg Oral Daily    atorvastatin  40 mg Oral Nightly     PRN Meds: sodium chloride, sodium chloride flush, sodium chloride, acetaminophen **OR** acetaminophen, glucose, dextrose bolus **OR** dextrose bolus, glucagon (rDNA), dextrose, perflutren lipid microspheres      Intake/Output Summary (Last 24 hours) at 10/4/2022 1600  Last data filed at 10/4/2022 1525  Gross per 24 hour   Intake 1458.38 ml   Output --   Net 1458.38 ml         Physical Exam Performed:    BP (!) 109/50   Pulse 76   Temp 98.1 °F (36.7 °C) (Temporal)   Resp 15   Wt 194 lb 12.8 oz (88.4 kg)   SpO2 98%   BMI 28.77 kg/m²     General appearance: Elderly male in No apparent distress, appears stated age and cooperative. HEENT: Pupils equal, round, and reactive to light. Conjunctivae/corneas clear. Neck: Supple, with full range of motion. No jugular venous distention. Trachea midline. Respiratory:  Normal respiratory effort. Diminished breath sounds at bases  (L > R )  cardiovascular: Regular rate and rhythm with normal S1/S2 without murmurs, rubs or gallops. Abdomen: Soft, non-tender, non-distended with normal bowel sounds. Musculoskeletal: No clubbing, cyanosis or edema bilaterally. Full range of motion without deformity. Skin: Skin color, texture, turgor normal.  No rashes or lesions. Neurologic:  Neurovascularly intact without any focal sensory/motor deficits.  Cranial nerves: II-XII intact, grossly non-focal.  Psychiatric: Alert and oriented, thought content appropriate, normal insight  Capillary Refill: Brisk,< 3 seconds   Peripheral Pulses: +2 palpable, equal bilaterally       Labs:   Recent Labs     10/01/22  2156 10/03/22  0700 10/04/22  0550   WBC 2.4* 1.2* 0.8*   HGB 8.2* 7.2* 6.3*   HCT 25.1* 21.4* 18.4*   * 96* 76*       Recent Labs     10/02/22  0520 10/03/22  0700 10/04/22  0550   * 135* 136   K 3.6 3.7 3.1*    102 103   CO2 23 24 25   BUN 16 12 12   CREATININE 0.8 0.9 0.7*   CALCIUM 7.3* 7.4* 7.6* Recent Labs     10/01/22  2156   AST 25   ALT 18   BILITOT 0.7   ALKPHOS 122       Recent Labs     10/02/22  0520   INR 1.15*       Recent Labs     10/01/22  2156 10/02/22  0256   TROPONINI 0.02* 0.11*         Urinalysis:    Lab Results   Component Value Date/Time    NITRU Negative 10/01/2022 11:27 PM    [address] 10/01/2022 11:27 PM    BACTERIA 2+ 08/23/2022 03:17 AM    RBCUA 11-20 10/01/2022 11:27 PM    BLOODU MODERATE 10/01/2022 11:27 PM    SPECGRAV >=1.030 10/01/2022 11:27 PM    GLUCOSEU 100 10/01/2022 11:27 PM       Radiology:  NM Cardiac Stress Test Nuclear Imaging   Final Result      CT CHEST PULMONARY EMBOLISM W CONTRAST   Final Result   No evidence of pulmonary embolism. Moderate size pleural effusion which may be loculated at the left lower   pleural cavity. Mild area of consolidation at the basal segment of the left   lower lobe which could represent atelectasis versus airspace disease process. Mild scattered patchy opacities which are nonspecific but could indicate an   infectious/inflammatory etiology. RECOMMENDATIONS:   Unavailable         XR CHEST PORTABLE   Final Result   Stable small left pleural effusion with adjacent left basilar opacity.            Consults:  IP CONSULT TO HOSPITALIST  PHARMACY TO DOSE VANCOMYCIN  IP CONSULT TO CARDIOLOGY  IP CONSULT TO ONCOLOGY  IP CONSULT TO INFECTIOUS DISEASES  IP CONSULT TO [address] [place] [address] Drive Problems    Diagnosis Date Noted    Gram-negative bacteremia [R78.81] 10/04/2022     Priority: Medium    Immunocompromised state due to drug therapy ([address] [H91.564, Z79.899] 10/03/2022     Priority: Medium    Pancytopenia ([address] [D61.818] 10/03/2022     Priority: Medium    Hyperglycemia due to type 2 diabetes mellitus ([address] [E11.65] 10/02/2022     Priority: Medium    Neutropenia ([address] [D70.9] 10/02/2022     Priority: Medium    Cancer of right lung ([address] [C34.91] 10/02/2022     Priority: Medium    Elevated troponin [R77.8] 10/02/2022 Priority: Medium    Sepsis ([facility] [A41.9] 08/23/2022     Priority: Medium    HTN (hypertension), benign [I10]      Assessment/Plan:  Left Lung Pneumonia/Sepsis  - Probably loculated pneumonia of LLL  - +SIRS with tachycardia, fever, leukopenia; source: lung  - Continue Vancocin and cefepime; pharmacy to dose Vancomycin  - Blood cultures positive for Serratia marcescens. ID consulted 10/3/22 -recommended port removal and advised to continue cefepime (at decreased dose) ; DC'd vancomycin  - Lactic negative, no hypotension, no evidence of hypoperfusion at this time  -Patient noted to have left-sided malignant pleural effusion with history of thoracentesis in the past.  No need of any thoracentesis at this time given patient w/o hypoxia and saturating fine on room air.  - GS consulted for port removal and patient kept NPO     Hyperglycemia/Type 2 DM  - Continue home dose of Lantus  - Low dose SSI AC/HS   - BG goal < 180 in the setting of sepsis     Elevated Troponin POA  - Troponin 0.02 -> 0.11  - Clinically equivocal, no active CP, overall symptoms global, more consistent with infectious process  - Given the  LLL PNA, possibly demand ischemia  - Started heparin gtt for presumed NSTEMI  - Consulted Cardiology -  evaluated and recommended NM stress test - Will follow       Right Lung NSCLC  - Consulted Oncology to follow, followed by Dr. Les Sharpe  - Last treatment Tuesday, 9/27/2022    Chemotherapy-induced pancytopenia  -   Started on Neupogen by oncology on 10/3/2022. Monitored CBC closely . Continue neutropenic precautions given  at this time. Hematuria  - Noted on UA, chronic . Improved. DVT Prophylaxis: IV heparin GTT   Diet: Diet NPO   Code Status: Full Code    PT/OT Eval Status: Consulted    Dispo -likely 2 days pending clinical improvement       The note was completed using Dragon -speech recognition software & EMR  .  Every effort was made to ensure accuracy; however, inadvertent computerized transcription errors may be present.     Neil Donis MD

## 2022-10-04 NOTE — PROGRESS NOTES
Infectious Disease Follow up Notes    CC :  Mey CLABSI     Antibiotics:   Cefepime 2g q12    Admit Date:   10/1/2022  Hospital Day: 4    Subjective:   He remains AF   Resting, says he feels ok today. No focal complaints. Coughing infrequently and not more than typical.  No / complaints. Getting unit of blood via PIV.       Objective:     Patient Vitals for the past 8 hrs:   BP Temp Temp src Pulse Resp SpO2   10/04/22 1245 (!) 134/57 97.7 °F (36.5 °C) Oral 77 16 100 %   10/04/22 1222 (!) 118/50 98.2 °F (36.8 °C) Oral 75 16 99 %   10/04/22 0745 (!) 159/65 97.9 °F (36.6 °C) Oral 83 18 96 %       EXAM:  General:   alert, oriented, NAD    HEENT:   NCAT, PERRL, sclera anicteric  NECK:   Supple, symmetrical   LUNGS:   CTA erica upper lobes  CV:   RRR without murmur   ABD:   soft, flat, NT   EXT: No focal rash   No stigmata of emboli        LINE:    Port R chest   PIV        Scheduled Meds:   filgrastim-aafi  480 mcg SubCUTAneous Daily    metoprolol succinate  50 mg Oral Daily    amLODIPine  5 mg Oral Daily    cefepime  2,000 mg IntraVENous Q12H    insulin glargine  56 Units SubCUTAneous Nightly    losartan  100 mg Oral Daily    sodium chloride flush  5-40 mL IntraVENous 2 times per day    insulin lispro  0-4 Units SubCUTAneous TID WC    insulin lispro  0-4 Units SubCUTAneous Nightly    folic acid  1 mg Oral Daily    aspirin  81 mg Oral Daily    atorvastatin  40 mg Oral Nightly       Continuous Infusions:   sodium chloride      sodium chloride      dextrose      sodium chloride 50 mL/hr at 10/04/22 0749          Data Review:    Lab Results   Component Value Date    WBC 0.8 (LL) 10/04/2022    HGB 6.3 (LL) 10/04/2022    HCT 18.4 (LL) 10/04/2022    MCV 83.1 10/04/2022    PLT 76 (L) 10/04/2022     Lab Results   Component Value Date    CREATININE 0.7 (L) 10/04/2022    BUN 12 10/04/2022     10/04/2022    K 3.1 (L) 10/04/2022     10/04/2022    CO2 25 10/04/2022       Hepatic Function Panel:   Lab Results   Component Value Date/Time    ALKPHOS 122 10/01/2022 09:56 PM    ALT 18 10/01/2022 09:56 PM    AST 25 10/01/2022 09:56 PM    PROT 5.7 10/01/2022 09:56 PM    BILITOT 0.7 10/01/2022 09:56 PM    LABALBU 3.0 10/01/2022 09:56 PM       Cultures:   8/22     BC x2 w Serratia marcescens R only to cefazolin   10/1     BC x2 S marcescens collected form port, same sensitivity profile   10/2     BC x1 NGTD, collected peripheral site         Radiology Review:  All pertinent images / reports were reviewed as a part of this visit. CT chest 10/2/22  Impression   No evidence of pulmonary embolism. Moderate size pleural effusion which may be loculated at the left lower   pleural cavity. Mild area of consolidation at the basal segment of the left   lower lobe which could represent atelectasis versus airspace disease process. Mild scattered patchy opacities which are nonspecific but could indicate an   infectious/inflammatory etiology.         Assessment:     Patient Active Problem List    Diagnosis Date Noted    Immunocompromised state due to drug therapy ([address] 10/03/2022     Priority: Medium    Pancytopenia ([address] 10/03/2022     Priority: Medium    Hyperglycemia due to type 2 diabetes mellitus ([address] 10/02/2022     Priority: Medium    Neutropenia ([address] 10/02/2022     Priority: Medium    Cancer of right lung ([address] 10/02/2022     Priority: Medium    Elevated troponin 10/02/2022     Priority: Medium    Gram-negative pneumonia ([address] 08/23/2022     Priority: Medium    Sepsis ([address] 08/23/2022     Priority: Medium    Shortness of breath 01/22/2022    Lung cancer ([address] 10/02/2021     Overview Note:     Stage 4      Left renal stone 12/04/2020    SOB (shortness of breath) 11/16/2020    Urolithiasis 11/07/2020    Acute encephalopathy     Hemispheric carotid artery syndrome     Uncontrolled type 2 diabetes mellitus with complication, without long-term current use of insulin     HTN (hypertension), benign     Dyslipidemia     Transient global amnesia 02/11/2018       Stage IV lung cancer treated with pemetrexed last given 9/27/22     Admitted 10/1/22 with acute febrile illness   Found to have relapsed Serratia bacteremia, presumed CLBASI   Same problem back in 8/2022 treated with short course IV/po abx   Lacking focal symptoms to suggest an alternative primary or metastatic sites of infection   Repeat BC collected peripherally is negative to date  Echo completed, pending   For port removal      Evolving neutropenia from antineoplastic therapy   Getting G-CSF      L pleural effusion, chronic, malignant      NKDA      Plan:     IV cefepime for now   Repeat BC in AM  Port removal planned for later today    Anticipate home with po cipro to complete the course of abx for this infection    Port replacement to be delayed pending completion of abx and resolution of infection       Discussed with patient/family, all questions answered        Easter Cogan, MD  Phone: [phone]   Fax : [phone]

## 2022-10-04 NOTE — ACP (ADVANCE CARE PLANNING)
Advanced Care Planning Note. Purpose of Encounter: Advanced care planning in light of left lung pneumonia with sepsis secondary to Serratia bacteremia, elevated troponin, right lung non-small cell lung cancer with recurrent malignant left pleural effusion, chemotherapy induced pancytopenia. Parties in attendance:   Patient Leigh Ann Tony), Dr. Eddi Carmona  (myself) ; Wife Amelia Mast   Decisional Capacity: Yes     Subjective/Patient Story: Patient/Family understands that his medical condition continues to deteriorate. She/he no longer wishes further curative interventions, including hospitalization, if there is no long term benefit :No  Patient/Family wishes to continue further interventions, patient will re-evaluate at a later time: Yes     Subjective and Objective Medical history :   Patient seen and examined   General appearance: Elderly male in No apparent distress, appears stated age and cooperative. HEENT: Pupils equal, round, and reactive to light. Conjunctivae/corneas clear. Neck: Supple, with full range of motion. No jugular venous distention. Trachea midline. Respiratory:  Normal respiratory effort. Diminished breath sounds at bases  (L > R )  cardiovascular: Regular rate and rhythm with normal S1/S2 without murmurs, rubs or gallops. Abdomen: Soft, non-tender, non-distended with normal bowel sounds. Musculoskeletal: No clubbing, cyanosis or edema bilaterally. Full range of motion without deformity. Skin: Skin color, texture, turgor normal.  No rashes or lesions. Neurologic:  Neurovascularly intact without any focal sensory/motor deficits.  Cranial nerves: II-XII intact, grossly non-focal. Forgetful at times   Psychiatric: Alert and oriented, thought content appropriate, normal insight  Capillary Refill: Brisk,< 3 seconds   Peripheral Pulses: +2 palpable, equal bilaterally     The patient has appointed the following active healthcare agents:    Primary Decision MakerTariq Clements - Spouse - [phone]    The Patient has the following current code status:    Code Status: Full Code    Visit Documentation:  I discussed Advance Care Planning with Rena Mcgill today which included the importance of making their choices for care and treatment in the case of a health event that adversely affects their decision-making abilities. He has not completed the Advance Care Directives. He has an active health care agent at this time. Rena Mcgill was encouraged to complete the declaration forms and provide a signed copy of his medical records. I advised patient we would continue this discussion at future visits. Goals of Care Determinations: Patient/Family wishes to focus on aggressive care including CPR, intubation, chest compressions, defibrillation and IV medications in the event of cardiac arrest.  Patient's wife Erin Morton stated that she would like to make some changes but not at the current time. Plan:  As above     Code Status: At this time patient/Family  wishes to be full code. Time Spent on Advanced Planning Documents: 16 mins. The following items were considered in medical decision making:  Independent review of images , Review / order clinical lab tests. Review / order radiology tests, Decision to obtain old records. Advanced Care Planning Documents: Completed advances directives, advanced directives in chart.       Severo Shackleton, MD  10/3/2022

## 2022-10-04 NOTE — PROGRESS NOTES
[facility]   Daily Progress Note      Admit Date:  10/1/2022    Reason for follow up visit: Elevated troponin    CC: \"I'm doing okay. I have some SOB at times, but that isn't new. \"    66 y/o male with PMH significant for [place] lung cancer, HTN, hyperlipidemia and diabetes mellitus who was admitted to [facility] after presenting with fever, fatigue and SOB. He is currently undergoing chemo for lung CA. He was noted to have an elevated troponin and cardiology asked to evaluate. Interval History:  Pt. seen and examined; records reviewed  BP elevated at times  Denies chest pain. Denies SOB this AM  Heparin dc'd this AM  Stress Myoview reviewed with pt and his wife  Echo still pending  Port to be removed today    Subjective:  Pt with no acute overnight cardiac events. Denies chest pain, cough, palpitations or dizziness  + chronic SOB    Review of Systems:   Constitutional: no unanticipated weight loss. There's been no change in energy level, sleep pattern, or activity level. No fevers, chills. Eyes: No visual changes or diplopia. No scleral icterus. ENT: No Headaches, hearing loss or vertigo. No mouth sores or sore throat. Cardiovascular: as reviewed in HPI  Respiratory: No cough or wheezing, no sputum production. No hematemesis. Gastrointestinal: No abdominal pain, appetite loss, blood in stools. No change in bowel or bladder habits. Genitourinary: No dysuria, trouble voiding, or hematuria. Musculoskeletal:  No gait disturbance, no joint complaints. Integumentary: No rash or pruritis. Neurological: No headache, diplopia, change in muscle strength, numbness or tingling. Psychiatric: No anxiety or depression. Endocrine: No temperature intolerance. No excessive thirst, fluid intake, or urination. No tremor. Hematologic/Lymphatic: No abnormal bruising or bleeding, blood clots or swollen lymph nodes. Allergic/Immunologic: No nasal congestion or hives.     Objective:   BP (!) 159/65   Pulse 83 Temp 97.9 °F (36.6 °C) (Oral)   Resp 18   Wt 194 lb 12.8 oz (88.4 kg)   SpO2 96%   BMI 28.77 kg/m²     Intake/Output Summary (Last 24 hours) at 10/4/2022 0812  Last data filed at 10/4/2022 0749  Gross per 24 hour   Intake 1258.38 ml   Output --   Net 1258.38 ml     Wt Readings from Last 3 Encounters:   10/04/22 194 lb 12.8 oz (88.4 kg)   08/25/22 197 lb (89.4 kg)   03/01/22 200 lb (90.7 kg)       Physical Exam:  General: In no acute distress. Awake, alert, and oriented X4. Sitting on side of bed  Skin:  Warm and dry. No new appearing rashes or lesions. Neck:  Supple. No JVD or carotid bruit appreciated. Chest: Lungs with decreased breath sounds in L posterior base. No wheezes/rhonchi/rales  Cardiovascular:  RRR. Normal S1 and S2. No murmur/gallop or rub   Abdomen:  soft, nontender, nondistended, +bowel sounds. No hepatomegaly  Extremities:  No LE edema. No clubbing or cyanosis. 2+ bilateral radial/carotid/DP/PT pulses; cap refill brisk.      Medications:    filgrastim-aafi  480 mcg SubCUTAneous Daily    cefepime  2,000 mg IntraVENous Q12H    insulin glargine  56 Units SubCUTAneous Nightly    losartan  100 mg Oral Daily    metoprolol succinate  25 mg Oral Daily    sodium chloride flush  5-40 mL IntraVENous 2 times per day    insulin lispro  0-4 Units SubCUTAneous TID WC    insulin lispro  0-4 Units SubCUTAneous Nightly    folic acid  1 mg Oral Daily    aspirin  81 mg Oral Daily    atorvastatin  40 mg Oral Nightly      sodium chloride      dextrose      sodium chloride 50 mL/hr at 10/04/22 0749     sodium chloride flush, sodium chloride, acetaminophen **OR** acetaminophen, glucose, dextrose bolus **OR** dextrose bolus, glucagon (rDNA), dextrose, heparin (porcine), heparin (porcine), perflutren lipid microspheres    Lab Data:  CBC:   Recent Labs     10/01/22  2156 10/03/22  0700 10/04/22  0550   WBC 2.4* 1.2* 0.8*   HGB 8.2* 7.2* 6.3*   * 96* 76*     BMP:    Recent Labs     10/02/22  0520 10/03/22  0700 10/04/22  0550   * 135* 136   K 3.6 3.7 3.1*   CO2 23 24 25   BUN 16 12 12   CREATININE 0.8 0.9 0.7*     LIVR:   Recent Labs     10/01/22  2156   AST 25   ALT 18     INR:    Recent Labs     10/02/22  0520   INR 1.15*     APTT:   Recent Labs     10/02/22  0520   APTT 35.3*       10/4/2022 Echo:    10/2/2022 Lexiscan-Myoview:  Normal LVEF >60%    Normal wall motion    Moderate base to mid inferior ischemia, extends to basal lateral wall        Overall, this would be considered an abnormal, intermediate risk, study         10/1/2022 CTPA:  No evidence of pulmonary embolism. Moderate size pleural effusion which may be loculated at the left lower   pleural cavity. Mild area of consolidation at the basal segment of the left   lower lobe which could represent atelectasis versus airspace disease process. Mild scattered patchy opacities which are nonspecific but could indicate an   infectious/inflammatory etiology. Echo 11/25/2020:   Technically difficult examination due to poor apical windows. Left ventricular systolic function is normal with a visually estimated   ejection fraction of 55%. The left ventricle is normal in size with mild septal hypertrophy. No regional wall motion abnormalities are noted. Normal left ventricular diastolic function. Mild bi-atrial enlargement. Mild mitral regurgitation. The IVC is dilated in size (>2.1 cm) but collapses >50% with respiration   consistent with elevated right atrial pressures (8 mmHg) .      Telemetry: Sinus rhythm with PAC's  (Personally reviewed)    Assessment/Plan:    1) Elevated troponin  -c/w  NSTEMI  -has H/O elevated troponin  -inferior ischemia on Myoview: given his multiple comorbidities will continue with conservative medical management (d/W Dr. Ricki Cranker)  -echo pending  -nothing to suggest angina  -continue ASA,  statin and BB     2) Fever  -resolved  -ID now following  -plan to remove Port today (site of infection)     3) Hypertension  -needs tighter control  -goal BP < 130/80  -continue medical management with adjustments     4) Anemia  -pancytopenia  -Rx per Primary team and Hem-onc     5) Hyperlipidemia  -continue statin     6) [place] lung CA-stage IV  -undergoing chemo    Lengthy discussion with Mr. Teo Santoyo and his wife regarding his stress test results and increased risk for invasive evaluation given his thrombocytopenia, lung CA, etc. Will continue with medical management.       Electronically signed by PUSHPA Mitchell CNP on 10/4/2022 at 8:12 AM

## 2022-10-04 NOTE — PROGRESS NOTES
Patient arrived in PACU at this time and placed on monitor. Report received from [id number]  and Joleen. Will continue to monitor. O2 at  2L nasal cannula.

## 2022-10-04 NOTE — ONCOLOGY
ONCOLOGY HEMATOLOGY CARE PROGRESS NOTE      SUBJECTIVE:     Afebrile and on room air. ROS:   The remaining 10 point review of symptoms is unremarkable. OBJECTIVE        Physical    VITALS:  BP (!) 150/72   Pulse 84   Temp 97.9 °F (36.6 °C) (Oral)   Resp 20   Wt 194 lb 12.8 oz (88.4 kg)   SpO2 97%   BMI 28.77 kg/m²   TEMPERATURE:  Current - Temp: 97.9 °F (36.6 °C); Max - Temp  Av.1 °F (36.7 °C)  Min: 97.9 °F (36.6 °C)  Max: 98.2 °F (36.8 °C)  PULSE OXIMETRY RANGE: SpO2  Av.8 %  Min: 94 %  Max: 98 %  24HR INTAKE/OUTPUT:    Intake/Output Summary (Last 24 hours) at 10/4/2022 314  Last data filed at 10/3/2022 2103  Gross per 24 hour   Intake 250 ml   Output --   Net 250 ml         CONSTITUTIONAL:  awake, alert, cooperative, no apparent distress, HEENT oral pharynx , no scleral icterus  HEMATOLOGIC/LYMPHATICS:  no cervical lymphadenopathy, no supraclavicular lymphadenopathy, no axillary lymphadenopathy and no inguinal lymphadenopathy  LUNGS:  No increased work of breathing, good air exchange, clear to auscultation bilaterally, no crackles or wheezing  CARDIOVASCULAR:  , regular rate and rhythm, normal S1 and S2, no S3 or S4, and no murmur noted  ABDOMEN:  No scars, normal bowel sounds, soft, non-distended, non-tender, no masses palpated, no hepatosplenomegally  MUSCULOSKELETAL:  There is no redness, warmth, or swelling of the joints. EXTREMETIES: No clubbing cynosis or edema  NEUROLOGIC:  Awake, alert, oriented to name, place and time. Cranial nerves II-XII are grossly intact. Motor is 5 out of 5 bilaterally.    SKIN:  no bruising or bleeding      Data      Recent Labs     10/01/22  2156 10/03/22  0700 10/04/22  0550   WBC 2.4* 1.2* 0.8*   HGB 8.2* 7.2* 6.3*   HCT 25.1* 21.4* 18.4*   * 96* 76*   MCV 86.0 84.3 83.1          Recent Labs     10/02/22  0520 10/03/22  0700 10/04/22  0550   * 135* 136   K 3.6 3.7 3.1*    102 103   CO2 23 24 25   BUN 16 12 12   CREATININE 0.8 0.9 0.7*       Recent Labs     10/01/22  2156   AST 25   ALT 18   BILITOT 0.7   ALKPHOS 122         Magnesium:    Lab Results   Component Value Date/Time    MG 1.70 10/04/2022 05:50 AM    MG 1.40 08/23/2022 05:50 AM    MG 1.70 01/24/2022 03:55 AM         Problem List  Patient Active Problem List   Diagnosis    Transient global amnesia    Acute encephalopathy    Hemispheric carotid artery syndrome    Uncontrolled type 2 diabetes mellitus with complication, without long-term current use of insulin    HTN (hypertension), benign    Dyslipidemia    Urolithiasis    SOB (shortness of breath)    Left renal stone    Lung cancer (HCC)    Shortness of breath    Gram-negative pneumonia (HCC)    Sepsis ([facility] [address]    Hyperglycemia due to type 2 diabetes mellitus (HCC)    Neutropenia (HCC)    Cancer of right lung (HCC)    Elevated troponin    Immunocompromised state due to drug therapy ([facility] [address]    Pancytopenia ([facility] [address]       ASSESSMENT AND PLAN     Pneumonia  - abx per IM  - on Vanc and Cefepime  - blood culture collected on 10/01/2022 grew Serratia marcescens  - check cbc daily  - Started Filgrastim on 10/03.  - Dr. Aren Zapata has recommended that the port be removed. Will consult surgery. 2. Stage IV NSCLC  - on maintenance chemotherapy with Alimta  - last treatment 9/27/22  - due next on 10/18/2022  - needs to continue home folic acid - ordered  - monitor CBC after chemo  - transfuse if Hgb < 7     3. Elevated troponin  - cardiology recommended ASA/statin  - Intermediate risk nuclear stress test.  LVEF >60%.       ONCOLOGIC DISPOSITION:    Mendel Mason MD  Please contact through [facility]

## 2022-10-04 NOTE — OP NOTE
Date of Surgery: 10/4/22    Preop Dx:  INfected portacath    Postop Dx:  same    Procedure:  Removal of right internal jugular portacath    Surgeon:  Pramod Vogel    Assistant:      Anesthesia:  Mac, local    EBL:   <50ml    Specimen:  catheter tip for culture    Complications: none    Drains/Lines: none    Indications:  66 yo with bacteremia and associated port infection. Description:  Patient was given adequate description of the risks and rewards of the procedure, including bleeding, infection and freely consented. Pt was given appropriate antibiotics and brought to the OR. Pt was placed in supine position. Prepped and draped in usual sterile fashion. Local anesthetic injected over prior incision site. Incision made along prior scar. Using blunt dissection and electrocautery scar tissue around port was dissected free. Tacking sutures were cut. Port removed. Pressure held at venotomy site for ten minutes. Catheter tract closed with 3-0 vicryl. Wound irrigated with normal saline. Wound closed with 3-0 vicryl and 4-0 monocryl. Sterile dressing placed. All suture, sponge and instrument count correct times two at end of case. Transferred to PACU in stable condition.     Shorty Shirley MD,

## 2022-10-04 NOTE — CONSULTS
Department of General Surgery Consult    PATIENT NAME: Carla Nunn OF BIRTH: 1945    ADMISSION DATE: 10/1/2022  8:21 PM      TODAY'S DATE: 10/4/2022    Reason for Consult:  infected portacath    Chief Complaint: fever    Requesting Physician:  Batsheva    HISTORY OF PRESENT ILLNESS:              The patient is a 65 y.o. male who presents with fever. Undergoing chemotherapy for lung cancer. Recent fevers, fatigue and sob. Workup has shown bacteremia with concern for portcath involvement. .    Past Medical History:        Diagnosis Date    Cancer ([facility] [address]     prostate    Diabetes mellitus ([facility] [address]     Hyperlipidemia     Hypertension     Kidney stone     Lung cancer ([facility] [address] 10/02/2021    Stage 4    Lung cancer ([facility] [address]        Past Surgical History:        Procedure Laterality Date    BACK SURGERY  2012    lumber, no hardware, \"cleaned it out\"    CYSTOSCOPY Right 11/9/2020    CYSTOSCOPY,  RIGHT URETEROSCOPY, RIGHT RETRO PYELOGRAM, REMOVAL STONE FROM BLADDER, URETHRAL DILITATION performed by Joseline Mcgraw MD at [facility] 1    NEPHROLITHOTOMY Left 12/4/2020    LEFT PERCUTANEOUS NEPHROLITHOTOMY WITH DR Russell Velez TO PLACE NEPHROSTOMY TUBE PRIOR performed by Vadim Marquez MD at [facility] N/A 11/4/2021    SURGICAL PORT PLACEMENT performed by Jerrell Verde MD at [address] [place] [address] Right     ROTATOR CUFF       Current Medications:   Current Facility-Administered Medications: filgrastim-aafi (NIVESTYM) injection 480 mcg, 480 mcg, SubCUTAneous, Daily  metoprolol succinate (TOPROL XL) extended release tablet 50 mg, 50 mg, Oral, Daily  amLODIPine (NORVASC) tablet 5 mg, 5 mg, Oral, Daily  0.9 % sodium chloride infusion, , IntraVENous, PRN  cefepime (MAXIPIME) 2000 mg IVPB minibag, 2,000 mg, IntraVENous, Q12H  insulin glargine (LANTUS) injection vial 56 Units, 56 Units, SubCUTAneous, Nightly  losartan (COZAAR) tablet 100 mg, 100 mg, Oral, Daily  sodium chloride flush 0.9 % injection 5-40 mL, 5-40 mL, IntraVENous, 2 times per day  sodium chloride flush 0.9 % injection 5-40 mL, 5-40 mL, IntraVENous, PRN  0.9 % sodium chloride infusion, , IntraVENous, PRN  acetaminophen (TYLENOL) tablet 650 mg, 650 mg, Oral, Q6H PRN **OR** acetaminophen (TYLENOL) suppository 650 mg, 650 mg, Rectal, Q6H PRN  glucose chewable tablet 16 g, 4 tablet, Oral, PRN  dextrose bolus 10% 125 mL, 125 mL, IntraVENous, PRN **OR** dextrose bolus 10% 250 mL, 250 mL, IntraVENous, PRN  glucagon (rDNA) injection 1 mg, 1 mg, SubCUTAneous, PRN  dextrose 10 % infusion, , IntraVENous, Continuous PRN  0.9 % sodium chloride infusion, , IntraVENous, Continuous  insulin lispro (HUMALOG) injection vial 0-4 Units, 0-4 Units, SubCUTAneous, TID WC  insulin lispro (HUMALOG) injection vial 0-4 Units, 0-4 Units, SubCUTAneous, Nightly  folic acid (FOLVITE) tablet 1 mg, 1 mg, Oral, Daily  aspirin EC tablet 81 mg, 81 mg, Oral, Daily  atorvastatin (LIPITOR) tablet 40 mg, 40 mg, Oral, Nightly  perflutren lipid microspheres (DEFINITY) injection 1.65 mg, 1.5 mL, IntraVENous, ONCE PRN  Prior to Admission medications    Medication Sig Start Date End Date Taking?  Authorizing Provider   tamsulosin (FLOMAX) 0.4 MG capsule Take 0.4 mg by mouth daily   Yes Historical Provider, MD   folic acid (FOLVITE) 1 MG tablet Take 1 mg by mouth daily    Historical Provider, MD   metoprolol succinate (TOPROL XL) 25 MG extended release tablet Take 1 tablet by mouth once daily 12/23/21   Zhang Caba MD   atorvastatin (LIPITOR) 20 MG tablet Take 20 mg by mouth daily     Historical Provider, MD   losartan (COZAAR) 100 MG tablet Take 100 mg by mouth daily    Historical Provider, MD   Multiple Vitamins-Minerals (THERAPEUTIC MULTIVITAMIN-MINERALS) tablet Take 1 tablet by mouth daily    Historical Provider, MD   vitamin D3 (CHOLECALCIFEROL) 10 MCG (400 UNIT) TABS tablet Take 400 Units by mouth daily    Historical Provider, MD   insulin aspart (NOVOLOG) 100 UNIT/ML injection vial Inject into the skin 3 times daily (before meals) Sliding scale    Historical Provider, MD   Insulin Glargine (TOUJEO SOLOSTAR SC) Inject 70 Units into the skin nightly     Historical Provider, MD        Allergies:  Patient has no known allergies. Social History:   Social History     Socioeconomic History    Marital status:      Spouse name: Not on file    Number of children: Not on file    Years of education: Not on file    Highest education level: Not on file   Occupational History    Not on file   Tobacco Use    Smoking status: Former     Types: Cigarettes     Start date: 3/22/1960     Quit date: 1976     Years since quittin.8    Smokeless tobacco: Never   Vaping Use    Vaping Use: Never used   Substance and Sexual Activity    Alcohol use: No    Drug use: No    Sexual activity: Not on file   Other Topics Concern    Not on file   Social History Narrative    Not on file     Social Determinants of Health     Financial Resource Strain: Not on file   Food Insecurity: Not on file   Transportation Needs: Not on file   Physical Activity: Not on file   Stress: Not on file   Social Connections: Not on file   Intimate Partner Violence: Not on file   Housing Stability: Not on file         Family History:    History reviewed. No pertinent family history. REVIEW OF SYSTEMS:  CONSTITUTIONAL:  positive for  fevers and fatigue  HEENT:  negative  RESPIRATORY:  SOB  CARDIOVASCULAR:  negative  GASTROINTESTINAL:  negative  GENITOURINARY:  negative  HEMATOLOGIC/LYMPHATIC:  negative  NEUROLOGICAL:  Negative  * All other ROS reviewed and negative. PHYSICAL EXAM:  VITALS:  BP (!) 134/57   Pulse 77   Temp 97.7 °F (36.5 °C) (Oral)   Resp 16   Wt 194 lb 12.8 oz (88.4 kg)   SpO2 100%   BMI 28.77 kg/m²   24HR INTAKE/OUTPUT:    I/O last 3 completed shifts: In: 290 [P.O.:270;  I.V.:20]  Out: -   I/O this shift:  In: 1008.4 [I.V.:608.4; IV Piggyback:400]  Out: - CONSTITUTIONAL:  alert, no apparent distress and normal weight  EYES:  PERRL, sclera clear  ENT:  Normocephalic,atraumatic, without obvious abnormality  NECK:  supple, symmetrical, trachea midline  LUNGS: Resp effort easy and unlabored, no crackles or wheezing  CARDIOVASCULAR:  NO JVD, regular rate  ABDOMEN:  , normal bowel sounds, soft, non-distended, non-tender,   MUSCULOSKELETAL: No clubbing or cyanosis, 0+ pitting edema lower extremities  NEUROLOGIC:  Mental Status Exam:  Level of Alertness:   awake  PSYCHIATRIC:   person, place, time  SKIN:  port site no erythema    DATA:    CBC:   Recent Labs     10/01/22  2156 10/03/22  0700 10/04/22  0550   WBC 2.4* 1.2* 0.8*   HGB 8.2* 7.2* 6.3*   HCT 25.1* 21.4* 18.4*   * 96* 76*     BMP:    Recent Labs     10/02/22  0520 10/03/22  0700 10/04/22  0550   * 135* 136   K 3.6 3.7 3.1*    102 103   CO2 23 24 25   BUN 16 12 12   CREATININE 0.8 0.9 0.7*   GLUCOSE 149* 115* 63*     Hepatic:   Recent Labs     10/01/22  2156   AST 25   ALT 18   BILITOT 0.7   ALKPHOS 122     Mag:      Recent Labs     10/04/22  0550   MG 1.70*      Phos:   No results for input(s): PHOS in the last 72 hours. INR:   Recent Labs     10/02/22  0520   INR 1.15*       Radiology Review: Images personally reviewed by me. NA      IMPRESSION/RECOMMENDATIONS:    66 yo with bacteremia associated with portacath  Discussed their diagnosis and indications for removal.  Risks of operation and typical recovery reviewed. Plan for OR today.     Jacobo Rhodes MD      Electronically signed by Jenna Hill, [address] [place] [address]  [phone]

## 2022-10-04 NOTE — CONSENT
Informed Consent for Blood Component Transfusion Note    I have discussed with the patient and spouse the rationale for blood component transfusion; its benefits in treating or preventing fatigue, organ damage, or death; and its risk which includes mild transfusion reactions, rare risk of blood borne infection, or more serious but rare reactions. I have discussed the alternatives to transfusion, including the risk and consequences of not receiving transfusion. The patient and spouse had an opportunity to ask questions and had agreed to proceed with transfusion of blood components.     Electronically signed by Kenroy Ovalle MD on 10/4/22 at 9:06 AM EDT

## 2022-10-05 LAB
ANION GAP SERPL CALCULATED.3IONS-SCNC: 9 MMOL/L (ref 3–16)
ANISOCYTOSIS: ABNORMAL
BANDED NEUTROPHILS RELATIVE PERCENT: 38 % (ref 0–7)
BASOPHILS ABSOLUTE: 0 K/UL (ref 0–0.2)
BASOPHILS RELATIVE PERCENT: 0 %
BUN BLDV-MCNC: 9 MG/DL (ref 7–20)
CALCIUM SERPL-MCNC: 8.2 MG/DL (ref 8.3–10.6)
CHLORIDE BLD-SCNC: 102 MMOL/L (ref 99–110)
CO2: 26 MMOL/L (ref 21–32)
CREAT SERPL-MCNC: 0.7 MG/DL (ref 0.8–1.3)
EOSINOPHILS ABSOLUTE: 0.1 K/UL (ref 0–0.6)
EOSINOPHILS RELATIVE PERCENT: 2 %
GFR AFRICAN AMERICAN: >60
GFR NON-AFRICAN AMERICAN: >60
GLUCOSE BLD-MCNC: 141 MG/DL (ref 70–99)
GLUCOSE BLD-MCNC: 191 MG/DL (ref 70–99)
GLUCOSE BLD-MCNC: 233 MG/DL (ref 70–99)
GLUCOSE BLD-MCNC: 277 MG/DL (ref 70–99)
GLUCOSE BLD-MCNC: 43 MG/DL (ref 70–99)
GLUCOSE BLD-MCNC: 51 MG/DL (ref 70–99)
GLUCOSE BLD-MCNC: 58 MG/DL (ref 70–99)
HCT VFR BLD CALC: 25.1 % (ref 40.5–52.5)
HEMATOLOGY PATH CONSULT: NO
HEMOGLOBIN: 8.4 G/DL (ref 13.5–17.5)
LYMPHOCYTES ABSOLUTE: 1.2 K/UL (ref 1–5.1)
LYMPHOCYTES RELATIVE PERCENT: 24 %
MAGNESIUM: 1.7 MG/DL (ref 1.8–2.4)
MCH RBC QN AUTO: 28 PG (ref 26–34)
MCHC RBC AUTO-ENTMCNC: 33.6 G/DL (ref 31–36)
MCV RBC AUTO: 83.4 FL (ref 80–100)
MONOCYTES ABSOLUTE: 0.3 K/UL (ref 0–1.3)
MONOCYTES RELATIVE PERCENT: 6 %
NEUTROPHILS ABSOLUTE: 3.3 K/UL (ref 1.7–7.7)
NEUTROPHILS RELATIVE PERCENT: 30 %
NUCLEATED RED BLOOD CELLS: 2 /100 WBC
PDW BLD-RTO: 18.1 % (ref 12.4–15.4)
PERFORMED ON: ABNORMAL
PLATELET # BLD: 73 K/UL (ref 135–450)
PLATELET SLIDE REVIEW: ABNORMAL
PMV BLD AUTO: 7.2 FL (ref 5–10.5)
POTASSIUM REFLEX MAGNESIUM: 3.5 MMOL/L (ref 3.5–5.1)
RBC # BLD: 3.01 M/UL (ref 4.2–5.9)
SLIDE REVIEW: ABNORMAL
SODIUM BLD-SCNC: 137 MMOL/L (ref 136–145)
WBC # BLD: 4.8 K/UL (ref 4–11)

## 2022-10-05 PROCEDURE — 36415 COLL VENOUS BLD VENIPUNCTURE: CPT

## 2022-10-05 PROCEDURE — 97530 THERAPEUTIC ACTIVITIES: CPT

## 2022-10-05 PROCEDURE — 80048 BASIC METABOLIC PNL TOTAL CA: CPT

## 2022-10-05 PROCEDURE — 83735 ASSAY OF MAGNESIUM: CPT

## 2022-10-05 PROCEDURE — 6370000000 HC RX 637 (ALT 250 FOR IP): Performed by: SURGERY

## 2022-10-05 PROCEDURE — 97165 OT EVAL LOW COMPLEX 30 MIN: CPT

## 2022-10-05 PROCEDURE — 87040 BLOOD CULTURE FOR BACTERIA: CPT

## 2022-10-05 PROCEDURE — 99024 POSTOP FOLLOW-UP VISIT: CPT | Performed by: SURGERY

## 2022-10-05 PROCEDURE — 99233 SBSQ HOSP IP/OBS HIGH 50: CPT | Performed by: NURSE PRACTITIONER

## 2022-10-05 PROCEDURE — 6360000002 HC RX W HCPCS: Performed by: NURSE PRACTITIONER

## 2022-10-05 PROCEDURE — 2580000003 HC RX 258: Performed by: SURGERY

## 2022-10-05 PROCEDURE — 6370000000 HC RX 637 (ALT 250 FOR IP): Performed by: NURSE PRACTITIONER

## 2022-10-05 PROCEDURE — 6360000002 HC RX W HCPCS: Performed by: SURGERY

## 2022-10-05 PROCEDURE — 1200000000 HC SEMI PRIVATE

## 2022-10-05 PROCEDURE — 2580000003 HC RX 258: Performed by: ANESTHESIOLOGY

## 2022-10-05 PROCEDURE — 97161 PT EVAL LOW COMPLEX 20 MIN: CPT

## 2022-10-05 PROCEDURE — 6370000000 HC RX 637 (ALT 250 FOR IP): Performed by: INTERNAL MEDICINE

## 2022-10-05 PROCEDURE — 85025 COMPLETE CBC W/AUTO DIFF WBC: CPT

## 2022-10-05 RX ORDER — ASPIRIN 81 MG/1
81 TABLET ORAL DAILY
Qty: 30 TABLET | Refills: 3 | Status: SHIPPED | OUTPATIENT
Start: 2022-10-06

## 2022-10-05 RX ORDER — MAGNESIUM SULFATE IN WATER 40 MG/ML
2000 INJECTION, SOLUTION INTRAVENOUS ONCE
Status: COMPLETED | OUTPATIENT
Start: 2022-10-05 | End: 2022-10-05

## 2022-10-05 RX ORDER — POTASSIUM CHLORIDE 20 MEQ/1
40 TABLET, EXTENDED RELEASE ORAL ONCE
Status: COMPLETED | OUTPATIENT
Start: 2022-10-05 | End: 2022-10-05

## 2022-10-05 RX ORDER — METOPROLOL SUCCINATE 50 MG/1
50 TABLET, EXTENDED RELEASE ORAL DAILY
Qty: 30 TABLET | Refills: 3 | Status: SHIPPED | OUTPATIENT
Start: 2022-10-06

## 2022-10-05 RX ORDER — ENOXAPARIN SODIUM 100 MG/ML
40 INJECTION SUBCUTANEOUS DAILY
Status: DISCONTINUED | OUTPATIENT
Start: 2022-10-05 | End: 2022-10-06 | Stop reason: HOSPADM

## 2022-10-05 RX ORDER — INSULIN GLARGINE 100 [IU]/ML
50 INJECTION, SOLUTION SUBCUTANEOUS NIGHTLY
Status: DISCONTINUED | OUTPATIENT
Start: 2022-10-05 | End: 2022-10-05

## 2022-10-05 RX ORDER — AMLODIPINE BESYLATE 5 MG/1
5 TABLET ORAL DAILY
Qty: 30 TABLET | Refills: 3 | Status: SHIPPED | OUTPATIENT
Start: 2022-10-06

## 2022-10-05 RX ORDER — ATORVASTATIN CALCIUM 40 MG/1
40 TABLET, FILM COATED ORAL NIGHTLY
Qty: 30 TABLET | Refills: 3 | Status: SHIPPED | OUTPATIENT
Start: 2022-10-05

## 2022-10-05 RX ORDER — INSULIN GLARGINE 100 [IU]/ML
40 INJECTION, SOLUTION SUBCUTANEOUS NIGHTLY
Status: DISCONTINUED | OUTPATIENT
Start: 2022-10-05 | End: 2022-10-06 | Stop reason: HOSPADM

## 2022-10-05 RX ORDER — CIPROFLOXACIN 500 MG/1
500 TABLET, FILM COATED ORAL 2 TIMES DAILY
Qty: 20 TABLET | Refills: 0 | Status: SHIPPED | OUTPATIENT
Start: 2022-10-05 | End: 2022-10-15

## 2022-10-05 RX ADMIN — Medication 16 G: at 08:08

## 2022-10-05 RX ADMIN — ATORVASTATIN CALCIUM 40 MG: 40 TABLET, FILM COATED ORAL at 20:50

## 2022-10-05 RX ADMIN — LOSARTAN POTASSIUM 100 MG: 100 TABLET, FILM COATED ORAL at 09:47

## 2022-10-05 RX ADMIN — Medication 10 ML: at 20:49

## 2022-10-05 RX ADMIN — INSULIN GLARGINE 40 UNITS: 100 INJECTION, SOLUTION SUBCUTANEOUS at 20:50

## 2022-10-05 RX ADMIN — POTASSIUM CHLORIDE 40 MEQ: 1500 TABLET, EXTENDED RELEASE ORAL at 16:14

## 2022-10-05 RX ADMIN — ASPIRIN 81 MG: 81 TABLET, COATED ORAL at 09:47

## 2022-10-05 RX ADMIN — METOPROLOL SUCCINATE 50 MG: 50 TABLET, EXTENDED RELEASE ORAL at 09:47

## 2022-10-05 RX ADMIN — FOLIC ACID 1 MG: 1 TABLET ORAL at 09:47

## 2022-10-05 RX ADMIN — AMLODIPINE BESYLATE 5 MG: 5 TABLET ORAL at 09:47

## 2022-10-05 RX ADMIN — MAGNESIUM SULFATE HEPTAHYDRATE 2000 MG: 40 INJECTION, SOLUTION INTRAVENOUS at 16:21

## 2022-10-05 RX ADMIN — SODIUM CHLORIDE: 9 INJECTION, SOLUTION INTRAVENOUS at 16:18

## 2022-10-05 RX ADMIN — CEFEPIME 2000 MG: 2 INJECTION, POWDER, FOR SOLUTION INTRAVENOUS at 01:23

## 2022-10-05 RX ADMIN — CEFEPIME 2000 MG: 2 INJECTION, POWDER, FOR SOLUTION INTRAVENOUS at 16:19

## 2022-10-05 NOTE — PROGRESS NOTES
Physical Therapy  Facility/Department: [facility] A2 CARD TELEMETRY  Physical Therapy Initial Assessment    Name: Roxana Tidwell  : 1945  MRN: [id number]  Date of Service: 10/5/2022    Discharge Recommendations:  Home with assist PRN   PT Equipment Recommendations  Equipment Needed: No      Patient Diagnosis(es): The primary encounter diagnosis was Immunocompromised state due to drug therapy ([address] Diagnoses of Fever, unspecified fever cause, Pleural effusion, Elevated troponin, Pancytopenia ([address] Port or reservoir infection, initial encounter, and Gram-negative bacteremia were also pertinent to this visit. Past Medical History:  has a past medical history of Cancer ([address] Diabetes mellitus ([address] Hyperlipidemia, Hypertension, Kidney stone, Lung cancer ([address] and Lung cancer ([facility] [address] [facility] Past Surgical History:  has a past surgical history that includes Prostate surgery; Cystoscopy (Right, 2020); back surgery (); NEPHROLITHOTOMY (Left, 2020); shoulder surgery (Right); [address] Surgery (N/A, 2021); and Port Surgery (N/A, 10/4/2022). Assessment   Body Structures, Functions, Activity Limitations Requiring Skilled Therapeutic Intervention: Decreased endurance;Decreased balance;Decreased posture  Assessment: Pt addmitted to [facility] with fever and chills, Dx: sepsis. Pt evaluated this date as co-treat with OT for safety and due to medical complexity. Functioning this date at supervision to SBA level. Pt amb 50ft x2 this date with SBA and No AD. Preformed all bed mobility and transfers with supervision. Pt does display forward flexed posture throughout AMB, decreased kavin, step height and swing length bilaterally however states this is his baseline level since prior back surgery. Pt currently functioning at baseline level. Discharge recommendations to return home to prior living arangements with assist from family as needed. No further PT needs at this time.     Decision Making: Medium Complexity  No Skilled PT:  At baseline function  Requires PT Follow-Up: No  Activity Tolerance  Activity Tolerance: Patient tolerated evaluation without incident     Plan   Physcial Therapy Plan  General Plan: Discharge with evaluation only  Safety Devices  Type of Devices: Nurse notified, Left in chair, Patient at risk for falls     Restrictions  Restrictions/Precautions  Restrictions/Precautions: General Precautions, Fall Risk  Position Activity Restriction  Other position/activity restrictions: Neutropenic precautions, up with assist     Subjective   Pain: Denies at rest  General  Chart Reviewed: Yes  Patient assessed for rehabilitation services?: Yes  Response To Previous Treatment: Not applicable  Family / Caregiver Present: No  Referring Practitioner: Michael Puga MD  Referral Date : 10/05/22  Diagnosis: Sepsis  Follows Commands: Within Functional Limits  General Comment  Comments: RN cleared pt for PT eval  Subjective  Subjective: Pt sitting up in chair upon arrival, agreeable to PT eval. Pt reports feeling better this date; [facility]         Social/Functional History  Social/Functional History  Lives With: Spouse  Type of Home: House  Home Layout: Two level, Able to Live on Main level with bedroom/bathroom  Home Access: Stairs to enter without rails  Entrance Stairs - Number of Steps: 2  Bathroom Shower/Tub: Walk-in shower  Bathroom Toilet: Standard  Bathroom Equipment: Built-in shower seat, Grab bars in shower, Hand-held shower  Home Equipment: [employer], standard  Has the patient had two or more falls in the past year or any fall with injury in the past year?: No  Receives Help From: Family  ADL Assistance: Needs assistance  Bath: Independent  Dressing: Minimal assistance  Grooming: Independent  Feeding: Independent  Toileting: Needs assistance (Min A for posterior tanisha care)  Homemaking Responsibilities: No  Ambulation Assistance: Independent  Transfer Assistance: Independent  Active : Yes  Mode of Transportation: Truck, SUV  Occupation: Retired  Type of Occupation: Iron work  Leisure & Hobbies: watch TV  [address]: Within Functional Limits  Hearing  Hearing: Exceptions to [address] Exceptions: Hard of hearing/hearing concerns; No hearing aid    Cognition   Orientation  Overall Orientation Status: Impaired  Orientation Level: Oriented to place;Oriented to situation;Oriented to person;Disoriented to time  Cognition  Overall Cognitive Status: WFL     Objective   Heart Rate: 85  Heart Rate Source: Monitor  BP: 139/67  BP Location: Left upper arm  BP Method: Automatic  Patient Position: Sitting  MAP (Calculated): 91  Resp: 18  SpO2: 98 %  O2 Device: None (Room air)     Observation/Palpation  Posture: Fair (forward flexed posture)  Gross Assessment  AROM: Within functional limits  Strength: Within functional limits  Coordination: Within functional limits  Tone: Normal  Sensation: Intact                 Bed Mobility Training  Bed Mobility Training: Yes  Overall Level of Assistance: Supervision  Rolling: Supervision  Supine to Sit: Supervision  Sit to Supine: Supervision  Balance  Sitting: Intact  Standing: Intact  Transfer Training  Transfer Training: Yes  Overall Level of Assistance: Supervision  Interventions: Safety awareness training; Tactile cues; Verbal cues  Sit to Stand: Supervision  Stand to Sit: Supervision  Toilet Transfer: Supervision  Gait Training  Gait Training: Yes  Gait  Overall Level of Assistance: Stand-by assistance  Interventions: Safety awareness training;Verbal cues  Base of Support: Narrowed  Speed/Kavin: Slow  Step Length: Left shortened;Right shortened  Stance: Left decreased;Right decreased  Gait Abnormalities: Shuffling gait;Trunk sway increased (mild trunk sway, decreased kavin, step height and swing, forward flexed posture pt states is his baseline due to past back surgery)  Assistive Device: Gait belt              Balance  Posture: Fair  Sitting - Static: Good  Sitting - Dynamic: Good  Standing - Static: Fair  Standing - Dynamic: Poor         AM-PAC Score  AM-PAC Inpatient Mobility Raw Score : 22 (10/05/22 1602)  AM-PAC Inpatient T-Scale Score : 53.28 (10/05/22 1602)  Mobility Inpatient CMS 0-100% Score: 20.91 (10/05/22 1602)  Mobility Inpatient CMS G-Code Modifier : CJ (10/05/22 1602)          Goals  Short Term Goals  Time Frame for Short Term Goals: in 1 session  Short Term Goal 1: Pt will AMB 50 ft with SBA and no AD - Goal met 10/05  Short Term Goal 2: Pt will preform bed mobility with supervision - Goal met 10/05  Short Term Goal 3: Pt will  preform bed to chair transfer with supervision - goal met 10/05  Patient Goals   Patient Goals : \"to feel stronger and return home\"       Education  Patient Education  Education Given To: Patient  Education Provided: Role of Therapy;Plan of Care;Orientation; Fall Prevention Strategies  Education Method: Verbal  Barriers to Learning: None  Education Outcome: Verbalized understanding      Therapy Time   Individual Concurrent Group Co-treatment   Time In 1521         Time Out 1540         Minutes 19         Timed Code Treatment Minutes: 10 Minutes (PT Eval - 10 mins)     If pt is unable to be seen after this session, please let this note serve as discharge summary. Please see case management note for discharge disposition. Thank you.     Blanca Phillips, PT

## 2022-10-05 NOTE — PROGRESS NOTES
[facility] GENERAL SURGERY    Surgery Progress Note           POD # 1    PATIENT NAME: Salena Meckel     TODAY'S DATE: 10/5/2022    INTERVAL HISTORY:    Pt  denies discomfort over prior port site. OBJECTIVE:   VITALS:  BP (!) 120/52   Pulse 81   Temp 97.9 °F (36.6 °C) (Oral)   Resp 18   Ht 5' 9\" (1.753 m)   Wt 196 lb (88.9 kg)   SpO2 99%   BMI 28.94 kg/m²     INTAKE/OUTPUT:    I/O last 3 completed shifts: In: 3318.5 [P.O.:960; I.V.:1898.4; IV Piggyback:460.1]  Out: -   No intake/output data recorded. INCISION: clean, dry, no drainage    Data:  CBC:   Recent Labs     10/03/22  0700 10/04/22  0550 10/04/22  1715 10/05/22  0700   WBC 1.2* 0.8*  --  4.8   HGB 7.2* 6.3* 8.6* 8.4*   HCT 21.4* 18.4* 25.4* 25.1*   PLT 96* 76*  --  73*     BMP:    Recent Labs     10/03/22  0700 10/04/22  0550   * 136   K 3.7 3.1*    103   CO2 24 25   BUN 12 12   CREATININE 0.9 0.7*   GLUCOSE 115* 63*     Hepatic: No results for input(s): AST, ALT, ALB, BILITOT, ALKPHOS in the last 72 hours. Mag:      Recent Labs     10/04/22  0550   MG 1.70*          ASSESSMENT AND PLAN:  65 y.o. male status post  Removal of right internal jugular portacath  Prior port site looks good without drainage. Pt to f/u as outpt for insertion of new port once antibiotics completed.      Electronically signed by PUSHPA Taylor - JUSTIN

## 2022-10-05 NOTE — PROGRESS NOTES
[facility]   Daily Progress Note      Admit Date:  10/1/2022    Reason for follow up visit: Elevated troponin    CC: \"I feel better today. I always have some SOB when I do things, but some better. \"    64 y/o male with PMH significant for [place] lung cancer, HTN, hyperlipidemia and diabetes mellitus who was admitted to [facility] [place] after presenting with fever, fatigue and SOB. He is currently undergoing chemo for lung CA. He was noted to have an elevated troponin and cardiology asked to evaluate. He underwent stress testing with inferior ischemia noted. Maintained on medical management given his multiple comorbidities. Echo shows LVEF 55% with no WMA. Interval History:  Pt. seen and examined; records reviewed  BP improved and controlled  Wt stable  Denies chest pain or SOB this AM  K+ 3.1    Magnesium 1.70  S/P Port removal on 10/4/22    Subjective:  Pt with no acute overnight cardiac events. Denies chest pain, cough, palpitations or dizziness  + chronic SOBOE (at baseline)    Review of Systems:   Constitutional: no unanticipated weight loss. There's been no change in energy level, sleep pattern, or activity level. No fevers, chills. Eyes: No visual changes or diplopia. No scleral icterus. ENT: No Headaches, hearing loss or vertigo. No mouth sores or sore throat. Cardiovascular: as reviewed in HPI  Respiratory: No cough or wheezing, no sputum production. No hematemesis. Gastrointestinal: No abdominal pain, appetite loss, blood in stools. No change in bowel or bladder habits. Genitourinary: No dysuria, trouble voiding, or hematuria. Musculoskeletal:  No gait disturbance, no joint complaints. Integumentary: No rash or pruritis. Neurological: No headache, diplopia, change in muscle strength, numbness or tingling. Psychiatric: No anxiety or depression. Endocrine: No temperature intolerance. No excessive thirst, fluid intake, or urination. No tremor.   Hematologic/Lymphatic: No abnormal bruising or bleeding, blood clots or swollen lymph nodes. Allergic/Immunologic: No nasal congestion or hives. Objective:   /63   Pulse 76   Temp 98.6 °F (37 °C) (Oral)   Resp 18   Ht 5' 9\" (1.753 m)   Wt 196 lb (88.9 kg)   SpO2 97%   BMI 28.94 kg/m²     Intake/Output Summary (Last 24 hours) at 10/5/2022 0833  Last data filed at 10/5/2022 0525  Gross per 24 hour   Intake 2060.12 ml   Output --   Net 2060.12 ml     Wt Readings from Last 3 Encounters:   10/05/22 196 lb (88.9 kg)   08/25/22 197 lb (89.4 kg)   03/01/22 200 lb (90.7 kg)       Physical Exam:  General: In no acute distress. Awake, alert, and oriented X4. Up in chair and tolerating breakfast  Skin:  Warm and dry. No new appearing rashes or lesions. Neck:  Supple. No JVD or carotid bruit appreciated. Chest:  Lungs clear to auscultation. No wheezes/rhonchi/rales  Cardiovascular:  RRR. Normal S1 and S2; no murmur/gallop or rub   Abdomen:  soft, nontender, nondistended, +bowel sounds. No hepatomegaly  Extremities:  No LE edema. No clubbing or cyanosis. 2+ bilateral radial/DP/PT pulses.  Cap refill brisk    Medications:    filgrastim-aafi  480 mcg SubCUTAneous Daily    metoprolol succinate  50 mg Oral Daily    amLODIPine  5 mg Oral Daily    sodium chloride flush  5-40 mL IntraVENous 2 times per day    cefepime  2,000 mg IntraVENous Q12H    insulin glargine  56 Units SubCUTAneous Nightly    losartan  100 mg Oral Daily    sodium chloride flush  5-40 mL IntraVENous 2 times per day    insulin lispro  0-4 Units SubCUTAneous TID     insulin lispro  0-4 Units SubCUTAneous Nightly    folic acid  1 mg Oral Daily    aspirin  81 mg Oral Daily    atorvastatin  40 mg Oral Nightly      sodium chloride      sodium chloride      sodium chloride      dextrose      sodium chloride 50 mL/hr at 10/04/22 0749     sodium chloride, sodium chloride flush, sodium chloride, meperidine, HYDROmorphone, HYDROmorphone, oxyCODONE, ondansetron, midazolam, diphenhydrAMINE, oxyCODONE, sodium chloride flush, sodium chloride, acetaminophen **OR** acetaminophen, glucose, dextrose bolus **OR** dextrose bolus, glucagon (rDNA), dextrose, perflutren lipid microspheres    Lab Data:  CBC:   Recent Labs     10/03/22  0700 10/04/22  0550 10/04/22  1715 10/05/22  0700   WBC 1.2* 0.8*  --  4.8   HGB 7.2* 6.3* 8.6* 8.4*   PLT 96* 76*  --  73*     BMP:    Recent Labs     10/03/22  0700 10/04/22  0550   * 136   K 3.7 3.1*   CO2 24 25   BUN 12 12   CREATININE 0.9 0.7*      Latest Reference Range & Units Most Recent 5/28/21 17:51 10/1/22 21:56 10/2/22 02:56   Troponin <0.01 ng/mL 0.11 (H)  10/2/22 02:56 <0.01 0.02 (H) 0.11 (H)   (H): Data is abnormally high    ECG 10/1/2022:  Sinus rhythm-sinus arrhythmia    Echo 10/4/2022:   Normal size left ventricle and wall thickness. Normal left ventricular systolic function with ejection fraction of 55%. No regional wall motion abnormalites are seen. Grade I diastolic dysfunction with normal filling pressure. The right ventricle is normal in size and function. Mild mitral and pulmonic regurgitation. Systolic pulmonic artery pressure (SPAP) is normal estimated at 28 mmHg   (Right atrial pressure of 3 mmHg). 10/2/2022 Lexiscan-Myoview:  Normal LVEF >60%    Normal wall motion    Moderate base to mid inferior ischemia, extends to basal lateral wall        Overall, this would be considered an abnormal, intermediate risk, study          10/1/2022 CTPA:  No evidence of pulmonary embolism. Moderate size pleural effusion which may be loculated at the left lower   pleural cavity. Mild area of consolidation at the basal segment of the left   lower lobe which could represent atelectasis versus airspace disease process. Mild scattered patchy opacities which are nonspecific but could indicate an   infectious/inflammatory etiology. Echo 11/25/2020:   Technically difficult examination due to poor apical windows.    Left ventricular systolic function is normal with a visually estimated   ejection fraction of 55%. The left ventricle is normal in size with mild septal hypertrophy. No regional wall motion abnormalities are noted. Normal left ventricular diastolic function. Mild bi-atrial enlargement. Mild mitral regurgitation. The IVC is dilated in size (>2.1 cm) but collapses >50% with respiration   consistent with elevated right atrial pressures (8 mmHg) . Telemetry: Sinus rhythm with PAC's; transient episodes of sinus arrhythmia  (Personally reviewed)    Assessment/Plan:    1) Elevated troponin  -c/w  NSTEMI  -has H/O elevated troponin with increase this admit  -Echo with LVEF 55% and no WMA  -inferior ischemia on Myoview: -continuing with medical management given his multiple comorbidities (d/W Dr. Rosibel Sinclair)  -no chest pain or sxs suggestive of angina  -continue ASA,  statin and BB    2) SOBOE  -may be some anginal component; however suspect multifactorial given his lung CA and deconditioning  -no overt sxs of CHF  -continue medical management for CAD and monitor for improvement; may need to consider addition of Imdur pending his course     3) Fever  -resolved  -S/P Port removal  -ID following  -abx per ID and Primary team     4) Hypertension  -much better controlled and goal BP < 130/80  -continue medical management     5) Anemia/Pancytopenia/Thrombocytopenia  -undergoing chemo for lung CA  -Rx per Primary team and Hem-Onc  -Hgb > 8 today     6) Hyperlipidemia  -continue statin     7) NSC lung CA-stage IV  -undergoing chemo    8) Diabetes Mellitus  -Rx per Primary team    9) Hypokalemia/Hypomagnesemia  -replacement today    Stable from cardiac standpoint and will no longer follow as an inpatient. Will arrange follow up as outpatient in the office.     We will continue with medical management of his presumed CAD; has preserved LVEF    Discussed low fat/low sodium diet and increase in activity    Discharge Cardiac Meds:  Amlodipine 5 mg daily  ASA 81 mg daily  Atorvastatin 40 mg nightly  Losartan 100 mg daily  Toprol Xl 50 mg daily    Med Rec for cardiac meds has been completed    Thank you for allowing us to participate in Mr. Rosangela Quintanilla marsha.       Electronically signed by PUSHPA Busby CNP on 10/5/2022 at 8:33 AM

## 2022-10-05 NOTE — PROGRESS NOTES
Hospitalist Progress Note      PCP: Sheryl Garcia DO    Date of Admission: 10/1/2022    Chief Complaint: Fever     Hospital Course: Reviewed H&P     Subjective: Patient seen and examined this AM .  Seen sitting in chair at bedside. Reports feeling   better . Offers no new complaints. S/p surgical port removal on 10/4/2022. WBC count improved to 4.8 this morning. Will hold off on further Neupogen and will DC after discussing with oncology. Currently on cefepime for Serratia bacteremia. Reviewed cardiology note-recommended medical management for abnormal stress test at this time given his multiple comorbidities. -RN reports patient hypoglycemic this morning to 58 blood glucose. Patient received oral glucose gel with improvement of blood glucose. Reviewed Lantus dosing and decrease to 40 units (patient usually at 70 units at home.) . RN to reconcile home dosing with pharmacy. Reviewed recent A1c 8.9% dated 9/15/2022 in [facility].    -Requested PT OT eval to assess discharge needs.     Medications:  Reviewed    Infusion Medications    sodium chloride      sodium chloride      sodium chloride      dextrose       Scheduled Medications    insulin glargine  40 Units SubCUTAneous Nightly    potassium chloride  40 mEq Oral Once    magnesium sulfate  2,000 mg IntraVENous Once    [Held by provider] filgrastim-aafi  480 mcg SubCUTAneous Daily    metoprolol succinate  50 mg Oral Daily    amLODIPine  5 mg Oral Daily    sodium chloride flush  5-40 mL IntraVENous 2 times per day    cefepime  2,000 mg IntraVENous Q12H    losartan  100 mg Oral Daily    insulin lispro  0-4 Units SubCUTAneous TID WC    insulin lispro  0-4 Units SubCUTAneous Nightly    folic acid  1 mg Oral Daily    aspirin  81 mg Oral Daily    atorvastatin  40 mg Oral Nightly     PRN Meds: sodium chloride, sodium chloride flush, sodium chloride, oxyCODONE, sodium chloride, acetaminophen **OR** acetaminophen, glucose, dextrose bolus **OR** dextrose bolus, glucagon (rDNA), dextrose, perflutren lipid microspheres      Intake/Output Summary (Last 24 hours) at 10/5/2022 1241  Last data filed at 10/5/2022 0525  Gross per 24 hour   Intake 2060.12 ml   Output --   Net 2060.12 ml         Physical Exam Performed:  BP (!) 120/52   Pulse 81   Temp 97.9 °F (36.6 °C) (Oral)   Resp 18   Ht 5' 9\" (1.753 m)   Wt 196 lb (88.9 kg)   SpO2 99%   BMI 28.94 kg/m²     General appearance: Elderly male in No apparent distress, appears stated age and cooperative. HEENT: Pupils equal, round, and reactive to light. Conjunctivae/corneas clear. Neck: Supple, with full range of motion. No jugular venous distention. Trachea midline. Respiratory:  Normal respiratory effort. Diminished breath sounds at bases  (L > R )  cardiovascular: Regular rate and rhythm with normal S1/S2 without murmurs, rubs or gallops. Abdomen: Soft, non-tender, non-distended with normal bowel sounds. Musculoskeletal: No clubbing, cyanosis or edema bilaterally. Full range of motion without deformity. Skin: Skin color, texture, turgor normal.  No rashes or lesions. Neurologic:  Neurovascularly intact without any focal sensory/motor deficits.  Cranial nerves: II-XII intact, grossly non-focal.  Psychiatric: Alert and oriented, thought content appropriate, normal insight  Capillary Refill: Brisk,< 3 seconds   Peripheral Pulses: +2 palpable, equal bilaterally       Labs:   Recent Labs     10/03/22  0700 10/04/22  0550 10/04/22  1715 10/05/22  0700   WBC 1.2* 0.8*  --  4.8   HGB 7.2* 6.3* 8.6* 8.4*   HCT 21.4* 18.4* 25.4* 25.1*   PLT 96* 76*  --  73*           Urinalysis:    Lab Results   Component Value Date/Time    NITRU Negative 10/01/2022 11:27 PM    [address]-5 10/01/2022 11:27 PM    BACTERIA 2+ 08/23/2022 03:17 AM    RBCUA 11-20 10/01/2022 11:27 PM    BLOODU MODERATE 10/01/2022 11:27 PM    SPECGRAV >=1.030 10/01/2022 11:27 PM    GLUCOSEU 100 10/01/2022 11:27 PM       Radiology:  NM Cardiac Stress Test Nuclear Imaging   Final Result      CT CHEST PULMONARY EMBOLISM W CONTRAST   Final Result   No evidence of pulmonary embolism. Moderate size pleural effusion which may be loculated at the left lower   pleural cavity. Mild area of consolidation at the basal segment of the left   lower lobe which could represent atelectasis versus airspace disease process. Mild scattered patchy opacities which are nonspecific but could indicate an   infectious/inflammatory etiology. RECOMMENDATIONS:   Unavailable         XR CHEST PORTABLE   Final Result   Stable small left pleural effusion with adjacent left basilar opacity. Consults:  IP CONSULT TO HOSPITALIST  PHARMACY TO DOSE VANCOMYCIN  IP CONSULT TO CARDIOLOGY  IP CONSULT TO ONCOLOGY  IP CONSULT TO INFECTIOUS DISEASES  IP CONSULT TO [address] [place] [address] Drive Problems    Diagnosis Date Noted    Gram-negative bacteremia [R78.81] 10/04/2022     Priority: Medium    Immunocompromised state due to drug therapy ([address] [O68.193, Z79.899] 10/03/2022     Priority: Medium    Pancytopenia ([address] [D61.818] 10/03/2022     Priority: Medium    Hyperglycemia due to type 2 diabetes mellitus ([address] [E11.65] 10/02/2022     Priority: Medium    Neutropenia ([address] [D70.9] 10/02/2022     Priority: Medium    Cancer of right lung ([address] [C34.91] 10/02/2022     Priority: Medium    Elevated troponin [R77.8] 10/02/2022     Priority: Medium    Sepsis ([address] [A41.9] 08/23/2022     Priority: Medium    HTN (hypertension), benign [I10]      Assessment/Plan:  Left Lung Pneumonia/Sepsis  - Probably loculated pneumonia of LLL  - +SIRS with tachycardia, fever, leukopenia; source: lung  - Continue Vancocin and cefepime; pharmacy to dose Vancomycin  - Blood cultures positive for Serratia marcescens. ID consulted 10/3/22 -recommended port removal and advised to continue cefepime (at decreased dose) ;  DC'd vancomycin  - Lactic negative, no hypotension, no evidence of hypoperfusion at this time  -Patient noted to have left-sided malignant pleural effusion with history of thoracentesis in the past.  No need of any thoracentesis at this time given patient w/o hypoxia and saturating fine on room air.  - GS consulted - S/p  port removal on 10/4/22      Hyperglycemia/Type 2 DM  - Continue home dose of Lantus  - Low dose SSI AC/HS   - BG goal < 180 in the setting of sepsis     Elevated Troponin POA  - Troponin 0.02 -> 0.11  - Clinically equivocal, no active CP, overall symptoms global, more consistent with infectious process  - Given the  LLL PNA, possibly demand ischemia  -Received heparin gtt x 48 hours for presumed NSTEMI  - Consulted Cardiology -  evaluated and recommended NM stress test -abnormal with moderate base to mid inferior ischemia, extends to basal lateral wall. Cardiology followed and recommended medical management at this time. continue ASA,  statin and BB      Right Lung NSCLC  - Consulted Oncology to follow, followed by Dr. Deanna Isaac  - Last treatment Tuesday, 9/27/2022    Chemotherapy-induced pancytopenia  -   Started on Neupogen by oncology on 10/3/2022. Monitored CBC closely with improvement. Continue neutropenic precautions given  at this time. Hematuria  - Noted on UA, chronic . Improved. DVT Prophylaxis: Lovenox subcu  Diet: ADULT DIET; Regular; 4 carb choices (60 gm/meal)   Code Status: Full Code    PT/OT Eval Status: Consulted    Dispo -likely 1- 2 days pending clinical improvement and clearance by subspecialists       The note was completed using Dragon -speech recognition software & EMR  . Every effort was made to ensure accuracy; however, inadvertent computerized transcription errors may be present.     Fifi Montes MD

## 2022-10-05 NOTE — PROGRESS NOTES
SHIFT SUMMARY:  A&O x 4 - appropriate. PERRLA  Converts between Afib CVR in the 70's-80's and NSR in the 80's. Occasional PVC's noted. SBP stable in the 120's-130's, MAP 70's-80's. .  Oxygen saturations in the mid to upper 90%'s for the most part on RA. Encouraged cough and deep breath. Lungs diminished but clear. RR stable. Bowel sounds active. Passing flatus - no BM this shift. Palpable pulses noted on all extremities. Pale/pink skin noted. No adventitious heart tones heard. Tolerating meals/fluids well. Patient denied pain throughout the shift. Blood glucose stable - Lantus given per orders. UOP stable - voided multiple times this shift. Right upper chest incision clean/dry/intact with surgical glue. Repositions self - ambulated about room/to the BR with little to no assist.    Afebrile.

## 2022-10-05 NOTE — PROGRESS NOTES
Occupational Therapy  Facility/Department: [facility] A2 CARD TELEMETRY  Occupational Therapy Initial Assessment and Treatment x1    Name: Manju Tyson  : 1945  MRN: [id number]  Date of Service: 10/5/2022    Discharge Recommendations:  24 hour supervision or assist  OT Equipment Recommendations  Equipment Needed: No       Patient Diagnosis(es): The primary encounter diagnosis was Immunocompromised state due to drug therapy ([address] Diagnoses of Fever, unspecified fever cause, Pleural effusion, Elevated troponin, Pancytopenia ([address] Port or reservoir infection, initial encounter, and Gram-negative bacteremia were also pertinent to this visit. Past Medical History:  has a past medical history of Cancer ([address] Diabetes mellitus ([address] Hyperlipidemia, Hypertension, Kidney stone, Lung cancer ([address] and Lung cancer ([address] Past Surgical History:  has a past surgical history that includes Prostate surgery; Cystoscopy (Right, 2020); back surgery (); NEPHROLITHOTOMY (Left, 2020); shoulder surgery (Right); [address] Surgery (N/A, 2021); and Port Surgery (N/A, 10/4/2022). Assessment   Performance deficits / Impairments: Decreased balance  Assessment: Manju Tyson is a(n) 65 y.o. male admitted to [facility] with c/o fever s/p chemo, Dx septic. PLOF: lives at home with wife, wife A with ADLs PRN, pt typically ambulates with no AD  Pt required:   Functional Mobility: SBA-supervision for all transfers and ambulation, no AD   ADL Training: pt declined ADLs at this time, dressed in pj pants at eval  Pt required minimal verbal cues for safety, including attempting for upright posturing though pt reports flexion to be baseline. Pt is functioning below baseline and would benefit from cont OT while in acute care. Once medically appropriate, rec Home with 24/ Assist/Supervision  upon d/c. Prognosis: Good  Decision Making: Low Complexity  No Skilled OT: Independent with functional mobility; Independent with ADL's;Safe to return home  REQUIRES OT FOLLOW-UP: No  Activity Tolerance  Activity Tolerance: Patient Tolerated treatment well        Plan   Occupational Therapy Plan  Times Per Week: 1x eval and treat only     Restrictions  Restrictions/Precautions  Restrictions/Precautions: General Precautions, Fall Risk  Position Activity Restriction  Other position/activity restrictions: Neutropenic precautions, up with assist    Subjective   General  Chart Reviewed: Yes  Patient assessed for rehabilitation services?: Yes  Additional Pertinent Hx: Stage IV Lung CA, active chemo, DMII  Family / Caregiver Present: No  Referring Practitioner: Flakita Hammer MD  Diagnosis: Septic  Subjective  Subjective: pt ttransferring to chair at start of session, agreeable to eval; reporting pain at IV sites but none otherwise  Pain: Denies at rest    Social/Functional History  Social/Functional History  Lives With: Spouse  Type of Home: House  Home Layout: Two level, Able to Live on Main level with bedroom/bathroom (does not go upstairs)  Home Access: Stairs to enter without rails  Entrance Stairs - Number of Steps: 2  Bathroom Shower/Tub: Walk-in shower  Bathroom Toilet: Standard  Bathroom Equipment: Built-in shower seat, Grab bars in shower, Hand-held shower  Home Equipment: Wandalee Sudheer, standard (does not use)  Has the patient had two or more falls in the past year or any fall with injury in the past year?: No  Receives Help From: Family  ADL Assistance: Needs assistance  Bath: Independent  Dressing: Minimal assistance (sock management)  Grooming: Independent  Feeding: Independent  Toileting: Needs assistance (Min A for posterior tanisha care)  Homemaking Responsibilities: No (wife completes)  Ambulation Assistance: Independent  Transfer Assistance: Independent  Active : Yes  Mode of Transportation: Truck, SUV [employer] or truck- difficulty with truck transfers)  Occupation: Retired  Type of Occupation: [employer] work  Leisure & Hobbies: watch TV       Objective   Heart Rate: 85  Heart Rate Source: Monitor  BP: 139/67  BP Location: Left upper arm  BP Method: Automatic  Patient Position: Sitting  MAP (Calculated): 91  Resp: 18  SpO2: 98 %  O2 Device: None (Room air)          Observation/Palpation  Posture: Fair (spinal flexion, pt reports Hx of back Sx, and that this is baseline)  Safety Devices  Type of Devices: Nurse notified; Left in chair;Patient at risk for falls (pt left in chair with physical therapy at end of OT eval)  Bed Mobility Training  Bed Mobility Training: No  Transfer Training  Transfer Training: Yes  Overall Level of Assistance: Supervision (no use of AD)  Sit to Stand: Supervision  Stand to Sit: Supervision  Toilet Transfer: Supervision     AROM: Generally decreased, functional  Strength: Generally decreased, functional  Coordination: Generally decreased, functional  Tone: Normal    ADL  Additional Comments: pt declined ADLs              Vision  Vision: Within Functional Limits  Hearing  Hearing: Exceptions to [facility]  Hearing Exceptions: Hard of hearing/hearing concerns; No hearing aid  Cognition  Overall Cognitive Status: WFL  Orientation  Overall Orientation Status: Impaired  Orientation Level: Oriented to place;Oriented to situation;Oriented to person;Disoriented to time  Perception  Overall Perceptual Status: [facility]            Dynamic Standing Balance Exercises: ambulation in sears and within room with supervision, no AD  Education Given To: Patient  Education Provided: Role of Therapy;Plan of Care;Transfer Training  Education Method: Verbal  Barriers to Learning: None  Education Outcome: Verbalized understanding;Demonstrated understanding     AM-PAC Score        AM-PAC Inpatient Daily Activity Raw Score: 22 (10/05/22 1540)  AM-PAC Inpatient ADL T-Scale Score : 47.1 (10/05/22 1540)  ADL Inpatient CMS 0-100% Score: 25.8 (10/05/22 1540)  ADL Inpatient CMS G-Code Modifier : CJ (10/05/22 1540)    Goals  Short Term Goals  Time Frame for Short Term Goals: within one OT session  Short Term Goal 1: pt will perform functional transfer with supervision- GOAL MET WITH TOILET TX 10/5  Short Term Goal 2: pt will perform ADL with supervision- GOAL NOT MET, pt declined ADLs  Patient Goals   Patient goals : to return home       Therapy Time   Individual Concurrent Group Co-treatment   Time In 1510         Time Out 1529         Minutes 19         Timed Code Treatment Minutes: 9 Minutes (10 minute eval)     If pt is unable to be seen after this session, please let this note serve as discharge summary. Please see case management note for discharge disposition. Thank you. Co-tx collaboration this date to safely meet goals and will have better occupational performance outcomes with in a co-treatment than 1:1 session.    Jacky Wolfe, OT

## 2022-10-05 NOTE — PROGRESS NOTES
Lab called writer with critical value of BG 43 at 1326. Lab was drawn at [phone]. Writer alerted lab that this was not accurate reading for the current time.

## 2022-10-05 NOTE — PROGRESS NOTES
Infectious Disease Follow up Notes    CC :  Mey CLABSI     Antibiotics:   Cefepime 2g q12    Admit Date:   10/1/2022  Hospital Day: 5    Subjective:   He remains AF   S/p port removal yesterday. Says he feels \"great\" today. Note AM hypoglycemia per RN. Patient admits po intake down.     Objective:     Patient Vitals for the past 8 hrs:   BP Temp Temp src Pulse Resp SpO2 Height Weight   10/05/22 1130 (!) 120/52 97.9 °F (36.6 °C) Oral 81 18 -- -- --   10/05/22 0945 116/87 97.7 °F (36.5 °C) Oral 87 16 99 % -- --   10/05/22 0534 -- -- -- -- -- -- 5' 9\" (1.753 m) 196 lb (88.9 kg)         EXAM:  General:   alert, oriented, NAD    HEENT:   NCAT, PERRL, sclera anicteric  NECK:   Supple, symmetrical   LUNGS:   CTA erica upper lobes  CV:   RRR without murmur   ABD:   soft, flat, NT   EXT: No focal rash   No stigmata of emboli        LINE:    PIV        Scheduled Meds:   insulin glargine  40 Units SubCUTAneous Nightly    potassium chloride  40 mEq Oral Once    magnesium sulfate  2,000 mg IntraVENous Once    enoxaparin  40 mg SubCUTAneous Daily    [Held by provider] filgrastim-aafi  480 mcg SubCUTAneous Daily    metoprolol succinate  50 mg Oral Daily    amLODIPine  5 mg Oral Daily    sodium chloride flush  5-40 mL IntraVENous 2 times per day    cefepime  2,000 mg IntraVENous Q12H    losartan  100 mg Oral Daily    insulin lispro  0-4 Units SubCUTAneous TID WC    insulin lispro  0-4 Units SubCUTAneous Nightly    folic acid  1 mg Oral Daily    aspirin  81 mg Oral Daily    atorvastatin  40 mg Oral Nightly       Continuous Infusions:   sodium chloride      sodium chloride      sodium chloride      dextrose            Data Review:    Lab Results   Component Value Date    WBC 4.8 10/05/2022    HGB 8.4 (L) 10/05/2022    HCT 25.1 (L) 10/05/2022    MCV 83.4 10/05/2022    PLT 73 (L) 10/05/2022     Lab Results   Component Value Date    CREATININE 0.7 (L) 10/04/2022    BUN 12 10/04/2022     10/04/2022    K 3.1 (L) 10/04/2022     10/04/2022    CO2 25 10/04/2022       Hepatic Function Panel:   Lab Results   Component Value Date/Time    ALKPHOS 122 10/01/2022 09:56 PM    ALT 18 10/01/2022 09:56 PM    AST 25 10/01/2022 09:56 PM    PROT 5.7 10/01/2022 09:56 PM    BILITOT 0.7 10/01/2022 09:56 PM    LABALBU 3.0 10/01/2022 09:56 PM       Cultures:   8/22     BC x2 w Serratia marcescens R only to cefazolin   10/1     BC x2 S marcescens collected form port, same sensitivity profile   10/2     BC x1 NGTD, collected peripheral site   10/4 Port tip culture NGTD         Radiology Review:  All pertinent images / reports were reviewed as a part of this visit. CT chest 10/2/22  Impression   No evidence of pulmonary embolism. Moderate size pleural effusion which may be loculated at the left lower   pleural cavity. Mild area of consolidation at the basal segment of the left   lower lobe which could represent atelectasis versus airspace disease process. Mild scattered patchy opacities which are nonspecific but could indicate an   infectious/inflammatory etiology. TTE 10/4/22  Summary   Normal size left ventricle and wall thickness. Normal left ventricular systolic function with ejection fraction of 55%. No regional wall motion abnormalites are seen. Grade I diastolic dysfunction with normal filling pressure. The right ventricle is normal in size and function. Mild mitral and pulmonic regurgitation. Systolic pulmonic artery pressure (SPAP) is normal estimated at 28 mmHg   (Right atrial pressure of 3 mmHg).     Assessment:     Patient Active Problem List    Diagnosis Date Noted    Gram-negative bacteremia 10/04/2022     Priority: Medium    Immunocompromised state due to drug therapy ([facility] 10/03/2022     Priority: Medium    Pancytopenia ([address] 10/03/2022     Priority: Medium    Hyperglycemia due to type 2 diabetes mellitus ([facility] 10/02/2022 Priority: Medium    Neutropenia ([facility] 10/02/2022     Priority: Medium    Cancer of right lung ([facility] 10/02/2022     Priority: Medium    Elevated troponin 10/02/2022     Priority: Medium    Gram-negative pneumonia ([address] 08/23/2022     Priority: Medium    Sepsis ([address] 08/23/2022     Priority: Medium    Shortness of breath 01/22/2022    Lung cancer ([facility] 10/02/2021     Overview Note:     Stage 4      Left renal stone 12/04/2020    SOB (shortness of breath) 11/16/2020    Urolithiasis 11/07/2020    Acute encephalopathy     Hemispheric carotid artery syndrome     Uncontrolled type 2 diabetes mellitus with complication, without long-term current use of insulin     HTN (hypertension), benign     Dyslipidemia     Transient global amnesia 02/11/2018       Stage IV lung cancer treated with pemetrexed last given 9/27/22     Admitted 10/1/22 with acute febrile illness   Found to have relapsed Serratia bacteremia, presumed CLBASI   Same problem back in 8/2022 treated with short course IV/po abx   Lacking focal symptoms to suggest an alternative primary or metastatic sites of infection   Repeat BC collected peripherally is negative to date  Echo completed, reassuring  Port was removed     Neutropenia from antineoplastic therapy - resolved w G-CSF      L pleural effusion, chronic, malignant      NKDA      Plan:     Doing well, no fever. WBC recovering w G-CSF. Repeat BC neg to date and echo was reassuring.     Abx day #4  Could change to po cipro and continue x10d - Rx sent to [facility] at his request     Get repeat BC ~1 week after last dose of abx, ~ 10/21 or 10/22 - order entered   If those [facility] are negative, cold replace port     Discussed with patient/family, all questions answered    Call with questions / concerns     Signing off     D/w RN       Bessie Clements MD  Phone: [phone]   Fax : [phone]

## 2022-10-05 NOTE — ANESTHESIA POSTPROCEDURE EVALUATION
Department of Anesthesiology  Postprocedure Note    Patient: Jennifer Shipman  MRN: [id number]  YOB: 1945  Date of evaluation: 10/4/2022      Procedure Summary     Date: 10/04/22 Room / Location: [address]    Anesthesia Start: 1500 Anesthesia Stop: 7090    Procedure: PORT REMOVAL (Chest) Diagnosis:       Port or reservoir infection, initial encounter      (INFECTED PORT)    Surgeons: Imelda Joya MD Responsible Provider: Ender Estrada MD    Anesthesia Type: MAC ASA Status: 4          Anesthesia Type: No value filed.     Antonella Phase I: Antonella Score: 9    Antonella Phase II: Antonella Score: 10      Anesthesia Post Evaluation    Comments: Postoperative Anesthesia Note    Name:    Jennifer Shipman  MRN:      [id number]    Patient Vitals in the past 12 hrs:  10/04/22 1700, BP:130/74, Temp:98.1 °F (36.7 °C), Temp src:Oral, Pulse:94, Resp:16, SpO2:100 %  10/04/22 1533, Temp:98.1 °F (36.7 °C), Temp src:Temporal  10/04/22 1530, BP:(!) 109/50, Pulse:76, SpO2:98 %  10/04/22 1444, BP:121/67, Temp:97.3 °F (36.3 °C), Temp src:Temporal, Pulse:82, Resp:15, SpO2:97 %  10/04/22 1245, BP:(!) 134/57, Temp:97.7 °F (36.5 °C), Temp src:Oral, Pulse:77, Resp:16, SpO2:100 %  10/04/22 1222, BP:(!) 118/50, Temp:98.2 °F (36.8 °C), Temp src:Oral, Pulse:75, Resp:16, SpO2:99 %     LABS:    CBC  Lab Results       Component                Value               Date/Time                  WBC                      0.8 (LL)            10/04/2022 05:50 AM        HGB                      8.6 (L)             10/04/2022 05:15 PM        HCT                      25.4 (L)            10/04/2022 05:15 PM        PLT                      76 (L)              10/04/2022 05:50 AM   RENAL  Lab Results       Component                Value               Date/Time                  NA                       136                 10/04/2022 05:50 AM        K                        3.1 (L)             10/04/2022 05:50 AM CL                       103                 10/04/2022 05:50 AM        CO2                      25                  10/04/2022 05:50 AM        BUN                      12                  10/04/2022 05:50 AM        CREATININE               0.7 (L)             10/04/2022 05:50 AM        GLUCOSE                  63 (L)              10/04/2022 05:50 AM   COAGS  Lab Results       Component                Value               Date/Time                  PROTIME                  14.6 (H)            10/02/2022 05:20 AM        INR                      1.15 (H)            10/02/2022 05:20 AM        APTT                     35.3 (H)            10/02/2022 05:20 AM     Intake & Output: In: 1458.4 (P.O.:240; I.V.:818.4)  Out: -     Nausea & Vomiting:  No    Level of Consciousness:  Awake    Pain Assessment:  Adequate analgesia    Anesthesia Complications:  No apparent anesthetic complications    SUMMARY      Vital signs stable  OK to discharge from Stage I post anesthesia care.   Care transferred from Anesthesiology department on discharge from perioperative area

## 2022-10-05 NOTE — DISCHARGE INSTRUCTIONS
FOLLOW-UP APPOINTMENTS    [facility] OFFICE - Follow-up appointment on November 8th at 11:00 am with Ceci Rebollar NP, [facility]. You and your one visitor will need to have your mouth and nose covered  with a mask. No children please. [facility],  [facility], [address] [facility], [address] [facility], [address] [place] [address]. Office #: [phone]. If you are unable to make this appointment, please call to reschedule. Directions to [address] [place] [address] towards [place]. [address] [facility] exit. Right off exit. Cross over [employer]. Right on [address] Left into hospital. Follow the signs to the emergency room ( turn left toward the Emergency room). Go right at the first stop sign. Just past the Emergency room at the second stop sign turn right and go up the ramp and park on the top level if possible. Go in the glass doors of the [facility] we on the top level of the garage [address]. As soon as you get in the door turn left and our office is the one with the glass doors.

## 2022-10-06 VITALS
TEMPERATURE: 98 F | OXYGEN SATURATION: 96 % | DIASTOLIC BLOOD PRESSURE: 67 MMHG | HEART RATE: 83 BPM | HEIGHT: 69 IN | SYSTOLIC BLOOD PRESSURE: 139 MMHG | BODY MASS INDEX: 28.88 KG/M2 | WEIGHT: 195 LBS | RESPIRATION RATE: 18 BRPM

## 2022-10-06 LAB
ANION GAP SERPL CALCULATED.3IONS-SCNC: 7 MMOL/L (ref 3–16)
ANISOCYTOSIS: ABNORMAL
BANDED NEUTROPHILS RELATIVE PERCENT: 28 % (ref 0–7)
BASOPHILS ABSOLUTE: 0 K/UL (ref 0–0.2)
BASOPHILS RELATIVE PERCENT: 0 %
BLOOD CULTURE, ROUTINE: NORMAL
BUN BLDV-MCNC: 9 MG/DL (ref 7–20)
CALCIUM SERPL-MCNC: 8.1 MG/DL (ref 8.3–10.6)
CHLORIDE BLD-SCNC: 107 MMOL/L (ref 99–110)
CO2: 26 MMOL/L (ref 21–32)
CREAT SERPL-MCNC: 0.7 MG/DL (ref 0.8–1.3)
EOSINOPHILS ABSOLUTE: 0.1 K/UL (ref 0–0.6)
EOSINOPHILS RELATIVE PERCENT: 1 %
GFR AFRICAN AMERICAN: >60
GFR NON-AFRICAN AMERICAN: >60
GLUCOSE BLD-MCNC: 136 MG/DL (ref 70–99)
GLUCOSE BLD-MCNC: 147 MG/DL (ref 70–99)
HCT VFR BLD CALC: 23.3 % (ref 40.5–52.5)
HEMATOLOGY PATH CONSULT: NO
HEMOGLOBIN: 7.8 G/DL (ref 13.5–17.5)
LYMPHOCYTES ABSOLUTE: 0.4 K/UL (ref 1–5.1)
LYMPHOCYTES RELATIVE PERCENT: 6 %
MCH RBC QN AUTO: 28.6 PG (ref 26–34)
MCHC RBC AUTO-ENTMCNC: 33.5 G/DL (ref 31–36)
MCV RBC AUTO: 85.4 FL (ref 80–100)
MONOCYTES ABSOLUTE: 0.5 K/UL (ref 0–1.3)
MONOCYTES RELATIVE PERCENT: 7 %
NEUTROPHILS ABSOLUTE: 6.3 K/UL (ref 1.7–7.7)
NEUTROPHILS RELATIVE PERCENT: 58 %
NUCLEATED RED BLOOD CELLS: 1 /100 WBC
OVALOCYTES: ABNORMAL
PDW BLD-RTO: 18.4 % (ref 12.4–15.4)
PERFORMED ON: ABNORMAL
PLATELET # BLD: 75 K/UL (ref 135–450)
PLATELET SLIDE REVIEW: ABNORMAL
PMV BLD AUTO: 7.2 FL (ref 5–10.5)
POTASSIUM REFLEX MAGNESIUM: 3.8 MMOL/L (ref 3.5–5.1)
RBC # BLD: 2.73 M/UL (ref 4.2–5.9)
SLIDE REVIEW: ABNORMAL
SODIUM BLD-SCNC: 140 MMOL/L (ref 136–145)
WBC # BLD: 7.3 K/UL (ref 4–11)

## 2022-10-06 PROCEDURE — 2580000003 HC RX 258: Performed by: SURGERY

## 2022-10-06 PROCEDURE — 6360000002 HC RX W HCPCS: Performed by: SURGERY

## 2022-10-06 PROCEDURE — 85025 COMPLETE CBC W/AUTO DIFF WBC: CPT

## 2022-10-06 PROCEDURE — 6370000000 HC RX 637 (ALT 250 FOR IP): Performed by: SURGERY

## 2022-10-06 PROCEDURE — 80048 BASIC METABOLIC PNL TOTAL CA: CPT

## 2022-10-06 PROCEDURE — 6360000002 HC RX W HCPCS: Performed by: INTERNAL MEDICINE

## 2022-10-06 PROCEDURE — 36415 COLL VENOUS BLD VENIPUNCTURE: CPT

## 2022-10-06 RX ORDER — INSULIN GLARGINE 300 U/ML
40 INJECTION, SOLUTION SUBCUTANEOUS NIGHTLY
Qty: 1 PEN | Refills: 0 | Status: SHIPPED | OUTPATIENT
Start: 2022-10-06

## 2022-10-06 RX ADMIN — LOSARTAN POTASSIUM 100 MG: 100 TABLET, FILM COATED ORAL at 09:10

## 2022-10-06 RX ADMIN — ASPIRIN 81 MG: 81 TABLET, COATED ORAL at 09:10

## 2022-10-06 RX ADMIN — METOPROLOL SUCCINATE 50 MG: 50 TABLET, EXTENDED RELEASE ORAL at 09:10

## 2022-10-06 RX ADMIN — AMLODIPINE BESYLATE 5 MG: 5 TABLET ORAL at 09:10

## 2022-10-06 RX ADMIN — CEFEPIME 2000 MG: 2 INJECTION, POWDER, FOR SOLUTION INTRAVENOUS at 01:21

## 2022-10-06 RX ADMIN — ENOXAPARIN SODIUM 40 MG: 100 INJECTION SUBCUTANEOUS at 09:10

## 2022-10-06 RX ADMIN — FOLIC ACID 1 MG: 1 TABLET ORAL at 09:10

## 2022-10-06 ASSESSMENT — PAIN SCALES - GENERAL: PAINLEVEL_OUTOF10: 0

## 2022-10-06 NOTE — PROGRESS NOTES
Pt d/c'd home with wife. Removed peripheral IV and stopped bleeding. Catheter intact. Pt tolerated well. No redness noted at site. Notified [facility] and removed tele box. Reviewed d/c instructions, home meds, and  f/u information utilizing teach-back method. Scripts were picked up by wife in [address] [place] [address]. Informed antibiotic was sent to [facility] pharmacy. Patient verbalized understanding.  Electronically signed by Rosario Bryson RN on 10/6/22 at 10:57 AM EDT

## 2022-10-06 NOTE — PLAN OF CARE
Problem: Discharge Planning  Goal: Discharge to home or other facility with appropriate resources  Outcome: Progressing     Problem: Safety - Adult  Goal: Free from fall injury  Outcome: Progressing     Problem: Skin/Tissue Integrity  Goal: Absence of new skin breakdown  Description: 1. Monitor for areas of redness and/or skin breakdown  2. Assess vascular access sites hourly  3. Every 4-6 hours minimum:  Change oxygen saturation probe site  4. Every 4-6 hours:  If on nasal continuous positive airway pressure, respiratory therapy assess nares and determine need for appliance change or resting period.   Outcome: Progressing     Problem: Chronic Conditions and Co-morbidities  Goal: Patient's chronic conditions and co-morbidity symptoms are monitored and maintained or improved  Outcome: Progressing     Problem: Pain  Goal: Verbalizes/displays adequate comfort level or baseline comfort level  Outcome: Progressing  Flowsheets (Taken 10/5/2022 0945 by Devin Higuera RN)  Verbalizes/displays adequate comfort level or baseline comfort level: Encourage patient to monitor pain and request assistance

## 2022-10-06 NOTE — DISCHARGE SUMMARY
Hospital Medicine Discharge Summary    Patient ID: Dolly Coker      Patient's PCP: Arnaud Robins DO    Admit Date: 10/1/2022     Discharge Date: 10/6/2022    Admitting Provider: Jas Morales MD     Discharge Provider: Kaycee Velasquez MD     Discharge Diagnoses: Active Hospital Problems    Diagnosis     Gram-negative bacteremia [R78.81]      Priority: Medium    Immunocompromised state due to drug therapy [facility]) [H02.189, Z79.899]      Priority: Medium    Pancytopenia ([address] [D61.818]      Priority: Medium    Hyperglycemia due to type 2 diabetes mellitus ([address] [E11.65]      Priority: Medium    Neutropenia ([address] [D70.9]      Priority: Medium    Cancer of right lung ([address] [C34.91]      Priority: Medium    Elevated troponin [R77.8]      Priority: Medium    Sepsis ([address] [A41.9]      Priority: Medium    HTN (hypertension), benign [I10]        The patient was seen and examined on day of discharge and this discharge summary is in conjunction with any daily progress note from day of discharge. Hospital Course: By problem list  Left Lung Pneumonia/Sepsis POA  - Probably loculated pneumonia of LLL  - +SIRS with tachycardia, fever, leukopenia; source: lung  - Continue Vancocin and cefepime; pharmacy to dose Vancomycin  - Blood cultures positive for Serratia marcescens. ID consulted 10/3/22 -recommended port removal and advised to continue cefepime (at decreased dose) ; DC'd vancomycin ; transition to p.o. Cipro twice daily for 10 days at discharge.   - Lactic negative, no hypotension, no evidence of hypoperfusion at this time  -Patient noted to have left-sided malignant pleural effusion with history of thoracentesis in the past.  No need of any thoracentesis at this time given patient w/o hypoxia and saturating fine on room air.  - GS consulted - S/p  port removal on 10/4/22      Hyperglycemia/Type 2 DM  -Patient had multiple hypoglycemic episodes with home dose of Lantus (70 units nightly) and hence is Lantus dose has been decreased to 40 units and had optimal blood glucose control. Discharged with decreased dosing.  - Low dose SSI AC/HS   -Patient advised to continue to check his blood glucose at least 3 times a day and make a log and take it with him to his next PCP visit for further adjustment of his insulin regimen. Elevated Troponin POA  - Troponin 0.02 -> 0.11  - Clinically equivocal, no active CP, overall symptoms global, more consistent with infectious process  - Given the  LLL PNA, possibly demand ischemia  -Received heparin gtt x 48 hours for presumed NSTEMI  - Consulted Cardiology -  evaluated and recommended NM stress test -abnormal with moderate base to mid inferior ischemia, extends to basal lateral wall. Cardiology followed and recommended medical management at this time. continue ASA,  statin and BB      Right Lung NSCLC  - Consulted Oncology to follow, followed by Dr. Marleny Spence  - Last treatment Tuesday, 9/27/2022  -Oncology recommended that they would delay next cycle of chemo by 1 week (delay from 10/18 to 10/25). Can give via a peripheral IV at that time if the blood culture is NGTD. Then arrange a port for the end of that cycle. Chemotherapy-induced pancytopenia  -   Started on Neupogen by oncology on 10/3/2022. Monitored CBC closely with improvement. Hematuria  - Noted on UA, chronic . Improved. Patient discharged home in stable medical condition. Physical Exam Performed:   /67   Pulse 83   Temp 98 °F (36.7 °C) (Oral)   Resp 18   Ht 5' 9\" (1.753 m)   Wt 195 lb (88.5 kg)   SpO2 96%   BMI 28.80 kg/m²     General appearance: Elderly male in No apparent distress, appears stated age and cooperative. HEENT: Pupils equal, round, and reactive to light. Conjunctivae/corneas clear. Neck: Supple, with full range of motion. No jugular venous distention. Trachea midline. Respiratory:  Normal respiratory effort.   Diminished breath sounds at bases  (L > R )  cardiovascular: Regular rate and rhythm with normal S1/S2 without murmurs, rubs or gallops. Abdomen: Soft, non-tender, non-distended with normal bowel sounds. Musculoskeletal: No clubbing, cyanosis or edema bilaterally. Full range of motion without deformity. Skin: Skin color, texture, turgor normal.  No rashes or lesions. Neurologic:  Neurovascularly intact without any focal sensory/motor deficits. Cranial nerves: II-XII intact, grossly non-focal.  Psychiatric: Alert and oriented, thought content appropriate, normal insight  Capillary Refill: Brisk,< 3 seconds   Peripheral Pulses: +2 palpable, equal bilaterally       Labs: For convenience and continuity at follow-up the following most recent labs are provided:      CBC:    Lab Results   Component Value Date/Time    WBC 7.3 10/06/2022 08:07 AM    HGB 7.8 10/06/2022 08:07 AM    HCT 23.3 10/06/2022 08:07 AM    PLT 75 10/06/2022 08:07 AM       Renal:    Lab Results   Component Value Date/Time     10/06/2022 08:07 AM    K 3.8 10/06/2022 08:07 AM     10/06/2022 08:07 AM    CO2 26 10/06/2022 08:07 AM    BUN 9 10/06/2022 08:07 AM    CREATININE 0.7 10/06/2022 08:07 AM    CALCIUM 8.1 10/06/2022 08:07 AM    PHOS 2.7 01/24/2022 03:55 AM         Significant Diagnostic Studies    Radiology:   NM Cardiac Stress Test Nuclear Imaging   Final Result      CT CHEST PULMONARY EMBOLISM W CONTRAST   Final Result   No evidence of pulmonary embolism. Moderate size pleural effusion which may be loculated at the left lower   pleural cavity. Mild area of consolidation at the basal segment of the left   lower lobe which could represent atelectasis versus airspace disease process. Mild scattered patchy opacities which are nonspecific but could indicate an   infectious/inflammatory etiology. RECOMMENDATIONS:   Unavailable         XR CHEST PORTABLE   Final Result   Stable small left pleural effusion with adjacent left basilar opacity.             Consults:   IP CONSULT TO HOSPITALIST  PHARMACY TO DOSE VANCOMYCIN  IP CONSULT TO CARDIOLOGY  IP CONSULT TO ONCOLOGY  IP CONSULT TO INFECTIOUS DISEASES  IP CONSULT TO GENERAL SURGERY    Disposition: Home    Condition at Discharge: Stable    Discharge Instructions/Follow-up: PCP, oncology as instructed    Code Status:  Full Code     Activity: activity as tolerated    Diet: regular diet      Discharge Medications:     Current Discharge Medication List             Details   aspirin 81 MG EC tablet Take 1 tablet by mouth daily  Qty: 30 tablet, Refills: 3      amLODIPine (NORVASC) 5 MG tablet Take 1 tablet by mouth daily  Qty: 30 tablet, Refills: 3      ciprofloxacin (CIPRO) 500 MG tablet Take 1 tablet by mouth 2 times daily for 10 days  Qty: 20 tablet, Refills: 0                Details   insulin glargine, 1 unit dial, (TOUJEO SOLOSTAR) 300 UNIT/ML concentrated injection pen Inject 40 Units into the skin nightly  Qty: 1 pen, Refills: 0      metoprolol succinate (TOPROL XL) 50 MG extended release tablet Take 1 tablet by mouth daily  Qty: 30 tablet, Refills: 3      atorvastatin (LIPITOR) 40 MG tablet Take 1 tablet by mouth nightly  Qty: 30 tablet, Refills: 3                Details   tamsulosin (FLOMAX) 0.4 MG capsule Take 0.4 mg by mouth daily      folic acid (FOLVITE) 1 MG tablet Take 1 mg by mouth daily      losartan (COZAAR) 100 MG tablet Take 100 mg by mouth daily      Multiple Vitamins-Minerals (THERAPEUTIC MULTIVITAMIN-MINERALS) tablet Take 1 tablet by mouth daily      vitamin D3 (CHOLECALCIFEROL) 10 MCG (400 UNIT) TABS tablet Take 400 Units by mouth daily      insulin aspart (NOVOLOG) 100 UNIT/ML injection vial Inject into the skin 3 times daily (before meals) Sliding scale             Time Spent on discharge is more than 30 minutes in the examination, evaluation, counseling and review of medications and discharge plan.       Signed:    Malathi Hernandez MD   10/6/2022      Thank you Waldo Hines DO for the opportunity to be involved in this patient's care. If you have any questions or concerns, please feel free to contact me at [phone].

## 2022-10-06 NOTE — ONCOLOGY
ONCOLOGY HEMATOLOGY CARE PROGRESS NOTE      SUBJECTIVE:     Afebrile and on room air. Not in the room during rounds. ROS:   Not obtained. OBJECTIVE        Physical    VITALS:  /72   Pulse 88   Temp 97.5 °F (36.4 °C) (Oral)   Resp 18   Ht 5' 9\" (1.753 m)   Wt 195 lb (88.5 kg)   SpO2 98%   BMI 28.80 kg/m²   TEMPERATURE:  Current - Temp: 97.5 °F (36.4 °C); Max - Temp  Av.7 °F (36.5 °C)  Min: 97.2 °F (36.2 °C)  Max: 98.4 °F (36.9 °C)  PULSE OXIMETRY RANGE: SpO2  Av %  Min: 96 %  Max: 99 %  24HR INTAKE/OUTPUT:    Intake/Output Summary (Last 24 hours) at 10/6/2022 0719  Last data filed at 10/6/2022 0704  Gross per 24 hour   Intake 232 ml   Output 0 ml   Net 232 ml         Not examined. Data      Recent Labs     10/04/22  0550 10/04/22  1715 10/05/22  0700   WBC 0.8*  --  4.8   HGB 6.3* 8.6* 8.4*   HCT 18.4* 25.4* 25.1*   PLT 76*  --  73*   MCV 83.1  --  83.4          Recent Labs     10/04/22  0550 10/05/22  0655    137   K 3.1* 3.5    102   CO2 25 26   BUN 12 9   CREATININE 0.7* 0.7*       No results for input(s): AST, ALT, ALB, BILIDIR, BILITOT, ALKPHOS in the last 72 hours.       Magnesium:    Lab Results   Component Value Date/Time    MG 1.70 10/05/2022 06:55 AM    MG 1.70 10/04/2022 05:50 AM    MG 1.40 2022 05:50 AM         Problem List  Patient Active Problem List   Diagnosis    Transient global amnesia    Acute encephalopathy    Hemispheric carotid artery syndrome    Uncontrolled type 2 diabetes mellitus with complication, without long-term current use of insulin    HTN (hypertension), benign    Dyslipidemia    Urolithiasis    SOB (shortness of breath)    Left renal stone    Lung cancer (HCC)    Shortness of breath    Gram-negative pneumonia (HCC)    Sepsis ([address]    Hyperglycemia due to type 2 diabetes mellitus (HCC)    Neutropenia (HCC)    Cancer of right lung (HCC)    Elevated troponin    Immunocompromised state due to drug therapy ([facility]    Pancytopenia ([facility]    Gram-negative bacteremia       ASSESSMENT AND PLAN     Pneumonia/port infection  - abx per IM  - on Vanc and Cefepime  - blood culture collected on 10/01/2022 grew Serratia marcescens  - check cbc daily  - Started Filgrastim on 10/03. Held on 10/05.  - Dr. Sukhdev Mckeon has recommended that the port be removed. - Port removed on  10/04/2022 by Dr. Luz Mckeon recommended that he could switch to PO Cipro on 10/05/2022. Treat for 10 more days. Repeat a blood culture 1 week after last dose (10/21 or 10/22). If neg, then get a port placed. 2. Stage IV NSCLC  - on maintenance chemotherapy with Alimta  - last treatment 9/27/22  - due next on 10/18/2022  - needs to continue home folic acid - ordered  - monitor CBC after chemo  - transfuse if Hgb < 7     3. Elevated troponin  - cardiology recommended ASA/statin  - Intermediate risk nuclear stress test.  LVEF >60%. - Medical management was recommended. ONCOLOGIC DISPOSITION:  No oncology barrier to discharge. Would delay next cycle of chemo by 1 week (delay from 10/18 to 10/25). Can give via a peripheral IV at that time if the blood culture is NGTD. Then arrange a port for the end of that cycle.     Fanny Muir MD  Please contact through [facility]

## 2022-10-08 LAB — CULTURE CATHETER TIP: NORMAL

## 2022-10-09 LAB
BLOOD CULTURE, ROUTINE: NORMAL
CULTURE, BLOOD 2: NORMAL

## 2022-10-25 ENCOUNTER — HOSPITAL ENCOUNTER (OUTPATIENT)
Dept: GENERAL RADIOLOGY | Age: 77
Discharge: HOME | End: 2022-10-25
Payer: COMMERCIAL

## 2022-10-25 ENCOUNTER — HOSPITAL ENCOUNTER (OUTPATIENT)
Age: 77
Discharge: HOME | End: 2022-10-25
Payer: COMMERCIAL

## 2022-10-25 DIAGNOSIS — J90 PLEURAL EFFUSION: ICD-10-CM

## 2022-10-25 PROCEDURE — 71046 X-RAY EXAM CHEST 2 VIEWS: CPT

## 2022-10-26 ENCOUNTER — TELEPHONE (OUTPATIENT)
Dept: SURGERY | Age: 77
End: 2022-10-26

## 2022-10-26 NOTE — PROGRESS NOTES
Dwaine Prier    Age 65 y.o.    male    1945    MRN [id number]    11/2/2022  Arrival Time_____________  OR Time____________[address]     Procedure(s):  SURGICAL PORT PLACEMENT                      Monitor Anesthesia Care   Surgeon(s):  Lencho Loose, MD      DAY ADMIT ___  SDS/OP ___  OUTPT IN BED ___        Phone [phone] (home)     PCP _____________________ Phone_________________ Epic ( ) Epic CE ( ) Appt ________    ADDITIONAL INFO __________________________________ Cardio/Consult _____________    NOTES _____________________________________________________________________    ____________________________________________________________________________    PAT APPT DATE:________ TIME: ________  FAXED QAD: _______  (__) H&P w/ Hospitalist  __________________________________________________________________________  Preop Nurse phone screen complete: _____________  (__) CBC     (__) W/ DIFF ___________     (__) Hgb A1C    ___________  (__) CHEST X RAY   __________  (__) LIPID PROFILE  ___________  (__) EKG   __________  (__) PT/PTT   ___________  (__) PFT's   __________  (__) BMP   ___________  (__) CAROTIDS  __________  (__) CMP   ___________  (__) VEIN MAPPING  __________  (__) U/A   ___________  (__) HISTORY & PHYSICAL __________  (__) URINE C & S  ___________  (__) CARDIAC CLEARANCE __________  (__) U/A W/ FLEX  ___________  (__) PULM.  CLEARANCE __________  (__) SERUM PREGNANCY ___________  (__) Check Epic DOS orders __________  (__) TYPE & SCREEN __________repeat ( ) (__)  __________________ __________  (__) ALBUMIN [place] ___________  (__)  __________________ __________  (__) TRANSFERRIN  ___________  (__)  __________________ __________  (__) LIVER PROFILE  ___________  (__)  __________________ __________  (__) MRSA NASAL SWAB ___________  (__) URINE PREG DOS __________  (__) SED RATE  ___________  (__) BLOOD SUGAR DOS __________  (__) C-REACTIVE PROTEIN ___________    (__) VITAMIN D HYDROXY ___________  (__) BLOOD THINNERS __________    (__) ACE/ ARBS: _____________________     (__) BETABLOCKERS __________________

## 2022-10-26 NOTE — TELEPHONE ENCOUNTER
Spoke with patient and scheduled procedure. Patient is scheduled for port placement with Dr. Salomón Ashley on 11/2/22 at 3:00PM with an arrival time of 1:00PM - DESTINEE Lara after midnight romina before surgery, Patient will need  day of surgery - [facility] completed pre-op physical - Patient's wife understood and agreed to above noted.

## 2022-10-31 ENCOUNTER — TELEPHONE (OUTPATIENT)
Dept: SURGERY | Age: 77
End: 2022-10-31

## 2022-10-31 NOTE — TELEPHONE ENCOUNTER
Called pt to inform we had a surgery cancellation on 11/2/22 and that pts surgery times was changed to 10am arrival 8am and to CB w/ any questions

## 2022-11-01 ENCOUNTER — ANESTHESIA EVENT (OUTPATIENT)
Dept: OPERATING ROOM | Age: 77
End: 2022-11-01
Payer: COMMERCIAL

## 2022-11-01 PROBLEM — R77.8 ELEVATED TROPONIN: Status: RESOLVED | Noted: 2022-10-02 | Resolved: 2022-11-01

## 2022-11-01 PROBLEM — R79.89 ELEVATED TROPONIN: Status: RESOLVED | Noted: 2022-10-02 | Resolved: 2022-11-01

## 2022-11-02 ENCOUNTER — ANESTHESIA (OUTPATIENT)
Dept: OPERATING ROOM | Age: 77
End: 2022-11-02
Payer: COMMERCIAL

## 2022-11-02 ENCOUNTER — APPOINTMENT (OUTPATIENT)
Dept: GENERAL RADIOLOGY | Age: 77
End: 2022-11-02
Attending: SURGERY
Payer: COMMERCIAL

## 2022-11-02 ENCOUNTER — HOSPITAL ENCOUNTER (OUTPATIENT)
Age: 77
Setting detail: OUTPATIENT SURGERY
Discharge: HOME | End: 2022-11-02
Attending: SURGERY | Admitting: SURGERY
Payer: COMMERCIAL

## 2022-11-02 VITALS
RESPIRATION RATE: 16 BRPM | SYSTOLIC BLOOD PRESSURE: 126 MMHG | HEART RATE: 67 BPM | OXYGEN SATURATION: 95 % | BODY MASS INDEX: 30.07 KG/M2 | DIASTOLIC BLOOD PRESSURE: 61 MMHG | TEMPERATURE: 97.2 F | WEIGHT: 203 LBS | HEIGHT: 69 IN

## 2022-11-02 DIAGNOSIS — G89.18 POSTOPERATIVE PAIN: Primary | ICD-10-CM

## 2022-11-02 LAB
ANION GAP SERPL CALCULATED.3IONS-SCNC: 9 MMOL/L (ref 3–16)
BUN BLDV-MCNC: 18 MG/DL (ref 7–20)
CALCIUM SERPL-MCNC: 8.6 MG/DL (ref 8.3–10.6)
CHLORIDE BLD-SCNC: 101 MMOL/L (ref 99–110)
CO2: 27 MMOL/L (ref 21–32)
CREAT SERPL-MCNC: 0.8 MG/DL (ref 0.8–1.3)
GFR SERPL CREATININE-BSD FRML MDRD: >60 ML/MIN/{1.73_M2}
GLUCOSE BLD-MCNC: 75 MG/DL (ref 70–99)
GLUCOSE BLD-MCNC: 84 MG/DL (ref 70–99)
GLUCOSE BLD-MCNC: 95 MG/DL (ref 70–99)
PERFORMED ON: NORMAL
PERFORMED ON: NORMAL
POTASSIUM REFLEX MAGNESIUM: 3.6 MMOL/L (ref 3.5–5.1)
SODIUM BLD-SCNC: 137 MMOL/L (ref 136–145)

## 2022-11-02 PROCEDURE — 3600000014 HC SURGERY LEVEL 4 ADDTL 15MIN: Performed by: SURGERY

## 2022-11-02 PROCEDURE — 6360000002 HC RX W HCPCS

## 2022-11-02 PROCEDURE — 2709999900 HC NON-CHARGEABLE SUPPLY: Performed by: SURGERY

## 2022-11-02 PROCEDURE — 77001 FLUOROGUIDE FOR VEIN DEVICE: CPT

## 2022-11-02 PROCEDURE — A4217 STERILE WATER/SALINE, 500 ML: HCPCS | Performed by: SURGERY

## 2022-11-02 PROCEDURE — C1788 PORT, INDWELLING, IMP: HCPCS | Performed by: SURGERY

## 2022-11-02 PROCEDURE — 3209999900 FLUORO FOR SURGICAL PROCEDURES

## 2022-11-02 PROCEDURE — 7100000010 HC PHASE II RECOVERY - FIRST 15 MIN: Performed by: SURGERY

## 2022-11-02 PROCEDURE — 80048 BASIC METABOLIC PNL TOTAL CA: CPT

## 2022-11-02 PROCEDURE — 2500000003 HC RX 250 WO HCPCS: Performed by: SURGERY

## 2022-11-02 PROCEDURE — 6360000002 HC RX W HCPCS: Performed by: SURGERY

## 2022-11-02 PROCEDURE — 36415 COLL VENOUS BLD VENIPUNCTURE: CPT

## 2022-11-02 PROCEDURE — 2580000003 HC RX 258: Performed by: SURGERY

## 2022-11-02 PROCEDURE — 3700000000 HC ANESTHESIA ATTENDED CARE: Performed by: SURGERY

## 2022-11-02 PROCEDURE — 76937 US GUIDE VASCULAR ACCESS: CPT | Performed by: SURGERY

## 2022-11-02 PROCEDURE — 7100000011 HC PHASE II RECOVERY - ADDTL 15 MIN: Performed by: SURGERY

## 2022-11-02 PROCEDURE — 36561 INSERT TUNNELED CV CATH: CPT | Performed by: SURGERY

## 2022-11-02 PROCEDURE — 3700000001 HC ADD 15 MINUTES (ANESTHESIA): Performed by: SURGERY

## 2022-11-02 PROCEDURE — 3600000004 HC SURGERY LEVEL 4 BASE: Performed by: SURGERY

## 2022-11-02 PROCEDURE — 77001 FLUOROGUIDE FOR VEIN DEVICE: CPT | Performed by: SURGERY

## 2022-11-02 PROCEDURE — 2580000003 HC RX 258: Performed by: ANESTHESIOLOGY

## 2022-11-02 DEVICE — PORT INFUS SGL LUMN ATTCH POLYUR OPN END CATH 8FR POWERPRT: Type: IMPLANTABLE DEVICE | Site: CHEST | Status: FUNCTIONAL

## 2022-11-02 RX ORDER — SODIUM CHLORIDE 9 MG/ML
INJECTION, SOLUTION INTRAVENOUS PRN
Status: DISCONTINUED | OUTPATIENT
Start: 2022-11-02 | End: 2022-11-02 | Stop reason: HOSPADM

## 2022-11-02 RX ORDER — SODIUM CHLORIDE 0.9 % (FLUSH) 0.9 %
5-40 SYRINGE (ML) INJECTION PRN
Status: DISCONTINUED | OUTPATIENT
Start: 2022-11-02 | End: 2022-11-02 | Stop reason: HOSPADM

## 2022-11-02 RX ORDER — PROPOFOL 10 MG/ML
INJECTION, EMULSION INTRAVENOUS CONTINUOUS PRN
Status: DISCONTINUED | OUTPATIENT
Start: 2022-11-02 | End: 2022-11-02 | Stop reason: SDUPTHER

## 2022-11-02 RX ORDER — MEPERIDINE HYDROCHLORIDE 50 MG/ML
12.5 INJECTION INTRAMUSCULAR; INTRAVENOUS; SUBCUTANEOUS EVERY 5 MIN PRN
Status: DISCONTINUED | OUTPATIENT
Start: 2022-11-02 | End: 2022-11-02 | Stop reason: HOSPADM

## 2022-11-02 RX ORDER — OXYCODONE HYDROCHLORIDE AND ACETAMINOPHEN 5; 325 MG/1; MG/1
1 TABLET ORAL EVERY 4 HOURS PRN
Qty: 12 TABLET | Refills: 0 | Status: SHIPPED | OUTPATIENT
Start: 2022-11-02 | End: 2022-11-05

## 2022-11-02 RX ORDER — SODIUM CHLORIDE 0.9 % (FLUSH) 0.9 %
5-40 SYRINGE (ML) INJECTION EVERY 12 HOURS SCHEDULED
Status: DISCONTINUED | OUTPATIENT
Start: 2022-11-02 | End: 2022-11-02 | Stop reason: HOSPADM

## 2022-11-02 RX ORDER — LIDOCAINE HYDROCHLORIDE 10 MG/ML
1 INJECTION, SOLUTION EPIDURAL; INFILTRATION; INTRACAUDAL; PERINEURAL
Status: DISCONTINUED | OUTPATIENT
Start: 2022-11-02 | End: 2022-11-02 | Stop reason: HOSPADM

## 2022-11-02 RX ORDER — SODIUM CHLORIDE, SODIUM LACTATE, POTASSIUM CHLORIDE, CALCIUM CHLORIDE 600; 310; 30; 20 MG/100ML; MG/100ML; MG/100ML; MG/100ML
INJECTION, SOLUTION INTRAVENOUS CONTINUOUS
Status: DISCONTINUED | OUTPATIENT
Start: 2022-11-02 | End: 2022-11-02 | Stop reason: HOSPADM

## 2022-11-02 RX ORDER — ONDANSETRON 2 MG/ML
4 INJECTION INTRAMUSCULAR; INTRAVENOUS
Status: DISCONTINUED | OUTPATIENT
Start: 2022-11-02 | End: 2022-11-02 | Stop reason: HOSPADM

## 2022-11-02 RX ORDER — ACETAMINOPHEN 500 MG
1000 TABLET ORAL
Status: DISCONTINUED | OUTPATIENT
Start: 2022-11-02 | End: 2022-11-02 | Stop reason: HOSPADM

## 2022-11-02 RX ORDER — PROPOFOL 10 MG/ML
INJECTION, EMULSION INTRAVENOUS PRN
Status: DISCONTINUED | OUTPATIENT
Start: 2022-11-02 | End: 2022-11-02 | Stop reason: SDUPTHER

## 2022-11-02 RX ORDER — DROPERIDOL 2.5 MG/ML
0.62 INJECTION, SOLUTION INTRAMUSCULAR; INTRAVENOUS
Status: DISCONTINUED | OUTPATIENT
Start: 2022-11-02 | End: 2022-11-02 | Stop reason: HOSPADM

## 2022-11-02 RX ORDER — OXYCODONE HYDROCHLORIDE 5 MG/1
5 TABLET ORAL EVERY 30 MIN PRN
Status: DISCONTINUED | OUTPATIENT
Start: 2022-11-02 | End: 2022-11-02 | Stop reason: HOSPADM

## 2022-11-02 RX ADMIN — CEFAZOLIN 2000 MG: 10 INJECTION, POWDER, FOR SOLUTION INTRAVENOUS at 10:10

## 2022-11-02 RX ADMIN — SODIUM CHLORIDE, SODIUM LACTATE, POTASSIUM CHLORIDE, AND CALCIUM CHLORIDE: .6; .31; .03; .02 INJECTION, SOLUTION INTRAVENOUS at 10:05

## 2022-11-02 RX ADMIN — PROPOFOL 100 MCG/KG/MIN: 10 INJECTION, EMULSION INTRAVENOUS at 10:13

## 2022-11-02 RX ADMIN — PROPOFOL 50 MG: 10 INJECTION, EMULSION INTRAVENOUS at 10:13

## 2022-11-02 ASSESSMENT — ENCOUNTER SYMPTOMS: SHORTNESS OF BREATH: 1

## 2022-11-02 ASSESSMENT — PAIN - FUNCTIONAL ASSESSMENT: PAIN_FUNCTIONAL_ASSESSMENT: NONE - DENIES PAIN

## 2022-11-02 ASSESSMENT — PAIN SCALES - GENERAL: PAINLEVEL_OUTOF10: 0

## 2022-11-02 NOTE — ANESTHESIA PRE PROCEDURE
Department of Anesthesiology  Preprocedure Note       Name:  Jennifer Shipman   Age:  65 y.o.  :  1945                                          MRN:  [id number]         Date:  2022      Surgeon: Katya Webb):  Justice Morrissey MD    Procedure: Procedure(s):  SURGICAL PORT PLACEMENT    Medications prior to admission:   Prior to Admission medications    Medication Sig Start Date End Date Taking? Authorizing Provider   insulin glargine, 1 unit dial, (TOUJEO SOLOSTAR) 300 UNIT/ML concentrated injection pen Inject 40 Units into the skin nightly  Patient taking differently: Inject 70 Units into the skin nightly 10/6/22   Mendel Martinez MD   aspirin 81 MG EC tablet Take 1 tablet by mouth daily 10/6/22   PUSHPA Rojo CNP   metoprolol succinate (TOPROL XL) 50 MG extended release tablet Take 1 tablet by mouth daily 10/6/22   PUSHPA Rojo CNP   amLODIPine (NORVASC) 5 MG tablet Take 1 tablet by mouth daily 10/6/22   PUSHPA Rojo CNP   atorvastatin (LIPITOR) 40 MG tablet Take 1 tablet by mouth nightly 10/5/22   PUSHPA Rojo CNP   tamsulosin (FLOMAX) 0.4 MG capsule Take 0.4 mg by mouth daily    Historical Provider, MD   folic acid (FOLVITE) 1 MG tablet Take 1 mg by mouth daily    Historical Provider, MD   losartan (COZAAR) 100 MG tablet Take 100 mg by mouth daily    Historical Provider, MD   Multiple Vitamins-Minerals (THERAPEUTIC MULTIVITAMIN-MINERALS) tablet Take 1 tablet by mouth daily    Historical Provider, MD   vitamin D3 (CHOLECALCIFEROL) 10 MCG (400 UNIT) TABS tablet Take 400 Units by mouth daily    Historical Provider, MD   insulin aspart (NOVOLOG) 100 UNIT/ML injection vial Inject into the skin 3 times daily (before meals) Sliding scale    Historical Provider, MD       Current medications:    No current facility-administered medications for this visit. No current outpatient medications on file.      Facility-Administered Medications Ordered in Other Visits   Medication Dose Route Frequency Provider Last Rate Last Admin    lidocaine PF 1 % injection 1 mL  1 mL IntraDERmal Once PRN Isaiah Richardson MD        lactated ringers infusion   IntraVENous Continuous Isaiah Richardson MD        sodium chloride flush 0.9 % injection 5-40 mL  5-40 mL IntraVENous 2 times per day Isaiah Richardson MD        sodium chloride flush 0.9 % injection 5-40 mL  5-40 mL IntraVENous PRN Isaiah Richardson MD        0.9 % sodium chloride infusion   IntraVENous PRN Isaiah Richardson MD        sodium chloride flush 0.9 % injection 5-40 mL  5-40 mL IntraVENous 2 times per day Justice Morrissey MD        sodium chloride flush 0.9 % injection 5-40 mL  5-40 mL IntraVENous PRN Justice Morrissey MD        0.9 % sodium chloride infusion   IntraVENous PRN Justice Morrissey MD        ceFAZolin (ANCEF) 2000 mg in dextrose 5 % 100 mL IVPB  2,000 mg IntraVENous On Call to [address] [place] [address], MD           Allergies:  No Known Allergies    Problem List:    Patient Active Problem List   Diagnosis Code    Transient global amnesia G45.4    Acute encephalopathy G93.40    Hemispheric carotid artery syndrome G45.1    Uncontrolled type 2 diabetes mellitus with complication, without long-term current use of insulin [id number]    HTN (hypertension), benign I10    Dyslipidemia E78.5    Urolithiasis N20.9    SOB (shortness of breath) R06.02    Left renal stone N20.0    Lung cancer ([address] C34.90    Shortness of breath R06.02    Gram-negative pneumonia ([address] J15.6    Sepsis ([address] A41.9    Hyperglycemia due to type 2 diabetes mellitus ([address] E11.65    Neutropenia ([address] D70.9    Cancer of right lung ([address] C34.91    Immunocompromised state due to drug therapy ([address] D84.821, Z79.899    Pancytopenia ([address] D61.818    Gram-negative bacteremia R78.81       Past Medical History:        Diagnosis Date    Cancer [facility]) 2016    prostate    Diabetes mellitus [facility]     Hyperlipidemia     Hypertension     Kidney stone     Lung cancer ([facility] 10/02/2021    Stage 4    Lung cancer [facility])        Past Surgical History:        Procedure Laterality Date    BACK SURGERY  2012    lumber, no hardware, \"cleaned it out\"    CYSTOSCOPY Right 2020    CYSTOSCOPY,  RIGHT URETEROSCOPY, RIGHT RETRO PYELOGRAM, REMOVAL STONE FROM BLADDER, URETHRAL DILITATION performed by Keyla Barrios MD at [facility] NEPHROLITHOTOMY Left 2020    LEFT PERCUTANEOUS NEPHROLITHOTOMY WITH DR Sunday Barber TO PLACE NEPHROSTOMY TUBE PRIOR performed by Eugenio Pastrana MD at [address] [place] [address] N/A 2021    SURGICAL PORT PLACEMENT performed by David Ibrahim MD at [facility] N/A 10/04/2022    PORT REMOVAL performed by Jeanne Manuel MD at [address] [facility]    SHOULDER SURGERY Right     ROTATOR CUFF       Social History:    Social History     Tobacco Use    Smoking status: Former     Types: Cigarettes     Start date: 3/22/1960     Quit date: 1976     Years since quittin.9    Smokeless tobacco: Never   Substance Use Topics    Alcohol use: No                                Counseling given: Not Answered      Vital Signs (Current): There were no vitals filed for this visit.                                            BP Readings from Last 3 Encounters:   22 (!) 144/60   10/06/22 139/67   22 (!) 131/56       NPO Status:                                                                                 BMI:   Wt Readings from Last 3 Encounters:   10/27/22 203 lb (92.1 kg)   10/06/22 195 lb (88.5 kg)   22 197 lb (89.4 kg)     There is no height or weight on file to calculate BMI.    CBC:   Lab Results   Component Value Date/Time    WBC 7.3 10/06/2022 08:07 AM    RBC 2.73 10/06/2022 08:07 AM    HGB 7.8 10/06/2022 08:07 AM    HCT 23.3 10/06/2022 08:07 AM    MCV 85.4 10/06/2022 08:07 AM RDW 18.4 10/06/2022 08:07 AM    PLT 75 10/06/2022 08:07 AM       CMP:   Lab Results   Component Value Date/Time     10/06/2022 08:07 AM    K 3.8 10/06/2022 08:07 AM     10/06/2022 08:07 AM    CO2 26 10/06/2022 08:07 AM    BUN 9 10/06/2022 08:07 AM    CREATININE 0.7 10/06/2022 08:07 AM    GFRAA >60 10/06/2022 08:07 AM    AGRATIO 1.1 10/01/2022 09:56 PM    LABGLOM >60 10/06/2022 08:07 AM    GLUCOSE 136 10/06/2022 08:07 AM    PROT 5.7 10/01/2022 09:56 PM    CALCIUM 8.1 10/06/2022 08:07 AM    BILITOT 0.7 10/01/2022 09:56 PM    ALKPHOS 122 10/01/2022 09:56 PM    AST 25 10/01/2022 09:56 PM    ALT 18 10/01/2022 09:56 PM       POC Tests:   Recent Labs     11/02/22  0811   POCGLU 84       Coags:   Lab Results   Component Value Date/Time    PROTIME 14.6 10/02/2022 05:20 AM    INR 1.15 10/02/2022 05:20 AM    APTT 35.3 10/02/2022 05:20 AM       HCG (If Applicable): No results found for: PREGTESTUR, PREGSERUM, HCG, HCGQUANT     ABGs: No results found for: PHART, PO2ART, PPW4CED, CPL1BZN, BEART, W5FHQOLU     Type & Screen (If Applicable):  No results found for: LABABO, LABRH    Drug/Infectious Status (If Applicable):  No results found for: HIV, HEPCAB    COVID-19 Screening (If Applicable):   Lab Results   Component Value Date/Time    COVID19 NOT DETECTED 10/01/2022 09:56 PM    COVID19 Not Detected 10/29/2021 11:44 AM           Anesthesia Evaluation  Patient summary reviewed and Nursing notes reviewed no history of anesthetic complications:   Airway: Mallampati: III  TM distance: >3 FB   Neck ROM: full  Mouth opening: > = 3 FB   Dental:    (+) upper dentures and lower dentures      Pulmonary: breath sounds clear to auscultation  (+) pneumonia:  shortness of breath:                             Cardiovascular:    (+) hypertension: no interval change,         Rhythm: regular  Rate: normal                    Neuro/Psych:   (+) TIA,             GI/Hepatic/Renal:             Endo/Other:    (+) Diabetes, . Abdominal:             Vascular: Other Findings:             Anesthesia Plan      MAC and general     ASA 3     (MAC vs General as needed)  Induction: intravenous. Anesthetic plan and risks discussed with patient. Plan discussed with CRNA.     Attending anesthesiologist reviewed and agrees with Preprocedure content                Sara Galloway MD   11/2/2022

## 2022-11-02 NOTE — PROGRESS NOTES
Reviewed discharge instruction with patients family and patient.  No questions or concerns at this time  Leticia Wang RN

## 2022-11-02 NOTE — PROGRESS NOTES
Pt arrived to [facility] alert and oriented, vss.  RN and Anesthesia consents signed with pt and verb understanding.   Call light in reach, wife at [facility]

## 2022-11-02 NOTE — H&P
I have reviewed the H&P by Dr Donald Nicole and examined this patient. I have reviewed with the patient and /or family the risks, benefits and alternatives to this procedure and they seem to understand and agree to proceed.   Carmel Blackburn MD

## 2022-11-02 NOTE — PROGRESS NOTES
Pt brought to [facility]. Report obtained from OR RN and Anesthesia. Pt awake and able to following commands. Left chest surgical port C/D/I.  Patient denies any pain or nausea at this time  Akiko Abraham, RN

## 2022-11-02 NOTE — OP NOTE
tunnelling device was then passed from the chest incision up to the wire site in the subcutaneous plane. The catheter tubing was now brought down from the neck to the chest incision. The dilator and sheath were then passed over the wire into the vein without difficulty. The dilator and wire were removed. The catheter tubing was inserted into the sheath and then the sheath cracked and removed. Fluoroscopy was used to position the catheter such that the tip was in the distal superior vena cava just above the heart. The catheter easily aspirated blood and was flushed with heparinized saline. The catheter was then cut at 28 cm and attached to the port. The port was placed into the pocket and secured to the chest wall w/ two 2-0 Prolene sutures. The port was accessed with a Tirado needle, and once again it aspirated easily and was flushed again w/ heparinized saline. Final fluoroscopic imaging confirmed good position of the port and catheter. Chest wall incision was closed with 3-0 Vicryl subcutaneous followed by 4-0 Monocryl subcuticular sutures. The patient tolerated the procedure well. Sponge, needle, and instrument counts were correct.            Electronically signed by Ronald Dodge MD on 11/2/2022 at 11:07 AM

## 2022-11-02 NOTE — DISCHARGE INSTRUCTIONS
[facility] AND [facility]. Dori Puente M.D. [address] [facility] [address] [address] [place]                [address] M[address]D. [address] [facility] , [address]         [place], [address] [facility] All Damon M.D                         [phone]          [facility] Gilbert Amaro M.D. [facility]      POST-OPERATIVE INSTRUCTIONS FOLLOWING MEDIPORT INSERTION    Call the office to if you have questions or concerns. You will have surgical glue closing your incisions. .     You may shower. Wash incision gently, and pat dry. Do not rub your incisions. Do NOT drive for one week and while taking your narcotic pain medicine. You will have pain medicine ordered. Take as directed.      Watch for signs of infection:    Fever over 100.5°     Excessive warmth or bright redness around your incisions   Leakage of bloody or cloudy fluid from you incisions

## 2022-11-02 NOTE — ANESTHESIA POSTPROCEDURE EVALUATION
Department of Anesthesiology  Postprocedure Note    Patient: Manju Tyson  MRN: [id number]  YOB: 1945  Date of evaluation: 11/2/2022      Procedure Summary     Date: 11/02/22 Room / Location: [address] [place] [address]    Anesthesia Start: 1008 Anesthesia Stop: 4711    Procedure: SURGICAL PORT PLACEMENT (Left: Chest) Diagnosis:       Malignant neoplasm of overlapping sites of left lung ([address]      (NON SMALL CELL LUNG CANCER)    Surgeons: David Ibrahim MD Responsible Provider: Patric Zheng MD    Anesthesia Type: MAC, general ASA Status: 3          Anesthesia Type: No value filed.     Antonella Phase I: Antonella Score: 10    Antonella Phase II: Antonella Score: 9      Anesthesia Post Evaluation    Patient location during evaluation: PACU  Patient participation: complete - patient participated  Level of consciousness: awake and alert  Airway patency: patent  Nausea & Vomiting: no nausea and no vomiting  Complications: no  Cardiovascular status: hemodynamically stable  Respiratory status: acceptable, room air and spontaneous ventilation  Hydration status: stable  Comments: ---------------------------               11/02/22                      1125         ---------------------------   BP:          126/61         Pulse:         67           Resp:          16           Temp:   97.2 °F (36.2 °C)   SpO2:          95%         ---------------------------

## 2022-11-14 NOTE — PROGRESS NOTES
[address]  Office Visit    Roxana Tidwell  1945    November 21, 2022    CC:   Chief Complaint   Patient presents with    Follow-Up from Hospital    Hypertension    Shortness of Breath     HPI:  The patient is 65 y.o. male with a past medical history significant for nonsmall cell lung CA, HTN, HLP and diabetes mellitus who is here for hospital follow up. He was hospitalized at [facility] from 10/1/2022-10/6/2022 after presenting with fever, fatigue, and SOB. He is currently undergoing chemo for his lung CA. Noted to have an elevated troponin and seen by cardiology. Stress testing was performed and demonstrated inferior ischemia and echo with LVEF 55% with no WMA and maintained on medical management given his multiple comorbidiities. Overall feeling okay. He has chronic SOB with activity which has remained stable for last several months. Continues with chemo for his lung CA. Denies chest pain/discomfort,  orthopnea/PND, cough, palpitations,  syncope, edema , weight change or claudication. Has occasional dizziness with his swing in his blood sugar. Review of Systems:  Constitutional: Denies fatigue, weakness, night sweats or fever. HEENT: Denies new visual changes, ringing in ears, nosebleeds,nasal congestion  Respiratory: Denies new or change in SOB, PND, orthopnea or cough. Cardiovascular: see HPI  GI: Denies N/V, diarrhea, constipation, abdominal pain, change in bowel habits, melena or hematochezia  : Denies urinary frequency, urgency, incontinence, hematuria or dysuria. Skin: Denies rash, hives, or cyanosis  Musculoskeletal: Denies joint or muscle aches/pain. + difficulty straightening up for ambulation (longstanding)  Neurological: Denies syncope or TIA-like symptoms.   Psychiatric: Denies anxiety, insomnia or depression      Past Medical History:   Diagnosis Date    Cancer ([facility] 2016    prostate    Diabetes mellitus ([facility]     Hyperlipidemia     Hypertension     Kidney stone     Lung cancer ([facility] 10/02/2021    Stage 4    Lung cancer [facility])      Past Surgical History:   Procedure Laterality Date    BACK SURGERY  2012    lumber, no hardware, \"cleaned it out\"    CYSTOSCOPY Right 2020    CYSTOSCOPY,  RIGHT URETEROSCOPY, RIGHT RETRO PYELOGRAM, REMOVAL STONE FROM BLADDER, URETHRAL DILITATION performed by Moses Herman MD at [facility]    NEPHROLITHOTOMY Left 2020    LEFT PERCUTANEOUS NEPHROLITHOTOMY WITH DR Heriberto Burch TO PLACE NEPHROSTOMY TUBE PRIOR performed by Milo Morgan MD at [place] [address] N/A 2021    SURGICAL PORT PLACEMENT performed by Justice Morrissey MD at [address] [place] [address] N/A 10/04/2022    PORT REMOVAL performed by Imelda Joya MD at [place] [address] Left 2022    SURGICAL PORT PLACEMENT performed by Justice Morrissey MD at [address] [place] [address] Right     ROTATOR CUFF     History reviewed. No pertinent family history.   Social History     Tobacco Use    Smoking status: Former     Types: Cigarettes     Start date: 3/22/1960     Quit date: 1976     Years since quittin.0    Smokeless tobacco: Never   Vaping Use    Vaping Use: Never used   Substance Use Topics    Alcohol use: No    Drug use: No       No Known Allergies  Current Outpatient Medications   Medication Sig Dispense Refill    dexamethasone (DECADRON) 4 MG tablet TAKE 1 TABLET BY MOUTH TWICE DAILY -TAKE ON THE DAY BEFORE, DAY OF, AND DAY AFTER PEMETREXED TREATMENT- TOTAL DAILY DOSE IS 8 MG      atorvastatin (LIPITOR) 40 MG tablet Take 1 tablet by mouth nightly 90 tablet 2    metoprolol succinate (TOPROL XL) 50 MG extended release tablet Take 1 tablet by mouth daily 90 tablet 2    amLODIPine (NORVASC) 5 MG tablet Take 1 tablet by mouth daily 90 tablet 2    insulin glargine, 1 unit dial, (TOUJEO SOLOSTAR) 300 UNIT/ML concentrated injection pen Inject 40 Units into the skin nightly (Patient taking differently: Inject 70 Units into the skin nightly) 1 pen 0    aspirin 81 MG EC tablet Take 1 tablet by mouth daily 30 tablet 3    tamsulosin (FLOMAX) 0.4 MG capsule Take 0.4 mg by mouth daily      folic acid (FOLVITE) 1 MG tablet Take 1 mg by mouth daily      losartan (COZAAR) 100 MG tablet Take 100 mg by mouth daily      Multiple Vitamins-Minerals (THERAPEUTIC MULTIVITAMIN-MINERALS) tablet Take 1 tablet by mouth daily      vitamin D3 (CHOLECALCIFEROL) 10 MCG (400 UNIT) TABS tablet Take 400 Units by mouth daily      insulin aspart (NOVOLOG) 100 UNIT/ML injection vial Inject into the skin 3 times daily (before meals) Sliding scale       No current facility-administered medications for this visit. Physical Exam:   /62 (Site: Right Upper Arm, Position: Sitting, Cuff Size: Medium Adult)   Pulse 81   Ht 5' 9\" (1.753 m)   Wt 204 lb 8 oz (92.8 kg)   SpO2 96%   BMI 30.20 kg/m²   Wt Readings from Last 2 Encounters:   11/21/22 204 lb 8 oz (92.8 kg)   10/27/22 203 lb (92.1 kg)     Constitutional: He is oriented to person, place, and time. He appears elderly, well-developed and well-nourished. In no acute distress. HEENT: Normocephalic and atraumatic. Sclerae anicteric. No xanthelasmas. Neck: Supple. No JVD present. Carotids without bruits. No thyromegaly present. Cardiovascular: RRR, normal S1 and S2; no murmur/gallop or rub  Pulmonary/Chest: Effort normal.  Lungs with diminished breath sounds bilaterally  Abdominal: soft, nontender, nondistended. + bowel sounds; no hepatomegaly  Extremities: No edema, cyanosis, or clubbing. Pulses are 2+ radial/carotid/DP bilaterally. Cap refill brisk. Neurological: No focal deficit. Skin: Skin is warm and dry. Psychiatric: He has a normal mood and affect.  His speech is normal and behavior is normal.      Lab Review:   No results found for: TRIG, HDL, LDLCALC, LDLDIRECT, LABVLDL  Lab Results   Component Value Date/Time     11/02/2022 08:08 AM    SHIRIN 3.6 11/02/2022 08:08 AM     11/02/2022 08:08 AM    CO2 27 11/02/2022 08:08 AM    BUN 18 11/02/2022 08:08 AM    CREATININE 0.8 11/02/2022 08:08 AM    GLUCOSE 95 11/02/2022 08:08 AM    CALCIUM 8.6 11/02/2022 08:08 AM      Lab Results   Component Value Date    WBC 7.3 10/06/2022    HGB 7.8 (L) 10/06/2022    HCT 23.3 (L) 10/06/2022    MCV 85.4 10/06/2022    PLT 75 (L) 10/06/2022     Echo 10/4/2022:   Normal size left ventricle and wall thickness. Normal left ventricular systolic function with ejection fraction of 55%. No regional wall motion abnormalites are seen. Grade I diastolic dysfunction with normal filling pressure. The right ventricle is normal in size and function. Mild mitral and pulmonic regurgitation. Systolic pulmonic artery pressure (SPAP) is normal estimated at 28 mmHg   (Right atrial pressure of 3 mmHg). 10/2/2022 Lexiscan-Myoview:  Normal LVEF >60%    Normal wall motion    Moderate base to mid inferior ischemia, extends to basal lateral wall        Overall, this would be considered an abnormal, intermediate risk, study          10/1/2022 CTPA:  No evidence of pulmonary embolism. Moderate size pleural effusion which may be loculated at the left lower   pleural cavity. Mild area of consolidation at the basal segment of the left   lower lobe which could represent atelectasis versus airspace disease process. Mild scattered patchy opacities which are nonspecific but could indicate an   infectious/inflammatory etiology. Echo 11/25/2020:   Technically difficult examination due to poor apical windows. Left ventricular systolic function is normal with a visually estimated   ejection fraction of 55%. The left ventricle is normal in size with mild septal hypertrophy. No regional wall motion abnormalities are noted. Normal left ventricular diastolic function. Mild bi-atrial enlargement. Mild mitral regurgitation.    The IVC is dilated in size (>2.1 cm) but collapses >50% with respiration   consistent with elevated right atrial pressures (8 mmHg) . Assessment:    1. Coronary artery calcification seen on CT scan  -recent NSTEMI (elevated troponin) in setting of sepsis  -recent CT demonstrates moderate coronary calcifications  -no chest pain or discomfort  -recent stress test with inferior ischemia  -recent echo with pLVEF and no wall motion abnormalities  -continue medical management with ASA, statin and BB  -risk factor modification    2. SOB (shortness of breath)  -chronic SOBOE  -suspect largely d/t lung CA and deconditioning; ? Anginal equivalent  -no overt sxs of CHF  -continue medical management for CAD    3. HTN (hypertension), benign  -controlled and @ goal < 130/80  -continue medical management    4. Dyslipidemia  -on statin    5. Malignant neoplasm of hilus of lung, unspecified laterality (HCC)  -anemia and thrombocytopenia on recent labs during hospital stay  -recent CT shows unchanged hepatic metastases, ? L2 lesion  -continue follow up with Hem-Onc; remains on chemo     6. Diabetes Mellitus  -Rx per Primary team    Plan:  Continue Amlodipine, ASA, atorvastatin, Toprol, losartan  Discussed low fat/low sodium diet and reinforced regular activity as tolerated  Advised to call if worsening SOB or chest pain  Given his current lung CA with mets and pancytopenia would continue with conservative medical management (recent echo with WMA and pEF)  RX's refilled  Follow up with Dr. Dick Avilez in 4 months or sooner if needed    Return in about 4 months (around 3/21/2023) for 4-5 months with Dr. Dick Avilez. Thanks for allowing me to participate in the care of this patient.       YOANA Vee  [address], [address] [facility] [address] [place] [address]  Office: [phone]  Fax: [phone]      Electronically signed by PUSHPA Kc CNP on 11/21/2022 at 2:32 PM

## 2022-11-17 ENCOUNTER — HOSPITAL ENCOUNTER (OUTPATIENT)
Dept: CT IMAGING | Age: 77
Discharge: HOME | End: 2022-11-17
Payer: COMMERCIAL

## 2022-11-17 DIAGNOSIS — C34.82 MALIGNANT NEOPLASM OF OVERLAPPING SITES OF LEFT LUNG (HCC): ICD-10-CM

## 2022-11-17 PROCEDURE — 6360000004 HC RX CONTRAST MEDICATION: Performed by: INTERNAL MEDICINE

## 2022-11-17 PROCEDURE — 71260 CT THORAX DX C+: CPT

## 2022-11-17 RX ADMIN — DIATRIZOATE MEGLUMINE AND DIATRIZOATE SODIUM 12 ML: 660; 100 LIQUID ORAL; RECTAL at 09:08

## 2022-11-17 RX ADMIN — IOPAMIDOL 75 ML: 755 INJECTION, SOLUTION INTRAVENOUS at 09:14

## 2022-11-21 ENCOUNTER — OFFICE VISIT (OUTPATIENT)
Dept: CARDIOLOGY CLINIC | Age: 77
End: 2022-11-21
Payer: COMMERCIAL

## 2022-11-21 VITALS
WEIGHT: 204.5 LBS | BODY MASS INDEX: 30.29 KG/M2 | SYSTOLIC BLOOD PRESSURE: 118 MMHG | HEART RATE: 81 BPM | DIASTOLIC BLOOD PRESSURE: 62 MMHG | OXYGEN SATURATION: 96 % | HEIGHT: 69 IN

## 2022-11-21 DIAGNOSIS — I21.4 NSTEMI (NON-ST ELEVATED MYOCARDIAL INFARCTION) (HCC): ICD-10-CM

## 2022-11-21 DIAGNOSIS — E78.5 DYSLIPIDEMIA: ICD-10-CM

## 2022-11-21 DIAGNOSIS — R06.02 SOB (SHORTNESS OF BREATH): ICD-10-CM

## 2022-11-21 DIAGNOSIS — C34.00 MALIGNANT NEOPLASM OF HILUS OF LUNG, UNSPECIFIED LATERALITY (HCC): ICD-10-CM

## 2022-11-21 DIAGNOSIS — I10 HTN (HYPERTENSION), BENIGN: ICD-10-CM

## 2022-11-21 DIAGNOSIS — I25.10 CORONARY ARTERY CALCIFICATION SEEN ON CT SCAN: Primary | ICD-10-CM

## 2022-11-21 PROCEDURE — 99214 OFFICE O/P EST MOD 30 MIN: CPT | Performed by: NURSE PRACTITIONER

## 2022-11-21 PROCEDURE — 3078F DIAST BP <80 MM HG: CPT | Performed by: NURSE PRACTITIONER

## 2022-11-21 PROCEDURE — 1123F ACP DISCUSS/DSCN MKR DOCD: CPT | Performed by: NURSE PRACTITIONER

## 2022-11-21 PROCEDURE — 3074F SYST BP LT 130 MM HG: CPT | Performed by: NURSE PRACTITIONER

## 2022-11-21 RX ORDER — AMLODIPINE BESYLATE 5 MG/1
5 TABLET ORAL DAILY
Qty: 90 TABLET | Refills: 2 | Status: SHIPPED | OUTPATIENT
Start: 2022-11-21

## 2022-11-21 RX ORDER — DEXAMETHASONE 4 MG/1
TABLET ORAL
COMMUNITY
Start: 2022-11-07

## 2022-11-21 RX ORDER — ATORVASTATIN CALCIUM 40 MG/1
40 TABLET, FILM COATED ORAL NIGHTLY
Qty: 90 TABLET | Refills: 2 | Status: SHIPPED | OUTPATIENT
Start: 2022-11-21

## 2022-11-21 RX ORDER — METOPROLOL SUCCINATE 50 MG/1
50 TABLET, EXTENDED RELEASE ORAL DAILY
Qty: 90 TABLET | Refills: 2 | Status: SHIPPED | OUTPATIENT
Start: 2022-11-21

## 2022-11-21 NOTE — Clinical Note
Dr. Ryan Alves: Not sure if SOBOE all his lung CA. Ct with moderate calcifications, however given his lung disease with mets and pancytopenia continuing with conservative Rx. OK with you?

## 2022-12-18 ENCOUNTER — HOSPITAL ENCOUNTER (EMERGENCY)
Age: 77
Discharge: HOME | End: 2022-12-18
Attending: STUDENT IN AN ORGANIZED HEALTH CARE EDUCATION/TRAINING PROGRAM
Payer: COMMERCIAL

## 2022-12-18 ENCOUNTER — APPOINTMENT (OUTPATIENT)
Dept: CT IMAGING | Age: 77
End: 2022-12-18
Payer: COMMERCIAL

## 2022-12-18 ENCOUNTER — APPOINTMENT (OUTPATIENT)
Dept: GENERAL RADIOLOGY | Age: 77
End: 2022-12-18
Payer: COMMERCIAL

## 2022-12-18 VITALS
SYSTOLIC BLOOD PRESSURE: 124 MMHG | TEMPERATURE: 98 F | DIASTOLIC BLOOD PRESSURE: 52 MMHG | OXYGEN SATURATION: 92 % | WEIGHT: 197 LBS | HEART RATE: 70 BPM | BODY MASS INDEX: 30.92 KG/M2 | RESPIRATION RATE: 15 BRPM | HEIGHT: 67 IN

## 2022-12-18 DIAGNOSIS — S46.912A STRAIN OF LEFT SHOULDER, INITIAL ENCOUNTER: ICD-10-CM

## 2022-12-18 DIAGNOSIS — S09.90XA INJURY OF HEAD, INITIAL ENCOUNTER: Primary | ICD-10-CM

## 2022-12-18 DIAGNOSIS — S00.03XA HEMATOMA OF SCALP, INITIAL ENCOUNTER: ICD-10-CM

## 2022-12-18 PROCEDURE — 99284 EMERGENCY DEPT VISIT MOD MDM: CPT

## 2022-12-18 PROCEDURE — 72125 CT NECK SPINE W/O DYE: CPT

## 2022-12-18 PROCEDURE — 70450 CT HEAD/BRAIN W/O DYE: CPT

## 2022-12-18 PROCEDURE — 6370000000 HC RX 637 (ALT 250 FOR IP): Performed by: STUDENT IN AN ORGANIZED HEALTH CARE EDUCATION/TRAINING PROGRAM

## 2022-12-18 PROCEDURE — 73030 X-RAY EXAM OF SHOULDER: CPT

## 2022-12-18 RX ORDER — ONDANSETRON 4 MG/1
4 TABLET, ORALLY DISINTEGRATING ORAL ONCE
Status: COMPLETED | OUTPATIENT
Start: 2022-12-18 | End: 2022-12-18

## 2022-12-18 RX ADMIN — ONDANSETRON 4 MG: 4 TABLET, ORALLY DISINTEGRATING ORAL at 08:09

## 2022-12-18 ASSESSMENT — PAIN DESCRIPTION - LOCATION: LOCATION: HEAD

## 2022-12-18 ASSESSMENT — PAIN DESCRIPTION - PAIN TYPE: TYPE: ACUTE PAIN

## 2022-12-18 ASSESSMENT — PAIN - FUNCTIONAL ASSESSMENT: PAIN_FUNCTIONAL_ASSESSMENT: 0-10

## 2022-12-18 ASSESSMENT — PAIN SCALES - GENERAL: PAINLEVEL_OUTOF10: 3

## 2022-12-18 NOTE — ED PROVIDER NOTES
ATTENDING PHYSICIAN NOTE       Date of evaluation: 12/18/2022    Chief Complaint     Fall (Lost balance and fell in bathroom this morning) and Head Injury (No loss of consciousness)      History of Present Illness     Salena Meckel is a 65 y.o. male who presents after fall. Patient states that he got up to use the bathroom this morning when he lost his balance and fell striking his head on the tile floor. He denies any loss of consciousness. He is endorsing nausea with one episode of nonbilious nonbloody emesis. He is also endorsing left-sided shoulder pain. Denies any radiation of pain. Denies any numbness or tingling. Patient denies any chest pain, shortness of breath or additional extremity injury. He denies any new neck or back pain. He is not anticoagulated. Patient does take aspirin daily. Patient is endorsing generalized headache but denies any vision changes or eye pain. He denies recent fever, cough, congestion or unilateral weakness. He denies any seizures or facial asymmetry. Review of Systems     Review of Systems   All other systems reviewed and are negative. Past Medical, Surgical, Family, and Social History     He has a past medical history of Cancer ([facility] [address] Diabetes mellitus ([facility] [address] Hyperlipidemia, Hypertension, Kidney stone, Lung cancer ([address] and Lung cancer ([facility] [address] He has a past surgical history that includes Prostate surgery; Cystoscopy (Right, 11/09/2020); back surgery (2012); NEPHROLITHOTOMY (Left, 12/04/2020); shoulder surgery (Right); [address] Surgery (N/A, 11/04/2021); [address] Surgery (N/A, 10/04/2022); and Port Surgery (Left, 11/2/2022). His family history is not on file. He reports that he quit smoking about 46 years ago. His smoking use included cigarettes. He started smoking about 62 years ago. He has never used smokeless tobacco. He reports that he does not drink alcohol and does not use drugs.     Medications     Previous Medications    AMLODIPINE (NORVASC) 5 MG TABLET Take 1 tablet by mouth daily    ASPIRIN 81 MG EC TABLET    Take 1 tablet by mouth daily    ATORVASTATIN (LIPITOR) 40 MG TABLET    Take 1 tablet by mouth nightly    DEXAMETHASONE (DECADRON) 4 MG TABLET    TAKE 1 TABLET BY MOUTH TWICE DAILY -TAKE ON THE DAY BEFORE, DAY OF, AND DAY AFTER PEMETREXED TREATMENT- TOTAL DAILY DOSE IS 8 MG    FOLIC ACID (FOLVITE) 1 MG TABLET    Take 1 mg by mouth daily    INSULIN ASPART (NOVOLOG) 100 UNIT/ML INJECTION VIAL    Inject into the skin 3 times daily (before meals) Sliding scale    INSULIN GLARGINE, 1 UNIT DIAL, (TOUJEO SOLOSTAR) 300 UNIT/ML CONCENTRATED INJECTION PEN    Inject 40 Units into the skin nightly    LOSARTAN (COZAAR) 100 MG TABLET    Take 100 mg by mouth daily    METOPROLOL SUCCINATE (TOPROL XL) 50 MG EXTENDED RELEASE TABLET    Take 1 tablet by mouth daily    MULTIPLE VITAMINS-MINERALS (THERAPEUTIC MULTIVITAMIN-MINERALS) TABLET    Take 1 tablet by mouth daily    TAMSULOSIN (FLOMAX) 0.4 MG CAPSULE    Take 0.4 mg by mouth daily    VITAMIN D3 (CHOLECALCIFEROL) 10 MCG (400 UNIT) TABS TABLET    Take 400 Units by mouth daily       Allergies     He has No Known Allergies. Physical Exam     INITIAL VITALS: BP: (!) 154/61, Temp: 98.9 °F (37.2 °C), Heart Rate: 83, Resp: 20, SpO2: 99 %   Physical Exam  Vitals and nursing note reviewed. Constitutional:       Appearance: He is not toxic-appearing or diaphoretic. HENT:      Head: Normocephalic. Comments: Large hematoma to the left forehead with no lacerations. Right Ear: External ear normal.      Left Ear: External ear normal.      Nose: Nose normal. No congestion or rhinorrhea. Comments: No septal hematoma     Mouth/Throat:      Pharynx: Oropharynx is clear. No oropharyngeal exudate or posterior oropharyngeal erythema. Comments: No dental trauma or malocclusion  Eyes:      Extraocular Movements: Extraocular movements intact.       Conjunctiva/sclera: Conjunctivae normal.      Pupils: Pupils are equal, round, and reactive to light. Cardiovascular:      Rate and Rhythm: Normal rate and regular rhythm. Pulses: Normal pulses. Heart sounds: Normal heart sounds. No friction rub. No gallop. Pulmonary:      Effort: Pulmonary effort is normal. No respiratory distress. Breath sounds: Normal breath sounds. Chest:      Chest wall: No tenderness. Abdominal:      General: There is no distension. Palpations: Abdomen is soft. Tenderness: There is no abdominal tenderness. There is no right CVA tenderness, left CVA tenderness, guarding or rebound. Musculoskeletal:         General: No deformity. Normal range of motion. Cervical back: Normal range of motion and neck supple. No rigidity or tenderness. Comments: Left shoulder with tenderness anteriorly. He is also having tenderness over the distal clavicle. Range of motion preserved. Skin:     General: Skin is warm. Capillary Refill: Capillary refill takes less than 2 seconds. Findings: No erythema. Neurological:      General: No focal deficit present. Mental Status: He is alert and oriented to person, place, and time. Cranial Nerves: No cranial nerve deficit. Sensory: No sensory deficit. Motor: No weakness. Psychiatric:         Mood and Affect: Mood normal.         Behavior: Behavior normal.       Diagnostic Results         RADIOLOGY:  CT CERVICAL SPINE WO CONTRAST   Final Result      1. Sclerosis involving the anterior aspect of C4 which may just be   degenerative unless the patient has clinical findings suspicious for a   sclerotic metastatic focus. 2.  No evidence of fracture with degenerative changes as described. CT HEAD WO CONTRAST   Final Result      1. No evidence of acute intracranial process. 2.  Findings of presumed mild small vessel ischemic deep white matter disease. 3.  Prominence of the sulci and/or CSF spaces suggests a degree of cerebral   atrophy.       4. Left lateral forehead and supraorbital scalp hematoma. No evidence of   fracture. XR SHOULDER LEFT (MIN 2 VIEWS)   Final Result   No acute osseous abnormality. Degenerative changes and enthesopathy of the shoulder. Benign-appearing   exostosis of the proximal humeral shaft. LABS:   No results found for this visit on 12/18/22. ED BEDSIDE ULTRASOUND:  No results found. RECENT VITALS:  BP: (!) 154/61,Temp: 98.9 °F (37.2 °C), Heart Rate: 83, Resp: 20, SpO2: 99 %     Procedures         ED Course     Nursing Notes, Past Medical Hx, Past Surgical Hx, Social Hx,Allergies, and Family Hx were reviewed. patient was given the following medications:  Orders Placed This Encounter   Medications    ondansetron (ZOFRAN-ODT) disintegrating tablet 4 mg       CONSULTS:  None    MEDICAL DECISIONMAKING / ASSESSMENT / PLAN     Ellen James is a 65 y.o. male who presents after mechanical ground-level fall. He presented hypertensive, afebrile, heart rate of 83, respiratory rate of 20 and satting at 99% on room air. Please see exam above. Given history and exam I am concerned for underlying intracranial abnormality, skull fracture, or cervical spine injury. I am also concerned for underlying humerus fracture. Left shoulder does not appear deformed to suggest dislocation. He has no chest wall tenderness or abdominal wall tenderness to suggest acute rib fracture, hemothorax, pneumothorax, or intra-abdominal injury. He has no additional extremity injuries. I did obtain labs and imaging studies as noted below. I interpreted the labs and note  CT head without contrast showing no acute intracranial process. He has left lateral forehead and supraorbital scalp hematoma. No evidence of fracture  CT cervical spine shows sclerosis involving the anterior aspect of C4. May be degenerative but incidentally could be sclerotic metastatic focus.   No evidence of fracture  X-ray of his left shoulder with no acute fracture. He has degenerative changes    Given no acute fracture and no intracranial findings I felt patient stable for discharge home. He was able to ambulate. All questions and concerns were addressed. Strict return precautions reviewed. Patient stable for discharge at this time. Clinical Impression     1. Injury of head, initial encounter    2. Hematoma of scalp, initial encounter    3.  Strain of left shoulder, initial encounter        Disposition     PATIENT REFERRED TO:  Patrice Petty MD  [place] [id number] [facility]  [phone]      As needed    DISCHARGE MEDICATIONS:  New Prescriptions    No medications on file       DISPOSITION Decision To Discharge 12/18/2022 08:38:02 AM          Mariah Anthony MD  12/18/22 0124

## 2022-12-18 NOTE — ED NOTES
Pt loss his balance and fell at home in the bathroom this AM no loc. Pt is awake alert orient x4.  Pt does have golf ball knot left side of head and a small superficial abrasion on left hand     Shorty Santiago RN  12/18/22 [id number]

## 2022-12-18 NOTE — DISCHARGE INSTRUCTIONS
You were seen in the emergency department after a fall. Your CT scan showed a scalp hematoma but there was no evidence of internal bleeding. There is no fracture to your spine. And your shoulder x-ray was normal.  Please return to the Emergency Department if you develop any new or concerning changes to your health. We hope you feel better soon!

## 2022-12-29 ENCOUNTER — HOSPITAL ENCOUNTER (OUTPATIENT)
Dept: MRI IMAGING | Age: 77
Discharge: HOME | End: 2022-12-29
Payer: COMMERCIAL

## 2022-12-29 DIAGNOSIS — C34.82 MALIGNANT NEOPLASM OF OVERLAPPING SITES OF LEFT LUNG (HCC): ICD-10-CM

## 2022-12-29 LAB
GFR SERPL CREATININE-BSD FRML MDRD: >60 ML/MIN/{1.73_M2}
PERFORMED ON: NORMAL
POC CREATININE: 1.2 MG/DL (ref 0.8–1.3)
POC SAMPLE TYPE: NORMAL

## 2022-12-29 PROCEDURE — 6360000004 HC RX CONTRAST MEDICATION: Performed by: INTERNAL MEDICINE

## 2022-12-29 PROCEDURE — A9579 GAD-BASE MR CONTRAST NOS,1ML: HCPCS | Performed by: INTERNAL MEDICINE

## 2022-12-29 PROCEDURE — 82565 ASSAY OF CREATININE: CPT

## 2022-12-29 PROCEDURE — 70553 MRI BRAIN STEM W/O & W/DYE: CPT

## 2022-12-29 RX ADMIN — GADOTERIDOL 17 ML: 279.3 INJECTION, SOLUTION INTRAVENOUS at 09:16

## 2023-01-25 ENCOUNTER — HOSPITAL ENCOUNTER (OUTPATIENT)
Age: 78
Discharge: HOME | End: 2023-01-25
Payer: COMMERCIAL

## 2023-01-25 LAB
ABO/RH: NORMAL
ANTIBODY SCREEN: NORMAL

## 2023-01-25 PROCEDURE — 86850 RBC ANTIBODY SCREEN: CPT

## 2023-01-25 PROCEDURE — 36415 COLL VENOUS BLD VENIPUNCTURE: CPT

## 2023-01-25 PROCEDURE — P9016 RBC LEUKOCYTES REDUCED: HCPCS

## 2023-01-25 PROCEDURE — 86900 BLOOD TYPING SEROLOGIC ABO: CPT

## 2023-01-25 PROCEDURE — 86901 BLOOD TYPING SEROLOGIC RH(D): CPT

## 2023-01-25 PROCEDURE — 86923 COMPATIBILITY TEST ELECTRIC: CPT

## 2023-01-26 ENCOUNTER — HOSPITAL ENCOUNTER (OUTPATIENT)
Dept: NURSING | Age: 78
Setting detail: INFUSION SERIES
Discharge: HOME | End: 2023-01-26
Payer: COMMERCIAL

## 2023-01-26 VITALS
HEIGHT: 67 IN | RESPIRATION RATE: 18 BRPM | HEART RATE: 65 BPM | WEIGHT: 200 LBS | OXYGEN SATURATION: 96 % | TEMPERATURE: 96.8 F | DIASTOLIC BLOOD PRESSURE: 64 MMHG | SYSTOLIC BLOOD PRESSURE: 129 MMHG | BODY MASS INDEX: 31.39 KG/M2

## 2023-01-26 PROCEDURE — 99211 OFF/OP EST MAY X REQ PHY/QHP: CPT

## 2023-01-26 PROCEDURE — 36430 TRANSFUSION BLD/BLD COMPNT: CPT

## 2023-01-26 PROCEDURE — 6370000000 HC RX 637 (ALT 250 FOR IP): Performed by: INTERNAL MEDICINE

## 2023-01-26 RX ORDER — DIPHENHYDRAMINE HCL 25 MG
25 TABLET ORAL ONCE
Status: COMPLETED | OUTPATIENT
Start: 2023-01-26 | End: 2023-01-26

## 2023-01-26 RX ORDER — ACETAMINOPHEN 325 MG/1
650 TABLET ORAL ONCE
Status: COMPLETED | OUTPATIENT
Start: 2023-01-26 | End: 2023-01-26

## 2023-01-26 RX ADMIN — DIPHENHYDRAMINE HCL 25 MG: 25 TABLET ORAL at 12:38

## 2023-01-26 RX ADMIN — ACETAMINOPHEN 325MG 650 MG: 325 TABLET ORAL at 12:38

## 2023-01-26 ASSESSMENT — PAIN - FUNCTIONAL ASSESSMENT: PAIN_FUNCTIONAL_ASSESSMENT: 0-10

## 2023-02-04 ENCOUNTER — HOSPITAL ENCOUNTER (OUTPATIENT)
Age: 78
Discharge: HOME | End: 2023-02-04
Payer: COMMERCIAL

## 2023-02-04 LAB
ABO/RH: NORMAL
ANTIBODY SCREEN: NORMAL
BLOOD BANK DISPENSE STATUS: NORMAL
BLOOD BANK PRODUCT CODE: NORMAL
BPU ID: NORMAL
DESCRIPTION BLOOD BANK: NORMAL
SPECIMEN STATUS: NORMAL

## 2023-02-04 PROCEDURE — 86923 COMPATIBILITY TEST ELECTRIC: CPT

## 2023-02-04 PROCEDURE — 86901 BLOOD TYPING SEROLOGIC RH(D): CPT

## 2023-02-04 PROCEDURE — 36415 COLL VENOUS BLD VENIPUNCTURE: CPT

## 2023-02-04 PROCEDURE — 86850 RBC ANTIBODY SCREEN: CPT

## 2023-02-04 PROCEDURE — 86900 BLOOD TYPING SEROLOGIC ABO: CPT

## 2023-02-06 ENCOUNTER — HOSPITAL ENCOUNTER (OUTPATIENT)
Dept: NURSING | Age: 78
Setting detail: INFUSION SERIES
Discharge: HOME | End: 2023-02-06
Payer: COMMERCIAL

## 2023-02-06 VITALS
SYSTOLIC BLOOD PRESSURE: 132 MMHG | HEART RATE: 72 BPM | DIASTOLIC BLOOD PRESSURE: 63 MMHG | HEIGHT: 68 IN | WEIGHT: 200 LBS | OXYGEN SATURATION: 99 % | RESPIRATION RATE: 16 BRPM | TEMPERATURE: 97 F | BODY MASS INDEX: 30.31 KG/M2

## 2023-02-06 PROCEDURE — 36430 TRANSFUSION BLD/BLD COMPNT: CPT

## 2023-02-06 PROCEDURE — 6370000000 HC RX 637 (ALT 250 FOR IP): Performed by: INTERNAL MEDICINE

## 2023-02-06 PROCEDURE — 99211 OFF/OP EST MAY X REQ PHY/QHP: CPT

## 2023-02-06 RX ORDER — DIPHENHYDRAMINE HCL 25 MG
25 TABLET ORAL ONCE
Status: COMPLETED | OUTPATIENT
Start: 2023-02-06 | End: 2023-02-06

## 2023-02-06 RX ORDER — ACETAMINOPHEN 325 MG/1
650 TABLET ORAL ONCE
Status: COMPLETED | OUTPATIENT
Start: 2023-02-06 | End: 2023-02-06

## 2023-02-06 RX ADMIN — ACETAMINOPHEN 325MG 650 MG: 325 TABLET ORAL at 07:24

## 2023-02-06 RX ADMIN — DIPHENHYDRAMINE HCL 25 MG: 25 TABLET ORAL at 07:24

## 2023-02-06 ASSESSMENT — PAIN - FUNCTIONAL ASSESSMENT: PAIN_FUNCTIONAL_ASSESSMENT: NONE - DENIES PAIN

## 2023-02-06 ASSESSMENT — PAIN SCALES - GENERAL: PAINLEVEL_OUTOF10: 0

## 2023-03-01 ENCOUNTER — HOSPITAL ENCOUNTER (OUTPATIENT)
Dept: CT IMAGING | Age: 78
Discharge: HOME | End: 2023-03-01
Payer: COMMERCIAL

## 2023-03-01 DIAGNOSIS — C78.7 SECONDARY MALIGNANT NEOPLASM OF LIVER (HCC): ICD-10-CM

## 2023-03-01 DIAGNOSIS — C34.82 MALIGNANT NEOPLASM OF OVERLAPPING SITES OF LEFT BRONCHUS AND LUNG (HCC): ICD-10-CM

## 2023-03-01 PROCEDURE — 71260 CT THORAX DX C+: CPT

## 2023-03-01 PROCEDURE — 6360000004 HC RX CONTRAST MEDICATION: Performed by: INTERNAL MEDICINE

## 2023-03-01 RX ADMIN — DIATRIZOATE MEGLUMINE AND DIATRIZOATE SODIUM 12 ML: 660; 100 LIQUID ORAL; RECTAL at 08:30

## 2023-03-01 RX ADMIN — IOPAMIDOL 75 ML: 755 INJECTION, SOLUTION INTRAVENOUS at 08:30

## 2023-03-28 ENCOUNTER — HOSPITAL ENCOUNTER (OUTPATIENT)
Dept: CT IMAGING | Age: 78
Discharge: HOME | End: 2023-03-28

## 2023-03-28 DIAGNOSIS — C34.90 MALIGNANT NEOPLASM OF LUNG, UNSPECIFIED LATERALITY, UNSPECIFIED PART OF LUNG (HCC): ICD-10-CM

## 2023-04-06 ENCOUNTER — TELEPHONE (OUTPATIENT)
Dept: INTERVENTIONAL RADIOLOGY/VASCULAR | Age: 78
End: 2023-04-06

## 2023-04-06 NOTE — TELEPHONE ENCOUNTER
Called and spoke to CHIDI (PT TANYA) about upcoming procedure. Phone number used: [phone]  Procedure: lung biopsy  Approving Radiologist: Kary Veloz    Pt informed of the following:  Date of procedure: 4/7/23  Arrival time of procedure: 0800  Time of procedure: 0930    On any blood thinners? No.       Blood pressure medication? Yes. If yes need to make sure take morning of procedure. Any issues with sedation in the past? no  Will receive versed and fentanyl for sedation will need a  day of procedure. H&P or office note within 30 days? yes - MEDIA TAB    After procedure will be here 2-4 hours after procedure for monitoring. Nothing to eat or drink at midnight.      Need COVID testing prior: No    Need SDS: Yes

## 2023-04-07 ENCOUNTER — HOSPITAL ENCOUNTER (OUTPATIENT)
Dept: GENERAL RADIOLOGY | Age: 78
Discharge: HOME | End: 2023-04-07
Payer: COMMERCIAL

## 2023-04-07 ENCOUNTER — HOSPITAL ENCOUNTER (OUTPATIENT)
Dept: CT IMAGING | Age: 78
Discharge: HOME | End: 2023-04-07
Payer: COMMERCIAL

## 2023-04-07 VITALS
BODY MASS INDEX: 32.96 KG/M2 | SYSTOLIC BLOOD PRESSURE: 127 MMHG | TEMPERATURE: 97.9 F | RESPIRATION RATE: 16 BRPM | HEIGHT: 67 IN | HEART RATE: 66 BPM | OXYGEN SATURATION: 94 % | WEIGHT: 210 LBS | DIASTOLIC BLOOD PRESSURE: 54 MMHG

## 2023-04-07 DIAGNOSIS — C34.90 NON-SMALL CELL LUNG CANCER, UNSPECIFIED LATERALITY (HCC): ICD-10-CM

## 2023-04-07 LAB
CREAT SERPL-MCNC: 0.9 MG/DL (ref 0.8–1.3)
DEPRECATED RDW RBC AUTO: 19.3 % (ref 12.4–15.4)
GFR SERPLBLD CREATININE-BSD FMLA CKD-EPI: >60 ML/MIN/{1.73_M2}
GLUCOSE BLD-MCNC: 170 MG/DL (ref 70–99)
HCT VFR BLD AUTO: 30.6 % (ref 40.5–52.5)
HGB BLD-MCNC: 10.2 G/DL (ref 13.5–17.5)
INR PPP: 0.95 (ref 0.84–1.16)
MCH RBC QN AUTO: 30.1 PG (ref 26–34)
MCHC RBC AUTO-ENTMCNC: 33.5 G/DL (ref 31–36)
MCV RBC AUTO: 89.8 FL (ref 80–100)
PERFORMED ON: ABNORMAL
PLATELET # BLD AUTO: 150 K/UL (ref 135–450)
PMV BLD AUTO: 7.1 FL (ref 5–10.5)
PROTHROMBIN TIME: 12.7 SEC (ref 11.5–14.8)
RBC # BLD AUTO: 3.4 M/UL (ref 4.2–5.9)
WBC # BLD AUTO: 6 K/UL (ref 4–11)

## 2023-04-07 PROCEDURE — 7100000001 HC PACU RECOVERY - ADDTL 15 MIN: Performed by: RADIOLOGY

## 2023-04-07 PROCEDURE — 6360000002 HC RX W HCPCS: Performed by: RADIOLOGY

## 2023-04-07 PROCEDURE — 82565 ASSAY OF CREATININE: CPT

## 2023-04-07 PROCEDURE — 7100000000 HC PACU RECOVERY - FIRST 15 MIN: Performed by: RADIOLOGY

## 2023-04-07 PROCEDURE — 7100000010 HC PHASE II RECOVERY - FIRST 15 MIN: Performed by: RADIOLOGY

## 2023-04-07 PROCEDURE — 99152 MOD SED SAME PHYS/QHP 5/>YRS: CPT

## 2023-04-07 PROCEDURE — 85610 PROTHROMBIN TIME: CPT

## 2023-04-07 PROCEDURE — 85027 COMPLETE CBC AUTOMATED: CPT

## 2023-04-07 PROCEDURE — 77012 CT SCAN FOR NEEDLE BIOPSY: CPT

## 2023-04-07 PROCEDURE — 71045 X-RAY EXAM CHEST 1 VIEW: CPT

## 2023-04-07 RX ORDER — MIDAZOLAM HYDROCHLORIDE 1 MG/ML
INJECTION INTRAMUSCULAR; INTRAVENOUS PRN
Status: COMPLETED | OUTPATIENT
Start: 2023-04-07 | End: 2023-04-07

## 2023-04-07 RX ORDER — FENTANYL CITRATE 50 UG/ML
INJECTION, SOLUTION INTRAMUSCULAR; INTRAVENOUS PRN
Status: COMPLETED | OUTPATIENT
Start: 2023-04-07 | End: 2023-04-07

## 2023-04-07 RX ADMIN — MIDAZOLAM HYDROCHLORIDE 1 MG: 2 INJECTION, SOLUTION INTRAMUSCULAR; INTRAVENOUS at 09:37

## 2023-04-07 RX ADMIN — FENTANYL CITRATE 50 MCG: 50 INJECTION, SOLUTION INTRAMUSCULAR; INTRAVENOUS at 09:37

## 2023-04-07 ASSESSMENT — PAIN - FUNCTIONAL ASSESSMENT: PAIN_FUNCTIONAL_ASSESSMENT: 0-10

## 2023-04-07 NOTE — PROGRESS NOTES
Patient denies chest pain and shortness of breath. No episodes of coughing thus far.  Remains on 5L of oxygen

## 2023-04-07 NOTE — PROGRESS NOTES
RADIOLOGY:  Attempted to visit the patient in PACU. Patient was in the restroom. Nurse stated that patient is no distress and offers no complaints. Off of supplemental oxygen. No further episodes of hemoptysis. Will discharge the patient with instructions to immediately return to the hospital if he experiences recurrent symptomatology.

## 2023-04-07 NOTE — PROGRESS NOTES
RADIOLOGY:  Patient status post CT-guided biopsy of left basilar lung lesion. Following completion of the procedure, patient developed coughing and hemoptysis. While coughing, patient coughed up particulate material which was placed in specimen jar and sent to the Laboratory for cytology. Patient placed on supplemental oxygen. Symptomatology abated prior to patient leaving the Radiology Department. Limited CT imaging of the chest following the procedure demonstrates no significant pneumothorax. Otherwise, patient tolerated the procedure well. Full report to follow.

## 2023-04-07 NOTE — PROGRESS NOTES
D: Patient doing well, off of oxygen 0 2 sat 98 %, patient ambulated to bathroom and back to stretcher, denies shortness of breath or chest pain, was able to dress himself. A: Discharge instructions reviewed with spouse who acknowledge understanding, patient discharged via wheelchair to private car.

## 2023-04-07 NOTE — PROGRESS NOTES
D: Patient is lying on stretcher with eyes closed, opens eyes with when spoken to,wife sitting with patient. A: Assessment completed and documented, call light is in reach, discussed plan of care with patient and spouse who acknowledged understanding.

## 2023-04-07 NOTE — OR NURSING
Pt arrived for image guided lung nodule biopsy left. Procedure explained including the risk and benefits of the procedure. All questions answered. Pt verbalizes understanding of the of procedure and states no more questions. Consent signed. Vital signs stable, labs, allergies, medications, and code status reviewed. No contraindications noted. Vitals:    04/07/23 0928   BP: (!) 147/90   Pulse: 74   Resp: 20   Temp:    SpO2: 96%    (vital signs in table format)    Antonella Score  2 - Able to move 4 extremities voluntarily on command  2 - BP+/- 20mmHg of normal  2 - Fully awake  2 - Able to maintain oxygen saturation >92% on room air  2 - Able to breathe deeply and cough freely    Allergies  Patient has no known allergies.     Labs  Lab Results   Component Value Date    INR 0.95 04/07/2023    PROTIME 12.7 04/07/2023       Lab Results   Component Value Date    CREATININE 0.9 04/07/2023    BUN 18 11/02/2022     11/02/2022    K 3.6 11/02/2022     11/02/2022    CO2 27 11/02/2022       Lab Results   Component Value Date    WBC 6.0 04/07/2023    HGB 10.2 (L) 04/07/2023    HCT 30.6 (L) 04/07/2023    MCV 89.8 04/07/2023     04/07/2023

## 2023-04-07 NOTE — PROGRESS NOTES
Image guided lung nodule biopsy biopsy completed by Dr. Judy Mccabe. Pt tolerated procedure. While the needle was still in the lung pt started to cough. Biopsy at that time was completed Dr Judy Mccabe used blood patch and needle was removed. Pt started to cough and was coughing up moderate amount of bright red blood with what appeared to be fibrous tissue. Pts SpO2 decreased to mid 80's pt was placed on 2L NC. Pt was not in any distress during episode. Dr Judy Mccabe was called back to the bedside to evaluate the patient. Another CT was done. No change from last CT. Pt coughed up blood for about 10 mins. Report given  to  RN. Pt transported back to PACU in stable condition via stretcher. Pt was no longer coughing up blood when taken to PACU. Total Biopsy: 2  Received: Versed: 1 mg       Fentanyl: 50 mcg  Bandage to left side that is clean dry and intact. Patient to sit upright     Vital Signs  Vitals:    04/07/23 0958   BP: 121/65   Pulse: 90   Resp: 20   Temp:    SpO2: 90%    (vital signs in table format)    Post Antonella  2 - Able to move 4 extremities voluntarily on command  2 - BP+/- 20mmHg of normal  2 - Fully awake  1 - Needs oxygen to maintain oxygen saturation >90%  2 - Able to breathe deeply and cough freely    This RN wasted the following with Vanessa IR tech.   1 mg Versed  50 mcg Fentanyl

## 2023-04-07 NOTE — OR NURSING
Time out completed prior to procedure. Pt was place supine on the CT table. Pt was placed on all cardiac monitoring. Pt placed on 2 L NC for sedation. Blood patch was drawn prior to procedure.

## 2023-06-03 NOTE — PROGRESS NOTES
Pt a/o. VSS. Shift assessment complete and charted. Neph tube dsg c/d/i and neph tube drainage bloody. F/C draining brown urine. Pt had 1 episode of emesis, antiemetic admin as ordered and requested. BS active and is passing gas but no BM yet. Pt denies needs at this time. Bed locked and in lowest position. Will continue to monitor. show

## 2023-06-13 ENCOUNTER — HOSPITAL ENCOUNTER (OUTPATIENT)
Dept: NURSING | Age: 78
Setting detail: INFUSION SERIES
Discharge: HOME | End: 2023-06-13
Payer: COMMERCIAL

## 2023-06-13 VITALS
DIASTOLIC BLOOD PRESSURE: 59 MMHG | HEART RATE: 77 BPM | TEMPERATURE: 97.9 F | RESPIRATION RATE: 16 BRPM | OXYGEN SATURATION: 97 % | WEIGHT: 200 LBS | SYSTOLIC BLOOD PRESSURE: 138 MMHG | BODY MASS INDEX: 30.31 KG/M2 | HEIGHT: 68 IN

## 2023-06-13 PROCEDURE — 36430 TRANSFUSION BLD/BLD COMPNT: CPT

## 2023-06-13 PROCEDURE — 6370000000 HC RX 637 (ALT 250 FOR IP): Performed by: INTERNAL MEDICINE

## 2023-06-13 PROCEDURE — P9016 RBC LEUKOCYTES REDUCED: HCPCS

## 2023-06-13 PROCEDURE — 99211 OFF/OP EST MAY X REQ PHY/QHP: CPT

## 2023-06-13 RX ORDER — DIPHENHYDRAMINE HCL 25 MG
25 TABLET ORAL ONCE
Status: COMPLETED | OUTPATIENT
Start: 2023-06-13 | End: 2023-06-13

## 2023-06-13 RX ORDER — ACETAMINOPHEN 325 MG/1
650 TABLET ORAL ONCE
Status: COMPLETED | OUTPATIENT
Start: 2023-06-13 | End: 2023-06-13

## 2023-06-13 RX ADMIN — DIPHENHYDRAMINE HYDROCHLORIDE 25 MG: 25 TABLET ORAL at 07:10

## 2023-06-13 RX ADMIN — ACETAMINOPHEN 325MG 650 MG: 325 TABLET ORAL at 07:10

## 2023-06-13 ASSESSMENT — PAIN - FUNCTIONAL ASSESSMENT: PAIN_FUNCTIONAL_ASSESSMENT: NONE - DENIES PAIN

## 2023-06-13 NOTE — PROGRESS NOTES
Pt tolerates prbc transfusion well. Refuses discharge instructions has had previous transfusions.  Discharged to home in stable condition

## 2023-07-24 ENCOUNTER — HOSPITAL ENCOUNTER (OUTPATIENT)
Dept: CT IMAGING | Age: 78
Discharge: HOME | End: 2023-07-24
Attending: INTERNAL MEDICINE
Payer: COMMERCIAL

## 2023-07-24 DIAGNOSIS — C34.82 MALIGNANT NEOPLASM OF OVERLAPPING SITES OF LEFT BRONCHUS AND LUNG (HCC): ICD-10-CM

## 2023-07-24 LAB
PERFORMED ON: NORMAL
POC CREATININE: 1.1 MG/DL (ref 0.8–1.3)
POC SAMPLE TYPE: NORMAL

## 2023-07-24 PROCEDURE — 82565 ASSAY OF CREATININE: CPT

## 2023-07-24 PROCEDURE — 6360000004 HC RX CONTRAST MEDICATION: Performed by: INTERNAL MEDICINE

## 2023-07-24 PROCEDURE — 74177 CT ABD & PELVIS W/CONTRAST: CPT

## 2023-07-24 RX ADMIN — DIATRIZOATE MEGLUMINE AND DIATRIZOATE SODIUM 12 ML: 660; 100 LIQUID ORAL; RECTAL at 09:23

## 2023-07-24 RX ADMIN — IOPAMIDOL 75 ML: 755 INJECTION, SOLUTION INTRAVENOUS at 09:23

## 2023-08-07 ENCOUNTER — HOSPITAL ENCOUNTER (OUTPATIENT)
Dept: VASCULAR LAB | Age: 78
Discharge: HOME | End: 2023-08-07
Payer: COMMERCIAL

## 2023-08-07 DIAGNOSIS — C34.82 MALIGNANT NEOPLASM OF OVERLAPPING SITES OF LEFT BRONCHUS AND LUNG (HCC): ICD-10-CM

## 2023-08-07 DIAGNOSIS — M79.89 SWELLING OF LIMB: ICD-10-CM

## 2023-08-07 PROCEDURE — 93971 EXTREMITY STUDY: CPT

## 2023-08-14 DIAGNOSIS — E78.5 DYSLIPIDEMIA: ICD-10-CM

## 2023-08-14 DIAGNOSIS — I25.10 CORONARY ARTERY CALCIFICATION SEEN ON CT SCAN: ICD-10-CM

## 2023-08-15 RX ORDER — ATORVASTATIN CALCIUM 40 MG/1
40 TABLET, FILM COATED ORAL NIGHTLY
Qty: 90 TABLET | Refills: 0 | Status: SHIPPED | OUTPATIENT
Start: 2023-08-15

## 2023-08-15 NOTE — TELEPHONE ENCOUNTER
LOV- 11/21/22  UOV- NONE  LIPID- NONE, LIPID PANEL ORDERED  LFT- 10/1/22    Pt need appointment.  Called and spoke with pt/wife, they are aware lipid panel has been ordered, they will call back to schedule f/u

## 2023-08-17 ENCOUNTER — TELEPHONE (OUTPATIENT)
Dept: CARDIOLOGY CLINIC | Age: 78
End: 2023-08-17

## 2023-08-17 NOTE — TELEPHONE ENCOUNTER
Pts wife called to see if he can be seen prior to 09/25/2023 ov. Pt has been experiencing fatigue and dizziness upon standing. Pt is currently doing chemo treatment and last had chemo on 08/04. [facility] ran recent lab work and his hemoglobin was at 7.9. please advise if we are able to see pt prior to 09/25.

## 2023-08-18 NOTE — TELEPHONE ENCOUNTER
Spoke with Mrs. Armando Paredes and scheduled pt for 8/29/23 at 330pm with [facility] [place]. I informed  that I am not cancelling the 9/252/23 appt until the pt is seen with [facility].

## 2023-08-22 ENCOUNTER — HOSPITAL ENCOUNTER (OUTPATIENT)
Dept: MRI IMAGING | Age: 78
Discharge: HOME | End: 2023-08-22
Payer: COMMERCIAL

## 2023-08-22 DIAGNOSIS — C34.82 MALIGNANT NEOPLASM OF OVERLAPPING SITES OF LEFT BRONCHUS AND LUNG (HCC): ICD-10-CM

## 2023-08-22 PROCEDURE — A9579 GAD-BASE MR CONTRAST NOS,1ML: HCPCS | Performed by: NURSE ANESTHETIST, CERTIFIED REGISTERED

## 2023-08-22 PROCEDURE — 70553 MRI BRAIN STEM W/O & W/DYE: CPT

## 2023-08-22 PROCEDURE — 6360000004 HC RX CONTRAST MEDICATION: Performed by: NURSE ANESTHETIST, CERTIFIED REGISTERED

## 2023-08-22 RX ADMIN — GADOTERIDOL 19 ML: 279.3 INJECTION, SOLUTION INTRAVENOUS at 16:13

## 2023-08-23 ENCOUNTER — OFFICE VISIT (OUTPATIENT)
Dept: CARDIOLOGY CLINIC | Age: 78
End: 2023-08-23
Payer: COMMERCIAL

## 2023-08-23 ENCOUNTER — HOSPITAL ENCOUNTER (OUTPATIENT)
Age: 78
Discharge: HOME | End: 2023-08-23
Payer: COMMERCIAL

## 2023-08-23 ENCOUNTER — TELEPHONE (OUTPATIENT)
Dept: CARDIOLOGY CLINIC | Age: 78
End: 2023-08-23

## 2023-08-23 VITALS
WEIGHT: 199.31 LBS | DIASTOLIC BLOOD PRESSURE: 48 MMHG | HEIGHT: 67 IN | HEART RATE: 63 BPM | BODY MASS INDEX: 31.28 KG/M2 | SYSTOLIC BLOOD PRESSURE: 106 MMHG | RESPIRATION RATE: 16 BRPM | OXYGEN SATURATION: 100 %

## 2023-08-23 DIAGNOSIS — R42 DIZZINESS: Primary | ICD-10-CM

## 2023-08-23 DIAGNOSIS — I10 HTN (HYPERTENSION), BENIGN: ICD-10-CM

## 2023-08-23 DIAGNOSIS — E78.5 DYSLIPIDEMIA: ICD-10-CM

## 2023-08-23 DIAGNOSIS — I25.10 CORONARY ARTERY CALCIFICATION SEEN ON CT SCAN: ICD-10-CM

## 2023-08-23 DIAGNOSIS — C34.82 MALIGNANT NEOPLASM OF OVERLAPPING SITES OF LEFT BRONCHUS AND LUNG (HCC): ICD-10-CM

## 2023-08-23 LAB
BUN SERPL-MCNC: 18 MG/DL (ref 7–20)
CHOLEST SERPL-MCNC: 75 MG/DL (ref 0–199)
CREAT SERPL-MCNC: 1.2 MG/DL (ref 0.8–1.3)
GFR SERPLBLD CREATININE-BSD FMLA CKD-EPI: >60 ML/MIN/{1.73_M2}
HDLC SERPL-MCNC: 33 MG/DL (ref 40–60)
LDLC SERPL CALC-MCNC: 20 MG/DL
TRIGL SERPL-MCNC: 108 MG/DL (ref 0–150)
VLDLC SERPL CALC-MCNC: 22 MG/DL

## 2023-08-23 PROCEDURE — 36415 COLL VENOUS BLD VENIPUNCTURE: CPT

## 2023-08-23 PROCEDURE — 80061 LIPID PANEL: CPT

## 2023-08-23 PROCEDURE — 3078F DIAST BP <80 MM HG: CPT | Performed by: INTERNAL MEDICINE

## 2023-08-23 PROCEDURE — 82565 ASSAY OF CREATININE: CPT

## 2023-08-23 PROCEDURE — 99214 OFFICE O/P EST MOD 30 MIN: CPT | Performed by: INTERNAL MEDICINE

## 2023-08-23 PROCEDURE — 1123F ACP DISCUSS/DSCN MKR DOCD: CPT | Performed by: INTERNAL MEDICINE

## 2023-08-23 PROCEDURE — 3074F SYST BP LT 130 MM HG: CPT | Performed by: INTERNAL MEDICINE

## 2023-08-23 PROCEDURE — 84520 ASSAY OF UREA NITROGEN: CPT

## 2023-08-23 RX ORDER — METOPROLOL SUCCINATE 50 MG/1
25 TABLET, EXTENDED RELEASE ORAL DAILY
Qty: 90 TABLET | Refills: 2 | Status: SHIPPED | OUTPATIENT
Start: 2023-08-23

## 2023-08-23 RX ORDER — LOSARTAN POTASSIUM 100 MG/1
50 TABLET ORAL DAILY
Qty: 30 TABLET | Refills: 0 | Status: SHIPPED
Start: 2023-08-23

## 2023-08-23 NOTE — TELEPHONE ENCOUNTER
Monitor placed by [facility]  Monitor company [employer]  Length of monitor 7 days  Monitor ordered by [facility]  Device: 13b6  BT ID: [id number]  Activation successful prior to pt leaving office?  Yes

## 2023-08-23 NOTE — PATIENT INSTRUCTIONS
Plan:  ~Cardiac Event Monitor for 1 week  ~Cut back metoprolol to 25 mg daily   ~Cut back on losartan to 50 mg daily   ~Keep BP log and call in 2 weeks with readings  Cardiac medications reviewed including indications and pertinent side effects. Medication list updated at this visit. Check blood pressure and heart rate at home a few times per week- keep a log with dates and times and bring to office visit   Regular exercise and following a healthy diet encouraged   Follow up with me: Keep 9/25/2023, you can cancel it if you feel the symptoms have resolved.

## 2023-08-23 NOTE — PROGRESS NOTES
amplitude and contour without delay or bruit  Normal S1S2, No S3, No Murmur  Peripheral pulses are symmetrical and full  There is no clubbing, cyanosis of the extremities. No edema  Femoral Arteries: 2+ and equal  Pedal Pulses: 2+ and equal   Abdomen:  No masses or tenderness  Liver/Spleen: No Abnormalities Noted  Neurological/Psychiatric:  Alert and oriented in all spheres  Moves all extremities well  Exhibits normal gait balance and coordination  No abnormalities of mood, affect, memory, mentation, or behavior are noted    Stress test 2008  IMPRESSION-  Negative graded exercise test at a work load of 7 METS,  heart rate 133 (86% age-predicted maximum heart rate), and a rate  pressure product of 26.6 kilotor per minute. Patient has no  clinical or electrocardiographic evidence of inducible ischemia and  the Duke treadmill score of 6 and normal recumbent heart rate  recovery both suggest low cardiovascular risk. Simultaneous  echocardiographic imaging results are reported elsewhere. It should  be noted the patient did have a very marked hypertensive systolic  blood pressure response for this level of exercise. Stress echocardiogram 9/2008  IMPRESSION-  Normal rest/stress echocardiogram.  Incidental  underlying left ventricular hypertrophy. Echocardiogram 11/25/2020  Summary   Technically difficult examination due to poor apical windows. Left ventricular systolic function is normal with a visually estimated   ejection fraction of 55%. The left ventricle is normal in size with mild septal hypertrophy. No regional wall motion abnormalities are noted. Normal left ventricular diastolic function. Mild bi-atrial enlargement. Mild mitral regurgitation. The IVC is dilated in size (>2.1 cm) but collapses >50% with respiration   consistent with elevated right atrial pressures (8 mmHg) .      Stress test 11/25/2020  Conclusions    Summary  There is uniform isotope uptake at stress and rest. There is no

## 2023-08-24 ENCOUNTER — TELEPHONE (OUTPATIENT)
Dept: CARDIOLOGY CLINIC | Age: 78
End: 2023-08-24

## 2023-08-24 NOTE — TELEPHONE ENCOUNTER
Spoke with pt regarding result notes. Pt v/u. Pt stated he would like his wife to get results as well but I could not find a HIPAA on file. Told pt to fill this out at next OV.     ----- Message from PUSHPA Ga CNP sent at 8/24/2023  8:37 AM EDT -----  Cholesterol overall is good. HDL is low (\"good cholesterol\"). Continue atorvastatin and increase activity/exercise as tolerated. Please call. Thanks!

## 2023-08-28 NOTE — CARE COORDINATION
[facility]    Referral received from CM to follow for home care services. I will follow for needs, and speak with patient to verify demos.     Patient was a nonadmit 9/1 due to not homebound per clinician at start of care    Josse Ugalde RN, BSN CTN  [facility] [phone] Finasteride Pregnancy And Lactation Text: This medication is absolutely contraindicated during pregnancy. It is unknown if it is excreted in breast milk.

## 2023-09-01 PROCEDURE — 93228 REMOTE 30 DAY ECG REV/REPORT: CPT | Performed by: INTERNAL MEDICINE

## 2023-09-14 ENCOUNTER — PRE-PROCEDURE TELEPHONE (OUTPATIENT)
Dept: RADIATION ONCOLOGY | Age: 78
End: 2023-09-14

## 2023-09-14 NOTE — PROGRESS NOTES
Patient was called today for [facility] consult as requested by Dr. Luis Alberto Lyons. After consultation patient has decided to go forward with [facility] radiosurgery. Patient was also emailed the [facility] from the Patient's Perspective video and received Dr. Beryle Lowes biography. We discussed the patient's date of procedure will be on 9/25/2023    All patient's questions were answered. Encouraged to call with any further questions or concerns.     Kamryn Callahan RN

## 2023-09-15 DIAGNOSIS — I10 HTN (HYPERTENSION), BENIGN: ICD-10-CM

## 2023-09-15 DIAGNOSIS — R42 DIZZINESS: ICD-10-CM

## 2023-09-15 RX ORDER — METOPROLOL SUCCINATE 50 MG/1
50 TABLET, EXTENDED RELEASE ORAL DAILY
Qty: 90 TABLET | Refills: 0 | Status: SHIPPED | OUTPATIENT
Start: 2023-09-15

## 2023-09-18 ENCOUNTER — TELEPHONE (OUTPATIENT)
Dept: CARDIOLOGY CLINIC | Age: 78
End: 2023-09-18

## 2023-09-18 NOTE — TELEPHONE ENCOUNTER
Spoke with pt wife Liyah Melton per hipaa. Informed her of [facility] message.  Liyah Melton vu

## 2023-09-18 NOTE — TELEPHONE ENCOUNTER
----- Message from Jason Potts MD sent at 9/15/2023  2:23 PM EDT -----  Please notify patient that their monitor shows frequent exytra heart beats.  No severe arrhythmias  Keep OV as scheduled so we can review in detail

## 2023-09-21 RX ORDER — ESCITALOPRAM OXALATE 10 MG/1
10 TABLET ORAL DAILY
COMMUNITY

## 2023-09-21 RX ORDER — LANOLIN ALCOHOL/MO/W.PET/CERES
325 CREAM (GRAM) TOPICAL
COMMUNITY

## 2023-09-25 ENCOUNTER — HOSPITAL ENCOUNTER (OUTPATIENT)
Dept: CT IMAGING | Age: 78
Discharge: HOME | End: 2023-09-25
Payer: COMMERCIAL

## 2023-09-25 ENCOUNTER — HOSPITAL ENCOUNTER (OUTPATIENT)
Dept: MRI IMAGING | Age: 78
Discharge: HOME | End: 2023-09-25
Payer: COMMERCIAL

## 2023-09-25 ENCOUNTER — ANESTHESIA EVENT (OUTPATIENT)
Dept: RADIATION ONCOLOGY | Age: 78
End: 2023-09-25
Payer: COMMERCIAL

## 2023-09-25 ENCOUNTER — HOSPITAL ENCOUNTER (OUTPATIENT)
Dept: RADIATION ONCOLOGY | Age: 78
Discharge: HOME | End: 2023-09-25
Payer: COMMERCIAL

## 2023-09-25 ENCOUNTER — ANESTHESIA (OUTPATIENT)
Dept: RADIATION ONCOLOGY | Age: 78
End: 2023-09-25
Payer: COMMERCIAL

## 2023-09-25 VITALS
BODY MASS INDEX: 30.89 KG/M2 | HEART RATE: 71 BPM | RESPIRATION RATE: 15 BRPM | HEIGHT: 67 IN | OXYGEN SATURATION: 100 % | TEMPERATURE: 97.7 F | DIASTOLIC BLOOD PRESSURE: 58 MMHG | SYSTOLIC BLOOD PRESSURE: 113 MMHG | WEIGHT: 196.8 LBS

## 2023-09-25 DIAGNOSIS — Z85.89 HISTORY OF CANCER METASTATIC TO BRAIN: ICD-10-CM

## 2023-09-25 LAB
ALBUMIN SERPL-MCNC: 3.5 G/DL (ref 3.4–5)
ANION GAP SERPL CALCULATED.3IONS-SCNC: 16 MMOL/L (ref 3–16)
BUN SERPL-MCNC: 16 MG/DL (ref 7–20)
CALCIUM SERPL-MCNC: 8.6 MG/DL (ref 8.3–10.6)
CHLORIDE SERPL-SCNC: 100 MMOL/L (ref 99–110)
CO2 SERPL-SCNC: 21 MMOL/L (ref 21–32)
CREAT SERPL-MCNC: 1.1 MG/DL (ref 0.8–1.3)
GFR SERPLBLD CREATININE-BSD FMLA CKD-EPI: >60 ML/MIN/{1.73_M2}
GLUCOSE BLD-MCNC: 264 MG/DL (ref 70–99)
GLUCOSE BLD-MCNC: 272 MG/DL (ref 70–99)
GLUCOSE SERPL-MCNC: 281 MG/DL (ref 70–99)
PERFORMED ON: ABNORMAL
PERFORMED ON: ABNORMAL
PHOSPHATE SERPL-MCNC: 2.1 MG/DL (ref 2.5–4.9)
POTASSIUM SERPL-SCNC: 4.4 MMOL/L (ref 3.5–5.1)
SODIUM SERPL-SCNC: 137 MMOL/L (ref 136–145)

## 2023-09-25 PROCEDURE — 6360000002 HC RX W HCPCS: Performed by: NURSE ANESTHETIST, CERTIFIED REGISTERED

## 2023-09-25 PROCEDURE — 6360000004 HC RX CONTRAST MEDICATION: Performed by: NEUROLOGICAL SURGERY

## 2023-09-25 PROCEDURE — A9576 INJ PROHANCE MULTIPACK: HCPCS | Performed by: NEUROLOGICAL SURGERY

## 2023-09-25 PROCEDURE — 6370000000 HC RX 637 (ALT 250 FOR IP): Performed by: RADIOLOGY

## 2023-09-25 PROCEDURE — 70553 MRI BRAIN STEM W/O & W/DYE: CPT

## 2023-09-25 PROCEDURE — 36415 COLL VENOUS BLD VENIPUNCTURE: CPT

## 2023-09-25 PROCEDURE — 7100000010 HC PHASE II RECOVERY - FIRST 15 MIN

## 2023-09-25 PROCEDURE — 2500000003 HC RX 250 WO HCPCS: Performed by: NEUROLOGICAL SURGERY

## 2023-09-25 PROCEDURE — 6360000002 HC RX W HCPCS: Performed by: NEUROLOGICAL SURGERY

## 2023-09-25 PROCEDURE — 3700000000 HC ANESTHESIA ATTENDED CARE: Performed by: FAMILY MEDICINE

## 2023-09-25 PROCEDURE — 77371 SRS MULTISOURCE: CPT

## 2023-09-25 PROCEDURE — 77295 3-D RADIOTHERAPY PLAN: CPT

## 2023-09-25 PROCEDURE — 6360000002 HC RX W HCPCS: Performed by: RADIOLOGY

## 2023-09-25 PROCEDURE — 70450 CT HEAD/BRAIN W/O DYE: CPT

## 2023-09-25 PROCEDURE — 3700000001 HC ADD 15 MINUTES (ANESTHESIA): Performed by: FAMILY MEDICINE

## 2023-09-25 PROCEDURE — 80069 RENAL FUNCTION PANEL: CPT

## 2023-09-25 PROCEDURE — 77334 RADIATION TREATMENT AID(S): CPT

## 2023-09-25 PROCEDURE — 7100000011 HC PHASE II RECOVERY - ADDTL 15 MIN

## 2023-09-25 PROCEDURE — 77300 RADIATION THERAPY DOSE PLAN: CPT

## 2023-09-25 RX ORDER — LORAZEPAM 2 MG/ML
1 INJECTION INTRAMUSCULAR ONCE
Status: DISCONTINUED | OUTPATIENT
Start: 2023-09-25 | End: 2023-09-26 | Stop reason: HOSPADM

## 2023-09-25 RX ORDER — INSULIN GLARGINE 100 [IU]/ML
70 INJECTION, SOLUTION SUBCUTANEOUS NIGHTLY
COMMUNITY

## 2023-09-25 RX ORDER — 0.9 % SODIUM CHLORIDE 0.9 %
500 INTRAVENOUS SOLUTION INTRAVENOUS ONCE
Status: DISCONTINUED | OUTPATIENT
Start: 2023-09-25 | End: 2023-09-26 | Stop reason: HOSPADM

## 2023-09-25 RX ORDER — INSULIN LISPRO 100 [IU]/ML
20 INJECTION, SOLUTION INTRAVENOUS; SUBCUTANEOUS ONCE
Status: COMPLETED | OUTPATIENT
Start: 2023-09-25 | End: 2023-09-25

## 2023-09-25 RX ORDER — ACETAMINOPHEN 325 MG/1
650 TABLET ORAL ONCE
Status: DISCONTINUED | OUTPATIENT
Start: 2023-09-25 | End: 2023-09-26 | Stop reason: HOSPADM

## 2023-09-25 RX ORDER — HEPARIN 100 UNIT/ML
500 SYRINGE INTRAVENOUS PRN
Status: DISCONTINUED | OUTPATIENT
Start: 2023-09-25 | End: 2023-09-26 | Stop reason: HOSPADM

## 2023-09-25 RX ORDER — BUPIVACAINE HYDROCHLORIDE 5 MG/ML
30 INJECTION, SOLUTION EPIDURAL; INTRACAUDAL ONCE
Status: COMPLETED | OUTPATIENT
Start: 2023-09-25 | End: 2023-09-25

## 2023-09-25 RX ORDER — LIDOCAINE HYDROCHLORIDE 10 MG/ML
30 INJECTION, SOLUTION INFILTRATION; PERINEURAL ONCE
Status: COMPLETED | OUTPATIENT
Start: 2023-09-25 | End: 2023-09-25

## 2023-09-25 RX ORDER — ONDANSETRON 2 MG/ML
4 INJECTION INTRAMUSCULAR; INTRAVENOUS ONCE
Status: COMPLETED | OUTPATIENT
Start: 2023-09-25 | End: 2023-09-25

## 2023-09-25 RX ORDER — PROPOFOL 10 MG/ML
INJECTION, EMULSION INTRAVENOUS PRN
Status: DISCONTINUED | OUTPATIENT
Start: 2023-09-25 | End: 2023-09-25 | Stop reason: SDUPTHER

## 2023-09-25 RX ORDER — DEXAMETHASONE SODIUM PHOSPHATE 4 MG/ML
4 INJECTION, SOLUTION INTRA-ARTICULAR; INTRALESIONAL; INTRAMUSCULAR; INTRAVENOUS; SOFT TISSUE ONCE
Status: COMPLETED | OUTPATIENT
Start: 2023-09-25 | End: 2023-09-25

## 2023-09-25 RX ORDER — SODIUM BICARBONATE 42 MG/ML
1.5 INJECTION, SOLUTION INTRAVENOUS ONCE
Status: COMPLETED | OUTPATIENT
Start: 2023-09-25 | End: 2023-09-25

## 2023-09-25 RX ADMIN — GADOTERIDOL 40 ML: 279.3 INJECTION, SOLUTION INTRAVENOUS at 09:50

## 2023-09-25 RX ADMIN — LIDOCAINE HYDROCHLORIDE 30 ML: 10 INJECTION, SOLUTION INFILTRATION; PERINEURAL at 08:05

## 2023-09-25 RX ADMIN — ONDANSETRON 4 MG: 2 INJECTION INTRAMUSCULAR; INTRAVENOUS at 06:50

## 2023-09-25 RX ADMIN — DEXAMETHASONE SODIUM PHOSPHATE 4 MG: 4 INJECTION INTRA-ARTICULAR; INTRALESIONAL; INTRAMUSCULAR; INTRAVENOUS; SOFT TISSUE at 08:05

## 2023-09-25 RX ADMIN — BUPIVACAINE HYDROCHLORIDE 150 MG: 5 INJECTION, SOLUTION EPIDURAL; INTRACAUDAL; PERINEURAL at 08:05

## 2023-09-25 RX ADMIN — SODIUM BICARBONATE 1.5 MEQ: 42 INJECTION, SOLUTION INTRAVENOUS at 08:05

## 2023-09-25 RX ADMIN — INSULIN LISPRO 20 UNITS: 100 INJECTION, SOLUTION INTRAVENOUS; SUBCUTANEOUS at 12:05

## 2023-09-25 RX ADMIN — PROPOFOL 40 MG: 10 INJECTION, EMULSION INTRAVENOUS at 08:15

## 2023-09-25 RX ADMIN — PROPOFOL 80 MG: 10 INJECTION, EMULSION INTRAVENOUS at 08:11

## 2023-09-25 RX ADMIN — Medication 500 UNITS: at 16:51

## 2023-09-25 ASSESSMENT — ENCOUNTER SYMPTOMS: SHORTNESS OF BREATH: 1

## 2023-09-25 NOTE — ONCOLOGY
Gamma Knife Radiosurgery Procedure Note      Patient: Luis Feliciano  Date: 9/25/2023    Diagnosis:Multifocal brain Metastases    Procedure: Gamma Knife Based Stereotactic Radiosurgery [facility])    Description of procedure:  Yony Little was met at the [facility] at 2:56 PM on 9/25/2023 by Dr. Jyoti Cuevas and myself. Yandy Zamora MD]    Conscious sedation and frame placement were completed by Dr. Jyoti Cuevas. A stereotactic MRI brain with double-dose contrast was then completed. Dosimetry of SRS is summarized as follows:   Nine lesions were planned and treated, all to a dose of 20 Gy. Please see Darrell Prince for complete details. Dosimetry was reviewed by \"Dr. Brent Dow MS (special physics consult requested) and myself. Treatment was then given successfully. The frame was removed without complication. The patient was discharged home in stable condition. He was placed on a steroid taper. He will followup with me in 3 months with repeat imaging. Yandy Zamora MD

## 2023-09-25 NOTE — PROGRESS NOTES
Gamma Knife Frame Placement Time Out     1. Patient states name and birthdate correctly? Yes    2. Procedure listed on consent:  G-Frame placement and Gamma Knife radiosurgery for multiple brain metastasis    3. Is this the correct procedure? Yes    4. Are the consents signed? Yes    5. Is this a trigeminal neuralgia case? No    -If yes, what side are we treating? N/A    -If yes, is the side marked for laterality? N/A    6. Have films been reviewed today? Yes    7. Has the interim History and Physical form been completed? yes    8. Has the neurosurgeon reviewed the [facility] RN Pre-Procedure Checklist?  Yes    9. FEMALES ONLY: Does the patient require a pregnancy test per [facility]? no     -If yes, the result was:  na    10. Are all present in agreement?   Yes    Those present for time out:  Ramona Johnson RN  ,Dr. Samuel Krause, [facility]    Ramona Johnson RN

## 2023-09-25 NOTE — PROGRESS NOTES
After Gamma Knife procedure, the G-frame was removed by VALORIE Quan and Stephanie Sam RN and fixation pin sites were observed for bleeding. None was noted. Pin sites were cleaned with alcohol and povidone-iodine. Dr. Zoran Cason then placed sutures at all four pin sites. Patient met Phase II discharge criteria. Baseline neurological status unchanged. See discharge Antonella score and vitals. Discharge instructions were reviewed with patient and spouse who verbalized understanding using teach back method. A written copy of the instructions was given. Patient has follow up appointments scheduled. Patient was accompanied to transportation by this RN.     Cheri Tipton RN

## 2023-09-25 NOTE — DISCHARGE INSTRUCTIONS
GAMMA KNIFE POST-PROCEDURE INSTRUCTIONS    You may gently wash your hair and face two days after your procedure. Be gentle and do not rub the pin sites. Pat areas dry. WHAT TO DO IF YOU EXPERIENCE BLEEDING AT THE PIN SITES:  Remove any steri strips or band-aids that may be in place at the site that is bleeding. Using the gauze pads given to you by the [facility] RN, apply direct, one finger pressure continuously for 5 minutes. After 5 minutes inspect the site for bleeding. If the bleeding continues, repeat holding pressure, this time for 10 minutes. After 10 minutes, inspect the site again. If bleeding continues, proceed to the nearest emergency room for placement of a suture (stitch). If you have sutures (stitches) in place, those will dissolve on their own in about 2-3 weeks. You may shower with them, but keep them clean and dry otherwise. Avoid hair spray, mousse, gels, hair color, permanent solutions, or any other products that could cause irritation until your pin sites heal.  You may notice blood-tinged water when washing your hair. This may be dried blood in your hair from the application of the head frame. Swelling above the eyes may occur within 48 hours of surgery. You may apply cool compresses to your forehead if swelling occurs. Some patients experiences tingling or temporary numbness above the pin sites. You may return to work or school after 24 hours if you feel well enough. You should resume all of your regular activities unless the physician has instructed you otherwise. You may resume your usual diet right away. You have undergone a procedure with sedation. You have been given medicines that affect your judgment or impair your ability to operate heavy machines and automobiles. You may not drive for 24 hours after your procedure. Please see the attached information regarding Sedation.   If you develop any signs of infection (redness, swelling, drainage or irritation at pin

## 2023-09-25 NOTE — PROGRESS NOTES
After G-frame placement, patient was taken to MRI and CT by this RN where SpO2 and pulse were monitored and remained stable throughout. Patient tolerated the study well.   Tha Hoskins RN

## 2023-09-25 NOTE — PROGRESS NOTES
Gamma Knife Radiation Delivery Time Out    1. Patient states name and birthdate correctly? Yes    2. Procedure listed on consent Stereotactic Radiosurgery, Gamma Knife for multiple brain metastases    3. Is this the correct procedure? Yes    4. Is this a trigeminal neuralgia case? No    -If yes, what side are we treating? N/A    -If yes, is the side marked for laterality? N/A    5. Has the final radiologist report been reviewed? yes    6. Does the patient require Keppra prior to treatment delivery? yes    7. Does the patient require Ativan prior to treatment delivery? yes    8. Are all present in agreement? Yes    9.  Those present for time out:  Dr. Mark Luna, RN     Boston Padilla RN

## 2023-09-25 NOTE — PROGRESS NOTES
Patient brought to [facility] suite and placed on treatment couch. Sequential Compression Devices placed on BLE's per [facility] VTE Protocol. AV monitoring in place and verified verbally with patient from console. Continuous SpO2 and pulse monitor visible and monitored by this RN.     Digna Bae RN

## 2023-09-25 NOTE — H&P
The patient was seen and examined. There have been no changes in the patient's condition since the history and physical.    I reviewed the benefits, risks, and alternatives and the patient consents to the procedure. Joshua Colunga MD

## 2023-09-25 NOTE — ANESTHESIA POSTPROCEDURE EVALUATION
Department of Anesthesiology  Postprocedure Note    Patient: Polo Cole  MRN: [id number]  YOB: 1945  Date of evaluation: 9/25/2023      Procedure Summary     Date: 09/25/23 Room / Location: [facility] Gamma Knife    Anesthesia Start: 0807 Anesthesia Stop: Reyoung Curlin    Procedure: GAMMAKNIFE W ANESTHESIA Diagnosis:     Scheduled Providers:  Responsible Provider: Mikayla Bender MD    Anesthesia Type: MAC ASA Status: 4          Anesthesia Type: MAC    Antonella Phase I: Antonella Score: 10    Antonella Phase II:        Anesthesia Post Evaluation    Patient location during evaluation: PACU  Level of consciousness: awake  Complications: no  Multimodal analgesia pain management approach

## 2023-09-25 NOTE — PROGRESS NOTES
Pt's BG level before eating was 264. Pt did not bring insulin or a copy of sliding scale order with him today, so both he and his wife are unsure what dosage is appropriate to give him with meals. This RN placed a phone call to Dr. Kody Flores' office who manages the patient's insulin needs. Teodoro Santiago RD and Diabetes Educator called this RN back after reviewing patient's chart and orders. The patient takes a baseline of 15 units of Novolog prior to meals and uses a sliding scale for blood glucose levels over 200. She states the patient should be taking 20 units of insulin at this time based on the scale and the 264 level per Dr. Kody Flores' transcribed orders. This RN will place a once time order for 20u Novolog at this time.     Bethanie Singer RN

## 2023-10-20 ENCOUNTER — HOSPITAL ENCOUNTER (OUTPATIENT)
Dept: CT IMAGING | Age: 78
Discharge: HOME | End: 2023-10-20
Attending: INTERNAL MEDICINE
Payer: COMMERCIAL

## 2023-10-20 DIAGNOSIS — C34.82 MALIGNANT NEOPLASM OF OVERLAPPING SITES OF LEFT BRONCHUS AND LUNG (HCC): ICD-10-CM

## 2023-10-20 PROCEDURE — 6360000004 HC RX CONTRAST MEDICATION: Performed by: INTERNAL MEDICINE

## 2023-10-20 PROCEDURE — 74177 CT ABD & PELVIS W/CONTRAST: CPT

## 2023-10-20 RX ADMIN — IOPAMIDOL 75 ML: 755 INJECTION, SOLUTION INTRAVENOUS at 13:47

## 2023-10-20 RX ADMIN — DIATRIZOATE MEGLUMINE AND DIATRIZOATE SODIUM 12 ML: 660; 100 LIQUID ORAL; RECTAL at 13:46

## 2023-10-25 ENCOUNTER — OFFICE VISIT (OUTPATIENT)
Dept: CARDIOLOGY CLINIC | Age: 78
End: 2023-10-25
Payer: COMMERCIAL

## 2023-10-25 VITALS
BODY MASS INDEX: 31.78 KG/M2 | DIASTOLIC BLOOD PRESSURE: 50 MMHG | SYSTOLIC BLOOD PRESSURE: 110 MMHG | WEIGHT: 202.5 LBS | HEART RATE: 84 BPM | OXYGEN SATURATION: 96 % | HEIGHT: 67 IN

## 2023-10-25 DIAGNOSIS — R06.02 SHORTNESS OF BREATH: ICD-10-CM

## 2023-10-25 DIAGNOSIS — E78.5 DYSLIPIDEMIA: Primary | ICD-10-CM

## 2023-10-25 PROCEDURE — 1123F ACP DISCUSS/DSCN MKR DOCD: CPT | Performed by: INTERNAL MEDICINE

## 2023-10-25 PROCEDURE — 3074F SYST BP LT 130 MM HG: CPT | Performed by: INTERNAL MEDICINE

## 2023-10-25 PROCEDURE — 99214 OFFICE O/P EST MOD 30 MIN: CPT | Performed by: INTERNAL MEDICINE

## 2023-10-25 PROCEDURE — 3078F DIAST BP <80 MM HG: CPT | Performed by: INTERNAL MEDICINE

## 2023-10-25 NOTE — PROGRESS NOTES
ProviderRaisa MD   Multiple Vitamins-Minerals (THERAPEUTIC MULTIVITAMIN-MINERALS) tablet Take 1 tablet by mouth daily   Yes Raisa Ramirez MD   vitamin D3 (CHOLECALCIFEROL) 10 MCG (400 UNIT) TABS tablet Take 1 tablet by mouth daily   Yes Raisa Ramirez MD   insulin aspart (NOVOLOG) 100 UNIT/ML injection vial Inject into the skin 3 times daily (before meals) Sliding scale   Yes Raisa Ramirez MD   escitalopram (LEXAPRO) 10 MG tablet Take 1 tablet by mouth daily  Patient not taking: Reported on 10/25/2023    Raisa Ramirez MD   dexamethasone (DECADRON) 4 MG tablet TAKE 1 TABLET BY MOUTH TWICE DAILY -TAKE ON THE DAY BEFORE, DAY OF, AND DAY AFTER PEMETREXED TREATMENT- TOTAL DAILY DOSE IS 8 MG  Patient not taking: Reported on 10/25/2023 11/7/22   Raisa Ramirez MD   amLODIPine (NORVASC) 5 MG tablet Take 1 tablet by mouth daily  Patient not taking: Reported on 10/25/2023 11/21/22   Enzweiler, Camellia Relic, APRN - CNP   insulin glargine, 1 unit dial, (TOUJEO SOLOSTAR) 300 UNIT/ML concentrated injection pen Inject 40 Units into the skin nightly  Patient not taking: Reported on 10/25/2023 10/6/22   Ismael Chiu MD   aspirin 81 MG EC tablet Take 1 tablet by mouth daily  Patient not taking: Reported on 10/25/2023 10/6/22   PUSHPA De Jesus CNP        Allergies:  Patient has no known allergies. Review of Systems:   Constitutional: there has been no unanticipated weight loss. There's been no change in energy level, sleep pattern, or activity level. Eyes: No visual changes or diplopia. No scleral icterus. ENT: No Headaches, hearing loss or vertigo. No mouth sores or sore throat. Cardiovascular: Reviewed in HPI  Respiratory: No cough or wheezing, no sputum production. No hematemesis. Gastrointestinal: No abdominal pain, appetite loss, blood in stools. No change in bowel or bladder habits.   Genitourinary: No dysuria, trouble voiding, or

## 2023-10-25 NOTE — PATIENT INSTRUCTIONS
Plan:  No changes to cardiac medications at this time. Recent cardiac testing reviewed- SOB unlikely due to cardiac cause. Cardiac medications reviewed including indications and pertinent side effects. Medication list updated at this visit. Check blood pressure and heart rate at home a few times per week- keep a log with dates and times and bring to office visit   Regular exercise and following a healthy diet encouraged   Follow up with NP in 6 months.

## 2023-10-30 ENCOUNTER — TELEPHONE (OUTPATIENT)
Dept: INTERVENTIONAL RADIOLOGY/VASCULAR | Age: 78
End: 2023-10-30

## 2023-10-30 NOTE — TELEPHONE ENCOUNTER
Called to schedule thora. Per Dr Carmelo St CT doesn't look to have enough fluid to drain. [facility] was made aware and called back and said that pt was SOB. When called to schedule made patients family member aware that there didn't look to be enough to drain. Pt doesn't want to schedule at this time. Given phone number to IR if pt changes his mind.

## 2023-11-10 DIAGNOSIS — E78.5 DYSLIPIDEMIA: ICD-10-CM

## 2023-11-10 DIAGNOSIS — I25.10 CORONARY ARTERY CALCIFICATION SEEN ON CT SCAN: ICD-10-CM

## 2023-11-10 RX ORDER — ATORVASTATIN CALCIUM 40 MG/1
40 TABLET, FILM COATED ORAL NIGHTLY
Qty: 90 TABLET | Refills: 0 | Status: SHIPPED | OUTPATIENT
Start: 2023-11-10

## 2023-11-28 ENCOUNTER — APPOINTMENT (OUTPATIENT)
Dept: GENERAL RADIOLOGY | Age: 78
End: 2023-11-28
Payer: MEDICARE

## 2023-11-28 ENCOUNTER — HOSPITAL ENCOUNTER (INPATIENT)
Age: 78
LOS: 1 days | Discharge: HOME HEALTH | End: 2023-11-30
Attending: EMERGENCY MEDICINE | Admitting: STUDENT IN AN ORGANIZED HEALTH CARE EDUCATION/TRAINING PROGRAM
Payer: MEDICARE

## 2023-11-28 DIAGNOSIS — R79.89 ELEVATED TROPONIN: ICD-10-CM

## 2023-11-28 DIAGNOSIS — D61.818 PANCYTOPENIA (HCC): ICD-10-CM

## 2023-11-28 DIAGNOSIS — I10 HTN (HYPERTENSION), BENIGN: ICD-10-CM

## 2023-11-28 DIAGNOSIS — N39.0 URINARY TRACT INFECTION WITHOUT HEMATURIA, SITE UNSPECIFIED: ICD-10-CM

## 2023-11-28 DIAGNOSIS — U07.1 COVID-19 VIRUS INFECTION: Primary | ICD-10-CM

## 2023-11-28 LAB
ALBUMIN SERPL-MCNC: 2.7 G/DL (ref 3.4–5)
ALBUMIN/GLOB SERPL: 0.9 {RATIO} (ref 1.1–2.2)
ALP SERPL-CCNC: 140 U/L (ref 40–129)
ALT SERPL-CCNC: 28 U/L (ref 10–40)
ANION GAP SERPL CALCULATED.3IONS-SCNC: 11 MMOL/L (ref 3–16)
ANISOCYTOSIS BLD QL SMEAR: ABNORMAL
AST SERPL-CCNC: 33 U/L (ref 15–37)
BASE EXCESS BLDV CALC-SCNC: -1 MMOL/L (ref -3–3)
BASOPHILS # BLD: 0 K/UL (ref 0–0.2)
BASOPHILS NFR BLD: 0 %
BILIRUB SERPL-MCNC: 0.5 MG/DL (ref 0–1)
BUN SERPL-MCNC: 13 MG/DL (ref 7–20)
CALCIUM SERPL-MCNC: 7.8 MG/DL (ref 8.3–10.6)
CHLORIDE SERPL-SCNC: 100 MMOL/L (ref 99–110)
CO2 BLDV-SCNC: 23 MMOL/L
CO2 SERPL-SCNC: 22 MMOL/L (ref 21–32)
COHGB MFR BLDV: 2.4 % (ref 0–1.5)
CREAT SERPL-MCNC: 1.3 MG/DL (ref 0.8–1.3)
DACRYOCYTES BLD QL SMEAR: ABNORMAL
DEPRECATED RDW RBC AUTO: 16.2 % (ref 12.4–15.4)
EOSINOPHIL # BLD: 0 K/UL (ref 0–0.6)
EOSINOPHIL NFR BLD: 1 %
FLUAV RNA RESP QL NAA+PROBE: NOT DETECTED
FLUBV RNA RESP QL NAA+PROBE: NOT DETECTED
GFR SERPLBLD CREATININE-BSD FMLA CKD-EPI: 56 ML/MIN/{1.73_M2}
GLUCOSE SERPL-MCNC: 234 MG/DL (ref 70–99)
HCO3 BLDV-SCNC: 22.1 MMOL/L (ref 23–29)
HCT VFR BLD AUTO: 21.7 % (ref 40.5–52.5)
HGB BLD-MCNC: 7.1 G/DL (ref 13.5–17.5)
LACTATE BLDV-SCNC: 2 MMOL/L (ref 0.4–1.9)
LYMPHOCYTES # BLD: 0.2 K/UL (ref 1–5.1)
LYMPHOCYTES NFR BLD: 10 %
MCH RBC QN AUTO: 30.7 PG (ref 26–34)
MCHC RBC AUTO-ENTMCNC: 32.7 G/DL (ref 31–36)
MCV RBC AUTO: 93.9 FL (ref 80–100)
METHGB MFR BLDV: 0.7 %
MONOCYTES # BLD: 0.5 K/UL (ref 0–1.3)
MONOCYTES NFR BLD: 21 %
NEUTROPHILS # BLD: 1.6 K/UL (ref 1.7–7.7)
NEUTROPHILS NFR BLD: 62 %
NEUTS BAND NFR BLD MANUAL: 6 % (ref 0–7)
NRBC BLD-RTO: 1 /100 WBC
O2 THERAPY: ABNORMAL
OVALOCYTES BLD QL SMEAR: ABNORMAL
PCO2 BLDV: 30.2 MMHG (ref 40–50)
PH BLDV: 7.48 [PH] (ref 7.35–7.45)
PLATELET # BLD AUTO: 77 K/UL (ref 135–450)
PLATELET BLD QL SMEAR: ABNORMAL
PMV BLD AUTO: 7.1 FL (ref 5–10.5)
PO2 BLDV: 69.5 MMHG (ref 25–40)
POLYCHROMASIA BLD QL SMEAR: ABNORMAL
POTASSIUM SERPL-SCNC: 3.9 MMOL/L (ref 3.5–5.1)
PROT SERPL-MCNC: 5.6 G/DL (ref 6.4–8.2)
RBC # BLD AUTO: 2.31 M/UL (ref 4.2–5.9)
SAO2 % BLDV: 94 %
SARS-COV-2 RNA RESP QL NAA+PROBE: DETECTED
SLIDE REVIEW: ABNORMAL
SODIUM SERPL-SCNC: 133 MMOL/L (ref 136–145)
TROPONIN, HIGH SENSITIVITY: 59 NG/L (ref 0–22)
WBC # BLD AUTO: 2.3 K/UL (ref 4–11)

## 2023-11-28 PROCEDURE — 85025 COMPLETE CBC W/AUTO DIFF WBC: CPT

## 2023-11-28 PROCEDURE — 71045 X-RAY EXAM CHEST 1 VIEW: CPT

## 2023-11-28 PROCEDURE — 99285 EMERGENCY DEPT VISIT HI MDM: CPT

## 2023-11-28 PROCEDURE — 96365 THER/PROPH/DIAG IV INF INIT: CPT

## 2023-11-28 PROCEDURE — 93005 ELECTROCARDIOGRAM TRACING: CPT | Performed by: EMERGENCY MEDICINE

## 2023-11-28 PROCEDURE — 84484 ASSAY OF TROPONIN QUANT: CPT

## 2023-11-28 PROCEDURE — 82803 BLOOD GASES ANY COMBINATION: CPT

## 2023-11-28 PROCEDURE — 87086 URINE CULTURE/COLONY COUNT: CPT

## 2023-11-28 PROCEDURE — 2580000003 HC RX 258: Performed by: EMERGENCY MEDICINE

## 2023-11-28 PROCEDURE — 87636 SARSCOV2 & INF A&B AMP PRB: CPT

## 2023-11-28 PROCEDURE — 81001 URINALYSIS AUTO W/SCOPE: CPT

## 2023-11-28 PROCEDURE — 80053 COMPREHEN METABOLIC PANEL: CPT

## 2023-11-28 PROCEDURE — 83605 ASSAY OF LACTIC ACID: CPT

## 2023-11-28 PROCEDURE — 87040 BLOOD CULTURE FOR BACTERIA: CPT

## 2023-11-28 RX ORDER — 0.9 % SODIUM CHLORIDE 0.9 %
1000 INTRAVENOUS SOLUTION INTRAVENOUS ONCE
Status: COMPLETED | OUTPATIENT
Start: 2023-11-28 | End: 2023-11-29

## 2023-11-28 RX ADMIN — SODIUM CHLORIDE 1000 ML: 9 INJECTION, SOLUTION INTRAVENOUS at 23:53

## 2023-11-28 ASSESSMENT — LIFESTYLE VARIABLES
HOW MANY STANDARD DRINKS CONTAINING ALCOHOL DO YOU HAVE ON A TYPICAL DAY: PATIENT DOES NOT DRINK
HOW OFTEN DO YOU HAVE A DRINK CONTAINING ALCOHOL: NEVER

## 2023-11-28 ASSESSMENT — PAIN - FUNCTIONAL ASSESSMENT: PAIN_FUNCTIONAL_ASSESSMENT: NONE - DENIES PAIN

## 2023-11-29 ENCOUNTER — APPOINTMENT (OUTPATIENT)
Dept: CT IMAGING | Age: 78
End: 2023-11-29
Payer: MEDICARE

## 2023-11-29 PROBLEM — U07.1 COVID-19: Status: ACTIVE | Noted: 2023-11-29

## 2023-11-29 LAB
ALBUMIN SERPL-MCNC: 3.1 G/DL (ref 3.4–5)
ALBUMIN/GLOB SERPL: 0.9 {RATIO} (ref 1.1–2.2)
ALP SERPL-CCNC: 147 U/L (ref 40–129)
ALT SERPL-CCNC: 34 U/L (ref 10–40)
ANION GAP SERPL CALCULATED.3IONS-SCNC: 11 MMOL/L (ref 3–16)
AST SERPL-CCNC: 43 U/L (ref 15–37)
BACTERIA UR CULT: NORMAL
BACTERIA URNS QL MICRO: ABNORMAL /HPF
BILIRUB SERPL-MCNC: 0.4 MG/DL (ref 0–1)
BILIRUB UR QL STRIP.AUTO: NEGATIVE
BUN SERPL-MCNC: 11 MG/DL (ref 7–20)
CALCIUM SERPL-MCNC: 7.9 MG/DL (ref 8.3–10.6)
CHLORIDE SERPL-SCNC: 103 MMOL/L (ref 99–110)
CLARITY UR: CLEAR
CO2 SERPL-SCNC: 22 MMOL/L (ref 21–32)
COLOR UR: YELLOW
CREAT SERPL-MCNC: 1.1 MG/DL (ref 0.8–1.3)
EKG ATRIAL RATE: 84 BPM
EKG DIAGNOSIS: NORMAL
EKG Q-T INTERVAL: 374 MS
EKG QRS DURATION: 98 MS
EKG QTC CALCULATION (BAZETT): 441 MS
EKG R AXIS: 79 DEGREES
EKG T AXIS: 69 DEGREES
EKG VENTRICULAR RATE: 84 BPM
EPI CELLS #/AREA URNS HPF: ABNORMAL /HPF (ref 0–5)
GFR SERPLBLD CREATININE-BSD FMLA CKD-EPI: >60 ML/MIN/{1.73_M2}
GLUCOSE BLD-MCNC: 246 MG/DL (ref 70–99)
GLUCOSE BLD-MCNC: 264 MG/DL (ref 70–99)
GLUCOSE SERPL-MCNC: 106 MG/DL (ref 70–99)
GLUCOSE UR STRIP.AUTO-MCNC: 250 MG/DL
HGB UR QL STRIP.AUTO: ABNORMAL
KETONES UR STRIP.AUTO-MCNC: NEGATIVE MG/DL
LACTATE BLDV-SCNC: 1.3 MMOL/L (ref 0.4–1.9)
LEUKOCYTE ESTERASE UR QL STRIP.AUTO: ABNORMAL
NITRITE UR QL STRIP.AUTO: NEGATIVE
PERFORMED ON: ABNORMAL
PERFORMED ON: ABNORMAL
PH UR STRIP.AUTO: 6 [PH] (ref 5–8)
POTASSIUM SERPL-SCNC: 3.6 MMOL/L (ref 3.5–5.1)
PROT SERPL-MCNC: 6.4 G/DL (ref 6.4–8.2)
PROT UR STRIP.AUTO-MCNC: NEGATIVE MG/DL
RBC #/AREA URNS HPF: ABNORMAL /HPF (ref 0–4)
SODIUM SERPL-SCNC: 136 MMOL/L (ref 136–145)
SP GR UR STRIP.AUTO: 1.02 (ref 1–1.03)
TROPONIN, HIGH SENSITIVITY: 58 NG/L (ref 0–22)
UA COMPLETE W REFLEX CULTURE PNL UR: YES
UA DIPSTICK W REFLEX MICRO PNL UR: YES
URN SPEC COLLECT METH UR: ABNORMAL
UROBILINOGEN UR STRIP-ACNC: 0.2 E.U./DL
WBC #/AREA URNS HPF: ABNORMAL /HPF (ref 0–5)
YEAST URNS QL MICRO: PRESENT /HPF

## 2023-11-29 PROCEDURE — 97530 THERAPEUTIC ACTIVITIES: CPT

## 2023-11-29 PROCEDURE — 6370000000 HC RX 637 (ALT 250 FOR IP): Performed by: STUDENT IN AN ORGANIZED HEALTH CARE EDUCATION/TRAINING PROGRAM

## 2023-11-29 PROCEDURE — 6360000004 HC RX CONTRAST MEDICATION: Performed by: EMERGENCY MEDICINE

## 2023-11-29 PROCEDURE — 6360000002 HC RX W HCPCS: Performed by: EMERGENCY MEDICINE

## 2023-11-29 PROCEDURE — 97162 PT EVAL MOD COMPLEX 30 MIN: CPT

## 2023-11-29 PROCEDURE — 93010 ELECTROCARDIOGRAM REPORT: CPT | Performed by: INTERNAL MEDICINE

## 2023-11-29 PROCEDURE — 1200000000 HC SEMI PRIVATE

## 2023-11-29 PROCEDURE — 97110 THERAPEUTIC EXERCISES: CPT

## 2023-11-29 PROCEDURE — 97166 OT EVAL MOD COMPLEX 45 MIN: CPT

## 2023-11-29 PROCEDURE — 2580000003 HC RX 258: Performed by: STUDENT IN AN ORGANIZED HEALTH CARE EDUCATION/TRAINING PROGRAM

## 2023-11-29 PROCEDURE — 84484 ASSAY OF TROPONIN QUANT: CPT

## 2023-11-29 PROCEDURE — 97116 GAIT TRAINING THERAPY: CPT

## 2023-11-29 PROCEDURE — 80053 COMPREHEN METABOLIC PANEL: CPT

## 2023-11-29 PROCEDURE — 2580000003 HC RX 258: Performed by: EMERGENCY MEDICINE

## 2023-11-29 PROCEDURE — 74177 CT ABD & PELVIS W/CONTRAST: CPT

## 2023-11-29 PROCEDURE — 6360000002 HC RX W HCPCS: Performed by: INTERNAL MEDICINE

## 2023-11-29 RX ORDER — POLYETHYLENE GLYCOL 3350 17 G/17G
17 POWDER, FOR SOLUTION ORAL DAILY PRN
Status: DISCONTINUED | OUTPATIENT
Start: 2023-11-29 | End: 2023-11-30 | Stop reason: HOSPADM

## 2023-11-29 RX ORDER — FOLIC ACID 1 MG/1
1 TABLET ORAL DAILY
Status: DISCONTINUED | OUTPATIENT
Start: 2023-11-29 | End: 2023-11-30 | Stop reason: HOSPADM

## 2023-11-29 RX ORDER — SODIUM CHLORIDE 9 MG/ML
INJECTION, SOLUTION INTRAVENOUS CONTINUOUS
Status: DISCONTINUED | OUTPATIENT
Start: 2023-11-29 | End: 2023-11-30 | Stop reason: HOSPADM

## 2023-11-29 RX ORDER — ENOXAPARIN SODIUM 100 MG/ML
40 INJECTION SUBCUTANEOUS DAILY
Status: DISCONTINUED | OUTPATIENT
Start: 2023-11-29 | End: 2023-11-30 | Stop reason: HOSPADM

## 2023-11-29 RX ORDER — FERROUS SULFATE 325(65) MG
325 TABLET ORAL
Status: DISCONTINUED | OUTPATIENT
Start: 2023-11-29 | End: 2023-11-30 | Stop reason: HOSPADM

## 2023-11-29 RX ORDER — ATORVASTATIN CALCIUM 40 MG/1
40 TABLET, FILM COATED ORAL NIGHTLY
Status: DISCONTINUED | OUTPATIENT
Start: 2023-11-29 | End: 2023-11-30 | Stop reason: HOSPADM

## 2023-11-29 RX ORDER — LOSARTAN POTASSIUM 25 MG/1
50 TABLET ORAL DAILY
Status: DISCONTINUED | OUTPATIENT
Start: 2023-11-29 | End: 2023-11-30 | Stop reason: HOSPADM

## 2023-11-29 RX ORDER — POTASSIUM CHLORIDE 7.45 MG/ML
10 INJECTION INTRAVENOUS PRN
Status: DISCONTINUED | OUTPATIENT
Start: 2023-11-29 | End: 2023-11-30 | Stop reason: HOSPADM

## 2023-11-29 RX ORDER — SODIUM CHLORIDE 9 MG/ML
INJECTION, SOLUTION INTRAVENOUS PRN
Status: DISCONTINUED | OUTPATIENT
Start: 2023-11-29 | End: 2023-11-30 | Stop reason: HOSPADM

## 2023-11-29 RX ORDER — POTASSIUM CHLORIDE 20 MEQ/1
40 TABLET, EXTENDED RELEASE ORAL PRN
Status: DISCONTINUED | OUTPATIENT
Start: 2023-11-29 | End: 2023-11-30 | Stop reason: HOSPADM

## 2023-11-29 RX ORDER — M-VIT,TX,IRON,MINS/CALC/FOLIC 27MG-0.4MG
1 TABLET ORAL DAILY
Status: DISCONTINUED | OUTPATIENT
Start: 2023-11-29 | End: 2023-11-30 | Stop reason: HOSPADM

## 2023-11-29 RX ORDER — ACETAMINOPHEN 325 MG/1
650 TABLET ORAL EVERY 6 HOURS PRN
Status: DISCONTINUED | OUTPATIENT
Start: 2023-11-29 | End: 2023-11-30 | Stop reason: HOSPADM

## 2023-11-29 RX ORDER — INSULIN LISPRO 100 [IU]/ML
0-4 INJECTION, SOLUTION INTRAVENOUS; SUBCUTANEOUS
Status: DISCONTINUED | OUTPATIENT
Start: 2023-11-29 | End: 2023-11-30 | Stop reason: HOSPADM

## 2023-11-29 RX ORDER — ONDANSETRON 2 MG/ML
4 INJECTION INTRAMUSCULAR; INTRAVENOUS EVERY 6 HOURS PRN
Status: DISCONTINUED | OUTPATIENT
Start: 2023-11-29 | End: 2023-11-30 | Stop reason: HOSPADM

## 2023-11-29 RX ORDER — METOPROLOL SUCCINATE 25 MG/1
25 TABLET, EXTENDED RELEASE ORAL DAILY
Status: DISCONTINUED | OUTPATIENT
Start: 2023-11-29 | End: 2023-11-30 | Stop reason: HOSPADM

## 2023-11-29 RX ORDER — INSULIN GLARGINE 100 [IU]/ML
70 INJECTION, SOLUTION SUBCUTANEOUS NIGHTLY
Status: DISCONTINUED | OUTPATIENT
Start: 2023-11-29 | End: 2023-11-30 | Stop reason: HOSPADM

## 2023-11-29 RX ORDER — SODIUM CHLORIDE 0.9 % (FLUSH) 0.9 %
5-40 SYRINGE (ML) INJECTION PRN
Status: DISCONTINUED | OUTPATIENT
Start: 2023-11-29 | End: 2023-11-30 | Stop reason: HOSPADM

## 2023-11-29 RX ORDER — ESCITALOPRAM OXALATE 10 MG/1
10 TABLET ORAL DAILY
Status: DISCONTINUED | OUTPATIENT
Start: 2023-11-29 | End: 2023-11-30 | Stop reason: HOSPADM

## 2023-11-29 RX ORDER — TAMSULOSIN HYDROCHLORIDE 0.4 MG/1
0.4 CAPSULE ORAL DAILY
Status: DISCONTINUED | OUTPATIENT
Start: 2023-11-29 | End: 2023-11-30 | Stop reason: HOSPADM

## 2023-11-29 RX ORDER — MAGNESIUM SULFATE IN WATER 40 MG/ML
2000 INJECTION, SOLUTION INTRAVENOUS PRN
Status: DISCONTINUED | OUTPATIENT
Start: 2023-11-29 | End: 2023-11-30 | Stop reason: HOSPADM

## 2023-11-29 RX ORDER — ACETAMINOPHEN 650 MG/1
650 SUPPOSITORY RECTAL EVERY 6 HOURS PRN
Status: DISCONTINUED | OUTPATIENT
Start: 2023-11-29 | End: 2023-11-30 | Stop reason: HOSPADM

## 2023-11-29 RX ORDER — ONDANSETRON 4 MG/1
4 TABLET, ORALLY DISINTEGRATING ORAL EVERY 8 HOURS PRN
Status: DISCONTINUED | OUTPATIENT
Start: 2023-11-29 | End: 2023-11-30 | Stop reason: HOSPADM

## 2023-11-29 RX ORDER — SODIUM CHLORIDE 0.9 % (FLUSH) 0.9 %
5-40 SYRINGE (ML) INJECTION EVERY 12 HOURS SCHEDULED
Status: DISCONTINUED | OUTPATIENT
Start: 2023-11-29 | End: 2023-11-30 | Stop reason: HOSPADM

## 2023-11-29 RX ORDER — INSULIN LISPRO 100 [IU]/ML
0-4 INJECTION, SOLUTION INTRAVENOUS; SUBCUTANEOUS NIGHTLY
Status: DISCONTINUED | OUTPATIENT
Start: 2023-11-29 | End: 2023-11-30 | Stop reason: HOSPADM

## 2023-11-29 RX ORDER — OMEGA-3S/DHA/EPA/FISH OIL/D3 300MG-1000
400 CAPSULE ORAL DAILY
Status: DISCONTINUED | OUTPATIENT
Start: 2023-11-29 | End: 2023-11-30 | Stop reason: HOSPADM

## 2023-11-29 RX ORDER — DEXTROSE MONOHYDRATE 100 MG/ML
INJECTION, SOLUTION INTRAVENOUS CONTINUOUS PRN
Status: DISCONTINUED | OUTPATIENT
Start: 2023-11-29 | End: 2023-11-30 | Stop reason: HOSPADM

## 2023-11-29 RX ADMIN — METOPROLOL SUCCINATE 25 MG: 25 TABLET, EXTENDED RELEASE ORAL at 08:35

## 2023-11-29 RX ADMIN — LOSARTAN POTASSIUM 50 MG: 25 TABLET, FILM COATED ORAL at 08:34

## 2023-11-29 RX ADMIN — ACETAMINOPHEN 650 MG: 325 TABLET ORAL at 08:43

## 2023-11-29 RX ADMIN — CHOLECALCIFEROL (VITAMIN D3) 10 MCG (400 UNIT) TABLET 400 UNITS: at 08:34

## 2023-11-29 RX ADMIN — CEFTRIAXONE SODIUM 1000 MG: 1 INJECTION, POWDER, FOR SOLUTION INTRAMUSCULAR; INTRAVENOUS at 00:58

## 2023-11-29 RX ADMIN — Medication 10 ML: at 20:28

## 2023-11-29 RX ADMIN — EPOETIN ALFA-EPBX 40000 UNITS: 40000 INJECTION, SOLUTION INTRAVENOUS; SUBCUTANEOUS at 13:28

## 2023-11-29 RX ADMIN — SODIUM CHLORIDE: 9 INJECTION, SOLUTION INTRAVENOUS at 03:56

## 2023-11-29 RX ADMIN — FOLIC ACID 1 MG: 1 TABLET ORAL at 08:34

## 2023-11-29 RX ADMIN — ATORVASTATIN CALCIUM 40 MG: 40 TABLET, FILM COATED ORAL at 20:26

## 2023-11-29 RX ADMIN — SODIUM CHLORIDE: 9 INJECTION, SOLUTION INTRAVENOUS at 17:40

## 2023-11-29 RX ADMIN — ACETAMINOPHEN 650 MG: 325 TABLET ORAL at 20:26

## 2023-11-29 RX ADMIN — ENOXAPARIN SODIUM 40 MG: 100 INJECTION SUBCUTANEOUS at 13:28

## 2023-11-29 RX ADMIN — IOPAMIDOL 75 ML: 755 INJECTION, SOLUTION INTRAVENOUS at 00:22

## 2023-11-29 RX ADMIN — TAMSULOSIN HYDROCHLORIDE 0.4 MG: 0.4 CAPSULE ORAL at 08:35

## 2023-11-29 RX ADMIN — FERROUS SULFATE TAB 325 MG (65 MG ELEMENTAL FE) 325 MG: 325 (65 FE) TAB at 08:34

## 2023-11-29 RX ADMIN — ESCITALOPRAM OXALATE 10 MG: 10 TABLET ORAL at 08:35

## 2023-11-29 RX ADMIN — MULTIPLE VITAMINS W/ MINERALS TAB 1 TABLET: TAB at 08:35

## 2023-11-29 RX ADMIN — INSULIN GLARGINE 70 UNITS: 100 INJECTION, SOLUTION SUBCUTANEOUS at 20:28

## 2023-11-29 ASSESSMENT — PAIN SCALES - GENERAL
PAINLEVEL_OUTOF10: 0

## 2023-11-29 ASSESSMENT — PAIN - FUNCTIONAL ASSESSMENT
PAIN_FUNCTIONAL_ASSESSMENT: 0-10
PAIN_FUNCTIONAL_ASSESSMENT: 0-10

## 2023-11-29 NOTE — CARE COORDINATION
[facility]    Referral received from  to follow for home care services. I will follow for needs, and speak with patient to verify demos. I have submitted for approval, and will update when I'm able.     Looks like no ko active     Provider: [id number]   Eligibility Period: 11/29/22 - 03/25/24  Patient Clif Tavarez GQ:[id number] YOB: 1945 Gender:M  as of 11/29/2023  Per [address] [facility] [id number] Previous Inquiry Date: N/A - new inquiry*  Benefit Information  03/01/2009 --Part A:      Dianna Max RN, BSN CTN [address] [place] [address]   [phone] fax [phone]  [facility] [employer] [address] [phone]

## 2023-11-29 NOTE — CARE COORDINATION
Yes  Would you like Case Management to discuss the discharge plan with any other family members/significant others, and if so, who? Yes (spouse)  Plans to Return to Present Housing: Yes  Other Identified Issues/Barriers to RETURNING to current housing: NONE at this time   Potential Assistance needed at discharge: Home Care            Potential DME:    Patient expects to discharge to: [address] [place] [address] for transportation at discharge: Family    Financial    Payor: aTtum Rivas / Plan: Violeta Crea / Product Type: *No Product type* /     Does insurance require precert for SNF: Yes    Potential assistance Purchasing Medications: No  Meds-to-Beds request: Yes      [address], [address] [place] [address]   [address] [facility]  Phone: [phone] Fax: [phone]      Notes:    Factors facilitating achievement of predicted outcomes: Family support, Cooperative, Pleasant, and Has needed Durable Medical Equipment at home    Barriers to discharge: IV abx for UTI    Additional Case Management Notes: pt IPTA at times but also can require assistance from spouse. Has a walker and cane at home. Pt lives in a 2 story home where he lives on the first floor. Spouse provides transportation. Pt currently undergoing chemo r/t lung ca. No C or SNF stays in the past, no preference. Recs for [address] PT. Referral placed to [facility]. Pt is followed by IM. IV abx for UTI. Per IM anticipate d/c in 2-3 days? Will follow for needs as they arise. The Plan for Transition of Care is related to the following treatment goals of Elevated troponin [R79.89]  Pancytopenia ([address]) [D61.818]  Urinary tract infection without hematuria, site unspecified [N39.0]  COVID-19 virus infection [U07.1]  COVID-19 [W46.6]    IF APPLICABLE: The Patient and/or patient representative Adelia Bush and his family were provided with a choice of provider and agrees with the discharge plan.  Freedom of choice list with basic dialogue that supports the patient's individualized plan of care/goals and shares the quality data associated with the providers was provided to:     Patient Representative Name:       The Patient and/or Patient Representative Agree with the Discharge Plan?       Samuel Aburto RN  Case Management Department

## 2023-11-29 NOTE — ED PROVIDER NOTES
[address] - MED [facility]     EMERGENCY DEPARTMENT ENCOUNTER            Pt Name: Phoenix Han   MRN: [id number]   [address] 1945   Date of evaluation: 11/28/2023   Provider: Neelima Macias MD   PCP: Morenita Fair MD   Note Started: 10:11 PM EST 11/28/23          CHIEF COMPLAINT     Chief Complaint   Patient presents with    Fatigue     Patient c/o of weakness starting this evening. Pt has hx of lung cancer and DM. EMS reports BG of 330 and received 400 ml of fluids. HISTORY OF PRESENT ILLNESS:           Phoenix Han is a 77 y.o. male who presents for generalized weakness. He has not been feeling well all day and he went to bed about 6 this evening then tried to get up about 7:30pm but he was too weak to get up. No pain. He has had a normal appetite. His has decreased urination today. No dysuria. No constipation but he has not had a BM today. Temp earlier was 99.7. He has had a cough all day today, non productive. He has chronic SOB and has not had any worsening. He is on chemotherapy. It has been almost 2 weeks since his last cycle. He will get it again next week Fri. Nursing Notes were all reviewed and agreed with, or any disagreements were addressed in the HPI. REVIEW OF SYSTEMS :    Positives and Pertinent negatives as per HPI. MEDICAL HISTORY   has a past medical history of BPH (benign prostatic hyperplasia), Cancer ([address]) (2016), Diabetes mellitus ([address]), Esophageal reflux, Hyperlipidemia, Hypertension, Kidney stone, Lung cancer ([address]) (10/02/2021), Lung cancer ([address]), and Neuropathy.     Past Surgical History:   Procedure Laterality Date    BACK SURGERY  2012    lumber, no hardware, \"cleaned it out\"    CATARACT EXTRACTION EXTRACAPSULAR W/ INTRAOCULAR LENS IMPLANTATION      CT NEEDLE BIOPSY LUNG PERCUTANEOUS  04/07/2023    CT NEEDLE BIOPSY LUNG PERCUTANEOUS 4/7/2023 Fercho Keating MD [facility] CT SCAN    CYSTOSCOPY Right 11/09/2020    CYSTOSCOPY,  RIGHT URETEROSCOPY,

## 2023-11-29 NOTE — PROGRESS NOTES
Patient admitted to room 525 from ED. Patient oriented to room, call light, bed rails, phone, lights and bathroom. Patient instructed about the schedule of the day including: vital sign frequency, lab draws, possible tests, frequency of MD and staff rounds, including RN/MD rounding together at bedside, daily weights, and I &O's. Patient instructed about prescribed diet, how to use 8MENU, and television. bed alarm in place, patient aware of placement and reason. Telemetry box 17 in place, patient aware of placement and reason. Bed locked, in lowest position, side rails up 2/4, call light within reach. Will continue to monitor.

## 2023-11-29 NOTE — PROGRESS NOTES
Physical Therapy  Facility/Department: [place] [address] - MED SURG/ORTHO  Physical Therapy Initial Assessment/treatment    Name: Lucia Corbett  : 1945  MRN: [id number]  Date of Service: 2023    Discharge Recommendations:  24 hour supervision or assist, Home with Home health PT   PT Equipment Recommendations  Equipment Needed: No  Other: CTA pt owns standard walker and cane      If pt is unable to be seen after this session, please let this note serve as discharge summary. Please see case management note for discharge disposition. Thank you. Patient Diagnosis(es): The primary encounter diagnosis was COVID-19 virus infection. Diagnoses of Elevated troponin, Pancytopenia ([address]), and Urinary tract infection without hematuria, site unspecified were also pertinent to this visit. Past Medical History:  has a past medical history of BPH (benign prostatic hyperplasia), Cancer ([address]), Diabetes mellitus ([address]), Esophageal reflux, Hyperlipidemia, Hypertension, Kidney stone, Lung cancer ([address]), Lung cancer ([address]), and Neuropathy. Past Surgical History:  has a past surgical history that includes Prostate surgery; Cystoscopy (Right, 2020); back surgery (); NEPHROLITHOTOMY (Left, 2020); shoulder surgery (Right); [facility] (N/A, 2021); [facility] (N/A, 10/04/2022); Port Surgery (Left, 2022); CT NEEDLE BIOPSY LUNG PERCUTANEOUS (2023); and Cataract extraction, extracapsular w/ intraocular lens implant. Assessment   Body Structures, Functions, Activity Limitations Requiring Skilled Therapeutic Intervention: Decreased functional mobility ; Decreased strength;Decreased balance;Decreased endurance;Decreased sensation;Decreased cognition  Assessment: Pt presents to [facility] for [facility]. Pt has hx of lung cancer and is receiving chemo. Pt reports baseline of living with wife with 2 ANAMARIA and receiving assistance from wife for most activities and occasionally using walker or HHA for transfers/gait. 1134)  Mobility Inpatient CMS G-Code Modifier : CK (11/29/23 1134)            Goals  Short Term Goals  Time Frame for Short Term Goals: 12/6  Short Term Goal 1: pt will perform STS INDEP  Short Term Goal 2: pt will perform bed mobility INDEP by 12/3  Short Term Goal 3: pt will ambulate 50 ft with supervision and LRAD  Short Term Goal 4: pt will navigate 2 steps with HR and CGA  Patient Goals   Patient Goals : \"to feel better\"       Education  Patient Education  Education Given To: Patient; Family  Education Provided: Role of Therapy;Plan of Care;Precautions; Equipment;Home Exercise Program;Transfer Training;Energy Conservation  Education Method: Verbal  Barriers to Learning: None  Education Outcome: Verbalized understanding;Continued education needed      Therapy Time   Individual Concurrent Group Co-treatment   Time In 0938         Time Out 1011         Minutes 33         Timed Code Treatment Minutes: 23 Minutes (10 min eval)       Vicki Roche, PT

## 2023-11-29 NOTE — PROGRESS NOTES
4 Eyes Skin Assessment     NAME:  Emy Mtz OF BIRTH:  1945  MEDICAL RECORD NUMBER:  [id number]    The patient is being assessed for  Admission    I agree that at least one RN has performed a thorough Head to Toe Skin Assessment on the patient. ALL assessment sites listed below have been assessed. Areas assessed by both nurses:    Head, Face, Ears, Shoulders, Back, Chest, Arms, Elbows, Hands, Sacrum. Buttock, Coccyx, Ischium, and Legs. Feet and Heels        Does the Patient have a Wound?  No noted wound(s)       Sigifredo Prevention initiated by RN: Yes  Wound Care Orders initiated by RN: No    Pressure Injury (Stage 3,4, Unstageable, DTI, NWPT, and Complex wounds) if present, place Wound referral order by RN under : No    New Ostomies, if present place, Ostomy referral order under : No     Nurse 1 eSignature: Electronically signed by Kg Agarwal RN on 11/29/23 at 4:13 AM EST    **SHARE this note so that the co-signing nurse can place an eSignature**    Nurse 2 eSignature: Electronically signed by Lety Almanza RN on 11/29/23 at 4:44 AM EST

## 2023-11-29 NOTE — PROGRESS NOTES
Med reconciliation not yet done - patient cannot remember all his medications at home. His wife will come at 0800 this morning and she knows all his medications at home.

## 2023-11-29 NOTE — PLAN OF CARE
Problem: Discharge Planning  Goal: Discharge to home or other facility with appropriate resources  11/29/2023 1608 by Yary Waggoner RN  Outcome: Progressing  11/29/2023 0444 by Veronique Roberts RN  Outcome: Progressing     Problem: Skin/Tissue Integrity  Goal: Absence of new skin breakdown  Description: 1. Monitor for areas of redness and/or skin breakdown  2. Assess vascular access sites hourly  3. Every 4-6 hours minimum:  Change oxygen saturation probe site  4. Every 4-6 hours:  If on nasal continuous positive airway pressure, respiratory therapy assess nares and determine need for appliance change or resting period.   11/29/2023 1608 by Yary Waggoner RN  Outcome: Progressing  11/29/2023 0444 by Veronique Roberts RN  Outcome: Progressing     Problem: Safety - Adult  Goal: Free from fall injury  11/29/2023 1608 by Yary Waggoner RN  Outcome: Progressing  11/29/2023 0444 by Veronique Roberts RN  Outcome: Progressing     Problem: ABCDS Injury Assessment  Goal: Absence of physical injury  11/29/2023 1608 by Yary Waggoner RN  Outcome: Progressing  11/29/2023 0444 by Veronique Roberts RN  Outcome: Progressing     Problem: Chronic Conditions and Co-morbidities  Goal: Patient's chronic conditions and co-morbidity symptoms are monitored and maintained or improved  Outcome: Progressing

## 2023-11-30 VITALS
HEART RATE: 92 BPM | RESPIRATION RATE: 18 BRPM | TEMPERATURE: 98 F | SYSTOLIC BLOOD PRESSURE: 137 MMHG | OXYGEN SATURATION: 95 % | WEIGHT: 190 LBS | HEIGHT: 67 IN | BODY MASS INDEX: 29.82 KG/M2 | DIASTOLIC BLOOD PRESSURE: 58 MMHG

## 2023-11-30 LAB
ALBUMIN SERPL-MCNC: 2.8 G/DL (ref 3.4–5)
ALBUMIN/GLOB SERPL: 1 {RATIO} (ref 1.1–2.2)
ALP SERPL-CCNC: 128 U/L (ref 40–129)
ALT SERPL-CCNC: 35 U/L (ref 10–40)
ANION GAP SERPL CALCULATED.3IONS-SCNC: 10 MMOL/L (ref 3–16)
AST SERPL-CCNC: 55 U/L (ref 15–37)
BASOPHILS # BLD: 0 K/UL (ref 0–0.2)
BASOPHILS NFR BLD: 0.2 %
BILIRUB SERPL-MCNC: 0.5 MG/DL (ref 0–1)
BUN SERPL-MCNC: 12 MG/DL (ref 7–20)
CALCIUM SERPL-MCNC: 7.1 MG/DL (ref 8.3–10.6)
CHLORIDE SERPL-SCNC: 103 MMOL/L (ref 99–110)
CO2 SERPL-SCNC: 23 MMOL/L (ref 21–32)
CREAT SERPL-MCNC: 1.2 MG/DL (ref 0.8–1.3)
DEPRECATED RDW RBC AUTO: 16.4 % (ref 12.4–15.4)
EOSINOPHIL # BLD: 0 K/UL (ref 0–0.6)
EOSINOPHIL NFR BLD: 0.2 %
GFR SERPLBLD CREATININE-BSD FMLA CKD-EPI: >60 ML/MIN/{1.73_M2}
GLUCOSE BLD-MCNC: 124 MG/DL (ref 70–99)
GLUCOSE BLD-MCNC: 185 MG/DL (ref 70–99)
GLUCOSE SERPL-MCNC: 114 MG/DL (ref 70–99)
HCT VFR BLD AUTO: 23.2 % (ref 40.5–52.5)
HGB BLD-MCNC: 7.5 G/DL (ref 13.5–17.5)
LYMPHOCYTES # BLD: 0.2 K/UL (ref 1–5.1)
LYMPHOCYTES NFR BLD: 10.7 %
MCH RBC QN AUTO: 30.8 PG (ref 26–34)
MCHC RBC AUTO-ENTMCNC: 32.2 G/DL (ref 31–36)
MCV RBC AUTO: 95.7 FL (ref 80–100)
MONOCYTES # BLD: 0.5 K/UL (ref 0–1.3)
MONOCYTES NFR BLD: 21.1 %
NEUTROPHILS # BLD: 1.6 K/UL (ref 1.7–7.7)
NEUTROPHILS NFR BLD: 67.8 %
PERFORMED ON: ABNORMAL
PERFORMED ON: ABNORMAL
PLATELET # BLD AUTO: 72 K/UL (ref 135–450)
PMV BLD AUTO: 7.3 FL (ref 5–10.5)
POTASSIUM SERPL-SCNC: 3.7 MMOL/L (ref 3.5–5.1)
PROT SERPL-MCNC: 5.7 G/DL (ref 6.4–8.2)
RBC # BLD AUTO: 2.42 M/UL (ref 4.2–5.9)
SODIUM SERPL-SCNC: 136 MMOL/L (ref 136–145)
WBC # BLD AUTO: 2.3 K/UL (ref 4–11)

## 2023-11-30 PROCEDURE — 85025 COMPLETE CBC W/AUTO DIFF WBC: CPT

## 2023-11-30 PROCEDURE — 80053 COMPREHEN METABOLIC PANEL: CPT

## 2023-11-30 PROCEDURE — 36415 COLL VENOUS BLD VENIPUNCTURE: CPT

## 2023-11-30 PROCEDURE — 6360000002 HC RX W HCPCS: Performed by: INTERNAL MEDICINE

## 2023-11-30 PROCEDURE — 6370000000 HC RX 637 (ALT 250 FOR IP): Performed by: NURSE PRACTITIONER

## 2023-11-30 PROCEDURE — 2580000003 HC RX 258: Performed by: STUDENT IN AN ORGANIZED HEALTH CARE EDUCATION/TRAINING PROGRAM

## 2023-11-30 PROCEDURE — 6370000000 HC RX 637 (ALT 250 FOR IP): Performed by: STUDENT IN AN ORGANIZED HEALTH CARE EDUCATION/TRAINING PROGRAM

## 2023-11-30 RX ORDER — GUAIFENESIN/DEXTROMETHORPHAN 100-10MG/5
5 SYRUP ORAL EVERY 4 HOURS PRN
Status: DISCONTINUED | OUTPATIENT
Start: 2023-11-30 | End: 2023-11-30 | Stop reason: HOSPADM

## 2023-11-30 RX ORDER — METOPROLOL SUCCINATE 50 MG/1
25 TABLET, EXTENDED RELEASE ORAL DAILY
COMMUNITY
Start: 2023-11-30

## 2023-11-30 RX ADMIN — MULTIPLE VITAMINS W/ MINERALS TAB 1 TABLET: TAB at 09:14

## 2023-11-30 RX ADMIN — FOLIC ACID 1 MG: 1 TABLET ORAL at 09:14

## 2023-11-30 RX ADMIN — FERROUS SULFATE TAB 325 MG (65 MG ELEMENTAL FE) 325 MG: 325 (65 FE) TAB at 09:14

## 2023-11-30 RX ADMIN — CHOLECALCIFEROL (VITAMIN D3) 10 MCG (400 UNIT) TABLET 400 UNITS: at 12:19

## 2023-11-30 RX ADMIN — METOPROLOL SUCCINATE 25 MG: 25 TABLET, EXTENDED RELEASE ORAL at 09:15

## 2023-11-30 RX ADMIN — LOSARTAN POTASSIUM 50 MG: 25 TABLET, FILM COATED ORAL at 09:14

## 2023-11-30 RX ADMIN — ENOXAPARIN SODIUM 40 MG: 100 INJECTION SUBCUTANEOUS at 09:15

## 2023-11-30 RX ADMIN — SODIUM CHLORIDE: 9 INJECTION, SOLUTION INTRAVENOUS at 09:14

## 2023-11-30 RX ADMIN — GUAIFENESIN AND DEXTROMETHORPHAN 5 ML: 100; 10 SYRUP ORAL at 07:31

## 2023-11-30 NOTE — PLAN OF CARE
Problem: Discharge Planning  Goal: Discharge to home or other facility with appropriate resources  11/29/2023 2056 by Mark Hernadez RN  Outcome: Progressing  11/29/2023 1608 by Mckinley Sam RN  Outcome: Progressing     Problem: Skin/Tissue Integrity  Goal: Absence of new skin breakdown  Description: 1. Monitor for areas of redness and/or skin breakdown  2. Assess vascular access sites hourly  3. Every 4-6 hours minimum:  Change oxygen saturation probe site  4. Every 4-6 hours:  If on nasal continuous positive airway pressure, respiratory therapy assess nares and determine need for appliance change or resting period.   11/29/2023 2056 by Mark Hernadez RN  Outcome: Progressing  11/29/2023 1608 by Mckinley Sam RN  Outcome: Progressing

## 2023-11-30 NOTE — PROGRESS NOTES
IV removed. All dishcarge instructions given and all questions answered. Patient wheeled down to main entrance with family, no complications. All belongings sent with patient at time of discharge.

## 2023-11-30 NOTE — HOME CARE
[facility] will require the following home care treatments or therapies: Skilled Nursing, vital signs, medication compliance and education, PT/OT, wound care, teaching and management of medical conditions, etc.  Home care will be necessary because of Lung Cancer and deconditioning. The patient is in agreement to receiving home care.

## 2023-11-30 NOTE — DISCHARGE INSTR - COC
Continuity of Care Form    Patient Name: Otto Rodriguez   :  1945  MRN:  [id number]    Admit date:  2023  Discharge date:  2023    Code Status Order: Full Code   Advance Directives:     Admitting Physician:  Pj Isidro DO  PCP: Flako Mckinley MD    [facility]/Room#: 7548/2549-20  Discharging Unit Phone Number: [phone]    Emergency Contact:   Extended Emergency Contact Information  Primary Emergency Contact: [place], [place]  Address: [address] [place] [address], [address] [place] [address] Franciscaalejandrina Martinez of [address] Phone: [phone]  Work Phone: [phone]  Mobile Phone: [phone]  Relation: Spouse  Secondary Emergency Contact: [facility] Isabel Phone: [phone]  Relation: Child    Past Surgical History:  Past Surgical History:   Procedure Laterality Date    BACK SURGERY      lumber, no hardware, \"cleaned it out\"    CATARACT EXTRACTION EXTRACAPSULAR W/ INTRAOCULAR LENS IMPLANTATION      CT NEEDLE BIOPSY LUNG PERCUTANEOUS  2023    CT NEEDLE BIOPSY LUNG PERCUTANEOUS 2023 Shannon Merlos MD [facility] CT SCAN    CYSTOSCOPY Right 2020    CYSTOSCOPY,  RIGHT URETEROSCOPY, RIGHT RETRO PYELOGRAM, REMOVAL STONE FROM BLADDER, URETHRAL DILITATION performed by Yoselin Kirby MD at [facility]    NEPHROLITHOTOMY Left 2020    LEFT PERCUTANEOUS NEPHROLITHOTOMY WITH DR Mariaelena Simons TO PLACE NEPHROSTOMY TUBE PRIOR performed by Luis Manuel Grady MD at [address] [place] [address] N/A 2021    SURGICAL PORT PLACEMENT performed by Isabella Guzman MD at [facility] N/A 10/04/2022    PORT REMOVAL performed by Valencia Alvarado MD at [address] [place] [address] Left 2022    SURGICAL PORT PLACEMENT performed by Isabella Guzman MD at [facility] Right     ROTATOR CUFF       Immunization History:   Immunization History   Administered Date(s) Documentation and Therapy:  Incision 10/04/22 Chest Right;Upper (Active)   Number of days: 421       Incision 11/02/22 Chest Left;Upper (Active)   Number of days: 393       Incision 12/04/20 Back Left (Active)   Number of days: 1090        Elimination:  Continence: Bowel: Yes  Bladder: No  Urinary Catheter: None   Colostomy/Ileostomy/Ileal Conduit: No       Date of Last BM: 11/30/2023    Intake/Output Summary (Last 24 hours) at 11/30/2023 1249  Last data filed at 11/29/2023 1905  Gross per 24 hour   Intake 360 ml   Output --   Net 360 ml     I/O last 3 completed shifts: In: 80 [P.O.:360; IV Piggyback:1050]  Out: -     Safety Concerns: At Risk for Falls    Impairments/Disabilities:      None    Nutrition Therapy:  Current Nutrition Therapy:   - Oral Diet:  General    Routes of Feeding: Oral  Liquids: Thin Liquids  Daily Fluid Restriction: no  Last Modified Barium Swallow with Video (Video Swallowing Test): not done    Treatments at the Time of Hospital Discharge:   Respiratory Treatments:   Oxygen Therapy:  is not on home oxygen therapy. Ventilator:    - No ventilator support    Rehab Therapies: Physical Therapy, Occupational Therapy, and Nurse; HRS if patient agreeable  Weight Bearing Status/Restrictions: No weight bearing restrictions  Other Medical Equipment (for information only, NOT a DME order):  walker  Other Treatments:     Patient's personal belongings (please select all that are sent with patient): All belongings sent with patient.     RN SIGNATURE:  Electronically signed by Yudith Valencia RN on 11/30/23 at 4:19 PM EST    CASE MANAGEMENT/SOCIAL WORK SECTION    Inpatient Status Date: ***    Readmission Risk Assessment Score:  Readmission Risk              Risk of Unplanned Readmission:  18           Discharging to Facility/ Agency   Name:  [facility] care    Address: [facility], [address] [place] [address]., [address]  Phone: [phone]  Fax: [phone]    Dialysis Facility (if applicable)

## 2023-11-30 NOTE — CARE COORDINATION
CASE MANAGEMENT DISCHARGE SUMMARY      Discharge to: Home w/spouse. [facility] PT/RN    IMM given: 11/301/23    New Durable Medical Equipment ordered/agency: n/a    Transportation:    Family/car:private w/spouse    Confirmed discharge plan with:     Patient: yes. Family:  yes. VM left for Spouse    Facility/Agency, name:  [facility]     RN, name: Hira Garcia     Note: Discharging nurse to complete FAN, reconcile AVS, and place final copy with patient's discharge packet.

## 2023-12-01 NOTE — CARE COORDINATION
[facility]    DC order noted, all docs needed have been faxed to [facility] for home care services.     Home care to see patient within 24-48 hrs    Bayron Garcia RN, BSN CTN  [facility] [phone]

## 2023-12-03 LAB
BACTERIA BLD CULT ORG #2: NORMAL
BACTERIA BLD CULT: NORMAL

## 2023-12-07 NOTE — PROGRESS NOTES
Physician Progress Note      PATIENT:               Yuly Pan  CSN #:                  [id number]  :                       1945  ADMIT DATE:       2023 9:54 PM  [facility] DATE:        2023 5:16 PM  RESPONDING  PROVIDER #:        Dhiraj Rodriguez MD          QUERY TEXT:    Patient admitted with covid and has documented pancytopenia and is being   treated for lung cancer. If possible, please document in progress notes and   discharge summary further specificity regarding pancytopenia:      The medical record reflects the following:  Risk Factors: Lung cancer, UTI, Covid  Clinical Indicators: Per ED consult note \"Laboratory studies show   pancytopenia\". wbc 2.3, Hgb 7.1, platelets 77. Per progress notes \"Currently   undergoing every 3 week chemotherapy\". Treatment: Oncology consult, serial labs, supportive care    Thank you,  Junior Rios RN BSN  Options provided:  -- Antineoplastic (chemotherapy) induced pancytopenia  -- Other - I will add my own diagnosis  -- Disagree - Not applicable / Not valid  -- Disagree - Clinically unable to determine / Unknown  -- Refer to Clinical Documentation Reviewer    PROVIDER RESPONSE TEXT:    Patient has antineoplastic (chemotherapy) induced pancytopenia. Query created by: Junior Rois on 2023 1:35 PM      Electronically signed by:   Dhiraj Rodriguez MD 2023 9:47 AM

## 2023-12-12 ENCOUNTER — HOSPITAL ENCOUNTER (OUTPATIENT)
Dept: MRI IMAGING | Age: 78
Discharge: HOME | End: 2023-12-12
Payer: COMMERCIAL

## 2023-12-12 DIAGNOSIS — C34.82 MALIGNANT NEOPLASM OF OVERLAPPING SITES OF LEFT BRONCHUS AND LUNG (HCC): ICD-10-CM

## 2023-12-12 PROCEDURE — 70553 MRI BRAIN STEM W/O & W/DYE: CPT

## 2023-12-12 PROCEDURE — 6360000004 HC RX CONTRAST MEDICATION: Performed by: RADIOLOGY

## 2023-12-12 PROCEDURE — A9579 GAD-BASE MR CONTRAST NOS,1ML: HCPCS | Performed by: RADIOLOGY

## 2023-12-12 RX ADMIN — GADOTERIDOL 18 ML: 279.3 INJECTION, SOLUTION INTRAVENOUS at 09:44

## 2023-12-13 DIAGNOSIS — I10 HTN (HYPERTENSION), BENIGN: ICD-10-CM

## 2023-12-13 RX ORDER — METOPROLOL SUCCINATE 50 MG/1
25 TABLET, EXTENDED RELEASE ORAL DAILY
Qty: 90 TABLET | Refills: 3 | Status: SHIPPED | OUTPATIENT
Start: 2023-12-13

## 2023-12-13 NOTE — TELEPHONE ENCOUNTER
Last ov- 10/25/23  Upcoming ov- 4/22/24    Bmp- 11/30/23  EKG- 11/28/23    Patient discharged from ED 11/30 with medication change. TOPROL now 25 mg instead of 50.  Pended medication for sign off

## 2024-02-07 ENCOUNTER — HOSPITAL ENCOUNTER (OUTPATIENT)
Dept: CT IMAGING | Age: 79
Discharge: HOME | End: 2024-02-07
Attending: INTERNAL MEDICINE
Payer: COMMERCIAL

## 2024-02-07 DIAGNOSIS — C34.82 MALIGNANT NEOPLASM OF OVERLAPPING SITES OF LEFT BRONCHUS AND LUNG (HCC): ICD-10-CM

## 2024-02-07 PROCEDURE — 74177 CT ABD & PELVIS W/CONTRAST: CPT

## 2024-02-07 PROCEDURE — 6360000004 HC RX CONTRAST MEDICATION: Performed by: INTERNAL MEDICINE

## 2024-02-07 RX ADMIN — IOPAMIDOL 75 ML: 755 INJECTION, SOLUTION INTRAVENOUS at 13:02

## 2024-02-07 RX ADMIN — DIATRIZOATE MEGLUMINE AND DIATRIZOATE SODIUM 12 ML: 660; 100 LIQUID ORAL; RECTAL at 13:02

## 2024-02-07 NOTE — PROGRESS NOTES
Port accessed for Wes Schneider. Pt was identified using two unique identifiers. Allergies were noted. Sterile procedures followed to access port. Left sided port was accessed, aspirated and flushed approprirately. Sterile dressing applied.

## 2024-02-07 NOTE — PROGRESS NOTES
sPort deaccessed for Wes Schneider. Pt was identified using two unique identifiers. Allergies were noted. left sided port was deaccessed and then dry dressing was applied to the site.

## 2024-02-08 DIAGNOSIS — I25.10 CORONARY ARTERY CALCIFICATION SEEN ON CT SCAN: ICD-10-CM

## 2024-02-08 DIAGNOSIS — E78.5 DYSLIPIDEMIA: ICD-10-CM

## 2024-02-08 RX ORDER — ATORVASTATIN CALCIUM 40 MG/1
40 TABLET, FILM COATED ORAL NIGHTLY
Qty: 90 TABLET | Refills: 0 | Status: SHIPPED | OUTPATIENT
Start: 2024-02-08

## 2024-02-08 NOTE — TELEPHONE ENCOUNTER
10/25/2023 [facility]  4/22/2024 [facility] upcoming appt  11/30/2023 cmp  8/23/2023 lipid

## 2024-04-04 ENCOUNTER — HOSPITAL ENCOUNTER (OUTPATIENT)
Dept: MRI IMAGING | Age: 79
Discharge: HOME | End: 2024-04-04
Payer: COMMERCIAL

## 2024-04-04 DIAGNOSIS — C79.31 METASTASIS TO BRAIN (HCC): ICD-10-CM

## 2024-04-04 DIAGNOSIS — C34.82 MALIGNANT NEOPLASM OF OVERLAPPING SITES OF LEFT LUNG (HCC): ICD-10-CM

## 2024-04-04 PROCEDURE — 6360000004 HC RX CONTRAST MEDICATION: Performed by: NURSE PRACTITIONER

## 2024-04-04 PROCEDURE — 2580000003 HC RX 258: Performed by: NURSE PRACTITIONER

## 2024-04-04 PROCEDURE — 70553 MRI BRAIN STEM W/O & W/DYE: CPT

## 2024-04-04 PROCEDURE — A9579 GAD-BASE MR CONTRAST NOS,1ML: HCPCS | Performed by: NURSE PRACTITIONER

## 2024-04-04 RX ORDER — SODIUM CHLORIDE 0.9 % (FLUSH) 0.9 %
5-40 SYRINGE (ML) INJECTION 2 TIMES DAILY
Status: DISCONTINUED | OUTPATIENT
Start: 2024-04-04 | End: 2024-04-05 | Stop reason: HOSPADM

## 2024-04-04 RX ADMIN — SODIUM CHLORIDE, PRESERVATIVE FREE 10 ML: 5 INJECTION INTRAVENOUS at 13:30

## 2024-04-04 RX ADMIN — GADOTERIDOL 17 ML: 279.3 INJECTION, SOLUTION INTRAVENOUS at 13:29

## 2024-05-01 ENCOUNTER — HOSPITAL ENCOUNTER (OUTPATIENT)
Dept: CT IMAGING | Age: 79
Discharge: HOME | End: 2024-05-01
Attending: INTERNAL MEDICINE
Payer: COMMERCIAL

## 2024-05-01 DIAGNOSIS — C34.82 MALIGNANT NEOPLASM OF OVERLAPPING SITES OF LEFT BRONCHUS AND LUNG (HCC): ICD-10-CM

## 2024-05-01 PROCEDURE — 6360000004 HC RX CONTRAST MEDICATION: Performed by: INTERNAL MEDICINE

## 2024-05-01 PROCEDURE — 74177 CT ABD & PELVIS W/CONTRAST: CPT

## 2024-05-01 RX ADMIN — IOPAMIDOL 75 ML: 755 INJECTION, SOLUTION INTRAVENOUS at 12:56

## 2024-05-01 RX ADMIN — DIATRIZOATE MEGLUMINE AND DIATRIZOATE SODIUM 12 ML: 660; 100 LIQUID ORAL; RECTAL at 12:45

## 2024-05-03 ENCOUNTER — HOSPITAL ENCOUNTER (OUTPATIENT)
Dept: MRI IMAGING | Age: 79
Discharge: HOME | End: 2024-05-03
Payer: MEDICARE

## 2024-05-03 DIAGNOSIS — C34.82 MALIGNANT NEOPLASM OF OVERLAPPING SITES OF LEFT BRONCHUS AND LUNG (HCC): ICD-10-CM

## 2024-05-03 DIAGNOSIS — C79.31 METASTASIS TO BRAIN (HCC): ICD-10-CM

## 2024-05-03 PROCEDURE — A9579 GAD-BASE MR CONTRAST NOS,1ML: HCPCS | Performed by: RADIOLOGY

## 2024-05-03 PROCEDURE — 6360000004 HC RX CONTRAST MEDICATION: Performed by: RADIOLOGY

## 2024-05-03 PROCEDURE — 70553 MRI BRAIN STEM W/O & W/DYE: CPT

## 2024-05-03 RX ADMIN — GADOTERIDOL 16 ML: 279.3 INJECTION, SOLUTION INTRAVENOUS at 13:53

## 2024-05-25 DIAGNOSIS — I25.10 CORONARY ARTERY CALCIFICATION SEEN ON CT SCAN: ICD-10-CM

## 2024-05-25 DIAGNOSIS — E78.5 DYSLIPIDEMIA: ICD-10-CM

## 2024-05-28 RX ORDER — ATORVASTATIN CALCIUM 40 MG/1
40 TABLET, FILM COATED ORAL NIGHTLY
Qty: 90 TABLET | Refills: 1 | Status: SHIPPED | OUTPATIENT
Start: 2024-05-28

## 2024-05-28 NOTE — TELEPHONE ENCOUNTER
10/25/2023 [facility]  No upcoming appt  11/30/2023 cmp  8/23/2023 lipid

## 2024-07-01 RX ORDER — ACETAMINOPHEN 325 MG/1
650 TABLET ORAL
OUTPATIENT
Start: 2024-07-01

## 2024-07-01 RX ORDER — SODIUM CHLORIDE 0.9 % (FLUSH) 0.9 %
5-40 SYRINGE (ML) INJECTION PRN
OUTPATIENT
Start: 2024-07-01

## 2024-07-01 RX ORDER — SODIUM CHLORIDE 9 MG/ML
INJECTION, SOLUTION INTRAVENOUS CONTINUOUS
OUTPATIENT
Start: 2024-07-01

## 2024-07-01 RX ORDER — ONDANSETRON 2 MG/ML
8 INJECTION INTRAMUSCULAR; INTRAVENOUS
OUTPATIENT
Start: 2024-07-01

## 2024-07-01 RX ORDER — EPINEPHRINE 1 MG/ML
0.3 INJECTION, SOLUTION INTRAMUSCULAR; SUBCUTANEOUS PRN
OUTPATIENT
Start: 2024-07-01

## 2024-07-01 RX ORDER — ALBUTEROL SULFATE 90 UG/1
4 AEROSOL, METERED RESPIRATORY (INHALATION) PRN
OUTPATIENT
Start: 2024-07-01

## 2024-07-01 RX ORDER — SODIUM CHLORIDE 9 MG/ML
20 INJECTION, SOLUTION INTRAVENOUS CONTINUOUS
OUTPATIENT
Start: 2024-07-01

## 2024-07-01 RX ORDER — SODIUM CHLORIDE 9 MG/ML
25 INJECTION, SOLUTION INTRAVENOUS PRN
OUTPATIENT
Start: 2024-07-01

## 2024-07-01 RX ORDER — DIPHENHYDRAMINE HYDROCHLORIDE 50 MG/ML
50 INJECTION INTRAMUSCULAR; INTRAVENOUS
OUTPATIENT
Start: 2024-07-01

## 2024-07-02 ENCOUNTER — HOSPITAL ENCOUNTER (OUTPATIENT)
Dept: ONCOLOGY | Age: 79
Setting detail: INFUSION SERIES
Discharge: HOME | End: 2024-07-02
Payer: COMMERCIAL

## 2024-07-02 ENCOUNTER — HOSPITAL ENCOUNTER (OUTPATIENT)
Age: 79
Discharge: HOME | End: 2024-07-02
Attending: INTERNAL MEDICINE | Admitting: INTERNAL MEDICINE
Payer: COMMERCIAL

## 2024-07-02 VITALS
HEART RATE: 75 BPM | SYSTOLIC BLOOD PRESSURE: 117 MMHG | RESPIRATION RATE: 14 BRPM | OXYGEN SATURATION: 97 % | TEMPERATURE: 97.7 F | DIASTOLIC BLOOD PRESSURE: 71 MMHG

## 2024-07-02 DIAGNOSIS — C34.00 MALIGNANT NEOPLASM OF HILUS OF LUNG, UNSPECIFIED LATERALITY (HCC): Primary | ICD-10-CM

## 2024-07-02 LAB
ABO + RH BLD: NORMAL
BLD GP AB SCN SERPL QL: NORMAL
BLOOD BANK DISPENSE STATUS: NORMAL
BLOOD BANK PRODUCT CODE: NORMAL
BPU ID: NORMAL
DESCRIPTION BLOOD BANK: NORMAL

## 2024-07-02 PROCEDURE — 86900 BLOOD TYPING SEROLOGIC ABO: CPT

## 2024-07-02 PROCEDURE — 36430 TRANSFUSION BLD/BLD COMPNT: CPT

## 2024-07-02 PROCEDURE — 86850 RBC ANTIBODY SCREEN: CPT

## 2024-07-02 PROCEDURE — P9016 RBC LEUKOCYTES REDUCED: HCPCS

## 2024-07-02 PROCEDURE — 6370000000 HC RX 637 (ALT 250 FOR IP): Performed by: INTERNAL MEDICINE

## 2024-07-02 PROCEDURE — 86923 COMPATIBILITY TEST ELECTRIC: CPT

## 2024-07-02 PROCEDURE — G0379 DIRECT REFER HOSPITAL OBSERV: HCPCS

## 2024-07-02 PROCEDURE — 86901 BLOOD TYPING SEROLOGIC RH(D): CPT

## 2024-07-02 PROCEDURE — 36591 DRAW BLOOD OFF VENOUS DEVICE: CPT

## 2024-07-02 PROCEDURE — G0378 HOSPITAL OBSERVATION PER HR: HCPCS

## 2024-07-02 RX ORDER — ACETAMINOPHEN 325 MG/1
650 TABLET ORAL
OUTPATIENT
Start: 2024-07-02

## 2024-07-02 RX ORDER — SODIUM CHLORIDE 9 MG/ML
20 INJECTION, SOLUTION INTRAVENOUS CONTINUOUS
OUTPATIENT
Start: 2024-07-02

## 2024-07-02 RX ORDER — SODIUM CHLORIDE 0.9 % (FLUSH) 0.9 %
5-40 SYRINGE (ML) INJECTION PRN
OUTPATIENT
Start: 2024-07-02

## 2024-07-02 RX ORDER — ONDANSETRON 2 MG/ML
8 INJECTION INTRAMUSCULAR; INTRAVENOUS
OUTPATIENT
Start: 2024-07-02

## 2024-07-02 RX ORDER — SODIUM CHLORIDE 9 MG/ML
25 INJECTION, SOLUTION INTRAVENOUS PRN
OUTPATIENT
Start: 2024-07-02

## 2024-07-02 RX ORDER — ALBUTEROL SULFATE 90 UG/1
4 AEROSOL, METERED RESPIRATORY (INHALATION) PRN
OUTPATIENT
Start: 2024-07-02

## 2024-07-02 RX ORDER — SODIUM CHLORIDE 9 MG/ML
INJECTION, SOLUTION INTRAVENOUS CONTINUOUS
OUTPATIENT
Start: 2024-07-02

## 2024-07-02 RX ORDER — EPINEPHRINE 1 MG/ML
0.3 INJECTION, SOLUTION INTRAMUSCULAR; SUBCUTANEOUS PRN
OUTPATIENT
Start: 2024-07-02

## 2024-07-02 RX ORDER — LEVOFLOXACIN 500 MG/1
1 TABLET, FILM COATED ORAL
COMMUNITY
Start: 2024-07-01

## 2024-07-02 RX ORDER — ACETAMINOPHEN 325 MG/1
650 TABLET ORAL ONCE
Status: CANCELLED | OUTPATIENT
Start: 2024-07-02 | End: 2024-07-02

## 2024-07-02 RX ORDER — DIPHENHYDRAMINE HCL 25 MG
25 TABLET ORAL ONCE
Status: CANCELLED | OUTPATIENT
Start: 2024-07-02 | End: 2024-07-02

## 2024-07-02 RX ORDER — DIPHENHYDRAMINE HCL 25 MG
25 TABLET ORAL ONCE
Status: COMPLETED | OUTPATIENT
Start: 2024-07-02 | End: 2024-07-02

## 2024-07-02 RX ORDER — ACETAMINOPHEN 325 MG/1
650 TABLET ORAL ONCE
Status: COMPLETED | OUTPATIENT
Start: 2024-07-02 | End: 2024-07-02

## 2024-07-02 RX ORDER — DIPHENHYDRAMINE HYDROCHLORIDE 50 MG/ML
50 INJECTION INTRAMUSCULAR; INTRAVENOUS
OUTPATIENT
Start: 2024-07-02

## 2024-07-02 RX ADMIN — DIPHENHYDRAMINE HCL 25 MG: 25 TABLET ORAL at 14:03

## 2024-07-02 RX ADMIN — ACETAMINOPHEN 650 MG: 325 TABLET ORAL at 14:03

## 2024-07-02 NOTE — PROGRESS NOTES
Patient's hemoglobin this AM: 7.1  Patient's platelet count this AM: 99  Pt seen at [facility] today for  Packed red blood cell transfusion  for above lab values per order. Informed consent verified. Pre-medications administered as ordered. 25 mg benadryl, 650 tylenol. Transfused per policy. Pt tolerated transfusion well and without incident.  Pt verbalizes understanding of discharge instructions.  Discharged to home with family member.     Screening Questionnaire for Adult Immunization    Are you sick today?   No   Do you have allergies to medications, food, a vaccine component or latex?   No   Have you ever had a serious reaction after receiving a vaccination?   No   Do you have a long-term health problem with heart disease, lung disease, asthma, kidney disease, metabolic disease (e.g. diabetes), anemia, or other blood disorder?   No   Do you have cancer, leukemia, HIV/AIDS, or any other immune system problem?   No   In the past 3 months, have you taken medications that affect  your immune system, such as prednisone, other steroids, or anticancer drugs; drugs for the treatment of rheumatoid arthritis, Crohn s disease, or psoriasis; or have you had radiation treatments?   No   Have you had a seizure, or a brain or other nervous system problem?   No   During the past year, have you received a transfusion of blood or blood     products, or been given immune (gamma) globulin or antiviral drug?   No   For women: Are you pregnant or is there a chance you could become        pregnant during the next month?   No   Have you received any vaccinations in the past 4 weeks?   No     Immunization questionnaire answers were all negative.        Prior to injection, verified patient identity using patient's name and date of birth.  Due to injection administration, patient instructed to remain in clinic for 15 minutes  afterwards, and to report any adverse reaction to me immediately.     Screening performed by Eloise Arana on 3/15/2019 at 7:54 AM.

## 2024-07-26 ENCOUNTER — HOSPITAL ENCOUNTER (OUTPATIENT)
Age: 79
Discharge: HOME | End: 2024-07-26
Payer: MEDICARE

## 2024-07-26 DIAGNOSIS — C34.32 MALIGNANT NEOPLASM OF LOWER LOBE, LEFT BRONCHUS OR LUNG (HCC): ICD-10-CM

## 2024-07-26 LAB
EGFR, POC: 86 ML/MIN/1.73M2
POC CREATININE: 0.9 MG/DL (ref 0.8–1.3)

## 2024-07-26 PROCEDURE — 70553 MRI BRAIN STEM W/O & W/DYE: CPT

## 2024-07-26 PROCEDURE — 82565 ASSAY OF CREATININE: CPT

## 2024-07-26 PROCEDURE — A9579 GAD-BASE MR CONTRAST NOS,1ML: HCPCS | Performed by: INTERNAL MEDICINE

## 2024-07-26 PROCEDURE — 6360000004 HC RX CONTRAST MEDICATION: Performed by: INTERNAL MEDICINE

## 2024-07-26 RX ADMIN — GADOTERIDOL 20 ML: 279.3 INJECTION, SOLUTION INTRAVENOUS at 15:18

## 2024-08-06 ENCOUNTER — HOSPITAL ENCOUNTER (OUTPATIENT)
Dept: CT IMAGING | Age: 79
Discharge: HOME | End: 2024-08-06
Attending: INTERNAL MEDICINE
Payer: COMMERCIAL

## 2024-08-06 DIAGNOSIS — C34.82 MALIGNANT NEOPLASM OF OVERLAPPING SITES OF LEFT BRONCHUS AND LUNG (HCC): ICD-10-CM

## 2024-08-06 PROCEDURE — 71260 CT THORAX DX C+: CPT

## 2024-08-06 PROCEDURE — 6360000004 HC RX CONTRAST MEDICATION: Performed by: INTERNAL MEDICINE

## 2024-08-06 RX ADMIN — DIATRIZOATE MEGLUMINE AND DIATRIZOATE SODIUM 12 ML: 660; 100 LIQUID ORAL; RECTAL at 11:18

## 2024-08-06 RX ADMIN — IOPAMIDOL 75 ML: 755 INJECTION, SOLUTION INTRAVENOUS at 11:17

## 2024-08-07 ENCOUNTER — HOSPITAL ENCOUNTER (INPATIENT)
Age: 79
LOS: 4 days | Discharge: HOME HEALTH | DRG: 871 | End: 2024-08-11
Attending: STUDENT IN AN ORGANIZED HEALTH CARE EDUCATION/TRAINING PROGRAM | Admitting: INTERNAL MEDICINE
Payer: MEDICARE

## 2024-08-07 ENCOUNTER — APPOINTMENT (OUTPATIENT)
Dept: CT IMAGING | Age: 79
DRG: 871 | End: 2024-08-07
Payer: MEDICARE

## 2024-08-07 ENCOUNTER — APPOINTMENT (OUTPATIENT)
Dept: GENERAL RADIOLOGY | Age: 79
DRG: 871 | End: 2024-08-07
Payer: MEDICARE

## 2024-08-07 DIAGNOSIS — D64.9 NORMOCYTIC ANEMIA: ICD-10-CM

## 2024-08-07 DIAGNOSIS — D69.6 THROMBOCYTOPENIA (HCC): ICD-10-CM

## 2024-08-07 DIAGNOSIS — R07.9 CHEST PAIN, UNSPECIFIED TYPE: Primary | ICD-10-CM

## 2024-08-07 DIAGNOSIS — M79.89 SWELLING OF RIGHT UPPER EXTREMITY: ICD-10-CM

## 2024-08-07 DIAGNOSIS — R65.10 SIRS (SYSTEMIC INFLAMMATORY RESPONSE SYNDROME) (HCC): ICD-10-CM

## 2024-08-07 DIAGNOSIS — R79.89 ELEVATED TROPONIN LEVEL: ICD-10-CM

## 2024-08-07 DIAGNOSIS — C34.91 ADENOCARCINOMA OF RIGHT LUNG (HCC): ICD-10-CM

## 2024-08-07 DIAGNOSIS — R79.89 ELEVATED TROPONIN: ICD-10-CM

## 2024-08-07 LAB
ABO + RH BLD: NORMAL
ALBUMIN SERPL-MCNC: 2.6 G/DL (ref 3.4–5)
ALBUMIN/GLOB SERPL: 0.8 {RATIO} (ref 1.1–2.2)
ALP SERPL-CCNC: 134 U/L (ref 40–129)
ALT SERPL-CCNC: 13 U/L (ref 10–40)
ANION GAP SERPL CALCULATED.3IONS-SCNC: 8 MMOL/L (ref 3–16)
ANISOCYTOSIS BLD QL SMEAR: ABNORMAL
AST SERPL-CCNC: 21 U/L (ref 15–37)
BASOPHILS # BLD: 0 K/UL (ref 0–0.2)
BASOPHILS NFR BLD: 0.3 %
BILIRUB SERPL-MCNC: 0.8 MG/DL (ref 0–1)
BLD GP AB SCN SERPL QL: NORMAL
BLOOD BANK DISPENSE STATUS: NORMAL
BLOOD BANK PRODUCT CODE: NORMAL
BPU ID: NORMAL
BUN SERPL-MCNC: 19 MG/DL (ref 7–20)
CALCIUM SERPL-MCNC: 7.8 MG/DL (ref 8.3–10.6)
CHLORIDE SERPL-SCNC: 101 MMOL/L (ref 99–110)
CO2 SERPL-SCNC: 23 MMOL/L (ref 21–32)
CREAT SERPL-MCNC: 1 MG/DL (ref 0.8–1.3)
DEPRECATED RDW RBC AUTO: 19.5 % (ref 12.4–15.4)
DESCRIPTION BLOOD BANK: NORMAL
EOSINOPHIL # BLD: 0 K/UL (ref 0–0.6)
EOSINOPHIL NFR BLD: 0.3 %
FERRITIN SERPL IA-MCNC: 2134 NG/ML (ref 30–400)
FOLATE SERPL-MCNC: 19.56 NG/ML (ref 4.78–24.2)
GFR SERPLBLD CREATININE-BSD FMLA CKD-EPI: 76 ML/MIN/{1.73_M2}
GLUCOSE BLD-MCNC: 338 MG/DL (ref 70–99)
GLUCOSE BLD-MCNC: 94 MG/DL (ref 70–99)
GLUCOSE SERPL-MCNC: 127 MG/DL (ref 70–99)
HCT VFR BLD AUTO: 20.8 % (ref 40.5–52.5)
HCT VFR BLD AUTO: 22.4 % (ref 40.5–52.5)
HGB BLD-MCNC: 6.7 G/DL (ref 13.5–17.5)
HGB BLD-MCNC: 7.3 G/DL (ref 13.5–17.5)
IRON SATN MFR SERPL: 25 % (ref 20–50)
IRON SERPL-MCNC: 38 UG/DL (ref 59–158)
LACTATE BLDV-SCNC: 1 MMOL/L (ref 0.4–2)
LACTATE BLDV-SCNC: 1.2 MMOL/L (ref 0.4–1.9)
LIPASE SERPL-CCNC: 48 U/L (ref 13–60)
LYMPHOCYTES # BLD: 0.2 K/UL (ref 1–5.1)
LYMPHOCYTES NFR BLD: 8.2 %
MCH RBC QN AUTO: 30 PG (ref 26–34)
MCHC RBC AUTO-ENTMCNC: 32.8 G/DL (ref 31–36)
MCV RBC AUTO: 91.6 FL (ref 80–100)
MICROCYTES BLD QL SMEAR: ABNORMAL
MONOCYTES # BLD: 0.4 K/UL (ref 0–1.3)
MONOCYTES NFR BLD: 15.6 %
NEUTROPHILS # BLD: 1.8 K/UL (ref 1.7–7.7)
NEUTROPHILS NFR BLD: 75.6 %
NT-PROBNP SERPL-MCNC: 833 PG/ML (ref 0–449)
PERFORMED ON: ABNORMAL
PERFORMED ON: NORMAL
PLATELET # BLD AUTO: 96 K/UL (ref 135–450)
PLATELET BLD QL SMEAR: ABNORMAL
PMV BLD AUTO: 7.1 FL (ref 5–10.5)
POTASSIUM SERPL-SCNC: 4.2 MMOL/L (ref 3.5–5.1)
PROCALCITONIN SERPL IA-MCNC: 0.38 NG/ML (ref 0–0.15)
PROT SERPL-MCNC: 5.9 G/DL (ref 6.4–8.2)
RBC # BLD AUTO: 2.44 M/UL (ref 4.2–5.9)
SLIDE REVIEW: ABNORMAL
SODIUM SERPL-SCNC: 132 MMOL/L (ref 136–145)
TIBC SERPL-MCNC: 155 UG/DL (ref 260–445)
TROPONIN, HIGH SENSITIVITY: 68 NG/L (ref 0–22)
TROPONIN, HIGH SENSITIVITY: 69 NG/L (ref 0–22)
TROPONIN, HIGH SENSITIVITY: 70 NG/L (ref 0–22)
VIT B12 SERPL-MCNC: 1243 PG/ML (ref 211–911)
WBC # BLD AUTO: 2.4 K/UL (ref 4–11)

## 2024-08-07 PROCEDURE — 86900 BLOOD TYPING SEROLOGIC ABO: CPT

## 2024-08-07 PROCEDURE — 71260 CT THORAX DX C+: CPT

## 2024-08-07 PROCEDURE — 6370000000 HC RX 637 (ALT 250 FOR IP): Performed by: NURSE PRACTITIONER

## 2024-08-07 PROCEDURE — 85018 HEMOGLOBIN: CPT

## 2024-08-07 PROCEDURE — 36430 TRANSFUSION BLD/BLD COMPNT: CPT

## 2024-08-07 PROCEDURE — 84145 PROCALCITONIN (PCT): CPT

## 2024-08-07 PROCEDURE — 6360000004 HC RX CONTRAST MEDICATION: Performed by: STUDENT IN AN ORGANIZED HEALTH CARE EDUCATION/TRAINING PROGRAM

## 2024-08-07 PROCEDURE — 83880 ASSAY OF NATRIURETIC PEPTIDE: CPT

## 2024-08-07 PROCEDURE — 30233N1 TRANSFUSION OF NONAUTOLOGOUS RED BLOOD CELLS INTO PERIPHERAL VEIN, PERCUTANEOUS APPROACH: ICD-10-PCS | Performed by: INTERNAL MEDICINE

## 2024-08-07 PROCEDURE — 36415 COLL VENOUS BLD VENIPUNCTURE: CPT

## 2024-08-07 PROCEDURE — 82728 ASSAY OF FERRITIN: CPT

## 2024-08-07 PROCEDURE — 97116 GAIT TRAINING THERAPY: CPT

## 2024-08-07 PROCEDURE — 97535 SELF CARE MNGMENT TRAINING: CPT

## 2024-08-07 PROCEDURE — 83690 ASSAY OF LIPASE: CPT

## 2024-08-07 PROCEDURE — 2580000003 HC RX 258: Performed by: STUDENT IN AN ORGANIZED HEALTH CARE EDUCATION/TRAINING PROGRAM

## 2024-08-07 PROCEDURE — 82746 ASSAY OF FOLIC ACID SERUM: CPT

## 2024-08-07 PROCEDURE — 71045 X-RAY EXAM CHEST 1 VIEW: CPT

## 2024-08-07 PROCEDURE — 83605 ASSAY OF LACTIC ACID: CPT

## 2024-08-07 PROCEDURE — 85014 HEMATOCRIT: CPT

## 2024-08-07 PROCEDURE — 99222 1ST HOSP IP/OBS MODERATE 55: CPT | Performed by: INTERNAL MEDICINE

## 2024-08-07 PROCEDURE — 86850 RBC ANTIBODY SCREEN: CPT

## 2024-08-07 PROCEDURE — 1200000000 HC SEMI PRIVATE

## 2024-08-07 PROCEDURE — 99285 EMERGENCY DEPT VISIT HI MDM: CPT

## 2024-08-07 PROCEDURE — 6360000002 HC RX W HCPCS: Performed by: INTERNAL MEDICINE

## 2024-08-07 PROCEDURE — 93005 ELECTROCARDIOGRAM TRACING: CPT | Performed by: STUDENT IN AN ORGANIZED HEALTH CARE EDUCATION/TRAINING PROGRAM

## 2024-08-07 PROCEDURE — 87040 BLOOD CULTURE FOR BACTERIA: CPT

## 2024-08-07 PROCEDURE — 84484 ASSAY OF TROPONIN QUANT: CPT

## 2024-08-07 PROCEDURE — 83540 ASSAY OF IRON: CPT

## 2024-08-07 PROCEDURE — P9016 RBC LEUKOCYTES REDUCED: HCPCS

## 2024-08-07 PROCEDURE — 85025 COMPLETE CBC W/AUTO DIFF WBC: CPT

## 2024-08-07 PROCEDURE — 83550 IRON BINDING TEST: CPT

## 2024-08-07 PROCEDURE — 97162 PT EVAL MOD COMPLEX 30 MIN: CPT

## 2024-08-07 PROCEDURE — 82607 VITAMIN B-12: CPT

## 2024-08-07 PROCEDURE — 6360000002 HC RX W HCPCS: Performed by: STUDENT IN AN ORGANIZED HEALTH CARE EDUCATION/TRAINING PROGRAM

## 2024-08-07 PROCEDURE — 80053 COMPREHEN METABOLIC PANEL: CPT

## 2024-08-07 PROCEDURE — 86901 BLOOD TYPING SEROLOGIC RH(D): CPT

## 2024-08-07 PROCEDURE — 97530 THERAPEUTIC ACTIVITIES: CPT

## 2024-08-07 PROCEDURE — 86923 COMPATIBILITY TEST ELECTRIC: CPT

## 2024-08-07 PROCEDURE — 2580000003 HC RX 258: Performed by: INTERNAL MEDICINE

## 2024-08-07 PROCEDURE — 97166 OT EVAL MOD COMPLEX 45 MIN: CPT

## 2024-08-07 RX ORDER — INSULIN LISPRO 100 [IU]/ML
0-8 INJECTION, SOLUTION INTRAVENOUS; SUBCUTANEOUS
Status: DISCONTINUED | OUTPATIENT
Start: 2024-08-08 | End: 2024-08-11 | Stop reason: HOSPADM

## 2024-08-07 RX ORDER — SODIUM CHLORIDE 9 MG/ML
INJECTION, SOLUTION INTRAVENOUS PRN
Status: DISCONTINUED | OUTPATIENT
Start: 2024-08-07 | End: 2024-08-11 | Stop reason: HOSPADM

## 2024-08-07 RX ORDER — NICOTINE 21 MG/24HR
1 PATCH, TRANSDERMAL 24 HOURS TRANSDERMAL DAILY
Status: DISCONTINUED | OUTPATIENT
Start: 2024-08-07 | End: 2024-08-11 | Stop reason: HOSPADM

## 2024-08-07 RX ORDER — INSULIN LISPRO 100 [IU]/ML
0-4 INJECTION, SOLUTION INTRAVENOUS; SUBCUTANEOUS NIGHTLY
Status: DISCONTINUED | OUTPATIENT
Start: 2024-08-07 | End: 2024-08-11 | Stop reason: HOSPADM

## 2024-08-07 RX ORDER — SODIUM CHLORIDE 0.9 % (FLUSH) 0.9 %
10 SYRINGE (ML) INJECTION EVERY 12 HOURS SCHEDULED
Status: DISCONTINUED | OUTPATIENT
Start: 2024-08-07 | End: 2024-08-11 | Stop reason: HOSPADM

## 2024-08-07 RX ORDER — LANOLIN ALCOHOL/MO/W.PET/CERES
3 CREAM (GRAM) TOPICAL NIGHTLY
Status: DISCONTINUED | OUTPATIENT
Start: 2024-08-07 | End: 2024-08-09

## 2024-08-07 RX ORDER — SODIUM CHLORIDE 0.9 % (FLUSH) 0.9 %
10 SYRINGE (ML) INJECTION PRN
Status: DISCONTINUED | OUTPATIENT
Start: 2024-08-07 | End: 2024-08-11 | Stop reason: HOSPADM

## 2024-08-07 RX ORDER — ACETAMINOPHEN 325 MG/1
650 TABLET ORAL EVERY 6 HOURS PRN
Status: DISCONTINUED | OUTPATIENT
Start: 2024-08-07 | End: 2024-08-11 | Stop reason: HOSPADM

## 2024-08-07 RX ORDER — 0.9 % SODIUM CHLORIDE 0.9 %
30 INTRAVENOUS SOLUTION INTRAVENOUS ONCE
Status: COMPLETED | OUTPATIENT
Start: 2024-08-07 | End: 2024-08-07

## 2024-08-07 RX ORDER — PROMETHAZINE HYDROCHLORIDE 25 MG/1
12.5 TABLET ORAL EVERY 6 HOURS PRN
Status: DISCONTINUED | OUTPATIENT
Start: 2024-08-07 | End: 2024-08-11 | Stop reason: HOSPADM

## 2024-08-07 RX ORDER — ACETAMINOPHEN 650 MG/1
650 SUPPOSITORY RECTAL EVERY 6 HOURS PRN
Status: DISCONTINUED | OUTPATIENT
Start: 2024-08-07 | End: 2024-08-11 | Stop reason: HOSPADM

## 2024-08-07 RX ORDER — ONDANSETRON 2 MG/ML
4 INJECTION INTRAMUSCULAR; INTRAVENOUS EVERY 6 HOURS PRN
Status: DISCONTINUED | OUTPATIENT
Start: 2024-08-07 | End: 2024-08-11 | Stop reason: HOSPADM

## 2024-08-07 RX ORDER — GLUCAGON 1 MG/ML
1 KIT INJECTION PRN
Status: DISCONTINUED | OUTPATIENT
Start: 2024-08-07 | End: 2024-08-11 | Stop reason: HOSPADM

## 2024-08-07 RX ORDER — DEXTROSE MONOHYDRATE 100 MG/ML
INJECTION, SOLUTION INTRAVENOUS CONTINUOUS PRN
Status: DISCONTINUED | OUTPATIENT
Start: 2024-08-07 | End: 2024-08-11 | Stop reason: HOSPADM

## 2024-08-07 RX ORDER — INSULIN GLARGINE 100 [IU]/ML
50 INJECTION, SOLUTION SUBCUTANEOUS NIGHTLY
Status: DISCONTINUED | OUTPATIENT
Start: 2024-08-07 | End: 2024-08-08

## 2024-08-07 RX ADMIN — CEFEPIME 1000 MG: 1 INJECTION, POWDER, FOR SOLUTION INTRAMUSCULAR; INTRAVENOUS at 19:06

## 2024-08-07 RX ADMIN — IOPAMIDOL 75 ML: 755 INJECTION, SOLUTION INTRAVENOUS at 05:29

## 2024-08-07 RX ADMIN — INSULIN GLARGINE 50 UNITS: 100 INJECTION, SOLUTION SUBCUTANEOUS at 21:47

## 2024-08-07 RX ADMIN — CEFEPIME 2000 MG: 2 INJECTION, POWDER, FOR SOLUTION INTRAVENOUS at 06:04

## 2024-08-07 RX ADMIN — SODIUM CHLORIDE, PRESERVATIVE FREE 10 ML: 5 INJECTION INTRAVENOUS at 21:48

## 2024-08-07 RX ADMIN — INSULIN LISPRO 4 UNITS: 100 INJECTION, SOLUTION INTRAVENOUS; SUBCUTANEOUS at 21:47

## 2024-08-07 RX ADMIN — SODIUM CHLORIDE 2052 ML: 9 INJECTION, SOLUTION INTRAVENOUS at 05:47

## 2024-08-07 RX ADMIN — VANCOMYCIN HYDROCHLORIDE 1500 MG: 10 INJECTION, POWDER, LYOPHILIZED, FOR SOLUTION INTRAVENOUS at 06:40

## 2024-08-07 ASSESSMENT — PAIN - FUNCTIONAL ASSESSMENT
PAIN_FUNCTIONAL_ASSESSMENT: 0-10
PAIN_FUNCTIONAL_ASSESSMENT: NONE - DENIES PAIN

## 2024-08-07 ASSESSMENT — LIFESTYLE VARIABLES
HOW OFTEN DO YOU HAVE A DRINK CONTAINING ALCOHOL: NEVER
HOW MANY STANDARD DRINKS CONTAINING ALCOHOL DO YOU HAVE ON A TYPICAL DAY: PATIENT DOES NOT DRINK

## 2024-08-07 ASSESSMENT — PAIN SCALES - GENERAL
PAINLEVEL_OUTOF10: 0
PAINLEVEL_OUTOF10: 0

## 2024-08-07 NOTE — ED NOTES
346@4016  
Note in and transport requested   
  Height: 1.727 m (5' 8\")        FiO2 (%): na  O2 Flow Rate: O2 Device: None (Room air)    Cardiac Rhythm: Cardiac Rhythm: Sinus tachy  Pain Assessment:  [ X] Verbal [ ] Devine Olguin Scale  Pain Scale: Pain Assessment  Pain Assessment: 0-10  Pain Level: 0  Patient's Stated Pain Goal: 0 - No pain  Last documented pain score (0-10 scale) Pain Level: 0  Last documented pain medication administered: n/a  Mental Status: oriented  NIH Score:    C-SSRS: Risk of Suicide: No Risk  Bedside swallow:    Charlie Coma Scale (GCS): [place] Coma Scale  Eye Opening: Spontaneous  Best Verbal Response: Oriented  Best Motor Response: Obeys commands  [place] Coma Scale Score: 15  Active LDA's:   Peripheral IV 08/07/24 Right Antecubital (Active)   Site Assessment Clean, dry & intact 08/07/24 0438   Line Status Blood return noted;Specimen collected;Flushed;Normal saline locked 08/07/24 0438     PO Status: Nothing by Mouth/ice chips   Pertinent or High Risk Medications/Drips: no  oIf Yes, please provide details:   Pending Blood Product Administration: no    You may also review the ED PT Care Timeline found under the Summary Nursing Index tab.    Recommendation    Pending orders     Plan for Discharge (if known):  Baseline ambulation: Independent.  Voiding: Independent\", “Urinal.  Belongings documented: no, however placed in a belongings bag and given to wife who is at bedside     Additional Comments:   If any further questions, please call Sending RN at [phone]    Electronically signed by: Electronically signed by Marzena Manrique RN on 8/7/2024 at 6:56 AM

## 2024-08-07 NOTE — ED PROVIDER NOTES
minutes, which excludes separately billable procedures and updating family. Time spent is specifically for management of the presenting complaint and symptoms initially, direct bedside care, reevaluation, review of records, and consultation.  There was a high probability of clinically significant life-threatening deterioration in the patient's condition, which required my urgent intervention.     This chart was generated in part by using Dragon Dictation system and may contain errors related to that system including errors in grammar, punctuation, and spelling, as well as words and phrases that may be inappropriate. If there are any questions or concerns please feel free to contact the dictating provider for clarification.     Amirah Akins MD   Acute Care [facility]        Amirah Akins MD  08/07/24 0573

## 2024-08-07 NOTE — CONSULTS
Pharmacy to Dose Vancomycin    Dx: PNA  Goal AUC = 400-600  Pt wt = 79.4 kg  Recent Labs     08/07/24 0434   CREATININE 1.0     Recent Labs     08/07/24 0434   WBC 2.4*     Regimen: 1500 mg IV every 24 hours.  Start time: 06:25 on 08/07/2024  Exposure target: AUC24 (range)400-600 mg/L.hr   LCM01-69: 404 mg/L.hr  AUC24,ss: 505 mg/L.hr  Probability of AUC24 > 400: 88 %  Ctrough,ss: 12.6 mg/L  Probability of Ctrough,ss > 20: 7 %  Vancomycin trough: 8/8 0600  Trevor Roca [facility] ED 8/7/2024 6:27 AM    Vancomycin Day 2  Recent Labs     08/08/24  0430   VANCOTROUGH 7.1*     No results for input(s): \"VANCORANDOM\" in the last 72 hours.  Recent Labs     08/07/24  0434 08/08/24 0430   CREATININE 1.0 1.0     Estimated Creatinine Clearance: 57 mL/min (based on SCr of 1 mg/dL).  Recent Labs     08/07/24  0434 08/08/24 0430   WBC 2.4* 2.5*     Plan: Continue vancomycin 1500mg q24h  Predicted AUC: 475 mg/L.hr  Predicted trough: 13.9 mg/L  Recheck vancomycin random level 8/10 at 0600  Robin Fried PharmD 8/8/2024  6:22 AM

## 2024-08-07 NOTE — CONSULTS
Consult Placed   Consult sent via perfect serve  Who: Dashawn Perez  Date: 8/7/2024  Time: 10:04 AM       Electronically signed by Yissel Fink on 8/7/2024 at 10:03 AM

## 2024-08-07 NOTE — CONSULTS
[id number]    Chest pain - atypical   SOB - primarily pulmonary   PNA  Elev trop - chronic  Adeno CA lung, stage 4  Abnormal stress test 10/2022. Intermediate risk. 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

## 2024-08-07 NOTE — H&P
Hospital Medicine History & Physical      Date of Admission: 8/7/2024    Date of Service:  Pt seen/examined on 8/7/2024    [x]Admitted to Inpatient with expected LOS greater than two midnights due to medical therapy.  []Placed in Observation status.    Chief Admission Complaint:  Chest pain     Presenting Admission History:      79 y.o. male who presented to [facility] with above complaint  PMHx significant for lung carcinoma, on 3 weekly chemotherapy, BPH, and DM. Patient was seen with wife today, mentioned had sudden onset of R) sided chest pain @2 am this morning with associated SOB, now improving. No associated palpitations, dizziness, headaches or syncopal episodes. Denies   Fevers, nausea or vomiting/diarrhea.     Assessment/Plan:      Current Principal Problem:  Sepsis (HCC)    Chest pain- initial trops of 70, now downtrending to 68, echo pending, repeat EKG telemetry patient does not have a chest pain now    Lung cancer- on 3 weekly chemotherapy.  Oncology notified    Pancytopenia-secondary to above, monitor    History of diabetes monitor blood sugar with low persistent insulin hemoglobin A1c    History of prostate cancer hyperlipidemia and hypertension    Chronic diastolic dysfunction per echocardiogram 2022  No evidence of fluid overload    Chronic upper extremity right swelling possible lymphedema  No evidence of cellulitis or acute blood clot  Elevate and monitor      Discussed management and the need for Hospitalization of the patient w/ the Emergency Department Provider:     CXR: I have reviewed the CXR with the following interpretation:   EKG:  I have reviewed the EKG with the following interpretation: 107/ min AT unusual P    Physical Exam Performed:      BP (!) 118/54   Pulse (!) 103   Temp 99.6 °F (37.6 °C) (Oral)   Resp 21   Ht 1.727 m (5' 8\")   Wt 79.4 kg (175 lb)   SpO2 96%   BMI 26.61 kg/m²     General appearance:  No apparent distress, appears stated age and cooperative.  HEENT:

## 2024-08-07 NOTE — CONSULTS
Oncology Hematology Care    Consult Note      Requesting Physician:  Jenn Pierce     CHIEF COMPLAINT:  adenocarcinoma and pancytopenia       HISTORY OF PRESENT ILLNESS:    Wes Schneider is an 79 year old male with PMXH of stage IV lung cancer undergoing treatment at [facility] with Dr. Herman, T2DM, HTN, BPH who presented to [facility] ED for chest pain worsening with inspiration. Patient notes that he has not been feeling well for several weeks and endorsed some fatigue. No abdominal pain, diarrhea, N/V.     Hem/onc consulted for adenocarcinoma, pancytopenia. He reports feeling better this AM. No chest pain.  History outlined in assessment and plan below.    Mr. Schneider  is a 79 y.o. male we are seeing in consultation for     ICD-10-CM    1. Chest pain, unspecified type  R07.9       2. Adenocarcinoma of right lung (HCC)  C34.91       3. SIRS (systemic inflammatory response syndrome) (HCC)  R65.10       4. Normocytic anemia  D64.9       5. Thrombocytopenia (HCC)  D69.6       6. Elevated troponin  R79.89       7. Elevated troponin level  R79.89 Echo (TTE) complete (PRN contrast/bubble/strain/3D)     Echo (TTE) complete (PRN contrast/bubble/strain/3D)             Past Medical History:  Past Medical History:   Diagnosis Date    BPH (benign prostatic hyperplasia)     Cancer (HCC) 2016    prostate    Diabetes mellitus (HCC)     Esophageal reflux     Hyperlipidemia     Hypertension     Kidney stone     Lung cancer (HCC) 10/02/2021    Stage 4    Lung cancer (HCC)     Neuropathy        Past Surgical History:  Past Surgical History:   Procedure Laterality Date    BACK SURGERY  2012    lumber, no hardware, \"cleaned it out\"    CATARACT EXTRACTION EXTRACAPSULAR W/ INTRAOCULAR LENS IMPLANTATION      CT NEEDLE BIOPSY LUNG PERCUTANEOUS  04/07/2023    CT NEEDLE BIOPSY LUNG PERCUTANEOUS 4/7/2023 Robert Palam MD [facility] CT SCAN

## 2024-08-08 LAB
ALBUMIN SERPL-MCNC: 2.4 G/DL (ref 3.4–5)
ALBUMIN/GLOB SERPL: 0.7 {RATIO} (ref 1.1–2.2)
ALP SERPL-CCNC: 112 U/L (ref 40–129)
ALT SERPL-CCNC: 11 U/L (ref 10–40)
ANION GAP SERPL CALCULATED.3IONS-SCNC: 10 MMOL/L (ref 3–16)
ANISOCYTOSIS BLD QL SMEAR: ABNORMAL
AST SERPL-CCNC: 23 U/L (ref 15–37)
BASOPHILS # BLD: 0 K/UL (ref 0–0.2)
BASOPHILS NFR BLD: 0 %
BILIRUB SERPL-MCNC: 1 MG/DL (ref 0–1)
BUN SERPL-MCNC: 17 MG/DL (ref 7–20)
CALCIUM SERPL-MCNC: 7.5 MG/DL (ref 8.3–10.6)
CHLORIDE SERPL-SCNC: 104 MMOL/L (ref 99–110)
CO2 SERPL-SCNC: 23 MMOL/L (ref 21–32)
CREAT SERPL-MCNC: 1 MG/DL (ref 0.8–1.3)
DEPRECATED RDW RBC AUTO: 18.8 % (ref 12.4–15.4)
EKG ATRIAL RATE: 107 BPM
EKG DIAGNOSIS: NORMAL
EKG P AXIS: -81 DEGREES
EKG P-R INTERVAL: 148 MS
EKG Q-T INTERVAL: 322 MS
EKG QRS DURATION: 90 MS
EKG QTC CALCULATION (BAZETT): 429 MS
EKG R AXIS: 77 DEGREES
EKG T AXIS: 47 DEGREES
EKG VENTRICULAR RATE: 107 BPM
EOSINOPHIL # BLD: 0 K/UL (ref 0–0.6)
EOSINOPHIL NFR BLD: 1 %
GFR SERPLBLD CREATININE-BSD FMLA CKD-EPI: 76 ML/MIN/{1.73_M2}
GLUCOSE BLD-MCNC: 159 MG/DL (ref 70–99)
GLUCOSE BLD-MCNC: 176 MG/DL (ref 70–99)
GLUCOSE BLD-MCNC: 299 MG/DL (ref 70–99)
GLUCOSE BLD-MCNC: 68 MG/DL (ref 70–99)
GLUCOSE BLD-MCNC: 99 MG/DL (ref 70–99)
GLUCOSE SERPL-MCNC: 76 MG/DL (ref 70–99)
HCT VFR BLD AUTO: 21.1 % (ref 40.5–52.5)
HCT VFR BLD AUTO: 21.6 % (ref 40.5–52.5)
HCT VFR BLD AUTO: 22.6 % (ref 40.5–52.5)
HGB BLD-MCNC: 6.9 G/DL (ref 13.5–17.5)
HGB BLD-MCNC: 7.3 G/DL (ref 13.5–17.5)
HGB BLD-MCNC: 7.5 G/DL (ref 13.5–17.5)
LYMPHOCYTES # BLD: 0.3 K/UL (ref 1–5.1)
LYMPHOCYTES NFR BLD: 12 %
MCH RBC QN AUTO: 31.2 PG (ref 26–34)
MCHC RBC AUTO-ENTMCNC: 33.4 G/DL (ref 31–36)
MCV RBC AUTO: 93.4 FL (ref 80–100)
MICROCYTES BLD QL SMEAR: ABNORMAL
MONOCYTES # BLD: 0.1 K/UL (ref 0–1.3)
MONOCYTES NFR BLD: 5 %
NEUTROPHILS # BLD: 2.1 K/UL (ref 1.7–7.7)
NEUTROPHILS NFR BLD: 81 %
NEUTS BAND NFR BLD MANUAL: 1 % (ref 0–7)
PERFORMED ON: ABNORMAL
PERFORMED ON: NORMAL
PLATELET # BLD AUTO: 82 K/UL (ref 135–450)
PLATELET BLD QL SMEAR: ABNORMAL
PMV BLD AUTO: 7.6 FL (ref 5–10.5)
POTASSIUM SERPL-SCNC: 3.9 MMOL/L (ref 3.5–5.1)
PROT SERPL-MCNC: 5.7 G/DL (ref 6.4–8.2)
RBC # BLD AUTO: 2.41 M/UL (ref 4.2–5.9)
SLIDE REVIEW: ABNORMAL
SODIUM SERPL-SCNC: 137 MMOL/L (ref 136–145)
VANCOMYCIN TROUGH SERPL-MCNC: 7.1 UG/ML (ref 10–20)
WBC # BLD AUTO: 2.5 K/UL (ref 4–11)

## 2024-08-08 PROCEDURE — 1200000000 HC SEMI PRIVATE

## 2024-08-08 PROCEDURE — 92526 ORAL FUNCTION THERAPY: CPT

## 2024-08-08 PROCEDURE — 80202 ASSAY OF VANCOMYCIN: CPT

## 2024-08-08 PROCEDURE — 93005 ELECTROCARDIOGRAM TRACING: CPT | Performed by: NURSE PRACTITIONER

## 2024-08-08 PROCEDURE — 2580000003 HC RX 258: Performed by: INTERNAL MEDICINE

## 2024-08-08 PROCEDURE — 85025 COMPLETE CBC W/AUTO DIFF WBC: CPT

## 2024-08-08 PROCEDURE — 92610 EVALUATE SWALLOWING FUNCTION: CPT

## 2024-08-08 PROCEDURE — 6370000000 HC RX 637 (ALT 250 FOR IP): Performed by: INTERNAL MEDICINE

## 2024-08-08 PROCEDURE — 97110 THERAPEUTIC EXERCISES: CPT

## 2024-08-08 PROCEDURE — 80053 COMPREHEN METABOLIC PANEL: CPT

## 2024-08-08 PROCEDURE — 36415 COLL VENOUS BLD VENIPUNCTURE: CPT

## 2024-08-08 PROCEDURE — 97116 GAIT TRAINING THERAPY: CPT

## 2024-08-08 PROCEDURE — 97530 THERAPEUTIC ACTIVITIES: CPT

## 2024-08-08 PROCEDURE — 6360000002 HC RX W HCPCS: Performed by: INTERNAL MEDICINE

## 2024-08-08 PROCEDURE — 2500000003 HC RX 250 WO HCPCS: Performed by: NURSE PRACTITIONER

## 2024-08-08 PROCEDURE — 93010 ELECTROCARDIOGRAM REPORT: CPT | Performed by: INTERNAL MEDICINE

## 2024-08-08 PROCEDURE — 6370000000 HC RX 637 (ALT 250 FOR IP): Performed by: NURSE PRACTITIONER

## 2024-08-08 PROCEDURE — 85014 HEMATOCRIT: CPT

## 2024-08-08 PROCEDURE — 85018 HEMOGLOBIN: CPT

## 2024-08-08 RX ORDER — METOPROLOL SUCCINATE 25 MG/1
25 TABLET, EXTENDED RELEASE ORAL DAILY
Status: DISCONTINUED | OUTPATIENT
Start: 2024-08-08 | End: 2024-08-09

## 2024-08-08 RX ORDER — METOPROLOL TARTRATE 1 MG/ML
2.5 INJECTION, SOLUTION INTRAVENOUS ONCE
Status: COMPLETED | OUTPATIENT
Start: 2024-08-08 | End: 2024-08-08

## 2024-08-08 RX ORDER — INSULIN GLARGINE 100 [IU]/ML
25 INJECTION, SOLUTION SUBCUTANEOUS NIGHTLY
Status: DISCONTINUED | OUTPATIENT
Start: 2024-08-08 | End: 2024-08-11

## 2024-08-08 RX ORDER — FOLIC ACID 1 MG/1
1 TABLET ORAL DAILY
Status: DISCONTINUED | OUTPATIENT
Start: 2024-08-08 | End: 2024-08-11 | Stop reason: HOSPADM

## 2024-08-08 RX ADMIN — CEFEPIME 1000 MG: 1 INJECTION, POWDER, FOR SOLUTION INTRAMUSCULAR; INTRAVENOUS at 18:14

## 2024-08-08 RX ADMIN — CEFEPIME 1000 MG: 1 INJECTION, POWDER, FOR SOLUTION INTRAMUSCULAR; INTRAVENOUS at 04:58

## 2024-08-08 RX ADMIN — SODIUM CHLORIDE: 9 INJECTION, SOLUTION INTRAVENOUS at 18:14

## 2024-08-08 RX ADMIN — INSULIN GLARGINE 25 UNITS: 100 INJECTION, SOLUTION SUBCUTANEOUS at 20:57

## 2024-08-08 RX ADMIN — SODIUM CHLORIDE, PRESERVATIVE FREE 10 ML: 5 INJECTION INTRAVENOUS at 21:10

## 2024-08-08 RX ADMIN — METOPROLOL SUCCINATE 25 MG: 25 TABLET, EXTENDED RELEASE ORAL at 20:02

## 2024-08-08 RX ADMIN — FOLIC ACID 1 MG: 1 TABLET ORAL at 09:50

## 2024-08-08 RX ADMIN — VANCOMYCIN HYDROCHLORIDE 1500 MG: 10 INJECTION, POWDER, LYOPHILIZED, FOR SOLUTION INTRAVENOUS at 06:31

## 2024-08-08 RX ADMIN — METOPROLOL TARTRATE 2.5 MG: 5 INJECTION INTRAVENOUS at 21:05

## 2024-08-08 ASSESSMENT — PAIN SCALES - GENERAL: PAINLEVEL_OUTOF10: 0

## 2024-08-08 NOTE — CARE COORDINATION
Case Management Assessment  Initial Evaluation    Date/Time of Evaluation: 8/7/2024 11:15 PM  Assessment Completed by: [place] Hernandes    If patient is discharged prior to next notation, then this note serves as note for discharge by case management.    Patient Name: Wes Schneider                   YOB: 1945  Diagnosis: Normocytic anemia [D64.9]  Thrombocytopenia (HCC) [D69.6]  Elevated troponin level [R79.89]  Elevated troponin [R79.89]  SIRS (systemic inflammatory response syndrome) (HCC) [R65.10]  Sepsis (HCC) [A41.9]  Adenocarcinoma of right lung (HCC) [C34.91]  Chest pain, unspecified type [R07.9]                   Date / Time: 8/7/2024  4:15 AM    Patient Admission Status: Inpatient   Readmission Risk (Low < 19, Mod (19-27), High > 27): Readmission Risk Score: 15.8    Current PCP: Jourdan Russ MD  PCP verified by CM? Yes    Chart Reviewed: Yes      History Provided by: Patient, Spouse  Patient Orientation: Alert and Oriented    Patient Cognition: Alert    Hospitalization in the last 30 days (Readmission):  No    If yes, Readmission Assessment in  Navigator will be completed.    Advance Directives:      Code Status: Full Code   Patient's Primary Decision Maker is: Legal Next of Kin (Wife reports that they have ACP documents but does not want them scanned to chart; states they have had a \"bad experience\" and were billed previously at [facility] by a physician who talked to them about advanced care documents and preferences.)    Primary Decision Maker: Miranda Schneider - Spouse - [phone]    Discharge Planning:    Patient lives with: Spouse/Significant Other Type of Home: House  Primary Care Giver: Spouse  Patient Support Systems include: Spouse/Significant Other, Children   Current Financial resources: Medicare  Current community resources: None  Current services prior to admission: None            Current DME:              Type of Home Care services:  None    ADLS  Prior functional level:

## 2024-08-08 NOTE — PLAN OF CARE
SLP completed evaluation. Please refer to notes in EMR.      Melva Pereira MA CCC-SLP SP#[id number]  Speech-Language Pathologist

## 2024-08-08 NOTE — ACP (ADVANCE CARE PLANNING)
Advance Care Planning     Palliative Team Advance Care Planning (ACP) Conversation    Date of Conversation: 08/08/24    Individuals present for the conversation: Patient with decision making capacity     ACP documents on file prior to discussion:  -None    Previously completed document/s not on file:  Per pts d/w CM, he has executed AD's but does not want them scanned into his record. Pt designated his wife, Miranda, as his healthcare proxy.  document was completed previously but it is not available for review. This writer requested a copy of the document for patient's chart.     Healthcare Decision Maker:    Primary Decision Maker: Miranda Schneider - Spouse - [phone]     Conversation Summary:  Met w/ Mr. Schneider while he sat up in chair.  Pt shared \"I'd started giving up but, my cancer doctor says I'm 'doing fine' so I'll just keep doing what I'm doing.\"  Pt went on to say that he \"has accepted what is going to happen, whenever it does.\"  That said, he doesn't seem to have given much thought to his resuscitation wishes and, when we discussed, wasn't ready to address.     Pt verb'd his greatest frustration is his inability to \"do the things I used to do and now have to depend on other's to get them done.\"   Pt and wife are both retired and live on 12 acres.  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   He expressed much gratitude that they can be retired w/o financial worries.  Pt said he enjoys \"sitting out on the deck.\"   He uses a Rolator for ambulation though it sounds as though he stays home much of the time.    Pt reports his chest pain \"is gone altogether.\"  He does have increased WOB w/ exertion but reports it improves w/ rest.  He denies c/o pain.  He relies on spouse to help w/ his healthcare, said that \"diabetes is the other thing trying to kill me and she gets me juice in the middle of the night when my sugar is low.\"   Pt denies need for any additional home support;

## 2024-08-08 NOTE — CARE COORDINATION
Advance Care Planning   The patient has the following advanced directives on file:  Advance Directives       Power of  Living Will ACP-Advance Directive ACP-Power of     Not on File Not on File Not on File Not on File            The patient has appointed the following active healthcare agents:    Primary Decision Maker: Miranda Schneider - Spouse - [phone]    The Patient has the following current code status:    Code Status: Full Code    Visit Documentation:  I discussed Advance Care Planning with Wes Schneider, wife at bedside, today which included the importance of making their choices for care and treatment in the case of a health event that adversely affects their decision-making abilities. He has completed the Advance Care Directives and states they do have copy at home but DO NOT want this scanned in to his chart here. Wife reports they feel like that \"could affect the care he gets\" and states prior bad experience with a bill received from [employer] regarding discussion with MD and advanced care discussion. Writer assured pt/wife that no charge to discuss ACP today with me. State they need to make some changes to ACP documents they have anyway and are not interested in discussing with any other personnel here.      He has an active health care agent at this time: his wife, Miranda.  Wes Schneider was encouraged to complete the declaration forms and provide a signed copy of his medical records. He was not referred to spiritual care to assist with completion.      I advised patient we could continue this discussion at future visits and strongly encouraged pt/wife to bring in ACP documents once ready.     Leena Butler, MSLOLI  8/7/2024

## 2024-08-08 NOTE — ONCOLOGY
(from 2 mm to 3 mm).    - Another MRI of the brain on 5/03/2024 showed stable brain mets with an increase in peritumoral edema surrounding the 2 larger brain mets.    - Another MRI on 7/26/2024 showed stable findings    Prevention of SRE   - Started Zoledronic acid on 11/08/2021.    - Last received 07/30/2024    T2DM  - on insulin glargine 70 units in evenings  - per IM       ONCOLOGIC DISPOSITION:      Ronald Herman MD  Please contact through [employer] [facility]    Alert-The patient is alert, awake and responds to voice. The patient is oriented to time, place, and person. The triage nurse is able to obtain subjective information.

## 2024-08-08 NOTE — CONSULTS
Palliative Care Initial Note  Palliative Care Admit date: 8/8/24    ACP/palcare referral noted

## 2024-08-08 NOTE — CONSULTS
[address] [address]                              CONSULTATION      PATIENT NAME: MERARI THURSTON             : 1945  MED REC NO: [id number]                      ROOM: 0346  ACCOUNT NO: [id number]                       ADMIT DATE: 2024  PROVIDER: Emmanuel Wadsworth MD      CONSULT DATE: 2024    REASON FOR CONSULTATION:  Chest pain.    HISTORY OF PRESENT ILLNESS:  An 79-year-old male presented to the hospital with a chief complaint of chest pain.  Describes right-sided chest pain that is worse with different positions, worse with a deep breath.  It is sharp, does not radiate.  He has chronic shortness of breath, but not much different with the pain.  Pain is nonexertional.    PAST MEDICAL HISTORY:    1. Stage IV adenocarcinoma of the lung.  2. Abnormal stress test 2022, medical management.  3. Hypertension.  4. Hyperlipidemia.  5. Diabetes.    SOCIAL HISTORY:  He is .  He is former smoker.    FAMILY HISTORY:  Positive for heart disease, nonspecific.    REVIEW OF SYSTEMS:  No fevers or chills.  He has a nonproductive cough.  No focal neurologic symptoms.  No headache.  No visual changes.  No recent GI or  bleeding.  No syncope.  No rash.  All other systems are negative except as in present illness.    ALLERGIES:  NO KNOWN DRUG ALLERGIES.      MEDICATIONS:  See list in the chart, which I have reviewed.    PHYSICAL EXAMINATION:  VITAL SIGNS:  Blood pressure is 115/55, heart rate 90, respirations 18, temperature 97.9.  He is 93% saturated on room air.  GENERAL:  Well-developed, well-nourished white male, in no acute distress.  HEENT:  Normocephalic, atraumatic.  Oropharynx clear.  Moist mucous membranes.  NECK:  Supple.  CHEST:  Clear.  CARDIAC:  Regular S1, S2.  There is no S3 or S4 gallop.  No systolic murmurs.  JV pressure is normal.  Carotids are 2+ and symmetric without bruit.  ABDOMEN:  Soft,

## 2024-08-08 NOTE — CARE COORDINATION
Chart review day 1- from home, PT/OT rec home with home care. Referral previously made to [facility]. Palliative care consult today. Pt remains full code. SLP, poss asp pneumonia. CM will continue to follow.

## 2024-08-08 NOTE — PLAN OF CARE
Problem: Pain  Goal: Verbalizes/displays adequate comfort level or baseline comfort level  Outcome: Progressing  Flowsheets (Taken 8/8/2024 1140)  Verbalizes/displays adequate comfort level or baseline comfort level:   Encourage patient to monitor pain and request assistance   Assess pain using appropriate pain scale   Administer analgesics based on type and severity of pain and evaluate response   Implement non-pharmacological measures as appropriate and evaluate response     Problem: Safety - Adult  Goal: Free from fall injury  Outcome: Progressing  Flowsheets (Taken 8/8/2024 1140)  Free From Fall Injury:   Instruct family/caregiver on patient safety   Based on caregiver fall risk screen, instruct family/caregiver to ask for assistance with transferring infant if caregiver noted to have fall risk factors

## 2024-08-09 ENCOUNTER — APPOINTMENT (OUTPATIENT)
Age: 79
DRG: 871 | End: 2024-08-09
Attending: INTERNAL MEDICINE
Payer: MEDICARE

## 2024-08-09 ENCOUNTER — HOSPITAL ENCOUNTER (INPATIENT)
Dept: VASCULAR LAB | Age: 79
Discharge: HOME | DRG: 871 | End: 2024-08-11
Attending: INTERNAL MEDICINE
Payer: MEDICARE

## 2024-08-09 LAB
ALBUMIN SERPL-MCNC: 2.4 G/DL (ref 3.4–5)
ALBUMIN/GLOB SERPL: 0.7 {RATIO} (ref 1.1–2.2)
ALP SERPL-CCNC: 124 U/L (ref 40–129)
ALT SERPL-CCNC: 15 U/L (ref 10–40)
ANION GAP SERPL CALCULATED.3IONS-SCNC: 11 MMOL/L (ref 3–16)
ANISOCYTOSIS BLD QL SMEAR: ABNORMAL
AST SERPL-CCNC: 24 U/L (ref 15–37)
BASOPHILS # BLD: 0 K/UL (ref 0–0.2)
BASOPHILS NFR BLD: 0 %
BILIRUB SERPL-MCNC: 0.6 MG/DL (ref 0–1)
BUN SERPL-MCNC: 17 MG/DL (ref 7–20)
CALCIUM SERPL-MCNC: 7.6 MG/DL (ref 8.3–10.6)
CHLORIDE SERPL-SCNC: 103 MMOL/L (ref 99–110)
CO2 SERPL-SCNC: 22 MMOL/L (ref 21–32)
CREAT SERPL-MCNC: 1 MG/DL (ref 0.8–1.3)
DEPRECATED RDW RBC AUTO: 18.7 % (ref 12.4–15.4)
ECHO AO ASC DIAM: 3.1 CM
ECHO AO ASCENDING AORTA INDEX: 1.6 CM/M2
ECHO AO ROOT DIAM: 3 CM
ECHO AO ROOT INDEX: 1.55 CM/M2
ECHO AV AREA PEAK VELOCITY: 2.5 CM2
ECHO AV AREA VTI: 2.4 CM2
ECHO AV AREA/BSA PEAK VELOCITY: 1.3 CM2/M2
ECHO AV AREA/BSA VTI: 1.2 CM2/M2
ECHO AV MEAN GRADIENT: 3 MMHG
ECHO AV MEAN VELOCITY: 0.8 M/S
ECHO AV PEAK GRADIENT: 6 MMHG
ECHO AV PEAK VELOCITY: 1.2 M/S
ECHO AV VELOCITY RATIO: 0.75
ECHO AV VTI: 18.9 CM
ECHO BSA: 1.96 M2
ECHO BSA: 1.96 M2
ECHO EST RA PRESSURE: 3 MMHG
ECHO LA AREA 2C: 18.3 CM2
ECHO LA AREA 4C: 16 CM2
ECHO LA DIAMETER INDEX: 1.65 CM/M2
ECHO LA DIAMETER: 3.2 CM
ECHO LA MAJOR AXIS: 5 CM
ECHO LA MINOR AXIS: 4.5 CM
ECHO LA TO AORTIC ROOT RATIO: 1.07
ECHO LA VOL BP: 52 ML (ref 18–58)
ECHO LA VOL MOD A2C: 60 ML (ref 18–58)
ECHO LA VOL MOD A4C: 41 ML (ref 18–58)
ECHO LA VOL/BSA BIPLANE: 27 ML/M2 (ref 16–34)
ECHO LA VOLUME INDEX MOD A2C: 31 ML/M2 (ref 16–34)
ECHO LA VOLUME INDEX MOD A4C: 21 ML/M2 (ref 16–34)
ECHO LV FRACTIONAL SHORTENING: 23 % (ref 28–44)
ECHO LV INTERNAL DIMENSION DIASTOLE INDEX: 2.47 CM/M2
ECHO LV INTERNAL DIMENSION DIASTOLIC: 4.8 CM (ref 4.2–5.9)
ECHO LV INTERNAL DIMENSION SYSTOLIC INDEX: 1.91 CM/M2
ECHO LV INTERNAL DIMENSION SYSTOLIC: 3.7 CM
ECHO LV IVSD: 1 CM (ref 0.6–1)
ECHO LV MASS 2D: 181.9 G (ref 88–224)
ECHO LV MASS INDEX 2D: 93.8 G/M2 (ref 49–115)
ECHO LV POSTERIOR WALL DIASTOLIC: 1.1 CM (ref 0.6–1)
ECHO LV RELATIVE WALL THICKNESS RATIO: 0.46
ECHO LVOT AREA: 3.5 CM2
ECHO LVOT AV VTI INDEX: 0.69
ECHO LVOT DIAM: 2.1 CM
ECHO LVOT MEAN GRADIENT: 2 MMHG
ECHO LVOT PEAK GRADIENT: 3 MMHG
ECHO LVOT PEAK VELOCITY: 0.9 M/S
ECHO LVOT STROKE VOLUME INDEX: 23.2 ML/M2
ECHO LVOT SV: 45 ML
ECHO LVOT VTI: 13 CM
ECHO RA AREA 4C: 11.6 CM2
ECHO RA END SYSTOLIC VOLUME APICAL 4 CHAMBER INDEX BSA: 13 ML/M2
ECHO RA VOLUME: 25 ML
ECHO RIGHT VENTRICULAR SYSTOLIC PRESSURE (RVSP): 22 MMHG
ECHO RV TAPSE: 2.2 CM (ref 1.7–?)
ECHO TV REGURGITANT MAX VELOCITY: 2.16 M/S
ECHO TV REGURGITANT PEAK GRADIENT: 19 MMHG
EKG ATRIAL RATE: 139 BPM
EKG ATRIAL RATE: 277 BPM
EKG DIAGNOSIS: NORMAL
EKG P AXIS: 270 DEGREES
EKG P AXIS: 93 DEGREES
EKG P-R INTERVAL: 130 MS
EKG Q-T INTERVAL: 310 MS
EKG Q-T INTERVAL: 326 MS
EKG Q-T INTERVAL: 334 MS
EKG QRS DURATION: 80 MS
EKG QRS DURATION: 92 MS
EKG QRS DURATION: 96 MS
EKG QTC CALCULATION (BAZETT): 468 MS
EKG QTC CALCULATION (BAZETT): 470 MS
EKG QTC CALCULATION (BAZETT): 471 MS
EKG R AXIS: 64 DEGREES
EKG R AXIS: 76 DEGREES
EKG R AXIS: 77 DEGREES
EKG T AXIS: 66 DEGREES
EKG T AXIS: 68 DEGREES
EKG T AXIS: 86 DEGREES
EKG VENTRICULAR RATE: 118 BPM
EKG VENTRICULAR RATE: 125 BPM
EKG VENTRICULAR RATE: 139 BPM
EOSINOPHIL # BLD: 0.1 K/UL (ref 0–0.6)
EOSINOPHIL NFR BLD: 2 %
GFR SERPLBLD CREATININE-BSD FMLA CKD-EPI: 76 ML/MIN/{1.73_M2}
GLUCOSE BLD-MCNC: 153 MG/DL (ref 70–99)
GLUCOSE BLD-MCNC: 199 MG/DL (ref 70–99)
GLUCOSE BLD-MCNC: 234 MG/DL (ref 70–99)
GLUCOSE BLD-MCNC: 235 MG/DL (ref 70–99)
GLUCOSE BLD-MCNC: 352 MG/DL (ref 70–99)
GLUCOSE SERPL-MCNC: 139 MG/DL (ref 70–99)
HCT VFR BLD AUTO: 23.3 % (ref 40.5–52.5)
HGB BLD-MCNC: 7.7 G/DL (ref 13.5–17.5)
LYMPHOCYTES # BLD: 0.5 K/UL (ref 1–5.1)
LYMPHOCYTES NFR BLD: 18 %
MACROCYTES BLD QL SMEAR: ABNORMAL
MCH RBC QN AUTO: 31.1 PG (ref 26–34)
MCHC RBC AUTO-ENTMCNC: 33.1 G/DL (ref 31–36)
MCV RBC AUTO: 94 FL (ref 80–100)
MICROCYTES BLD QL SMEAR: ABNORMAL
MONOCYTES # BLD: 0.2 K/UL (ref 0–1.3)
MONOCYTES NFR BLD: 8 %
NEUTROPHILS # BLD: 2 K/UL (ref 1.7–7.7)
NEUTROPHILS NFR BLD: 72 %
PERFORMED ON: ABNORMAL
PLATELET # BLD AUTO: 94 K/UL (ref 135–450)
PLATELET BLD QL SMEAR: ABNORMAL
PMV BLD AUTO: 7.9 FL (ref 5–10.5)
POLYCHROMASIA BLD QL SMEAR: ABNORMAL
POTASSIUM SERPL-SCNC: 4 MMOL/L (ref 3.5–5.1)
PROT SERPL-MCNC: 5.7 G/DL (ref 6.4–8.2)
RBC # BLD AUTO: 2.48 M/UL (ref 4.2–5.9)
SLIDE REVIEW: ABNORMAL
SODIUM SERPL-SCNC: 136 MMOL/L (ref 136–145)
WBC # BLD AUTO: 2.8 K/UL (ref 4–11)

## 2024-08-09 PROCEDURE — 6370000000 HC RX 637 (ALT 250 FOR IP): Performed by: INTERNAL MEDICINE

## 2024-08-09 PROCEDURE — 93971 EXTREMITY STUDY: CPT | Performed by: SURGERY

## 2024-08-09 PROCEDURE — 36415 COLL VENOUS BLD VENIPUNCTURE: CPT

## 2024-08-09 PROCEDURE — 80053 COMPREHEN METABOLIC PANEL: CPT

## 2024-08-09 PROCEDURE — C8929 TTE W OR WO FOL WCON,DOPPLER: HCPCS

## 2024-08-09 PROCEDURE — 93005 ELECTROCARDIOGRAM TRACING: CPT | Performed by: INTERNAL MEDICINE

## 2024-08-09 PROCEDURE — 99223 1ST HOSP IP/OBS HIGH 75: CPT | Performed by: INTERNAL MEDICINE

## 2024-08-09 PROCEDURE — 6360000002 HC RX W HCPCS

## 2024-08-09 PROCEDURE — 6360000002 HC RX W HCPCS: Performed by: INTERNAL MEDICINE

## 2024-08-09 PROCEDURE — 93306 TTE W/DOPPLER COMPLETE: CPT | Performed by: INTERNAL MEDICINE

## 2024-08-09 PROCEDURE — 94640 AIRWAY INHALATION TREATMENT: CPT

## 2024-08-09 PROCEDURE — 93971 EXTREMITY STUDY: CPT

## 2024-08-09 PROCEDURE — 93010 ELECTROCARDIOGRAM REPORT: CPT | Performed by: INTERNAL MEDICINE

## 2024-08-09 PROCEDURE — 6370000000 HC RX 637 (ALT 250 FOR IP): Performed by: NURSE PRACTITIONER

## 2024-08-09 PROCEDURE — 1200000000 HC SEMI PRIVATE

## 2024-08-09 PROCEDURE — 85025 COMPLETE CBC W/AUTO DIFF WBC: CPT

## 2024-08-09 PROCEDURE — 2580000003 HC RX 258: Performed by: INTERNAL MEDICINE

## 2024-08-09 RX ORDER — TAMSULOSIN HYDROCHLORIDE 0.4 MG/1
0.4 CAPSULE ORAL DAILY
Status: DISCONTINUED | OUTPATIENT
Start: 2024-08-09 | End: 2024-08-11 | Stop reason: HOSPADM

## 2024-08-09 RX ORDER — METOPROLOL SUCCINATE 25 MG/1
25 TABLET, EXTENDED RELEASE ORAL 2 TIMES DAILY
Status: DISCONTINUED | OUTPATIENT
Start: 2024-08-09 | End: 2024-08-11 | Stop reason: HOSPADM

## 2024-08-09 RX ORDER — ALBUTEROL SULFATE 90 UG/1
2 AEROSOL, METERED RESPIRATORY (INHALATION) EVERY 6 HOURS PRN
Status: DISCONTINUED | OUTPATIENT
Start: 2024-08-09 | End: 2024-08-11 | Stop reason: HOSPADM

## 2024-08-09 RX ORDER — ALBUTEROL SULFATE 90 UG/1
1 AEROSOL, METERED RESPIRATORY (INHALATION) EVERY 4 HOURS PRN
COMMUNITY
Start: 2024-07-01

## 2024-08-09 RX ORDER — ATORVASTATIN CALCIUM 40 MG/1
40 TABLET, FILM COATED ORAL NIGHTLY
Status: DISCONTINUED | OUTPATIENT
Start: 2024-08-09 | End: 2024-08-11 | Stop reason: HOSPADM

## 2024-08-09 RX ADMIN — ATORVASTATIN CALCIUM 40 MG: 40 TABLET, FILM COATED ORAL at 22:20

## 2024-08-09 RX ADMIN — INSULIN GLARGINE 25 UNITS: 100 INJECTION, SOLUTION SUBCUTANEOUS at 21:11

## 2024-08-09 RX ADMIN — METOPROLOL SUCCINATE 25 MG: 25 TABLET, EXTENDED RELEASE ORAL at 09:44

## 2024-08-09 RX ADMIN — TAMSULOSIN HYDROCHLORIDE 0.4 MG: 0.4 CAPSULE ORAL at 22:20

## 2024-08-09 RX ADMIN — FOLIC ACID 1 MG: 1 TABLET ORAL at 09:44

## 2024-08-09 RX ADMIN — INSULIN LISPRO 4 UNITS: 100 INJECTION, SOLUTION INTRAVENOUS; SUBCUTANEOUS at 21:11

## 2024-08-09 RX ADMIN — EPOETIN ALFA-EPBX 40000 UNITS: 20000 INJECTION, SOLUTION INTRAVENOUS; SUBCUTANEOUS at 17:36

## 2024-08-09 RX ADMIN — SODIUM CHLORIDE, PRESERVATIVE FREE 10 ML: 5 INJECTION INTRAVENOUS at 21:15

## 2024-08-09 RX ADMIN — SODIUM CHLORIDE, PRESERVATIVE FREE 10 ML: 5 INJECTION INTRAVENOUS at 09:46

## 2024-08-09 RX ADMIN — Medication 2 PUFF: at 16:41

## 2024-08-09 RX ADMIN — CEFEPIME 1000 MG: 1 INJECTION, POWDER, FOR SOLUTION INTRAMUSCULAR; INTRAVENOUS at 05:24

## 2024-08-09 RX ADMIN — METOPROLOL SUCCINATE 25 MG: 25 TABLET, EXTENDED RELEASE ORAL at 21:07

## 2024-08-09 RX ADMIN — INSULIN LISPRO 2 UNITS: 100 INJECTION, SOLUTION INTRAVENOUS; SUBCUTANEOUS at 17:35

## 2024-08-09 RX ADMIN — CEFEPIME 1000 MG: 1 INJECTION, POWDER, FOR SOLUTION INTRAMUSCULAR; INTRAVENOUS at 17:42

## 2024-08-09 NOTE — CARE COORDINATION
CM met with wife at bedside. Pt sleeping. Pt has had skilled home care which ended in July. Previously active with [facility]. She would like to use them again.     CM called  [facility] [phone]. They provide palliative care services for pt. They have ordered home care in past through [facility].    Cm spoke with [facility]. They are aware of referral and can accept pt at NH. They will follow in [facility].

## 2024-08-09 NOTE — CONSULTS
Consult Placed   Consult called to [facility]  Who: [facility]  Date: 8/9/2024  Time: 10:56 AM       Electronically signed by Yissel Fink on 8/9/2024 at 10:56 AM

## 2024-08-09 NOTE — CONSULTS
Electrophysiology Consultation   Date: 8/9/2024  Admit Date:  8/7/2024  Reason for Consultation: New onset symptomatic paroxysmal atrial fibrillation  Consult Requesting Physician: Yas Rasmussen MD     Chief Complaint   Patient presents with    Chest Pain     Right sided chest pain, worse with inspiration, started at 3am. Hx of lung cancer. Pt took 324 ASA pta.      HPI:   Patient is a unfortunate 79-year-old male with a medical history significant for metastatic adenocarcinoma of the lung undergoing chemotherapy, pancytopenia secondary to chemotherapy, hypertension, heart failure preserved ejection fraction, and diabetes mellitus type 2 who presents complaining with chest pain.  Patient was seen by cardiology and his chest pain was deemed to be noncardiac in nature.  He was watched overnight and this morning he again suffered from atrial fibrillation with RVR.  He reported symptoms of fatigue and an uneasy feeling.  He denies chest pain with the atrial fibrillation and has not.  Electrophysiology was consulted and evaluated patient.    Current rhythm: Normal sinus rhythm with PACs  Known history of atrial fibrillation: no  Valvular disease: No  Associated symptoms: palpitations and a skipping sensation  Heart rate is not controlled  Previous cardioversion and/or ablation: no  History of CAD: No  History of sleep apnea: No  History of ETOH abuse/drug abuse: No  History of thyroid disease: No  Elevated BNP: Yes  Left atrial size is Normal    Past Medical History:   Diagnosis Date    BPH (benign prostatic hyperplasia)     Cancer (HCC) 2016    prostate    Diabetes mellitus (HCC)     Esophageal reflux     Hyperlipidemia     Hypertension     Kidney stone     Lung cancer (HCC) 10/02/2021    Stage 4    Lung cancer (HCC)     Neuropathy         Past Surgical History:   Procedure Laterality Date    BACK SURGERY  2012    lumber, no hardware, \"cleaned it out\"    CATARACT EXTRACTION EXTRACAPSULAR W/ INTRAOCULAR LENS

## 2024-08-10 LAB
ALBUMIN SERPL-MCNC: 2.2 G/DL (ref 3.4–5)
ALBUMIN/GLOB SERPL: 0.6 {RATIO} (ref 1.1–2.2)
ALP SERPL-CCNC: 123 U/L (ref 40–129)
ALT SERPL-CCNC: 18 U/L (ref 10–40)
ANION GAP SERPL CALCULATED.3IONS-SCNC: 10 MMOL/L (ref 3–16)
ANISOCYTOSIS BLD QL SMEAR: ABNORMAL
AST SERPL-CCNC: 30 U/L (ref 15–37)
BASOPHILS # BLD: 0 K/UL (ref 0–0.2)
BASOPHILS NFR BLD: 0 %
BILIRUB SERPL-MCNC: 0.5 MG/DL (ref 0–1)
BILIRUB UR QL STRIP.AUTO: NEGATIVE
BUN SERPL-MCNC: 16 MG/DL (ref 7–20)
CALCIUM SERPL-MCNC: 8 MG/DL (ref 8.3–10.6)
CHLORIDE SERPL-SCNC: 105 MMOL/L (ref 99–110)
CLARITY UR: CLEAR
CO2 SERPL-SCNC: 23 MMOL/L (ref 21–32)
COLOR UR: YELLOW
CREAT SERPL-MCNC: 1 MG/DL (ref 0.8–1.3)
DEPRECATED RDW RBC AUTO: 18.9 % (ref 12.4–15.4)
EOSINOPHIL # BLD: 0.1 K/UL (ref 0–0.6)
EOSINOPHIL NFR BLD: 3 %
GFR SERPLBLD CREATININE-BSD FMLA CKD-EPI: 76 ML/MIN/{1.73_M2}
GLUCOSE BLD-MCNC: 110 MG/DL (ref 70–99)
GLUCOSE BLD-MCNC: 192 MG/DL (ref 70–99)
GLUCOSE BLD-MCNC: 329 MG/DL (ref 70–99)
GLUCOSE BLD-MCNC: 78 MG/DL (ref 70–99)
GLUCOSE BLD-MCNC: 84 MG/DL (ref 70–99)
GLUCOSE BLD-MCNC: 93 MG/DL (ref 70–99)
GLUCOSE SERPL-MCNC: 66 MG/DL (ref 70–99)
GLUCOSE UR STRIP.AUTO-MCNC: 250 MG/DL
HCT VFR BLD AUTO: 22.5 % (ref 40.5–52.5)
HGB BLD-MCNC: 7.5 G/DL (ref 13.5–17.5)
HGB UR QL STRIP.AUTO: NEGATIVE
KETONES UR STRIP.AUTO-MCNC: NEGATIVE MG/DL
LEUKOCYTE ESTERASE UR QL STRIP.AUTO: NEGATIVE
LYMPHOCYTES # BLD: 0.4 K/UL (ref 1–5.1)
LYMPHOCYTES NFR BLD: 14 %
MACROCYTES BLD QL SMEAR: ABNORMAL
MAGNESIUM SERPL-MCNC: 2.1 MG/DL (ref 1.8–2.4)
MCH RBC QN AUTO: 31.7 PG (ref 26–34)
MCHC RBC AUTO-ENTMCNC: 33.4 G/DL (ref 31–36)
MCV RBC AUTO: 94.9 FL (ref 80–100)
MICROCYTES BLD QL SMEAR: ABNORMAL
MONOCYTES # BLD: 0.4 K/UL (ref 0–1.3)
MONOCYTES NFR BLD: 16 %
NEUTROPHILS # BLD: 1.8 K/UL (ref 1.7–7.7)
NEUTROPHILS NFR BLD: 66 %
NEUTS BAND NFR BLD MANUAL: 1 % (ref 0–7)
NEUTS HYPERSEG BLD QL SMEAR: PRESENT
NITRITE UR QL STRIP.AUTO: NEGATIVE
PERFORMED ON: ABNORMAL
PERFORMED ON: NORMAL
PH UR STRIP.AUTO: 6 [PH] (ref 5–8)
PLATELET # BLD AUTO: 102 K/UL (ref 135–450)
PLATELET BLD QL SMEAR: ABNORMAL
PMV BLD AUTO: 7.7 FL (ref 5–10.5)
POTASSIUM SERPL-SCNC: 4 MMOL/L (ref 3.5–5.1)
PROT SERPL-MCNC: 5.6 G/DL (ref 6.4–8.2)
PROT UR STRIP.AUTO-MCNC: NEGATIVE MG/DL
RBC # BLD AUTO: 2.37 M/UL (ref 4.2–5.9)
SLIDE REVIEW: ABNORMAL
SODIUM SERPL-SCNC: 138 MMOL/L (ref 136–145)
SP GR UR STRIP.AUTO: 1.01 (ref 1–1.03)
SPHEROCYTES BLD QL SMEAR: ABNORMAL
UA COMPLETE W REFLEX CULTURE PNL UR: ABNORMAL
UA DIPSTICK W REFLEX MICRO PNL UR: ABNORMAL
URN SPEC COLLECT METH UR: ABNORMAL
UROBILINOGEN UR STRIP-ACNC: 0.2 E.U./DL
WBC # BLD AUTO: 2.7 K/UL (ref 4–11)

## 2024-08-10 PROCEDURE — 36415 COLL VENOUS BLD VENIPUNCTURE: CPT

## 2024-08-10 PROCEDURE — 80053 COMPREHEN METABOLIC PANEL: CPT

## 2024-08-10 PROCEDURE — 2580000003 HC RX 258: Performed by: INTERNAL MEDICINE

## 2024-08-10 PROCEDURE — 87449 NOS EACH ORGANISM AG IA: CPT

## 2024-08-10 PROCEDURE — 83735 ASSAY OF MAGNESIUM: CPT

## 2024-08-10 PROCEDURE — 99232 SBSQ HOSP IP/OBS MODERATE 35: CPT | Performed by: NURSE PRACTITIONER

## 2024-08-10 PROCEDURE — 87641 MR-STAPH DNA AMP PROBE: CPT

## 2024-08-10 PROCEDURE — 81003 URINALYSIS AUTO W/O SCOPE: CPT

## 2024-08-10 PROCEDURE — 6370000000 HC RX 637 (ALT 250 FOR IP): Performed by: NURSE PRACTITIONER

## 2024-08-10 PROCEDURE — 6360000002 HC RX W HCPCS: Performed by: INTERNAL MEDICINE

## 2024-08-10 PROCEDURE — 6370000000 HC RX 637 (ALT 250 FOR IP): Performed by: INTERNAL MEDICINE

## 2024-08-10 PROCEDURE — 85025 COMPLETE CBC W/AUTO DIFF WBC: CPT

## 2024-08-10 PROCEDURE — 1200000000 HC SEMI PRIVATE

## 2024-08-10 RX ORDER — AMIODARONE HYDROCHLORIDE 200 MG/1
200 TABLET ORAL DAILY
Status: DISCONTINUED | OUTPATIENT
Start: 2024-08-11 | End: 2024-08-11 | Stop reason: HOSPADM

## 2024-08-10 RX ADMIN — CEFEPIME 1000 MG: 1 INJECTION, POWDER, FOR SOLUTION INTRAMUSCULAR; INTRAVENOUS at 05:37

## 2024-08-10 RX ADMIN — METOPROLOL SUCCINATE 25 MG: 25 TABLET, EXTENDED RELEASE ORAL at 09:54

## 2024-08-10 RX ADMIN — INSULIN GLARGINE 25 UNITS: 100 INJECTION, SOLUTION SUBCUTANEOUS at 20:46

## 2024-08-10 RX ADMIN — SODIUM CHLORIDE, PRESERVATIVE FREE 10 ML: 5 INJECTION INTRAVENOUS at 20:49

## 2024-08-10 RX ADMIN — INSULIN LISPRO 4 UNITS: 100 INJECTION, SOLUTION INTRAVENOUS; SUBCUTANEOUS at 20:46

## 2024-08-10 RX ADMIN — APIXABAN 5 MG: 5 TABLET, FILM COATED ORAL at 20:45

## 2024-08-10 RX ADMIN — CEFEPIME 1000 MG: 1 INJECTION, POWDER, FOR SOLUTION INTRAMUSCULAR; INTRAVENOUS at 19:23

## 2024-08-10 RX ADMIN — ATORVASTATIN CALCIUM 40 MG: 40 TABLET, FILM COATED ORAL at 20:45

## 2024-08-10 RX ADMIN — METOPROLOL SUCCINATE 25 MG: 25 TABLET, EXTENDED RELEASE ORAL at 20:44

## 2024-08-10 RX ADMIN — TAMSULOSIN HYDROCHLORIDE 0.4 MG: 0.4 CAPSULE ORAL at 20:45

## 2024-08-10 RX ADMIN — FOLIC ACID 1 MG: 1 TABLET ORAL at 09:54

## 2024-08-10 NOTE — PLAN OF CARE
Problem: Pain  Goal: Verbalizes/displays adequate comfort level or baseline comfort level  Outcome: Progressing  Flowsheets (Taken 8/10/2024 0004)  Verbalizes/displays adequate comfort level or baseline comfort level:   Encourage patient to monitor pain and request assistance   Assess pain using appropriate pain scale   Administer analgesics based on type and severity of pain and evaluate response   Implement non-pharmacological measures as appropriate and evaluate response     Problem: Safety - Adult  Goal: Free from fall injury  Outcome: Progressing

## 2024-08-11 VITALS
HEIGHT: 68 IN | BODY MASS INDEX: 27.1 KG/M2 | OXYGEN SATURATION: 96 % | SYSTOLIC BLOOD PRESSURE: 119 MMHG | TEMPERATURE: 97.8 F | DIASTOLIC BLOOD PRESSURE: 66 MMHG | WEIGHT: 178.8 LBS | RESPIRATION RATE: 18 BRPM | HEART RATE: 82 BPM

## 2024-08-11 LAB
BACTERIA BLD CULT ORG #2: NORMAL
BACTERIA BLD CULT: NORMAL
BASOPHILS # BLD: 0 K/UL (ref 0–0.2)
BASOPHILS NFR BLD: 0.2 %
DEPRECATED RDW RBC AUTO: 18.9 % (ref 12.4–15.4)
EOSINOPHIL # BLD: 0 K/UL (ref 0–0.6)
EOSINOPHIL NFR BLD: 1 %
GLUCOSE BLD-MCNC: 125 MG/DL (ref 70–99)
GLUCOSE BLD-MCNC: 136 MG/DL (ref 70–99)
GLUCOSE BLD-MCNC: 76 MG/DL (ref 70–99)
GLUCOSE BLD-MCNC: 79 MG/DL (ref 70–99)
HCT VFR BLD AUTO: 25.3 % (ref 40.5–52.5)
HEMOCCULT STL QL: NORMAL
HGB BLD-MCNC: 8.2 G/DL (ref 13.5–17.5)
LYMPHOCYTES # BLD: 0.5 K/UL (ref 1–5.1)
LYMPHOCYTES NFR BLD: 10.9 %
MCH RBC QN AUTO: 31.1 PG (ref 26–34)
MCHC RBC AUTO-ENTMCNC: 32.5 G/DL (ref 31–36)
MCV RBC AUTO: 95.4 FL (ref 80–100)
MONOCYTES # BLD: 0.6 K/UL (ref 0–1.3)
MONOCYTES NFR BLD: 14.4 %
MRSA DNA SPEC QL NAA+PROBE: NORMAL
NEUTROPHILS # BLD: 3.1 K/UL (ref 1.7–7.7)
NEUTROPHILS NFR BLD: 73.5 %
PERFORMED ON: ABNORMAL
PERFORMED ON: ABNORMAL
PERFORMED ON: NORMAL
PERFORMED ON: NORMAL
PLATELET # BLD AUTO: 128 K/UL (ref 135–450)
PMV BLD AUTO: 7.3 FL (ref 5–10.5)
RBC # BLD AUTO: 2.65 M/UL (ref 4.2–5.9)
WBC # BLD AUTO: 4.2 K/UL (ref 4–11)

## 2024-08-11 PROCEDURE — 6370000000 HC RX 637 (ALT 250 FOR IP): Performed by: INTERNAL MEDICINE

## 2024-08-11 PROCEDURE — 36415 COLL VENOUS BLD VENIPUNCTURE: CPT

## 2024-08-11 PROCEDURE — 99232 SBSQ HOSP IP/OBS MODERATE 35: CPT | Performed by: NURSE PRACTITIONER

## 2024-08-11 PROCEDURE — 82270 OCCULT BLOOD FECES: CPT

## 2024-08-11 PROCEDURE — 6360000002 HC RX W HCPCS: Performed by: INTERNAL MEDICINE

## 2024-08-11 PROCEDURE — 6370000000 HC RX 637 (ALT 250 FOR IP): Performed by: NURSE PRACTITIONER

## 2024-08-11 PROCEDURE — 85025 COMPLETE CBC W/AUTO DIFF WBC: CPT

## 2024-08-11 PROCEDURE — 2580000003 HC RX 258: Performed by: INTERNAL MEDICINE

## 2024-08-11 RX ORDER — CEFUROXIME AXETIL 500 MG/1
500 TABLET ORAL 2 TIMES DAILY
Qty: 3 TABLET | Refills: 0 | Status: SHIPPED | OUTPATIENT
Start: 2024-08-11 | End: 2024-08-14

## 2024-08-11 RX ORDER — INSULIN GLARGINE 100 [IU]/ML
20 INJECTION, SOLUTION SUBCUTANEOUS NIGHTLY
Qty: 5 ADJUSTABLE DOSE PRE-FILLED PEN SYRINGE | Refills: 3
Start: 2024-08-11

## 2024-08-11 RX ORDER — INSULIN GLARGINE 100 [IU]/ML
20 INJECTION, SOLUTION SUBCUTANEOUS NIGHTLY
Status: DISCONTINUED | OUTPATIENT
Start: 2024-08-11 | End: 2024-08-11 | Stop reason: HOSPADM

## 2024-08-11 RX ORDER — AMIODARONE HYDROCHLORIDE 200 MG/1
200 TABLET ORAL DAILY
Qty: 30 TABLET | Refills: 0 | Status: SHIPPED | OUTPATIENT
Start: 2024-08-12

## 2024-08-11 RX ADMIN — SODIUM CHLORIDE, PRESERVATIVE FREE 10 ML: 5 INJECTION INTRAVENOUS at 09:45

## 2024-08-11 RX ADMIN — CEFEPIME 1000 MG: 1 INJECTION, POWDER, FOR SOLUTION INTRAMUSCULAR; INTRAVENOUS at 06:10

## 2024-08-11 RX ADMIN — METOPROLOL SUCCINATE 25 MG: 25 TABLET, EXTENDED RELEASE ORAL at 09:45

## 2024-08-11 RX ADMIN — FOLIC ACID 1 MG: 1 TABLET ORAL at 09:45

## 2024-08-11 RX ADMIN — APIXABAN 5 MG: 5 TABLET, FILM COATED ORAL at 09:45

## 2024-08-11 RX ADMIN — AMIODARONE HYDROCHLORIDE 200 MG: 200 TABLET ORAL at 09:45

## 2024-08-11 NOTE — PROGRESS NOTES
ONCOLOGY HEMATOLOGY CARE PROGRESS NOTE      SUBJECTIVE:    Patient in bed with obvious fatigue coming in and out of sleep. Currently in afib. Denies chest pain, dizziness, or lightheadedness.    Constitutional:  Fatigue. No weight loss, No fever, No chills, No night sweats.   Eyes:  No impairment or change in vision  ENT / Mouth:  No pain, abnormal ulceration, bleeding, nasal drip or change in voice or hearing  Cardiovascular:  Palpitations, irregular rhythm. No chest pain, new edema, or calf discomfort  Respiratory:  No pain, hemoptysis, change to breathing  Gastrointestinal:  No pain, cramping, jaundice, change to eating and bowel habits  Urinary:  No pain, bleeding or change in continence  Genitalia: No pain, bleeding or discharge  Musculoskeletal:  No redness, pain, edema or weakness  Skin:  No pruritus, rash, change to nodules or lesions  Neurologic:  No discomfort, change in mental status, speech, sensory or motor activity  Psychiatric:  No change in concentration or change to affect or mood  Endocrine:  No hot flashes, increased thirst, or change to urine production  Hematologic: No petechiae, ecchymosis or bleeding  Lymphatic:  No lymphadenopathy or lymphedema    OBJECTIVE        Physical    VITALS:  Patient Vitals for the past 24 hrs:   BP Temp Temp src Pulse Resp SpO2 Height Weight   08/09/24 1059 109/67 97.5 °F (36.4 °C) Oral 89 16 99 % -- --   08/09/24 0943 109/63 -- -- (!) 114 16 -- -- --   08/09/24 0859 107/69 -- -- -- -- -- 1.727 m (5' 8\") 80.3 kg (177 lb)   08/09/24 0828 107/69 -- -- (!) 141 16 97 % -- --   08/09/24 0823 113/72 -- -- (!) 139 16 -- -- --   08/09/24 0807 (!) 87/56 97.7 °F (36.5 °C) Oral (!) 141 16 92 % -- --   08/09/24 0432 112/66 97 °F (36.1 °C) Oral 57 17 97 % -- 80.6 kg (177 lb 9.6 oz)   08/08/24 2345 (!) 96/56 97.7 °F (36.5 °C) Oral 90 17 96 % -- --   08/08/24 2105 126/67 -- -- (!) 112 18 96 % -- --   08/08/24 1943 -- -- -- (!) 141 18 -- -- -- 
   08/09/24 1438   Encounter Summary   Encounter Overview/Reason Spiritual/Emotional Needs   Service Provided For Patient   Referral/Consult From Nurse   Support System Spouse;Children;[employer]  (Wes shared that his wife, children and [employer] community offer him support. AW)   Last Encounter  08/09/24   Complexity of Encounter Moderate   Begin Time 1406   End Time  1419   Total Time Calculated 13 min   Spiritual/Emotional needs   Type Spiritual Support   Assessment/Intervention/Outcome   Assessment Calm;Coping;Peaceful   Intervention Active listening;Discussed illness injury and it’s impact;Explored Coping Skills/Resources;Life review/Legacy;Sustaining Presence/Ministry of presence   Outcome Coping;Engaged in conversation;Expressed Gratitude  (Wes shared his gratitude for \"a good life\". AW)   Plan and Referrals   Plan/Referrals Continue Support (comment)  (Spiritual Health available PRN for additional support for Miranda Harvey and their family. AW)       
  Hospital Medicine Progress Note      Date of Admission: 8/7/2024  Hospital Day: 2    Chief Admission Complaint:  Chest pain      Subjective:   no CP or SOB < no fever    Presenting Admission History:       79 y.o. male who presented to [facility] with above complaint  PMHx significant for lung carcinoma, on 3 weekly chemotherapy, BPH, and DM. Patient was seen with wife today, mentioned had sudden onset of R) sided chest pain @2 am this morning with associated SOB, now improving. No associated palpitations, dizziness, headaches or syncopal episodes. Denies   Fevers, nausea or vomiting/diarrhea    Assessment/Plan:      Current Principal Problem:  Sepsis (HCC)    Chest pain- initial trops of 70, now downtrending to 68, echo pending, repeat EKG telemetry patient does not have a chest pain now  Cardio input appreciated . 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  Oncology eval appreciated   Palliative care consulted     Pancytopenia-secondary to above/ chemo , monitor  S/p PRBC     History of diabetes monitor blood sugar with low persistent insulin hemoglobin A1c  BS low in the am   63 , cut down the lantus half 25      History of prostate cancer hyperlipidemia and hypertension     Chronic diastolic dysfunction per echocardiogram 2022  No evidence of fluid overload     Chronic upper extremity right swelling possible lymphedema  No evidence of cellulitis or acute blood clot  Elevate and monitor, better , not worsening     Physical Exam Performed:      General appearance:  No apparent distress  Respiratory:  Normal respiratory effort.   Cardiovascular:  Regular rate and rhythm.  Abdomen:  Soft, non-tender, non-distended.  Musculoskeletal:  No edema  Neurologic:  Non-focal  Psychiatric:  Alert 
  Hospital Medicine Progress Note      Date of Admission: 8/7/2024  Hospital Day: 4    Chief Admission Complaint:  Chest pain      Subjective:   no CP or SOB < no fever, tele tachy and flipped in to a fib this am , received IV metoprolol last night for Tachycardia  Pt does not have palpitation , CP , jordon or change in MS at rest   Had BF    Presenting Admission History:       79 y.o. male who presented to [facility] with above complaint  PMHx significant for lung carcinoma, on 3 weekly chemotherapy, BPH, and DM. Patient was seen with wife today, mentioned had sudden onset of R) sided chest pain @2 am this morning with associated SOB, now improving. No associated palpitations, dizziness, headaches or syncopal episodes. Denies   Fevers, nausea or vomiting/diarrhea    Assessment/Plan:      Current Principal Problem:  Sepsis (HCC)    Chest pain- initial trops of 70, now downtrending to 68, echo pending, repeat EKG telemetry patient does not have a chest pain now  Cardio input appreciated . 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  Oncology eval appreciated   Palliative care consulted, input appreciated , pt Is  full code      Pancytopenia-secondary to above/ chemo , monitor  S/p PRBC     History of diabetes monitor blood sugar with low persistent insulin hemoglobin A1c  BS low in the am   63 , cut down the lantus half 25   BS reviewed ,      History of prostate cancer hyperlipidemia and hypertension     Chronic diastolic dysfunction per echocardiogram 2022  No evidence of fluid overload     Chronic upper extremity right swelling possible lymphedema  No evidence of cellulitis  , given tachy , venous dopple ruled out DVT     Afib RVR- on BB,  , cardio consulted - EP, input appreciated   Raquel 
  Hospital Medicine Progress Note      Date of Admission: 8/7/2024  Hospital Day: 5    Chief Admission Complaint:  Chest pain      Subjective:   no CP or SOB < no fever,  Presenting Admission History:       79 y.o. male who presented to [facility] with above complaint  PMHx significant for lung carcinoma, on 3 weekly chemotherapy, BPH, and DM. Patient was seen with wife today, mentioned had sudden onset of R) sided chest pain @2 am this morning with associated SOB, now improving. No associated palpitations, dizziness, headaches or syncopal episodes. Denies   Fevers, nausea or vomiting/diarrhea    Assessment/Plan:      Current Principal Problem:  Sepsis (HCC)    Chest pain- initial trops of 70, now downtrending to 68, echo pending, repeat EKG telemetry patient does not have a chest pain now  Cardio input appreciated . 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  Oncology eval appreciated   Palliative care consulted, input appreciated , pt Is  full code      Pancytopenia-secondary to above/ chemo , monitor  S/p PRBC     History of diabetes monitor blood sugar with low persistent insulin hemoglobin A1c  BS low in the am   63 , cut down the lantus half 25   BS reviewed , two digits   May cut down further      History of prostate cancer hyperlipidemia and hypertension     Chronic diastolic dysfunction per echocardiogram 2022  No evidence of fluid overload     Chronic upper extremity right swelling possible lymphedema  No evidence of cellulitis  , given tachy , venous dopple ruled out DVT     Afib RVR- on BB,  , cardio consulted - EP, input appreciated   Amio and AC   oncology is ok for AC  BB     BP better controlled     Physical Exam Performed:      General appearance:  No apparent 
A&Ox4. Denied chest pain and heaviness, /SOB. Denied pain or discomfort.   SR x 2.   Call light within reach.  Bed alarm on. Bed on lowest position.   
A&Ox4. Denied chest pain/heaviness, SOB/. Denies pain/discomfort.  SR x 2.  Call light within reach.   Bed alarm on. Bed on lowest position.   
Comprehensive Nutrition Assessment    Type and Reason for Visit:  Positive Nutrition Screen    Nutrition Recommendations/Plan:   Liberalize diet to encourage PO intakes- IMELDA diet.  Change Ensure to Glucerna TID.     Malnutrition Assessment:  Malnutrition Status:  At risk for malnutrition (Comment) (08/08/24 9296)    Context:  Chronic Illness     Findings of the 6 clinical characteristics of malnutrition:  Energy Intake:  Unable to assess  Weight Loss:  Greater than 7.5% over 3 months     Body Fat Loss:  Unable to assess     Muscle Mass Loss:  Unable to assess    Fluid Accumulation:  Mild Extremities   Strength:  Not Performed    Nutrition Assessment:    + nutrition screen. Pt with PMH of lung cancer and DM, presented with chest pain. Noted pt with wt trending down, -9.7% over past 3 months. Pt currently on Low Na diet with ONS TID but intakes poor, <50%. Recommend liberalize diet and encourage PO intakes. Change Ensure to Glucerna.    Nutrition Related Findings:    +1 BLe and +1/+2 BUE edema; BM 8/8; Glu 159 Wound Type: None       Current Nutrition Intake & Therapies:       Average Supplements Intake: Unable to assess (no intakes noted)  ADULT DIET; Regular; Low Sodium (2 gm)  ADULT ORAL NUTRITION SUPPLEMENT; Breakfast, Lunch, Dinner; Standard High Calorie/High Protein Oral Supplement    Anthropometric Measures:  Height: 172.7 cm (5' 8\")  Ideal Body Weight (IBW): 154 lbs (70 kg)       Current Body Weight: 79.4 kg (175 lb 0.7 oz), 113.7 % IBW. Weight Source: Stated  Current BMI (kg/m2): 26.6  Usual Body Weight: 88 kg (194 lb)  % Weight Change (Calculated): -9.8                    BMI Categories: Overweight (BMI 25.0-29.9)    Estimated Daily Nutrient Needs:  Energy Requirements Based On: Kcal/kg  Weight Used for Energy Requirements: Current  Energy (kcal/day): 1824-0432 kcal (25-30 kcal/kg 79 kg CBW)  Weight Used for Protein Requirements: Current  Protein (g/day):  gm (1.2-1.4 gm/kg 79 kg CBW)     Fluid 
EKG completed, strip handed to RN.   
Occupational Therapy  Attempted to see pt for OT follow-up this AM. Per discussion with RN, pt's HR is out of rhythm and to hold OT until this afternoon after MD assesses pt. Will re-attempt at a later time as schedule allows. Thank you.    Sharon Flores, OTR/L  
Occupational Therapy  Facility/Department: [facility] C3 TELE/MED SURG/ONC  Occupational Therapy Initial Assessment and Treatment Note    Name: Wes Schneider  : 1945  MRN: [id number]  Date of Service: 2024    Discharge Recommendations:  24 hour supervision or assist, Home with Home health OT      Patient Diagnosis(es): The primary encounter diagnosis was Chest pain, unspecified type. Diagnoses of Adenocarcinoma of right lung (HCC), SIRS (systemic inflammatory response syndrome) (HCC), Normocytic anemia, Thrombocytopenia (HCC), Elevated troponin, and Elevated troponin level were also pertinent to this visit.  Past Medical History:  has a past medical history of BPH (benign prostatic hyperplasia), Cancer (HCC), Diabetes mellitus (HCC), Esophageal reflux, Hyperlipidemia, Hypertension, Kidney stone, Lung cancer (HCC), Lung cancer (HCC), and Neuropathy.  Past Surgical History:  has a past surgical history that includes Prostate surgery; Cystoscopy (Right, 2020); back surgery (); NEPHROLITHOTOMY (Left, 2020); shoulder surgery (Right); Port Surgery (N/A, 2021); Port Surgery (N/A, 10/04/2022); Port Surgery (Left, 2022); CT NEEDLE BIOPSY LUNG PERCUTANEOUS (2023); and Cataract extraction, extracapsular w/ intraocular lens implant.     Assessment   Performance deficits / Impairments: Decreased functional mobility ;Decreased ADL status;Decreased endurance;Decreased balance  After evaluation, pt found to be presenting with the above mentioned occupational performance deficits which are affecting participation in daily living skills. Pt would benefit from continued skilled occupational therapy to address ADLs, functional mobility, and safety while in acute care.    Prognosis: Good  Decision Making: Medium Complexity  REQUIRES OT FOLLOW-UP: Yes  Activity Tolerance  Activity Tolerance: Patient limited by fatigue        Plan   Occupational Therapy Plan  Times Per Week: 2-4x  Current 
Perfect Serve sent to Dr. Herman:    \"Dr. Rasmussen wanted me to reach out to you to see if the patient can start anticoagulation. He has a new onset of Paroxysmal Afib and cardiology's note says they will start amiodarone if he can start oral anticoagulant. Please advise.\"    1600: Dr. Herman responded and ok with starting an oral anticoagulant.   
Physical Therapy  Facility/Department: [facility] C3 TELE/MED SURG/ONC  Physical Therapy Initial Assessment    Name: eWs Schneider  : 1945  MRN: [id number]  Date of Service: 2024    Discharge Recommendations:  24 hour supervision or assist, Home with Home health PT   PT Equipment Recommendations  Equipment Needed: No  Other: pt has 4WW at home      Patient Diagnosis(es): The primary encounter diagnosis was Chest pain, unspecified type. Diagnoses of Adenocarcinoma of right lung (HCC), SIRS (systemic inflammatory response syndrome) (HCC), Normocytic anemia, Thrombocytopenia (HCC), Elevated troponin, and Elevated troponin level were also pertinent to this visit.  Past Medical History:  has a past medical history of BPH (benign prostatic hyperplasia), Cancer (HCC), Diabetes mellitus (HCC), Esophageal reflux, Hyperlipidemia, Hypertension, Kidney stone, Lung cancer (HCC), Lung cancer (HCC), and Neuropathy.  Past Surgical History:  has a past surgical history that includes Prostate surgery; Cystoscopy (Right, 2020); back surgery (); NEPHROLITHOTOMY (Left, 2020); shoulder surgery (Right); Port Surgery (N/A, 2021); Port Surgery (N/A, 10/04/2022); Port Surgery (Left, 2022); CT NEEDLE BIOPSY LUNG PERCUTANEOUS (2023); and Cataract extraction, extracapsular w/ intraocular lens implant.    Assessment   Body Structures, Functions, Activity Limitations Requiring Skilled Therapeutic Intervention: Decreased functional mobility ;Decreased strength;Decreased endurance;Decreased balance;Decreased safe awareness;Decreased posture  Assessment: Pt is an 79 year old male admitted with R sided chest pain, found to have PNA. Pt able to complete bed mobility with min A, functional transfers with CGA, and ambulate household distance in room with CGA using SW. He requires min cues for safety with use of walker, as pt typically uses 4WW at baseline (RW found after session and placed in pt's room for 
Physical Therapy  Facility/Department: [facility] C3 TELE/MED SURG/ONC  Daily Treatment Note  NAME: Wes Schneider  : 1945  MRN: [id number]    Date of Service: 2024    Discharge Recommendations:  24 hour supervision or assist, Home with Home health PT   PT Equipment Recommendations  Equipment Needed: No  Other: pt has 4WW at home    Patient Diagnosis(es): The primary encounter diagnosis was Chest pain, unspecified type. Diagnoses of Adenocarcinoma of right lung (HCC), SIRS (systemic inflammatory response syndrome) (HCC), Normocytic anemia, Thrombocytopenia (HCC), Elevated troponin, and Elevated troponin level were also pertinent to this visit.    Assessment   Assessment: Pt seen for PT session this morning focusing on functional transfers, ambulation, and LE exercises. Pt able to complete functional transfers with SBA and ambulate household distance in room with CGA using RW. He requires intermittent cues to maintain DEYVI within frame of RW for safety with ambulation. Tolerates LE exercises well for strengthening. He is cooperative and motivated to participate; reports he feels \"much better\" this morning compared to admission. Continue to recommend home with 24 hr assist and HHPT.  Activity Tolerance: Patient tolerated treatment well  Equipment Needed: No  Other: pt has 4WW at home     Plan    Physical Therapy Plan  General Plan: 3-5 times per week  Current Treatment Recommendations: Strengthening;Balance training;Functional mobility training;Transfer training;Endurance training;Gait training;Home exercise program;Safety education & training;Patient/Caregiver education & training;Equipment evaluation, education, & procurement;Therapeutic activities     Restrictions  Restrictions/Precautions  Restrictions/Precautions: Fall Risk  Position Activity Restriction  Other position/activity restrictions: up with assist, telemetry, NPO     Subjective    Subjective  Subjective: Pt reseated in chair upon arrival and 
Pt /63 HR, 114 afib, another EKG as ordered, Pt denies SOB, chest pain, Dr. Rasmussen coming to bedside.   
Pt assessment completed and charted. VSS. Pt a/o. Pt denied any pain.    Bed in lowest position and wheels locked. Call light within reach. Bedside table within reach. Non-skid socks in place. Pt denies any other needs at this time.  Pt calls out appropriately.  
Pt called out and said he was \"not feeling well\" OOB to the chair, Denies  chest pain,or lightheadedness, BP 87/56 - sinus tach per monitor, O-2 RA 93%, Pt back to bed, stat EKG, Dr. Grady weathers served, Sr 2/4, call light w/in reach, pt 's wife at there bedside.  
[facility]  Cardiology  Progress Note    Admission date:  2024    Reason for follow up visit: New paroxysmal atrial fibrillation    HPI/CC: Wes Schneider is a 79 y.o. male who presented 2024 for chest pain and found to have pneumonia.  While admitted he developed paroxysmal atrial fibrillation, new diagnosis.  Echo showed EF 55-60%.  Rhythm overnight has been sinus.    Subjective: Feels fatigued but denies any chest pain or shortness of breath.    Vitals:  Blood pressure 112/66, pulse 82, temperature 97.8 °F (36.6 °C), temperature source Oral, resp. rate 16, height 1.727 m (5' 8\"), weight 81.2 kg (179 lb 1.6 oz), SpO2 97%.  Temp  Av.7 °F (36.5 °C)  Min: 97.4 °F (36.3 °C)  Max: 97.8 °F (36.6 °C)  Pulse  Av.5  Min: 81  Max: 98  BP  Min: 112/66  Max: 128/71  SpO2  Av %  Min: 95 %  Max: 99 %    24 hour I/O    Intake/Output Summary (Last 24 hours) at 8/10/2024 1410  Last data filed at 8/10/2024 0538  Gross per 24 hour   Intake 441.59 ml   Output --   Net 441.59 ml     Current Facility-Administered Medications   Medication Dose Route Frequency Provider Last Rate Last Admin    Epoetin Bg-epbx (RETACRIT) injection 40,000 Units  40,000 Units SubCUTAneous Weekly Dashawn Perez PA   40,000 Units at 24 1736    metoprolol succinate (TOPROL XL) extended release tablet 25 mg  25 mg Oral BID TATIANA Verduzco Jr., MD   25 mg at 08/10/24 0954    albuterol sulfate HFA (PROVENTIL;VENTOLIN;PROAIR) 108 (90 Base) MCG/ACT inhaler 2 puff  2 puff Inhalation Q6H PRN Yas Rasmussen MD   2 puff at 24 1641    tamsulosin (FLOMAX) capsule 0.4 mg  0.4 mg Oral Daily Panfilo Arce APRN - CNP   0.4 mg at 24 2220    atorvastatin (LIPITOR) tablet 40 mg  40 mg Oral Nightly Panfilo Arce APRN - CNP   40 mg at 24 2220    folic acid (FOLVITE) tablet 1 mg  1 mg Oral Daily Ronald Herman MD   1 mg at 08/10/24 0954    insulin glargine (LANTUS) injection vial 25 Units  25 
Shift assessment done as charted. Pt A&O x4, VSS.     -ambulates with walker, wife at bedside.   -tolerating PO intake well  -denies further needs at this time.    Nonskid footware on. Bed alarm on. Bed in low position, wheels locked, SR x2, call light and bedside table within reach.  
Shift assessment done as charted. Pt A&O x4, VSS.   Nonskid footware on. Bed alarm on. Bed in low position, wheels locked, SR x2, call light and bedside table within reach.    Perfect Serve sent to LISA Arce;  \"Patient has new onset A fib and recommendation of cardio is to start him on Eliquis 5 mg BID if okay with oncology and  Consider amiodarone 200 mg daily if starting anticoagulation. Paged Dr. Herman from oncology and agree with the plan. Can you place an order for that? \"  -waiting for response.   
Started blood transfusion. Instructed to report untoward signs and symptoms like /SOB, itching, back pain.  
Critical electrolyte abnormalities requiring IV replacement and close serial monitoring  [x] Insulin - monitoring serial FSBS for Hypoglycemic adverse drug reaction  [] Anticoagulation requiring serial monitoring of coagulation factors  [] Other -   [] Change in code status:    [] Decision to escalate care:    [] Major surgery/procedure with associated risk factors:    ----------------------------------------------------------------------  C. Data (any 2)  [x] Discussed current management and discharge planning options with Case Management.  [] Discussed management of the case with:    [] Telemetry personally reviewed and interpreted as documented above    [] Imaging personally reviewed and interpreted, includes:    [x] Data Review (any 3)  [x] All available Consultant notes from yesterday/today were reviewed  [x] All current labs were reviewed and interpreted for clinical significance   [x] Appropriate follow-up labs were ordered  [] Collateral history obtained from:        Medications:  Personally reviewed in detail in conjunction w/ labs as documented for evidence of drug toxicity.     Infusion Medications    sodium chloride Stopped (08/08/24 1814)    dextrose       Scheduled Medications    folic acid  1 mg Oral Daily    insulin glargine  25 Units SubCUTAneous Nightly    metoprolol succinate  25 mg Oral Daily    sodium chloride flush  10 mL IntraVENous 2 times per day    melatonin  3 mg Oral Nightly    nicotine  1 patch TransDERmal Daily    cefepime  1,000 mg IntraVENous Q12H    insulin lispro  0-8 Units SubCUTAneous TID WC    insulin lispro  0-4 Units SubCUTAneous Nightly     PRN Meds: sodium chloride flush, sodium chloride, promethazine **OR** ondansetron, acetaminophen **OR** acetaminophen, glucose, dextrose bolus **OR** dextrose bolus, glucagon (rDNA), dextrose     Labs:  Personally reviewed and interpreted for clinical significance.     Recent Labs     08/07/24  0434 08/07/24  1840 08/08/24  0301 
PROBNP 93 11/16/2020 12:32 PM    PROBNP 83 02/11/2018 02:48 PM     Fasting Lipid Panel:    Lab Results   Component Value Date/Time    CHOL 75 08/23/2023 10:53 AM    HDL 33 08/23/2023 10:53 AM    TRIG 108 08/23/2023 10:53 AM     Cardiac Enzymes:  CK/MbTroponin  Lab Results   Component Value Date/Time    TROPONINI 0.11 10/02/2022 02:56 AM     PT/ INR   Lab Results   Component Value Date/Time    INR 0.95 04/07/2023 07:56 AM    INR 1.15 10/02/2022 05:20 AM    INR 1.11 01/24/2022 02:13 PM    PROTIME 12.7 04/07/2023 07:56 AM    PROTIME 14.6 10/02/2022 05:20 AM    PROTIME 12.6 01/24/2022 02:13 PM     PTT No components found for: \"PTT\"   Lab Results   Component Value Date/Time    MG 2.10 08/10/2024 05:18 AM    No results found for: \"TSH\"    All labs and imaging reviewed today    Assessment:  Paroxysmal atrial fibrillation: New diagnosis, now stable in sinus  Abnormal stress test 10/2022: Medical management recommended  Pneumonia  Lung adenocarcinoma  HTN  HLD  DM  Pancytopenia  Normal LVEF on echo 8/2024    Plan:   1.  Oncology okay with starting anticoagulation, Eliquis 5 mg BID along with amiodarone 200 mg daily started per EP  2.  Will need monitoring of CBC, renal function, liver function, TSH  3.  He plans to follow-up with [facility] cardiology.  Consider cardiac monitor for arrhythmia burden, unable to be completed on the weekend  4. Continue Toprol 25 mg twice daily  5.  Monitor telemetry  6.  Cardiology will sign off, please call if needed.  He will contact office if he chooses to follow-up with I.    Ollie Hansen, APRN-CNP  [facility]  [phone]     
SP#[id number]  Speech-Language Pathologist

## 2024-08-11 NOTE — DISCHARGE INSTR - COC
Continuity of Care Form    Patient Name: Wes Schneider   :  1945  MRN:  [id number]    Admit date:  2024  Discharge date:  24    Code Status Order: Full Code   Advance Directives:   Advance Care Flowsheet Documentation             Admitting Physician:  Jenn Pierce DO  PCP: Jourdan Russ MD    Discharging Nurse: VALORIE Gomez   Discharging Hospital Unit/Room#: 0346/0346-01  Discharging Unit Phone Number: [phone]    Emergency Contact:   Extended Emergency Contact Information  Primary Emergency Contact: Miranda Schneider  Address: [address] [place] [address]  Home Phone: [phone]  Work Phone: [phone]  Mobile Phone: [phone]  Relation: Spouse  Secondary Emergency Contact: Frank Schneider  Home Phone: [phone]  Relation: Child    Past Surgical History:  Past Surgical History:   Procedure Laterality Date    BACK SURGERY  2012    lumber, no hardware, \"cleaned it out\"    CATARACT EXTRACTION EXTRACAPSULAR W/ INTRAOCULAR LENS IMPLANTATION      CT NEEDLE BIOPSY LUNG PERCUTANEOUS  2023    CT NEEDLE BIOPSY LUNG PERCUTANEOUS 2023 Robert Palma MD [facility] CT SCAN    CYSTOSCOPY Right 2020    CYSTOSCOPY,  RIGHT URETEROSCOPY, RIGHT RETRO PYELOGRAM, REMOVAL STONE FROM BLADDER, URETHRAL DILITATION performed by Lucien Shultz MD at [facility] OR    NEPHROLITHOTOMY Left 2020    LEFT PERCUTANEOUS NEPHROLITHOTOMY WITH DR MONTES TO PLACE NEPHROSTOMY TUBE PRIOR performed by Trevor Trejo MD at [facility] OR    PORT SURGERY N/A 2021    SURGICAL PORT PLACEMENT performed by Ever Lamar MD at [facility] ASC OR    PORT SURGERY N/A 10/04/2022    PORT REMOVAL performed by Danny Andrade MD at [facility] OR    PORT SURGERY Left 2022    SURGICAL PORT PLACEMENT performed by Ever Lamar MD at [facility] OR    PROSTATE SURGERY      CYBER KNIFE    SHOULDER SURGERY Right     ROTATOR CUFF       Immunization History:

## 2024-08-11 NOTE — CARE COORDINATION
CASE MANAGEMENT DISCHARGE SUMMARY      Discharge to: Home with Supportive C      Transportation:    Family/car: yes       Confirmed discharge plan with:     Patient: yes per RN     Family:  not able to reach, pt/RN cont'ing to try     RN, name: Patricia EUGENE    Note: Discharging nurse to complete FAN, reconcile AVS, and place final copy with patient's discharge packet. RN to ensure that written prescriptions for  Level II medications are sent with patient to the facility as per protocol.

## 2024-08-11 NOTE — DISCHARGE SUMMARY
Hospital Medicine Discharge Summary    Patient: Wes Schneider   : 1945     Admit Date: 2024   Discharge Date:    2024   Disposition:  []Home   [x]HHC  []SNF  []ECF  []Acute Rehab  []LTAC  []Hospice  Code status:  [x]Full  []DNR/CCA  []Limited (DNR/CCA with Do Not Intubate)  []DNRCC  Condition at Discharge: Stable  Primary Care Provider: Jourdan Russ MD    Admitting Provider: Jenn Pierce DO  Discharge Provider: Yas Rasmussen MD     Discharge Diagnoses:      Active Hospital Problems    Diagnosis     Sepsis (HCC) [A41.9]      Priority: Medium       Presenting Admission History:      79 y.o. male who presented to [facility] with above complaint  PMHx significant for lung carcinoma, on 3 weekly chemotherapy, BPH, and DM. Patient was seen with wife today, mentioned had sudden onset of R) sided chest pain @2 am this morning with associated SOB, now improving. No associated palpitations, dizziness, headaches or syncopal episodes. Denies   Fevers, nausea or vomiting/diarrhea        Assessment/Plan:      Chest pain- initial trops of 70, now downtrending to 68, echo pending, repeat EKG telemetry patient does not have a chest pain now  Cardio input appreciated . 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  Oncology eval appreciated   Palliative care consulted, input appreciated , pt Is  full code      Pancytopenia-secondary to above/ chemo , monitor  S/p PRBC  Stable      History of diabetes monitor blood sugar with low persistent insulin hemoglobin A1c  BS low in2 digits mostly   , cut down the lantus half 20   BS reviewed , two digits   May cut down further      History of prostate cancer hyperlipidemia and hypertension     Chronic diastolic

## 2024-08-11 NOTE — PLAN OF CARE
Problem: Pain  Goal: Verbalizes/displays adequate comfort level or baseline comfort level  Outcome: Progressing  Flowsheets (Taken 8/10/2024 2205)  Verbalizes/displays adequate comfort level or baseline comfort level:   Encourage patient to monitor pain and request assistance   Assess pain using appropriate pain scale   Administer analgesics based on type and severity of pain and evaluate response   Implement non-pharmacological measures as appropriate and evaluate response     Problem: Safety - Adult  Goal: Free from fall injury  Outcome: Progressing

## 2024-08-12 LAB
LEGIONELLA AG UR QL: NORMAL
PATH INTERP BLD-IMP: NORMAL
S PNEUM AG UR QL: NORMAL

## 2024-08-16 ENCOUNTER — APPOINTMENT (OUTPATIENT)
Dept: GENERAL RADIOLOGY | Age: 79
End: 2024-08-16
Payer: MEDICARE

## 2024-08-16 ENCOUNTER — HOSPITAL ENCOUNTER (INPATIENT)
Age: 79
LOS: 2 days | Discharge: HOME | End: 2024-08-18
Attending: EMERGENCY MEDICINE | Admitting: INTERNAL MEDICINE
Payer: MEDICARE

## 2024-08-16 DIAGNOSIS — R79.89 TROPONIN LEVEL ELEVATED: ICD-10-CM

## 2024-08-16 DIAGNOSIS — J18.9 PNEUMONIA DUE TO INFECTIOUS ORGANISM, UNSPECIFIED LATERALITY, UNSPECIFIED PART OF LUNG: Primary | ICD-10-CM

## 2024-08-16 PROBLEM — D64.9 ANEMIA: Status: ACTIVE | Noted: 2024-08-16

## 2024-08-16 LAB
ALBUMIN SERPL-MCNC: 2.7 G/DL (ref 3.4–5)
ALBUMIN/GLOB SERPL: 0.7 {RATIO} (ref 1.1–2.2)
ALP SERPL-CCNC: 160 U/L (ref 40–129)
ALT SERPL-CCNC: 23 U/L (ref 10–40)
ANION GAP SERPL CALCULATED.3IONS-SCNC: 14 MMOL/L (ref 3–16)
AST SERPL-CCNC: 27 U/L (ref 15–37)
BASE EXCESS BLDV CALC-SCNC: 1.8 MMOL/L (ref -3–3)
BASOPHILS # BLD: 0 K/UL (ref 0–0.2)
BASOPHILS NFR BLD: 0.4 %
BILIRUB SERPL-MCNC: 0.6 MG/DL (ref 0–1)
BILIRUB UR QL STRIP.AUTO: NEGATIVE
BUN SERPL-MCNC: 15 MG/DL (ref 7–20)
CALCIUM SERPL-MCNC: 8.3 MG/DL (ref 8.3–10.6)
CHLORIDE SERPL-SCNC: 100 MMOL/L (ref 99–110)
CLARITY UR: CLEAR
CO2 BLDV-SCNC: 28 MMOL/L
CO2 SERPL-SCNC: 23 MMOL/L (ref 21–32)
COHGB MFR BLDV: 3.6 % (ref 0–1.5)
COLOR UR: YELLOW
CREAT SERPL-MCNC: 1.1 MG/DL (ref 0.8–1.3)
DEPRECATED RDW RBC AUTO: 20.8 % (ref 12.4–15.4)
EOSINOPHIL # BLD: 0 K/UL (ref 0–0.6)
EOSINOPHIL NFR BLD: 0.3 %
FLUAV RNA RESP QL NAA+PROBE: NOT DETECTED
FLUBV RNA RESP QL NAA+PROBE: NOT DETECTED
GFR SERPLBLD CREATININE-BSD FMLA CKD-EPI: 68 ML/MIN/{1.73_M2}
GLUCOSE BLD-MCNC: 249 MG/DL (ref 70–99)
GLUCOSE SERPL-MCNC: 240 MG/DL (ref 70–99)
GLUCOSE UR STRIP.AUTO-MCNC: NEGATIVE MG/DL
HCO3 BLDV-SCNC: 26.5 MMOL/L (ref 23–29)
HCT VFR BLD AUTO: 27.8 % (ref 40.5–52.5)
HGB BLD-MCNC: 8.9 G/DL (ref 13.5–17.5)
HGB UR QL STRIP.AUTO: NEGATIVE
KETONES UR STRIP.AUTO-MCNC: NEGATIVE MG/DL
LACTATE BLDV-SCNC: 1.8 MMOL/L (ref 0.4–1.9)
LEUKOCYTE ESTERASE UR QL STRIP.AUTO: ABNORMAL
LIPASE SERPL-CCNC: 41 U/L (ref 13–60)
LYMPHOCYTES # BLD: 0.4 K/UL (ref 1–5.1)
LYMPHOCYTES NFR BLD: 4.7 %
MCH RBC QN AUTO: 31.4 PG (ref 26–34)
MCHC RBC AUTO-ENTMCNC: 32.1 G/DL (ref 31–36)
MCV RBC AUTO: 98 FL (ref 80–100)
METHGB MFR BLDV: 0.1 %
MONOCYTES # BLD: 0.7 K/UL (ref 0–1.3)
MONOCYTES NFR BLD: 9.5 %
NEUTROPHILS # BLD: 6.5 K/UL (ref 1.7–7.7)
NEUTROPHILS NFR BLD: 85.1 %
NITRITE UR QL STRIP.AUTO: NEGATIVE
O2 THERAPY: ABNORMAL
PCO2 BLDV: 42.1 MMHG (ref 40–50)
PERFORMED ON: ABNORMAL
PH BLDV: 7.42 [PH] (ref 7.35–7.45)
PH UR STRIP.AUTO: 6 [PH] (ref 5–8)
PLATELET # BLD AUTO: 195 K/UL (ref 135–450)
PMV BLD AUTO: 8.3 FL (ref 5–10.5)
PO2 BLDV: 35.6 MMHG (ref 25–40)
POTASSIUM SERPL-SCNC: 4.6 MMOL/L (ref 3.5–5.1)
PROCALCITONIN SERPL IA-MCNC: 0.16 NG/ML (ref 0–0.15)
PROT SERPL-MCNC: 6.8 G/DL (ref 6.4–8.2)
PROT UR STRIP.AUTO-MCNC: ABNORMAL MG/DL
RBC # BLD AUTO: 2.84 M/UL (ref 4.2–5.9)
RBC #/AREA URNS HPF: NORMAL /HPF (ref 0–4)
SAO2 % BLDV: 67 %
SARS-COV-2 RNA RESP QL NAA+PROBE: NOT DETECTED
SODIUM SERPL-SCNC: 137 MMOL/L (ref 136–145)
SP GR UR STRIP.AUTO: 1.02 (ref 1–1.03)
TROPONIN, HIGH SENSITIVITY: 86 NG/L (ref 0–22)
TROPONIN, HIGH SENSITIVITY: 98 NG/L (ref 0–22)
UA COMPLETE W REFLEX CULTURE PNL UR: ABNORMAL
UA DIPSTICK W REFLEX MICRO PNL UR: YES
URN SPEC COLLECT METH UR: ABNORMAL
UROBILINOGEN UR STRIP-ACNC: 0.2 E.U./DL
WBC # BLD AUTO: 7.6 K/UL (ref 4–11)
WBC #/AREA URNS HPF: NORMAL /HPF (ref 0–5)

## 2024-08-16 PROCEDURE — 6370000000 HC RX 637 (ALT 250 FOR IP): Performed by: NURSE PRACTITIONER

## 2024-08-16 PROCEDURE — 96374 THER/PROPH/DIAG INJ IV PUSH: CPT

## 2024-08-16 PROCEDURE — 1200000000 HC SEMI PRIVATE

## 2024-08-16 PROCEDURE — 36415 COLL VENOUS BLD VENIPUNCTURE: CPT

## 2024-08-16 PROCEDURE — 87636 SARSCOV2 & INF A&B AMP PRB: CPT

## 2024-08-16 PROCEDURE — 83690 ASSAY OF LIPASE: CPT

## 2024-08-16 PROCEDURE — 85025 COMPLETE CBC W/AUTO DIFF WBC: CPT

## 2024-08-16 PROCEDURE — 84443 ASSAY THYROID STIM HORMONE: CPT

## 2024-08-16 PROCEDURE — 81001 URINALYSIS AUTO W/SCOPE: CPT

## 2024-08-16 PROCEDURE — 87641 MR-STAPH DNA AMP PROBE: CPT

## 2024-08-16 PROCEDURE — 83605 ASSAY OF LACTIC ACID: CPT

## 2024-08-16 PROCEDURE — 87086 URINE CULTURE/COLONY COUNT: CPT

## 2024-08-16 PROCEDURE — 2580000003 HC RX 258: Performed by: NURSE PRACTITIONER

## 2024-08-16 PROCEDURE — 80053 COMPREHEN METABOLIC PANEL: CPT

## 2024-08-16 PROCEDURE — 71045 X-RAY EXAM CHEST 1 VIEW: CPT

## 2024-08-16 PROCEDURE — 87106 FUNGI IDENTIFICATION YEAST: CPT

## 2024-08-16 PROCEDURE — 84145 PROCALCITONIN (PCT): CPT

## 2024-08-16 PROCEDURE — 6360000002 HC RX W HCPCS: Performed by: EMERGENCY MEDICINE

## 2024-08-16 PROCEDURE — 87040 BLOOD CULTURE FOR BACTERIA: CPT

## 2024-08-16 PROCEDURE — 99285 EMERGENCY DEPT VISIT HI MDM: CPT

## 2024-08-16 PROCEDURE — 93005 ELECTROCARDIOGRAM TRACING: CPT | Performed by: EMERGENCY MEDICINE

## 2024-08-16 PROCEDURE — 96376 TX/PRO/DX INJ SAME DRUG ADON: CPT

## 2024-08-16 PROCEDURE — 2580000003 HC RX 258: Performed by: EMERGENCY MEDICINE

## 2024-08-16 PROCEDURE — 84484 ASSAY OF TROPONIN QUANT: CPT

## 2024-08-16 PROCEDURE — 82803 BLOOD GASES ANY COMBINATION: CPT

## 2024-08-16 PROCEDURE — 84439 ASSAY OF FREE THYROXINE: CPT

## 2024-08-16 RX ORDER — M-VIT,TX,IRON,MINS/CALC/FOLIC 27MG-0.4MG
1 TABLET ORAL DAILY
Status: DISCONTINUED | OUTPATIENT
Start: 2024-08-17 | End: 2024-08-18 | Stop reason: HOSPADM

## 2024-08-16 RX ORDER — ACETAMINOPHEN 650 MG/1
650 SUPPOSITORY RECTAL EVERY 6 HOURS PRN
Status: DISCONTINUED | OUTPATIENT
Start: 2024-08-16 | End: 2024-08-18 | Stop reason: HOSPADM

## 2024-08-16 RX ORDER — INSULIN LISPRO 100 [IU]/ML
0-4 INJECTION, SOLUTION INTRAVENOUS; SUBCUTANEOUS
Status: DISCONTINUED | OUTPATIENT
Start: 2024-08-17 | End: 2024-08-18 | Stop reason: HOSPADM

## 2024-08-16 RX ORDER — SODIUM CHLORIDE 0.9 % (FLUSH) 0.9 %
5-40 SYRINGE (ML) INJECTION PRN
Status: DISCONTINUED | OUTPATIENT
Start: 2024-08-16 | End: 2024-08-18 | Stop reason: HOSPADM

## 2024-08-16 RX ORDER — TAMSULOSIN HYDROCHLORIDE 0.4 MG/1
0.4 CAPSULE ORAL DAILY
Status: DISCONTINUED | OUTPATIENT
Start: 2024-08-17 | End: 2024-08-18 | Stop reason: HOSPADM

## 2024-08-16 RX ORDER — 0.9 % SODIUM CHLORIDE 0.9 %
500 INTRAVENOUS SOLUTION INTRAVENOUS ONCE
Status: COMPLETED | OUTPATIENT
Start: 2024-08-16 | End: 2024-08-16

## 2024-08-16 RX ORDER — SODIUM CHLORIDE 9 MG/ML
INJECTION, SOLUTION INTRAVENOUS PRN
Status: DISCONTINUED | OUTPATIENT
Start: 2024-08-16 | End: 2024-08-18 | Stop reason: HOSPADM

## 2024-08-16 RX ORDER — GLUCAGON 1 MG/ML
1 KIT INJECTION PRN
Status: DISCONTINUED | OUTPATIENT
Start: 2024-08-16 | End: 2024-08-18 | Stop reason: HOSPADM

## 2024-08-16 RX ORDER — SODIUM CHLORIDE 9 MG/ML
INJECTION, SOLUTION INTRAVENOUS CONTINUOUS
Status: DISCONTINUED | OUTPATIENT
Start: 2024-08-16 | End: 2024-08-18 | Stop reason: HOSPADM

## 2024-08-16 RX ORDER — ALBUTEROL SULFATE 90 UG/1
1 AEROSOL, METERED RESPIRATORY (INHALATION) EVERY 4 HOURS PRN
Status: DISCONTINUED | OUTPATIENT
Start: 2024-08-16 | End: 2024-08-18 | Stop reason: HOSPADM

## 2024-08-16 RX ORDER — AMIODARONE HYDROCHLORIDE 200 MG/1
200 TABLET ORAL DAILY
Status: DISCONTINUED | OUTPATIENT
Start: 2024-08-17 | End: 2024-08-18 | Stop reason: HOSPADM

## 2024-08-16 RX ORDER — DEXTROSE MONOHYDRATE 100 MG/ML
INJECTION, SOLUTION INTRAVENOUS CONTINUOUS PRN
Status: DISCONTINUED | OUTPATIENT
Start: 2024-08-16 | End: 2024-08-18 | Stop reason: HOSPADM

## 2024-08-16 RX ORDER — INSULIN LISPRO 100 [IU]/ML
0-4 INJECTION, SOLUTION INTRAVENOUS; SUBCUTANEOUS NIGHTLY
Status: DISCONTINUED | OUTPATIENT
Start: 2024-08-16 | End: 2024-08-18 | Stop reason: HOSPADM

## 2024-08-16 RX ORDER — ATORVASTATIN CALCIUM 40 MG/1
40 TABLET, FILM COATED ORAL NIGHTLY
Status: DISCONTINUED | OUTPATIENT
Start: 2024-08-16 | End: 2024-08-18 | Stop reason: HOSPADM

## 2024-08-16 RX ORDER — ACETAMINOPHEN 325 MG/1
650 TABLET ORAL EVERY 6 HOURS PRN
Status: DISCONTINUED | OUTPATIENT
Start: 2024-08-16 | End: 2024-08-18 | Stop reason: HOSPADM

## 2024-08-16 RX ORDER — FOLIC ACID 1 MG/1
1 TABLET ORAL DAILY
Status: DISCONTINUED | OUTPATIENT
Start: 2024-08-17 | End: 2024-08-18 | Stop reason: HOSPADM

## 2024-08-16 RX ORDER — METOPROLOL SUCCINATE 25 MG/1
25 TABLET, EXTENDED RELEASE ORAL DAILY
Status: DISCONTINUED | OUTPATIENT
Start: 2024-08-17 | End: 2024-08-18 | Stop reason: HOSPADM

## 2024-08-16 RX ORDER — SODIUM CHLORIDE 0.9 % (FLUSH) 0.9 %
5-40 SYRINGE (ML) INJECTION EVERY 12 HOURS SCHEDULED
Status: DISCONTINUED | OUTPATIENT
Start: 2024-08-16 | End: 2024-08-18 | Stop reason: HOSPADM

## 2024-08-16 RX ORDER — INSULIN GLARGINE 100 [IU]/ML
20 INJECTION, SOLUTION SUBCUTANEOUS NIGHTLY
Status: DISCONTINUED | OUTPATIENT
Start: 2024-08-16 | End: 2024-08-18 | Stop reason: HOSPADM

## 2024-08-16 RX ADMIN — ATORVASTATIN CALCIUM 40 MG: 40 TABLET, FILM COATED ORAL at 23:16

## 2024-08-16 RX ADMIN — APIXABAN 5 MG: 5 TABLET, FILM COATED ORAL at 23:16

## 2024-08-16 RX ADMIN — SODIUM CHLORIDE 500 ML: 9 INJECTION, SOLUTION INTRAVENOUS at 20:13

## 2024-08-16 RX ADMIN — INSULIN GLARGINE 20 UNITS: 100 INJECTION, SOLUTION SUBCUTANEOUS at 23:46

## 2024-08-16 RX ADMIN — Medication 10 ML: at 23:15

## 2024-08-16 RX ADMIN — SODIUM CHLORIDE: 9 INJECTION, SOLUTION INTRAVENOUS at 23:15

## 2024-08-16 RX ADMIN — CEFEPIME 2000 MG: 2 INJECTION, POWDER, FOR SOLUTION INTRAVENOUS at 21:32

## 2024-08-16 ASSESSMENT — PAIN - FUNCTIONAL ASSESSMENT: PAIN_FUNCTIONAL_ASSESSMENT: NONE - DENIES PAIN

## 2024-08-16 NOTE — ED TRIAGE NOTES
Pt from home.  Pt just out of hospital for sepsis.  He has been weak without improvement since he got out of the hospital.  One hour ago complaining of chest pain which was sharp.  States this felt similar to how he felt when he came in previously

## 2024-08-16 NOTE — ED PROVIDER NOTES
EMERGENCY DEPARTMENT ENCOUNTER            Pt Name: Wes Schneider   MRN: [id number]   Birthdate 1945   Date of evaluation: 8/16/2024   Provider: Tyrell Chavez MD   PCP: Jourdan Russ MD   Note Started: 7:40 PM EDT 8/16/24          CHIEF COMPLAINT     Chief Complaint   Patient presents with    Extremity Weakness    Chest Pain             HISTORY OF PRESENT ILLNESS:   History from : Patient and his wife  Limitations to history : None     Wes Schneider is a 79 y.o. male who presents complaint of right-sided chest discomfort.  This patient is an 79-year-old male with multiple medical problems to include lung cancer, recent diagnosis of paroxysmal A-fib.  Him and his wife states since yesterday he is just felt fatigued and malaise and not feeling very well at all.  He states earlier today his symptoms worsen and he had some right-sided chest discomfort.  It was an ache.  It would come and go.  Currently its gone.    EMS reported that his heart rate was elevated.  Currently the patient's heart rate is less than 100 and he denies any chest discomfort.  He just endorses fatigue and malaise.  There is no history of nausea vomiting diarrhea.  UTI symptoms, blood in his stool.    Nursing Notes were all reviewed and agreed with, or any disagreements were addressed in the HPI.     REVIEW OF SYSTEMS :    Review of Systems   Constitutional:  Positive for fatigue. Negative for fever.   HENT:  Negative for rhinorrhea and sore throat.    Eyes:  Negative for redness.   Respiratory:  Positive for cough. Negative for shortness of breath.    Cardiovascular:  Positive for chest pain.   Gastrointestinal:  Negative for abdominal pain.   Genitourinary:  Negative for flank pain.   Neurological:  Negative for headaches.   Hematological:  Negative for adenopathy.   Psychiatric/Behavioral:  Negative for confusion.         MEDICAL HISTORY   has a past medical history of BPH (benign prostatic hyperplasia),  Esophageal reflux, Hyperlipidemia, Hypertension, Kidney stone, Lung cancer (HCC) (10/02/2021), Lung cancer (HCC), and Neuropathy.        Disposition Considerations:    I am the Primary Clinician of Record.        FINAL IMPRESSION    1. Pneumonia due to infectious organism, unspecified laterality, unspecified part of lung    2. Troponin level elevated           DISPOSITION/PLAN     PATIENT REFERRED TO:   No follow-up provider specified.     DISCHARGE MEDICATIONS:   Current Discharge Medication List           DISCONTINUED MEDICATIONS:   Current Discharge Medication List                 (Please note that portions of this note were completed with a voice recognition program.  Efforts were made to edit the dictations but occasionally words are mis-transcribed.)       Tyrell Chavez MD (electronically signed)              Tyrell Chavez MD  08/16/24 8094

## 2024-08-17 LAB
ALBUMIN SERPL-MCNC: 2.1 G/DL (ref 3.4–5)
ALBUMIN/GLOB SERPL: 0.5 {RATIO} (ref 1.1–2.2)
ALP SERPL-CCNC: 139 U/L (ref 40–129)
ALT SERPL-CCNC: 18 U/L (ref 10–40)
ANION GAP SERPL CALCULATED.3IONS-SCNC: 15 MMOL/L (ref 3–16)
AST SERPL-CCNC: 24 U/L (ref 15–37)
BASOPHILS # BLD: 0.1 K/UL (ref 0–0.2)
BASOPHILS NFR BLD: 0.9 %
BILIRUB SERPL-MCNC: 0.9 MG/DL (ref 0–1)
BUN SERPL-MCNC: 16 MG/DL (ref 7–20)
CALCIUM SERPL-MCNC: 7.9 MG/DL (ref 8.3–10.6)
CHLORIDE SERPL-SCNC: 100 MMOL/L (ref 99–110)
CO2 SERPL-SCNC: 17 MMOL/L (ref 21–32)
CREAT SERPL-MCNC: 1.1 MG/DL (ref 0.8–1.3)
DEPRECATED RDW RBC AUTO: 21.6 % (ref 12.4–15.4)
EKG DIAGNOSIS: NORMAL
EKG Q-T INTERVAL: 316 MS
EKG QRS DURATION: 88 MS
EKG QTC CALCULATION (BAZETT): 448 MS
EKG R AXIS: 77 DEGREES
EKG T AXIS: 254 DEGREES
EKG VENTRICULAR RATE: 121 BPM
EOSINOPHIL # BLD: 0 K/UL (ref 0–0.6)
EOSINOPHIL NFR BLD: 0.4 %
GFR SERPLBLD CREATININE-BSD FMLA CKD-EPI: 68 ML/MIN/{1.73_M2}
GLUCOSE BLD-MCNC: 187 MG/DL (ref 70–99)
GLUCOSE BLD-MCNC: 214 MG/DL (ref 70–99)
GLUCOSE BLD-MCNC: 228 MG/DL (ref 70–99)
GLUCOSE BLD-MCNC: 313 MG/DL (ref 70–99)
GLUCOSE SERPL-MCNC: 208 MG/DL (ref 70–99)
HCT VFR BLD AUTO: 26.2 % (ref 40.5–52.5)
HGB BLD-MCNC: 8.2 G/DL (ref 13.5–17.5)
LYMPHOCYTES # BLD: 0.5 K/UL (ref 1–5.1)
LYMPHOCYTES NFR BLD: 5.8 %
MCH RBC QN AUTO: 31.8 PG (ref 26–34)
MCHC RBC AUTO-ENTMCNC: 31.3 G/DL (ref 31–36)
MCV RBC AUTO: 101.6 FL (ref 80–100)
MONOCYTES # BLD: 1 K/UL (ref 0–1.3)
MONOCYTES NFR BLD: 11.5 %
MRSA DNA SPEC QL NAA+PROBE: NORMAL
NEUTROPHILS # BLD: 6.8 K/UL (ref 1.7–7.7)
NEUTROPHILS NFR BLD: 81.4 %
PERFORMED ON: ABNORMAL
PLATELET # BLD AUTO: 209 K/UL (ref 135–450)
PMV BLD AUTO: 7.8 FL (ref 5–10.5)
POTASSIUM SERPL-SCNC: 4.8 MMOL/L (ref 3.5–5.1)
PROCALCITONIN SERPL IA-MCNC: 0.18 NG/ML (ref 0–0.15)
PROT SERPL-MCNC: 6.4 G/DL (ref 6.4–8.2)
RBC # BLD AUTO: 2.58 M/UL (ref 4.2–5.9)
SODIUM SERPL-SCNC: 132 MMOL/L (ref 136–145)
T4 FREE SERPL-MCNC: 1.3 NG/DL (ref 0.9–1.8)
TSH SERPL DL<=0.005 MIU/L-ACNC: 7.32 UIU/ML (ref 0.27–4.2)
WBC # BLD AUTO: 8.4 K/UL (ref 4–11)

## 2024-08-17 PROCEDURE — 36415 COLL VENOUS BLD VENIPUNCTURE: CPT

## 2024-08-17 PROCEDURE — 99222 1ST HOSP IP/OBS MODERATE 55: CPT | Performed by: INTERNAL MEDICINE

## 2024-08-17 PROCEDURE — 85025 COMPLETE CBC W/AUTO DIFF WBC: CPT

## 2024-08-17 PROCEDURE — 6370000000 HC RX 637 (ALT 250 FOR IP): Performed by: NURSE PRACTITIONER

## 2024-08-17 PROCEDURE — 96365 THER/PROPH/DIAG IV INF INIT: CPT

## 2024-08-17 PROCEDURE — 97530 THERAPEUTIC ACTIVITIES: CPT

## 2024-08-17 PROCEDURE — 96366 THER/PROPH/DIAG IV INF ADDON: CPT

## 2024-08-17 PROCEDURE — 80053 COMPREHEN METABOLIC PANEL: CPT

## 2024-08-17 PROCEDURE — 94640 AIRWAY INHALATION TREATMENT: CPT

## 2024-08-17 PROCEDURE — 6360000002 HC RX W HCPCS: Performed by: NURSE PRACTITIONER

## 2024-08-17 PROCEDURE — 84145 PROCALCITONIN (PCT): CPT

## 2024-08-17 PROCEDURE — 6370000000 HC RX 637 (ALT 250 FOR IP): Performed by: STUDENT IN AN ORGANIZED HEALTH CARE EDUCATION/TRAINING PROGRAM

## 2024-08-17 PROCEDURE — 97166 OT EVAL MOD COMPLEX 45 MIN: CPT

## 2024-08-17 PROCEDURE — 1200000000 HC SEMI PRIVATE

## 2024-08-17 PROCEDURE — 99222 1ST HOSP IP/OBS MODERATE 55: CPT | Performed by: STUDENT IN AN ORGANIZED HEALTH CARE EDUCATION/TRAINING PROGRAM

## 2024-08-17 PROCEDURE — 2580000003 HC RX 258: Performed by: NURSE PRACTITIONER

## 2024-08-17 PROCEDURE — 93010 ELECTROCARDIOGRAM REPORT: CPT | Performed by: INTERNAL MEDICINE

## 2024-08-17 RX ORDER — IPRATROPIUM BROMIDE AND ALBUTEROL SULFATE 2.5; .5 MG/3ML; MG/3ML
1 SOLUTION RESPIRATORY (INHALATION)
Status: DISCONTINUED | OUTPATIENT
Start: 2024-08-17 | End: 2024-08-18 | Stop reason: HOSPADM

## 2024-08-17 RX ORDER — ESCITALOPRAM OXALATE 10 MG/1
10 TABLET ORAL DAILY
Status: DISCONTINUED | OUTPATIENT
Start: 2024-08-17 | End: 2024-08-18 | Stop reason: HOSPADM

## 2024-08-17 RX ORDER — DICYCLOMINE HCL 20 MG
20 TABLET ORAL
Status: DISCONTINUED | OUTPATIENT
Start: 2024-08-17 | End: 2024-08-18 | Stop reason: HOSPADM

## 2024-08-17 RX ORDER — PREDNISONE 20 MG/1
40 TABLET ORAL DAILY
Status: DISCONTINUED | OUTPATIENT
Start: 2024-08-17 | End: 2024-08-18 | Stop reason: HOSPADM

## 2024-08-17 RX ADMIN — INSULIN GLARGINE 20 UNITS: 100 INJECTION, SOLUTION SUBCUTANEOUS at 20:30

## 2024-08-17 RX ADMIN — DICYCLOMINE HYDROCHLORIDE 20 MG: 20 TABLET ORAL at 05:44

## 2024-08-17 RX ADMIN — FOLIC ACID 1 MG: 1 TABLET ORAL at 08:12

## 2024-08-17 RX ADMIN — CEFEPIME 2000 MG: 2 INJECTION, POWDER, FOR SOLUTION INTRAVENOUS at 04:18

## 2024-08-17 RX ADMIN — CEFEPIME 2000 MG: 2 INJECTION, POWDER, FOR SOLUTION INTRAVENOUS at 11:30

## 2024-08-17 RX ADMIN — DICYCLOMINE HYDROCHLORIDE 20 MG: 20 TABLET ORAL at 17:04

## 2024-08-17 RX ADMIN — DICYCLOMINE HYDROCHLORIDE 20 MG: 20 TABLET ORAL at 11:28

## 2024-08-17 RX ADMIN — APIXABAN 5 MG: 5 TABLET, FILM COATED ORAL at 19:56

## 2024-08-17 RX ADMIN — Medication 1 TABLET: at 08:12

## 2024-08-17 RX ADMIN — INSULIN LISPRO 1 UNITS: 100 INJECTION, SOLUTION INTRAVENOUS; SUBCUTANEOUS at 08:12

## 2024-08-17 RX ADMIN — ESCITALOPRAM OXALATE 10 MG: 10 TABLET ORAL at 02:15

## 2024-08-17 RX ADMIN — PREDNISONE 40 MG: 20 TABLET ORAL at 14:51

## 2024-08-17 RX ADMIN — ATORVASTATIN CALCIUM 40 MG: 40 TABLET, FILM COATED ORAL at 19:56

## 2024-08-17 RX ADMIN — SODIUM CHLORIDE: 9 INJECTION, SOLUTION INTRAVENOUS at 17:08

## 2024-08-17 RX ADMIN — AMIODARONE HYDROCHLORIDE 200 MG: 200 TABLET ORAL at 08:12

## 2024-08-17 RX ADMIN — IPRATROPIUM BROMIDE AND ALBUTEROL SULFATE 1 DOSE: 2.5; .5 SOLUTION RESPIRATORY (INHALATION) at 15:16

## 2024-08-17 RX ADMIN — METOPROLOL SUCCINATE 25 MG: 25 TABLET, EXTENDED RELEASE ORAL at 08:12

## 2024-08-17 RX ADMIN — TAMSULOSIN HYDROCHLORIDE 0.4 MG: 0.4 CAPSULE ORAL at 08:11

## 2024-08-17 RX ADMIN — APIXABAN 5 MG: 5 TABLET, FILM COATED ORAL at 08:12

## 2024-08-17 RX ADMIN — DICYCLOMINE HYDROCHLORIDE 20 MG: 20 TABLET ORAL at 19:56

## 2024-08-17 RX ADMIN — INSULIN LISPRO 1 UNITS: 100 INJECTION, SOLUTION INTRAVENOUS; SUBCUTANEOUS at 11:28

## 2024-08-17 RX ADMIN — INSULIN LISPRO 4 UNITS: 100 INJECTION, SOLUTION INTRAVENOUS; SUBCUTANEOUS at 20:30

## 2024-08-17 RX ADMIN — IPRATROPIUM BROMIDE AND ALBUTEROL SULFATE 1 DOSE: 2.5; .5 SOLUTION RESPIRATORY (INHALATION) at 20:12

## 2024-08-17 NOTE — H&P
pain TECHNOLOGIST PROVIDED HISTORY: Reason for exam:->chest pain Reason for Exam: Chest Pain (Right sided chest pain, worse with inspiration, started at 3am. Hx of lung cancer. Pt took 324 ASA pta. ) FINDINGS: A left-sided chest port is noted.  Mild increased opacification seen in the left perihilar region.  Left-sided pleural effusion again noted.  Osseous structures appear similar.  Sclerotic foci seen in the osseous structures related to metastatic disease.  No new infiltrates seen in the right lung.     Chronic parenchymal airspace disease seen in the left lung, greatest in the left perihilar region and lung base with a left-sided pleural effusion. Airspace disease is mildly increased in the left perihilar region, otherwise similar study.  This could represent acute atelectasis or pneumonia. No new infiltrates seen in the right lung.     MRI BRAIN W WO CONTRAST    Result Date: 7/26/2024  EXAM: Magnetic resonance imaging the brain Noncontrast INDICATIONS: Malignant neoplasm of lower lobe, left bronchus or lung, Malignant neoplasm of lower lobe, left bronchus or lung / STAT Creatinine as needed:->No COMPARISON: MRI 5/3/2024 TECHNICAL FACTORS: Multisequence imaging. Diffusion, FLAIR and gradient sequences Contrast: None FINDINGS: Noncontrast axial images reveal midline ventricles. Ventricular size and cortical sulci are mildly prominent compatible with stable atrophic changes. Vasogenic edema associated with the left periventricular frontal metastasis is stable. No restricted diffusion No susceptibility Intra-axial metastasis. *  Left frontal adjacent to the ventricle series 9 image 221 measuring 7 x 7 mm, stable in size. *  Left frontal lobe high convexity 2 mm enhancing focus image 268 series 9, stable. *  Left frontal: Image 278 series 9 left frontal parasagittal enhancing focus, stable. *  On the left: Image 150 series 9 there is a left lateral occipital cortical region of enhancement which has a slightly more    Procedure Laterality Date    BACK SURGERY  2012    lumber, no hardware, \"cleaned it out\"    CATARACT EXTRACTION EXTRACAPSULAR W/ INTRAOCULAR LENS IMPLANTATION      CT NEEDLE BIOPSY LUNG PERCUTANEOUS  04/07/2023    CT NEEDLE BIOPSY LUNG PERCUTANEOUS 4/7/2023 Robert Palma MD [facility] CT SCAN    CYSTOSCOPY Right 11/09/2020    CYSTOSCOPY,  RIGHT URETEROSCOPY, RIGHT RETRO PYELOGRAM, REMOVAL STONE FROM BLADDER, URETHRAL DILITATION performed by Lucien Shultz MD at [facility] OR    NEPHROLITHOTOMY Left 12/04/2020    LEFT PERCUTANEOUS NEPHROLITHOTOMY WITH DR MONTES TO PLACE NEPHROSTOMY TUBE PRIOR performed by Trevor Trejo MD at [facility] OR    PORT SURGERY N/A 11/04/2021    SURGICAL PORT PLACEMENT performed by Ever Lamar MD at [facility] ASC OR    PORT SURGERY N/A 10/04/2022    PORT REMOVAL performed by Danny Andrade MD at [facility] OR    PORT SURGERY Left 11/02/2022    SURGICAL PORT PLACEMENT performed by Ever Lamar MD at [facility] OR    PROSTATE SURGERY      CYBER KNIFE    SHOULDER SURGERY Right     ROTATOR CUFF       Medications Prior to Admission:   Prior to Admission medications    Medication Sig Start Date End Date Taking? Authorizing Provider   amiodarone (CORDARONE) 200 MG tablet Take 1 tablet by mouth daily 8/12/24   Yas Rasmussen MD   apixaban (ELIQUIS) 5 MG TABS tablet Take 1 tablet by mouth 2 times daily 8/11/24   Yas Rasmussen MD   insulin glargine (BASAGLAR KWIKPEN) 100 UNIT/ML injection pen Inject 20 Units into the skin nightly 8/11/24   Yas Rasmussen MD   albuterol sulfate HFA (PROVENTIL;VENTOLIN;PROAIR) 108 (90 Base) MCG/ACT inhaler Inhale 1 puff into the lungs every 4 hours as needed for Shortness of Breath 4-6 hours 7/1/24   ProviderRaisa MD   NONFORMULARY 5-loxin    ProviderRaisa MD   atorvastatin (LIPITOR) 40 MG tablet Take 1 tablet by mouth nightly 5/28/24   Emmanuel Wadsworth MD   metoprolol succinate (TOPROL XL) 50 MG extended release tablet

## 2024-08-17 NOTE — RT PROTOCOL NOTE
RT Inhaler-Nebulizer Bronchodilator Protocol Note    There is a bronchodilator order in the chart from a provider indicating to follow the RT Bronchodilator Protocol and there is an “Initiate RT Inhaler-Nebulizer Bronchodilator Protocol” order as well (see protocol at bottom of note).    CXR Findings:  XR CHEST PORTABLE    Result Date: 8/16/2024  Redemonstration of left lung infiltrates and loss of volume with diminished aeration in the left upper lobe compared to the previous evaluation. Suggestion of small left effusion.  Access port with the catheter tip at the innominate SVC junction.       The findings from the last RT Protocol Assessment were as follows:   History Pulmonary Disease: Smoker 15 pack years or more  Respiratory Pattern: Regular pattern and RR 12-20 bpm  Breath Sounds: Clear breath sounds  Cough: Strong, spontaneous, non-productive  Indication for Bronchodilator Therapy: On home bronchodilators  Bronchodilator Assessment Score: 1    Aerosolized bronchodilator medication orders have been revised according to the RT Inhaler-Nebulizer Bronchodilator Protocol below.    Respiratory Therapist to perform RT Therapy Protocol Assessment initially then follow the protocol.  Repeat RT Therapy Protocol Assessment PRN for score 0-3 or on second treatment, BID, and PRN for scores above 3.    No Indications - adjust the frequency to every 6 hours PRN wheezing or bronchospasm, if no treatments needed after 48 hours then discontinue using Per Protocol order mode.     If indication present, adjust the RT bronchodilator orders based on the Bronchodilator Assessment Score as indicated below.  Use Inhaler orders unless patient has one or more of the following: on home nebulizer, not able to hold breath for 10 seconds, is not alert and oriented, cannot activate and use MDI correctly, or respiratory rate 25 breaths per minute or more, then use the equivalent nebulizer order(s) with same Frequency and PRN reasons based on  the score.  If a patient is on this medication at home then do not decrease Frequency below that used at home.    0-3 - enter or revise RT bronchodilator order(s) to equivalent RT Bronchodilator order with Frequency of every 4 hours PRN for wheezing or increased work of breathing using Per Protocol order mode.        4-6 - enter or revise RT Bronchodilator order(s) to two equivalent RT bronchodilator orders with one order with BID Frequency and one order with Frequency of every 4 hours PRN wheezing or increased work of breathing using Per Protocol order mode.        7-10 - enter or revise RT Bronchodilator order(s) to two equivalent RT bronchodilator orders with one order with TID Frequency and one order with Frequency of every 4 hours PRN wheezing or increased work of breathing using Per Protocol order mode.       11-13 - enter or revise RT Bronchodilator order(s) to one equivalent RT bronchodilator order with QID Frequency and an Albuterol order with Frequency of every 4 hours PRN wheezing or increased work of breathing using Per Protocol order mode.      Greater than 13 - enter or revise RT Bronchodilator order(s) to one equivalent RT bronchodilator order with every 4 hours Frequency and an Albuterol order with Frequency of every 2 hours PRN wheezing or increased work of breathing using Per Protocol order mode.     RT to enter RT Home Evaluation for COPD & MDI Assessment order using Per Protocol order mode.    Electronically signed by Tamar Lehman RCP on 8/17/2024 at 12:58 AM

## 2024-08-17 NOTE — PROGRESS NOTES
Shift assessment completed.  Pt A&O, VSS.  Denies any needs at this time.  Bed locked and in lowest position, side rails up 2/4.  Call light within reach.

## 2024-08-17 NOTE — CONSULTS
[facility]   Cardiovascular Evaluation    PATIENT: Wes Schneider  DATE: 2024  MRN: [id number]  CSN: [id number]  : 1945    Primary Care Doctor/Referring provider: Jourdan Russ MD, Ehsan Shabbir, MD     Reason for evaluation/Chief complaint:   Extremity Weakness and Chest Pain      Subjective:    History of present illness on initial date of evaluation:   Wes Schneider is a 79 y.o. patient who presents for evaluation of several days weakness, fatigue and right-sided chest pain.  Patient does fatigued and unable to participate completely in history of present illness.  Patient's wife is at bedside who relays additional information.  The patient has been struggling with stage IV lung cancer and been on active chemotherapy for several years.  He has had repeat admissions to the hospital with most recently admitted in early August.  He was seen by our cardiology team at that time for similar chest pain and troponin elevation.  At that time it was elected to proceed with a conservative approach and the symptoms likely represented on cardiac causes.      Patient Active Problem List   Diagnosis    Transient global amnesia    Acute encephalopathy    Hemispheric carotid artery syndrome    Uncontrolled type 2 diabetes mellitus with complication, without long-term current use of insulin    HTN (hypertension), benign    Dyslipidemia    Urolithiasis    SOB (shortness of breath)    Left renal stone    Lung cancer (HCC)    Shortness of breath    Gram-negative pneumonia (HCC)    Sepsis (HCC)    Hyperglycemia due to type 2 diabetes mellitus (HCC)    Neutropenia (HCC)    Cancer of right lung (HCC)    Immunocompromised state due to drug therapy (HCC)    Pancytopenia (HCC)    Gram-negative bacteremia    COVID-19    Anemia         Cardiac Testing: I have reviewed the findings below.  EKG:  ECHO:   STRESS TEST:  CATH:  BYPASS:  VASCULAR:    Past Medical History:   has a past medical history    08/11/24 81.1 kg (178 lb 12.8 oz)   11/28/23 86.2 kg (190 lb)     No intake or output data in the 24 hours ending 08/17/24 0830    General Appearance:  Alert, cooperative, no distress, appears stated age, appears fatigued   Head:  Normocephalic, without obvious abnormality, atraumatic   Eyes:  PERRL, conjunctiva/corneas clear       Nose: Nares normal, no drainage or sinus tenderness   Throat: Lips, mucosa, and tongue normal   Neck: Supple, symmetrical, trachea midline, no adenopathy, thyroid: not enlarged, symmetric, no tenderness/mass/nodules, no carotid bruit or JVD       Lungs:   Clear to auscultation bilaterally, respirations unlabored   Chest Wall:  No tenderness or deformity   Heart:  Regular rhythm and normal rate; S1, S2 are normal; no murmur noted; no rub or gallop   Abdomen:   Soft, non-tender           Extremities: Extremities normal, atraumatic, no cyanosis or edema   Pulses: 2+ and symmetric   Skin: Skin color, texture, turgor normal, no rashes or lesions   Pysch: Weak mood and affect   Neurologic: Weak gross motor        Labs  Recent Labs     08/16/24 2011 08/17/24  0606   WBC 7.6 8.4   HGB 8.9* 8.2*   HCT 27.8* 26.2*   MCV 98.0 101.6*    209     Recent Labs     08/16/24 2011 08/17/24  0606   CREATININE 1.1 1.1   BUN 15 16    132*   K 4.6 4.8    100   CO2 23 17*     No results for input(s): \"INR\", \"PROTIME\" in the last 72 hours.  No results for input(s): \"PROBNP\" in the last 72 hours.  No results for input(s): \"CHOL\", \"LDL\", \"HDL\" in the last 72 hours.    Invalid input(s): \"TG\"  Lab Results   Component Value Date    TROPHS 86 (H) 08/16/2024    TROPHS 98 (H) 08/16/2024    TROPHS 69 (H) 08/07/2024    TROPHS 68 (H) 08/07/2024    TROPHS 70 (H) 08/07/2024    TROPHS 58 (H) 11/29/2023          Imaging:  I have reviewed the below testing personally and my interpretation is below.  EKG:  Atrial fibrillation with rapid ventricular responseNonspecific ST and T wave abnormalityAbnormal

## 2024-08-17 NOTE — CONSULTS
Left eye: No discharge.   Cardiovascular:      Rate and Rhythm: Normal rate and regular rhythm.      Heart sounds: No murmur heard.     No friction rub. No gallop.   Pulmonary:      Effort: Pulmonary effort is normal. No respiratory distress.      Breath sounds: Rales present. No wheezing.   Abdominal:      General: Abdomen is flat. Bowel sounds are normal.      Palpations: Abdomen is soft.   Musculoskeletal:         General: No swelling. Normal range of motion.      Cervical back: Normal range of motion.   Skin:     General: Skin is warm and dry.   Neurological:      General: No focal deficit present.      Mental Status: He is alert and oriented to person, place, and time.   Psychiatric:         Mood and Affect: Mood normal.            Data Reviewed:   LABS:  :   Recent Labs     08/16/24 2011 08/17/24  0606   WBC 7.6 8.4   HGB 8.9* 8.2*   HCT 27.8* 26.2*   MCV 98.0 101.6*    209     BMP:   Recent Labs     08/16/24 2011 08/17/24  0606    132*   K 4.6 4.8    100   CO2 23 17*   BUN 15 16   CREATININE 1.1 1.1     PROFILE:   Recent Labs     08/16/24 2011 08/17/24  0606   AST 27 24   ALT 23 18   LIPASE 41.0  --    BILITOT 0.6 0.9   ALKPHOS 160* 139*     PT/INR: No results for input(s): \"PROTIME\", \"INR\" in the last 72 hours.  APTT:No results for input(s): \"APTT\" in the last 72 hours.  :  Recent Labs     08/16/24 2129   COLORU Yellow   PHUR 6.0   WBCUA 3-5   RBCUA 3-4   CLARITYU Clear   LEUKOCYTESUR TRACE*   UROBILINOGEN 0.2   BILIRUBINUR Negative   BLOODU Negative   GLUCOSEU Negative     No results for input(s): \"PHART\", \"LHL1BYA\", \"PO2ART\" in the last 72 hours.       Chest x-ray 8/16/2024  Left lower lobe consolidation.  Mediastinal shift to the left.  No consolidations on the right.  The right appears clear.  No consolidation the left apices.    CTPE 8/7/2024  Diminished lung volume on the left.  Dilated esophagus.  Left trace pleural effusion.  Trace pericardial effusion.  No effusion on right.   Left upper lobe and lower lobe consolidation.    Assessment/Plan   79 y.o. year old male with significant past medical history of lung nodules, stage IV lung cancer, former smoker that presents to [facility] for shortness of breath.     Assessment:  Shortness of breath  Malignant pleural effusion  Stage IV lung cancer  Centrilobular emphysema  Former Smoker    Plan:  - Supplemental O2 therapy with goal saturation 88-92%, he is on RA and saturating well  - start on Prednisone 40mg PO qd x 5 day course  - Duonebs q4h while awake  - Pt received abx therapy recently, no infectious symptoms noted, hold on abx therapy  - No effusions see on CT imaging, monitor MPE  - Cont smoking cessation    Rojelio Hastings MD, FCCP    11:42 AM

## 2024-08-17 NOTE — PROGRESS NOTES
Occupational Therapy  Facility/Department: [facility] C3 TELE/MED SURG/ONC  Occupational Therapy Initial Assessment    Name: Wes Schneider  : 1945  MRN: [id number]  Date of Service: 2024    Discharge Recommendations:  Home with Home health OT, 24 hour supervision or assist  OT Equipment Recommendations  Equipment Needed: No (continue to use 4wRW, ADL AD, and built in shower bench)       Patient Diagnosis(es): The primary encounter diagnosis was Pneumonia due to infectious organism, unspecified laterality, unspecified part of lung. A diagnosis of Troponin level elevated was also pertinent to this visit.  Past Medical History:  has a past medical history of BPH (benign prostatic hyperplasia), Cancer (HCC), Diabetes mellitus (HCC), Esophageal reflux, Hyperlipidemia, Hypertension, Kidney stone, Lung cancer (HCC), Lung cancer (HCC), and Neuropathy.  Past Surgical History:  has a past surgical history that includes Prostate surgery; Cystoscopy (Right, 2020); back surgery (); NEPHROLITHOTOMY (Left, 2020); shoulder surgery (Right); Port Surgery (N/A, 2021); Port Surgery (N/A, 10/04/2022); Port Surgery (Left, 2022); CT NEEDLE BIOPSY LUNG PERCUTANEOUS (2023); and Cataract extraction, extracapsular w/ intraocular lens implant.    Treatment Diagnosis: impaired ADLs      Assessment   Performance deficits / Impairments: Decreased functional mobility ;Decreased ADL status;Decreased strength;Decreased posture;Decreased coordination;Decreased balance;Decreased endurance  Assessment: 78 y/o male adm with extensive past medical hx of hypertension, hyperlipidemia, type 2 diabetes, lung cancer, and BPH who presents to [facility]'s emergency department with wife at bedside who notes patient seems to be declining at home. PLOF: SPV for ADLs, mod I for mobility and transfers with use of 4WRW, and no IADLs, wife available and able to assist 24h/a. currently pt requiring min A for bed  mobility, CGA for transfers and minimal ambulation, pt declining mobility outside of EOB with RW. Pt would benefit from skilled OT to address deficits listed above, recommending home 24h/a and HHOT> no DME needs.  Treatment Diagnosis: impaired ADLs  Prognosis: Good  Decision Making: Medium Complexity  REQUIRES OT FOLLOW-UP: Yes  Activity Tolerance  Activity Tolerance: Patient Tolerated treatment well;Patient limited by fatigue  Activity Tolerance Comments: 101 HR, 113/67 BP        Plan   Occupational Therapy Plan  Times Per Week: 3-5x  Current Treatment Recommendations: Strengthening, ROM, Functional mobility training, Endurance training, Balance training, Cognitive reorientation, Safety education & training, Patient/Caregiver education & training, Equipment evaluation, education, & procurement, Self-Care / ADL     Restrictions  Position Activity Restriction  Other position/activity restrictions: IV    Subjective   General  Chart Reviewed: Yes, Orders, Labs, Progress Notes  Additional Pertinent Hx: Wes Schneider is a/an 79 y.o. male with a significant past medical history of hypertension, hyperlipidemia, type 2 diabetes, lung cancer, and BPH who presents to [facility]'s emergency department with wife at bedside who notes patient seems to be declining at home.  Of note, he was admitted here on 8/7 to 8/11 for chest pain with a tumultuous hospital trajectory with a diagnosis of A-fib RVR, was found to have possible right lower lobe pneumonia and was started on vancomycin while inpatient and discharged with Ceftin.  Wife and patient both endorse that ever since he was discharged, he has had worsening general weakness, malaise, and anorexia.  Patient notes still having somewhat of a cough, productive, no fever. He describes dyspnea mostly with exertion only, and equivocally notes this is chronic and can not confidently states if it's progressive or worsening.  Response to previous treatment: Patient with

## 2024-08-17 NOTE — PROGRESS NOTES
negative.  Not on antibiotics     SIRS positive without evidence of sepsis.     Elevated down -trending troponins.  No ischemic workup planned at this time.  Seen by cardiology     Type 2 Diabetes.  Insulin sliding scale     Atrial Fibrillation . Rate controlled on EKG,  Continue amiodarone, apixaban    Stage IV lung cancer.  Not on chemotherapy while inpatient.      DVT prophylaxis Eliquis  Full code     Subjective:   Patient seen and examined at bedside.  Laying comfortably in bed not in acute distress.  No acute events reported overnight.  Patient denies any chest pain, fever, headache, shortness of breath, abdominal pain, nausea or vomiting, diarrhea or new extremity weakness      Objective:   No intake or output data in the 24 hours ending 08/17/24 1106   Vitals:   Vitals:    08/17/24 0709   BP: 132/69   Pulse: 94   Resp: 18   Temp: 98.1 °F (36.7 °C)   SpO2: 94%       Ten point ROS reviewed negative, unless as noted above    Physical Exam:     GENERAL APPEARANCE: not in any acute distress,  appears comfortable.   NECK: Supple, no JVD.  CARDIOVASCULAR: Regular rate and rhythm, no murmurs, rubs or gallops  LUNGS: clear to auscultation bilaterally   ABDOMEN: normoactive bowel sounds, soft non-tender and non distended  EXTREMITIES: No edema  MUSCULOSKELETAL S: normal movement and normal bulk in all ext  NEUROLOGIC: Alert and oriented x 3- grossly non focal exam, moving all extremities   SKIN: No Rashes       Electronically signed by Ehsan Shabbir, MD on 8/17/2024 at 11:06 AM    Minimum 35 Minutes spent in preparation of following this patient including face to face encounter, discussions with the patient and/or family, medication reconciliation, and transition of care preparation.            Medications:   Medications:    escitalopram  10 mg Oral Daily    dicyclomine  20 mg Oral 4x Daily AC & HS    sodium chloride flush  5-40 mL IntraVENous 2 times per day    amiodarone  200 mg Oral Daily    apixaban  5 mg Oral BID     atorvastatin  40 mg Oral Nightly    folic acid  1 mg Oral Daily    insulin glargine  20 Units SubCUTAneous Nightly    metoprolol succinate  25 mg Oral Daily    therapeutic multivitamin-minerals  1 tablet Oral Daily    tamsulosin  0.4 mg Oral Daily    sodium chloride flush  5-40 mL IntraVENous 2 times per day    insulin lispro  0-4 Units SubCUTAneous TID WC    insulin lispro  0-4 Units SubCUTAneous Nightly    cefepime  2,000 mg IntraVENous Q8H      Infusions:    sodium chloride      dextrose      sodium chloride      sodium chloride 100 mL/hr at 08/16/24 4226     PRN Meds: sodium chloride flush, 5-40 mL, PRN  sodium chloride, , PRN  albuterol sulfate HFA, 1 puff, Q4H PRN  glucose, 4 tablet, PRN  dextrose bolus, 125 mL, PRN   Or  dextrose bolus, 250 mL, PRN  glucagon (rDNA), 1 mg, PRN  dextrose, , Continuous PRN  sodium chloride flush, 5-40 mL, PRN  sodium chloride, , PRN  acetaminophen, 650 mg, Q6H PRN   Or  acetaminophen, 650 mg, Q6H PRN

## 2024-08-17 NOTE — ED NOTES
Patient Name: Wes Schneider  :  1945  79 y.o.  MRN:  [id number]  Preferred Name  Wes  ED Room #:    Family/Caregiver Present yes  Restraints no  Sitter no  Sepsis Risk Score      Situation  Code Status: Prior No additional code details.    Allergies: Patient has no known allergies.  Weight: Patient Vitals for the past 96 hrs (Last 3 readings):   Weight   24 1853 84.8 kg (186 lb 15.2 oz)     Arrived from: home  Chief Complaint:   Chief Complaint   Patient presents with    Extremity Weakness    Chest Pain     Hospital Problem/Diagnosis:  Principal Problem:    Sepsis (HCC)  Resolved Problems:    * No resolved hospital problems. *    Imaging:   XR CHEST PORTABLE   Final Result   Redemonstration of left lung infiltrates and loss of volume with diminished   aeration in the left upper lobe compared to the previous evaluation.   Suggestion of small left effusion.  Access port with the catheter tip at the   innominate SVC junction.           Abnormal labs:   Abnormal Labs Reviewed   CBC WITH AUTO DIFFERENTIAL - Abnormal; Notable for the following components:       Result Value    RBC 2.84 (*)     Hemoglobin 8.9 (*)     Hematocrit 27.8 (*)     RDW 20.8 (*)     Lymphocytes Absolute 0.4 (*)     All other components within normal limits   COMPREHENSIVE METABOLIC PANEL W/ REFLEX TO MG FOR LOW K - Abnormal; Notable for the following components:    Glucose 240 (*)     Albumin 2.7 (*)     Albumin/Globulin Ratio 0.7 (*)     Alkaline Phosphatase 160 (*)     All other components within normal limits   BLOOD GAS, VENOUS - Abnormal; Notable for the following components:    Carboxyhemoglobin 3.6 (*)     All other components within normal limits   TROPONIN - Abnormal; Notable for the following components:    Troponin, High Sensitivity 98 (*)     All other components within normal limits   TROPONIN - Abnormal; Notable for the following components:    Troponin, High Sensitivity 86 (*)     All other components within  tab.    Recommendation    Pending orders    Plan for Discharge (if known):  Baseline ambulation:  I have not visualized him walking. Pt came by squad and has used the urinal while in bed .  Voiding: Independent\", “Urinal.  Belongings documented: yes    Additional Comments:    If any further questions, please call Sending RN at [phone]    Electronically signed by: Electronically signed by Marilou Khan RN on 8/16/2024 at 10:00 PM

## 2024-08-18 VITALS
TEMPERATURE: 97.5 F | SYSTOLIC BLOOD PRESSURE: 124 MMHG | DIASTOLIC BLOOD PRESSURE: 65 MMHG | WEIGHT: 186.95 LBS | RESPIRATION RATE: 18 BRPM | BODY MASS INDEX: 28.33 KG/M2 | HEART RATE: 118 BPM | HEIGHT: 68 IN | OXYGEN SATURATION: 95 %

## 2024-08-18 PROBLEM — R53.1 GENERALIZED WEAKNESS: Status: ACTIVE | Noted: 2024-08-18

## 2024-08-18 LAB
ALBUMIN SERPL-MCNC: 2.3 G/DL (ref 3.4–5)
ALBUMIN/GLOB SERPL: 0.7 {RATIO} (ref 1.1–2.2)
ALP SERPL-CCNC: 119 U/L (ref 40–129)
ALT SERPL-CCNC: 14 U/L (ref 10–40)
ANION GAP SERPL CALCULATED.3IONS-SCNC: 9 MMOL/L (ref 3–16)
AST SERPL-CCNC: 18 U/L (ref 15–37)
BACTERIA UR CULT: ABNORMAL
BACTERIA UR CULT: ABNORMAL
BASOPHILS # BLD: 0 K/UL (ref 0–0.2)
BASOPHILS NFR BLD: 0.2 %
BILIRUB SERPL-MCNC: 0.6 MG/DL (ref 0–1)
BUN SERPL-MCNC: 18 MG/DL (ref 7–20)
CALCIUM SERPL-MCNC: 7.4 MG/DL (ref 8.3–10.6)
CHLORIDE SERPL-SCNC: 103 MMOL/L (ref 99–110)
CO2 SERPL-SCNC: 20 MMOL/L (ref 21–32)
CREAT SERPL-MCNC: 1 MG/DL (ref 0.8–1.3)
DEPRECATED RDW RBC AUTO: 20.4 % (ref 12.4–15.4)
EOSINOPHIL # BLD: 0 K/UL (ref 0–0.6)
EOSINOPHIL NFR BLD: 0.1 %
GFR SERPLBLD CREATININE-BSD FMLA CKD-EPI: 76 ML/MIN/{1.73_M2}
GLUCOSE BLD-MCNC: 271 MG/DL (ref 70–99)
GLUCOSE BLD-MCNC: 346 MG/DL (ref 70–99)
GLUCOSE SERPL-MCNC: 298 MG/DL (ref 70–99)
HCT VFR BLD AUTO: 23 % (ref 40.5–52.5)
HGB BLD-MCNC: 7.3 G/DL (ref 13.5–17.5)
LYMPHOCYTES # BLD: 0.2 K/UL (ref 1–5.1)
LYMPHOCYTES NFR BLD: 2.1 %
MCH RBC QN AUTO: 31.1 PG (ref 26–34)
MCHC RBC AUTO-ENTMCNC: 31.8 G/DL (ref 31–36)
MCV RBC AUTO: 97.5 FL (ref 80–100)
MONOCYTES # BLD: 0.6 K/UL (ref 0–1.3)
MONOCYTES NFR BLD: 8 %
NEUTROPHILS # BLD: 6.7 K/UL (ref 1.7–7.7)
NEUTROPHILS NFR BLD: 89.6 %
ORGANISM: ABNORMAL
PERFORMED ON: ABNORMAL
PERFORMED ON: ABNORMAL
PLATELET # BLD AUTO: 187 K/UL (ref 135–450)
PMV BLD AUTO: 7.9 FL (ref 5–10.5)
POTASSIUM SERPL-SCNC: 4.3 MMOL/L (ref 3.5–5.1)
PROT SERPL-MCNC: 5.7 G/DL (ref 6.4–8.2)
RBC # BLD AUTO: 2.36 M/UL (ref 4.2–5.9)
SODIUM SERPL-SCNC: 132 MMOL/L (ref 136–145)
WBC # BLD AUTO: 7.5 K/UL (ref 4–11)

## 2024-08-18 PROCEDURE — 6370000000 HC RX 637 (ALT 250 FOR IP): Performed by: NURSE PRACTITIONER

## 2024-08-18 PROCEDURE — G0378 HOSPITAL OBSERVATION PER HR: HCPCS

## 2024-08-18 PROCEDURE — 6370000000 HC RX 637 (ALT 250 FOR IP): Performed by: STUDENT IN AN ORGANIZED HEALTH CARE EDUCATION/TRAINING PROGRAM

## 2024-08-18 PROCEDURE — 80053 COMPREHEN METABOLIC PANEL: CPT

## 2024-08-18 PROCEDURE — 2580000003 HC RX 258: Performed by: NURSE PRACTITIONER

## 2024-08-18 PROCEDURE — 94640 AIRWAY INHALATION TREATMENT: CPT

## 2024-08-18 PROCEDURE — 94669 MECHANICAL CHEST WALL OSCILL: CPT

## 2024-08-18 PROCEDURE — 85025 COMPLETE CBC W/AUTO DIFF WBC: CPT

## 2024-08-18 PROCEDURE — 2580000003 HC RX 258: Performed by: EMERGENCY MEDICINE

## 2024-08-18 PROCEDURE — 36415 COLL VENOUS BLD VENIPUNCTURE: CPT

## 2024-08-18 RX ORDER — PREDNISONE 20 MG/1
40 TABLET ORAL DAILY
Qty: 6 TABLET | Refills: 0 | Status: ON HOLD | OUTPATIENT
Start: 2024-08-19 | End: 2024-08-23 | Stop reason: HOSPADM

## 2024-08-18 RX ADMIN — METOPROLOL SUCCINATE 25 MG: 25 TABLET, EXTENDED RELEASE ORAL at 08:01

## 2024-08-18 RX ADMIN — Medication 1 TABLET: at 08:01

## 2024-08-18 RX ADMIN — IPRATROPIUM BROMIDE AND ALBUTEROL SULFATE 1 DOSE: 2.5; .5 SOLUTION RESPIRATORY (INHALATION) at 07:44

## 2024-08-18 RX ADMIN — PREDNISONE 40 MG: 20 TABLET ORAL at 08:00

## 2024-08-18 RX ADMIN — IPRATROPIUM BROMIDE AND ALBUTEROL SULFATE 1 DOSE: 2.5; .5 SOLUTION RESPIRATORY (INHALATION) at 11:35

## 2024-08-18 RX ADMIN — SODIUM CHLORIDE, PRESERVATIVE FREE 10 ML: 5 INJECTION INTRAVENOUS at 08:02

## 2024-08-18 RX ADMIN — FOLIC ACID 1 MG: 1 TABLET ORAL at 08:00

## 2024-08-18 RX ADMIN — INSULIN LISPRO 3 UNITS: 100 INJECTION, SOLUTION INTRAVENOUS; SUBCUTANEOUS at 11:22

## 2024-08-18 RX ADMIN — INSULIN LISPRO 2 UNITS: 100 INJECTION, SOLUTION INTRAVENOUS; SUBCUTANEOUS at 08:01

## 2024-08-18 RX ADMIN — SODIUM CHLORIDE: 9 INJECTION, SOLUTION INTRAVENOUS at 02:55

## 2024-08-18 RX ADMIN — DICYCLOMINE HYDROCHLORIDE 20 MG: 20 TABLET ORAL at 11:22

## 2024-08-18 RX ADMIN — APIXABAN 5 MG: 5 TABLET, FILM COATED ORAL at 08:01

## 2024-08-18 RX ADMIN — ESCITALOPRAM OXALATE 10 MG: 10 TABLET ORAL at 08:01

## 2024-08-18 RX ADMIN — AMIODARONE HYDROCHLORIDE 200 MG: 200 TABLET ORAL at 08:04

## 2024-08-18 RX ADMIN — TAMSULOSIN HYDROCHLORIDE 0.4 MG: 0.4 CAPSULE ORAL at 08:00

## 2024-08-18 RX ADMIN — DICYCLOMINE HYDROCHLORIDE 20 MG: 20 TABLET ORAL at 08:00

## 2024-08-18 ASSESSMENT — PAIN SCALES - GENERAL
PAINLEVEL_OUTOF10: 0
PAINLEVEL_OUTOF10: 0

## 2024-08-18 NOTE — PROGRESS NOTES
Pt assessment completed and charted. VSS. Pt a/ox4, but can be forgetful. Pt denies pain. Pt on RA. Pt has dyspnea on ambulation and can have coughing spells that make it difficult to breath. Pt tolerating diet.  Pt denies any other needs at this time.  Pt calls out appropriately.       Pt is a fall risk;  -Bed in lowest position and wheels locked.  -Call light within reach.   -Bedside table within reach.   -Non-skid footwear in place.  -bed check in place.

## 2024-08-18 NOTE — PROGRESS NOTES
Pt a/o x4. VSS. All  discharge and follow up instructions given to the patient and wife. IV removed. The patient and wife verbalizes understanding and denies questions.  All belongings collected and sent with the patient and wife. The patient was discharged off the unit by wheelchair and PCA in stable condition.

## 2024-08-18 NOTE — PLAN OF CARE
Problem: Safety - Adult  Goal: Free from fall injury  Outcome: Progressing  Flowsheets (Taken 8/18/2024 1242)  Free From Fall Injury:   Instruct family/caregiver on patient safety   Based on caregiver fall risk screen, instruct family/caregiver to ask for assistance with transferring infant if caregiver noted to have fall risk factors     Problem: Skin/Tissue Integrity  Goal: Absence of new skin breakdown  Description: 1.  Monitor for areas of redness and/or skin breakdown  2.  Assess vascular access sites hourly  3.  Every 4-6 hours minimum:  Change oxygen saturation probe site  4.  Every 4-6 hours:  If on nasal continuous positive airway pressure, respiratory therapy assess nares and determine need for appliance change or resting period.  Outcome: Progressing     Problem: Chronic Conditions and Co-morbidities  Goal: Patient's chronic conditions and co-morbidity symptoms are monitored and maintained or improved  Outcome: Progressing  Flowsheets (Taken 8/18/2024 1242)  Care Plan - Patient's Chronic Conditions and Co-Morbidity Symptoms are Monitored and Maintained or Improved: Monitor and assess patient's chronic conditions and comorbid symptoms for stability, deterioration, or improvement

## 2024-08-18 NOTE — DISCHARGE INSTR - COC
Continuity of Care Form    Patient Name: Wes Schneider   :  1945  MRN:  [id number]    Admit date:  2024  Discharge date:  2024      Code Status Order: Full Code   Advance Directives:   Advance Care Flowsheet Documentation             Admitting Physician:  Ehsan Shabbir, MD  PCP: Jourdan Russ MD    Discharging Nurse: VALORIE Perez   Discharging Hospital Unit/Room#: 0328/0328-01  Discharging Unit Phone Number: [phone]      Emergency Contact:   Extended Emergency Contact Information  Primary Emergency Contact: Miranda Schneider  Address: [address] [place] [address]  Home Phone: [phone]  Work Phone: [phone]  Mobile Phone: [phone]  Relation: Spouse  Secondary Emergency Contact: Frank Schneider  Home Phone: [phone]  Relation: Child    Past Surgical History:  Past Surgical History:   Procedure Laterality Date    BACK SURGERY  2012    lumber, no hardware, \"cleaned it out\"    CATARACT EXTRACTION EXTRACAPSULAR W/ INTRAOCULAR LENS IMPLANTATION      CT NEEDLE BIOPSY LUNG PERCUTANEOUS  2023    CT NEEDLE BIOPSY LUNG PERCUTANEOUS 2023 Robert Palma MD [facility] CT SCAN    CYSTOSCOPY Right 2020    CYSTOSCOPY,  RIGHT URETEROSCOPY, RIGHT RETRO PYELOGRAM, REMOVAL STONE FROM BLADDER, URETHRAL DILITATION performed by Lucien Shultz MD at [facility] OR    NEPHROLITHOTOMY Left 2020    LEFT PERCUTANEOUS NEPHROLITHOTOMY WITH DR MONTES TO PLACE NEPHROSTOMY TUBE PRIOR performed by Trevor Trejo MD at [facility] OR    PORT SURGERY N/A 2021    SURGICAL PORT PLACEMENT performed by Ever Lamar MD at [facility] OR    PORT SURGERY N/A 10/04/2022    PORT REMOVAL performed by Danny Andrade MD at [facility] OR    PORT SURGERY Left 2022    SURGICAL PORT PLACEMENT performed by Ever Lamar MD at [facility] OR    PROSTATE SURGERY      CYBER KNIFE    SHOULDER SURGERY Right     ROTATOR CUFF       Immunization History:  Assessment Score:  Readmission Risk              Risk of Unplanned Readmission:  26           Discharging to Facility/ Agency   Name: [facility]  Address:  Phone: [phone]  Fax:[phone]    Dialysis Facility (if applicable)   Name:  Address:  Dialysis Schedule:  Phone:  Fax:    / signature: Electronically signed by Andria Jay RN on 8/18/24 at 11:54 AM EDT    PHYSICIAN SECTION    Prognosis: Good    Condition at Discharge: Stable    Rehab Potential (if transferring to Rehab): Good    Recommended Labs or Other Treatments After Discharge:      Physician Certification: I certify the above information and transfer of Wes Schneider  is necessary for the continuing treatment of the diagnosis listed and that he requires Home Care for greater 30 days.     Update Admission H&P: No change in H&P    PHYSICIAN SIGNATURE:  Electronically signed by Ehsan Shabbir, MD on 8/18/24 at 12:07 PM EDT

## 2024-08-18 NOTE — DISCHARGE SUMMARY
Discharge Summary  Ehsan Shabbir, MD     Name:  Wes Schneider /Age/Sex: 1945  (79 y.o. male)   MRN & CSN:  [id number] & [id number] Admission Date/Time: 2024  6:46 PM   Attending:  Shabbir, Ehsan, MD Discharging Physician: Ehsan Shabbir, MD     Hospital Course:   Wes Schneider is a 79 y.o. male with a significant past medical history of hypertension, hyperlipidemia, type 2 diabetes, lung cancer, and BPH who presents to [facility]'s emergency department with wife at bedside who notes patient seems to be declining at home.  Of note, he was admitted here on  to  for chest pain with a tumultuous hospital trajectory with a diagnosis of A-fib RVR, was found to have possible right lower lobe pneumonia and was started on vancomycin while inpatient and discharged with Ceftin.  Wife and patient both endorse that ever since he was discharged, he has had worsening general weakness, malaise, and anorexia.  Patient notes still having somewhat of a cough, productive, no fever. He describes dyspnea mostly with exertion only, and equivocally notes this is chronic and can not confidently states if it's progressive or worsening.  His evaluation here included laboratory studies, EKG, and chest x-ray.  Chest x-ray  showed redemonstration of left lung infiltrates and loss of volume with diminished aeration in the left upper lobe suggesting small left effusion.  Laboratory studies reviewed and pertinent for blood glucose 240, procalcitonin 0.16, troponin 98, and hemoglobin 8.9.  Flu and COVID testing were negative.  Urinalysis was mostly unremarkable.  There was concern for possible failed outpatient therapy for pneumonia so patient was started on cefepime in the ED.  Hospital team was consulted to admit.            Generalized weakness.  POA.  Multifactorial including secondary to malignancy, chemotherapy and physical deconditioning, PT OT , would like to go home with home PT OT       Pneumonia ruled

## 2024-08-18 NOTE — PROGRESS NOTES
Pt and family were concerned with discharged. Concerned with pt's breathing and coughing. Writer discussed these concerns with the MD. MD reported that pulm and cards signed off and that steroids were added for the breathing. Reported this information to the family. The wife and pt were given time to discuss and they are okay with leaving at this time.

## 2024-08-18 NOTE — CARE COORDINATION
CASE MANAGEMENT DISCHARGE SUMMARY      Discharge to: home with [facility]    IMM given: (date)     New Durable Medical Equipment ordered/agency: na    Transportation:    Family/car: family     Confirmed discharge plan with: RN, [facility]     Patient: yes/no     Family:  yes/no    Name: Contact number:     Facility/Agency, name:  [facility] faxed [phone]   Phone number for report to facility:      RN, name: Ana    Note: Discharging nurse to complete FAN, reconcile AVS, and place final copy with patient's discharge packet. RN to ensure that written prescriptions for  Level II medications are sent with patient to the facility as per protocol.

## 2024-08-19 ENCOUNTER — APPOINTMENT (OUTPATIENT)
Dept: GENERAL RADIOLOGY | Age: 79
DRG: 177 | End: 2024-08-19
Payer: MEDICARE

## 2024-08-19 ENCOUNTER — HOSPITAL ENCOUNTER (INPATIENT)
Age: 79
LOS: 4 days | Discharge: HOME | DRG: 177 | End: 2024-08-23
Attending: EMERGENCY MEDICINE | Admitting: INTERNAL MEDICINE
Payer: MEDICARE

## 2024-08-19 DIAGNOSIS — R09.02 HYPOXIA: ICD-10-CM

## 2024-08-19 DIAGNOSIS — I48.91 ATRIAL FIBRILLATION WITH RVR (HCC): ICD-10-CM

## 2024-08-19 DIAGNOSIS — I50.9 ACUTE CONGESTIVE HEART FAILURE, UNSPECIFIED HEART FAILURE TYPE (HCC): Primary | ICD-10-CM

## 2024-08-19 DIAGNOSIS — J90 PLEURAL EFFUSION, LEFT: ICD-10-CM

## 2024-08-19 DIAGNOSIS — Z85.118 HISTORY OF LUNG CANCER: ICD-10-CM

## 2024-08-19 DIAGNOSIS — C34.90 MALIGNANT NEOPLASM OF LUNG, UNSPECIFIED LATERALITY, UNSPECIFIED PART OF LUNG (HCC): ICD-10-CM

## 2024-08-19 PROBLEM — G93.6 VASOGENIC EDEMA (HCC): Status: ACTIVE | Noted: 2024-08-19

## 2024-08-19 PROBLEM — R79.89 ELEVATED TROPONIN: Status: ACTIVE | Noted: 2024-08-19

## 2024-08-19 PROBLEM — J98.11 ATELECTASIS: Status: ACTIVE | Noted: 2024-08-19

## 2024-08-19 PROBLEM — Z87.891 FORMER SMOKER: Status: ACTIVE | Noted: 2024-08-19

## 2024-08-19 PROBLEM — C79.9 METASTATIC ADENOCARCINOMA (HCC): Status: ACTIVE | Noted: 2024-08-19

## 2024-08-19 PROBLEM — C79.31 PRIMARY MALIGNANT NEOPLASM OF LUNG WITH METASTASIS TO BRAIN (HCC): Status: ACTIVE | Noted: 2024-08-19

## 2024-08-19 PROBLEM — R79.89 ELEVATED BRAIN NATRIURETIC PEPTIDE (BNP) LEVEL: Status: ACTIVE | Noted: 2024-08-19

## 2024-08-19 PROBLEM — R91.8 PULMONARY INFILTRATE: Status: ACTIVE | Noted: 2024-08-19

## 2024-08-19 PROBLEM — E11.65 UNCONTROLLED TYPE 2 DIABETES MELLITUS WITH HYPERGLYCEMIA (HCC): Status: ACTIVE | Noted: 2024-08-19

## 2024-08-19 PROBLEM — J96.01 ACUTE HYPOXEMIC RESPIRATORY FAILURE (HCC): Status: ACTIVE | Noted: 2024-08-19

## 2024-08-19 LAB
ABO + RH BLD: NORMAL
ALBUMIN SERPL-MCNC: 2.6 G/DL (ref 3.4–5)
ALBUMIN/GLOB SERPL: 0.8 {RATIO} (ref 1.1–2.2)
ALP SERPL-CCNC: 134 U/L (ref 40–129)
ALT SERPL-CCNC: 21 U/L (ref 10–40)
ANION GAP SERPL CALCULATED.3IONS-SCNC: 12 MMOL/L (ref 3–16)
ANTI-XA UNFRAC HEPARIN: >1.1 IU/ML (ref 0.3–0.7)
AST SERPL-CCNC: 30 U/L (ref 15–37)
BASE EXCESS BLDV CALC-SCNC: -3.7 MMOL/L (ref -3–3)
BASOPHILS # BLD: 0 K/UL (ref 0–0.2)
BASOPHILS NFR BLD: 0.1 %
BILIRUB SERPL-MCNC: 0.5 MG/DL (ref 0–1)
BLD GP AB SCN SERPL QL: NORMAL
BUN SERPL-MCNC: 20 MG/DL (ref 7–20)
CALCIUM SERPL-MCNC: 8 MG/DL (ref 8.3–10.6)
CHLORIDE SERPL-SCNC: 101 MMOL/L (ref 99–110)
CO2 BLDV-SCNC: 24 MMOL/L
CO2 SERPL-SCNC: 21 MMOL/L (ref 21–32)
COHGB MFR BLDV: 2.4 % (ref 0–1.5)
CREAT SERPL-MCNC: 1.1 MG/DL (ref 0.8–1.3)
DEPRECATED RDW RBC AUTO: 20.6 % (ref 12.4–15.4)
EKG DIAGNOSIS: NORMAL
EKG Q-T INTERVAL: 296 MS
EKG QRS DURATION: 96 MS
EKG QTC CALCULATION (BAZETT): 418 MS
EKG R AXIS: 81 DEGREES
EKG T AXIS: 263 DEGREES
EKG VENTRICULAR RATE: 120 BPM
EOSINOPHIL # BLD: 0 K/UL (ref 0–0.6)
EOSINOPHIL NFR BLD: 0.2 %
FLUAV RNA RESP QL NAA+PROBE: NOT DETECTED
FLUBV RNA RESP QL NAA+PROBE: NOT DETECTED
GFR SERPLBLD CREATININE-BSD FMLA CKD-EPI: 68 ML/MIN/{1.73_M2}
GLUCOSE BLD-MCNC: 189 MG/DL (ref 70–99)
GLUCOSE BLD-MCNC: 328 MG/DL (ref 70–99)
GLUCOSE BLD-MCNC: 360 MG/DL (ref 70–99)
GLUCOSE SERPL-MCNC: 144 MG/DL (ref 70–99)
HCO3 BLDV-SCNC: 22.3 MMOL/L (ref 23–29)
HCT VFR BLD AUTO: 29.2 % (ref 40.5–52.5)
HGB BLD-MCNC: 9.2 G/DL (ref 13.5–17.5)
LACTATE BLDV-SCNC: 1.9 MMOL/L (ref 0.4–2)
LYMPHOCYTES # BLD: 0.3 K/UL (ref 1–5.1)
LYMPHOCYTES NFR BLD: 2.9 %
MAGNESIUM SERPL-MCNC: 2 MG/DL (ref 1.8–2.4)
MCH RBC QN AUTO: 30.9 PG (ref 26–34)
MCHC RBC AUTO-ENTMCNC: 31.4 G/DL (ref 31–36)
MCV RBC AUTO: 98.5 FL (ref 80–100)
METHGB MFR BLDV: 0.1 %
MONOCYTES # BLD: 1.1 K/UL (ref 0–1.3)
MONOCYTES NFR BLD: 9.4 %
NEUTROPHILS # BLD: 10.4 K/UL (ref 1.7–7.7)
NEUTROPHILS NFR BLD: 87.4 %
NT-PROBNP SERPL-MCNC: 3729 PG/ML (ref 0–449)
O2 THERAPY: ABNORMAL
PCO2 BLDV: 43.9 MMHG (ref 40–50)
PERFORMED ON: ABNORMAL
PH BLDV: 7.32 [PH] (ref 7.35–7.45)
PLATELET # BLD AUTO: 265 K/UL (ref 135–450)
PMV BLD AUTO: 7.8 FL (ref 5–10.5)
PO2 BLDV: 38 MMHG (ref 25–40)
POTASSIUM SERPL-SCNC: 4.6 MMOL/L (ref 3.5–5.1)
PROCALCITONIN SERPL IA-MCNC: 0.21 NG/ML (ref 0–0.15)
PROT SERPL-MCNC: 6 G/DL (ref 6.4–8.2)
RBC # BLD AUTO: 2.96 M/UL (ref 4.2–5.9)
SAO2 % BLDV: 64 %
SARS-COV-2 RNA RESP QL NAA+PROBE: NOT DETECTED
SODIUM SERPL-SCNC: 134 MMOL/L (ref 136–145)
TROPONIN, HIGH SENSITIVITY: 56 NG/L (ref 0–22)
TROPONIN, HIGH SENSITIVITY: 59 NG/L (ref 0–22)
WBC # BLD AUTO: 11.9 K/UL (ref 4–11)

## 2024-08-19 PROCEDURE — 83880 ASSAY OF NATRIURETIC PEPTIDE: CPT

## 2024-08-19 PROCEDURE — 2500000003 HC RX 250 WO HCPCS: Performed by: INTERNAL MEDICINE

## 2024-08-19 PROCEDURE — 94761 N-INVAS EAR/PLS OXIMETRY MLT: CPT

## 2024-08-19 PROCEDURE — 86850 RBC ANTIBODY SCREEN: CPT

## 2024-08-19 PROCEDURE — 6370000000 HC RX 637 (ALT 250 FOR IP): Performed by: INTERNAL MEDICINE

## 2024-08-19 PROCEDURE — 83735 ASSAY OF MAGNESIUM: CPT

## 2024-08-19 PROCEDURE — 93005 ELECTROCARDIOGRAM TRACING: CPT | Performed by: EMERGENCY MEDICINE

## 2024-08-19 PROCEDURE — 85520 HEPARIN ASSAY: CPT

## 2024-08-19 PROCEDURE — 85025 COMPLETE CBC W/AUTO DIFF WBC: CPT

## 2024-08-19 PROCEDURE — 99223 1ST HOSP IP/OBS HIGH 75: CPT | Performed by: INTERNAL MEDICINE

## 2024-08-19 PROCEDURE — 84484 ASSAY OF TROPONIN QUANT: CPT

## 2024-08-19 PROCEDURE — 80053 COMPREHEN METABOLIC PANEL: CPT

## 2024-08-19 PROCEDURE — 99285 EMERGENCY DEPT VISIT HI MDM: CPT

## 2024-08-19 PROCEDURE — 86900 BLOOD TYPING SEROLOGIC ABO: CPT

## 2024-08-19 PROCEDURE — 1200000000 HC SEMI PRIVATE

## 2024-08-19 PROCEDURE — 84145 PROCALCITONIN (PCT): CPT

## 2024-08-19 PROCEDURE — 83605 ASSAY OF LACTIC ACID: CPT

## 2024-08-19 PROCEDURE — 2580000003 HC RX 258: Performed by: INTERNAL MEDICINE

## 2024-08-19 PROCEDURE — 86901 BLOOD TYPING SEROLOGIC RH(D): CPT

## 2024-08-19 PROCEDURE — 2700000000 HC OXYGEN THERAPY PER DAY

## 2024-08-19 PROCEDURE — 93010 ELECTROCARDIOGRAM REPORT: CPT | Performed by: INTERNAL MEDICINE

## 2024-08-19 PROCEDURE — 82803 BLOOD GASES ANY COMBINATION: CPT

## 2024-08-19 PROCEDURE — 94640 AIRWAY INHALATION TREATMENT: CPT

## 2024-08-19 PROCEDURE — 87636 SARSCOV2 & INF A&B AMP PRB: CPT

## 2024-08-19 PROCEDURE — 71045 X-RAY EXAM CHEST 1 VIEW: CPT

## 2024-08-19 PROCEDURE — 87040 BLOOD CULTURE FOR BACTERIA: CPT

## 2024-08-19 PROCEDURE — 36415 COLL VENOUS BLD VENIPUNCTURE: CPT

## 2024-08-19 PROCEDURE — 6360000002 HC RX W HCPCS: Performed by: INTERNAL MEDICINE

## 2024-08-19 RX ORDER — SODIUM CHLORIDE 0.9 % (FLUSH) 0.9 %
5-40 SYRINGE (ML) INJECTION EVERY 12 HOURS SCHEDULED
Status: DISCONTINUED | OUTPATIENT
Start: 2024-08-19 | End: 2024-08-23 | Stop reason: HOSPADM

## 2024-08-19 RX ORDER — INSULIN GLARGINE 100 [IU]/ML
20 INJECTION, SOLUTION SUBCUTANEOUS NIGHTLY
Status: DISCONTINUED | OUTPATIENT
Start: 2024-08-19 | End: 2024-08-20

## 2024-08-19 RX ORDER — INSULIN LISPRO 100 [IU]/ML
0-8 INJECTION, SOLUTION INTRAVENOUS; SUBCUTANEOUS
Status: DISCONTINUED | OUTPATIENT
Start: 2024-08-19 | End: 2024-08-23 | Stop reason: HOSPADM

## 2024-08-19 RX ORDER — IPRATROPIUM BROMIDE AND ALBUTEROL SULFATE 2.5; .5 MG/3ML; MG/3ML
1 SOLUTION RESPIRATORY (INHALATION)
Status: DISCONTINUED | OUTPATIENT
Start: 2024-08-19 | End: 2024-08-23 | Stop reason: HOSPADM

## 2024-08-19 RX ORDER — AMIODARONE HYDROCHLORIDE 200 MG/1
200 TABLET ORAL DAILY
Status: DISCONTINUED | OUTPATIENT
Start: 2024-08-19 | End: 2024-08-21

## 2024-08-19 RX ORDER — ONDANSETRON 4 MG/1
4 TABLET, ORALLY DISINTEGRATING ORAL EVERY 8 HOURS PRN
Status: DISCONTINUED | OUTPATIENT
Start: 2024-08-19 | End: 2024-08-23 | Stop reason: HOSPADM

## 2024-08-19 RX ORDER — PREDNISONE 20 MG/1
40 TABLET ORAL DAILY
Status: DISCONTINUED | OUTPATIENT
Start: 2024-08-19 | End: 2024-08-19

## 2024-08-19 RX ORDER — GLUCAGON 1 MG/ML
1 KIT INJECTION PRN
Status: DISCONTINUED | OUTPATIENT
Start: 2024-08-19 | End: 2024-08-23 | Stop reason: HOSPADM

## 2024-08-19 RX ORDER — ACETAMINOPHEN 325 MG/1
650 TABLET ORAL EVERY 6 HOURS PRN
Status: DISCONTINUED | OUTPATIENT
Start: 2024-08-19 | End: 2024-08-23 | Stop reason: HOSPADM

## 2024-08-19 RX ORDER — ONDANSETRON 2 MG/ML
4 INJECTION INTRAMUSCULAR; INTRAVENOUS EVERY 6 HOURS PRN
Status: DISCONTINUED | OUTPATIENT
Start: 2024-08-19 | End: 2024-08-23 | Stop reason: HOSPADM

## 2024-08-19 RX ORDER — POLYETHYLENE GLYCOL 3350 17 G/17G
17 POWDER, FOR SOLUTION ORAL DAILY PRN
Status: DISCONTINUED | OUTPATIENT
Start: 2024-08-19 | End: 2024-08-23 | Stop reason: HOSPADM

## 2024-08-19 RX ORDER — FOLIC ACID 1 MG/1
1 TABLET ORAL DAILY
Status: DISCONTINUED | OUTPATIENT
Start: 2024-08-20 | End: 2024-08-23 | Stop reason: HOSPADM

## 2024-08-19 RX ORDER — METOPROLOL TARTRATE 1 MG/ML
5 INJECTION, SOLUTION INTRAVENOUS ONCE
Status: COMPLETED | OUTPATIENT
Start: 2024-08-19 | End: 2024-08-19

## 2024-08-19 RX ORDER — DEXTROSE MONOHYDRATE 100 MG/ML
INJECTION, SOLUTION INTRAVENOUS CONTINUOUS PRN
Status: DISCONTINUED | OUTPATIENT
Start: 2024-08-19 | End: 2024-08-23 | Stop reason: HOSPADM

## 2024-08-19 RX ORDER — ACETAMINOPHEN 650 MG/1
650 SUPPOSITORY RECTAL EVERY 6 HOURS PRN
Status: DISCONTINUED | OUTPATIENT
Start: 2024-08-19 | End: 2024-08-23 | Stop reason: HOSPADM

## 2024-08-19 RX ORDER — SODIUM CHLORIDE 0.9 % (FLUSH) 0.9 %
5-40 SYRINGE (ML) INJECTION PRN
Status: DISCONTINUED | OUTPATIENT
Start: 2024-08-19 | End: 2024-08-23 | Stop reason: HOSPADM

## 2024-08-19 RX ORDER — METOPROLOL SUCCINATE 25 MG/1
25 TABLET, EXTENDED RELEASE ORAL DAILY
Status: DISCONTINUED | OUTPATIENT
Start: 2024-08-19 | End: 2024-08-23 | Stop reason: HOSPADM

## 2024-08-19 RX ORDER — SODIUM CHLORIDE 9 MG/ML
INJECTION, SOLUTION INTRAVENOUS PRN
Status: DISCONTINUED | OUTPATIENT
Start: 2024-08-19 | End: 2024-08-23 | Stop reason: HOSPADM

## 2024-08-19 RX ORDER — TAMSULOSIN HYDROCHLORIDE 0.4 MG/1
0.4 CAPSULE ORAL DAILY
Status: DISCONTINUED | OUTPATIENT
Start: 2024-08-19 | End: 2024-08-23 | Stop reason: HOSPADM

## 2024-08-19 RX ORDER — INSULIN LISPRO 100 [IU]/ML
0-4 INJECTION, SOLUTION INTRAVENOUS; SUBCUTANEOUS NIGHTLY
Status: DISCONTINUED | OUTPATIENT
Start: 2024-08-19 | End: 2024-08-23 | Stop reason: HOSPADM

## 2024-08-19 RX ORDER — ATORVASTATIN CALCIUM 40 MG/1
40 TABLET, FILM COATED ORAL NIGHTLY
Status: DISCONTINUED | OUTPATIENT
Start: 2024-08-19 | End: 2024-08-23 | Stop reason: HOSPADM

## 2024-08-19 RX ORDER — PANTOPRAZOLE SODIUM 40 MG/1
40 TABLET, DELAYED RELEASE ORAL
Status: DISCONTINUED | OUTPATIENT
Start: 2024-08-20 | End: 2024-08-23 | Stop reason: HOSPADM

## 2024-08-19 RX ORDER — ESCITALOPRAM OXALATE 10 MG/1
5 TABLET ORAL NIGHTLY
Status: DISCONTINUED | OUTPATIENT
Start: 2024-08-19 | End: 2024-08-23 | Stop reason: HOSPADM

## 2024-08-19 RX ORDER — M-VIT,TX,IRON,MINS/CALC/FOLIC 27MG-0.4MG
1 TABLET ORAL DAILY
Status: DISCONTINUED | OUTPATIENT
Start: 2024-08-20 | End: 2024-08-23 | Stop reason: HOSPADM

## 2024-08-19 RX ORDER — DEXAMETHASONE SODIUM PHOSPHATE 4 MG/ML
4 INJECTION, SOLUTION INTRA-ARTICULAR; INTRALESIONAL; INTRAMUSCULAR; INTRAVENOUS; SOFT TISSUE EVERY 6 HOURS
Status: DISCONTINUED | OUTPATIENT
Start: 2024-08-19 | End: 2024-08-20

## 2024-08-19 RX ADMIN — ATORVASTATIN CALCIUM 40 MG: 40 TABLET, FILM COATED ORAL at 20:59

## 2024-08-19 RX ADMIN — INSULIN LISPRO 6 UNITS: 100 INJECTION, SOLUTION INTRAVENOUS; SUBCUTANEOUS at 17:28

## 2024-08-19 RX ADMIN — METOPROLOL TARTRATE 5 MG: 5 INJECTION INTRAVENOUS at 15:06

## 2024-08-19 RX ADMIN — AMIODARONE HYDROCHLORIDE 200 MG: 200 TABLET ORAL at 11:11

## 2024-08-19 RX ADMIN — TAMSULOSIN HYDROCHLORIDE 0.4 MG: 0.4 CAPSULE ORAL at 11:09

## 2024-08-19 RX ADMIN — INSULIN GLARGINE 20 UNITS: 100 INJECTION, SOLUTION SUBCUTANEOUS at 20:59

## 2024-08-19 RX ADMIN — ESCITALOPRAM OXALATE 5 MG: 10 TABLET ORAL at 20:59

## 2024-08-19 RX ADMIN — METOPROLOL TARTRATE 5 MG: 5 INJECTION INTRAVENOUS at 17:40

## 2024-08-19 RX ADMIN — SODIUM CHLORIDE 25 ML: 9 INJECTION, SOLUTION INTRAVENOUS at 17:54

## 2024-08-19 RX ADMIN — IPRATROPIUM BROMIDE AND ALBUTEROL SULFATE 1 DOSE: 2.5; .5 SOLUTION RESPIRATORY (INHALATION) at 20:39

## 2024-08-19 RX ADMIN — METOPROLOL SUCCINATE 25 MG: 50 TABLET, EXTENDED RELEASE ORAL at 11:11

## 2024-08-19 RX ADMIN — PREDNISONE 40 MG: 20 TABLET ORAL at 11:11

## 2024-08-19 RX ADMIN — APIXABAN 5 MG: 5 TABLET, FILM COATED ORAL at 20:59

## 2024-08-19 RX ADMIN — IPRATROPIUM BROMIDE AND ALBUTEROL SULFATE 1 DOSE: 2.5; .5 SOLUTION RESPIRATORY (INHALATION) at 15:39

## 2024-08-19 RX ADMIN — SODIUM CHLORIDE, PRESERVATIVE FREE 10 ML: 5 INJECTION INTRAVENOUS at 21:00

## 2024-08-19 RX ADMIN — INSULIN LISPRO 4 UNITS: 100 INJECTION, SOLUTION INTRAVENOUS; SUBCUTANEOUS at 20:59

## 2024-08-19 RX ADMIN — APIXABAN 5 MG: 5 TABLET, FILM COATED ORAL at 11:11

## 2024-08-19 RX ADMIN — AMPICILLIN SODIUM AND SULBACTAM SODIUM 3000 MG: 2; 1 INJECTION, POWDER, FOR SOLUTION INTRAMUSCULAR; INTRAVENOUS at 17:54

## 2024-08-19 RX ADMIN — DEXAMETHASONE SODIUM PHOSPHATE 4 MG: 4 INJECTION INTRA-ARTICULAR; INTRALESIONAL; INTRAMUSCULAR; INTRAVENOUS; SOFT TISSUE at 17:45

## 2024-08-19 ASSESSMENT — LIFESTYLE VARIABLES: HOW OFTEN DO YOU HAVE A DRINK CONTAINING ALCOHOL: NEVER

## 2024-08-19 ASSESSMENT — PAIN SCALES - GENERAL: PAINLEVEL_OUTOF10: 0

## 2024-08-19 NOTE — ED PROVIDER NOTES
I independently examined and evaluated Wes Schneider.    In brief, patient is a 79yoM who presents to the ED for evaluation of shortness of breath. Patient hypoxic and requiring supplemental o2. Admit for further evaluation and treatment.     The Ekg interpreted by me shows  Atrial fibrillation with rapid ventricular response with premature ventricular or aberrantly conducted complexes with a rate of 120  Axis is normal  QTc is normal  Intervals and Durations are unremarkable.      ST Segments: Nonspecific ST and T wave abnormality    All diagnostic, treatment, and disposition decisions were made by myself in conjunction with the advanced practice provider/resident physician.     I personally saw the patient and performed a substantive portion of the visit including aspects of the medical decision making. I approved management plan and take responsibility for the patient management.     I personally saw the patient and independently provided 0 minutes of non-concurrent critical care out of the total shared critical care time provided.    Comment: Please note this report has been produced using speech recognition software and may contain errors related to that system including errors in grammar, punctuation, and spelling, as well as words and phrases that may be inappropriate. If there are any questions or concerns please feel free to contact the dictating provider for clarification.    For all further details of the patient's emergency department visit, please see the advanced practice provider's documentation.       Gabi Washington MD  08/21/24 4507

## 2024-08-19 NOTE — ED PROVIDER NOTES
[place] [id number] - MED SURG  Emergency Department Encounter    Patient Name: Wes Schneider  MRN: [id number]  YOB: 1945  Date of Evaluation: 8/19/2024  Provider: Jourdan Russ MD  Note Started: 9:03 AM EDT 8/19/24    CHIEF COMPLAINT  Shortness of Breath (Per squad 77 % on ra.. duoneb and solmed in squad..The patient arrived 87 % ra.. pt place on 4 lt)    SHARED SERVICE VISIT   I have seen and evaluated this patient with my supervising physician Dr. Washington.    HISTORY OF PRESENT ILLNESS  History From: Patient    Limitations to history : None    Social Determinants Significantly Affecting Health : None    Wes Schneider is a 79 y.o. male who presents to the ED for evaluation of shortness of breath onset today.  Per report patient has a history of lung cancer and is currently being treated for pneumonia.  Patient was just recently admitted to the hospital and discharged home.  Patient has not been requiring any supplemental oxygen however upon EMS arrival patient was noted to be 77% on room air.  Upon arrival to the ER the patient's oxygen saturation was 87% on room air after EMS had been administering nasal cannula oxygen.  Patient currently on 4 L nasal cannula and oxygen saturation is 94%.  Patient denies any pain at this time.  Patient states that he is receiving chemotherapy treatment with last dose given 2 days ago.    No other complaints, modifying factors or associated symptoms.     Nursing notes reviewed were all reviewed and agreed with or any disagreements were addressed in the HPI.    PMH:  Past Medical History:   Diagnosis Date    BPH (benign prostatic hyperplasia)     Cancer (HCC) 2016    prostate    Diabetes mellitus (HCC)     Esophageal reflux     Hyperlipidemia     Hypertension     Kidney stone     Lung cancer (HCC) 10/02/2021    Stage 4    Lung cancer (HCC)     Neuropathy      Surgical History:  Past Surgical History:   Procedure Laterality Date    BACK SURGERY  2012    lumber, no  Mood normal.         EKG  When ordered, EKG's are interpreted by the Cardiology and ED physician.  Please see their note for interpretation of EKG.    LABS  Labs Reviewed   CBC WITH AUTO DIFFERENTIAL - Abnormal; Notable for the following components:       Result Value    WBC 11.9 (*)     RBC 2.96 (*)     Hemoglobin 9.2 (*)     Hematocrit 29.2 (*)     RDW 20.6 (*)     Neutrophils Absolute 10.4 (*)     Lymphocytes Absolute 0.3 (*)     All other components within normal limits   TROPONIN - Abnormal; Notable for the following components:    Troponin, High Sensitivity 56 (*)     All other components within normal limits   BRAIN NATRIURETIC PEPTIDE - Abnormal; Notable for the following components:    Pro-BNP 3,729 (*)     All other components within normal limits   COMPREHENSIVE METABOLIC PANEL - Abnormal; Notable for the following components:    Sodium 134 (*)     Glucose 144 (*)     Calcium 8.0 (*)     Total Protein 6.0 (*)     Albumin 2.6 (*)     Albumin/Globulin Ratio 0.8 (*)     Alkaline Phosphatase 134 (*)     All other components within normal limits   BLOOD GAS, VENOUS - Abnormal; Notable for the following components:    pH, Pedro 7.323 (*)     HCO3, Venous 22.3 (*)     Base Excess, Pedro -3.7 (*)     Carboxyhemoglobin 2.4 (*)     All other components within normal limits   PROCALCITONIN - Abnormal; Notable for the following components:    Procalcitonin 0.21 (*)     All other components within normal limits   ANTI-XA, UNFRACTIONATED HEPARIN - Abnormal; Notable for the following components:    Anti-XA Unfrac Heparin >1.10 (*)     All other components within normal limits    Narrative:     CALL  Willard BRANDON tel. [phone],                  Coag results called to and read back by Randy Adams RN ,                  08/19/2024 10:02, by JANIS   TROPONIN - Abnormal; Notable for the following components:    Troponin, High Sensitivity 59 (*)     All other components within normal limits   POCT GLUCOSE - Abnormal;  failure type (HCC)    2. Hypoxia    3. Atrial fibrillation with RVR (HCC)    4. Pleural effusion, left    5. History of lung cancer      Patient referred to:  No follow-up provider specified.    Discharge Medications:   Current Discharge Medication List        Discontinued Medications:  Current Discharge Medication List          DISPOSITION:  Patient was admitted.    (Please note that portions of this note were completed with a voice recognition program.  Efforts were made to edit the dictations but occasionally words are mis-transcribed).          Richard Nichols PA-C  08/19/24 8468

## 2024-08-19 NOTE — CONSULTS
Consult Placed     Who: SARAH STRONG   Date:8/19/2024  Time:1214     Electronically signed by Dex Bright on 8/19/2024 at 12:14 PM

## 2024-08-19 NOTE — CONSULTS
INPATIENT PULMONARY CRITICAL CARE CONSULT NOTE      Chief Complaint/Referring Provider:  Patient is being seen at the request of Dr. Renteria for a consultation for shortness of breath, pleural effusion and has history of lung cancer     Presenting HPI: Patient was brought to the hospital because of increasing shortness of breath and hypoxemia      As per the admitting provider-79 y.o. 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  He is followed by [facility] and is currently receiving chemotherapy.  Also has PMH of atrial fibrillation on long-term anticoagulation with Eliquis, hypertension, hyperlipidemia, type 2 diabetes mellitus insulin requiring, BPH     He had a recent admission to [facility] and was admitted on 8/16/2024 and discharged on 8/18/2024.  He was at that time with weakness and shortness of breath     He was discharged on 8/19/2024 and was doing reasonably well until the morning of admission when he had increased shortness of breath and therefore presented to the ED.  He denies any chest pain, denies any fever or chills  He is requiring supplemental oxygen the ED is now being admitted for further evaluation and management.     Patient when seen this morning in the ER apparently has been having some shortness of breath along with that patient has cough with mucoid expectoration without any hemoptysis, patient also was having worsening hypoxemia with saturation dropping to 60s as per patient family which made them bring the patient to the hospital, patient was not having any chest pain or palpitations, patient did not have any obvious PE fever or chills, patient did not have any epistaxis or hemoptysis, patient has been having some oropharyngeal dysphagia as per patient, patient has been having some coughing and choking when eating, patient does not have any fever or chills, patient also has been having significant leg edema along with that  UA 1.005 - 1.030  1.020   Blood, Urine Negative  Negative   Protein, UA Negative mg/dL TRACE !   Urobilinogen <2.0 E.U./dL 0.2   Nitrite, Urine Negative  Negative   Leukocyte Esterase, Urine Negative  TRACE !   Urine Type  NotGiven   Urine Reflex to Culture  Not Indicated   pH, Urine 5.0 - 8.0  6.0   WBC, UA 0 - 5 /HPF 3-5   RBC, UA 0 - 4 /HPF 3-4   Microscopic Examination  YES   URINALYSIS WITH REFLEX TO CULTURE  Rpt !       Echocardiogram:  Left Ventricle Normal left ventricular systolic function with a visually estimated EF of 55 - 60%. Left ventricle size is normal. Normal wall thickness. Normal wall motion. Indeterminate diastolic function due to arrhythmia.   Left Atrium Left atrium size is normal. Left atrial volume index is normal (16-34 mL/m2).   Right Ventricle Right ventricle size is normal. Normal systolic function. TAPSE is normal. TAPSE is 2.2 cm.   Right Atrium Right atrium size is normal.   Aortic Valve Valve structure is normal. No regurgitation. No stenosis.   Mitral Valve Mildly thickened leaflets. Trace regurgitation. No stenosis noted.   Tricuspid Valve Valve structure is normal. Trace regurgitation. No stenosis noted.   Pulmonic Valve The pulmonic valve was not well visualized. No regurgitation. No stenosis noted.   Aorta Normal sized aortic root and ascending aorta.   Pericardium No pericardial effusion.   Extracardiac Difficult to evaluate pleural space.     PFT: None available in the epic    Recent evaluation by speech therapist recommended patient to have IDDSI 7 regular solids, IDDSI 0 thin liquids, meds p.o. 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 *          Plan:   Oxygen supplementation to keep saturation between 90 and 94% only  Please titrate the oxygen as per the above parameters  Pulmonary toilet  Patient and family were shown the recent CT of the chest along with his findings and implications  Patient and family were also shown the x-ray chest along with findings and implications  Patient does have some pulm infiltrates/volume loss/loculated pleural effusion  Patient was started on empiric Unasyn at this time  Bronchodilators  P.o. prednisone changed to IV Decadron which will help both with the inflammation in the lungs and also for vasogenic edema  Patient never had any radiation therapy to the lungs  Patient has a persistent left sided lung nodule which may be a part of the adenocarcinoma of the lung if the patient has  Patient's recent MRI of the head was reviewed and patient had brain mets with vasogenic edema  Patient is complaining of oropharyngeal dysphagia-patient had a recent speech evaluation done and patient was cleared to eat by mouth-consider repeating speech evaluation if deemed appropriate-patient may require MBS or FEES  Patient does have elevated troponin and BNP-further evaluation as per patient's primary team  Patient has been on metoprolol and amiodarone at home-consider restarting  Patient also is getting Eliquis  Monitor for any bleeding  If patient's clinical status does not improve will consider diagnostic bronchoscopy  Monitor input output and BMP  Correct electrolytes on whenever necessary basis  Insulins as per primary team  Blood glucose monitoring with sliding scale insulin  Monitor for any hypoglycemia  PUD prophylaxis      Case

## 2024-08-19 NOTE — H&P
Hospital Medicine History & Physical      Date of Admission: 8/19/2024    Date of Service:  Pt seen/examined on 8/19/24     [x]Admitted to Inpatient with expected LOS greater than two midnights due to medical therapy.  []Placed in Observation status.    Chief Admission Complaint:  sob    Presenting Admission History:      79 y.o. 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  He is followed by [facility] and is currently receiving chemotherapy.  Also has PMH of atrial fibrillation on long-term anticoagulation with Eliquis, hypertension, hyperlipidemia, type 2 diabetes mellitus insulin requiring, BPH    He had a recent admission to [facility] and was admitted on 8/16/2024 and discharged on 8/18/2024.  He was at that time with weakness and shortness of breath    He was discharged on 8/19/2024 and was doing reasonably well until the morning of admission when he had increased shortness of breath and therefore presented to the ED.  He denies any chest pain, denies any fever or chills  He is requiring supplemental oxygen the ED is now being admitted for further evaluation and management.        Assessment/Plan:      Current Principal Problem:  Acute hypoxemic respiratory failure (HCC)    Acute hypoxic respiratory : Patient presented to ED with main complaint shortness of breath.  Chest x-ray from time of admission does reveal left-sided pleural effusion left-sided airspace opacities.  He is feeling better with supplemental oxygen via nasal cannula.  He will be admitted, he was discharged on oral prednisone will continue.  Will add bronchodilators, will ask pulmonary to see for any further input.  He does have pleural effusion noted on the left side via chest x-ray will discuss with pulmonary if he would benefit of thoracentesis      Generalized weakness.  POA.  Multifactorial including secondary to malignancy, chemotherapy and physical deconditioning.will  Midnight  []Other   DVT Prophylaxis: []PPx LMWH  []SQ Heparin  []IPC/SCDs  [x]Eliquis  []Xarelto  []Coumadin     Code status: [x]Full  []DNR/CCA  []Limited (DNR/CCA with Do Not Intubate)  []DNRCC  Surrogate Decision Maker:   PT/OT Eval Status:   [x]NOT yet ordered  []Ordered and Pending   []Seen with Recommendations for:  []Home independently  []Home w/ assist  []HHC  []SNF  []Acute Rehab    Anticipated Discharge Day/Date: 2 to 3-day    Anticipated Discharge Location: [x]Home  []HHC  [x]SNF  []Acute Rehab  []ECF  []LTAC  []Hospice    Consults:      IP CONSULT TO PULMONOLOGY    I personally have obtained, updated and/or reviewed the patient’s medication list on 8/19/2024   --------------------------------------------------------------------------------------------------------------------------------------------------------------------    Imaging:     XR CHEST PORTABLE    Result Date: 8/19/2024  EXAMINATION: ONE XRAY VIEW OF THE CHEST 8/19/2024 8:48 am COMPARISON: 08/16/2024 HISTORY: ORDERING SYSTEM PROVIDED HISTORY: Shortness of Breath TECHNOLOGIST PROVIDED HISTORY: Reason for exam:->Shortness of Breath Reason for Exam: Shortness of Breath FINDINGS: Lines and tubes: Left-sided chest port with tip in SVC. Left-sided volume loss with left-sided airspace opacities and probable small left effusion is unchanged.  The right lung is clear.  Heart size stable.     Unchanged left-sided pleural effusion and left-sided airspace opacities     XR CHEST PORTABLE    Result Date: 8/16/2024  EXAMINATION: ONE XRAY VIEW OF THE CHEST 8/16/2024 7:44 pm COMPARISON: August 7, 2024 HISTORY: ORDERING SYSTEM PROVIDED HISTORY: Chest discomfort TECHNOLOGIST PROVIDED HISTORY: Reason for exam:->Chest discomfort Reason for Exam: chest discomfort FINDINGS: Redemonstration of left lung infiltrates and loss of volume with diminished aeration in the left upper lobe compared to the previous evaluation. Suggestion of small left effusion.  Heart and  Date    BACK SURGERY  2012    lumber, no hardware, \"cleaned it out\"    CATARACT EXTRACTION EXTRACAPSULAR W/ INTRAOCULAR LENS IMPLANTATION      CT NEEDLE BIOPSY LUNG PERCUTANEOUS  04/07/2023    CT NEEDLE BIOPSY LUNG PERCUTANEOUS 4/7/2023 Robert Palma MD [facility] CT SCAN    CYSTOSCOPY Right 11/09/2020    CYSTOSCOPY,  RIGHT URETEROSCOPY, RIGHT RETRO PYELOGRAM, REMOVAL STONE FROM BLADDER, URETHRAL DILITATION performed by Luicen Shultz MD at [facility] OR    NEPHROLITHOTOMY Left 12/04/2020    LEFT PERCUTANEOUS NEPHROLITHOTOMY WITH DR MONTES TO PLACE NEPHROSTOMY TUBE PRIOR performed by Trevor Trejo MD at [facility] OR    PORT SURGERY N/A 11/04/2021    SURGICAL PORT PLACEMENT performed by Ever Lamar MD at [facility] OR    PORT SURGERY N/A 10/04/2022    PORT REMOVAL performed by Danny Andrade MD at [facility] OR    PORT SURGERY Left 11/02/2022    SURGICAL PORT PLACEMENT performed by Ever Lamar MD at [facility] OR    PROSTATE SURGERY      CYBER KNIFE    SHOULDER SURGERY Right     ROTATOR CUFF       Medications Prior to Admission:   Prior to Admission medications    Medication Sig Start Date End Date Taking? Authorizing Provider   predniSONE (DELTASONE) 20 MG tablet Take 2 tablets by mouth daily for 3 doses 8/19/24 8/22/24  Shabbir, Ehsan, MD   amiodarone (CORDARONE) 200 MG tablet Take 1 tablet by mouth daily 8/12/24   Yas Rasmussen MD   apixaban (ELIQUIS) 5 MG TABS tablet Take 1 tablet by mouth 2 times daily 8/11/24   Yas Rasmussen MD   insulin glargine (BASAGLAR KWIKPEN) 100 UNIT/ML injection pen Inject 20 Units into the skin nightly 8/11/24   Yas Rasmussen MD   albuterol sulfate HFA (PROVENTIL;VENTOLIN;PROAIR) 108 (90 Base) MCG/ACT inhaler Inhale 1 puff into the lungs every 4 hours as needed for Shortness of Breath 4-6 hours 7/1/24   ProviderRaisa MD NONFORMULARY 5-loxin    Raisa Ramirez MD   atorvastatin (LIPITOR) 40 MG tablet Take 1 tablet by mouth nightly 5/28/24

## 2024-08-19 NOTE — PLAN OF CARE
PLAN OF CARE    Received request for inpatient admission. Admitting provider Dr. Renteria notified.    DAVID BOCANEGRA MD  8/19/2024  10:17 AM

## 2024-08-19 NOTE — PROGRESS NOTES
4 Eyes Skin Assessment and Patient belongings     The patient is being assess for  Admission    I agree that 2 Nurses have performed a thorough Head to Toe Skin Assessment on the patient. ALL assessment sites listed below have been assessed.       Areas assessed by both nurses:   [x]   Head, Face, and Ears   [x]   Shoulders, Back, and Chest  [x]   Arms, Elbows, and Hands   [x]   Coccyx, Sacrum, and IschIum  [x]   Legs, Feet, and Heels        Does the Patient have Skin Breakdown?  Yes LDA WOUND CARE was Initiated documentation include the Brenda-wound, Wound Assessment, Measurements, Dressing Treatment, Drainage, and Color\",         Sigifredo Prevention initiated:  Yes   Wound Care Orders initiated:  Yes      [facility] nurse consulted for Pressure Injury (Stage 3,4, Unstageable, DTI, NWPT, and Complex wounds), New and Established Ostomies:  No      I agree that 2 Nurses have reviewed patient belongings with the patient/family and documented in the flowsheet upon admission or transfer to the unit.     Belongings  Dental Appliances: Uppers, Lowers  Vision - Corrective Lenses: None  Hearing Aid: None  Clothing: Sent home  Jewelry: None  Body Piercings Removed: N/A  Electronic Devices: Sent home, Cell Phone,   Weapons (Notify Protective Services/Security): None  Other Valuables: Sent home  Home Medications: None  Valuables Given To: Family (Comment)  Patient approves for provider to speak to responsible person post operatively: Yes       Nurse 1 eSignature: Electronically signed by Yessenia Henderson RN on 8/19/24 at 12:41 PM EDT    **SHARE this note so that the co-signing nurse is able to place an eSignature**    Nurse 2 eSignature: Electronically signed by NEAL DICKERSON RN on 8/19/24 at 6:17 PM EDT

## 2024-08-19 NOTE — PROGRESS NOTES
Patient admitted to room from ED. Patient oriented to room, call light, bed rails, phone, lights and bathroom.  Patient instructed about schedule of the day including:  Vital sign, frequency, lab draws, possible tests, frequency of MD and staff rounds.  Patient instructed about precribed diet, how/when to call for meal service, and television.  Telemetry box in place, patient aware of placement and reason.  Bed locked, in lowest position, side rails up 2/4, call light within reach.

## 2024-08-19 NOTE — ED NOTES
Patient Name: Wes Schneider  :  1945  79 y.o.  MRN:  [id number]  Preferred Name  Da  ED Room #:    Family/Caregiver Present no  Restraints no  Sitter no  Sepsis Risk Score      Situation  Code Status: Full Code No additional code details.    Allergies: Patient has no known allergies.  Weight: Patient Vitals for the past 96 hrs (Last 3 readings):   Weight   24 0845 81.6 kg (180 lb)     Arrived from: home  Chief Complaint:   Chief Complaint   Patient presents with    Shortness of Breath     Per squad 77 % on ra.. duoneb and solmed in squad..The patient arrived 87 % ra.. pt place on 4 lt     Hospital Problem/Diagnosis:  Principal Problem:    Acute hypoxemic respiratory failure (HCC)  Resolved Problems:    * No resolved hospital problems. *    Imaging:   XR CHEST PORTABLE   Final Result   Unchanged left-sided pleural effusion and left-sided airspace opacities           Abnormal labs:   Abnormal Labs Reviewed   CBC WITH AUTO DIFFERENTIAL - Abnormal; Notable for the following components:       Result Value    WBC 11.9 (*)     RBC 2.96 (*)     Hemoglobin 9.2 (*)     Hematocrit 29.2 (*)     RDW 20.6 (*)     Neutrophils Absolute 10.4 (*)     Lymphocytes Absolute 0.3 (*)     All other components within normal limits   TROPONIN - Abnormal; Notable for the following components:    Troponin, High Sensitivity 56 (*)     All other components within normal limits   BRAIN NATRIURETIC PEPTIDE - Abnormal; Notable for the following components:    Pro-BNP 3,729 (*)     All other components within normal limits   COMPREHENSIVE METABOLIC PANEL - Abnormal; Notable for the following components:    Sodium 134 (*)     Glucose 144 (*)     Calcium 8.0 (*)     Total Protein 6.0 (*)     Albumin 2.6 (*)     Albumin/Globulin Ratio 0.8 (*)     Alkaline Phosphatase 134 (*)     All other components within normal limits   BLOOD GAS, VENOUS - Abnormal; Notable for the following components:    pH, Pedro 7.323 (*)     HCO3, Venous  22.3 (*)     Base Excess, Pedro -3.7 (*)     Carboxyhemoglobin 2.4 (*)     All other components within normal limits   PROCALCITONIN - Abnormal; Notable for the following components:    Procalcitonin 0.21 (*)     All other components within normal limits   ANTI-XA, UNFRACTIONATED HEPARIN - Abnormal; Notable for the following components:    Anti-XA Unfrac Heparin >1.10 (*)     All other components within normal limits    Narrative:     CALL  Lamar  [facility] tel. [phone],  Coag results called to and read back by Randy Adams RN ,  08/19/2024 10:02, by JANIS   TROPONIN - Abnormal; Notable for the following components:    Troponin, High Sensitivity 59 (*)     All other components within normal limits     Critical values: no  Intervention for critical value(s):        Abnormal Assessment Findings:      Background  History:   Past Medical History:   Diagnosis Date    BPH (benign prostatic hyperplasia)     Cancer (HCC) 2016    prostate    Diabetes mellitus (HCC)     Esophageal reflux     Hyperlipidemia     Hypertension     Kidney stone     Lung cancer (HCC) 10/02/2021    Stage 4    Lung cancer (HCC)     Neuropathy        Assessment    Vitals/MEWS:    Level of Consciousness: Alert (0)   Vitals:    08/19/24 0829 08/19/24 0845 08/19/24 1012   BP: 108/63 (!) 147/123 (!) 104/53   Pulse: 81  (!) 117   Resp: 20 16 20   Temp: 97.8 °F (36.6 °C) 97.8 °F (36.6 °C)    TempSrc: Oral Oral    SpO2: 94% 92% 98%   Weight:  81.6 kg (180 lb)    Height:  1.829 m (6')      FiO2 (%): 2  O2 Flow Rate: O2 Device: Nasal cannula O2 Flow Rate (L/min): 4 L/min  Cardiac Rhythm:    Pain Assessment: 0 [ ] Verbal [ ] Devine Olguin Scale  Pain Scale:    Last documented pain score (0-10 scale)    Last documented pain medication administered:  none  Mental Status: oriented  NIH Score:    C-SSRS: Risk of Suicide: No Risk  Bedside swallow:    [place] Coma Scale (GCS): Charlie Coma Scale  Eye Opening: Spontaneous  Best Verbal Response: Oriented  Best Motor  Response: Obeys commands  Charlie Coma Scale Score: 15  Active LDA's:   Peripheral IV 08/16/24 Left Antecubital (Active)     PO Status: Regular  Pertinent or High Risk Medications/Drips: no  oIf Yes, please provide details:    Pending Blood Product Administration: no    You may also review the ED PT Care Timeline found under the Summary Nursing Index tab.    Recommendation    Pending orders no  Plan for Discharge (if known):  Baseline ambulation: Walker.  Voiding: Independent\", “Urinal.  Belongings documented: yes    Additional Comments: Pt normally not on O2 now on 2L, Has lung Cx currently receiving chemo last given 2 days ago  If any further questions, please call Sending RN at [phone]    Electronically signed by: Electronically signed by TIMO MASCORRO RN on 8/19/2024 at 11:32 AM

## 2024-08-19 NOTE — PLAN OF CARE
Problem: Discharge Planning  Goal: Discharge to home or other facility with appropriate resources  8/19/2024 1913 by Elsa Rollins RN  Outcome: Progressing  8/19/2024 1322 by Yessenia Henderson RN  Outcome: Progressing  Flowsheets (Taken 8/19/2024 1231)  Discharge to home or other facility with appropriate resources:   Identify barriers to discharge with patient and caregiver   Arrange for needed discharge resources and transportation as appropriate     Problem: Safety - Adult  Goal: Free from fall injury  8/19/2024 1913 by Elsa Rollins RN  Outcome: Progressing  8/19/2024 1322 by Yessenia Henderson RN  Outcome: Progressing     Problem: ABCDS Injury Assessment  Goal: Absence of physical injury  Outcome: Progressing     Problem: Skin/Tissue Integrity  Goal: Absence of new skin breakdown  Description: 1.  Monitor for areas of redness and/or skin breakdown  2.  Assess vascular access sites hourly  3.  Every 4-6 hours minimum:  Change oxygen saturation probe site  4.  Every 4-6 hours:  If on nasal continuous positive airway pressure, respiratory therapy assess nares and determine need for appliance change or resting period.  8/19/2024 1913 by Elsa Rollins RN  Outcome: Progressing  8/19/2024 1322 by Yessenia Henderson RN  Outcome: Progressing     Problem: Chronic Conditions and Co-morbidities  Goal: Patient's chronic conditions and co-morbidity symptoms are monitored and maintained or improved  Outcome: Progressing

## 2024-08-19 NOTE — RT PROTOCOL NOTE
RT Inhaler-Nebulizer Bronchodilator Protocol Note    There is a bronchodilator order in the chart from a provider indicating to follow the RT Bronchodilator Protocol and there is an “Initiate RT Inhaler-Nebulizer Bronchodilator Protocol” order as well (see protocol at bottom of note).    CXR Findings:  XR CHEST PORTABLE    Result Date: 8/19/2024  Unchanged left-sided pleural effusion and left-sided airspace opacities       The findings from the last RT Protocol Assessment were as follows:   History Pulmonary Disease: Smoker 15 pack years or more  Respiratory Pattern: Dyspnea on exertion or RR 21-25 bpm  Breath Sounds: Slightly diminished and/or crackles  Cough: Strong, productive  Indication for Bronchodilator Therapy: On home bronchodilators  Bronchodilator Assessment Score: 6    Aerosolized bronchodilator medication orders have been revised according to the RT Inhaler-Nebulizer Bronchodilator Protocol below.    Respiratory Therapist to perform RT Therapy Protocol Assessment initially then follow the protocol.  Repeat RT Therapy Protocol Assessment PRN for score 0-3 or on second treatment, BID, and PRN for scores above 3.    No Indications - adjust the frequency to every 6 hours PRN wheezing or bronchospasm, if no treatments needed after 48 hours then discontinue using Per Protocol order mode.     If indication present, adjust the RT bronchodilator orders based on the Bronchodilator Assessment Score as indicated below.  Use Inhaler orders unless patient has one or more of the following: on home nebulizer, not able to hold breath for 10 seconds, is not alert and oriented, cannot activate and use MDI correctly, or respiratory rate 25 breaths per minute or more, then use the equivalent nebulizer order(s) with same Frequency and PRN reasons based on the score.  If a patient is on this medication at home then do not decrease Frequency below that used at home.    0-3 - enter or revise RT bronchodilator order(s) to

## 2024-08-19 NOTE — CONSULTS
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  Recent admission 08/07-08/11/2024 for pneumonia. Completed treatment and was discharged home 08/11/2024. Seen in office at [facility] 08/15/2024 and was doing well.   Readmitted to the hospital 08/16/2024 for weakness and chest pain. Workup unremarkable. Started on prednisone 08/17 at 40mg daily for 5 days. Discharged 08/18.   Returned to ED 08/19/2024 with concerns for SOB. Coughing up white frothy sputum.     Hem/onc consulted for continuity per patient/family request.     Mr. Schneider  is a 79 y.o. male we are seeing in consultation for     ICD-10-CM    1. Acute congestive heart failure, unspecified heart failure type (HCC)  I50.9       2. Hypoxia  R09.02       3. Atrial fibrillation with RVR (HCC)  I48.91       4. Pleural effusion, left  J90       5. History of lung cancer  Z85.118              Past Medical History:  Past Medical History:   Diagnosis Date    BPH (benign prostatic hyperplasia)     Cancer (HCC) 2016    prostate    Diabetes mellitus (HCC)     Esophageal reflux     Hyperlipidemia     Hypertension     Kidney stone     Lung cancer (HCC) 10/02/2021    Stage 4    Lung cancer (HCC)     Neuropathy        Past Surgical History:  Past Surgical History:   Procedure Laterality Date    BACK SURGERY  2012    lumber, no hardware, \"cleaned it out\"    CATARACT EXTRACTION EXTRACAPSULAR W/ INTRAOCULAR LENS IMPLANTATION      CT NEEDLE BIOPSY LUNG PERCUTANEOUS  04/07/2023    CT NEEDLE BIOPSY LUNG PERCUTANEOUS 4/7/2023 Robert Palma MD [facility] CT SCAN    CYSTOSCOPY Right  11/09/2020    CYSTOSCOPY,  RIGHT URETEROSCOPY, RIGHT RETRO PYELOGRAM, REMOVAL STONE FROM BLADDER, URETHRAL DILITATION performed by Lucien Shultz MD at [facility] OR    NEPHROLITHOTOMY Left 12/04/2020    LEFT PERCUTANEOUS NEPHROLITHOTOMY WITH DR MONTES TO PLACE NEPHROSTOMY TUBE PRIOR performed by Trevor Trejo MD at [facility] OR    PORT SURGERY N/A 11/04/2021    SURGICAL PORT PLACEMENT performed by Ever Lamar MD at [facility] OR    PORT SURGERY N/A 10/04/2022    PORT REMOVAL performed by Danny Andrade MD at [facility] OR    PORT SURGERY Left 11/02/2022    SURGICAL PORT PLACEMENT performed by Ever Lamar MD at [facility] OR    PROSTATE SURGERY      CYBER KNIFE    SHOULDER SURGERY Right     ROTATOR CUFF       Current Medications:  No current facility-administered medications for this encounter.     Current Outpatient Medications   Medication Sig Dispense Refill    predniSONE (DELTASONE) 20 MG tablet Take 2 tablets by mouth daily for 3 doses 6 tablet 0    amiodarone (CORDARONE) 200 MG tablet Take 1 tablet by mouth daily 30 tablet 0    apixaban (ELIQUIS) 5 MG TABS tablet Take 1 tablet by mouth 2 times daily 60 tablet 0    insulin glargine (BASAGLAR KWIKPEN) 100 UNIT/ML injection pen Inject 20 Units into the skin nightly 5 Adjustable Dose Pre-filled Pen Syringe 3    albuterol sulfate HFA (PROVENTIL;VENTOLIN;PROAIR) 108 (90 Base) MCG/ACT inhaler Inhale 1 puff into the lungs every 4 hours as needed for Shortness of Breath 4-6 hours      NONFORMULARY 5-loxin      atorvastatin (LIPITOR) 40 MG tablet Take 1 tablet by mouth nightly 90 tablet 1    metoprolol succinate (TOPROL XL) 50 MG extended release tablet Take 0.5 tablets by mouth daily 90 tablet 3    tamsulosin (FLOMAX) 0.4 MG capsule Take 1 capsule by mouth daily      folic acid (FOLVITE) 1 MG tablet Take 1 tablet by mouth daily      Multiple Vitamins-Minerals (THERAPEUTIC MULTIVITAMIN-MINERALS) tablet Take 1 tablet by mouth daily      vitamin D3  succcinate 25mg daily  -per IM    75 minutes spent reviewing records, labs, imaging and discussion with patient, family, IM.     Thank you for asking me to see the patient.       AASHISH XIONG  Please Contact Through [employer]

## 2024-08-19 NOTE — RT PROTOCOL NOTE
RT Inhaler-Nebulizer Bronchodilator Protocol Note    There is a bronchodilator order in the chart from a provider indicating to follow the RT Bronchodilator Protocol and there is an “Initiate RT Inhaler-Nebulizer Bronchodilator Protocol” order as well (see protocol at bottom of note).    CXR Findings:  XR CHEST PORTABLE    Result Date: 8/19/2024  Unchanged left-sided pleural effusion and left-sided airspace opacities       The findings from the last RT Protocol Assessment were as follows:   History Pulmonary Disease: Smoker 15 pack years or more  Respiratory Pattern: Dyspnea on exertion or RR 21-25 bpm  Breath Sounds: Slightly diminished and/or crackles  Cough: Strong, spontaneous, non-productive  Indication for Bronchodilator Therapy: Decreased or absent breath sounds, On home bronchodilators  Bronchodilator Assessment Score: 5    Aerosolized bronchodilator medication orders have been revised according to the RT Inhaler-Nebulizer Bronchodilator Protocol below.    Respiratory Therapist to perform RT Therapy Protocol Assessment initially then follow the protocol.  Repeat RT Therapy Protocol Assessment PRN for score 0-3 or on second treatment, BID, and PRN for scores above 3.    No Indications - adjust the frequency to every 6 hours PRN wheezing or bronchospasm, if no treatments needed after 48 hours then discontinue using Per Protocol order mode.     If indication present, adjust the RT bronchodilator orders based on the Bronchodilator Assessment Score as indicated below.  Use Inhaler orders unless patient has one or more of the following: on home nebulizer, not able to hold breath for 10 seconds, is not alert and oriented, cannot activate and use MDI correctly, or respiratory rate 25 breaths per minute or more, then use the equivalent nebulizer order(s) with same Frequency and PRN reasons based on the score.  If a patient is on this medication at home then do not decrease Frequency below that used at home.    0-3 -  enter or revise RT bronchodilator order(s) to equivalent RT Bronchodilator order with Frequency of every 4 hours PRN for wheezing or increased work of breathing using Per Protocol order mode.        4-6 - enter or revise RT Bronchodilator order(s) to two equivalent RT bronchodilator orders with one order with BID Frequency and one order with Frequency of every 4 hours PRN wheezing or increased work of breathing using Per Protocol order mode.        7-10 - enter or revise RT Bronchodilator order(s) to two equivalent RT bronchodilator orders with one order with TID Frequency and one order with Frequency of every 4 hours PRN wheezing or increased work of breathing using Per Protocol order mode.       11-13 - enter or revise RT Bronchodilator order(s) to one equivalent RT bronchodilator order with QID Frequency and an Albuterol order with Frequency of every 4 hours PRN wheezing or increased work of breathing using Per Protocol order mode.      Greater than 13 - enter or revise RT Bronchodilator order(s) to one equivalent RT bronchodilator order with every 4 hours Frequency and an Albuterol order with Frequency of every 2 hours PRN wheezing or increased work of breathing using Per Protocol order mode.     RT to enter RT Home Evaluation for COPD & MDI Assessment order using Per Protocol order mode.    Electronically signed by Lucia Crooks RCP on 8/19/2024 at 3:46 PM

## 2024-08-19 NOTE — PLAN OF CARE
Problem: Discharge Planning  Goal: Discharge to home or other facility with appropriate resources  Outcome: Progressing  Flowsheets (Taken 8/19/2024 1231)  Discharge to home or other facility with appropriate resources:   Identify barriers to discharge with patient and caregiver   Arrange for needed discharge resources and transportation as appropriate     Problem: Safety - Adult  Goal: Free from fall injury  Outcome: Progressing     Problem: Skin/Tissue Integrity  Goal: Absence of new skin breakdown  Description: 1.  Monitor for areas of redness and/or skin breakdown  2.  Assess vascular access sites hourly  3.  Every 4-6 hours minimum:  Change oxygen saturation probe site  4.  Every 4-6 hours:  If on nasal continuous positive airway pressure, respiratory therapy assess nares and determine need for appliance change or resting period.  Outcome: Progressing

## 2024-08-20 PROBLEM — I48.91 ATRIAL FIBRILLATION WITH RVR (HCC): Status: ACTIVE | Noted: 2024-08-20

## 2024-08-20 PROBLEM — E87.20 NORMAL ANION GAP METABOLIC ACIDOSIS: Status: ACTIVE | Noted: 2024-08-20

## 2024-08-20 LAB
ANION GAP SERPL CALCULATED.3IONS-SCNC: 13 MMOL/L (ref 3–16)
BACTERIA BLD CULT ORG #2: NORMAL
BACTERIA BLD CULT: NORMAL
BASOPHILS # BLD: 0 K/UL (ref 0–0.2)
BASOPHILS NFR BLD: 0.4 %
BUN SERPL-MCNC: 24 MG/DL (ref 7–20)
CALCIUM SERPL-MCNC: 8.1 MG/DL (ref 8.3–10.6)
CHLORIDE SERPL-SCNC: 101 MMOL/L (ref 99–110)
CO2 SERPL-SCNC: 20 MMOL/L (ref 21–32)
CREAT SERPL-MCNC: 1.2 MG/DL (ref 0.8–1.3)
DEPRECATED RDW RBC AUTO: 21.2 % (ref 12.4–15.4)
EKG ATRIAL RATE: 134 BPM
EKG DIAGNOSIS: NORMAL
EKG DIAGNOSIS: NORMAL
EKG P AXIS: -30 DEGREES
EKG P-R INTERVAL: 152 MS
EKG Q-T INTERVAL: 304 MS
EKG Q-T INTERVAL: 326 MS
EKG QRS DURATION: 104 MS
EKG QRS DURATION: 96 MS
EKG QTC CALCULATION (BAZETT): 453 MS
EKG QTC CALCULATION (BAZETT): 460 MS
EKG R AXIS: 103 DEGREES
EKG R AXIS: 77 DEGREES
EKG T AXIS: 210 DEGREES
EKG T AXIS: 240 DEGREES
EKG VENTRICULAR RATE: 120 BPM
EKG VENTRICULAR RATE: 134 BPM
EOSINOPHIL # BLD: 0 K/UL (ref 0–0.6)
EOSINOPHIL NFR BLD: 0 %
EST. AVERAGE GLUCOSE BLD GHB EST-MCNC: 125.5 MG/DL
GFR SERPLBLD CREATININE-BSD FMLA CKD-EPI: 61 ML/MIN/{1.73_M2}
GLUCOSE BLD-MCNC: 224 MG/DL (ref 70–99)
GLUCOSE BLD-MCNC: 259 MG/DL (ref 70–99)
GLUCOSE BLD-MCNC: 273 MG/DL (ref 70–99)
GLUCOSE BLD-MCNC: 412 MG/DL (ref 70–99)
GLUCOSE SERPL-MCNC: 267 MG/DL (ref 70–99)
HBA1C MFR BLD: 6 %
HCT VFR BLD AUTO: 28.4 % (ref 40.5–52.5)
HGB BLD-MCNC: 9.1 G/DL (ref 13.5–17.5)
LYMPHOCYTES # BLD: 0.1 K/UL (ref 1–5.1)
LYMPHOCYTES NFR BLD: 1.5 %
MCH RBC QN AUTO: 32.1 PG (ref 26–34)
MCHC RBC AUTO-ENTMCNC: 32.2 G/DL (ref 31–36)
MCV RBC AUTO: 99.9 FL (ref 80–100)
MONOCYTES # BLD: 0.3 K/UL (ref 0–1.3)
MONOCYTES NFR BLD: 3.2 %
NEUTROPHILS # BLD: 8.6 K/UL (ref 1.7–7.7)
NEUTROPHILS NFR BLD: 94.9 %
PERFORMED ON: ABNORMAL
PLATELET # BLD AUTO: 270 K/UL (ref 135–450)
PMV BLD AUTO: 7.8 FL (ref 5–10.5)
POTASSIUM SERPL-SCNC: 5 MMOL/L (ref 3.5–5.1)
RBC # BLD AUTO: 2.84 M/UL (ref 4.2–5.9)
SODIUM SERPL-SCNC: 134 MMOL/L (ref 136–145)
WBC # BLD AUTO: 9 K/UL (ref 4–11)

## 2024-08-20 PROCEDURE — 94669 MECHANICAL CHEST WALL OSCILL: CPT

## 2024-08-20 PROCEDURE — 36415 COLL VENOUS BLD VENIPUNCTURE: CPT

## 2024-08-20 PROCEDURE — 2580000003 HC RX 258: Performed by: INTERNAL MEDICINE

## 2024-08-20 PROCEDURE — 6370000000 HC RX 637 (ALT 250 FOR IP): Performed by: INTERNAL MEDICINE

## 2024-08-20 PROCEDURE — 85025 COMPLETE CBC W/AUTO DIFF WBC: CPT

## 2024-08-20 PROCEDURE — 93005 ELECTROCARDIOGRAM TRACING: CPT | Performed by: INTERNAL MEDICINE

## 2024-08-20 PROCEDURE — 92610 EVALUATE SWALLOWING FUNCTION: CPT

## 2024-08-20 PROCEDURE — 99233 SBSQ HOSP IP/OBS HIGH 50: CPT | Performed by: INTERNAL MEDICINE

## 2024-08-20 PROCEDURE — 99222 1ST HOSP IP/OBS MODERATE 55: CPT | Performed by: INTERNAL MEDICINE

## 2024-08-20 PROCEDURE — 94761 N-INVAS EAR/PLS OXIMETRY MLT: CPT

## 2024-08-20 PROCEDURE — 6360000002 HC RX W HCPCS: Performed by: INTERNAL MEDICINE

## 2024-08-20 PROCEDURE — 80048 BASIC METABOLIC PNL TOTAL CA: CPT

## 2024-08-20 PROCEDURE — 93010 ELECTROCARDIOGRAM REPORT: CPT | Performed by: INTERNAL MEDICINE

## 2024-08-20 PROCEDURE — 92526 ORAL FUNCTION THERAPY: CPT

## 2024-08-20 PROCEDURE — 94640 AIRWAY INHALATION TREATMENT: CPT

## 2024-08-20 PROCEDURE — 2060000000 HC ICU INTERMEDIATE R&B

## 2024-08-20 PROCEDURE — 83036 HEMOGLOBIN GLYCOSYLATED A1C: CPT

## 2024-08-20 PROCEDURE — 2700000000 HC OXYGEN THERAPY PER DAY

## 2024-08-20 PROCEDURE — 2500000003 HC RX 250 WO HCPCS: Performed by: INTERNAL MEDICINE

## 2024-08-20 RX ORDER — INSULIN GLARGINE 100 [IU]/ML
25 INJECTION, SOLUTION SUBCUTANEOUS NIGHTLY
Status: DISCONTINUED | OUTPATIENT
Start: 2024-08-20 | End: 2024-08-23 | Stop reason: HOSPADM

## 2024-08-20 RX ORDER — INSULIN LISPRO 100 [IU]/ML
14 INJECTION, SOLUTION INTRAVENOUS; SUBCUTANEOUS ONCE
Status: COMPLETED | OUTPATIENT
Start: 2024-08-20 | End: 2024-08-20

## 2024-08-20 RX ORDER — METOPROLOL TARTRATE 1 MG/ML
5 INJECTION, SOLUTION INTRAVENOUS ONCE
Status: COMPLETED | OUTPATIENT
Start: 2024-08-20 | End: 2024-08-20

## 2024-08-20 RX ORDER — DEXAMETHASONE SODIUM PHOSPHATE 4 MG/ML
4 INJECTION, SOLUTION INTRA-ARTICULAR; INTRALESIONAL; INTRAMUSCULAR; INTRAVENOUS; SOFT TISSUE EVERY 12 HOURS
Status: DISCONTINUED | OUTPATIENT
Start: 2024-08-21 | End: 2024-08-22

## 2024-08-20 RX ADMIN — AMPICILLIN SODIUM AND SULBACTAM SODIUM 3000 MG: 2; 1 INJECTION, POWDER, FOR SOLUTION INTRAMUSCULAR; INTRAVENOUS at 17:43

## 2024-08-20 RX ADMIN — IPRATROPIUM BROMIDE AND ALBUTEROL SULFATE 1 DOSE: 2.5; .5 SOLUTION RESPIRATORY (INHALATION) at 12:04

## 2024-08-20 RX ADMIN — INSULIN LISPRO 8 UNITS: 100 INJECTION, SOLUTION INTRAVENOUS; SUBCUTANEOUS at 12:11

## 2024-08-20 RX ADMIN — INSULIN LISPRO 4 UNITS: 100 INJECTION, SOLUTION INTRAVENOUS; SUBCUTANEOUS at 09:10

## 2024-08-20 RX ADMIN — IPRATROPIUM BROMIDE AND ALBUTEROL SULFATE 1 DOSE: 2.5; .5 SOLUTION RESPIRATORY (INHALATION) at 16:10

## 2024-08-20 RX ADMIN — DEXAMETHASONE SODIUM PHOSPHATE 4 MG: 4 INJECTION INTRA-ARTICULAR; INTRALESIONAL; INTRAMUSCULAR; INTRAVENOUS; SOFT TISSUE at 12:12

## 2024-08-20 RX ADMIN — AMPICILLIN SODIUM AND SULBACTAM SODIUM 3000 MG: 2; 1 INJECTION, POWDER, FOR SOLUTION INTRAMUSCULAR; INTRAVENOUS at 12:23

## 2024-08-20 RX ADMIN — IPRATROPIUM BROMIDE AND ALBUTEROL SULFATE 1 DOSE: 2.5; .5 SOLUTION RESPIRATORY (INHALATION) at 20:19

## 2024-08-20 RX ADMIN — IPRATROPIUM BROMIDE AND ALBUTEROL SULFATE 1 DOSE: 2.5; .5 SOLUTION RESPIRATORY (INHALATION) at 08:13

## 2024-08-20 RX ADMIN — AMIODARONE HYDROCHLORIDE 200 MG: 200 TABLET ORAL at 09:11

## 2024-08-20 RX ADMIN — TAMSULOSIN HYDROCHLORIDE 0.4 MG: 0.4 CAPSULE ORAL at 09:11

## 2024-08-20 RX ADMIN — SODIUM BICARBONATE 50 MEQ: 84 INJECTION INTRAVENOUS at 14:47

## 2024-08-20 RX ADMIN — ATORVASTATIN CALCIUM 40 MG: 40 TABLET, FILM COATED ORAL at 20:35

## 2024-08-20 RX ADMIN — ESCITALOPRAM OXALATE 5 MG: 10 TABLET ORAL at 20:35

## 2024-08-20 RX ADMIN — AMPICILLIN SODIUM AND SULBACTAM SODIUM 3000 MG: 2; 1 INJECTION, POWDER, FOR SOLUTION INTRAMUSCULAR; INTRAVENOUS at 00:35

## 2024-08-20 RX ADMIN — AMIODARONE HYDROCHLORIDE 1 MG/MIN: 50 INJECTION, SOLUTION INTRAVENOUS at 17:45

## 2024-08-20 RX ADMIN — INSULIN GLARGINE 25 UNITS: 100 INJECTION, SOLUTION SUBCUTANEOUS at 20:34

## 2024-08-20 RX ADMIN — CHOLECALCIFEROL TAB 10 MCG (400 UNIT) 400 UNITS: 10 TAB at 09:11

## 2024-08-20 RX ADMIN — METOPROLOL SUCCINATE 25 MG: 50 TABLET, EXTENDED RELEASE ORAL at 09:11

## 2024-08-20 RX ADMIN — DEXAMETHASONE SODIUM PHOSPHATE 4 MG: 4 INJECTION INTRA-ARTICULAR; INTRALESIONAL; INTRAMUSCULAR; INTRAVENOUS; SOFT TISSUE at 00:34

## 2024-08-20 RX ADMIN — AMPICILLIN SODIUM AND SULBACTAM SODIUM 3000 MG: 2; 1 INJECTION, POWDER, FOR SOLUTION INTRAMUSCULAR; INTRAVENOUS at 23:54

## 2024-08-20 RX ADMIN — FOLIC ACID 1 MG: 1 TABLET ORAL at 09:11

## 2024-08-20 RX ADMIN — DEXAMETHASONE SODIUM PHOSPHATE 4 MG: 4 INJECTION INTRA-ARTICULAR; INTRALESIONAL; INTRAMUSCULAR; INTRAVENOUS; SOFT TISSUE at 06:32

## 2024-08-20 RX ADMIN — APIXABAN 5 MG: 5 TABLET, FILM COATED ORAL at 09:11

## 2024-08-20 RX ADMIN — SODIUM CHLORIDE, PRESERVATIVE FREE 10 ML: 5 INJECTION INTRAVENOUS at 09:12

## 2024-08-20 RX ADMIN — SODIUM CHLORIDE 25 ML: 9 INJECTION, SOLUTION INTRAVENOUS at 12:22

## 2024-08-20 RX ADMIN — AMPICILLIN SODIUM AND SULBACTAM SODIUM 3000 MG: 2; 1 INJECTION, POWDER, FOR SOLUTION INTRAMUSCULAR; INTRAVENOUS at 06:38

## 2024-08-20 RX ADMIN — INSULIN LISPRO 2 UNITS: 100 INJECTION, SOLUTION INTRAVENOUS; SUBCUTANEOUS at 17:36

## 2024-08-20 RX ADMIN — Medication 1 TABLET: at 09:11

## 2024-08-20 RX ADMIN — DEXAMETHASONE SODIUM PHOSPHATE 4 MG: 4 INJECTION, SOLUTION INTRAMUSCULAR; INTRAVENOUS at 23:47

## 2024-08-20 RX ADMIN — AMIODARONE HYDROCHLORIDE 150 MG: 50 INJECTION, SOLUTION INTRAVENOUS at 15:49

## 2024-08-20 RX ADMIN — METOPROLOL TARTRATE 5 MG: 5 INJECTION INTRAVENOUS at 10:47

## 2024-08-20 RX ADMIN — PANTOPRAZOLE SODIUM 40 MG: 40 TABLET, DELAYED RELEASE ORAL at 06:32

## 2024-08-20 RX ADMIN — APIXABAN 5 MG: 5 TABLET, FILM COATED ORAL at 20:35

## 2024-08-20 RX ADMIN — INSULIN LISPRO 14 UNITS: 100 INJECTION, SOLUTION INTRAVENOUS; SUBCUTANEOUS at 12:11

## 2024-08-20 ASSESSMENT — PAIN SCALES - GENERAL
PAINLEVEL_OUTOF10: 0

## 2024-08-20 NOTE — PROGRESS NOTES
Pt in bed with wife at bedside. Pt is alert and oriented but gets disoriented at times. Pt denies any pain. Voids in urinal but can be incontinent at times as well. Pt swallows pills whole and tolerates a easy to chew diet. Gets up with x 1 and walker. On 2 liters NC. Pt has port that is non accessed. Left AC piv intact and saline locked. Assessment completed. All safety precautions in place. Pt denies any needs. Call light in reach, will continue to monitor.

## 2024-08-20 NOTE — PROGRESS NOTES
4 Eyes Skin Assessment     The patient is being assess for  Transfer to New Unit    I agree that 2 RN's have performed a thorough Head to Toe Skin Assessment on the patient. ALL assessment sites listed below have been assessed.       Areas assessed by both nurses:   [x]   Head, Face, and Ears   [x]   Shoulders, Back, and Chest  [x]   Arms, Elbows, and Hands   [x]   Coccyx, Sacrum, and IschIum  [x]   Legs, Feet, and Heels        Does the Patient have Skin Breakdown?  No         Sigifredo Prevention initiated:  No   Wound Care Orders initiated:  No      [facility] nurse consulted for Pressure Injury (Stage 3,4, Unstageable, DTI, NWPT, and Complex wounds), New and Established Ostomies:  No      Nurse 1 eSignature: Electronically signed by Dale Alatorre RN on 8/20/24 at 6:52 PM EDT    **SHARE this note so that the co-signing nurse is able to place an eSignature**    Nurse 2 eSignature: Electronically signed by Jovanny Patel RN on 8/20/24 at 6:53 PM EDT

## 2024-08-20 NOTE — PLAN OF CARE
Problem: Discharge Planning  Goal: Discharge to home or other facility with appropriate resources  8/20/2024 1313 by Elsa Rollins RN  Outcome: Not Progressing- pt being transferred to higher level of care.   Flowsheets (Taken 8/20/2024 1303)  Discharge to home or other facility with appropriate resources: Identify barriers to discharge with patient and caregiver  8/19/2024 2355 by Haven Ruiz RN  Outcome: Progressing     Problem: Chronic Conditions and Co-morbidities  Goal: Patient's chronic conditions and co-morbidity symptoms are monitored and maintained or improved  8/20/2024 1313 by Elsa Rollins RN  Outcome: Not Progressing  Flowsheets (Taken 8/20/2024 1303)  Care Plan - Patient's Chronic Conditions and Co-Morbidity Symptoms are Monitored and Maintained or Improved: Monitor and assess patient's chronic conditions and comorbid symptoms for stability, deterioration, or improvement  8/19/2024 2355 by Haven Ruiz RN  Outcome: Progressing     Problem: Discharge Planning  Goal: Discharge to home or other facility with appropriate resources  8/20/2024 1313 by Elsa Rollins RN  Outcome: Not Progressing  Flowsheets (Taken 8/20/2024 1303)  Discharge to home or other facility with appropriate resources: Identify barriers to discharge with patient and caregiver  8/19/2024 2355 by Haven Ruiz RN  Outcome: Progressing     Problem: Chronic Conditions and Co-morbidities  Goal: Patient's chronic conditions and co-morbidity symptoms are monitored and maintained or improved  8/20/2024 1313 by Elsa Rollins RN  Outcome: Not Progressing  Flowsheets (Taken 8/20/2024 1303)  Care Plan - Patient's Chronic Conditions and Co-Morbidity Symptoms are Monitored and Maintained or Improved: Monitor and assess patient's chronic conditions and comorbid symptoms for stability, deterioration, or improvement  8/19/2024 2355 by Haven Ruiz RN  Outcome: Progressing

## 2024-08-20 NOTE — ONCOLOGY
07/30/2024   - He had a CT scan on 11/17/2022, which showed stable disease.  Another CT on 3/01/2023 showed multiple foci of pulmonary consolidation (atypical infection versus metastatic disease) and stable liver/bone mets.  A follow-up PET/CT on 3/27/2023 showed multiple nodular areas of consolidation in the left lung with septal thickening (pneumonitis versus infection versus viable malignancy).  Liver mets and bone mets were redemonstrated.  A CT-guided biopsy of the left lung on 4/07/2023 showed benign/inflammatory findings.  Another CT on 7/24/2023 showed stable disease.  Another CT on 10/20/2023 showed stable disease.  Another CT on 2/07/2024 showed stable disease.  Another CT on 5/01/2024 showed stable disease.  - CT Chest 08/07/2024               1. No evidence of pulmonary embolism.  2. Post treatment changes of the left lung.  Consolidative opacities at the left lung base are slightly increased from May 2024 prior and there is left-sided interlobular septal thickening.  New ground-glass opacities are also seen in the right middle lobe. Findings are suspicious for a superimposed infectious/inflammatory process versus less likely asymmetric edema.  Recommend attention on follow-up imaging.  3. Stable left upper lobe pulmonary nodule measuring 1.4 cm.  4. Stable left-sided pleural thickening and small loculated left pleural effusion.  5. Osseous metastatic disease, similar in appearance to prior  - due for treatment 08/20/2024 with Alimta although this remains on hold      Dysphagia  - patient notes that he has been choking on liquids for the last 3-4 weeks   - no oral sores/irration noted or signs of thrush   - speech eval 08/08 unremarkable     SOB  Chest Pain now resolved  - cardiology evaluated patient on prior admission and recommended conservative medical management without ischemic workup given underlying metastatic disease  - patient was started on prednisone 40 mg qd x 5 day course on 08/17 and  discharged home  - readmitted 08/19/2024  - BNP 3729 on 08/19   - Possible bronch if the patient does not improve.     Chemo-induced anemia   - Started Procrit 40,000 units weekly on 9/01/2023.  HGB was 7.4 g/dL at that time.    - last received procrit on 08/15/2024   - Transfused 1 unit of PRBCs on 8/07  - likely secondary to recent chemo   - on Folic acid, as Alimta is a folic acid inhibitor  - no evidence of acute bleeding   - continuing trending and transfuse for hgb <7, plts <10K     Brain mets   - An MRI of the brain on 8/22/2023 showed a new 9 mm enhancing lesion in the right superior frontal gyrus and a new 3 mm enhancing lesion in the left cerebellum.    - At the time of a planning MRI of the brain on 9/25/2023, 9 total brain mets were identified and treated by [facility] with Dr. Carl Ojeda and Dr. Gerry Lofton.  - A follow-up MRI on 12/12/2023 showed mild enlargement of lesions in the right temporal/occipital lobe and left frontal lobe.  This was felt to be due to treatment effect/radiation necrosis.  5 Loxin was recommended.  - Another MRI of the brain on 4/04/2024 showed an increase in the size of a left frontal periventricular met (from 2 mm to 6 mm) and an increase in the size of the right parietal met (from 2 mm to 3 mm).    - Another MRI of the brain on 5/03/2024 showed stable brain mets with an increase in peritumoral edema surrounding the 2 larger brain mets.    - Another MRI on 7/26/2024 showed stable findings    Prevention of SRE   - Started Zoledronic acid on 11/08/2021.    - Last received 07/30/2024    T2DM  - on insulin glargine 70 units in evenings  - per IM      Afib  - on metoprolol succcinate 25mg daily  -per IM        ONCOLOGIC DISPOSITION:      Ronald Herman MD  Please contact through [employer]

## 2024-08-20 NOTE — PLAN OF CARE
Problem: Discharge Planning  Goal: Discharge to home or other facility with appropriate resources  8/19/2024 2355 by Haven Ruiz RN  Outcome: Progressing  8/19/2024 1913 by Elsa Rollins RN  Outcome: Progressing  8/19/2024 1322 by Yessenia Henderson RN  Outcome: Progressing  Flowsheets (Taken 8/19/2024 1231)  Discharge to home or other facility with appropriate resources:   Identify barriers to discharge with patient and caregiver   Arrange for needed discharge resources and transportation as appropriate     Problem: Safety - Adult  Goal: Free from fall injury  8/19/2024 2355 by Haven Ruiz RN  Outcome: Progressing  8/19/2024 1913 by Elsa Rollins RN  Outcome: Progressing  8/19/2024 1322 by Yessenia Henderson RN  Outcome: Progressing     Problem: ABCDS Injury Assessment  Goal: Absence of physical injury  8/19/2024 2355 by Haven Ruiz RN  Outcome: Progressing  8/19/2024 1913 by Elsa Rollins RN  Outcome: Progressing     Problem: Skin/Tissue Integrity  Goal: Absence of new skin breakdown  Description: 1.  Monitor for areas of redness and/or skin breakdown  2.  Assess vascular access sites hourly  3.  Every 4-6 hours minimum:  Change oxygen saturation probe site  4.  Every 4-6 hours:  If on nasal continuous positive airway pressure, respiratory therapy assess nares and determine need for appliance change or resting period.  8/19/2024 2355 by Haven Ruiz RN  Outcome: Progressing  8/19/2024 1913 by Elsa Rollins RN  Outcome: Progressing  8/19/2024 1322 by Yessenia Henderson RN  Outcome: Progressing     Problem: Chronic Conditions and Co-morbidities  Goal: Patient's chronic conditions and co-morbidity symptoms are monitored and maintained or improved  8/19/2024 2355 by Haven Ruiz RN  Outcome: Progressing  8/19/2024 1913 by Elsa Rollins RN  Outcome: Progressing

## 2024-08-20 NOTE — PLAN OF CARE
Bedside swallow evaluation completed this date.    Charley Yancey M.S. CCC-SLP  Speech-language pathologist  [id number]

## 2024-08-20 NOTE — CONSULTS
[facility]   Cardiovascular Evaluation    PATIENT: Wes Schneider  DATE: 2024  MRN: [id number]  CSN: [id number]  : 1945    Primary Care Doctor/Referring provider: Jourdan Russ MD, Romel Renteria MD     Reason for evaluation/Chief complaint:   Shortness of Breath (Per squad 77 % on ra.. duoneb and solmed in squad..The patient arrived 87 % ra.. pt place on 4 lt)      Subjective:    History of present illness on initial date of evaluation:   Wes Schneider is a 79 y.o. patient whom we are asked to see for evaluation of shortness of breath.  The patient was just discharged by our medical service on , .  He returned  for complaints of not doing well and increasing shortness of breath.  Cardiology is asked see the patient for further recommendations.  The patient was most recently seen in the hospital twice by our cardiology service.  From my previous consult note several days ago, \" for evaluation of several days weakness, fatigue and right-sided chest pain.  Patient does fatigued and unable to participate completely in history of present illness.  Patient's wife is at bedside who relays additional information.  The patient has been struggling with stage IV lung cancer and been on active chemotherapy for several years.  He has had repeat admissions to the hospital with most recently admitted in early August.  He was seen by our cardiology team at that time for similar chest pain and troponin elevation.  At that time it was elected to proceed with a conservative approach and the symptoms likely represented on cardiac causes.\"      Patient Active Problem List   Diagnosis    Transient global amnesia    Acute encephalopathy    Hemispheric carotid artery syndrome    Uncontrolled type 2 diabetes mellitus with complication, without long-term current use of insulin    HTN (hypertension), benign    Dyslipidemia    Urolithiasis    SOB (shortness of breath)     Left renal stone    Lung cancer (HCC)    Shortness of breath    Gram-negative pneumonia (HCC)    Sepsis (HCC)    Hyperglycemia due to type 2 diabetes mellitus (HCC)    Neutropenia (HCC)    Cancer of right lung (HCC)    Elevated troponin level not due myocardial infarction    Immunocompromised state due to drug therapy (HCC)    Pancytopenia (HCC)    Gram-negative bacteremia    COVID-19    Normocytic normochromic anemia    Generalized weakness    Acute hypoxemic respiratory failure (HCC)    Hypoxemia    Pulmonary infiltrate    Loculated pleural effusion    Atelectasis    Metastatic adenocarcinoma (HCC)    Primary malignant neoplasm of lung with metastasis to brain (HCC)    Vasogenic edema (HCC)    Elevated troponin    Uncontrolled type 2 diabetes mellitus with hyperglycemia (HCC)    Former smoker    Elevated brain natriuretic peptide (BNP) level    Normal anion gap metabolic acidosis    Atrial fibrillation with RVR (HCC)         Cardiac Testing: I have reviewed the findings below.  EKG:  ECHO:   STRESS TEST:  CATH:  BYPASS:  VASCULAR:    Past Medical History:   has a past medical history of BPH (benign prostatic hyperplasia), Cancer (HCC), Diabetes mellitus (HCC), Esophageal reflux, Hyperlipidemia, Hypertension, Kidney stone, Lung cancer (HCC), Lung cancer (HCC), and Neuropathy.    Surgical History:   has a past surgical history that includes Prostate surgery; Cystoscopy (Right, 11/09/2020); back surgery (2012); NEPHROLITHOTOMY (Left, 12/04/2020); shoulder surgery (Right); Port Surgery (N/A, 11/04/2021); Port Surgery (N/A, 10/04/2022); Port Surgery (Left, 11/02/2022); CT NEEDLE BIOPSY LUNG PERCUTANEOUS (04/07/2023); and Cataract extraction, extracapsular w/ intraocular lens implant.     Social History:   reports that he quit smoking about 47 years ago. His smoking use included cigarettes. He started smoking about 64 years ago. He has never used smokeless tobacco. He reports that he does not drink alcohol and does not  [START ON 8/21/2024] dexAMETHasone  4 mg IntraVENous Q12H    amiodarone  150 mg IntraVENous Once    [Held by provider] amiodarone  200 mg Oral Daily    apixaban  5 mg Oral BID    atorvastatin  40 mg Oral Nightly    folic acid  1 mg Oral Daily    metoprolol succinate  25 mg Oral Daily    therapeutic multivitamin-minerals  1 tablet Oral Daily    tamsulosin  0.4 mg Oral Daily    vitamin D3  400 Units Oral Daily    escitalopram  5 mg Oral Nightly    insulin lispro  0-8 Units SubCUTAneous TID WC    insulin lispro  0-4 Units SubCUTAneous Nightly    sodium chloride flush  5-40 mL IntraVENous 2 times per day    ipratropium 0.5 mg-albuterol 2.5 mg  1 Dose Inhalation Q4H WA RT    ampicillin-sulbactam  3,000 mg IntraVENous Q6H    pantoprazole  40 mg Oral QAM AC         Infusion Medications:   amiodarone      Followed by    amiodarone      dextrose      sodium chloride 25 mL (08/20/24 1222)         Allergies:  Patient has no known allergies.     Review of Systems:   All 14 point review of symptoms completed. Pertinent positives identified in the HPI, all other review of symptoms findings as below.         Physical Examination:    [unfilled]  BP (!) 91/55   Pulse (!) 120   Temp 97.3 °F (36.3 °C) (Oral)   Resp 18   Ht 1.829 m (6')   Wt 81.6 kg (180 lb)   SpO2 96%   BMI 24.41 kg/m²    Weight - Scale: 81.6 kg (180 lb)     Wt Readings from Last 3 Encounters:   08/19/24 81.6 kg (180 lb)   08/16/24 84.8 kg (186 lb 15.2 oz)   08/11/24 81.1 kg (178 lb 12.8 oz)       Intake/Output Summary (Last 24 hours) at 8/20/2024 4677  Last data filed at 8/20/2024 0903  Gross per 24 hour   Intake 310 ml   Output 750 ml   Net -440 ml       General Appearance:  Alert, cooperative, no distress, appears stated age, appears fatigued   Head:  Normocephalic, without obvious abnormality, atraumatic   Eyes:  PERRL, conjunctiva/corneas clear       Nose: Nares normal, no drainage or sinus tenderness   Throat: Lips, mucosa, and tongue normal   Neck: Supple,

## 2024-08-20 NOTE — PROGRESS NOTES
This RN gave report to nurse on A2. Patient to transfer to room 217. RN made son aware of plan of care who is at bedside at this time.

## 2024-08-20 NOTE — CONSULTS
Consult Placed     Who: CARDIOLOGY, [facility]   Date:8/20/2024  Time:1214     Electronically signed by Dex Bright on 8/20/2024 at 12:14 PM

## 2024-08-20 NOTE — CONSULTS
[facility] Nurse  Consult Note       NAME:  Wes Schneider  MEDICAL RECORD NUMBER:  [id number]  AGE: 79 y.o.   GENDER: male  : 1945  TODAY'S DATE:  2024    Subjective; Nope, try again.   ( Wound Care nurse stated wrong name) Yes, now you have me.   Reason for WOCN Evaluation and Assessment: eep tissue injury vs moisture related area on buttocks     Bi lateral buttocks discolored but blanching.  Continue to monitor discoloration for break down.  At this time feel more shearing.  There is a small open wound with in coccyx. Stage 2.       Wes Schneider is a 79 y.o. male referred by:   [] Physician  [x] Nursing  [] Other:     Wound Identification:  Wound Type: diabetic, pressure, non-healing surgical, and non-healing/non-surgical  Contributing Factors: diabetes, poor glucose control, chronic pressure, decreased mobility, shear force, and obesity    Wound History: 79 y.o. 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   Current Wound Care Treatment:  DIANNE    Patient Goal of Care:  [x] Wound Healing  [] Odor Control  [] Palliative Care  [x] Pain Control   [] Other:         PAST MEDICAL HISTORY        Diagnosis Date    BPH (benign prostatic hyperplasia)     Cancer (HCC)     prostate    Diabetes mellitus (HCC)     Esophageal reflux     Hyperlipidemia     Hypertension     Kidney stone     Lung cancer (HCC) 10/02/2021    Stage 4    Lung cancer (HCC)     Neuropathy        PAST SURGICAL HISTORY    Past Surgical History:   Procedure Laterality Date    BACK SURGERY      lumber, no hardware, \"cleaned it out\"    CATARACT EXTRACTION EXTRACAPSULAR W/ INTRAOCULAR LENS IMPLANTATION      CT NEEDLE BIOPSY LUNG PERCUTANEOUS  2023    CT NEEDLE BIOPSY LUNG PERCUTANEOUS 2023 Robert Palma MD [facility] CT SCAN    CYSTOSCOPY Right 2020    CYSTOSCOPY,  RIGHT URETEROSCOPY, RIGHT RETRO PYELOGRAM, REMOVAL STONE FROM BLADDER,   [] Needs reinforcement  [] Unsuccessful      [x] Verbal Understanding  [] Demonstrated understanding       [] No evidence of learning  [] Refused teaching         [] N/A       Electronically signed by Nereida Ferreira, RN, BSN on 8/20/2024 at 10:21 AM

## 2024-08-20 NOTE — PROGRESS NOTES
INPATIENT PULMONARY CRITICAL CARE PROGRESS NOTE      Reason for visit    shortness of breath, pleural effusion and has history of lung cancer     SUBJECTIVE: Patient when seen this morning continues to be symptomatic, patient still has some cough but is able to bring up minimal amount of secretions now, patient does have some shortness of breath, patient does not have any pleuritic chest pain, patient when seen was on 2 L of nasal cannula oxygen with sats 95%, patient was not having any epistaxis or hemoptysis, patient was afebrile, patient's blood pressure is fluctuating, and was on the lower side, patient's blood sugars are not optimally controlled, patient has gone into A-fib with RVR, patient document urine output is on the lower side, patient's p.o. intake has been on the lower side as documented epic with cumulative fluid balance of -440 ml, patient was alert and oriented, no other pertinent review of system of concern           Physical Exam:  Blood pressure (!) 91/55, pulse (!) 120, temperature 97.3 °F (36.3 °C), temperature source Oral, resp. rate 18, height 1.829 m (6'), weight 81.6 kg (180 lb), SpO2 96%.'   Constitutional:  No acute distress.   HENT:  Oropharynx is clear and moist. No thyromegaly.  Eyes:  Conjunctivae are normal. Pupils equal, round, and reactive to light. No scleral icterus.   Neck: . No tracheal deviation present. No obvious thyroid mass.   Cardiovascular: S1-S2 irregularly irregular, normal heart sounds.  No right ventricular heave. No lower extremity edema.  Pulmonary/Chest: No wheezes.  Left-sided rales.  Chest wall is not dull to percussion.  No accessory muscle usage or stridor.  Decreased breath sound intensity  Abdominal: Soft. Bowel sounds present. No distension or hernia. No tenderness.    Musculoskeletal: No cyanosis. No clubbing. No obvious joint deformity.   Lymphadenopathy: No cervical or supraclavicular adenopathy.   Skin: Skin is warm and dry. No rash or nodules on the  Vasogenic edema (HCC)    Elevated troponin    Uncontrolled type 2 diabetes mellitus with hyperglycemia (HCC)    Former smoker    Elevated brain natriuretic peptide (BNP) level    Normal anion gap metabolic acidosis    Atrial fibrillation with RVR (HCC)  Resolved Problems:    * No resolved hospital problems. *          Plan:   Oxygen supplementation to keep saturation between 90 and 94% only  Please titrate the oxygen as per the above parameters  Pulmonary toilet  Patient and family have been shown the recent CT of the chest along with his findings and implications  Patient and family were also shown the x-ray chest along with findings and implications  Patient does have some pulm infiltrates/volume loss/loculated pleural effusion  Patient was started on empiric Unasyn at this time  Sputum culture ordered  Respiratory pneumonia multiple panel ordered for the patient  Bronchodilators  P.o. prednisone changed to IV Decadron which will help both with the inflammation in the lungs and also for vasogenic edema but the dose was decreased to 4 mg IV push every 12 hours  Patient never had any radiation therapy to the lungs  Patient has a persistent left sided lung nodule which may be a part of the adenocarcinoma of the lung if the patient has  Patient's recent MRI of the head was reviewed and patient had brain mets with vasogenic edema  Patient is complaining of oropharyngeal dysphagia-patient had a recent speech evaluation done and patient was cleared to eat by mouth-consider repeating speech evaluation if deemed appropriate-patient may require MBS or FEES  Patient has developed A-fib with RVR  Patient got several doses of IV Lopressor with no improvement  Patient also has been on p.o. amiodarone  Patient also is getting Lopressor and Flomax  Patient continues to be in A-fib with RVR along with that patient has borderline blood pressure  Patient to be transferred to progressive care unit  Patient to be given amiodarone  infusion  Monitor hemodynamics and cardiac rhythm closely  Titration of antihypertensives and Flomax as per primary team depending on hemodynamics  Patient does have elevated troponin and BNP-further evaluation as per patient's primary team  Patient has been on metoprolol and amiodarone at home-consider restarting  Patient also is getting Eliquis  Monitor for any bleeding  Patient has uncontrolled diabetes mellitus-insulins as per IM on the base of blood glucose monitoring  BGM with SSI  Monitor for any hypoglycemia  Patient also has normal anion gap metabolic acidosis-patient was given 1 dose of sodium bicarbonate  If patient's clinical status does not improve will consider diagnostic bronchoscopy-will provisionally keep the patient n.p.o. after midnight  Monitor input output and BMP  Correct electrolytes on whenever necessary basis  PUD prophylaxis    Case discussed with patient and family  Patient's clinical status has deteriorated and patient has been transferred to progressive unit for further management    Case discussed with nursing            Electronically signed by:  SARAH STRONG MD    8/20/2024    1:32 PM.

## 2024-08-20 NOTE — PROGRESS NOTES
Speech Language Pathology  Clinical Bedside Swallow Assessment  Facility/Department: [facility] B3 - MED SURG        Recommendations:  Diet recommendation: IDDSI 7 Regular- Easy To Chew; IDDSI 0 Thin Liquids; Meds PO as tolerated  Instrumentation: consider completion of instrumental swallow study to correlate clinical findings; SLP reviewed both MBSS and FEES with pt this date, pt agreeable to MBSS.  Will discuss with MD.  Risk management: upright for all intake, stay upright for at least 30 mins after intake, small bites/sips, close supervision, oral care 2-3x/day to reduce adverse affects in the event of aspiration, increase physical mobility as able, alternate bites/sips, slow rate of intake, general GERD precautions, general aspiration precautions, and hold PO and contact SLP if s/s of aspiration or worsening respiratory status develop.      NAME:Wes Schneider  : 1945 (79 y.o.)   MRN: [id number]  ROOM: [address] [place] [address]  ADMISSION DATE: 2024  PATIENT DIAGNOSIS(ES): Hypoxia [R09.02]  History of lung cancer [Z85.118]  Pleural effusion, left [J90]  Atrial fibrillation with RVR (HCC) [I48.91]  Acute hypoxemic respiratory failure (HCC) [J96.01]  Acute congestive heart failure, unspecified heart failure type (HCC) [I50.9]  Chief Complaint   Patient presents with    Shortness of Breath     Per squad 77 % on ra.. duoneb and solmed in squad..The patient arrived 87 % ra.. pt place on 4 lt     Patient Active Problem List    Diagnosis Date Noted    Gram-negative bacteremia 10/04/2022    Immunocompromised state due to drug therapy (HCC) 10/03/2022    Pancytopenia (HCC) 10/03/2022    Hyperglycemia due to type 2 diabetes mellitus (HCC) 10/02/2022    Neutropenia (HCC) 10/02/2022    Cancer of right lung (HCC) 10/02/2022    Elevated troponin level not due myocardial infarction 10/02/2022    Gram-negative pneumonia (HCC) 2022    Sepsis (HCC) 2022    Acute hypoxemic respiratory failure (HCC) 2024     Ketan Trejo MD at [facility] OR    PORT SURGERY N/A 11/04/2021    SURGICAL PORT PLACEMENT performed by Ever Lamar MD at [facility] OR    PORT SURGERY N/A 10/04/2022    PORT REMOVAL performed by Danny Andrade MD at [facility] OR    PORT SURGERY Left 11/02/2022    SURGICAL PORT PLACEMENT performed by Ever Lamar MD at [facility] OR    PROSTATE SURGERY      CYBER KNIFE    SHOULDER SURGERY Right     ROTATOR CUFF     No Known Allergies    DATE ONSET: 8/19/2024    Date of Evaluation: 8/20/2024   Evaluating Therapist: ORLANDO Morris    Chart Reviewed:  [x] Yes [] No     Pain: The patient does not complain of pain     Current Diet: ADULT DIET; Easy to Chew  ADULT ORAL NUTRITION SUPPLEMENT; Lunch, Dinner; Standard High Calorie/High Protein Oral Supplement  ADULT ORAL NUTRITION SUPPLEMENT; Breakfast; Standard High Calorie/High Protein Oral Supplement      Most Recent Imaging    XR CHEST PORTABLE   Final Result   Unchanged left-sided pleural effusion and left-sided airspace opacities               Reason for Referral  Wes Schneider was referred for a bedside swallow evaluation to assess the efficiency of their swallow function, identify signs and symptoms of aspiration and make recommendations regarding safe dietary consistencies, effective compensatory strategies, and safe eating environment.    Assessment    Medical record review/interview: Per MD H&P on 8/19/24: \" 79 y.o. 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  He is followed by [facility] and is currently receiving chemotherapy.  Also has PMH of atrial fibrillation on long-term anticoagulation with Eliquis, hypertension, hyperlipidemia, type 2 diabetes mellitus insulin requiring, BPH     He had a recent admission to [facility] and was admitted on 8/16/2024 and discharged on 8/18/2024.  He was at that time with weakness and shortness of breath     He was discharged on 8/19/2024 and  will tolerate least restrictive diet with no clinical s/s of aspiration or worsening respiratory/pulmonary status      Treatment:  Skilled instruction completed with patient re: evidenced based practice regarding recommendations and POC, importance of oral care to reduce adverse affects in the event of aspiration, and instruction of recommended compensatory strategies developed based upon clinical exam. Pt able to recall/demonstrate compensatory strategies with (min-mod) cues.       Pt Education: SLP educated the patient re: Role of SLP, rationale for completion of assessment, results of assessment, recommendations, POC, and rationale for MBS  Pt Education Response: verbalized understanding, would benefit from ongoing education, RN aware, and caregiver aware    Duration/Frequency of Tx: 1-3x/week for LOS    Individuals Consulted:   [x]  Patient     []  NP         [x]  RN   []  RD                   []  MD      [x]  Family Member (pt's wife)              []  PA    []  Other:      Safety Devices / Report:   [x]  All fall risk precautions in place [x]  Safety handoff completed with RN  [x]  Bed alarm in place  [x]  Left in bed     []  Chair alarm in place  []  Left in chair   [x]  Call light in reach   []  Other:                       Total Treatment Time / Charges       Time in Time out Total Time / units   Swallow Eval/Tx Time  0822 0843 24 min / 2 units     Signature:  Charley Yancey M.S. CCC-SLP  Speech-language pathologist  [phone]

## 2024-08-20 NOTE — PROGRESS NOTES
Hospital Medicine Progress Note      Date of Admission: 8/19/2024  Hospital Day: 2    Chief Admission Complaint:  sob     Subjective: He is sitting up in bed, in no acute distress this time.  States shortness of breath is somewhat improved from admission.  Denies any chest pain    Presenting Admission History:       79 y.o. 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  He is followed by [facility] and is currently receiving chemotherapy.  Also has PMH of atrial fibrillation on long-term anticoagulation with Eliquis, hypertension, hyperlipidemia, type 2 diabetes mellitus insulin requiring, BPH     He had a recent admission to [facility] and was admitted on 8/16/2024 and discharged on 8/18/2024.  He was at that time with weakness and shortness of breath     He was discharged on 8/19/2024 and was doing reasonably well until the morning of admission when he had increased shortness of breath and therefore presented to the ED.  He denies any chest pain, denies any fever or chills  He is requiring supplemental oxygen the ED was admitted for further evaluation and management.       Assessment/Plan:      Current Principal Problem:  <principal problem not specified>    Acute hypoxic respiratory : Patient presented to ED with main complaint shortness of breath.  Chest x-ray from time of admission does reveal left-sided pleural effusion left-sided airspace opacities.  He is feeling better with supplemental oxygen via nasal cannula.  Pulmonary has seen in consultation and their input is noted and appreciated.  Continue bronchodilators, continue on IV steroids/Decadron.       Generalized weakness.  POA.  Multifactorial including secondary to malignancy, chemotherapy and physical deconditioning.will ask PT OT to evaluate.     Pulmonary infiltrates : This was noted on chest x-ray at the time of admission.  Blood cultures have been sent.  Continue on empiric  9.1*   HCT 23.0* 29.2* 28.4*    265 270     Recent Labs     08/18/24  0527 08/19/24  0908 08/20/24  0643   * 134* 134*   K 4.3 4.6 5.0    101 101   CO2 20* 21 20*   BUN 18 20 24*   CREATININE 1.0 1.1 1.2   CALCIUM 7.4* 8.0* 8.1*   MG  --  2.00  --      Recent Labs     08/19/24  0908 08/19/24  1011   PROBNP 3,729*  --    TROPHS 56* 59*     No results for input(s): \"LABA1C\" in the last 72 hours.  Recent Labs     08/18/24  0527 08/19/24  0908   AST 18 30   ALT 14 21   BILITOT 0.6 0.5   ALKPHOS 119 134*     Recent Labs     08/19/24  0858   LACTA 1.9       Urine Cultures:   Lab Results   Component Value Date/Time    LABURIN  08/16/2024 09:29 PM     <10,000 CFU/ml mixed skin/urogenital cora. No further workup    LABURIN 25,000 CFU/ml  No further workup   08/16/2024 09:29 PM     Blood Cultures:   Lab Results   Component Value Date/Time    BC  08/19/2024 10:09 AM     No Growth to date.  Any change in status will be called.     Lab Results   Component Value Date/Time    BLOODCULT2  08/19/2024 10:09 AM     No Growth to date.  Any change in status will be called.     Organism:   Lab Results   Component Value Date/Time    ORG Candida parapsilosis 08/16/2024 09:29 PM         CARLOS A VALDEZ MD

## 2024-08-20 NOTE — CARE COORDINATION
Case Management Assessment  Initial Evaluation    Date/Time of Evaluation: 8/20/2024 2:54 PM  Assessment Completed by: Nisha Hinton RN    If patient is discharged prior to next notation, then this note serves as note for discharge by case management.    Patient Name: Wes Schneider                   YOB: 1945  Diagnosis: Hypoxia [R09.02]  History of lung cancer [Z85.118]  Pleural effusion, left [J90]  Atrial fibrillation with RVR (HCC) [I48.91]  Acute hypoxemic respiratory failure (HCC) [J96.01]  Acute congestive heart failure, unspecified heart failure type (HCC) [I50.9]                   Date / Time: 8/19/2024  8:31 AM    Patient Admission Status: Inpatient   Readmission Risk (Low < 19, Mod (19-27), High > 27): Readmission Risk Score: 27.2    Current PCP: Jourdan Russ MD  PCP verified by CM? Yes    Chart Reviewed: Yes      History Provided by: Patient  Patient Orientation: Alert and Oriented, Person, Place, Situation, Self    Patient Cognition: Alert    Hospitalization in the last 30 days (Readmission):  Yes    If yes, Readmission Assessment in  Navigator will be completed.    Advance Directives:      Code Status: Full Code   Patient's Primary Decision Maker is: Legal Next of Kin    Primary Decision Maker: Miranda Schneider - Spouse - [phone]    Discharge Planning:    Patient lives with: Spouse/Significant Other Type of Home: House  Primary Care Giver: Self  Patient Support Systems include: Spouse/Significant Other, Children   Current Financial resources: Medicare  Current community resources: None  Current services prior to admission: Home Care            Current DME:              Type of Home Care services:  OT, PT, Nursing Services    ADLS  Prior functional level: Assistance with the following:, Shopping, Housework, Cooking  Current functional level: Shopping, Housework, Cooking, Assistance with the following:    PT AM-PAC:   /24  OT AM-PAC:   /24    Family can provide assistance at DC:

## 2024-08-20 NOTE — ACP (ADVANCE CARE PLANNING)
Advance Care Planning   General Advance Care Planning (ACP) Conversation    Date of Conversation: 8/19/2024  Conducted with: Patient with Decision Making Capacity  Other persons present: Son Frank    Healthcare Decision Maker:    Primary Decision Maker: JennieMiranda - Spouse - [phone]    Today we documented Decision Maker(s) consistent with ACP documents on file. Wife is designated decision maker. - no documents on file.   Content/Action Overview:  Has ACP document(s) NOT on file - requested patient to provide  Reviewed DNR/DNI and patient elects Full Code (Attempt Resuscitation)      Length of Voluntary ACP Conversation in minutes:  <16 minutes (Non-Billable)    Nisha Hinton RN

## 2024-08-21 ENCOUNTER — APPOINTMENT (OUTPATIENT)
Dept: GENERAL RADIOLOGY | Age: 79
DRG: 177 | End: 2024-08-21
Payer: MEDICARE

## 2024-08-21 LAB
ANION GAP SERPL CALCULATED.3IONS-SCNC: 9 MMOL/L (ref 3–16)
APPEARANCE FLUID: NORMAL
BDY FLUID QUALITY: NORMAL
BUN SERPL-MCNC: 26 MG/DL (ref 7–20)
CALCIUM SERPL-MCNC: 7.3 MG/DL (ref 8.3–10.6)
CELL COUNT FLUID TYPE: NORMAL
CHLORIDE SERPL-SCNC: 105 MMOL/L (ref 99–110)
CO2 SERPL-SCNC: 23 MMOL/L (ref 21–32)
COLOR FLUID: NORMAL
CREAT SERPL-MCNC: 1.2 MG/DL (ref 0.8–1.3)
DEPRECATED RDW RBC AUTO: 20.9 % (ref 12.4–15.4)
GFR SERPLBLD CREATININE-BSD FMLA CKD-EPI: 61 ML/MIN/{1.73_M2}
GLUCOSE BLD-MCNC: 282 MG/DL (ref 70–99)
GLUCOSE BLD-MCNC: 284 MG/DL (ref 70–99)
GLUCOSE BLD-MCNC: 296 MG/DL (ref 70–99)
GLUCOSE BLD-MCNC: 302 MG/DL (ref 70–99)
GLUCOSE BLD-MCNC: 302 MG/DL (ref 70–99)
GLUCOSE SERPL-MCNC: 281 MG/DL (ref 70–99)
HCT VFR BLD AUTO: 25.3 % (ref 40.5–52.5)
HGB BLD-MCNC: 8 G/DL (ref 13.5–17.5)
LYMPHOCYTES NFR FLD: 3 %
MAGNESIUM SERPL-MCNC: 2 MG/DL (ref 1.8–2.4)
MCH RBC QN AUTO: 31.4 PG (ref 26–34)
MCHC RBC AUTO-ENTMCNC: 31.6 G/DL (ref 31–36)
MCV RBC AUTO: 99.5 FL (ref 80–100)
MESOTHL CELL NFR FLD: 1 %
MONONUCLEAR UNIDENTIFIED CELLS FLUID: 2 %
NEUTROPHIL, FLUID: 94 %
NUC CELL # FLD: 69 /CUMM
PATH CONSULT FLUID: NORMAL
PATH REV: YES
PERFORMED ON: ABNORMAL
PLATELET # BLD AUTO: 233 K/UL (ref 135–450)
PMV BLD AUTO: 7.5 FL (ref 5–10.5)
POTASSIUM SERPL-SCNC: 4.5 MMOL/L (ref 3.5–5.1)
RBC # BLD AUTO: 2.54 M/UL (ref 4.2–5.9)
RBC FLUID: NORMAL /CUMM
REPORT: NORMAL
RESP PATH DNA+RNA PNL L RESP NAA+NON-PRB: NORMAL
SODIUM SERPL-SCNC: 137 MMOL/L (ref 136–145)
TOTAL CELLS COUNTED FLD: 100
WBC # BLD AUTO: 11.9 K/UL (ref 4–11)

## 2024-08-21 PROCEDURE — 88112 CYTOPATH CELL ENHANCE TECH: CPT

## 2024-08-21 PROCEDURE — 88305 TISSUE EXAM BY PATHOLOGIST: CPT

## 2024-08-21 PROCEDURE — 92610 EVALUATE SWALLOWING FUNCTION: CPT

## 2024-08-21 PROCEDURE — 2709999900 HC NON-CHARGEABLE SUPPLY: Performed by: INTERNAL MEDICINE

## 2024-08-21 PROCEDURE — 87186 SC STD MICRODIL/AGAR DIL: CPT

## 2024-08-21 PROCEDURE — 80048 BASIC METABOLIC PNL TOTAL CA: CPT

## 2024-08-21 PROCEDURE — 6360000002 HC RX W HCPCS: Performed by: INTERNAL MEDICINE

## 2024-08-21 PROCEDURE — 3609011100 HC BRONCHOSCOPY BRUSHINGS: Performed by: INTERNAL MEDICINE

## 2024-08-21 PROCEDURE — 94640 AIRWAY INHALATION TREATMENT: CPT

## 2024-08-21 PROCEDURE — 87102 FUNGUS ISOLATION CULTURE: CPT

## 2024-08-21 PROCEDURE — 6370000000 HC RX 637 (ALT 250 FOR IP): Performed by: INTERNAL MEDICINE

## 2024-08-21 PROCEDURE — 2580000003 HC RX 258: Performed by: INTERNAL MEDICINE

## 2024-08-21 PROCEDURE — 92611 MOTION FLUOROSCOPY/SWALLOW: CPT

## 2024-08-21 PROCEDURE — 85027 COMPLETE CBC AUTOMATED: CPT

## 2024-08-21 PROCEDURE — 87633 RESP VIRUS 12-25 TARGETS: CPT

## 2024-08-21 PROCEDURE — 7100000011 HC PHASE II RECOVERY - ADDTL 15 MIN: Performed by: INTERNAL MEDICINE

## 2024-08-21 PROCEDURE — 36415 COLL VENOUS BLD VENIPUNCTURE: CPT

## 2024-08-21 PROCEDURE — 2060000000 HC ICU INTERMEDIATE R&B

## 2024-08-21 PROCEDURE — 94669 MECHANICAL CHEST WALL OSCILL: CPT

## 2024-08-21 PROCEDURE — 87206 SMEAR FLUORESCENT/ACID STAI: CPT

## 2024-08-21 PROCEDURE — 7100000010 HC PHASE II RECOVERY - FIRST 15 MIN: Performed by: INTERNAL MEDICINE

## 2024-08-21 PROCEDURE — 3609027000 HC BRONCHOSCOPY: Performed by: INTERNAL MEDICINE

## 2024-08-21 PROCEDURE — 92526 ORAL FUNCTION THERAPY: CPT

## 2024-08-21 PROCEDURE — 87116 MYCOBACTERIA CULTURE: CPT

## 2024-08-21 PROCEDURE — 89051 BODY FLUID CELL COUNT: CPT

## 2024-08-21 PROCEDURE — 99152 MOD SED SAME PHYS/QHP 5/>YRS: CPT | Performed by: INTERNAL MEDICINE

## 2024-08-21 PROCEDURE — 87070 CULTURE OTHR SPECIMN AEROBIC: CPT

## 2024-08-21 PROCEDURE — 87106 FUNGI IDENTIFICATION YEAST: CPT

## 2024-08-21 PROCEDURE — 0B9B8ZX DRAINAGE OF LEFT LOWER LOBE BRONCHUS, VIA NATURAL OR ARTIFICIAL OPENING ENDOSCOPIC, DIAGNOSTIC: ICD-10-PCS | Performed by: INTERNAL MEDICINE

## 2024-08-21 PROCEDURE — 87077 CULTURE AEROBIC IDENTIFY: CPT

## 2024-08-21 PROCEDURE — 3609010800 HC BRONCHOSCOPY ALVEOLAR LAVAGE: Performed by: INTERNAL MEDICINE

## 2024-08-21 PROCEDURE — 87205 SMEAR GRAM STAIN: CPT

## 2024-08-21 PROCEDURE — 74230 X-RAY XM SWLNG FUNCJ C+: CPT

## 2024-08-21 PROCEDURE — 94761 N-INVAS EAR/PLS OXIMETRY MLT: CPT

## 2024-08-21 PROCEDURE — 31624 DX BRONCHOSCOPE/LAVAGE: CPT | Performed by: INTERNAL MEDICINE

## 2024-08-21 PROCEDURE — 2700000000 HC OXYGEN THERAPY PER DAY

## 2024-08-21 PROCEDURE — 99233 SBSQ HOSP IP/OBS HIGH 50: CPT | Performed by: INTERNAL MEDICINE

## 2024-08-21 PROCEDURE — 83735 ASSAY OF MAGNESIUM: CPT

## 2024-08-21 PROCEDURE — A4217 STERILE WATER/SALINE, 500 ML: HCPCS | Performed by: INTERNAL MEDICINE

## 2024-08-21 PROCEDURE — 2500000003 HC RX 250 WO HCPCS: Performed by: INTERNAL MEDICINE

## 2024-08-21 PROCEDURE — 31623 DX BRONCHOSCOPE/BRUSH: CPT | Performed by: INTERNAL MEDICINE

## 2024-08-21 RX ORDER — AMIODARONE HYDROCHLORIDE 200 MG/1
200 TABLET ORAL DAILY
Status: DISCONTINUED | OUTPATIENT
Start: 2024-08-21 | End: 2024-08-22

## 2024-08-21 RX ORDER — FENTANYL CITRATE 50 UG/ML
INJECTION, SOLUTION INTRAMUSCULAR; INTRAVENOUS PRN
Status: DISCONTINUED | OUTPATIENT
Start: 2024-08-21 | End: 2024-08-21 | Stop reason: ALTCHOICE

## 2024-08-21 RX ORDER — LIDOCAINE HYDROCHLORIDE 20 MG/ML
INJECTION, SOLUTION EPIDURAL; INFILTRATION; INTRACAUDAL; PERINEURAL PRN
Status: DISCONTINUED | OUTPATIENT
Start: 2024-08-21 | End: 2024-08-21 | Stop reason: ALTCHOICE

## 2024-08-21 RX ORDER — MAGNESIUM HYDROXIDE 1200 MG/15ML
LIQUID ORAL CONTINUOUS PRN
Status: COMPLETED | OUTPATIENT
Start: 2024-08-21 | End: 2024-08-21

## 2024-08-21 RX ORDER — MIDAZOLAM HYDROCHLORIDE 5 MG/ML
INJECTION INTRAMUSCULAR; INTRAVENOUS PRN
Status: DISCONTINUED | OUTPATIENT
Start: 2024-08-21 | End: 2024-08-21 | Stop reason: ALTCHOICE

## 2024-08-21 RX ADMIN — AMIODARONE HYDROCHLORIDE 200 MG: 200 TABLET ORAL at 17:13

## 2024-08-21 RX ADMIN — Medication 1 TABLET: at 12:56

## 2024-08-21 RX ADMIN — INSULIN LISPRO 4 UNITS: 100 INJECTION, SOLUTION INTRAVENOUS; SUBCUTANEOUS at 21:02

## 2024-08-21 RX ADMIN — IPRATROPIUM BROMIDE AND ALBUTEROL SULFATE 1 DOSE: 2.5; .5 SOLUTION RESPIRATORY (INHALATION) at 16:01

## 2024-08-21 RX ADMIN — TAMSULOSIN HYDROCHLORIDE 0.4 MG: 0.4 CAPSULE ORAL at 12:56

## 2024-08-21 RX ADMIN — METOPROLOL SUCCINATE 25 MG: 50 TABLET, EXTENDED RELEASE ORAL at 17:13

## 2024-08-21 RX ADMIN — AMPICILLIN SODIUM AND SULBACTAM SODIUM 3000 MG: 2; 1 INJECTION, POWDER, FOR SOLUTION INTRAMUSCULAR; INTRAVENOUS at 06:17

## 2024-08-21 RX ADMIN — AMPICILLIN SODIUM AND SULBACTAM SODIUM 3000 MG: 2; 1 INJECTION, POWDER, FOR SOLUTION INTRAMUSCULAR; INTRAVENOUS at 13:02

## 2024-08-21 RX ADMIN — INSULIN LISPRO 6 UNITS: 100 INJECTION, SOLUTION INTRAVENOUS; SUBCUTANEOUS at 18:27

## 2024-08-21 RX ADMIN — APIXABAN 5 MG: 5 TABLET, FILM COATED ORAL at 12:56

## 2024-08-21 RX ADMIN — AMIODARONE HYDROCHLORIDE 0.5 MG/MIN: 50 INJECTION, SOLUTION INTRAVENOUS at 03:38

## 2024-08-21 RX ADMIN — AMPICILLIN SODIUM AND SULBACTAM SODIUM 3000 MG: 2; 1 INJECTION, POWDER, FOR SOLUTION INTRAMUSCULAR; INTRAVENOUS at 18:32

## 2024-08-21 RX ADMIN — IPRATROPIUM BROMIDE AND ALBUTEROL SULFATE 1 DOSE: 2.5; .5 SOLUTION RESPIRATORY (INHALATION) at 12:20

## 2024-08-21 RX ADMIN — DEXAMETHASONE SODIUM PHOSPHATE 4 MG: 4 INJECTION, SOLUTION INTRAMUSCULAR; INTRAVENOUS at 12:56

## 2024-08-21 RX ADMIN — INSULIN LISPRO 4 UNITS: 100 INJECTION, SOLUTION INTRAVENOUS; SUBCUTANEOUS at 12:56

## 2024-08-21 RX ADMIN — APIXABAN 5 MG: 5 TABLET, FILM COATED ORAL at 21:03

## 2024-08-21 RX ADMIN — SODIUM CHLORIDE, PRESERVATIVE FREE 10 ML: 5 INJECTION INTRAVENOUS at 21:07

## 2024-08-21 RX ADMIN — ATORVASTATIN CALCIUM 40 MG: 40 TABLET, FILM COATED ORAL at 21:03

## 2024-08-21 RX ADMIN — ESCITALOPRAM OXALATE 5 MG: 10 TABLET ORAL at 21:03

## 2024-08-21 RX ADMIN — IPRATROPIUM BROMIDE AND ALBUTEROL SULFATE 1 DOSE: 2.5; .5 SOLUTION RESPIRATORY (INHALATION) at 08:12

## 2024-08-21 RX ADMIN — SODIUM CHLORIDE, PRESERVATIVE FREE 10 ML: 5 INJECTION INTRAVENOUS at 12:57

## 2024-08-21 RX ADMIN — IPRATROPIUM BROMIDE AND ALBUTEROL SULFATE 1 DOSE: 2.5; .5 SOLUTION RESPIRATORY (INHALATION) at 19:47

## 2024-08-21 RX ADMIN — INSULIN GLARGINE 25 UNITS: 100 INJECTION, SOLUTION SUBCUTANEOUS at 21:03

## 2024-08-21 ASSESSMENT — PULMONARY FUNCTION TESTS
PIF_VALUE: 0

## 2024-08-21 ASSESSMENT — PAIN SCALES - GENERAL
PAINLEVEL_OUTOF10: 0

## 2024-08-21 ASSESSMENT — PAIN - FUNCTIONAL ASSESSMENT
PAIN_FUNCTIONAL_ASSESSMENT: 0-10

## 2024-08-21 NOTE — PROGRESS NOTES
INPATIENT PULMONARY CRITICAL CARE PROGRESS NOTE      Reason for visit    shortness of breath, pleural effusion and has history of lung cancer     SUBJECTIVE: Patient when seen this morning continues to be symptomatic, patient still has some cough but is able to bring up minimal amount of secretions now which have not improved,, patient does have some shortness of breath, patient was continued to be in A-fib with variable ventricular rate on the amiodarone infusion when seen this morning, patient was afebrile and hemodynamically maintained, patient blood sugars are not optimally controlled, patient was alert and oriented but looked tired, patient urine output as documented epic is on the lower side but it may not be objective, no other pertinent review of system of concern           Physical Exam:  Blood pressure 105/64, pulse (!) 112, temperature 97.7 °F (36.5 °C), temperature source Axillary, resp. rate 18, height 1.829 m (6'), weight 81.6 kg (180 lb), SpO2 99%.'   Constitutional:  No acute distress.   HENT:  Oropharynx is clear and moist. No thyromegaly.  Eyes:  Conjunctivae are normal. Pupils equal, round, and reactive to light. No scleral icterus.   Neck: . No tracheal deviation present. No obvious thyroid mass.   Cardiovascular: S1-S2 irregularly irregular, normal heart sounds.  No right ventricular heave. No lower extremity edema.  Pulmonary/Chest: No wheezes.  Left-sided rales.  Chest wall is not dull to percussion.  No accessory muscle usage or stridor.  Decreased breath sound intensity  Abdominal: Soft. Bowel sounds present. No distension or hernia. No tenderness.    Musculoskeletal: No cyanosis. No clubbing. No obvious joint deformity.   Lymphadenopathy: No cervical or supraclavicular adenopathy.   Skin: Skin is warm and dry. No rash or nodules on the exposed extremities.  Psychiatric: Normal mood and affect. Behavior is normal.  No anxiety.   Neurologic: Alert, awake and oriented. PERRL.  Speech fluent            Results:  CBC:   Recent Labs     08/19/24  0908 08/20/24  0643 08/21/24  0429   WBC 11.9* 9.0 11.9*   HGB 9.2* 9.1* 8.0*   HCT 29.2* 28.4* 25.3*   MCV 98.5 99.9 99.5    270 233     BMP:   Recent Labs     08/19/24  0908 08/20/24  0643 08/21/24  0429   * 134* 137   K 4.6 5.0 4.5    101 105   CO2 21 20* 23   BUN 20 24* 26*   CREATININE 1.1 1.2 1.2     LIVER PROFILE:   Recent Labs     08/19/24  0908   AST 30   ALT 21   BILITOT 0.5   ALKPHOS 134*         Imaging:  I have reviewed radiology images personally.    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 *          Plan:   Oxygen supplementation to keep saturation between 90 and 94% only  Please titrate the oxygen as per the above parameters  Pulmonary toilet  Patient and family have been shown the recent CT of the chest along with his findings and implications  Patient and family were also shown the x-ray chest along with findings and implications  Patient does have some pulm infiltrates/volume loss/loculated pleural effusion  Patient was started on empiric Unasyn at this time  Sputum culture ordered and the results are pending  Respiratory pneumonia molecular panel ordered for

## 2024-08-21 NOTE — PROGRESS NOTES
Shift Summary    Admission Chief Complaint: SOB - Pneumonia, Afib RVR    Shift Events:  Amio gtt running per order - HR improved but remains Afib   Remained on RA overnight   Extremities edematous   Disoriented - wife at bedside     Vitals:  Vitals:    08/20/24 1953 08/20/24 2021 08/20/24 2238 08/21/24 0339   BP: 96/65  120/76 105/64   Pulse: (!) 104  (!) 121 (!) 112   Resp: 18 18 18   Temp: 97.5 °F (36.4 °C)  97.7 °F (36.5 °C) 97.7 °F (36.5 °C)   TempSrc: Oral  Oral Axillary   SpO2: 100% 96% 94% 99%   Weight:       Height:            Patient Vitals for the past 96 hrs (Last 3 readings):   Weight   08/19/24 0845 81.6 kg (180 lb)       Weight change:     Tele:  Afib     IV/Line:  Peripheral IV 08/16/24 Left Antecubital (Active)   Site Assessment Clean, dry & intact 08/21/24 0400   Line Status Infusing 08/21/24 0400   Line Care Connections checked and tightened 08/21/24 0400   Phlebitis Assessment No symptoms 08/21/24 0400   Infiltration Assessment 0 08/21/24 0400   Alcohol Cap Used Yes 08/20/24 2129   Dressing Status Clean, dry & intact 08/21/24 0400   Dressing Type Transparent 08/21/24 0400       Peripheral IV Right Antecubital (Active)   Site Assessment Clean, dry & intact 08/21/24 0400   Line Status Infusing 08/21/24 0400   Line Care Connections checked and tightened 08/21/24 0400   Phlebitis Assessment No symptoms 08/21/24 0400   Infiltration Assessment 0 08/21/24 0400   Dressing Status Clean, dry & intact 08/21/24 0400   Dressing Type Transparent 08/21/24 0400          Drains/Salas:  External Urinary Catheter (Active)   Site Assessment Clean,dry & intact 08/21/24 0014   Placement Initiated 08/21/24 0014   Securement Method Securing device (Describe) 08/21/24 0014   Catheter Care Catheter/Wick replaced;Suction Canister/Tubing changed 08/21/24 0014   Perineal Care Yes 08/21/24 0014   Suction 40 mmgHg continuous 08/21/24 0014       Neuro:   disoriented and only aware of  place and person    I/O:   I/O last 3  completed shifts:  In: 310 [P.O.:300; I.V.:10]  Out: 750 [Urine:750]    I/O this shift:  In: 300 [P.O.:300]  Out: 350 [Urine:350]

## 2024-08-21 NOTE — PROGRESS NOTES
Speech Language Pathology  MBSS Note    Name: Wes Schneider  : 1945  Medical Diagnosis: Hypoxia [R09.02]  History of lung cancer [Z85.118]  Pleural effusion, left [J90]  Atrial fibrillation with RVR (HCC) [I48.91]  Acute hypoxemic respiratory failure (HCC) [J96.01]  Acute congestive heart failure, unspecified heart failure type (HCC) [I50.9]      MBSS order placed 24 and was held d/t elevated HR. Per RN, pt with bronch scheduled for approx. 0930 this date. MBSS tentatively scheduled for 1300 pending pts condition. No charges.     Thank you,    Luiza Velasquez M.A. CCC-SLP   Speech-Language Pathologist

## 2024-08-21 NOTE — PROCEDURES
Speech Language Pathology  Modified Barium Swallow Study  Facility/Department: [facility] A2 CARD TELEMETRY        Recommendations:  Diet recommendation: IDDSI 7 Regular- Easy To Chew; IDDSI 0 Thin Liquids-SMALL sips- NO straws; Meds whole in puree  *Recommend strict use of chin tuck with all PO  *Downgrade pt to nectar thick liquids (with use of chin tuck) if s/s aspiration or worsening respiratory status develops  Risk management: upright for all intake, stay upright for at least 30 mins after intake, small bites/sips, downgrade to mildly thick if s/s of aspiration develop, 1:1 supervision with intake, oral care 2-3x/day to reduce adverse affects in the event of aspiration, increase physical mobility as able, alternate bites/sips, slow rate of intake, general GERD precautions, STRICT aspiration precautions, and hold PO and contact SLP if s/s of aspiration or worsening respiratory status develop.  Pt may benefit from GI consult d/t residue observed near the UES (pt noted with hx of esophageal reflux)    NAME:Wesvan Schneider  : 1945 (79 y.o.)   MRN: [id number]  ROOM: [address] [place] [address]  ADMISSION DATE: 2024  PATIENT DIAGNOSIS(ES): Hypoxia [R09.02]  History of lung cancer [Z85.118]  Pleural effusion, left [J90]  Atrial fibrillation with RVR (HCC) [I48.91]  Acute hypoxemic respiratory failure (HCC) [J96.01]  Acute congestive heart failure, unspecified heart failure type (HCC) [I50.9]  Chief Complaint   Patient presents with    Shortness of Breath     Per squad 77 % on ra.. duoneb and solmed in squad..The patient arrived 87 % ra.. pt place on 4 lt     Patient Active Problem List    Diagnosis Date Noted    Gram-negative bacteremia 10/04/2022    Immunocompromised state due to drug therapy (HCC) 10/03/2022    Pancytopenia (HCC) 10/03/2022    Hyperglycemia due to type 2 diabetes mellitus (HCC) 10/02/2022    Neutropenia (HCC) 10/02/2022    Cancer of right lung (HCC) 10/02/2022    Elevated troponin level not due  type 2 diabetes mellitus insulin requiring, BPH     He had a recent admission to [facility] and was admitted on 8/16/2024 and discharged on 8/18/2024.  He was at that time with weakness and shortness of breath     He was discharged on 8/19/2024 and was doing reasonably well until the morning of admission when he had increased shortness of breath and therefore presented to the ED.  He denies any chest pain, denies any fever or chills  He is requiring supplemental oxygen the ED is now being admitted for further evaluation and management\".    General Comments: pt admitted d/t CHF, hypoxia.  Pt has PMHx of lung cancer (stage IV) and esophageal reflux per chart.     Pt seen for BSE on 8/8/24 with recommendations for a regular solid diet (IDDSI 7) with thin liquids (IDDSI 0) with meds PO as tolerated.     BSE completed 8/20 rec MBSS to d/t concerning chest x-ray and pt and pt's wife report of intermittent coughing with meals at baseline.      Predisposing dysphagia risk factors: Age, esophageal reflux hx  Clinical signs of possible chronic dysphagia: N/A  Precipitating dysphagia risk factors: increased O2 demands    Pain   Patient Currently in Pain: No    Recent Chest Radiography: [x] Chest XR   [] CT of Chest  [] No recent chest  radiography on file   Date: 8/19/24  Impressions   Unchanged left-sided pleural effusion and left-sided airspace opacities       Impressions:  Treatment Dx and ICD 10: R13.12 Oropharyngeal phase dysphagia   Radiologist: Dr. Emerson  Referring MD: Dr. Renteria     Assessment: Pt presents with mild-moderate oropharyngeal phase dysphagia. Oral phase characterized by prolonged mastication for regular solids and reduced bolus cohesion for solid consistencies, resulting in piecemeal swallow for both soft and regular solids. Lingual residue demonstrated across all consistencies. Pts pharyngeal phase characterized by delayed swallow initiation, reduced anterior hyoid movement, insufficient    Esophageal Phase  Impaired  Upper Esophageal Screen- Major Contributing Deficits  Reduced Cricopharyngeal Opening: All  Esophageal phase comment: Increased pharyngeal residue observed as viscosities of trials progressed (minimal for regular solid) that liquid rinse did not appear to clear. Pt noted with hx of esophageal reflux.       Aspiration Scale    1 Material does not enter the airway    2 Material enters the airway, remains above the vocal folds, and is ejected from the airway    3 Material enters the airway, remains above the vocal folds, and is not ejected from the airway    4 Material enters the airway, contacts the vocal folds, an is ejected from the airway          X 5 Material enters the airway, contacts the vocal folds, and is not ejected from the airway    6 Material enters the airway, passes below the vocal folds and is ejected into the larynx or out of the airway    7 Material enters the airway, passes below the vocal folds, and is not ejected from the trachea despite effort    8 Material enters the airway, passes below the vocal folds, and no effort is made to eject.           Compensatory Swallowing Strategies Attempted: dry swallow, cough and dry swallow  Postural Changes and/or Swallow Maneuvers Trialed: chin tuck  Patient Position: Lateral and Patient Degrees: 90 degrees, Seated upright in [facility] chair  Consistencies Administered: thin: tsp, controlled, cup, straw; nectar: tsp, controlled, cup; honey tsp; puree; soft; and regular solid       Recommendations:  Diet recommendation: IDDSI 7 Regular- Easy To Chew; IDDSI 0 Thin Liquids-SMALL sips- NO straws; Meds whole in puree  *Recommend strict use of chin tuck with all PO  *Downgrade pt to nectar thick liquids (with use of chin tuck) if s/s aspiration or worsening respiratory status develops  Risk management: upright for all intake, stay upright for at least 30 mins after intake, small bites/sips, downgrade to mildly thick if s/s of aspiration

## 2024-08-21 NOTE — PROCEDURES
Bronchoscopy note    Patient with shortness of breath, history of lung cancer with persistent pulm infiltrates and loculated pleural effusion and given the patient clinical status it was decided to do a bronchoscopy for diagnostic purposes and for that reason after informed consent, the patient was taken to the endoscopy suite, patient was given oropharyngeal analgesia with benzocaine after timeout, patient was given lidocaine for tracheobronchial analgesia, patient was given conscious sedation with 2 mg of Versed and 25 mcg of fentanyl for the procedure, the bronchoscope was introduced through the mouth using a bite block, patient had significant posterior pharyngeal secretions which were suctioned out, patient had normal vocal cords, patient had some brownish secretions coming out from the vocal cords, patient trachea was normal, patient had brownish secretions coming from the left lower lobe bronchus along with that patient also had some thickening/small nodular lesion /splayed jessica in the left lower lobe bronchus, the bronchoscope was wedged into the left lower bronchus and BAL was done from the area which was sent for various culture and cytology subsequently bronchial brushings were done from the left lower lobe bronchus which was sent for cytology, patient did not have any other pathology of concern especially on the right side, patient tolerated the procedure well and did not have any apparent complications  Estimated blood loss was 0  Patient's bronchoscopy findings were discussed with patient's family    Further management depending on patient clinical status and the bronchoscopy results      Edison Holden MD

## 2024-08-21 NOTE — PROGRESS NOTES
Pt arrives in PACU resting quietly.  Resp easy and regular.  VS stable.  Pt arouses easily to verbal stimulation.  NS infusing.  450 cc LTC.  Report from Elsa EUGENE from Endo.

## 2024-08-21 NOTE — PROGRESS NOTES
Ticket to bill sent with chart .  Pt being transported to [place] [id number] by Paddy EUGENE from [facility].

## 2024-08-21 NOTE — PRE SEDATION
Sedation Pre-Procedure Note    Patient Name: Wes Schneider   YOB: 1945  Room/Bed: 0217/0217-01  Medical Record Number: [id number]  Date: 8/21/2024   Time: 7:00 PM       Indication: Patient with history of lung cancer with persistent pulm infiltrates and loculated. Pleural effusion     Consent: I have discussed with the patient and/or the patient representative the indication, alternatives, and the possible risks and/or complications of the planned procedure and the anesthesia methods. The patient and/or patient representative appear to understand and agree to proceed.    Vital Signs:   Vitals:    08/21/24 1700   BP: 114/76   Pulse: (!) 110   Resp: 20   Temp: 97.5 °F (36.4 °C)   SpO2: 93%       Past Medical History:   has a past medical history of BPH (benign prostatic hyperplasia), Cancer (HCC), Diabetes mellitus (HCC), Esophageal reflux, Hyperlipidemia, Hypertension, Kidney stone, Lung cancer (HCC), Lung cancer (HCC), and Neuropathy.    Past Surgical History:   has a past surgical history that includes Prostate surgery; Cystoscopy (Right, 11/09/2020); back surgery (2012); NEPHROLITHOTOMY (Left, 12/04/2020); shoulder surgery (Right); Port Surgery (N/A, 11/04/2021); Port Surgery (N/A, 10/04/2022); Port Surgery (Left, 11/02/2022); CT NEEDLE BIOPSY LUNG PERCUTANEOUS (04/07/2023); Cataract extraction, extracapsular w/ intraocular lens implant; bronchoscopy (N/A, 8/21/2024); bronchoscopy (8/21/2024); and bronchoscopy (8/21/2024).    Medications:   Scheduled Meds:    amiodarone  200 mg Oral Daily    insulin glargine  25 Units SubCUTAneous Nightly    dexAMETHasone  4 mg IntraVENous Q12H    apixaban  5 mg Oral BID    atorvastatin  40 mg Oral Nightly    folic acid  1 mg Oral Daily    metoprolol succinate  25 mg Oral Daily    therapeutic multivitamin-minerals  1 tablet Oral Daily    tamsulosin  0.4 mg Oral Daily    vitamin D3  400 Units Oral Daily    escitalopram  5 mg Oral Nightly    insulin lispro  0-8

## 2024-08-21 NOTE — PROGRESS NOTES
ONCOLOGY HEMATOLOGY CARE PROGRESS NOTE      SUBJECTIVE:    Wes just returned from procedure. Amiodarone drip ongoing.   Patient minimally conversational as he is drowsy.     ROS:     Limited evaluation, patient drowsy.     OBJECTIVE        Physical    VITALS:  Patient Vitals for the past 24 hrs:   BP Temp Temp src Pulse Resp SpO2   08/21/24 0339 105/64 97.7 °F (36.5 °C) Axillary (!) 112 18 99 %   08/20/24 2238 120/76 97.7 °F (36.5 °C) Oral (!) 121 18 94 %   08/20/24 2021 -- -- -- -- -- 96 %   08/20/24 1953 96/65 97.5 °F (36.4 °C) Oral (!) 104 18 100 %   08/20/24 1835 112/74 -- -- (!) 116 -- --   08/20/24 1613 -- -- -- (!) 102 18 97 %   08/20/24 1456 116/68 97.5 °F (36.4 °C) Oral (!) 110 20 96 %   08/20/24 1245 (!) 91/55 -- -- (!) 120 -- --   08/20/24 1208 (!) 96/58 -- -- (!) 113 -- 96 %   08/20/24 1204 -- -- -- (!) 114 18 95 %   08/20/24 1200 (!) 111/57 97.3 °F (36.3 °C) Oral (!) 109 16 94 %   08/20/24 1047 109/66 -- -- (!) 131 -- --   08/20/24 0830 105/60 97.5 °F (36.4 °C) Axillary (!) 117 18 92 %   08/20/24 0813 -- -- -- (!) 113 18 94 %       24HR INTAKE/OUTPUT:    Intake/Output Summary (Last 24 hours) at 8/21/2024 0725  Last data filed at 8/21/2024 0339  Gross per 24 hour   Intake 540 ml   Output 350 ml   Net 190 ml       CONSTITUTIONAL: awake, alert, cooperative, no apparent distress. Resting with eyes closed, awakens to verbal stimuli.   EYES: pupils equal, round and reactive to light, sclera clear and conjunctiva normal  ENT: Normocephalic, without obvious abnormality, atraumatic  NECK: supple, symmetrical, no jugular venous distension   HEMATOLOGIC/LYMPHATIC: no cervical, supraclavicular or axillary lymphadenopathy   LUNGS: no increased work of breathing, on o2 via NC. No crackles or wheezing noted.   CARDIOVASCULAR: regular rate and rhythm, normal S1 and S2, no murmur noted  ABDOMEN: normal bowel sounds x 4, soft, non-distended, non-tender, no masses palpated, no  hepatosplenomgaly   MUSCULOSKELETAL: full range of motion noted, tone is normal  NEUROLOGIC: awake, alert, oriented to name, place and time. Motor skills grossly intact.   SKIN: Normal skin color, texture, turgor and no jaundice. appears intact   EXTREMITIES: no LE edema     DATA:  CBC:    Recent Labs     08/21/24  0429 08/20/24  0643 08/19/24  0908 08/18/24  0527 08/17/24  0606   WBC 11.9* 9.0 11.9* 7.5 8.4   NEUTROABS  --  8.6* 10.4* 6.7 6.8   LYMPHOPCT  --  1.5 2.9 2.1 5.8   RBC 2.54* 2.84* 2.96* 2.36* 2.58*   HGB 8.0* 9.1* 9.2* 7.3* 8.2*   HCT 25.3* 28.4* 29.2* 23.0* 26.2*   MCV 99.5 99.9 98.5 97.5 101.6*   MCH 31.4 32.1 30.9 31.1 31.8   MCHC 31.6 32.2 31.4 31.8 31.3   RDW 20.9* 21.2* 20.6* 20.4* 21.6*    270 265 187 209       PT/INR:  No results for input(s): \"INR\" in the last 720 hours.    Invalid input(s): \"PROT\"  PTT:  No results for input(s): \"APTT\" in the last 720 hours.    CMP:    Recent Labs     08/21/24 0429 08/20/24  0643 08/19/24  0908 08/18/24  0527 08/17/24  0606 08/16/24  2011 08/10/24  0518    134* 134* 132* 132* 137 138   K 4.5 5.0 4.6 4.3 4.8 4.6 4.0    101 101 103 100 100 105   CO2 23 20* 21 20* 17* 23 23   GLUCOSE 281* 267* 144* 298* 208* 240* 66*   BUN 26* 24* 20 18 16 15 16   CREATININE 1.2 1.2 1.1 1.0 1.1 1.1 1.0   LABGLOM 61 61 68 76 68 68 76   CALCIUM 7.3* 8.1* 8.0* 7.4* 7.9* 8.3 8.0*   AGRATIO  --   --  0.8* 0.7* 0.5* 0.7* 0.6*   BILITOT  --   --  0.5 0.6 0.9 0.6 0.5   ALKPHOS  --   --  134* 119 139* 160* 123   ALT  --   --  21 14 18 23 18   AST  --   --  30 18 24 27 30   MG 2.00  --  2.00  --   --   --  2.10       Lab Results   Component Value Date    CALCIUM 7.3 (L) 08/21/2024    PHOS 2.1 (L) 09/25/2023       LDH:No results for input(s): \"LDH\" in the last 720 hours.    Radiology Review:  XR CHEST PORTABLE  Narrative: EXAMINATION:  ONE XRAY VIEW OF THE CHEST    8/19/2024 8:48 am    COMPARISON:  08/16/2024    HISTORY:  ORDERING SYSTEM PROVIDED HISTORY: Shortness of  Another CT on 2/07/2024 showed stable disease.  Another CT on 5/01/2024 showed stable disease.  - CT Chest 08/07/2024               1. No evidence of pulmonary embolism.  2. Post treatment changes of the left lung.  Consolidative opacities at the left lung base are slightly increased from May 2024 prior and there is left-sided interlobular septal thickening.  New ground-glass opacities are also seen in the right middle lobe. Findings are suspicious for a superimposed infectious/inflammatory process versus less likely asymmetric edema.  Recommend attention on follow-up imaging.  3. Stable left upper lobe pulmonary nodule measuring 1.4 cm.  4. Stable left-sided pleural thickening and small loculated left pleural effusion.  5. Osseous metastatic disease, similar in appearance to prior  - due for treatment 08/20/2024 with Alimta although this remains on hold      Dysphagia  - patient notes that he has been choking on liquids for the last 3-4 weeks   - no oral sores/irration noted or signs of thrush   - speech eval 08/08 unremarkable  - MBSS pending      SOB  Chest Pain now resolved  - cardiology evaluated patient on prior admission and recommended conservative medical management without ischemic workup given underlying metastatic disease  - patient was started on prednisone 40 mg qd x 5 day course on 08/17 and discharged home  - readmitted 08/19/2024  - BNP 3729 on 08/19   - s/p procedure this AM with cytology and cultures pending   - was on room air, now on O2 post procedure     Chemo-induced anemia   - Started Procrit 40,000 units weekly on 9/01/2023.  HGB was 7.4 g/dL at that time.    - last received procrit on 08/15/2024   - Transfused 1 unit of PRBCs on 8/07  - likely secondary to recent chemo   - on Folic acid, as Alimta is a folic acid inhibitor  - no evidence of acute bleeding   - continuing trending and transfuse for hgb <7, plts <10K     Brain mets   - An MRI of the brain on 8/22/2023 showed a new 9 mm

## 2024-08-21 NOTE — PROGRESS NOTES
Hospital Medicine Progress Note      Date of Admission: 8/19/2024  Hospital Day: 3    Chief Admission Complaint:   sob     Subjective: He is lying in bed, looks tired but in no acute distress.  States shortness of breath is slowly improving.  Denies any chest pain    Presenting Admission History:       79 y.o. 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  He is followed by [facility] and is currently receiving chemotherapy.  Also has PMH of atrial fibrillation on long-term anticoagulation with Eliquis, hypertension, hyperlipidemia, type 2 diabetes mellitus insulin requiring, BPH     He had a recent admission to [facility] and was admitted on 8/16/2024 and discharged on 8/18/2024.  He was at that time with weakness and shortness of breath     He was doing reasonably well until the morning of admission when he had increased shortness of breath and therefore presented to the ED.  He denied any chest pain, denied any fever or chills  He is requiring supplemental oxygen the ED was admitted for further evaluation and management.          Assessment/Plan:      Current Principal Problem:  <principal problem not specified>    Acute hypoxic respiratory : Patient presented to ED with main complaint shortness of breath.  Chest x-ray from time of admission does reveal left-sided pleural effusion left-sided airspace opacities.  He is feeling better with supplemental oxygen via nasal cannula.  Pulmonary has seen in consultation and their input is noted and appreciated.  Continue bronchodilators, continue on IV steroids/Decadron.    He did go for bronchoscopy on 8/21/2024     Generalized weakness.  POA.  Multifactorial including secondary to malignancy, chemotherapy and physical deconditioning.will ask PT OT to evaluate.     Pulmonary infiltrates : This was noted on chest x-ray at the time of admission.  Blood cultures have been sent.  Continue on empiric  Management.  [] Discussed management of the case with:    [] Telemetry personally reviewed and interpreted as documented above    [] Imaging personally reviewed and interpreted, includes:    [x] Data Review (any 3)  [x] All available Consultant notes from yesterday/today were reviewed  [x] All current labs were reviewed and interpreted for clinical significance   [x] Appropriate follow-up labs were ordered  [] Collateral history obtained from:        Medications:  Personally reviewed in detail in conjunction w/ labs as documented for evidence of drug toxicity.     Infusion Medications    dextrose      sodium chloride 25 mL (08/20/24 1222)     Scheduled Medications    amiodarone  200 mg Oral Daily    insulin glargine  25 Units SubCUTAneous Nightly    dexAMETHasone  4 mg IntraVENous Q12H    apixaban  5 mg Oral BID    atorvastatin  40 mg Oral Nightly    folic acid  1 mg Oral Daily    metoprolol succinate  25 mg Oral Daily    therapeutic multivitamin-minerals  1 tablet Oral Daily    tamsulosin  0.4 mg Oral Daily    vitamin D3  400 Units Oral Daily    escitalopram  5 mg Oral Nightly    insulin lispro  0-8 Units SubCUTAneous TID WC    insulin lispro  0-4 Units SubCUTAneous Nightly    sodium chloride flush  5-40 mL IntraVENous 2 times per day    ipratropium 0.5 mg-albuterol 2.5 mg  1 Dose Inhalation Q4H WA RT    ampicillin-sulbactam  3,000 mg IntraVENous Q6H    pantoprazole  40 mg Oral QAM AC     PRN Meds: glucose, dextrose bolus **OR** dextrose bolus, glucagon (rDNA), dextrose, sodium chloride flush, sodium chloride, ondansetron **OR** ondansetron, polyethylene glycol, acetaminophen **OR** acetaminophen     Labs:  Personally reviewed and interpreted for clinical significance.     Recent Labs     08/19/24 0908 08/20/24 0643 08/21/24 0429   WBC 11.9* 9.0 11.9*   HGB 9.2* 9.1* 8.0*   HCT 29.2* 28.4* 25.3*    270 233     Recent Labs     08/19/24 0908 08/20/24 0643 08/21/24 0429   * 134* 137   K 4.6 5.0 4.5

## 2024-08-21 NOTE — PLAN OF CARE
Problem: Discharge Planning  Goal: Discharge to home or other facility with appropriate resources  8/21/2024 0301 by Cee Rivera RN  Outcome: Progressing  8/20/2024 1313 by Elsa Rollins RN  Outcome: Not Progressing  Flowsheets (Taken 8/20/2024 1303)  Discharge to home or other facility with appropriate resources: Identify barriers to discharge with patient and caregiver     Problem: Safety - Adult  Goal: Free from fall injury  8/21/2024 0301 by Cee Rivera RN  Outcome: Progressing  8/20/2024 1313 by Elsa Rollins RN  Outcome: Progressing     Problem: ABCDS Injury Assessment  Goal: Absence of physical injury  8/21/2024 0301 by Cee Rivera RN  Outcome: Progressing  8/20/2024 1313 by Elsa Rollins RN  Outcome: Progressing     Problem: Skin/Tissue Integrity  Goal: Absence of new skin breakdown  Description: 1.  Monitor for areas of redness and/or skin breakdown  2.  Assess vascular access sites hourly  3.  Every 4-6 hours minimum:  Change oxygen saturation probe site  4.  Every 4-6 hours:  If on nasal continuous positive airway pressure, respiratory therapy assess nares and determine need for appliance change or resting period.  8/21/2024 0301 by Cee Rivera RN  Outcome: Progressing  8/20/2024 1313 by Elsa Rollins RN  Outcome: Progressing     Problem: Chronic Conditions and Co-morbidities  Goal: Patient's chronic conditions and co-morbidity symptoms are monitored and maintained or improved  8/21/2024 0301 by Cee Rivera RN  Outcome: Progressing  8/20/2024 1313 by Elsa Rollins RN  Outcome: Not Progressing  Flowsheets (Taken 8/20/2024 1303)  Care Plan - Patient's Chronic Conditions and Co-Morbidity Symptoms are Monitored and Maintained or Improved: Monitor and assess patient's chronic conditions and comorbid symptoms for stability, deterioration, or improvement     Problem: Pain  Goal: Verbalizes/displays adequate comfort level or baseline comfort level  Outcome: Progressing    Patient states s/s as noted by PSR.  Coughing/blowing green.  Has some SOB and wheezing.  States coughing keeping her up all NOC.  Tested negative on Monday.  Patient made aware may be false negative and recommend testing again.  Patient states \"I don't have covid\"  Patient able to speak in complete sentences without issue, however does have hoarse voice.  UC advised.  Patient wanted to see PCP, however no openings.  She asked \" why do I even have a doctor, if I can't get in when I need to\".  UC advised.  Patient verbalized understanding and stated she will present to UC as recommended.    FYI for PCP.     Problem: Discharge Planning  Goal: Discharge to home or other facility with appropriate resources  8/21/2024 0301 by Cee Rivera RN  Outcome: Progressing  8/20/2024 1313 by Elsa Rollins RN  Outcome: Not Progressing  Flowsheets (Taken 8/20/2024 1303)  Discharge to home or other facility with appropriate resources: Identify barriers to discharge with patient and caregiver     Problem: Chronic Conditions and Co-morbidities  Goal: Patient's chronic conditions and co-morbidity symptoms are monitored and maintained or improved  8/21/2024 0301 by Cee Rivera RN  Outcome: Progressing  8/20/2024 1313 by Elsa Rollins RN  Outcome: Not Progressing  Flowsheets (Taken 8/20/2024 1303)  Care Plan - Patient's Chronic Conditions and Co-Morbidity Symptoms are Monitored and Maintained or Improved: Monitor and assess patient's chronic conditions and comorbid symptoms for stability, deterioration, or improvement

## 2024-08-22 ENCOUNTER — APPOINTMENT (OUTPATIENT)
Dept: GENERAL RADIOLOGY | Age: 79
DRG: 177 | End: 2024-08-22
Payer: MEDICARE

## 2024-08-22 LAB
ACID FAST STN SPEC QL: NORMAL
ANION GAP SERPL CALCULATED.3IONS-SCNC: 12 MMOL/L (ref 3–16)
BUN SERPL-MCNC: 25 MG/DL (ref 7–20)
CALCIUM SERPL-MCNC: 7.3 MG/DL (ref 8.3–10.6)
CHLORIDE SERPL-SCNC: 106 MMOL/L (ref 99–110)
CO2 SERPL-SCNC: 18 MMOL/L (ref 21–32)
CREAT SERPL-MCNC: 1.1 MG/DL (ref 0.8–1.3)
DEPRECATED RDW RBC AUTO: 21.9 % (ref 12.4–15.4)
GFR SERPLBLD CREATININE-BSD FMLA CKD-EPI: 68 ML/MIN/{1.73_M2}
GLUCOSE BLD-MCNC: 131 MG/DL (ref 70–99)
GLUCOSE BLD-MCNC: 226 MG/DL (ref 70–99)
GLUCOSE BLD-MCNC: 226 MG/DL (ref 70–99)
GLUCOSE BLD-MCNC: 274 MG/DL (ref 70–99)
GLUCOSE SERPL-MCNC: 123 MG/DL (ref 70–99)
HCT VFR BLD AUTO: 28.3 % (ref 40.5–52.5)
HGB BLD-MCNC: 8.7 G/DL (ref 13.5–17.5)
MAGNESIUM SERPL-MCNC: 2.3 MG/DL (ref 1.8–2.4)
MCH RBC QN AUTO: 31 PG (ref 26–34)
MCHC RBC AUTO-ENTMCNC: 30.7 G/DL (ref 31–36)
MCV RBC AUTO: 101 FL (ref 80–100)
PERFORMED ON: ABNORMAL
PLATELET # BLD AUTO: 250 K/UL (ref 135–450)
PMV BLD AUTO: 7.2 FL (ref 5–10.5)
POTASSIUM SERPL-SCNC: 5 MMOL/L (ref 3.5–5.1)
RBC # BLD AUTO: 2.81 M/UL (ref 4.2–5.9)
SODIUM SERPL-SCNC: 136 MMOL/L (ref 136–145)
WBC # BLD AUTO: 14.7 K/UL (ref 4–11)

## 2024-08-22 PROCEDURE — 97530 THERAPEUTIC ACTIVITIES: CPT

## 2024-08-22 PROCEDURE — 85027 COMPLETE CBC AUTOMATED: CPT

## 2024-08-22 PROCEDURE — 97535 SELF CARE MNGMENT TRAINING: CPT

## 2024-08-22 PROCEDURE — 99232 SBSQ HOSP IP/OBS MODERATE 35: CPT | Performed by: INTERNAL MEDICINE

## 2024-08-22 PROCEDURE — 6370000000 HC RX 637 (ALT 250 FOR IP): Performed by: INTERNAL MEDICINE

## 2024-08-22 PROCEDURE — 2060000000 HC ICU INTERMEDIATE R&B

## 2024-08-22 PROCEDURE — 97116 GAIT TRAINING THERAPY: CPT

## 2024-08-22 PROCEDURE — 94640 AIRWAY INHALATION TREATMENT: CPT

## 2024-08-22 PROCEDURE — 83735 ASSAY OF MAGNESIUM: CPT

## 2024-08-22 PROCEDURE — 71045 X-RAY EXAM CHEST 1 VIEW: CPT

## 2024-08-22 PROCEDURE — 80048 BASIC METABOLIC PNL TOTAL CA: CPT

## 2024-08-22 PROCEDURE — 97162 PT EVAL MOD COMPLEX 30 MIN: CPT

## 2024-08-22 PROCEDURE — 2580000003 HC RX 258: Performed by: INTERNAL MEDICINE

## 2024-08-22 PROCEDURE — 6360000002 HC RX W HCPCS: Performed by: INTERNAL MEDICINE

## 2024-08-22 PROCEDURE — 36415 COLL VENOUS BLD VENIPUNCTURE: CPT

## 2024-08-22 PROCEDURE — 97166 OT EVAL MOD COMPLEX 45 MIN: CPT

## 2024-08-22 RX ORDER — AMIODARONE HYDROCHLORIDE 200 MG/1
250 TABLET ORAL DAILY
Status: DISCONTINUED | OUTPATIENT
Start: 2024-08-23 | End: 2024-08-22

## 2024-08-22 RX ORDER — AMIODARONE HYDROCHLORIDE 200 MG/1
250 TABLET ORAL DAILY
Status: DISCONTINUED | OUTPATIENT
Start: 2024-08-22 | End: 2024-08-23 | Stop reason: HOSPADM

## 2024-08-22 RX ORDER — PREDNISONE 10 MG/1
10 TABLET ORAL DAILY
Status: DISCONTINUED | OUTPATIENT
Start: 2024-08-23 | End: 2024-08-23 | Stop reason: HOSPADM

## 2024-08-22 RX ADMIN — AMIODARONE HYDROCHLORIDE 250 MG: 200 TABLET ORAL at 11:34

## 2024-08-22 RX ADMIN — PANTOPRAZOLE SODIUM 40 MG: 40 TABLET, DELAYED RELEASE ORAL at 06:13

## 2024-08-22 RX ADMIN — AMPICILLIN SODIUM AND SULBACTAM SODIUM 3000 MG: 2; 1 INJECTION, POWDER, FOR SOLUTION INTRAMUSCULAR; INTRAVENOUS at 12:13

## 2024-08-22 RX ADMIN — DEXAMETHASONE SODIUM PHOSPHATE 4 MG: 4 INJECTION, SOLUTION INTRAMUSCULAR; INTRAVENOUS at 00:02

## 2024-08-22 RX ADMIN — TAMSULOSIN HYDROCHLORIDE 0.4 MG: 0.4 CAPSULE ORAL at 12:11

## 2024-08-22 RX ADMIN — Medication 1 TABLET: at 12:11

## 2024-08-22 RX ADMIN — IPRATROPIUM BROMIDE AND ALBUTEROL SULFATE 1 DOSE: 2.5; .5 SOLUTION RESPIRATORY (INHALATION) at 15:57

## 2024-08-22 RX ADMIN — AMPICILLIN SODIUM AND SULBACTAM SODIUM 3000 MG: 2; 1 INJECTION, POWDER, FOR SOLUTION INTRAMUSCULAR; INTRAVENOUS at 00:05

## 2024-08-22 RX ADMIN — IPRATROPIUM BROMIDE AND ALBUTEROL SULFATE 1 DOSE: 2.5; .5 SOLUTION RESPIRATORY (INHALATION) at 20:03

## 2024-08-22 RX ADMIN — ATORVASTATIN CALCIUM 40 MG: 40 TABLET, FILM COATED ORAL at 21:06

## 2024-08-22 RX ADMIN — SODIUM CHLORIDE, PRESERVATIVE FREE 10 ML: 5 INJECTION INTRAVENOUS at 21:07

## 2024-08-22 RX ADMIN — INSULIN LISPRO 2 UNITS: 100 INJECTION, SOLUTION INTRAVENOUS; SUBCUTANEOUS at 18:01

## 2024-08-22 RX ADMIN — IPRATROPIUM BROMIDE AND ALBUTEROL SULFATE 1 DOSE: 2.5; .5 SOLUTION RESPIRATORY (INHALATION) at 12:14

## 2024-08-22 RX ADMIN — AMPICILLIN SODIUM AND SULBACTAM SODIUM 3000 MG: 2; 1 INJECTION, POWDER, FOR SOLUTION INTRAMUSCULAR; INTRAVENOUS at 06:13

## 2024-08-22 RX ADMIN — INSULIN LISPRO 2 UNITS: 100 INJECTION, SOLUTION INTRAVENOUS; SUBCUTANEOUS at 11:35

## 2024-08-22 RX ADMIN — DEXAMETHASONE SODIUM PHOSPHATE 4 MG: 4 INJECTION, SOLUTION INTRAMUSCULAR; INTRAVENOUS at 11:35

## 2024-08-22 RX ADMIN — IPRATROPIUM BROMIDE AND ALBUTEROL SULFATE 1 DOSE: 2.5; .5 SOLUTION RESPIRATORY (INHALATION) at 08:17

## 2024-08-22 RX ADMIN — FOLIC ACID 1 MG: 1 TABLET ORAL at 11:35

## 2024-08-22 RX ADMIN — AMPICILLIN SODIUM AND SULBACTAM SODIUM 3000 MG: 2; 1 INJECTION, POWDER, FOR SOLUTION INTRAMUSCULAR; INTRAVENOUS at 18:01

## 2024-08-22 RX ADMIN — APIXABAN 5 MG: 5 TABLET, FILM COATED ORAL at 21:06

## 2024-08-22 RX ADMIN — APIXABAN 5 MG: 5 TABLET, FILM COATED ORAL at 11:34

## 2024-08-22 RX ADMIN — CHOLECALCIFEROL TAB 10 MCG (400 UNIT) 400 UNITS: 10 TAB at 11:35

## 2024-08-22 RX ADMIN — ESCITALOPRAM OXALATE 5 MG: 10 TABLET ORAL at 21:06

## 2024-08-22 RX ADMIN — METOPROLOL SUCCINATE 25 MG: 50 TABLET, EXTENDED RELEASE ORAL at 11:39

## 2024-08-22 RX ADMIN — INSULIN GLARGINE 25 UNITS: 100 INJECTION, SOLUTION SUBCUTANEOUS at 21:06

## 2024-08-22 ASSESSMENT — PAIN SCALES - GENERAL
PAINLEVEL_OUTOF10: 0

## 2024-08-22 NOTE — PROGRESS NOTES
Comprehensive Nutrition Assessment    Type and Reason for Visit:  Initial, Wound    Nutrition Recommendations/Plan:   Continue general diet - textures and consistencies per SLP   Increase Glucerna to TID - no flavor preferences  Encourage PO intakes as tolerated   Monitor nutrition adequacy, pertinent labs, bowel habits, wt changes, and clinical progress     Malnutrition Assessment:  Malnutrition Status:  At risk for malnutrition (08/22/24 1223)    Context:  Chronic Illness     Findings of the 6 clinical characteristics of malnutrition:  Energy Intake:  Mild decrease in energy intake (Comment) (PO intakes of % this admission, reports PO decreased PTA)  Weight Loss:  Mild weight loss (specify amount and time period) (recent stated weight hx, difficult to assess weight loss)     Body Fat Loss:  No significant body fat loss     Muscle Mass Loss:  No significant muscle mass loss      Nutrition Assessment:    Pt admitted with SOB. Hx of stage 4 lung CA, on chemo and DM. Pt nutritionally compromised AEB wound. S/p bronch and MBSS with recommendations for easy to chew and thin liquids yesterday. Pt reports poor appetite and PO intakes PTA. Reports appetite improving since admission to the hospital. States ate most of breakfast this AM. Favorable to current ONS, agreeable to increase to TID to promote PO intakes. Endorses drinking ONS BID at home. Denies any further nutrition questions or concerns at this time. Will monitor.    Nutrition Related Findings:    Active BS. BM on 8/20. +1 generalized and BUE edema. +2 pitting BLE edema. -302 past 24 hrs. Wound Type: Pressure Injury, Stage II       Current Nutrition Intake & Therapies:    Average Meal Intake: 51-75%, %  Average Supplements Intake: Unable to assess  ADULT DIET; Dysphagia - Soft and Bite Sized  ADULT ORAL NUTRITION SUPPLEMENT; Breakfast; Diabetic Oral Supplement    Anthropometric Measures:  Height: 182.9 cm (6')  Ideal Body Weight (IBW): 178 lbs

## 2024-08-22 NOTE — PROGRESS NOTES
Physical Therapy  Facility/Department: [facility] A2 CARD TELEMETRY  Physical Therapy Initial Assessment    Name: Wes Schneider  : 1945  MRN: [id number]  Date of Service: 2024    Discharge Recommendations:  Subacute/Skilled Nursing Facility   PT Equipment Recommendations  Equipment Needed: No    Therapy discharge recommendations take into account each patient's current medical complexities and are subject to input/oversight from the patient's healthcare team.   Barriers to Home Discharge:   [x] Steps to access home entry or bed/bath: 2 ANAMARIA   [x] Unable to transfer, ambulate, or propel wheelchair household distances without assist   [] Limited available assist at home upon discharge    [] Patient or family requests d/c to post-acute facility    [] Poor cognition/safety awareness for d/c to home alone    []Unable to maintain ordered weight bearing status    [] Patient with salient signs of long-standing immobility   [x] Patient is at risk for falls   [] Other:    If pt is unable to be seen after this session, please let this note serve as discharge summary.  Please see case management note for discharge disposition.  Thank you.    Patient Diagnosis(es): The primary encounter diagnosis was Acute congestive heart failure, unspecified heart failure type (HCC). Diagnoses of Hypoxia, Atrial fibrillation with RVR (HCC), Pleural effusion, left, History of lung cancer, and Malignant neoplasm of lung, unspecified laterality, unspecified part of lung (HCC) were also pertinent to this visit.  Past Medical History:  has a past medical history of BPH (benign prostatic hyperplasia), Cancer (HCC), Diabetes mellitus (HCC), Esophageal reflux, Hyperlipidemia, Hypertension, Kidney stone, Lung cancer (HCC), Lung cancer (HCC), and Neuropathy.  Past Surgical History:  has a past surgical history that includes Prostate surgery; Cystoscopy (Right, 2020); back surgery (); NEPHROLITHOTOMY (Left, 2020); shoulder  surgery (Right); Port Surgery (N/A, 11/04/2021); Port Surgery (N/A, 10/04/2022); Port Surgery (Left, 11/02/2022); CT NEEDLE BIOPSY LUNG PERCUTANEOUS (04/07/2023); Cataract extraction, extracapsular w/ intraocular lens implant; bronchoscopy (N/A, 8/21/2024); bronchoscopy (8/21/2024); and bronchoscopy (8/21/2024).    Assessment  Body Structures, Functions, Activity Limitations Requiring Skilled Therapeutic Intervention: Decreased functional mobility ;Decreased balance;Decreased strength;Decreased safe awareness;Decreased endurance;Decreased tolerance to work activity  Assessment: Pt is an 80 y/o male who presents with afib with RVR. Pt was independent with rollator prior to admit living with wife in two story home. Pt currently requires min A for bed mobility and transfers and mod A for ambulation with walker. Pt would benefit from continued skilled PT to address these limitations. Recommend SNF for continued therapy.  Treatment Diagnosis: impaired functional mobility  Therapy Prognosis: Good  Decision Making: Medium Complexity  Requires PT Follow-Up: Yes  Activity Tolerance  Activity Tolerance: Patient tolerated evaluation without incident;Patient limited by fatigue;Patient limited by endurance  Activity Tolerance Comments: HR up to 138 with activity; extended rest breaks required between activities secondary to shortness of breath    Plan  Physical Therapy Plan  General Plan: 3-5 times per week  Current Treatment Recommendations: Strengthening, Functional mobility training, Balance training, Gait training, Home exercise program, Safety education & training, Therapeutic activities, Patient/Caregiver education & training, Endurance training, Co-Treatment, Transfer training  Safety Devices  Type of Devices: All fall risk precautions in place, Call light within reach, Chair alarm in place, Gait belt, Nurse notified, Left in chair, Patient at risk for  Provided: Role of Therapy;Plan of Care;Transfer Training  Education Provided Comments: Disease Specific Education: Pt educated on importance of OOB mobility, prevention of complications of bedrest, and general safety during hospitalization.  Education Method: Verbal  Barriers to Learning: None  Education Outcome: Verbalized understanding;Continued education needed      Therapy Time   Individual Concurrent Group Co-treatment   Time In 0956         Time Out 1049         Minutes 53         Timed Code Treatment Minutes: 40 Minutes       Yessenia Avina, [place] [id number]

## 2024-08-22 NOTE — CARE COORDINATION
Therapy rec's  of SNF noted.   Spoke with patient and wife at bedside for introduction.  Discussed therapy rec's of SNF but patient and wife refusing.  They would like for patient to discharge to home with resumption of care through [facility].  CM will arrange for this at discharge.

## 2024-08-22 NOTE — PROGRESS NOTES
Physician Progress Note      PATIENT:               MERARI THURSTON  CSN #:                  [id number]  :                       1945  ADMIT DATE:       2024 8:31 AM  DISCH DATE:  RESPONDING  PROVIDER #:        Romel Renteria MD          QUERY TEXT:    Patient admitted with  \"Acute hypoxic respiratory : Patient presented to ED   with main complaint shortness of breath. Chest x-ray from time of admission   does reveal left-sided pleural effusion left-sided airspace opacities.\"  If possible, please document in the progress notes and discharge summary if   pneumonia was:    The medical record reflects the following:  Risk Factors: immunocompromised state -chemo for lung Ca  Clinical Indicators: Patient's CBC reveals white blood cell count of 11.9    Lactic acid - 1.9.  Patient's procalcitonin is slightly elevated at 0.21.    Pulmonary consult- \"Patient does have some pulm infiltrates/volume   loss/loculated pleural effusion\"  CXR-Redemonstration of left lung infiltrates   and loss of volume with diminished aeration in the left upper lobe compared   to the previous evaluation. Suggestion of small left effusion  Treatment: ? Oxygen supplementation to keep saturation between 90 and 94%   only, Please titrate the oxygen as per the above parameters, Pulmonary toilet.   Patient was started on empiric Unasyn at this time? Sputum culture ordered   and the results are pending. Respiratory pneumonia molecular panel ordered.   Pulmonary consult, Bronchoscopy done    Thank-You, Claire Burris RN, BSN, CCDS  Options provided:  -- Pneumonia POA,  confirmed after study  -- Pneumonia ruled out after study  -- Other - I will add my own diagnosis  -- Disagree - Not applicable / Not valid  -- Disagree - Clinically unable to determine / Unknown  -- Refer to Clinical Documentation Reviewer    PROVIDER RESPONSE TEXT:    Pneumonia POA, confirmed after study.    Query created by: Clive Burris on 2024 3:16

## 2024-08-22 NOTE — PROGRESS NOTES
Occupational Therapy  Facility/Department: [facility] A2 CARD TELEMETRY  Occupational Therapy Initial Assessment and Treatment    Name: Wes Schneider  : 1945  MRN: [id number]  Date of Service: 2024    Discharge Recommendations:  Subacute/Skilled Nursing Facility  OT Equipment Recommendations  Equipment Needed: No  Other: Defer to next level of care     Therapy discharge recommendations are subject to collaboration from the patient’s interdisciplinary healthcare team, including MD and case management recommendations.    Barriers to Home Discharge:   [x] Steps to access home entry or bed/bath:   [x] Unable to transfer, ambulate, or propel wheelchair household distances without assist   [] Limited available assist at home upon discharge    [] Patient or family requests d/c to post-acute facility    [x] Poor cognition/safety awareness for d/c to home alone    [] Unable to maintain ordered weight bearing status    [] Patient with salient signs of long-standing immobility   [x] Decreased independence with ADLs   [x] Increased risk for falls   [] Other:    If pt is unable to be seen after this session, please let this note serve as discharge summary.  Please see case management note for discharge disposition.  Thank you.    Patient Diagnosis(es): The primary encounter diagnosis was Acute congestive heart failure, unspecified heart failure type (HCC). Diagnoses of Hypoxia, Atrial fibrillation with RVR (HCC), Pleural effusion, left, History of lung cancer, and Malignant neoplasm of lung, unspecified laterality, unspecified part of lung (HCC) were also pertinent to this visit.  Past Medical History:  has a past medical history of BPH (benign prostatic hyperplasia), Cancer (HCC), Diabetes mellitus (HCC), Esophageal reflux, Hyperlipidemia, Hypertension, Kidney stone, Lung cancer (HCC), Lung cancer (HCC), and Neuropathy.  Past Surgical History:  has a past surgical history that includes Prostate surgery; Cystoscopy  place;Call light within reach;Chair alarm in place;Gait belt;Nurse notified;Left in chair;Patient at risk for falls  Restraints  Restraints Initially in Place: No  Bed Mobility Training  Bed Mobility Training: Yes  Supine to Sit: Minimum assistance  Sit to Supine: Other (comment) (up in chair at end of session)  Balance  Sitting: High guard  Standing: Impaired  Standing - Static: Fair  Standing - Dynamic: Fair  Transfer Training  Transfer Training: Yes  Interventions: Safety awareness training;Verbal cues  Sit to Stand: Minimum assistance (from EOB and recliner)  Stand to Sit: Minimum assistance  Gait  Gait Training: Yes  Overall Level of Assistance: Minimum assistance  Distance (ft): 5 Feet (and 8 ft and 5 ft)  Assistive Device: Gait belt;Walker  Interventions: Verbal cues;Safety awareness training;Tactile cues  Base of Support: Narrowed  Speed/Latisha: Fluctuations;Shuffled  Step Length: Left shortened;Right shortened  Gait Abnormalities: Decreased step clearance;Shuffling gait (cues for safety; pt rushing to complete task unsafely; SOB after ambulation)     AROM: Generally decreased, functional  Strength: Generally decreased, functional  Coordination: Generally decreased, functional  Tone: Normal  Sensation: Intact  ADL  Feeding: Setup;Beverage management  Feeding Skilled Clinical Factors: seated in chair  LE Dressing: Dependent/Total  LE Dressing Skilled Clinical Factors: don/doff brief and socks  Toileting: Dependent/Total  Toileting Skilled Clinical Factors: incontinent of urine upon standing, brief soiled 2/2 purewick malfunction, total A for brief exchange and tanisha hygiene seated on toilet  Functional Mobility: Moderate assistance  Functional Mobility Skilled Clinical Factors: w/ SW to/from bathroom  Skin Care: Chlorhexidine wipes     Activity Tolerance  Activity Tolerance: Patient tolerated evaluation without incident;Patient limited by fatigue;Patient limited by endurance  Activity Tolerance Comments: HR

## 2024-08-22 NOTE — PROGRESS NOTES
INPATIENT PULMONARY CRITICAL CARE PROGRESS NOTE      Reason for visit    shortness of breath, pleural effusion and has history of lung cancer     SUBJECTIVE: Patient underwent bronchoscopy and large amounts of brownish respite secretion was suctioned out yesterday, patient when seen this morning was comfortably lying in the recliner without any significant shortness of breath, patient feels better and has less shortness of breath, patient was not have any chest pain or palpitations, patient was off the amiodarone infusion and was maintaining A-fib with controlled ventricular rate on the monitor, patient was afebrile and he medically maintained, patient was not hypoxemic and was on room air oxygen saturation of 91 to 93% when seen, patient's blood sugars are not optimally controlled, patient is urine output has not been recorded overnight, no other pertinent review of system of concern         Physical Exam:  Blood pressure 106/67, pulse 83, temperature 97.7 °F (36.5 °C), temperature source Oral, resp. rate 20, height 1.829 m (6'), weight 81.6 kg (180 lb), SpO2 93%.'   Constitutional:  No acute distress.   HENT:  Oropharynx is clear and moist. No thyromegaly.  Eyes:  Conjunctivae are normal. Pupils equal, round, and reactive to light. No scleral icterus.   Neck: . No tracheal deviation present. No obvious thyroid mass.   Cardiovascular: S1-S2 irregularly irregular, normal heart sounds.  No right ventricular heave. No lower extremity edema.  Pulmonary/Chest: No wheezes.  Decreased left-sided rales.  Chest wall is not dull to percussion.  No accessory muscle usage or stridor.  Decreased breath sound intensity  Abdominal: Soft. Bowel sounds present. No distension or hernia. No tenderness.    Musculoskeletal: No cyanosis. No clubbing. No obvious joint deformity.   Lymphadenopathy: No cervical or supraclavicular adenopathy.   Skin: Skin is warm and dry. No rash or nodules on the exposed extremities.  Psychiatric: Normal  toilet  Patient does have some pulm infiltrates/volume loss/loculated pleural effusion  Patient was started on empiric Unasyn at this time  Sputum culture ordered and the results are pending  Respiratory pneumonia molecular panel ordered for the patient and the results are pending  Bronchodilators  IV Decadron being changed to p.o. prednisone  Patient never had any radiation therapy to the lungs  Patient has a persistent left sided lung nodule which may be a part of the adenocarcinoma of the lung if the patient has  Patient's recent MRI of the head was reviewed and patient had brain mets with vasogenic edema  Patient is complaining of oropharyngeal dysphagia-patient had a recent speech evaluation done and patient was cleared to eat by mouth-consider repeating speech evaluation if deemed appropriate-patient may require MBS or FEES  Patient's atrial fibrillation has improved and patient is not on any IV amiodarone infusion  Patient also is getting Lopressor and Flomax  Monitor hemodynamics and cardiac rhythm closely  Titration of antihypertensives and Flomax as per primary team depending on hemodynamics  Patient does have elevated troponin and BNP-further evaluation as per patient's primary team  Keep negative fluid balance  Patient was started on IV amiodarone which was continuing when seen this morning  Patient also is getting Eliquis  Monitor for any bleeding  Patient has uncontrolled diabetes mellitus-insulins as per IM on the base of blood glucose monitoring  BGM with SSI  Monitor for any hypoglycemia  Follow the bronchoscopy results  Monitor input output and BMP  Correct electrolytes on whenever necessary basis  PUD prophylaxis  PT/OT    Case discussed with patient and family        Electronically signed by:  SARAH STRONG MD    8/22/2024    7:45 AM.

## 2024-08-22 NOTE — PROGRESS NOTES
Hospital Medicine Progress Note      Date of Admission: 8/19/2024  Hospital Day: 4    Chief Admission Complaint:  SOB     Subjective:  He is sitting up in bed, he is feeling a little better but in no acute distress.  States shortness of breath is slowly improving.  Denies any chest pain       Presenting Admission History:       79 y.o. 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  He is followed by [facility] and is currently receiving chemotherapy.  Also has PMH of atrial fibrillation on long-term anticoagulation with Eliquis, hypertension, hyperlipidemia, type 2 diabetes mellitus insulin requiring, BPH     He had a recent admission to [facility] and was admitted on 8/16/2024 and discharged on 8/18/2024.  He was at that time with weakness and shortness of breath     He was doing reasonably well until the morning of admission when he had increased shortness of breath and therefore presented to the ED.  He denied any chest pain, denied any fever or chills  [place] requiring supplemental oxygen the ED was admitted for further evaluation and management.       Assessment/Plan:      Current Principal Problem:  <principal problem not specified>    Acute hypoxic respiratory : Patient presented to ED with main complaint shortness of breath.  Chest x-ray from time of admission does reveal left-sided pleural effusion left-sided airspace opacities.  He is feeling better with supplemental oxygen via nasal cannula.  Pulmonary has seen in consultation and their input is noted and appreciated.  Continue bronchodilators, continue on IV steroids/Decadron.    He did go for bronchoscopy on 8/21/2024 and results as noted.     Generalized weakness.  POA.  Multifactorial including secondary to malignancy, chemotherapy and physical deconditioning.will ask PT OT to evaluate.     Pulmonary infiltrates : This was noted on chest x-ray at the time of admission.  Blood cultures  Sized  ADULT ORAL NUTRITION SUPPLEMENT; Breakfast; Diabetic Oral Supplement  DVT Prophylaxis: []PPx LMWH  []SQ Heparin  []IPC/SCDs  [x]Eliquis  []Xarelto  []Coumadin  []Other -      Code status: Full Code  PT/OT Eval Status:   []NOT yet ordered  []Ordered and Pending   [x]Seen with Recommendations for:  []Home independently  []Home w/ assist  []HHC  [x]SNF  []Acute Rehab    Anticipated Discharge Day/Date:  1-2 days     Anticipated Discharge Location: []Home  []HHC  [x]SNF  []Acute Rehab  []ECF  []LTAC  []Hospice  []Other -      Consults:      IP CONSULT TO PULMONOLOGY  IP CONSULT TO CARDIOLOGY  IP CONSULT TO VASCULAR ACCESS TEAM      ------------------------------------------------------------------------------------------------------------------------------------------------------------------------    MDM      [x] High (any 2)    A. Problems (any 1)  [x] Acute/Chronic Illness/injury posing threat to life or bodily function:    [] Severe exacerbation of chronic illness:    ---------------------------------------------------------------------  B. Risk of Treatment (any 1)   [] Drugs/treatments that require intensive monitoring for toxicity include:    [x] IV ABX requiring serial renal monitoring for nephrotoxicity:     [] IV Narcotic analgesia for adverse drug reaction  [] IV diuresis requiring serial monitoring for renal impairment and electrolyte derangements  [x] Critical electrolyte abnormalities requiring IV replacement and close serial monitoring  [] Insulin - monitoring serial FSBS for Hypoglycemic adverse drug reaction  [] Anticoagulation requiring serial monitoring of coagulation factors  [] Other -   [] Change in code status:    [] Decision to escalate care:    [x] Major surgery/procedure with associated risk factors:  He did go for bronchoscopy this admission   ----------------------------------------------------------------------  C. Data (any 2)  [x] Discussed current management and discharge planning  options with Case Management.  [x] Discussed management of the case with: Pulmonary  [x] Telemetry personally reviewed and interpreted as documented above    [] Imaging personally reviewed and interpreted, includes:    [x] Data Review (any 3)  [x] All available Consultant notes from yesterday/today were reviewed  [x] All current labs were reviewed and interpreted for clinical significance   [x] Appropriate follow-up labs were ordered  [] Collateral history obtained from:        Medications:  Personally reviewed in detail in conjunction w/ labs as documented for evidence of drug toxicity.     Infusion Medications    dextrose      sodium chloride 25 mL (08/20/24 1222)     Scheduled Medications    [START ON 8/23/2024] amiodarone  250 mg Oral Daily    insulin glargine  25 Units SubCUTAneous Nightly    dexAMETHasone  4 mg IntraVENous Q12H    apixaban  5 mg Oral BID    atorvastatin  40 mg Oral Nightly    folic acid  1 mg Oral Daily    metoprolol succinate  25 mg Oral Daily    therapeutic multivitamin-minerals  1 tablet Oral Daily    tamsulosin  0.4 mg Oral Daily    vitamin D3  400 Units Oral Daily    escitalopram  5 mg Oral Nightly    insulin lispro  0-8 Units SubCUTAneous TID WC    insulin lispro  0-4 Units SubCUTAneous Nightly    sodium chloride flush  5-40 mL IntraVENous 2 times per day    ipratropium 0.5 mg-albuterol 2.5 mg  1 Dose Inhalation Q4H WA RT    ampicillin-sulbactam  3,000 mg IntraVENous Q6H    pantoprazole  40 mg Oral QAM AC     PRN Meds: glucose, dextrose bolus **OR** dextrose bolus, glucagon (rDNA), dextrose, sodium chloride flush, sodium chloride, ondansetron **OR** ondansetron, polyethylene glycol, acetaminophen **OR** acetaminophen     Labs:  Personally reviewed and interpreted for clinical significance.     Recent Labs     08/20/24 0643 08/21/24 0429   WBC 9.0 11.9*   HGB 9.1* 8.0*   HCT 28.4* 25.3*    233     Recent Labs     08/20/24  0643 08/21/24 0429 08/22/24  0703   * 137 136   K

## 2024-08-22 NOTE — PLAN OF CARE
Problem: Safety - Adult  Goal: Free from fall injury  8/22/2024 0210 by Carol Rivera RN  Outcome: Progressing     Problem: Discharge Planning  Goal: Discharge to home or other facility with appropriate resources  8/22/2024 0210 by Carol Rivera RN  Outcome: Progressing     Problem: Skin/Tissue Integrity  Goal: Absence of new skin breakdown  Description: 1.  Monitor for areas of redness and/or skin breakdown  2.  Assess vascular access sites hourly  3.  Every 4-6 hours minimum:  Change oxygen saturation probe site  4.  Every 4-6 hours:  If on nasal continuous positive airway pressure, respiratory therapy assess nares and determine need for appliance change or resting period.  8/22/2024 0210 by Carol Rivera RN  Outcome: Progressing     Problem: Chronic Conditions and Co-morbidities  Goal: Patient's chronic conditions and co-morbidity symptoms are monitored and maintained or improved  8/22/2024 0210 by Carol Rivera RN  Outcome: Progressing  Note: Patient with diagnosis of Diabetes. Active orders for ACHS glucose monitoring, SSI and Lantus. Instructed importance of following carb control diet to maintain stable gluocose levels.       Problem: Pain  Goal: Verbalizes/displays adequate comfort level or baseline comfort level  8/22/2024 0210 by Carol Rivera RN  Outcome: Progressing

## 2024-08-22 NOTE — CONSULTS
CONSULTATION  Wes Schneider is a 79 y.o. male asked to see us in consultation by  Jourdan Russ MD & Romel Renteria MD for evaluation of abnormal MBS.      Mr. Schneider is an 79-year-old male with past medical history of BPH, prostate cancer, A-fib on Eliquis, DM, reflux, HLD, HTN, neuropathy and stage IV adenocarcinoma of left lung with mets to the brain, liver and bone currently on chemotherapy through [facility].    He was recently admitted from 8/16 - 8/18 with weakness and shortness of breath.  He was discharged on 8/19 but presented back the same day with with worsening dyspnea.  CXR was revealing for a pleural effusion.  He underwent bronchoscopy on 8/24 and is currently on oxygen.  Hospital course was also complicated by a-fib with RVR.  Speech therapy was asked to see the patient due to worsening respiratory status and concern for aspiration.  He did undergo an MBS yesterday which reports a moderate residue and variable episodes of laryngeal penetration.  He has been cleared for a regular diet.  A GI consult is recommended due to residue observed near the upper esophageal sphincter.    The patient denies heartburn, abdominal pain, nausea and vomiting.  He states from time to time he has difficulty swallowing solids, but the difficulty is in initiating the swallow to begin with.  He also reports difficulty chewing his food.  His family notes he has been belching more recently.  He does not take a PPI.    He had a colonoscopy in 2002 for right sided abdominal pain with findings of severe pandiverticulosis.          Medications Prior to Admission: predniSONE (DELTASONE) 20 MG tablet, Take 2 tablets by mouth daily for 3 doses  amiodarone (CORDARONE) 200 MG tablet, Take 1 tablet by mouth daily  apixaban (ELIQUIS) 5 MG TABS tablet, Take 1 tablet by mouth 2 times daily  insulin glargine (BASAGLAR KWIKPEN) 100 UNIT/ML        IMAGES  XR CHEST PORTABLE    Result Date: 8/22/2024  1. New right pleural effusion and right lung base airspace opacities. 2. Unchanged appearance of left lung volume loss and left-sided airspace opacities with probable left small effusion.     FL MODIFIED BARIUM SWALLOW W VIDEO    Result Date: 8/21/2024  Moderate residue throughout the examination.  Variable episodes of laryngeal penetration. Please see separate speech pathology report for full discussion of findings and recommendations.     XR CHEST PORTABLE    Result Date: 8/19/2024  Unchanged left-sided pleural effusion and left-sided airspace opacities     XR CHEST PORTABLE    Result Date: 8/16/2024  Redemonstration of left lung infiltrates and loss of volume with diminished aeration in the left upper lobe compared to the previous evaluation. Suggestion of small left effusion.  Access port with the catheter tip at the innominate SVC junction.           Assessment:  79-year-old male with past medical history of BPH, prostate cancer, A-fib on Eliquis, DM, reflux, HLD, HTN, neuropathy and stage IV adenocarcinoma of left lung with mets to the brain, liver and bone currently on chemotherapy through [facility] presenting with dyspnea.  Consulted for abnormal MBS.    Abnormal MBS showing residue near UES.  Patient denies symptoms suggestive of esophageal dysphagia.  No reports of GERD like symptoms although he is belching which may reflect reflux.    2. Acute hypoxic respiratory failure.  Left pleural effusion.  S/p bronchoscopy 8/21.    3. A-fib with RVR.  Off amiodarone drip.    Plan:  1. Continue regular diet as recommended by speech therapy.  2. Continue pantoprazole 40 mg daily.  3. Outpatient EGD.      Thank you for permitting us to participate in the care of your patient. Please do not hesitate to call with questions or concerns.     1.  The patient indicates understanding of these issues and agrees with the plan.  2.  I reviewed the patient's medical  information and medical history.   3.  I have reviewed the past medical, family, and social history sections including the medications and allergies listed in the above medical record.        Electronically signed by: PUSHPA Garrison 8/22/2024 2:50 PM           (Office) [phone]  (Fax) [phone]  Available via perfect serve

## 2024-08-22 NOTE — PROGRESS NOTES
Hospital Medicine Progress Note      Date of Admission: 8/19/2024  Hospital Day: 4    Chief Admission Complaint:  sob     Subjective: He is up in bed, his daughter is at the bedside.  States he is feeling a little better today shortness of breath improving.    Presenting Admission History:       79 y.o. 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  He is followed by [facility] and is currently receiving chemotherapy.  Also has PMH of atrial fibrillation on long-term anticoagulation with Eliquis, hypertension, hyperlipidemia, type 2 diabetes mellitus insulin requiring, BPH     He had a recent admission to [facility] and was admitted on 8/16/2024 and discharged on 8/18/2024.  He was at that time with weakness and shortness of breath     He was doing reasonably well until the morning of admission when he had increased shortness of breath and therefore presented to the ED.  He denied any chest pain, denied any fever or chills  [place] requiring supplemental oxygen the ED was admitted for further evaluation and management.          Assessment/Plan:      Current Principal Problem:  <principal problem not specified>    Acute hypoxic respiratory : Patient presented to ED with main complaint shortness of breath.  Chest x-ray from time of admission does reveal left-sided pleural effusion left-sided airspace opacities.  He is feeling better with supplemental oxygen via nasal cannula.  Pulmonary has seen in consultation and their input is noted and appreciated.  Continue bronchodilators, continue on IV steroids/Decadron.    He did go for bronchoscopy on 8/21/2024     Generalized weakness.  POA.  Multifactorial including secondary to malignancy, chemotherapy and physical deconditioning.will ask PT OT to evaluate.     Pulmonary infiltrates : This was noted on chest x-ray at the time of admission.  Blood cultures have been sent.  Continue on empiric

## 2024-08-23 VITALS
DIASTOLIC BLOOD PRESSURE: 74 MMHG | SYSTOLIC BLOOD PRESSURE: 118 MMHG | RESPIRATION RATE: 18 BRPM | HEIGHT: 72 IN | HEART RATE: 105 BPM | BODY MASS INDEX: 24.38 KG/M2 | TEMPERATURE: 97.7 F | OXYGEN SATURATION: 96 % | WEIGHT: 180 LBS

## 2024-08-23 PROBLEM — J15.1 PNEUMONIA OF LEFT LOWER LOBE DUE TO PSEUDOMONAS SPECIES (HCC): Status: ACTIVE | Noted: 2024-08-23

## 2024-08-23 LAB
ANION GAP SERPL CALCULATED.3IONS-SCNC: 9 MMOL/L (ref 3–16)
BACTERIA BLD CULT ORG #2: NORMAL
BACTERIA BLD CULT: NORMAL
BUN SERPL-MCNC: 27 MG/DL (ref 7–20)
CALCIUM SERPL-MCNC: 7.1 MG/DL (ref 8.3–10.6)
CHLORIDE SERPL-SCNC: 106 MMOL/L (ref 99–110)
CO2 SERPL-SCNC: 25 MMOL/L (ref 21–32)
CREAT SERPL-MCNC: 1.3 MG/DL (ref 0.8–1.3)
FLUAV RNA SPEC QL NAA+PROBE: NOT DETECTED
FLUBV RNA SPEC QL NAA+PROBE: NOT DETECTED
GFR SERPLBLD CREATININE-BSD FMLA CKD-EPI: 55 ML/MIN/{1.73_M2}
GLUCOSE BLD-MCNC: 174 MG/DL (ref 70–99)
GLUCOSE BLD-MCNC: 203 MG/DL (ref 70–99)
GLUCOSE BLD-MCNC: 214 MG/DL (ref 70–99)
GLUCOSE SERPL-MCNC: 198 MG/DL (ref 70–99)
PERFORMED ON: ABNORMAL
POTASSIUM SERPL-SCNC: 4.4 MMOL/L (ref 3.5–5.1)
RSV RNA SPEC QL NAA+PROBE: NOT DETECTED
RSV SOURCE: NORMAL
SODIUM SERPL-SCNC: 140 MMOL/L (ref 136–145)

## 2024-08-23 PROCEDURE — 99232 SBSQ HOSP IP/OBS MODERATE 35: CPT | Performed by: INTERNAL MEDICINE

## 2024-08-23 PROCEDURE — 6370000000 HC RX 637 (ALT 250 FOR IP): Performed by: INTERNAL MEDICINE

## 2024-08-23 PROCEDURE — 94640 AIRWAY INHALATION TREATMENT: CPT

## 2024-08-23 PROCEDURE — 6360000002 HC RX W HCPCS: Performed by: INTERNAL MEDICINE

## 2024-08-23 PROCEDURE — 2580000003 HC RX 258: Performed by: INTERNAL MEDICINE

## 2024-08-23 PROCEDURE — 80048 BASIC METABOLIC PNL TOTAL CA: CPT

## 2024-08-23 RX ORDER — CIPROFLOXACIN 500 MG/1
500 TABLET, FILM COATED ORAL EVERY 12 HOURS SCHEDULED
Status: DISCONTINUED | OUTPATIENT
Start: 2024-08-23 | End: 2024-08-23 | Stop reason: HOSPADM

## 2024-08-23 RX ORDER — AMIODARONE HYDROCHLORIDE 100 MG/1
250 TABLET ORAL DAILY
Qty: 75 TABLET | Refills: 1 | Status: SHIPPED | OUTPATIENT
Start: 2024-08-24 | End: 2024-08-23

## 2024-08-23 RX ORDER — CIPROFLOXACIN 500 MG/1
500 TABLET, FILM COATED ORAL 2 TIMES DAILY
Qty: 14 TABLET | Refills: 0 | Status: SHIPPED | OUTPATIENT
Start: 2024-08-23 | End: 2024-08-30

## 2024-08-23 RX ORDER — PREDNISONE 10 MG/1
10 TABLET ORAL DAILY
Qty: 10 TABLET | Refills: 0 | Status: SHIPPED | OUTPATIENT
Start: 2024-08-24 | End: 2024-09-03

## 2024-08-23 RX ORDER — AMIODARONE HYDROCHLORIDE 100 MG/1
250 TABLET ORAL DAILY
Qty: 75 TABLET | Refills: 1 | Status: SHIPPED | OUTPATIENT
Start: 2024-08-24 | End: 2024-10-23

## 2024-08-23 RX ADMIN — SODIUM CHLORIDE, PRESERVATIVE FREE 10 ML: 5 INJECTION INTRAVENOUS at 10:08

## 2024-08-23 RX ADMIN — TAMSULOSIN HYDROCHLORIDE 0.4 MG: 0.4 CAPSULE ORAL at 10:05

## 2024-08-23 RX ADMIN — APIXABAN 5 MG: 5 TABLET, FILM COATED ORAL at 10:05

## 2024-08-23 RX ADMIN — INSULIN LISPRO 2 UNITS: 100 INJECTION, SOLUTION INTRAVENOUS; SUBCUTANEOUS at 12:28

## 2024-08-23 RX ADMIN — PREDNISONE 10 MG: 10 TABLET ORAL at 10:05

## 2024-08-23 RX ADMIN — IPRATROPIUM BROMIDE AND ALBUTEROL SULFATE 1 DOSE: 2.5; .5 SOLUTION RESPIRATORY (INHALATION) at 07:36

## 2024-08-23 RX ADMIN — IPRATROPIUM BROMIDE AND ALBUTEROL SULFATE 1 DOSE: 2.5; .5 SOLUTION RESPIRATORY (INHALATION) at 11:26

## 2024-08-23 RX ADMIN — Medication 1 TABLET: at 10:05

## 2024-08-23 RX ADMIN — IPRATROPIUM BROMIDE AND ALBUTEROL SULFATE 1 DOSE: 2.5; .5 SOLUTION RESPIRATORY (INHALATION) at 15:40

## 2024-08-23 RX ADMIN — AMIODARONE HYDROCHLORIDE 250 MG: 200 TABLET ORAL at 10:05

## 2024-08-23 RX ADMIN — AMPICILLIN SODIUM AND SULBACTAM SODIUM 3000 MG: 2; 1 INJECTION, POWDER, FOR SOLUTION INTRAMUSCULAR; INTRAVENOUS at 05:34

## 2024-08-23 RX ADMIN — FOLIC ACID 1 MG: 1 TABLET ORAL at 10:05

## 2024-08-23 RX ADMIN — METOPROLOL SUCCINATE 25 MG: 50 TABLET, EXTENDED RELEASE ORAL at 10:05

## 2024-08-23 RX ADMIN — AMPICILLIN SODIUM AND SULBACTAM SODIUM 3000 MG: 2; 1 INJECTION, POWDER, FOR SOLUTION INTRAMUSCULAR; INTRAVENOUS at 01:05

## 2024-08-23 RX ADMIN — PANTOPRAZOLE SODIUM 40 MG: 40 TABLET, DELAYED RELEASE ORAL at 05:34

## 2024-08-23 RX ADMIN — CHOLECALCIFEROL TAB 10 MCG (400 UNIT) 400 UNITS: 10 TAB at 10:05

## 2024-08-23 RX ADMIN — INSULIN LISPRO 2 UNITS: 100 INJECTION, SOLUTION INTRAVENOUS; SUBCUTANEOUS at 10:05

## 2024-08-23 ASSESSMENT — PAIN SCALES - GENERAL
PAINLEVEL_OUTOF10: 0

## 2024-08-23 NOTE — PROGRESS NOTES
ONCOLOGY HEMATOLOGY CARE PROGRESS NOTE      SUBJECTIVE:  Patient was sitting in chair eating breakfast with wife at bedside upon arrival. Breathing on room air. He states he is feeling well. Denies chest pain, fevers, SOB.     ROS:     Constitutional:  No weight loss, No fever, No chills, No night sweats.  Energy level good.  Eyes:  No impairment or change in vision  ENT / Mouth:  No pain, abnormal ulceration, bleeding, nasal drip or change in voice or hearing  Cardiovascular:  No chest pain, palpitations, new edema, or calf discomfort  Respiratory:  No pain, hemoptysis, change to breathing  Breast:  No pain, discharge, change in appearance or texture  Gastrointestinal:  No pain, cramping, jaundice, change to eating and bowel habits  Urinary:  No pain, bleeding or change in continence  Genitalia: No pain, bleeding or discharge  Musculoskeletal:  No redness, pain, edema or weakness  Skin:  No pruritus, rash, change to nodules or lesions  Neurologic:  No discomfort, change in mental status, speech, sensory or motor activity  Psychiatric:  No change in concentration or change to affect or mood  Endocrine:  No hot flashes, increased thirst, or change to urine production  Hematologic: No petechiae, ecchymosis or bleeding  Lymphatic:  No lymphadenopathy or lymphedema  Allergy / Immunologic:  No eczema, hives, frequent or recurrent infections    OBJECTIVE        Physical    VITALS:  Patient Vitals for the past 24 hrs:   BP Temp Temp src Pulse Resp SpO2   08/23/24 0745 117/66 97.5 °F (36.4 °C) Oral 65 18 95 %   08/23/24 0440 97/63 98.1 °F (36.7 °C) Oral 94 20 95 %   08/23/24 0007 115/62 97.7 °F (36.5 °C) Oral (!) 102 18 95 %   08/22/24 2045 (!) 118/51 97.9 °F (36.6 °C) Oral (!) 119 20 97 %   08/22/24 2004 -- -- -- -- -- 94 %   08/22/24 1558 125/76 97.6 °F (36.4 °C) Oral 92 18 92 %   08/22/24 1214 -- -- -- 96 18 91 %   08/22/24 1056 123/77 97.5 °F (36.4 °C) Oral (!) 131 -- 95 %  encephalopathy    Hemispheric carotid artery syndrome    Uncontrolled type 2 diabetes mellitus with complication, without long-term current use of insulin    HTN (hypertension), benign    Dyslipidemia    Urolithiasis    SOB (shortness of breath)    Left renal stone    Lung cancer (HCC)    Shortness of breath    Gram-negative pneumonia (HCC)    Sepsis (HCC)    Hyperglycemia due to type 2 diabetes mellitus (HCC)    Neutropenia (HCC)    Cancer of right lung (HCC)    Elevated troponin level not due myocardial infarction    Immunocompromised state due to drug therapy (HCC)    Pancytopenia (HCC)    Gram-negative bacteremia    COVID-19    Normocytic normochromic anemia    Generalized weakness    Acute hypoxemic respiratory failure (HCC)    Hypoxemia    Pulmonary infiltrate    Loculated pleural effusion    Atelectasis    Metastatic adenocarcinoma (HCC)    Primary malignant neoplasm of lung with metastasis to brain (HCC)    Vasogenic edema (HCC)    Elevated troponin    Uncontrolled type 2 diabetes mellitus with hyperglycemia (HCC)    Former smoker    Elevated brain natriuretic peptide (BNP) level    Normal anion gap metabolic acidosis    Atrial fibrillation with RVR (HCC)       ASSESSMENT AND PLAN:    Stage IV adenocarcinoma of the LLL of the lung   Liver Metastasis  Bone Metastasis   - Diagnosis  - 5/28/2021.  A CTPA at that time showed no evidence of a PE.  A large left-sided pleural effusion was present.  A 1.7 cm area of rounded atelectasis in the LLL was present.  Scattered granulomas were present as well as calcified left hilar and left lower lobe paratracheal lymph nodes.  - CT of the chest on 9/01/2021 showed stable findings.   - Dr. Bush on 9/27/2021 ultrasound-guided paracentesis was ordered.  A left-sided ultrasound-guided paracentesis was performed on 9/28/2021, which yielded 1 L of clear yellow fluid.  Cytology showed a metastatic adenocarcinoma.  IHC was consistent with a lung primary.  -  PET/CT on 10/21/2021

## 2024-08-23 NOTE — PLAN OF CARE
Problem: Discharge Planning  Goal: Discharge to home or other facility with appropriate resources  8/23/2024 1631 by Wen Sexton RN  Outcome: Adequate for Discharge  8/23/2024 0646 by Carol Rviera RN  Outcome: Progressing     Problem: Safety - Adult  Goal: Free from fall injury  8/23/2024 1631 by Wen Sexton RN  Outcome: Adequate for Discharge  8/23/2024 0646 by Carol Rivera RN  Outcome: Progressing     Problem: ABCDS Injury Assessment  Goal: Absence of physical injury  Outcome: Adequate for Discharge     Problem: Skin/Tissue Integrity  Goal: Absence of new skin breakdown  Description: 1.  Monitor for areas of redness and/or skin breakdown  2.  Assess vascular access sites hourly  3.  Every 4-6 hours minimum:  Change oxygen saturation probe site  4.  Every 4-6 hours:  If on nasal continuous positive airway pressure, respiratory therapy assess nares and determine need for appliance change or resting period.  8/23/2024 1631 by Wen Sexton RN  Outcome: Adequate for Discharge  8/23/2024 0646 by Carol Rivera RN  Outcome: Progressing     Problem: Chronic Conditions and Co-morbidities  Goal: Patient's chronic conditions and co-morbidity symptoms are monitored and maintained or improved  Outcome: Adequate for Discharge     Problem: Pain  Goal: Verbalizes/displays adequate comfort level or baseline comfort level  8/23/2024 1631 by Wen Sexton RN  Outcome: Adequate for Discharge  8/23/2024 0646 by Carol Rivera RN  Outcome: Progressing     Problem: Nutrition Deficit:  Goal: Optimize nutritional status  Outcome: Adequate for Discharge

## 2024-08-23 NOTE — PROGRESS NOTES
Patient discharged 8/23/2024, 6:19 PM per MD order. Patient's IV removed bleeding stopped, tele box removed place in dirty bin, VVS. Discharge instructions and medication education given all questions answered, no additional questions. Patient wheeled out by staff with personal belonging and family at side. Patient prescriptions sent to pharmacy, prescriptions picked up and wheeled to personal vehicle.

## 2024-08-23 NOTE — PROGRESS NOTES
INPATIENT PULMONARY CRITICAL CARE PROGRESS NOTE      Reason for visit    shortness of breath, pleural effusion and has history of lung cancer     SUBJECTIVE: Patient when seen this morning was feeling better, patient did not have any increasing cough or congestion, no increasing shortness of breath or wheezing, patient did not have any pleuritic chest pain, patient was afebrile and he medically maintained, patient had atrial fibrillation on the monitor with variable ventricular rate, patient blood sugars are not optimally controlled, patient was not hypoxemic and was on room air oxygen with saturation of 95% when seen, patient urine output was adequate, patient has a cumulative dose of -1 L, no other pertinent review of system of concern         Physical Exam:  Blood pressure 97/63, pulse 94, temperature 98.1 °F (36.7 °C), temperature source Oral, resp. rate 20, height 1.829 m (6'), weight 81.6 kg (180 lb), SpO2 95%.'   Constitutional:  No acute distress.   HENT:  Oropharynx is clear and moist. No thyromegaly.  Eyes:  Conjunctivae are normal. Pupils equal, round, and reactive to light. No scleral icterus.   Neck: . No tracheal deviation present. No obvious thyroid mass.   Cardiovascular: S1-S2 irregularly irregular, normal heart sounds.  No right ventricular heave. No lower extremity edema.  Pulmonary/Chest: No wheezes.  Minimal rales.  Chest wall is not dull to percussion.  No accessory muscle usage or stridor.  Decreased breath sound intensity  Abdominal: Soft. Bowel sounds present. No distension or hernia. No tenderness.    Musculoskeletal: No cyanosis. No clubbing. No obvious joint deformity.   Lymphadenopathy: No cervical or supraclavicular adenopathy.   Skin: Skin is warm and dry. No rash or nodules on the exposed extremities.  Psychiatric: Normal mood and affect. Behavior is normal.  No anxiety.   Neurologic: Alert, awake and oriented. PERRL.  Speech fluent           Results:  CBC:   Recent Labs      IM on the base of blood glucose monitoring  BGM with SSI  Monitor for any hypoglycemia  Monitor input output and BMP  Correct electrolytes on whenever necessary basis  PUD prophylaxis  PT/OT    Case discussed with patient and family    Case d/w IM    ?Discharge planning         Electronically signed by:  SARAH STRONG MD    8/23/2024    7:26 AM.

## 2024-08-23 NOTE — PLAN OF CARE
Problem: Discharge Planning  Goal: Discharge to home or other facility with appropriate resources  8/23/2024 0646 by Carol Rivera RN  Outcome: Progressing     Problem: Safety - Adult  Goal: Free from fall injury  8/23/2024 0646 by Carol Rivera RN  Outcome: Progressing       Problem: Skin/Tissue Integrity  Goal: Absence of new skin breakdown  Description: 1.  Monitor for areas of redness and/or skin breakdown  2.  Assess vascular access sites hourly  3.  Every 4-6 hours minimum:  Change oxygen saturation probe site  4.  Every 4-6 hours:  If on nasal continuous positive airway pressure, respiratory therapy assess nares and determine need for appliance change or resting period.  8/23/2024 0646 by Carol Rivera RN  Outcome: Progressing     Problem: Pain  Goal: Verbalizes/displays adequate comfort level or baseline comfort level  8/23/2024 0646 by Carol Rivera, RN  Outcome: Progressing

## 2024-08-23 NOTE — DISCHARGE SUMMARY
Hospital Medicine Discharge Summary    Patient: Wes Schneider   : 1945     Admit Date: 2024   Discharge Date:   24  Disposition:  [x]Home   []HHC  []SNF  []ECF  []Acute Rehab  []LTAC  []Hospice  Code status:  []Full  []DNR/CCA  []Limited (DNR/CCA with Do Not Intubate)  []DNRCC  Condition at Discharge: Stable  Primary Care Provider: Jourdan Russ MD    Admitting Provider: Romel Valdez MD  Discharge Provider: ROMEL VALDEZ MD     Discharge Diagnoses:      Active Hospital Problems    Diagnosis     Immunocompromised state due to drug therapy (HCC) [D84.821, Z79.899]      Priority: Medium    Pneumonia of left lower lobe due to Pseudomonas species (HCC) [J15.1]     Normal anion gap metabolic acidosis [E87.20]     Atrial fibrillation with RVR (HCC) [I48.91]     Hypoxemia [R09.02]     Pulmonary infiltrate [R91.8]     Loculated pleural effusion [J90]     Atelectasis [J98.11]     Metastatic adenocarcinoma (HCC) [C79.9]     Primary malignant neoplasm of lung with metastasis to brain (HCC) [C34.90, C79.31]     Vasogenic edema (HCC) [G93.6]     Elevated troponin [R79.89]     Uncontrolled type 2 diabetes mellitus with hyperglycemia (HCC) [E11.65]     Former smoker [Z87.891]     Elevated brain natriuretic peptide (BNP) level [R79.89]     Normocytic normochromic anemia [D64.9]     Shortness of breath [R06.02]        Presenting Admission History:      79 y.o. 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  He is followed by [facility] and is currently receiving chemotherapy.  Also has PMH of atrial fibrillation on long-term anticoagulation with Eliquis, hypertension, hyperlipidemia, type 2 diabetes mellitus insulin requiring, BPH     He had a recent admission to [facility] and was admitted on 2024 and discharged on 2024.  He was at that time with weakness and shortness of breath     He was doing reasonably well until the morning of  subclavian vein was investigated. This vein appeared to show normal color filling and Doppler interrogation showed phasic, spontaneous and somewhat pulsatile flow.     Echo (TTE) complete (PRN contrast/bubble/strain/3D)    Result Date: 8/9/2024    Image quality is adequate. Contrast used: Definity.   Left Ventricle: Normal left ventricular systolic function with a visually estimated EF of 55 - 60%. Left ventricle size is normal. Normal wall thickness. Normal wall motion.     CT CHEST ABDOMEN PELVIS W CONTRAST Additional Contrast? Oral    Result Date: 8/8/2024  EXAMINATION: CT OF THE CHEST, ABDOMEN, AND PELVIS WITH CONTRAST 8/6/2024 10:24 am TECHNIQUE: CT of the chest, abdomen and pelvis was performed with the administration of intravenous contrast. Multiplanar reformatted images are provided for review. Automated exposure control, iterative reconstruction, and/or weight based adjustment of the mA/kV was utilized to reduce the radiation dose to as low as reasonably achievable. COMPARISON: CT chest abdomen pelvis with contrast 05/01/2024, CT chest pulmonary embolism 08/07/2024 HISTORY: ORDERING SYSTEM PROVIDED HISTORY: Malignant neoplasm of overlapping sites of left bronchus and lung (HCC) TECHNOLOGIST PROVIDED HISTORY: Additional Contrast?->Oral STAT Creatinine as needed:->No Reason for exam:->rule out mets/chemo maintenance Reason for Exam: rule out mets; lung cancer; prostate cancer; pt states no new symptoms or issues at this time; HX of back surgery, port a cath FINDINGS: Chest: Mediastinum: The heart is normal in size without pericardial effusion.  The thoracic aorta and main pulmonary artery are normal in caliber and patent. There is no central filling defect in the pulmonary arteries.  There is a left chest wall port with its tip terminating in the mid superior vena cava. There is left-sided mediastinal shift related to volume loss. Lungs/pleura: The trachea and mainstem bronchi are patent and normal in  25,000 CFU/ml  No further workup   08/16/2024 09:29 PM     Blood Cultures:   Lab Results   Component Value Date/Time    BC No Growth after 4 days of incubation. 08/19/2024 10:09 AM     Lab Results   Component Value Date/Time    BLOODCULT2 No Growth after 4 days of incubation. 08/19/2024 10:09 AM     Organism:   Lab Results   Component Value Date/Time    ORG Streptococcus pneumoniae 08/21/2024 09:58 AM    ORG Pseudomonas aeruginosa 08/21/2024 09:58 AM       Signed:    CARLOS A VALDEZ MD

## 2024-08-24 LAB
BACTERIA SPEC RESP CULT: ABNORMAL
GRAM STN SPEC: ABNORMAL
GRAM STN SPEC: ABNORMAL
ORGANISM: ABNORMAL

## 2024-08-25 LAB
FUNGUS SPEC CULT: ABNORMAL
LOEFFLER MB STN SPEC: ABNORMAL
ORGANISM: ABNORMAL

## 2024-08-26 LAB
FUNGUS SPEC CULT: ABNORMAL
LOEFFLER MB STN SPEC: ABNORMAL
ORGANISM: ABNORMAL

## 2024-09-03 LAB
ACID FAST STN SPEC QL: NORMAL
MYCOBACTERIUM SPEC CULT: NORMAL

## 2024-09-10 LAB
ACID FAST STN SPEC QL: NORMAL
MYCOBACTERIUM SPEC CULT: NORMAL

## 2024-09-17 LAB
ACID FAST STN SPEC QL: NORMAL
MYCOBACTERIUM SPEC CULT: NORMAL

## 2024-09-18 PROBLEM — R79.89 ELEVATED TROPONIN: Status: RESOLVED | Noted: 2024-08-19 | Resolved: 2024-09-18

## 2024-09-23 LAB
FUNGUS SPEC CULT: ABNORMAL
LOEFFLER MB STN SPEC: ABNORMAL
ORGANISM: ABNORMAL

## 2024-09-24 LAB
ACID FAST STN SPEC QL: NORMAL
MYCOBACTERIUM SPEC CULT: NORMAL

## 2024-10-01 LAB
ACID FAST STN SPEC QL: NORMAL
MYCOBACTERIUM SPEC CULT: NORMAL

## 2024-10-08 LAB
ACID FAST STN SPEC QL: NORMAL
MYCOBACTERIUM SPEC CULT: NORMAL

## 2024-10-10 ENCOUNTER — HOSPITAL ENCOUNTER (OUTPATIENT)
Dept: MRI IMAGING | Age: 79
Discharge: HOME | End: 2024-10-10
Attending: RADIOLOGY
Payer: MEDICARE

## 2024-10-10 ENCOUNTER — HOSPITAL ENCOUNTER (OUTPATIENT)
Dept: ONCOLOGY | Age: 79
Setting detail: INFUSION SERIES
Discharge: HOME | End: 2024-10-10
Payer: MEDICARE

## 2024-10-10 VITALS
DIASTOLIC BLOOD PRESSURE: 59 MMHG | SYSTOLIC BLOOD PRESSURE: 116 MMHG | OXYGEN SATURATION: 99 % | TEMPERATURE: 98 F | HEART RATE: 77 BPM | RESPIRATION RATE: 16 BRPM

## 2024-10-10 DIAGNOSIS — C34.00 MALIGNANT NEOPLASM OF HILUS OF LUNG, UNSPECIFIED LATERALITY (HCC): Primary | ICD-10-CM

## 2024-10-10 DIAGNOSIS — C79.31 METASTASIS TO BRAIN (HCC): ICD-10-CM

## 2024-10-10 PROCEDURE — 36591 DRAW BLOOD OFF VENOUS DEVICE: CPT

## 2024-10-10 PROCEDURE — P9040 RBC LEUKOREDUCED IRRADIATED: HCPCS

## 2024-10-10 PROCEDURE — 86900 BLOOD TYPING SEROLOGIC ABO: CPT

## 2024-10-10 PROCEDURE — 6360000004 HC RX CONTRAST MEDICATION: Performed by: RADIOLOGY

## 2024-10-10 PROCEDURE — 6370000000 HC RX 637 (ALT 250 FOR IP): Performed by: NURSE PRACTITIONER

## 2024-10-10 PROCEDURE — 86901 BLOOD TYPING SEROLOGIC RH(D): CPT

## 2024-10-10 PROCEDURE — 36430 TRANSFUSION BLD/BLD COMPNT: CPT

## 2024-10-10 PROCEDURE — 86923 COMPATIBILITY TEST ELECTRIC: CPT

## 2024-10-10 PROCEDURE — 70553 MRI BRAIN STEM W/O & W/DYE: CPT

## 2024-10-10 PROCEDURE — A9576 INJ PROHANCE MULTIPACK: HCPCS | Performed by: RADIOLOGY

## 2024-10-10 PROCEDURE — 86850 RBC ANTIBODY SCREEN: CPT

## 2024-10-10 RX ORDER — ACETAMINOPHEN 325 MG/1
650 TABLET ORAL
OUTPATIENT
Start: 2024-10-10

## 2024-10-10 RX ORDER — SODIUM CHLORIDE 9 MG/ML
20 INJECTION, SOLUTION INTRAVENOUS CONTINUOUS
OUTPATIENT
Start: 2024-10-10

## 2024-10-10 RX ORDER — ALBUTEROL SULFATE 90 UG/1
4 INHALANT RESPIRATORY (INHALATION) PRN
OUTPATIENT
Start: 2024-10-10

## 2024-10-10 RX ORDER — ONDANSETRON 2 MG/ML
8 INJECTION INTRAMUSCULAR; INTRAVENOUS
OUTPATIENT
Start: 2024-10-10

## 2024-10-10 RX ORDER — SODIUM CHLORIDE 0.9 % (FLUSH) 0.9 %
5-40 SYRINGE (ML) INJECTION PRN
OUTPATIENT
Start: 2024-10-10

## 2024-10-10 RX ORDER — SODIUM CHLORIDE 9 MG/ML
25 INJECTION, SOLUTION INTRAVENOUS PRN
OUTPATIENT
Start: 2024-10-10

## 2024-10-10 RX ORDER — ACETAMINOPHEN 325 MG/1
650 TABLET ORAL ONCE
Status: COMPLETED | OUTPATIENT
Start: 2024-10-10 | End: 2024-10-10

## 2024-10-10 RX ORDER — SODIUM CHLORIDE 9 MG/ML
INJECTION, SOLUTION INTRAVENOUS CONTINUOUS
OUTPATIENT
Start: 2024-10-10

## 2024-10-10 RX ORDER — DIPHENHYDRAMINE HCL 25 MG
25 TABLET ORAL ONCE
Status: COMPLETED | OUTPATIENT
Start: 2024-10-10 | End: 2024-10-10

## 2024-10-10 RX ORDER — EPINEPHRINE 1 MG/ML
0.3 INJECTION, SOLUTION INTRAMUSCULAR; SUBCUTANEOUS PRN
OUTPATIENT
Start: 2024-10-10

## 2024-10-10 RX ORDER — DIPHENHYDRAMINE HYDROCHLORIDE 50 MG/ML
50 INJECTION INTRAMUSCULAR; INTRAVENOUS
OUTPATIENT
Start: 2024-10-10

## 2024-10-10 RX ADMIN — GADOTERIDOL 16 ML: 279.3 INJECTION, SOLUTION INTRAVENOUS at 10:05

## 2024-10-10 RX ADMIN — DIPHENHYDRAMINE HCL 25 MG: 25 TABLET ORAL at 10:04

## 2024-10-10 RX ADMIN — ACETAMINOPHEN 650 MG: 325 TABLET ORAL at 10:04

## 2024-10-10 NOTE — PROGRESS NOTES
Patient's hemoglobin this AM: 7.3 (pt symptomatic without relief from IVF)    Pt seen at [facility] today for  Packed red blood cell transfusion  for above lab values per order. Informed consent verified. Pre-medications administered as ordered. 25 mg benadryl, 650 tylenol. Transfused per policy. Pt tolerated transfusion well and without incident.  Pt verbalizes understanding of discharge instructions.  Discharged to home with family member.

## 2024-10-17 ENCOUNTER — HOSPITAL ENCOUNTER (OUTPATIENT)
Dept: ONCOLOGY | Age: 79
Setting detail: INFUSION SERIES
Discharge: HOME | End: 2024-10-17
Payer: COMMERCIAL

## 2024-10-17 VITALS
OXYGEN SATURATION: 97 % | DIASTOLIC BLOOD PRESSURE: 80 MMHG | HEART RATE: 68 BPM | RESPIRATION RATE: 16 BRPM | TEMPERATURE: 97.8 F | SYSTOLIC BLOOD PRESSURE: 133 MMHG

## 2024-10-17 DIAGNOSIS — C34.00 MALIGNANT NEOPLASM OF HILUS OF LUNG, UNSPECIFIED LATERALITY (HCC): Primary | ICD-10-CM

## 2024-10-17 PROCEDURE — 86901 BLOOD TYPING SEROLOGIC RH(D): CPT

## 2024-10-17 PROCEDURE — 36430 TRANSFUSION BLD/BLD COMPNT: CPT

## 2024-10-17 PROCEDURE — 86900 BLOOD TYPING SEROLOGIC ABO: CPT

## 2024-10-17 PROCEDURE — 86923 COMPATIBILITY TEST ELECTRIC: CPT

## 2024-10-17 PROCEDURE — 36591 DRAW BLOOD OFF VENOUS DEVICE: CPT

## 2024-10-17 PROCEDURE — 86850 RBC ANTIBODY SCREEN: CPT

## 2024-10-17 PROCEDURE — P9016 RBC LEUKOCYTES REDUCED: HCPCS

## 2024-10-17 PROCEDURE — 6370000000 HC RX 637 (ALT 250 FOR IP): Performed by: INTERNAL MEDICINE

## 2024-10-17 RX ORDER — SODIUM CHLORIDE 9 MG/ML
25 INJECTION, SOLUTION INTRAVENOUS PRN
OUTPATIENT
Start: 2024-10-17

## 2024-10-17 RX ORDER — DIPHENHYDRAMINE HYDROCHLORIDE 50 MG/ML
50 INJECTION INTRAMUSCULAR; INTRAVENOUS
OUTPATIENT
Start: 2024-10-17

## 2024-10-17 RX ORDER — DIPHENHYDRAMINE HCL 25 MG
25 TABLET ORAL ONCE
Status: COMPLETED | OUTPATIENT
Start: 2024-10-17 | End: 2024-10-17

## 2024-10-17 RX ORDER — ALBUTEROL SULFATE 90 UG/1
4 INHALANT RESPIRATORY (INHALATION) PRN
OUTPATIENT
Start: 2024-10-17

## 2024-10-17 RX ORDER — EPINEPHRINE 1 MG/ML
0.3 INJECTION, SOLUTION INTRAMUSCULAR; SUBCUTANEOUS PRN
OUTPATIENT
Start: 2024-10-17

## 2024-10-17 RX ORDER — ONDANSETRON 2 MG/ML
8 INJECTION INTRAMUSCULAR; INTRAVENOUS
OUTPATIENT
Start: 2024-10-17

## 2024-10-17 RX ORDER — SODIUM CHLORIDE 9 MG/ML
20 INJECTION, SOLUTION INTRAVENOUS CONTINUOUS
OUTPATIENT
Start: 2024-10-17

## 2024-10-17 RX ORDER — SODIUM CHLORIDE 9 MG/ML
INJECTION, SOLUTION INTRAVENOUS CONTINUOUS
OUTPATIENT
Start: 2024-10-17

## 2024-10-17 RX ORDER — SODIUM CHLORIDE 0.9 % (FLUSH) 0.9 %
5-40 SYRINGE (ML) INJECTION PRN
OUTPATIENT
Start: 2024-10-17

## 2024-10-17 RX ORDER — ACETAMINOPHEN 325 MG/1
650 TABLET ORAL
OUTPATIENT
Start: 2024-10-17

## 2024-10-17 RX ORDER — ACETAMINOPHEN 325 MG/1
650 TABLET ORAL ONCE
Status: COMPLETED | OUTPATIENT
Start: 2024-10-17 | End: 2024-10-17

## 2024-10-17 RX ADMIN — DIPHENHYDRAMINE HCL 25 MG: 25 TABLET ORAL at 08:59

## 2024-10-17 RX ADMIN — ACETAMINOPHEN 650 MG: 325 TABLET ORAL at 08:59

## 2024-10-17 NOTE — PROGRESS NOTES
Patient's hemoglobin 10/15/24 AM: 7.0     Pt seen at [facility] today for  Packed red blood cell transfusion  for above lab values per order. Informed consent verified. Pre-medications administered as ordered. 25 mg benadryl, 650 tylenol. Transfused per policy. Pt tolerated transfusion well and without incident.  Pt verbalizes understanding of discharge instructions.  Discharged to home with family member.

## 2024-10-28 ENCOUNTER — APPOINTMENT (OUTPATIENT)
Dept: CT IMAGING | Age: 79
DRG: 808 | End: 2024-10-28
Payer: MEDICARE

## 2024-10-28 ENCOUNTER — APPOINTMENT (OUTPATIENT)
Dept: GENERAL RADIOLOGY | Age: 79
DRG: 808 | End: 2024-10-28
Payer: MEDICARE

## 2024-10-28 ENCOUNTER — HOSPITAL ENCOUNTER (INPATIENT)
Age: 79
LOS: 15 days | Discharge: HOME | DRG: 808 | End: 2024-11-12
Attending: EMERGENCY MEDICINE | Admitting: INTERNAL MEDICINE
Payer: MEDICARE

## 2024-10-28 DIAGNOSIS — T45.1X5A CHEMOTHERAPY-INDUCED NEUTROPENIA (HCC): Primary | ICD-10-CM

## 2024-10-28 DIAGNOSIS — D70.1 CHEMOTHERAPY-INDUCED NEUTROPENIA (HCC): Primary | ICD-10-CM

## 2024-10-28 DIAGNOSIS — D61.89 ANEMIA DUE TO OTHER BONE MARROW FAILURE (HCC): ICD-10-CM

## 2024-10-28 LAB
ABO + RH BLD: NORMAL
ALBUMIN SERPL-MCNC: 2.4 G/DL (ref 3.4–5)
ALBUMIN/GLOB SERPL: 0.8 {RATIO} (ref 1.1–2.2)
ALP SERPL-CCNC: 125 U/L (ref 40–129)
ALT SERPL-CCNC: 24 U/L (ref 10–40)
ANION GAP SERPL CALCULATED.3IONS-SCNC: 10 MMOL/L (ref 3–16)
ANISOCYTOSIS BLD QL SMEAR: ABNORMAL
AST SERPL-CCNC: 43 U/L (ref 15–37)
BASOPHILS # BLD: 0 K/UL (ref 0–0.2)
BASOPHILS NFR BLD: 0 %
BILIRUB SERPL-MCNC: 0.7 MG/DL (ref 0–1)
BILIRUB UR QL STRIP.AUTO: NEGATIVE
BLD GP AB SCN SERPL QL: NORMAL
BUN SERPL-MCNC: 26 MG/DL (ref 7–20)
CALCIUM SERPL-MCNC: 7.5 MG/DL (ref 8.3–10.6)
CHLORIDE SERPL-SCNC: 101 MMOL/L (ref 99–110)
CLARITY UR: CLEAR
CO2 SERPL-SCNC: 24 MMOL/L (ref 21–32)
COLOR UR: NORMAL
CREAT SERPL-MCNC: 1.1 MG/DL (ref 0.8–1.3)
DEPRECATED RDW RBC AUTO: 17.8 % (ref 12.4–15.4)
EKG DIAGNOSIS: NORMAL
EKG Q-T INTERVAL: 364 MS
EKG QRS DURATION: 82 MS
EKG QTC CALCULATION (BAZETT): 422 MS
EKG R AXIS: 85 DEGREES
EKG T AXIS: 123 DEGREES
EKG VENTRICULAR RATE: 81 BPM
EOSINOPHIL # BLD: 0 K/UL (ref 0–0.6)
EOSINOPHIL NFR BLD: 1 %
FLUAV RNA RESP QL NAA+PROBE: NOT DETECTED
FLUBV RNA RESP QL NAA+PROBE: NOT DETECTED
GFR SERPLBLD CREATININE-BSD FMLA CKD-EPI: 68 ML/MIN/{1.73_M2}
GLUCOSE BLD-MCNC: 305 MG/DL (ref 70–99)
GLUCOSE BLD-MCNC: 337 MG/DL (ref 70–99)
GLUCOSE SERPL-MCNC: 191 MG/DL (ref 70–99)
GLUCOSE UR STRIP.AUTO-MCNC: NEGATIVE MG/DL
HCT VFR BLD AUTO: 16.2 % (ref 40.5–52.5)
HCT VFR BLD AUTO: 17.5 % (ref 40.5–52.5)
HGB BLD-MCNC: 5.2 G/DL (ref 13.5–17.5)
HGB BLD-MCNC: 5.9 G/DL (ref 13.5–17.5)
HGB UR QL STRIP.AUTO: NEGATIVE
HYPOCHROMIA BLD QL SMEAR: ABNORMAL
INR PPP: 1.3 (ref 0.85–1.15)
KETONES UR STRIP.AUTO-MCNC: NEGATIVE MG/DL
LACTATE BLDV-SCNC: 1.2 MMOL/L (ref 0.4–2)
LEUKOCYTE ESTERASE UR QL STRIP.AUTO: NEGATIVE
LYMPHOCYTES # BLD: 0.3 K/UL (ref 1–5.1)
LYMPHOCYTES NFR BLD: 21 %
MACROCYTES BLD QL SMEAR: ABNORMAL
MCH RBC QN AUTO: 29.3 PG (ref 26–34)
MCHC RBC AUTO-ENTMCNC: 32.1 G/DL (ref 31–36)
MCV RBC AUTO: 91.4 FL (ref 80–100)
MICROCYTES BLD QL SMEAR: ABNORMAL
MONOCYTES # BLD: 0.1 K/UL (ref 0–1.3)
MONOCYTES NFR BLD: 8 %
MYELOCYTES NFR BLD MANUAL: 2 %
NEUTROPHILS # BLD: 0.9 K/UL (ref 1.7–7.7)
NEUTROPHILS NFR BLD: 58 %
NEUTS BAND NFR BLD MANUAL: 7 % (ref 0–7)
NEUTS HYPERSEG BLD QL SMEAR: PRESENT
NITRITE UR QL STRIP.AUTO: NEGATIVE
NT-PROBNP SERPL-MCNC: 1089 PG/ML (ref 0–449)
PATH INTERP BLD-IMP: NORMAL
PATH INTERP BLD-IMP: YES
PERFORMED ON: ABNORMAL
PERFORMED ON: ABNORMAL
PH UR STRIP.AUTO: 6 [PH] (ref 5–8)
PLATELET # BLD AUTO: 45 K/UL (ref 135–450)
PLATELET BLD QL SMEAR: ABNORMAL
PMV BLD AUTO: 8.6 FL (ref 5–10.5)
POIKILOCYTOSIS BLD QL SMEAR: ABNORMAL
POLYCHROMASIA BLD QL SMEAR: ABNORMAL
POTASSIUM SERPL-SCNC: 4.5 MMOL/L (ref 3.5–5.1)
PROCALCITONIN SERPL IA-MCNC: 0.78 NG/ML (ref 0–0.15)
PROT SERPL-MCNC: 5.3 G/DL (ref 6.4–8.2)
PROT UR STRIP.AUTO-MCNC: NEGATIVE MG/DL
PROTHROMBIN TIME: 16.4 SEC (ref 11.9–14.9)
RBC # BLD AUTO: 1.77 M/UL (ref 4.2–5.9)
SARS-COV-2 RNA RESP QL NAA+PROBE: NOT DETECTED
SLIDE REVIEW: ABNORMAL
SODIUM SERPL-SCNC: 135 MMOL/L (ref 136–145)
SP GR UR STRIP.AUTO: 1.01 (ref 1–1.03)
TROPONIN, HIGH SENSITIVITY: 99 NG/L (ref 0–22)
TROPONIN, HIGH SENSITIVITY: 99 NG/L (ref 0–22)
UA COMPLETE W REFLEX CULTURE PNL UR: NORMAL
UA DIPSTICK W REFLEX MICRO PNL UR: NORMAL
URN SPEC COLLECT METH UR: NORMAL
UROBILINOGEN UR STRIP-ACNC: 1 E.U./DL
VARIANT LYMPHS NFR BLD MANUAL: 3 % (ref 0–6)
WBC # BLD AUTO: 1.4 K/UL (ref 4–11)

## 2024-10-28 PROCEDURE — 84484 ASSAY OF TROPONIN QUANT: CPT

## 2024-10-28 PROCEDURE — 86850 RBC ANTIBODY SCREEN: CPT

## 2024-10-28 PROCEDURE — 70450 CT HEAD/BRAIN W/O DYE: CPT

## 2024-10-28 PROCEDURE — 85018 HEMOGLOBIN: CPT

## 2024-10-28 PROCEDURE — 36415 COLL VENOUS BLD VENIPUNCTURE: CPT

## 2024-10-28 PROCEDURE — 6360000002 HC RX W HCPCS: Performed by: INTERNAL MEDICINE

## 2024-10-28 PROCEDURE — 85014 HEMATOCRIT: CPT

## 2024-10-28 PROCEDURE — 30233N1 TRANSFUSION OF NONAUTOLOGOUS RED BLOOD CELLS INTO PERIPHERAL VEIN, PERCUTANEOUS APPROACH: ICD-10-PCS | Performed by: INTERNAL MEDICINE

## 2024-10-28 PROCEDURE — 93010 ELECTROCARDIOGRAM REPORT: CPT | Performed by: INTERNAL MEDICINE

## 2024-10-28 PROCEDURE — 93005 ELECTROCARDIOGRAM TRACING: CPT | Performed by: EMERGENCY MEDICINE

## 2024-10-28 PROCEDURE — 71045 X-RAY EXAM CHEST 1 VIEW: CPT

## 2024-10-28 PROCEDURE — 1200000000 HC SEMI PRIVATE

## 2024-10-28 PROCEDURE — 6370000000 HC RX 637 (ALT 250 FOR IP): Performed by: INTERNAL MEDICINE

## 2024-10-28 PROCEDURE — 99285 EMERGENCY DEPT VISIT HI MDM: CPT

## 2024-10-28 PROCEDURE — 81003 URINALYSIS AUTO W/O SCOPE: CPT

## 2024-10-28 PROCEDURE — 87636 SARSCOV2 & INF A&B AMP PRB: CPT

## 2024-10-28 PROCEDURE — 86923 COMPATIBILITY TEST ELECTRIC: CPT

## 2024-10-28 PROCEDURE — 83605 ASSAY OF LACTIC ACID: CPT

## 2024-10-28 PROCEDURE — 87040 BLOOD CULTURE FOR BACTERIA: CPT

## 2024-10-28 PROCEDURE — 86900 BLOOD TYPING SEROLOGIC ABO: CPT

## 2024-10-28 PROCEDURE — 84145 PROCALCITONIN (PCT): CPT

## 2024-10-28 PROCEDURE — 85610 PROTHROMBIN TIME: CPT

## 2024-10-28 PROCEDURE — 2580000003 HC RX 258: Performed by: INTERNAL MEDICINE

## 2024-10-28 PROCEDURE — 83880 ASSAY OF NATRIURETIC PEPTIDE: CPT

## 2024-10-28 PROCEDURE — 71260 CT THORAX DX C+: CPT

## 2024-10-28 PROCEDURE — 86901 BLOOD TYPING SEROLOGIC RH(D): CPT

## 2024-10-28 PROCEDURE — 80053 COMPREHEN METABOLIC PANEL: CPT

## 2024-10-28 PROCEDURE — 36430 TRANSFUSION BLD/BLD COMPNT: CPT

## 2024-10-28 PROCEDURE — 6360000002 HC RX W HCPCS

## 2024-10-28 PROCEDURE — 85025 COMPLETE CBC W/AUTO DIFF WBC: CPT

## 2024-10-28 PROCEDURE — 6360000004 HC RX CONTRAST MEDICATION

## 2024-10-28 PROCEDURE — P9016 RBC LEUKOCYTES REDUCED: HCPCS

## 2024-10-28 RX ORDER — ACETAMINOPHEN 325 MG/1
650 TABLET ORAL EVERY 6 HOURS PRN
Status: DISCONTINUED | OUTPATIENT
Start: 2024-10-28 | End: 2024-11-12 | Stop reason: HOSPADM

## 2024-10-28 RX ORDER — ATORVASTATIN CALCIUM 40 MG/1
40 TABLET, FILM COATED ORAL NIGHTLY
Status: DISCONTINUED | OUTPATIENT
Start: 2024-10-28 | End: 2024-11-12 | Stop reason: HOSPADM

## 2024-10-28 RX ORDER — PROCHLORPERAZINE EDISYLATE 5 MG/ML
10 INJECTION INTRAMUSCULAR; INTRAVENOUS EVERY 6 HOURS PRN
Status: DISCONTINUED | OUTPATIENT
Start: 2024-10-28 | End: 2024-11-12 | Stop reason: HOSPADM

## 2024-10-28 RX ORDER — SODIUM CHLORIDE 9 MG/ML
INJECTION, SOLUTION INTRAVENOUS PRN
Status: DISCONTINUED | OUTPATIENT
Start: 2024-10-28 | End: 2024-10-30 | Stop reason: SDUPTHER

## 2024-10-28 RX ORDER — SODIUM CHLORIDE 0.9 % (FLUSH) 0.9 %
5-40 SYRINGE (ML) INJECTION EVERY 12 HOURS SCHEDULED
Status: DISCONTINUED | OUTPATIENT
Start: 2024-10-28 | End: 2024-11-12 | Stop reason: HOSPADM

## 2024-10-28 RX ORDER — ALBUTEROL SULFATE 90 UG/1
1 INHALANT RESPIRATORY (INHALATION) EVERY 4 HOURS PRN
Status: DISCONTINUED | OUTPATIENT
Start: 2024-10-28 | End: 2024-11-12 | Stop reason: HOSPADM

## 2024-10-28 RX ORDER — VANCOMYCIN 1.25 G/250ML
25 INJECTION, SOLUTION INTRAVENOUS ONCE
Status: COMPLETED | OUTPATIENT
Start: 2024-10-28 | End: 2024-10-28

## 2024-10-28 RX ORDER — METOPROLOL SUCCINATE 25 MG/1
25 TABLET, EXTENDED RELEASE ORAL DAILY
Status: DISCONTINUED | OUTPATIENT
Start: 2024-10-28 | End: 2024-10-28

## 2024-10-28 RX ORDER — GLUCAGON 1 MG/ML
1 KIT INJECTION PRN
Status: DISCONTINUED | OUTPATIENT
Start: 2024-10-28 | End: 2024-11-12 | Stop reason: HOSPADM

## 2024-10-28 RX ORDER — AMIODARONE HYDROCHLORIDE 200 MG/1
250 TABLET ORAL DAILY
Status: DISCONTINUED | OUTPATIENT
Start: 2024-10-28 | End: 2024-11-12 | Stop reason: HOSPADM

## 2024-10-28 RX ORDER — ACETAMINOPHEN 650 MG/1
650 SUPPOSITORY RECTAL EVERY 6 HOURS PRN
Status: DISCONTINUED | OUTPATIENT
Start: 2024-10-28 | End: 2024-11-12 | Stop reason: HOSPADM

## 2024-10-28 RX ORDER — METOPROLOL SUCCINATE 25 MG/1
12.5 TABLET, EXTENDED RELEASE ORAL DAILY
Status: DISCONTINUED | OUTPATIENT
Start: 2024-10-29 | End: 2024-11-12 | Stop reason: HOSPADM

## 2024-10-28 RX ORDER — M-VIT,TX,IRON,MINS/CALC/FOLIC 27MG-0.4MG
1 TABLET ORAL DAILY
Status: DISCONTINUED | OUTPATIENT
Start: 2024-10-28 | End: 2024-11-12 | Stop reason: HOSPADM

## 2024-10-28 RX ORDER — DEXTROSE MONOHYDRATE 100 MG/ML
INJECTION, SOLUTION INTRAVENOUS CONTINUOUS PRN
Status: DISCONTINUED | OUTPATIENT
Start: 2024-10-28 | End: 2024-11-12 | Stop reason: HOSPADM

## 2024-10-28 RX ORDER — SODIUM CHLORIDE 0.9 % (FLUSH) 0.9 %
5-40 SYRINGE (ML) INJECTION PRN
Status: DISCONTINUED | OUTPATIENT
Start: 2024-10-28 | End: 2024-11-12 | Stop reason: HOSPADM

## 2024-10-28 RX ORDER — IOPAMIDOL 755 MG/ML
75 INJECTION, SOLUTION INTRAVASCULAR
Status: COMPLETED | OUTPATIENT
Start: 2024-10-28 | End: 2024-10-28

## 2024-10-28 RX ORDER — POTASSIUM CHLORIDE 1500 MG/1
40 TABLET, EXTENDED RELEASE ORAL PRN
Status: DISCONTINUED | OUTPATIENT
Start: 2024-10-28 | End: 2024-11-12 | Stop reason: HOSPADM

## 2024-10-28 RX ORDER — VANCOMYCIN HCL IN 5 % DEXTROSE 1.25 G/25
1250 PLASTIC BAG, INJECTION (ML) INTRAVENOUS EVERY 24 HOURS
Status: DISCONTINUED | OUTPATIENT
Start: 2024-10-29 | End: 2024-10-29 | Stop reason: SDUPTHER

## 2024-10-28 RX ORDER — MAGNESIUM SULFATE IN WATER 40 MG/ML
2000 INJECTION, SOLUTION INTRAVENOUS PRN
Status: DISCONTINUED | OUTPATIENT
Start: 2024-10-28 | End: 2024-11-12 | Stop reason: HOSPADM

## 2024-10-28 RX ORDER — FOLIC ACID 1 MG/1
1 TABLET ORAL DAILY
Status: DISCONTINUED | OUTPATIENT
Start: 2024-10-28 | End: 2024-11-12 | Stop reason: HOSPADM

## 2024-10-28 RX ORDER — INSULIN LISPRO 100 [IU]/ML
0-4 INJECTION, SOLUTION INTRAVENOUS; SUBCUTANEOUS
Status: DISCONTINUED | OUTPATIENT
Start: 2024-10-28 | End: 2024-11-06

## 2024-10-28 RX ORDER — TAMSULOSIN HYDROCHLORIDE 0.4 MG/1
0.4 CAPSULE ORAL DAILY
Status: DISCONTINUED | OUTPATIENT
Start: 2024-10-28 | End: 2024-10-28

## 2024-10-28 RX ORDER — SODIUM CHLORIDE 9 MG/ML
INJECTION, SOLUTION INTRAVENOUS PRN
Status: DISCONTINUED | OUTPATIENT
Start: 2024-10-28 | End: 2024-11-12 | Stop reason: HOSPADM

## 2024-10-28 RX ORDER — POLYETHYLENE GLYCOL 3350 17 G/17G
17 POWDER, FOR SOLUTION ORAL DAILY PRN
Status: DISCONTINUED | OUTPATIENT
Start: 2024-10-28 | End: 2024-11-12 | Stop reason: HOSPADM

## 2024-10-28 RX ADMIN — VANCOMYCIN 1750 MG: 1.25 INJECTION, SOLUTION INTRAVENOUS at 14:27

## 2024-10-28 RX ADMIN — AMIODARONE HYDROCHLORIDE 250 MG: 200 TABLET ORAL at 18:43

## 2024-10-28 RX ADMIN — FOLIC ACID 1 MG: 1 TABLET ORAL at 18:44

## 2024-10-28 RX ADMIN — IOPAMIDOL 75 ML: 755 INJECTION, SOLUTION INTRAVENOUS at 15:55

## 2024-10-28 RX ADMIN — INSULIN LISPRO 3 UNITS: 100 INJECTION, SOLUTION INTRAVENOUS; SUBCUTANEOUS at 18:43

## 2024-10-28 RX ADMIN — INSULIN LISPRO 3 UNITS: 100 INJECTION, SOLUTION INTRAVENOUS; SUBCUTANEOUS at 20:01

## 2024-10-28 RX ADMIN — CHOLECALCIFEROL (VITAMIN D3) 10 MCG (400 UNIT) TABLET 400 UNITS: at 18:44

## 2024-10-28 RX ADMIN — CEFEPIME 2000 MG: 2 INJECTION, POWDER, FOR SOLUTION INTRAVENOUS at 18:48

## 2024-10-28 RX ADMIN — ATORVASTATIN CALCIUM 40 MG: 40 TABLET, FILM COATED ORAL at 20:01

## 2024-10-28 RX ADMIN — SODIUM CHLORIDE, PRESERVATIVE FREE 10 ML: 5 INJECTION INTRAVENOUS at 20:02

## 2024-10-28 RX ADMIN — Medication 1 TABLET: at 18:44

## 2024-10-28 ASSESSMENT — PAIN - FUNCTIONAL ASSESSMENT: PAIN_FUNCTIONAL_ASSESSMENT: NONE - DENIES PAIN

## 2024-10-28 NOTE — H&P
cells demonstrate no acute abnormality. SOFT TISSUES/SKULL:  No acute abnormality of the visualized skull or soft tissues.     1. An area of hypoattenuation in the right frontal lobe corresponds to a metastatic lesion seen on recent MRI brain.  Known additional metastatic lesions are not well seen on this noncontrast CT. 2. No acute intracranial hemorrhage or mass effect.     XR CHEST PORTABLE    Result Date: 10/28/2024  EXAMINATION: ONE XRAY VIEW OF THE CHEST 10/28/2024 10:46 am COMPARISON: 08/22/2024 HISTORY: ORDERING SYSTEM PROVIDED HISTORY: altered TECHNOLOGIST PROVIDED HISTORY: Reason for exam:->altered FINDINGS: Left chest wall Port-A-Cath line with tip in the SVC.  There is extensive left lung airspace consolidation again noted stable since prior examination. Resolution of the previous seen bilateral pleural effusions.  No pneumothorax. Cardiomediastinal silhouette and bony thorax are unchanged.     Extensive left lung airspace consolidation again noted stable since prior examination. Resolution of the previous seen bilateral pleural effusions. Left chest wall Port-A-Cath line with tip in the SVC.       Full Code

## 2024-10-28 NOTE — RT PROTOCOL NOTE
RT Inhaler-Nebulizer Bronchodilator Protocol Note    There is a bronchodilator order in the chart from a provider indicating to follow the RT Bronchodilator Protocol and there is an “Initiate RT Inhaler-Nebulizer Bronchodilator Protocol” order as well (see protocol at bottom of note).    CXR Findings:  XR CHEST PORTABLE    Result Date: 10/28/2024  Extensive left lung airspace consolidation again noted stable since prior examination. Resolution of the previous seen bilateral pleural effusions. Left chest wall Port-A-Cath line with tip in the SVC.       The findings from the last RT Protocol Assessment were as follows:   History Pulmonary Disease: Smoker 15 pack years or more  Respiratory Pattern: Dyspnea on exertion or RR 21-25 bpm  Breath Sounds: Clear breath sounds  Cough: Strong, spontaneous, non-productive  Indication for Bronchodilator Therapy: None  Bronchodilator Assessment Score: 3    Aerosolized bronchodilator medication orders have been revised according to the RT Inhaler-Nebulizer Bronchodilator Protocol below.    Respiratory Therapist to perform RT Therapy Protocol Assessment initially then follow the protocol.  Repeat RT Therapy Protocol Assessment PRN for score 0-3 or on second treatment, BID, and PRN for scores above 3.    No Indications - adjust the frequency to every 6 hours PRN wheezing or bronchospasm, if no treatments needed after 48 hours then discontinue using Per Protocol order mode.     If indication present, adjust the RT bronchodilator orders based on the Bronchodilator Assessment Score as indicated below.  Use Inhaler orders unless patient has one or more of the following: on home nebulizer, not able to hold breath for 10 seconds, is not alert and oriented, cannot activate and use MDI correctly, or respiratory rate 25 breaths per minute or more, then use the equivalent nebulizer order(s) with same Frequency and PRN reasons based on the score.  If a patient is on this medication at home then

## 2024-10-28 NOTE — CONSENT
Informed Consent for Blood Component Transfusion Note    I have discussed with the patient the rationale for blood component transfusion; its benefits in treating or preventing fatigue, organ damage, or death; and its risk which includes mild transfusion reactions, rare risk of blood borne infection, or more serious but rare reactions. I have discussed the alternatives to transfusion, including the risk and consequences of not receiving transfusion. The patient had an opportunity to ask questions and had agreed to proceed with transfusion of blood components.    Electronically signed by AASHISH Van Jr on 10/28/24 at 11:59 AM EDT

## 2024-10-28 NOTE — ED NOTES
Pt brief was wet with copious amounts of urine.. brief removed and pt cleaned. Pt attempted to urinate. Pt unable to at this time. Pt placed on pure wick.  
Sent Perfect[employer] message to Oncology @ [phone]  Reg: Neutropenic, anemia  Per: Mason Collins Jr. PA  @2845 [place] called back and spoke with Mason ZENDEJAS   
[id number]  
To CT on way to floor with this RN. Blood infusing  
Patient Care Timeline.     Pending orders/Uncompleted orders to hand off:  see orders    If any further questions, please call Sending RN at [phone]

## 2024-10-28 NOTE — ED PROVIDER NOTES
I independently examined and evaluated Wes Schneider.    In brief, patient is a 79-year-old male who presents to the emergency department for evaluation of fatigue.  My assessment, patient is pale.  He is answering questions appropriately.  He had soft, nondistended abdomen with no tenderness to palpation throughout.  He denies having any epistaxis, recent injuries, hematuria, hematochezia, or melena.  Hemoglobin was 5.2.  As he is neutropenic, infectious workup including blood cultures were obtained.  Oncology consulted who recommends broad-spectrum antibiotics.  These were ordered for the patient.  He was ordered packed red blood cell.  Hospitalist consulted for admission for further evaluation and treatment.  Admit.    All diagnostic, treatment, and disposition decisions were made by myself in conjunction with the advanced practice provider/resident physician.     I personally saw the patient and performed a substantive portion of the visit including aspects of the medical decision making. I approved management plan and take responsibility for the patient management.     I personally saw the patient and independently provided 10 minutes of non-concurrent critical care out of the total shared critical care time provided.    Comment: Please note this report has been produced using speech recognition software and may contain errors related to that system including errors in grammar, punctuation, and spelling, as well as words and phrases that may be inappropriate. If there are any questions or concerns please feel free to contact the dictating provider for clarification.    For all further details of the patient's emergency department visit, please see the advanced practice provider's documentation.        Gabi Washington MD  10/28/24 [id number]    
     CT NEEDLE BIOPSY LUNG PERCUTANEOUS  04/07/2023    CT NEEDLE BIOPSY LUNG PERCUTANEOUS 4/7/2023 Robert Palma MD [facility] CT SCAN    CYSTOSCOPY Right 11/09/2020    CYSTOSCOPY,  RIGHT URETEROSCOPY, RIGHT RETRO PYELOGRAM, REMOVAL STONE FROM BLADDER, URETHRAL DILITATION performed by Lucien Shultz MD at [facility] OR    NEPHROLITHOTOMY Left 12/04/2020    LEFT PERCUTANEOUS NEPHROLITHOTOMY WITH DR MONTES TO PLACE NEPHROSTOMY TUBE PRIOR performed by Trevor Trejo MD at [facility] OR    PORT SURGERY N/A 11/04/2021    SURGICAL PORT PLACEMENT performed by Ever Lamar MD at [facility] OR    PORT SURGERY N/A 10/04/2022    PORT REMOVAL performed by Danny Andrade MD at [facility] OR    PORT SURGERY Left 11/02/2022    SURGICAL PORT PLACEMENT performed by Ever Lamar MD at [facility] OR    PROSTATE SURGERY      CYBER KNIFE    SHOULDER SURGERY Right     ROTATOR CUFF       CURRENTMEDICATIONS       Previous Medications    ALBUTEROL SULFATE HFA (PROVENTIL;VENTOLIN;PROAIR) 108 (90 BASE) MCG/ACT INHALER    Inhale 1 puff into the lungs every 4 hours as needed for Shortness of Breath 4-6 hours    AMIODARONE (PACERONE) 100 MG TABLET    Take 2.5 tablets by mouth daily    APIXABAN (ELIQUIS) 5 MG TABS TABLET    Take 1 tablet by mouth 2 times daily    ATORVASTATIN (LIPITOR) 40 MG TABLET    Take 1 tablet by mouth nightly    FOLIC ACID (FOLVITE) 1 MG TABLET    Take 1 tablet by mouth daily    INSULIN ASPART (NOVOLOG) 100 UNIT/ML INJECTION VIAL    Inject into the skin 3 times daily (before meals) Sliding scale    INSULIN GLARGINE (BASAGLAR KWIKPEN) 100 UNIT/ML INJECTION PEN    Inject 20 Units into the skin nightly    METOPROLOL SUCCINATE (TOPROL XL) 50 MG EXTENDED RELEASE TABLET    Take 0.5 tablets by mouth daily    MULTIPLE VITAMINS-MINERALS (THERAPEUTIC MULTIVITAMIN-MINERALS) TABLET    Take 1 tablet by mouth daily    TAMSULOSIN (FLOMAX) 0.4 MG CAPSULE    Take 1 capsule by mouth daily    VITAMIN D3 (CHOLECALCIFEROL) 10 MCG

## 2024-10-29 LAB
ANION GAP SERPL CALCULATED.3IONS-SCNC: 12 MMOL/L (ref 3–16)
ANISOCYTOSIS BLD QL SMEAR: ABNORMAL
BASOPHILS # BLD: 0 K/UL (ref 0–0.2)
BASOPHILS NFR BLD: 0 %
BUN SERPL-MCNC: 25 MG/DL (ref 7–20)
CALCIUM SERPL-MCNC: 7 MG/DL (ref 8.3–10.6)
CHLORIDE SERPL-SCNC: 103 MMOL/L (ref 99–110)
CO2 SERPL-SCNC: 21 MMOL/L (ref 21–32)
CREAT SERPL-MCNC: 1.1 MG/DL (ref 0.8–1.3)
DEPRECATED RDW RBC AUTO: 16.5 % (ref 12.4–15.4)
EOSINOPHIL # BLD: 0 K/UL (ref 0–0.6)
EOSINOPHIL NFR BLD: 1 %
GFR SERPLBLD CREATININE-BSD FMLA CKD-EPI: 68 ML/MIN/{1.73_M2}
GLUCOSE BLD-MCNC: 191 MG/DL (ref 70–99)
GLUCOSE BLD-MCNC: 287 MG/DL (ref 70–99)
GLUCOSE BLD-MCNC: 291 MG/DL (ref 70–99)
GLUCOSE BLD-MCNC: 328 MG/DL (ref 70–99)
GLUCOSE SERPL-MCNC: 165 MG/DL (ref 70–99)
HCT VFR BLD AUTO: 21.8 % (ref 40.5–52.5)
HCT VFR BLD AUTO: 24.8 % (ref 40.5–52.5)
HGB BLD-MCNC: 7.2 G/DL (ref 13.5–17.5)
HGB BLD-MCNC: 8 G/DL (ref 13.5–17.5)
LYMPHOCYTES # BLD: 0.3 K/UL (ref 1–5.1)
LYMPHOCYTES NFR BLD: 17 %
MACROCYTES BLD QL SMEAR: ABNORMAL
MCH RBC QN AUTO: 29.6 PG (ref 26–34)
MCHC RBC AUTO-ENTMCNC: 33.1 G/DL (ref 31–36)
MCV RBC AUTO: 89.6 FL (ref 80–100)
MICROCYTES BLD QL SMEAR: ABNORMAL
MONOCYTES # BLD: 0.3 K/UL (ref 0–1.3)
MONOCYTES NFR BLD: 16 %
NEUTROPHILS # BLD: 1.2 K/UL (ref 1.7–7.7)
NEUTROPHILS NFR BLD: 65 %
NEUTS BAND NFR BLD MANUAL: 1 % (ref 0–7)
OVALOCYTES BLD QL SMEAR: ABNORMAL
PATH INTERP BLD-IMP: NO
PERFORMED ON: ABNORMAL
PLATELET # BLD AUTO: 44 K/UL (ref 135–450)
PLATELET BLD QL SMEAR: ABNORMAL
PMV BLD AUTO: 8.1 FL (ref 5–10.5)
POIKILOCYTOSIS BLD QL SMEAR: ABNORMAL
POTASSIUM SERPL-SCNC: 4.3 MMOL/L (ref 3.5–5.1)
RBC # BLD AUTO: 2.43 M/UL (ref 4.2–5.9)
SLIDE REVIEW: ABNORMAL
SODIUM SERPL-SCNC: 136 MMOL/L (ref 136–145)
TROPONIN, HIGH SENSITIVITY: 92 NG/L (ref 0–22)
WBC # BLD AUTO: 1.8 K/UL (ref 4–11)

## 2024-10-29 PROCEDURE — 6370000000 HC RX 637 (ALT 250 FOR IP): Performed by: STUDENT IN AN ORGANIZED HEALTH CARE EDUCATION/TRAINING PROGRAM

## 2024-10-29 PROCEDURE — 36415 COLL VENOUS BLD VENIPUNCTURE: CPT

## 2024-10-29 PROCEDURE — 84484 ASSAY OF TROPONIN QUANT: CPT

## 2024-10-29 PROCEDURE — 97162 PT EVAL MOD COMPLEX 30 MIN: CPT

## 2024-10-29 PROCEDURE — 97530 THERAPEUTIC ACTIVITIES: CPT

## 2024-10-29 PROCEDURE — 2580000003 HC RX 258: Performed by: INTERNAL MEDICINE

## 2024-10-29 PROCEDURE — 97166 OT EVAL MOD COMPLEX 45 MIN: CPT

## 2024-10-29 PROCEDURE — 85018 HEMOGLOBIN: CPT

## 2024-10-29 PROCEDURE — 80048 BASIC METABOLIC PNL TOTAL CA: CPT

## 2024-10-29 PROCEDURE — 85014 HEMATOCRIT: CPT

## 2024-10-29 PROCEDURE — 6360000002 HC RX W HCPCS: Performed by: STUDENT IN AN ORGANIZED HEALTH CARE EDUCATION/TRAINING PROGRAM

## 2024-10-29 PROCEDURE — 6370000000 HC RX 637 (ALT 250 FOR IP): Performed by: INTERNAL MEDICINE

## 2024-10-29 PROCEDURE — 6370000000 HC RX 637 (ALT 250 FOR IP): Performed by: NURSE PRACTITIONER

## 2024-10-29 PROCEDURE — 6360000002 HC RX W HCPCS: Performed by: INTERNAL MEDICINE

## 2024-10-29 PROCEDURE — 97535 SELF CARE MNGMENT TRAINING: CPT

## 2024-10-29 PROCEDURE — 85025 COMPLETE CBC W/AUTO DIFF WBC: CPT

## 2024-10-29 PROCEDURE — 1200000000 HC SEMI PRIVATE

## 2024-10-29 RX ORDER — VANCOMYCIN HCL IN 5 % DEXTROSE 1.25 G/25
1250 PLASTIC BAG, INJECTION (ML) INTRAVENOUS EVERY 24 HOURS
Status: DISCONTINUED | OUTPATIENT
Start: 2024-10-30 | End: 2024-10-30 | Stop reason: SDUPTHER

## 2024-10-29 RX ORDER — VANCOMYCIN 1.75 G/350ML
1250 INJECTION, SOLUTION INTRAVENOUS EVERY 24 HOURS
Status: COMPLETED | OUTPATIENT
Start: 2024-10-29 | End: 2024-10-29

## 2024-10-29 RX ORDER — HONEY 100 %
PASTE (ML) TOPICAL DAILY
Status: DISCONTINUED | OUTPATIENT
Start: 2024-10-29 | End: 2024-11-12 | Stop reason: HOSPADM

## 2024-10-29 RX ORDER — POLYMYXIN B SULFATE AND TRIMETHOPRIM 1; 10000 MG/ML; [USP'U]/ML
1 SOLUTION OPHTHALMIC
Status: DISCONTINUED | OUTPATIENT
Start: 2024-10-29 | End: 2024-11-08

## 2024-10-29 RX ADMIN — CHOLECALCIFEROL (VITAMIN D3) 10 MCG (400 UNIT) TABLET 400 UNITS: at 08:39

## 2024-10-29 RX ADMIN — Medication 1 TABLET: at 08:39

## 2024-10-29 RX ADMIN — APIXABAN 5 MG: 5 TABLET, FILM COATED ORAL at 20:53

## 2024-10-29 RX ADMIN — SODIUM CHLORIDE, PRESERVATIVE FREE 10 ML: 5 INJECTION INTRAVENOUS at 20:57

## 2024-10-29 RX ADMIN — CEFEPIME 2000 MG: 2 INJECTION, POWDER, FOR SOLUTION INTRAVENOUS at 04:58

## 2024-10-29 RX ADMIN — INSULIN LISPRO 2 UNITS: 100 INJECTION, SOLUTION INTRAVENOUS; SUBCUTANEOUS at 11:57

## 2024-10-29 RX ADMIN — CEFEPIME 2000 MG: 2 INJECTION, POWDER, FOR SOLUTION INTRAVENOUS at 17:07

## 2024-10-29 RX ADMIN — SODIUM CHLORIDE, PRESERVATIVE FREE 10 ML: 5 INJECTION INTRAVENOUS at 09:25

## 2024-10-29 RX ADMIN — FOLIC ACID 1 MG: 1 TABLET ORAL at 08:48

## 2024-10-29 RX ADMIN — ATORVASTATIN CALCIUM 40 MG: 40 TABLET, FILM COATED ORAL at 20:53

## 2024-10-29 RX ADMIN — METOPROLOL SUCCINATE 12.5 MG: 25 TABLET, EXTENDED RELEASE ORAL at 08:40

## 2024-10-29 RX ADMIN — POLYMYXIN B SULFATE AND TRIMETHOPRIM 1 DROP: 10000; 1 SOLUTION OPHTHALMIC at 20:54

## 2024-10-29 RX ADMIN — SODIUM CHLORIDE 30 ML: 9 INJECTION, SOLUTION INTRAVENOUS at 14:25

## 2024-10-29 RX ADMIN — INSULIN LISPRO 2 UNITS: 100 INJECTION, SOLUTION INTRAVENOUS; SUBCUTANEOUS at 16:59

## 2024-10-29 RX ADMIN — INSULIN LISPRO 3 UNITS: 100 INJECTION, SOLUTION INTRAVENOUS; SUBCUTANEOUS at 20:53

## 2024-10-29 RX ADMIN — POLYMYXIN B SULFATE AND TRIMETHOPRIM 1 DROP: 10000; 1 SOLUTION OPHTHALMIC at 15:52

## 2024-10-29 RX ADMIN — VANCOMYCIN 1250 MG: 1.75 INJECTION, SOLUTION INTRAVENOUS at 14:27

## 2024-10-29 RX ADMIN — AMIODARONE HYDROCHLORIDE 250 MG: 200 TABLET ORAL at 08:36

## 2024-10-29 RX ADMIN — Medication: at 22:07

## 2024-10-29 RX ADMIN — APIXABAN 5 MG: 5 TABLET, FILM COATED ORAL at 08:40

## 2024-10-29 NOTE — ONCOLOGY
osteoblastic metastatic disease, and a 1 cm subcutaneous nodule along the left chest wall that was slightly increased in size compared to previously.  - plan was to repeat scans after this treatment 10/18/2024 and discuss possible discontinuation of chemo to preserve quality of life.   - CT C/A/P 10/29/2024 showed  - Stable reticulonodular infiltrates in the left lung  - Stable liver mets  - Stable sclerotic bone mets  - Perhaps go to a 4 week cycle to preserve QOL vs stop all together despite stable disease.     Neutropenia   Anemia  Thrombocytopenia   - secondary to recent chemo 10/18/2024   - Started Procrit 40,000 units weekly on 9/01/2023.  HGB was 7.4 g/dL at that time.  He last received Procrit on 10/16/2024.  He is due for Procrit next on 10/23/2024.  - HGB 5.2 on admission, plts 42K, ANC 0.9  - afebrile   - please continue patients folic acid as Alimta is a folic acid inhibitor   - Infectious Workup:              - blood cultures pending               - COVID/Flu negative               - UA              - GI pathogen panel               - procalcitonin elevated 0.78  - started broad spec abx in ED   - monitor temp  - transfuse as needed for HGB <7, plts <10K    Brain mets   - An MRI of the brain on 8/22/2023 showed a new 9 mm enhancing lesion in the right superior frontal gyrus and a new 3 mm enhancing lesion in the left cerebellum.  At the time of a planning MRI of the brain on 9/25/2023, 9 total brain mets were identified and treated by [facility] with Dr. Carl Ojeda and Dr. Gerry Lofton.  - A follow-up MRI on 12/12/2023 showed mild enlargement of lesions in the right temporal/occipital lobe and left frontal lobe.  This was felt to be due to treatment effect/radiation necrosis.  5 Loxin was recommended.  - Another MRI of the brain on 4/04/2024 showed an increase in the size of a left frontal periventricular met (from 2 mm to 6 mm) and an increase in the size of the right parietal met (from 2 mm to 3 mm).

## 2024-10-29 NOTE — DISCHARGE INSTR - COC
Continuity of Care Form    Patient Name: Wes Schneider   :  1945  MRN:  [id number]    Admit date:  10/28/2024  Discharge date:      Code Status Order: Full Code   Advance Directives:   Advance Care Flowsheet Documentation             Admitting Physician:  Brandon Phillip MD  PCP: Jourdan Russ MD    Discharging Nurse: Rebecca Phan RN  Discharging Hospital Unit/Room#: 0329/0329-01  Discharging Unit Phone Number: [phone]    Emergency Contact:   Extended Emergency Contact Information  Primary Emergency Contact: Miranda Schneider  Address: [address] [place], [place] [address] [facility]  Home Phone: [phone]  Work Phone: [phone]  Mobile Phone: [phone]  Relation: Spouse  Secondary Emergency Contact: Frank Schneider  Home Phone: [phone]  Relation: Child    Past Surgical History:  Past Surgical History:   Procedure Laterality Date    BACK SURGERY      lumber, no hardware, \"cleaned it out\"    BRONCHOSCOPY N/A 2024    BRONCHOSCOPY DIAGNOSTIC OR CELL WASH ONLY performed by Edison Holden MD at [facility] ENDOSCOPY    BRONCHOSCOPY  2024    BRONCHOSCOPY ALVEOLAR LAVAGE performed by Edison Holden MD at [facility] ENDOSCOPY    BRONCHOSCOPY  2024    BRONCHOSCOPY BRUSHINGS performed by Edison Holden MD at [facility] ENDOSCOPY    CATARACT EXTRACTION EXTRACAPSULAR W/ INTRAOCULAR LENS IMPLANTATION      CT NEEDLE BIOPSY LUNG PERCUTANEOUS  2023    CT NEEDLE BIOPSY LUNG PERCUTANEOUS 2023 Robert Palma MD [facility] CT SCAN    CYSTOSCOPY Right 2020    CYSTOSCOPY,  RIGHT URETEROSCOPY, RIGHT RETRO PYELOGRAM, REMOVAL STONE FROM BLADDER, URETHRAL DILITATION performed by Lucien Shultz MD at [facility] OR    NEPHROLITHOTOMY Left 2020    LEFT PERCUTANEOUS NEPHROLITHOTOMY WITH DR MONTES TO PLACE NEPHROSTOMY TUBE PRIOR performed by Trevor Trejo MD at [facility] OR    PORT SURGERY N/A 2021    SURGICAL PORT PLACEMENT performed by

## 2024-10-30 ENCOUNTER — APPOINTMENT (OUTPATIENT)
Dept: MRI IMAGING | Age: 79
DRG: 808 | End: 2024-10-30
Payer: MEDICARE

## 2024-10-30 LAB
ACANTHOCYTES BLD QL SMEAR: ABNORMAL
ALBUMIN SERPL-MCNC: 2 G/DL (ref 3.4–5)
ALBUMIN/GLOB SERPL: 0.7 {RATIO} (ref 1.1–2.2)
ALP SERPL-CCNC: 122 U/L (ref 40–129)
ALT SERPL-CCNC: 23 U/L (ref 10–40)
AMMONIA PLAS-SCNC: 17 UMOL/L (ref 16–60)
ANION GAP SERPL CALCULATED.3IONS-SCNC: 16 MMOL/L (ref 3–16)
ANISOCYTOSIS BLD QL SMEAR: ABNORMAL
AST SERPL-CCNC: 50 U/L (ref 15–37)
BASOPHILS # BLD: 0 K/UL (ref 0–0.2)
BASOPHILS NFR BLD: 0 %
BILIRUB SERPL-MCNC: 1 MG/DL (ref 0–1)
BLOOD BANK DISPENSE STATUS: NORMAL
BLOOD BANK PRODUCT CODE: NORMAL
BPU ID: NORMAL
BUN SERPL-MCNC: 22 MG/DL (ref 7–20)
CALCIUM SERPL-MCNC: 7.3 MG/DL (ref 8.3–10.6)
CHLORIDE SERPL-SCNC: 105 MMOL/L (ref 99–110)
CO2 SERPL-SCNC: 15 MMOL/L (ref 21–32)
CREAT SERPL-MCNC: 1.1 MG/DL (ref 0.8–1.3)
DEPRECATED RDW RBC AUTO: 17.2 % (ref 12.4–15.4)
DESCRIPTION BLOOD BANK: NORMAL
EOSINOPHIL # BLD: 0 K/UL (ref 0–0.6)
EOSINOPHIL NFR BLD: 1 %
GFR SERPLBLD CREATININE-BSD FMLA CKD-EPI: 68 ML/MIN/{1.73_M2}
GLUCOSE BLD-MCNC: 213 MG/DL (ref 70–99)
GLUCOSE BLD-MCNC: 230 MG/DL (ref 70–99)
GLUCOSE BLD-MCNC: 233 MG/DL (ref 70–99)
GLUCOSE BLD-MCNC: 258 MG/DL (ref 70–99)
GLUCOSE SERPL-MCNC: 183 MG/DL (ref 70–99)
HCT VFR BLD AUTO: 21.2 % (ref 40.5–52.5)
HCT VFR BLD AUTO: 23.9 % (ref 40.5–52.5)
HGB BLD-MCNC: 6.8 G/DL (ref 13.5–17.5)
HGB BLD-MCNC: 7.9 G/DL (ref 13.5–17.5)
LYMPHOCYTES # BLD: 0.6 K/UL (ref 1–5.1)
LYMPHOCYTES NFR BLD: 24 %
MACROCYTES BLD QL SMEAR: ABNORMAL
MAGNESIUM SERPL-MCNC: 2.39 MG/DL (ref 1.8–2.4)
MCH RBC QN AUTO: 30.1 PG (ref 26–34)
MCHC RBC AUTO-ENTMCNC: 32.1 G/DL (ref 31–36)
MCV RBC AUTO: 93.7 FL (ref 80–100)
METAMYELOCYTES NFR BLD MANUAL: 1 %
MICROCYTES BLD QL SMEAR: ABNORMAL
MONOCYTES # BLD: 0.3 K/UL (ref 0–1.3)
MONOCYTES NFR BLD: 11 %
MYELOCYTES NFR BLD MANUAL: 1 %
NEUTROPHILS # BLD: 1.5 K/UL (ref 1.7–7.7)
NEUTROPHILS NFR BLD: 57 %
NEUTS BAND NFR BLD MANUAL: 5 % (ref 0–7)
NEUTS HYPERSEG BLD QL SMEAR: PRESENT
NRBC BLD-RTO: 1 /100 WBC
PATH INTERP BLD-IMP: NO
PERFORMED ON: ABNORMAL
PLATELET # BLD AUTO: 51 K/UL (ref 135–450)
PLATELET BLD QL SMEAR: ABNORMAL
PMV BLD AUTO: 8.9 FL (ref 5–10.5)
POLYCHROMASIA BLD QL SMEAR: ABNORMAL
POTASSIUM SERPL-SCNC: 4.3 MMOL/L (ref 3.5–5.1)
PROT SERPL-MCNC: 5 G/DL (ref 6.4–8.2)
RBC # BLD AUTO: 2.26 M/UL (ref 4.2–5.9)
SLIDE REVIEW: ABNORMAL
SMUDGE CELLS BLD QL SMEAR: PRESENT
SODIUM SERPL-SCNC: 136 MMOL/L (ref 136–145)
SPHEROCYTES BLD QL SMEAR: ABNORMAL
TOXIC GRANULES BLD QL SMEAR: PRESENT
VANCOMYCIN SERPL-MCNC: 18.4 UG/ML
WBC # BLD AUTO: 2.4 K/UL (ref 4–11)

## 2024-10-30 PROCEDURE — 36430 TRANSFUSION BLD/BLD COMPNT: CPT

## 2024-10-30 PROCEDURE — 85014 HEMATOCRIT: CPT

## 2024-10-30 PROCEDURE — 6370000000 HC RX 637 (ALT 250 FOR IP): Performed by: INTERNAL MEDICINE

## 2024-10-30 PROCEDURE — 6360000002 HC RX W HCPCS

## 2024-10-30 PROCEDURE — 82140 ASSAY OF AMMONIA: CPT

## 2024-10-30 PROCEDURE — 80053 COMPREHEN METABOLIC PANEL: CPT

## 2024-10-30 PROCEDURE — 6370000000 HC RX 637 (ALT 250 FOR IP): Performed by: NURSE PRACTITIONER

## 2024-10-30 PROCEDURE — 85018 HEMOGLOBIN: CPT

## 2024-10-30 PROCEDURE — 6360000002 HC RX W HCPCS: Performed by: INTERNAL MEDICINE

## 2024-10-30 PROCEDURE — 85025 COMPLETE CBC W/AUTO DIFF WBC: CPT

## 2024-10-30 PROCEDURE — 83735 ASSAY OF MAGNESIUM: CPT

## 2024-10-30 PROCEDURE — 2580000003 HC RX 258: Performed by: INTERNAL MEDICINE

## 2024-10-30 PROCEDURE — 1200000000 HC SEMI PRIVATE

## 2024-10-30 PROCEDURE — 80202 ASSAY OF VANCOMYCIN: CPT

## 2024-10-30 PROCEDURE — 36415 COLL VENOUS BLD VENIPUNCTURE: CPT

## 2024-10-30 RX ORDER — LORAZEPAM 2 MG/ML
0.5 INJECTION INTRAMUSCULAR ONCE
Status: COMPLETED | OUTPATIENT
Start: 2024-10-30 | End: 2024-10-30

## 2024-10-30 RX ORDER — QUETIAPINE FUMARATE 25 MG/1
25 TABLET, FILM COATED ORAL ONCE
Status: COMPLETED | OUTPATIENT
Start: 2024-10-30 | End: 2024-10-30

## 2024-10-30 RX ORDER — SODIUM CHLORIDE 9 MG/ML
INJECTION, SOLUTION INTRAVENOUS PRN
Status: DISCONTINUED | OUTPATIENT
Start: 2024-10-30 | End: 2024-11-05

## 2024-10-30 RX ORDER — LORAZEPAM 2 MG/ML
0.5 INJECTION INTRAMUSCULAR EVERY 4 HOURS PRN
Status: DISCONTINUED | OUTPATIENT
Start: 2024-10-30 | End: 2024-10-30

## 2024-10-30 RX ORDER — QUETIAPINE FUMARATE 25 MG/1
25 TABLET, FILM COATED ORAL ONCE
Status: DISCONTINUED | OUTPATIENT
Start: 2024-10-30 | End: 2024-10-30

## 2024-10-30 RX ORDER — INSULIN GLARGINE 100 [IU]/ML
20 INJECTION, SOLUTION SUBCUTANEOUS NIGHTLY
Status: DISCONTINUED | OUTPATIENT
Start: 2024-10-30 | End: 2024-11-06

## 2024-10-30 RX ORDER — VANCOMYCIN 1 G/200ML
1000 INJECTION, SOLUTION INTRAVENOUS EVERY 24 HOURS
Status: DISCONTINUED | OUTPATIENT
Start: 2024-10-30 | End: 2024-10-30

## 2024-10-30 RX ADMIN — QUETIAPINE FUMARATE 25 MG: 25 TABLET ORAL at 18:58

## 2024-10-30 RX ADMIN — POLYMYXIN B SULFATE AND TRIMETHOPRIM 1 DROP: 10000; 1 SOLUTION OPHTHALMIC at 04:44

## 2024-10-30 RX ADMIN — LORAZEPAM 0.5 MG: 2 INJECTION INTRAMUSCULAR; INTRAVENOUS at 15:12

## 2024-10-30 RX ADMIN — Medication 1 TABLET: at 08:48

## 2024-10-30 RX ADMIN — SODIUM CHLORIDE, PRESERVATIVE FREE 10 ML: 5 INJECTION INTRAVENOUS at 20:45

## 2024-10-30 RX ADMIN — CHOLECALCIFEROL (VITAMIN D3) 10 MCG (400 UNIT) TABLET 400 UNITS: at 08:47

## 2024-10-30 RX ADMIN — INSULIN LISPRO 2 UNITS: 100 INJECTION, SOLUTION INTRAVENOUS; SUBCUTANEOUS at 11:55

## 2024-10-30 RX ADMIN — FOLIC ACID 1 MG: 1 TABLET ORAL at 08:48

## 2024-10-30 RX ADMIN — INSULIN LISPRO 1 UNITS: 100 INJECTION, SOLUTION INTRAVENOUS; SUBCUTANEOUS at 20:45

## 2024-10-30 RX ADMIN — POLYMYXIN B SULFATE AND TRIMETHOPRIM 1 DROP: 10000; 1 SOLUTION OPHTHALMIC at 20:45

## 2024-10-30 RX ADMIN — METOPROLOL SUCCINATE 12.5 MG: 25 TABLET, EXTENDED RELEASE ORAL at 08:47

## 2024-10-30 RX ADMIN — POLYMYXIN B SULFATE AND TRIMETHOPRIM 1 DROP: 10000; 1 SOLUTION OPHTHALMIC at 15:07

## 2024-10-30 RX ADMIN — POLYMYXIN B SULFATE AND TRIMETHOPRIM 1 DROP: 10000; 1 SOLUTION OPHTHALMIC at 11:56

## 2024-10-30 RX ADMIN — INSULIN LISPRO 1 UNITS: 100 INJECTION, SOLUTION INTRAVENOUS; SUBCUTANEOUS at 16:41

## 2024-10-30 RX ADMIN — SODIUM CHLORIDE, PRESERVATIVE FREE 10 ML: 5 INJECTION INTRAVENOUS at 08:47

## 2024-10-30 RX ADMIN — AMIODARONE HYDROCHLORIDE 250 MG: 200 TABLET ORAL at 08:48

## 2024-10-30 RX ADMIN — CEFEPIME 2000 MG: 2 INJECTION, POWDER, FOR SOLUTION INTRAVENOUS at 04:44

## 2024-10-30 RX ADMIN — APIXABAN 5 MG: 5 TABLET, FILM COATED ORAL at 08:48

## 2024-10-30 RX ADMIN — POLYMYXIN B SULFATE AND TRIMETHOPRIM 1 DROP: 10000; 1 SOLUTION OPHTHALMIC at 00:37

## 2024-10-30 RX ADMIN — INSULIN GLARGINE 20 UNITS: 100 INJECTION, SOLUTION SUBCUTANEOUS at 20:45

## 2024-10-30 RX ADMIN — POLYMYXIN B SULFATE AND TRIMETHOPRIM 1 DROP: 10000; 1 SOLUTION OPHTHALMIC at 08:53

## 2024-10-30 RX ADMIN — INSULIN LISPRO 1 UNITS: 100 INJECTION, SOLUTION INTRAVENOUS; SUBCUTANEOUS at 08:45

## 2024-10-30 NOTE — ACP (ADVANCE CARE PLANNING)
Advance Care Planning     General Advance Care Planning (ACP) Conversation    Date of Conversation: 10/30/2024  Conducted with: Patient with Decision Making Capacity and Legal next of kin  Other persons present: Spouse Miranda    Healthcare Decision Maker:   Primary Decision Maker: Miranda Schneider - Spouse - [phone]     Today we documented Decision Maker(s) consistent with Legal Next of Kin hierarchy.  Content/Action Overview:  Has ACP document(s) NOT on file - requested patient to provide  Reviewed DNR/DNI and patient elects Full Code (Attempt Resuscitation)        Length of Voluntary ACP Conversation in minutes:  <16 minutes (Non-Billable)    NOMAN Toribio

## 2024-10-31 ENCOUNTER — APPOINTMENT (OUTPATIENT)
Dept: MRI IMAGING | Age: 79
DRG: 808 | End: 2024-10-31
Payer: MEDICARE

## 2024-10-31 LAB
ALBUMIN SERPL-MCNC: 2.4 G/DL (ref 3.4–5)
ALBUMIN/GLOB SERPL: 0.9 {RATIO} (ref 1.1–2.2)
ALP SERPL-CCNC: 144 U/L (ref 40–129)
ALT SERPL-CCNC: 29 U/L (ref 10–40)
ANION GAP SERPL CALCULATED.3IONS-SCNC: 8 MMOL/L (ref 3–16)
AST SERPL-CCNC: 55 U/L (ref 15–37)
BASOPHILS # BLD: 0 K/UL (ref 0–0.2)
BASOPHILS NFR BLD: 0.1 %
BILIRUB SERPL-MCNC: 1.1 MG/DL (ref 0–1)
BUN SERPL-MCNC: 26 MG/DL (ref 7–20)
CALCIUM SERPL-MCNC: 7.9 MG/DL (ref 8.3–10.6)
CHLORIDE SERPL-SCNC: 106 MMOL/L (ref 99–110)
CO2 SERPL-SCNC: 21 MMOL/L (ref 21–32)
CREAT SERPL-MCNC: 1.1 MG/DL (ref 0.8–1.3)
DEPRECATED RDW RBC AUTO: 15.7 % (ref 12.4–15.4)
EOSINOPHIL # BLD: 0 K/UL (ref 0–0.6)
EOSINOPHIL NFR BLD: 0.2 %
GFR SERPLBLD CREATININE-BSD FMLA CKD-EPI: 68 ML/MIN/{1.73_M2}
GLUCOSE BLD-MCNC: 220 MG/DL (ref 70–99)
GLUCOSE BLD-MCNC: 256 MG/DL (ref 70–99)
GLUCOSE BLD-MCNC: 87 MG/DL (ref 70–99)
GLUCOSE SERPL-MCNC: 222 MG/DL (ref 70–99)
HCT VFR BLD AUTO: 24.5 % (ref 40.5–52.5)
HGB BLD-MCNC: 8.1 G/DL (ref 13.5–17.5)
LYMPHOCYTES # BLD: 0.2 K/UL (ref 1–5.1)
LYMPHOCYTES NFR BLD: 3.4 %
MCH RBC QN AUTO: 29.9 PG (ref 26–34)
MCHC RBC AUTO-ENTMCNC: 33.1 G/DL (ref 31–36)
MCV RBC AUTO: 90.3 FL (ref 80–100)
MONOCYTES # BLD: 0.4 K/UL (ref 0–1.3)
MONOCYTES NFR BLD: 8.4 %
NEUTROPHILS # BLD: 4.5 K/UL (ref 1.7–7.7)
NEUTROPHILS NFR BLD: 87.9 %
PERFORMED ON: ABNORMAL
PERFORMED ON: ABNORMAL
PERFORMED ON: NORMAL
PLATELET # BLD AUTO: 57 K/UL (ref 135–450)
PMV BLD AUTO: 8.1 FL (ref 5–10.5)
POTASSIUM SERPL-SCNC: 4.7 MMOL/L (ref 3.5–5.1)
PROT SERPL-MCNC: 5.2 G/DL (ref 6.4–8.2)
RBC # BLD AUTO: 2.72 M/UL (ref 4.2–5.9)
SODIUM SERPL-SCNC: 135 MMOL/L (ref 136–145)
WBC # BLD AUTO: 5.1 K/UL (ref 4–11)

## 2024-10-31 PROCEDURE — 97530 THERAPEUTIC ACTIVITIES: CPT

## 2024-10-31 PROCEDURE — 1200000000 HC SEMI PRIVATE

## 2024-10-31 PROCEDURE — 6370000000 HC RX 637 (ALT 250 FOR IP): Performed by: STUDENT IN AN ORGANIZED HEALTH CARE EDUCATION/TRAINING PROGRAM

## 2024-10-31 PROCEDURE — 97110 THERAPEUTIC EXERCISES: CPT

## 2024-10-31 PROCEDURE — 6370000000 HC RX 637 (ALT 250 FOR IP): Performed by: NURSE PRACTITIONER

## 2024-10-31 PROCEDURE — 80053 COMPREHEN METABOLIC PANEL: CPT

## 2024-10-31 PROCEDURE — A9579 GAD-BASE MR CONTRAST NOS,1ML: HCPCS

## 2024-10-31 PROCEDURE — 70553 MRI BRAIN STEM W/O & W/DYE: CPT

## 2024-10-31 PROCEDURE — 85025 COMPLETE CBC W/AUTO DIFF WBC: CPT

## 2024-10-31 PROCEDURE — 6370000000 HC RX 637 (ALT 250 FOR IP): Performed by: INTERNAL MEDICINE

## 2024-10-31 PROCEDURE — 6360000004 HC RX CONTRAST MEDICATION

## 2024-10-31 PROCEDURE — 2580000003 HC RX 258: Performed by: INTERNAL MEDICINE

## 2024-10-31 PROCEDURE — 36415 COLL VENOUS BLD VENIPUNCTURE: CPT

## 2024-10-31 RX ORDER — BOSWELLIA SERRATA EXTRACT 307 MG
2 TABLET ORAL DAILY
Status: DISCONTINUED | OUTPATIENT
Start: 2024-10-31 | End: 2024-11-12 | Stop reason: HOSPADM

## 2024-10-31 RX ORDER — ESCITALOPRAM OXALATE 10 MG/1
10 TABLET ORAL NIGHTLY
Status: DISCONTINUED | OUTPATIENT
Start: 2024-10-31 | End: 2024-11-12 | Stop reason: HOSPADM

## 2024-10-31 RX ADMIN — SODIUM CHLORIDE, PRESERVATIVE FREE 10 ML: 5 INJECTION INTRAVENOUS at 21:44

## 2024-10-31 RX ADMIN — POLYMYXIN B SULFATE AND TRIMETHOPRIM 1 DROP: 10000; 1 SOLUTION OPHTHALMIC at 14:15

## 2024-10-31 RX ADMIN — Medication: at 00:04

## 2024-10-31 RX ADMIN — ATORVASTATIN CALCIUM 40 MG: 40 TABLET, FILM COATED ORAL at 20:15

## 2024-10-31 RX ADMIN — POLYETHYLENE GLYCOL 3350 17 G: 17 POWDER, FOR SOLUTION ORAL at 20:13

## 2024-10-31 RX ADMIN — ESCITALOPRAM OXALATE 10 MG: 10 TABLET ORAL at 20:15

## 2024-10-31 RX ADMIN — INSULIN LISPRO 2 UNITS: 100 INJECTION, SOLUTION INTRAVENOUS; SUBCUTANEOUS at 21:42

## 2024-10-31 RX ADMIN — POLYMYXIN B SULFATE AND TRIMETHOPRIM 1 DROP: 10000; 1 SOLUTION OPHTHALMIC at 21:43

## 2024-10-31 RX ADMIN — POLYMYXIN B SULFATE AND TRIMETHOPRIM 1 DROP: 10000; 1 SOLUTION OPHTHALMIC at 17:23

## 2024-10-31 RX ADMIN — CHOLECALCIFEROL (VITAMIN D3) 10 MCG (400 UNIT) TABLET 400 UNITS: at 09:28

## 2024-10-31 RX ADMIN — APIXABAN 5 MG: 5 TABLET, FILM COATED ORAL at 20:15

## 2024-10-31 RX ADMIN — POLYMYXIN B SULFATE AND TRIMETHOPRIM 1 DROP: 10000; 1 SOLUTION OPHTHALMIC at 09:22

## 2024-10-31 RX ADMIN — INSULIN LISPRO 1 UNITS: 100 INJECTION, SOLUTION INTRAVENOUS; SUBCUTANEOUS at 17:22

## 2024-10-31 RX ADMIN — METOPROLOL SUCCINATE 12.5 MG: 25 TABLET, EXTENDED RELEASE ORAL at 09:17

## 2024-10-31 RX ADMIN — POLYMYXIN B SULFATE AND TRIMETHOPRIM 1 DROP: 10000; 1 SOLUTION OPHTHALMIC at 00:04

## 2024-10-31 RX ADMIN — AMIODARONE HYDROCHLORIDE 250 MG: 200 TABLET ORAL at 09:17

## 2024-10-31 RX ADMIN — INSULIN GLARGINE 20 UNITS: 100 INJECTION, SOLUTION SUBCUTANEOUS at 20:28

## 2024-10-31 RX ADMIN — POLYMYXIN B SULFATE AND TRIMETHOPRIM 1 DROP: 10000; 1 SOLUTION OPHTHALMIC at 04:05

## 2024-10-31 RX ADMIN — Medication 1 TABLET: at 09:18

## 2024-10-31 RX ADMIN — FOLIC ACID 1 MG: 1 TABLET ORAL at 09:18

## 2024-10-31 RX ADMIN — Medication 2 CAPSULE: at 14:14

## 2024-10-31 RX ADMIN — APIXABAN 5 MG: 5 TABLET, FILM COATED ORAL at 09:17

## 2024-10-31 RX ADMIN — GADOTERIDOL 16 ML: 279.3 INJECTION, SOLUTION INTRAVENOUS at 11:51

## 2024-10-31 RX ADMIN — SODIUM CHLORIDE, PRESERVATIVE FREE 10 ML: 5 INJECTION INTRAVENOUS at 09:17

## 2024-11-01 LAB
ALBUMIN SERPL-MCNC: 2 G/DL (ref 3.4–5)
ALBUMIN/GLOB SERPL: 0.7 {RATIO} (ref 1.1–2.2)
ALP SERPL-CCNC: 131 U/L (ref 40–129)
ALT SERPL-CCNC: 24 U/L (ref 10–40)
ANION GAP SERPL CALCULATED.3IONS-SCNC: 8 MMOL/L (ref 3–16)
AST SERPL-CCNC: 52 U/L (ref 15–37)
BACTERIA BLD CULT ORG #2: NORMAL
BACTERIA BLD CULT: NORMAL
BASOPHILS # BLD: 0 K/UL (ref 0–0.2)
BASOPHILS NFR BLD: 0.2 %
BILIRUB SERPL-MCNC: 1.1 MG/DL (ref 0–1)
BUN SERPL-MCNC: 26 MG/DL (ref 7–20)
CALCIUM SERPL-MCNC: 7.5 MG/DL (ref 8.3–10.6)
CHLORIDE SERPL-SCNC: 108 MMOL/L (ref 99–110)
CO2 SERPL-SCNC: 17 MMOL/L (ref 21–32)
CREAT SERPL-MCNC: 1.1 MG/DL (ref 0.8–1.3)
DEPRECATED RDW RBC AUTO: 16.2 % (ref 12.4–15.4)
EOSINOPHIL # BLD: 0 K/UL (ref 0–0.6)
EOSINOPHIL NFR BLD: 0.6 %
GFR SERPLBLD CREATININE-BSD FMLA CKD-EPI: 68 ML/MIN/{1.73_M2}
GLUCOSE BLD-MCNC: 122 MG/DL (ref 70–99)
GLUCOSE BLD-MCNC: 156 MG/DL (ref 70–99)
GLUCOSE BLD-MCNC: 198 MG/DL (ref 70–99)
GLUCOSE BLD-MCNC: 247 MG/DL (ref 70–99)
GLUCOSE BLD-MCNC: 262 MG/DL (ref 70–99)
GLUCOSE SERPL-MCNC: 134 MG/DL (ref 70–99)
HCT VFR BLD AUTO: 22.1 % (ref 40.5–52.5)
HGB BLD-MCNC: 7.2 G/DL (ref 13.5–17.5)
LYMPHOCYTES # BLD: 0.3 K/UL (ref 1–5.1)
LYMPHOCYTES NFR BLD: 5.3 %
MCH RBC QN AUTO: 29.7 PG (ref 26–34)
MCHC RBC AUTO-ENTMCNC: 32.5 G/DL (ref 31–36)
MCV RBC AUTO: 91.4 FL (ref 80–100)
MONOCYTES # BLD: 0.4 K/UL (ref 0–1.3)
MONOCYTES NFR BLD: 8.9 %
NEUTROPHILS # BLD: 4.1 K/UL (ref 1.7–7.7)
NEUTROPHILS NFR BLD: 85 %
PERFORMED ON: ABNORMAL
PLATELET # BLD AUTO: 60 K/UL (ref 135–450)
PMV BLD AUTO: 8.2 FL (ref 5–10.5)
POTASSIUM SERPL-SCNC: 4.1 MMOL/L (ref 3.5–5.1)
PROT SERPL-MCNC: 4.9 G/DL (ref 6.4–8.2)
RBC # BLD AUTO: 2.42 M/UL (ref 4.2–5.9)
REASON FOR REJECTION: NORMAL
REJECTED TEST: NORMAL
SODIUM SERPL-SCNC: 133 MMOL/L (ref 136–145)
WBC # BLD AUTO: 4.9 K/UL (ref 4–11)

## 2024-11-01 PROCEDURE — 6360000002 HC RX W HCPCS

## 2024-11-01 PROCEDURE — 1200000000 HC SEMI PRIVATE

## 2024-11-01 PROCEDURE — 80053 COMPREHEN METABOLIC PANEL: CPT

## 2024-11-01 PROCEDURE — 97530 THERAPEUTIC ACTIVITIES: CPT

## 2024-11-01 PROCEDURE — 6370000000 HC RX 637 (ALT 250 FOR IP): Performed by: INTERNAL MEDICINE

## 2024-11-01 PROCEDURE — 97110 THERAPEUTIC EXERCISES: CPT

## 2024-11-01 PROCEDURE — 36415 COLL VENOUS BLD VENIPUNCTURE: CPT

## 2024-11-01 PROCEDURE — 6370000000 HC RX 637 (ALT 250 FOR IP): Performed by: STUDENT IN AN ORGANIZED HEALTH CARE EDUCATION/TRAINING PROGRAM

## 2024-11-01 PROCEDURE — 85025 COMPLETE CBC W/AUTO DIFF WBC: CPT

## 2024-11-01 PROCEDURE — 6370000000 HC RX 637 (ALT 250 FOR IP): Performed by: NURSE PRACTITIONER

## 2024-11-01 PROCEDURE — 2580000003 HC RX 258: Performed by: INTERNAL MEDICINE

## 2024-11-01 RX ORDER — FAMOTIDINE 20 MG/1
20 TABLET, FILM COATED ORAL 2 TIMES DAILY
Status: DISCONTINUED | OUTPATIENT
Start: 2024-11-01 | End: 2024-11-12 | Stop reason: HOSPADM

## 2024-11-01 RX ADMIN — APIXABAN 5 MG: 5 TABLET, FILM COATED ORAL at 08:08

## 2024-11-01 RX ADMIN — POLYMYXIN B SULFATE AND TRIMETHOPRIM 1 DROP: 10000; 1 SOLUTION OPHTHALMIC at 16:30

## 2024-11-01 RX ADMIN — AMIODARONE HYDROCHLORIDE 250 MG: 200 TABLET ORAL at 08:07

## 2024-11-01 RX ADMIN — CHOLECALCIFEROL (VITAMIN D3) 10 MCG (400 UNIT) TABLET 400 UNITS: at 08:15

## 2024-11-01 RX ADMIN — Medication: at 00:54

## 2024-11-01 RX ADMIN — INSULIN GLARGINE 20 UNITS: 100 INJECTION, SOLUTION SUBCUTANEOUS at 22:09

## 2024-11-01 RX ADMIN — Medication 2 CAPSULE: at 08:09

## 2024-11-01 RX ADMIN — FOLIC ACID 1 MG: 1 TABLET ORAL at 08:07

## 2024-11-01 RX ADMIN — POLYMYXIN B SULFATE AND TRIMETHOPRIM 1 DROP: 10000; 1 SOLUTION OPHTHALMIC at 08:14

## 2024-11-01 RX ADMIN — METOPROLOL SUCCINATE 12.5 MG: 25 TABLET, EXTENDED RELEASE ORAL at 08:08

## 2024-11-01 RX ADMIN — Medication 1 TABLET: at 08:07

## 2024-11-01 RX ADMIN — APIXABAN 5 MG: 5 TABLET, FILM COATED ORAL at 22:17

## 2024-11-01 RX ADMIN — POLYMYXIN B SULFATE AND TRIMETHOPRIM 1 DROP: 10000; 1 SOLUTION OPHTHALMIC at 22:10

## 2024-11-01 RX ADMIN — SODIUM CHLORIDE, PRESERVATIVE FREE 10 ML: 5 INJECTION INTRAVENOUS at 08:08

## 2024-11-01 RX ADMIN — Medication: at 21:45

## 2024-11-01 RX ADMIN — EPOETIN ALFA-EPBX 40000 UNITS: 20000 INJECTION, SOLUTION INTRAVENOUS; SUBCUTANEOUS at 16:29

## 2024-11-01 RX ADMIN — ATORVASTATIN CALCIUM 40 MG: 40 TABLET, FILM COATED ORAL at 22:10

## 2024-11-01 RX ADMIN — FAMOTIDINE 20 MG: 20 TABLET, FILM COATED ORAL at 22:10

## 2024-11-01 RX ADMIN — INSULIN LISPRO 1 UNITS: 100 INJECTION, SOLUTION INTRAVENOUS; SUBCUTANEOUS at 22:25

## 2024-11-01 RX ADMIN — ESCITALOPRAM OXALATE 10 MG: 10 TABLET ORAL at 22:10

## 2024-11-01 RX ADMIN — SODIUM CHLORIDE, PRESERVATIVE FREE 10 ML: 5 INJECTION INTRAVENOUS at 22:10

## 2024-11-01 RX ADMIN — POLYMYXIN B SULFATE AND TRIMETHOPRIM 1 DROP: 10000; 1 SOLUTION OPHTHALMIC at 00:55

## 2024-11-01 RX ADMIN — INSULIN LISPRO 2 UNITS: 100 INJECTION, SOLUTION INTRAVENOUS; SUBCUTANEOUS at 16:29

## 2024-11-02 LAB
GLUCOSE BLD-MCNC: 147 MG/DL (ref 70–99)
GLUCOSE BLD-MCNC: 178 MG/DL (ref 70–99)
GLUCOSE BLD-MCNC: 205 MG/DL (ref 70–99)
GLUCOSE BLD-MCNC: 233 MG/DL (ref 70–99)
GLUCOSE BLD-MCNC: 244 MG/DL (ref 70–99)
PERFORMED ON: ABNORMAL

## 2024-11-02 PROCEDURE — 1200000000 HC SEMI PRIVATE

## 2024-11-02 PROCEDURE — 6370000000 HC RX 637 (ALT 250 FOR IP): Performed by: INTERNAL MEDICINE

## 2024-11-02 PROCEDURE — 94761 N-INVAS EAR/PLS OXIMETRY MLT: CPT

## 2024-11-02 PROCEDURE — 6370000000 HC RX 637 (ALT 250 FOR IP): Performed by: NURSE PRACTITIONER

## 2024-11-02 PROCEDURE — 94640 AIRWAY INHALATION TREATMENT: CPT

## 2024-11-02 PROCEDURE — 6370000000 HC RX 637 (ALT 250 FOR IP): Performed by: STUDENT IN AN ORGANIZED HEALTH CARE EDUCATION/TRAINING PROGRAM

## 2024-11-02 PROCEDURE — 2580000003 HC RX 258: Performed by: INTERNAL MEDICINE

## 2024-11-02 PROCEDURE — 2700000000 HC OXYGEN THERAPY PER DAY

## 2024-11-02 RX ADMIN — POLYMYXIN B SULFATE AND TRIMETHOPRIM 1 DROP: 10000; 1 SOLUTION OPHTHALMIC at 18:01

## 2024-11-02 RX ADMIN — ATORVASTATIN CALCIUM 40 MG: 40 TABLET, FILM COATED ORAL at 21:19

## 2024-11-02 RX ADMIN — POLYMYXIN B SULFATE AND TRIMETHOPRIM 1 DROP: 10000; 1 SOLUTION OPHTHALMIC at 10:17

## 2024-11-02 RX ADMIN — AMIODARONE HYDROCHLORIDE 250 MG: 200 TABLET ORAL at 10:14

## 2024-11-02 RX ADMIN — SODIUM CHLORIDE, PRESERVATIVE FREE 10 ML: 5 INJECTION INTRAVENOUS at 21:25

## 2024-11-02 RX ADMIN — METOPROLOL SUCCINATE 12.5 MG: 25 TABLET, EXTENDED RELEASE ORAL at 10:13

## 2024-11-02 RX ADMIN — ESCITALOPRAM OXALATE 10 MG: 10 TABLET ORAL at 21:19

## 2024-11-02 RX ADMIN — POLYMYXIN B SULFATE AND TRIMETHOPRIM 1 DROP: 10000; 1 SOLUTION OPHTHALMIC at 21:20

## 2024-11-02 RX ADMIN — FAMOTIDINE 20 MG: 20 TABLET, FILM COATED ORAL at 10:17

## 2024-11-02 RX ADMIN — SODIUM CHLORIDE, PRESERVATIVE FREE 10 ML: 5 INJECTION INTRAVENOUS at 10:18

## 2024-11-02 RX ADMIN — INSULIN GLARGINE 20 UNITS: 100 INJECTION, SOLUTION SUBCUTANEOUS at 21:19

## 2024-11-02 RX ADMIN — Medication 2 CAPSULE: at 10:13

## 2024-11-02 RX ADMIN — Medication 1 TABLET: at 10:14

## 2024-11-02 RX ADMIN — FAMOTIDINE 20 MG: 20 TABLET, FILM COATED ORAL at 21:19

## 2024-11-02 RX ADMIN — FOLIC ACID 1 MG: 1 TABLET ORAL at 10:17

## 2024-11-02 RX ADMIN — Medication 1 PUFF: at 02:20

## 2024-11-02 RX ADMIN — POLYMYXIN B SULFATE AND TRIMETHOPRIM 1 DROP: 10000; 1 SOLUTION OPHTHALMIC at 13:29

## 2024-11-02 RX ADMIN — CHOLECALCIFEROL (VITAMIN D3) 10 MCG (400 UNIT) TABLET 400 UNITS: at 10:16

## 2024-11-02 RX ADMIN — Medication: at 21:24

## 2024-11-03 LAB
ANION GAP SERPL CALCULATED.3IONS-SCNC: 11 MMOL/L (ref 3–16)
BUN SERPL-MCNC: 24 MG/DL (ref 7–20)
CA-I BLD-SCNC: 1.02 MMOL/L (ref 1.12–1.32)
CALCIUM SERPL-MCNC: 7.2 MG/DL (ref 8.3–10.6)
CHLORIDE SERPL-SCNC: 106 MMOL/L (ref 99–110)
CO2 SERPL-SCNC: 17 MMOL/L (ref 21–32)
CREAT SERPL-MCNC: 0.9 MG/DL (ref 0.8–1.3)
DEPRECATED RDW RBC AUTO: 16.4 % (ref 12.4–15.4)
GFR SERPLBLD CREATININE-BSD FMLA CKD-EPI: 86 ML/MIN/{1.73_M2}
GLUCOSE BLD-MCNC: 156 MG/DL (ref 70–99)
GLUCOSE BLD-MCNC: 167 MG/DL (ref 70–99)
GLUCOSE BLD-MCNC: 207 MG/DL (ref 70–99)
GLUCOSE BLD-MCNC: 229 MG/DL (ref 70–99)
GLUCOSE BLD-MCNC: 270 MG/DL (ref 70–99)
GLUCOSE SERPL-MCNC: 154 MG/DL (ref 70–99)
HCT VFR BLD AUTO: 22.3 % (ref 40.5–52.5)
HGB BLD-MCNC: 7.3 G/DL (ref 13.5–17.5)
MCH RBC QN AUTO: 29.9 PG (ref 26–34)
MCHC RBC AUTO-ENTMCNC: 32.7 G/DL (ref 31–36)
MCV RBC AUTO: 91.3 FL (ref 80–100)
PERFORMED ON: ABNORMAL
PH BLDV: 7.41 [PH] (ref 7.35–7.45)
PLATELET # BLD AUTO: 113 K/UL (ref 135–450)
PMV BLD AUTO: 7.7 FL (ref 5–10.5)
POTASSIUM SERPL-SCNC: 4.2 MMOL/L (ref 3.5–5.1)
RBC # BLD AUTO: 2.44 M/UL (ref 4.2–5.9)
REASON FOR REJECTION: NORMAL
REJECTED TEST: NORMAL
SODIUM SERPL-SCNC: 134 MMOL/L (ref 136–145)
WBC # BLD AUTO: 4.5 K/UL (ref 4–11)

## 2024-11-03 PROCEDURE — 80048 BASIC METABOLIC PNL TOTAL CA: CPT

## 2024-11-03 PROCEDURE — 6370000000 HC RX 637 (ALT 250 FOR IP): Performed by: INTERNAL MEDICINE

## 2024-11-03 PROCEDURE — 85027 COMPLETE CBC AUTOMATED: CPT

## 2024-11-03 PROCEDURE — 82330 ASSAY OF CALCIUM: CPT

## 2024-11-03 PROCEDURE — 1200000000 HC SEMI PRIVATE

## 2024-11-03 PROCEDURE — 6370000000 HC RX 637 (ALT 250 FOR IP): Performed by: STUDENT IN AN ORGANIZED HEALTH CARE EDUCATION/TRAINING PROGRAM

## 2024-11-03 PROCEDURE — 36415 COLL VENOUS BLD VENIPUNCTURE: CPT

## 2024-11-03 PROCEDURE — 2580000003 HC RX 258: Performed by: INTERNAL MEDICINE

## 2024-11-03 PROCEDURE — 6370000000 HC RX 637 (ALT 250 FOR IP): Performed by: NURSE PRACTITIONER

## 2024-11-03 PROCEDURE — 2580000003 HC RX 258: Performed by: NURSE PRACTITIONER

## 2024-11-03 RX ORDER — SODIUM CHLORIDE 9 MG/ML
INJECTION, SOLUTION INTRAVENOUS CONTINUOUS
Status: DISCONTINUED | OUTPATIENT
Start: 2024-11-03 | End: 2024-11-10 | Stop reason: SINTOL

## 2024-11-03 RX ORDER — MECOBALAMIN 5000 MCG
10 TABLET,DISINTEGRATING ORAL NIGHTLY
Status: DISCONTINUED | OUTPATIENT
Start: 2024-11-03 | End: 2024-11-12 | Stop reason: HOSPADM

## 2024-11-03 RX ADMIN — FAMOTIDINE 20 MG: 20 TABLET, FILM COATED ORAL at 10:25

## 2024-11-03 RX ADMIN — POLYMYXIN B SULFATE AND TRIMETHOPRIM 1 DROP: 10000; 1 SOLUTION OPHTHALMIC at 08:30

## 2024-11-03 RX ADMIN — METOPROLOL SUCCINATE 12.5 MG: 25 TABLET, EXTENDED RELEASE ORAL at 10:25

## 2024-11-03 RX ADMIN — FOLIC ACID 1 MG: 1 TABLET ORAL at 10:25

## 2024-11-03 RX ADMIN — FAMOTIDINE 20 MG: 20 TABLET, FILM COATED ORAL at 20:59

## 2024-11-03 RX ADMIN — INSULIN LISPRO 2 UNITS: 100 INJECTION, SOLUTION INTRAVENOUS; SUBCUTANEOUS at 16:40

## 2024-11-03 RX ADMIN — Medication: at 21:02

## 2024-11-03 RX ADMIN — Medication 10 MG: at 20:59

## 2024-11-03 RX ADMIN — POLYMYXIN B SULFATE AND TRIMETHOPRIM 1 DROP: 10000; 1 SOLUTION OPHTHALMIC at 20:59

## 2024-11-03 RX ADMIN — POLYMYXIN B SULFATE AND TRIMETHOPRIM 1 DROP: 10000; 1 SOLUTION OPHTHALMIC at 00:15

## 2024-11-03 RX ADMIN — INSULIN LISPRO 2 UNITS: 100 INJECTION, SOLUTION INTRAVENOUS; SUBCUTANEOUS at 11:58

## 2024-11-03 RX ADMIN — AMIODARONE HYDROCHLORIDE 250 MG: 200 TABLET ORAL at 10:25

## 2024-11-03 RX ADMIN — POLYMYXIN B SULFATE AND TRIMETHOPRIM 1 DROP: 10000; 1 SOLUTION OPHTHALMIC at 03:30

## 2024-11-03 RX ADMIN — POLYMYXIN B SULFATE AND TRIMETHOPRIM 1 DROP: 10000; 1 SOLUTION OPHTHALMIC at 11:59

## 2024-11-03 RX ADMIN — Medication 1 TABLET: at 10:25

## 2024-11-03 RX ADMIN — ATORVASTATIN CALCIUM 40 MG: 40 TABLET, FILM COATED ORAL at 20:59

## 2024-11-03 RX ADMIN — CHOLECALCIFEROL (VITAMIN D3) 10 MCG (400 UNIT) TABLET 400 UNITS: at 10:25

## 2024-11-03 RX ADMIN — POLYMYXIN B SULFATE AND TRIMETHOPRIM 1 DROP: 10000; 1 SOLUTION OPHTHALMIC at 16:40

## 2024-11-03 RX ADMIN — ESCITALOPRAM OXALATE 10 MG: 10 TABLET ORAL at 20:59

## 2024-11-03 RX ADMIN — INSULIN GLARGINE 20 UNITS: 100 INJECTION, SOLUTION SUBCUTANEOUS at 20:59

## 2024-11-03 RX ADMIN — Medication 2 CAPSULE: at 10:25

## 2024-11-03 RX ADMIN — SODIUM CHLORIDE, PRESERVATIVE FREE 10 ML: 5 INJECTION INTRAVENOUS at 10:26

## 2024-11-03 RX ADMIN — SODIUM CHLORIDE: 9 INJECTION, SOLUTION INTRAVENOUS at 15:07

## 2024-11-03 ASSESSMENT — PAIN SCALES - GENERAL: PAINLEVEL_OUTOF10: 0

## 2024-11-04 ENCOUNTER — APPOINTMENT (OUTPATIENT)
Dept: GENERAL RADIOLOGY | Age: 79
DRG: 808 | End: 2024-11-04
Payer: MEDICARE

## 2024-11-04 LAB
ABO + RH BLD: NORMAL
ANION GAP SERPL CALCULATED.3IONS-SCNC: 9 MMOL/L (ref 3–16)
BASE EXCESS BLDA CALC-SCNC: -1 MMOL/L (ref -3–3)
BLD GP AB SCN SERPL QL: NORMAL
BLOOD BANK DISPENSE STATUS: NORMAL
BLOOD BANK PRODUCT CODE: NORMAL
BPU ID: NORMAL
BUN SERPL-MCNC: 21 MG/DL (ref 7–20)
CA-I BLD-SCNC: 1.15 MMOL/L (ref 1.12–1.32)
CALCIUM SERPL-MCNC: 7 MG/DL (ref 8.3–10.6)
CHLORIDE SERPL-SCNC: 108 MMOL/L (ref 99–110)
CO2 BLDA-SCNC: 26 MMOL/L
CO2 SERPL-SCNC: 21 MMOL/L (ref 21–32)
CREAT SERPL-MCNC: 0.9 MG/DL (ref 0.8–1.3)
DEPRECATED RDW RBC AUTO: 16.6 % (ref 12.4–15.4)
DESCRIPTION BLOOD BANK: NORMAL
GFR SERPLBLD CREATININE-BSD FMLA CKD-EPI: 86 ML/MIN/{1.73_M2}
GLUCOSE BLD-MCNC: 104 MG/DL (ref 70–99)
GLUCOSE BLD-MCNC: 107 MG/DL (ref 70–99)
GLUCOSE BLD-MCNC: 129 MG/DL (ref 70–99)
GLUCOSE BLD-MCNC: 147 MG/DL (ref 70–99)
GLUCOSE BLD-MCNC: 152 MG/DL (ref 70–99)
GLUCOSE BLD-MCNC: 199 MG/DL (ref 70–99)
GLUCOSE SERPL-MCNC: 104 MG/DL (ref 70–99)
HCO3 BLDA-SCNC: 24.5 MMOL/L (ref 21–29)
HCT VFR BLD AUTO: 18.7 % (ref 40.5–52.5)
HCT VFR BLD AUTO: 22 % (ref 40.5–52.5)
HCT VFR BLD AUTO: 22.6 % (ref 40.5–52.5)
HGB BLD CALC-MCNC: 7.6 GM/DL (ref 13.5–17.5)
HGB BLD-MCNC: 6.2 G/DL (ref 13.5–17.5)
HGB BLD-MCNC: 7.5 G/DL (ref 13.5–17.5)
LACTATE BLD-SCNC: 1 MMOL/L (ref 0.4–2)
MCH RBC QN AUTO: 30.9 PG (ref 26–34)
MCHC RBC AUTO-ENTMCNC: 33.4 G/DL (ref 31–36)
MCV RBC AUTO: 92.4 FL (ref 80–100)
NT-PROBNP SERPL-MCNC: 1578 PG/ML (ref 0–449)
PCO2 BLDA: 46 MM HG (ref 35–45)
PERFORMED ON: ABNORMAL
PH BLDA: 7.33 [PH] (ref 7.35–7.45)
PLATELET # BLD AUTO: 141 K/UL (ref 135–450)
PMV BLD AUTO: 7.3 FL (ref 5–10.5)
PO2 BLDA: 77.1 MM HG (ref 75–108)
POC SAMPLE TYPE: ABNORMAL
POTASSIUM BLD-SCNC: 4 MMOL/L (ref 3.5–5.1)
POTASSIUM SERPL-SCNC: 4.2 MMOL/L (ref 3.5–5.1)
RBC # BLD AUTO: 2.02 M/UL (ref 4.2–5.9)
SAO2 % BLDA: 94 % (ref 93–100)
SODIUM BLD-SCNC: 141 MMOL/L (ref 136–145)
SODIUM SERPL-SCNC: 138 MMOL/L (ref 136–145)
WBC # BLD AUTO: 3.6 K/UL (ref 4–11)

## 2024-11-04 PROCEDURE — 92610 EVALUATE SWALLOWING FUNCTION: CPT

## 2024-11-04 PROCEDURE — 2580000003 HC RX 258: Performed by: INTERNAL MEDICINE

## 2024-11-04 PROCEDURE — 84132 ASSAY OF SERUM POTASSIUM: CPT

## 2024-11-04 PROCEDURE — 92526 ORAL FUNCTION THERAPY: CPT

## 2024-11-04 PROCEDURE — 82330 ASSAY OF CALCIUM: CPT

## 2024-11-04 PROCEDURE — P9016 RBC LEUKOCYTES REDUCED: HCPCS

## 2024-11-04 PROCEDURE — 82803 BLOOD GASES ANY COMBINATION: CPT

## 2024-11-04 PROCEDURE — 6370000000 HC RX 637 (ALT 250 FOR IP): Performed by: NURSE PRACTITIONER

## 2024-11-04 PROCEDURE — 86901 BLOOD TYPING SEROLOGIC RH(D): CPT

## 2024-11-04 PROCEDURE — 2580000003 HC RX 258: Performed by: STUDENT IN AN ORGANIZED HEALTH CARE EDUCATION/TRAINING PROGRAM

## 2024-11-04 PROCEDURE — 36600 WITHDRAWAL OF ARTERIAL BLOOD: CPT

## 2024-11-04 PROCEDURE — 1200000000 HC SEMI PRIVATE

## 2024-11-04 PROCEDURE — 71045 X-RAY EXAM CHEST 1 VIEW: CPT

## 2024-11-04 PROCEDURE — 86923 COMPATIBILITY TEST ELECTRIC: CPT

## 2024-11-04 PROCEDURE — 85027 COMPLETE CBC AUTOMATED: CPT

## 2024-11-04 PROCEDURE — 36430 TRANSFUSION BLD/BLD COMPNT: CPT

## 2024-11-04 PROCEDURE — 74230 X-RAY XM SWLNG FUNCJ C+: CPT

## 2024-11-04 PROCEDURE — 83605 ASSAY OF LACTIC ACID: CPT

## 2024-11-04 PROCEDURE — 6360000002 HC RX W HCPCS: Performed by: INTERNAL MEDICINE

## 2024-11-04 PROCEDURE — 86900 BLOOD TYPING SEROLOGIC ABO: CPT

## 2024-11-04 PROCEDURE — 36415 COLL VENOUS BLD VENIPUNCTURE: CPT

## 2024-11-04 PROCEDURE — 92611 MOTION FLUOROSCOPY/SWALLOW: CPT

## 2024-11-04 PROCEDURE — 94640 AIRWAY INHALATION TREATMENT: CPT

## 2024-11-04 PROCEDURE — 85014 HEMATOCRIT: CPT

## 2024-11-04 PROCEDURE — 6370000000 HC RX 637 (ALT 250 FOR IP): Performed by: INTERNAL MEDICINE

## 2024-11-04 PROCEDURE — 2700000000 HC OXYGEN THERAPY PER DAY

## 2024-11-04 PROCEDURE — 99233 SBSQ HOSP IP/OBS HIGH 50: CPT | Performed by: STUDENT IN AN ORGANIZED HEALTH CARE EDUCATION/TRAINING PROGRAM

## 2024-11-04 PROCEDURE — 82947 ASSAY GLUCOSE BLOOD QUANT: CPT

## 2024-11-04 PROCEDURE — 85018 HEMOGLOBIN: CPT

## 2024-11-04 PROCEDURE — 83880 ASSAY OF NATRIURETIC PEPTIDE: CPT

## 2024-11-04 PROCEDURE — 6360000002 HC RX W HCPCS: Performed by: STUDENT IN AN ORGANIZED HEALTH CARE EDUCATION/TRAINING PROGRAM

## 2024-11-04 PROCEDURE — 80048 BASIC METABOLIC PNL TOTAL CA: CPT

## 2024-11-04 PROCEDURE — 6370000000 HC RX 637 (ALT 250 FOR IP): Performed by: STUDENT IN AN ORGANIZED HEALTH CARE EDUCATION/TRAINING PROGRAM

## 2024-11-04 PROCEDURE — 87040 BLOOD CULTURE FOR BACTERIA: CPT

## 2024-11-04 PROCEDURE — 84295 ASSAY OF SERUM SODIUM: CPT

## 2024-11-04 PROCEDURE — 86850 RBC ANTIBODY SCREEN: CPT

## 2024-11-04 PROCEDURE — 94761 N-INVAS EAR/PLS OXIMETRY MLT: CPT

## 2024-11-04 RX ORDER — 0.9 % SODIUM CHLORIDE 0.9 %
500 INTRAVENOUS SOLUTION INTRAVENOUS ONCE
Status: COMPLETED | OUTPATIENT
Start: 2024-11-04 | End: 2024-11-04

## 2024-11-04 RX ORDER — FUROSEMIDE 10 MG/ML
20 INJECTION INTRAMUSCULAR; INTRAVENOUS ONCE
Status: COMPLETED | OUTPATIENT
Start: 2024-11-04 | End: 2024-11-04

## 2024-11-04 RX ORDER — IPRATROPIUM BROMIDE AND ALBUTEROL SULFATE 2.5; .5 MG/3ML; MG/3ML
1 SOLUTION RESPIRATORY (INHALATION)
Status: DISCONTINUED | OUTPATIENT
Start: 2024-11-04 | End: 2024-11-12 | Stop reason: HOSPADM

## 2024-11-04 RX ORDER — FUROSEMIDE 10 MG/ML
40 INJECTION INTRAMUSCULAR; INTRAVENOUS ONCE
Status: DISCONTINUED | OUTPATIENT
Start: 2024-11-04 | End: 2024-11-04

## 2024-11-04 RX ORDER — SODIUM CHLORIDE 9 MG/ML
INJECTION, SOLUTION INTRAVENOUS PRN
Status: DISCONTINUED | OUTPATIENT
Start: 2024-11-04 | End: 2024-11-05

## 2024-11-04 RX ADMIN — ATORVASTATIN CALCIUM 40 MG: 40 TABLET, FILM COATED ORAL at 21:23

## 2024-11-04 RX ADMIN — POLYMYXIN B SULFATE AND TRIMETHOPRIM 1 DROP: 10000; 1 SOLUTION OPHTHALMIC at 04:35

## 2024-11-04 RX ADMIN — MEROPENEM 1000 MG: 1 INJECTION INTRAVENOUS at 16:06

## 2024-11-04 RX ADMIN — INSULIN GLARGINE 20 UNITS: 100 INJECTION, SOLUTION SUBCUTANEOUS at 21:23

## 2024-11-04 RX ADMIN — IPRATROPIUM BROMIDE AND ALBUTEROL SULFATE 1 DOSE: 2.5; .5 SOLUTION RESPIRATORY (INHALATION) at 19:37

## 2024-11-04 RX ADMIN — Medication 10 MG: at 21:23

## 2024-11-04 RX ADMIN — SODIUM CHLORIDE, PRESERVATIVE FREE 10 ML: 5 INJECTION INTRAVENOUS at 21:23

## 2024-11-04 RX ADMIN — METHYLPREDNISOLONE SODIUM SUCCINATE 40 MG: 40 INJECTION INTRAMUSCULAR; INTRAVENOUS at 17:34

## 2024-11-04 RX ADMIN — FAMOTIDINE 20 MG: 20 TABLET, FILM COATED ORAL at 21:23

## 2024-11-04 RX ADMIN — SODIUM CHLORIDE 500 ML: 9 INJECTION, SOLUTION INTRAVENOUS at 16:01

## 2024-11-04 RX ADMIN — POLYMYXIN B SULFATE AND TRIMETHOPRIM 1 DROP: 10000; 1 SOLUTION OPHTHALMIC at 11:50

## 2024-11-04 RX ADMIN — ESCITALOPRAM OXALATE 10 MG: 10 TABLET ORAL at 21:23

## 2024-11-04 RX ADMIN — SODIUM CHLORIDE, PRESERVATIVE FREE 10 ML: 5 INJECTION INTRAVENOUS at 11:48

## 2024-11-04 RX ADMIN — POLYMYXIN B SULFATE AND TRIMETHOPRIM 1 DROP: 10000; 1 SOLUTION OPHTHALMIC at 21:25

## 2024-11-04 RX ADMIN — Medication: at 21:24

## 2024-11-04 RX ADMIN — FUROSEMIDE 20 MG: 10 INJECTION, SOLUTION INTRAMUSCULAR; INTRAVENOUS at 11:48

## 2024-11-04 RX ADMIN — POLYMYXIN B SULFATE AND TRIMETHOPRIM 1 DROP: 10000; 1 SOLUTION OPHTHALMIC at 17:33

## 2024-11-04 ASSESSMENT — PAIN SCALES - GENERAL: PAINLEVEL_OUTOF10: 0

## 2024-11-04 NOTE — PROCEDURES
Speech Language Pathology  Modified Barium Swallow Study  Facility/Department: [facility] [place] C3 TELE/MED SURG/ONC        Recommendations:  Diet recommendation: IDDSI 4 Puree Solids; IDDSI 0 Thin Liquids - NO straws ; Meds crushed in puree as able; encourage use of double swallow with PO as able  Risk management: upright for all intake, stay upright for at least 30 mins after intake, small bites/sips, assist feed, downgrade to mildly thick if s/s of aspiration develop, intermittent cued use of chin tuck post-swallow to assist with clearance of pharyngeal residue, 1:1 supervision with intake, oral care q4 hrs to reduce adverse affects in the event of aspiration, increase physical mobility as able, alternate bites/sips, slow rate of intake, general GERD precautions, general aspiration precautions, and hold PO and contact SLP if s/s of aspiration or worsening respiratory status develop.    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 Pt is lung cancer pt , last chemo one week ago, has been more lethargic last couple days and per wife mental status has declined.     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

## 2024-11-04 NOTE — CONSULTS
[facility]    Consult Note      Requesting Physician:  Mason Collins PA-C    CHIEF COMPLAINT:  NSCLC patient at [facility]      HISTORY OF PRESENT ILLNESS:    Wes Schneider is an 79 year old male with PMHX of NSCLC stage IV followed by [facility] undergoing treatment, T2DM, HTN who presented to [facility] ED on 10/28/2024 with concerns for fatigue and weakness. Found to have hemoglobin of 5.2 on admission. Undergoing transfusion with plans to admit for further workup and management.     Hem/onc consulted for continuity. Wife at bedside gives most of the history as patient is fatigued. He awakens for initial conversation but minimal and falls back asleep.   Denies evidence of acute bleeding although notes intermittent drop of blood once every few weeks prior to urination. Not ongoing. No rectal bleeding or other bleeding noted. States he was drinking fluids although not eating much. Denies fever.     Mr. Schneider  is a 79 y.o. male we are seeing in consultation for No diagnosis found.       Past Medical History:  Past Medical History:   Diagnosis Date    BPH (benign prostatic hyperplasia)     Cancer (HCC) 2016    prostate    Diabetes mellitus (HCC)     Esophageal reflux     Hyperlipidemia     Hypertension     Kidney stone     Lung cancer (HCC) 10/02/2021    Stage 4    Lung cancer (HCC)     Neuropathy        Past Surgical History:  Past Surgical History:   Procedure Laterality Date    BACK SURGERY  2012    lumber, no hardware, \"cleaned it out\"    BRONCHOSCOPY N/A 8/21/2024    BRONCHOSCOPY DIAGNOSTIC OR CELL WASH ONLY performed by Edison Holden MD at [facility] ENDOSCOPY    BRONCHOSCOPY  8/21/2024    BRONCHOSCOPY ALVEOLAR LAVAGE performed by Eidson Holden MD at [facility] ENDOSCOPY    BRONCHOSCOPY  8/21/2024    BRONCHOSCOPY BRUSHINGS performed by Edison Holden MD at [facility] ENDOSCOPY    CATARACT 
  Pharmacy Note  Vancomycin Consult    Wes Schneider is a 79 y.o. male started on Vancomycin for PNA x 7 days; consult received from Dr. Brandon Phillip to manage therapy. Also receiving the following antibiotics: Cefepime.    Allergies:  Patient has no known allergies.     Tmax: 98.3    Micro: bc sent    Recent Labs     10/28/24  1048   CREATININE 1.1       Recent Labs     10/28/24  1048   WBC 1.4*       Estimated Creatinine Clearance: 50 mL/min (based on SCr of 1.1 mg/dL).      Intake/Output Summary (Last 24 hours) at 10/28/2024 1615  Last data filed at 10/28/2024 1530  Gross per 24 hour   Intake 280 ml   Output --   Net 280 ml       Wt Readings from Last 1 Encounters:   10/28/24 72.6 kg (160 lb)         Body mass index is 25.06 kg/m².    Loading dose (critically ill or in ICU, require dialysis or renal replacement therapy): Vancomycin 25 mg/kg IVPB x 1 (maximum 2500 mg).  Maintenance dose: 10-20 mg/kg (maximum: 2000 mg/dose and 4500 mg/day) starting at the next dosing interval determined by renal function  Pulse dose: fluctuating renal function, PIYUSH, ESRD  CRRT: 7.5-10 mg/kg q12h   Goal Vancomycin trough: 15-20 mcg/mL   Goal Vancomycin AUC: 400-600     Assessment/Plan:  Will initiate Vancomycin with a one time loading dose of 1750 mg x1, followed by 1250 mg IV every 24 hours. Calculated Vancomycin AUC = 523 mg/L*h with an estimated steady-state vancomycin trough = 13.8 mcg/mL. Vancomycin level ordered for 10/30 0600. Timing of trough level will be determined based on culture results, renal function, and clinical response.     Thank you for the consult.  Emeka Giraldo PharmD 10/28/2024 4:16 PM    ----------------------------------------------------------------------------------------------------------------  10/29/2024 7:51 AM                                           Vancomycin Progress Note  Day: 2/7 Indication: Pneumonia Other Antibiotics: cefepime     Recent Labs     10/28/24  1048 10/29/24  0252   CREATININE 
 [facility] Nurse  Consult Note       NAME:  Wes Schneider  MEDICAL RECORD NUMBER:  [id number]  AGE: 79 y.o.   GENDER: male  : 1945  TODAY'S DATE:  10/29/2024    Subjective; Patient awake, spouse at bedside speaking up for patient.   Spouse asked to have patient's bottom checked also.   Reason for WOCN Evaluation and Assessment: open wound on R heel.       Wes Schneider is a 79 y.o. male referred by:   [] Physician  [x] Nursing  [] Other:     Per spouse patient follows Dr. Minor, with [facility], Podiatrist for right heel.  Last saw couple weeks ago.   Was using mupirocin ointment.  Alright to try the Alginate ag. To right heel.        Wound Identification:  Wound Type: pressure  Contributing Factors: diabetes    Wound History: above  Current Wound Care Treatment:  wrap    Patient Goal of Care:  [x] Wound Healing  [] Odor Control  [] Palliative Care  [x] Pain Control   [] Other:         PAST MEDICAL HISTORY        Diagnosis Date    BPH (benign prostatic hyperplasia)     Cancer (HCC)     prostate    Diabetes mellitus (HCC)     Esophageal reflux     Hyperlipidemia     Hypertension     Kidney stone     Lung cancer (HCC) 10/02/2021    Stage 4    Lung cancer (HCC)     Neuropathy        PAST SURGICAL HISTORY    Past Surgical History:   Procedure Laterality Date    BACK SURGERY      lumber, no hardware, \"cleaned it out\"    BRONCHOSCOPY N/A 2024    BRONCHOSCOPY DIAGNOSTIC OR CELL WASH ONLY performed by Edison Holden MD at [facility] ENDOSCOPY    BRONCHOSCOPY  2024    BRONCHOSCOPY ALVEOLAR LAVAGE performed by Edison Holden MD at [facility] ENDOSCOPY    BRONCHOSCOPY  2024    BRONCHOSCOPY BRUSHINGS performed by Edison Holdne MD at [facility] ENDOSCOPY    CATARACT EXTRACTION EXTRACAPSULAR W/ INTRAOCULAR LENS IMPLANTATION      CT NEEDLE BIOPSY LUNG PERCUTANEOUS  2023    CT NEEDLE BIOPSY LUNG PERCUTANEOUS 2023 Robert Palma MD [facility] CT SCAN    CYSTOSCOPY Right 
Consult Placed     Who: Paula Mosley  Date: 11/1/24  Time: 1425     Electronically signed by Jaja Cruz on 11/1/2024 at 2:24 PM    
Consult Placed   Added to the treatment team  Who: Charlene Bruno  Date: 10/29/2024  Time: 6:21 AM       Electronically signed by Yissel Fink on 10/29/2024 at 6:20 AM  
Consult Placed   Consult sent via perfect serve  Who: Dr. Hastings  Date: 11/4/2024  Time: 8:39 AM       Electronically signed by Yissel Fink on 11/4/2024 at 8:39 AM  
GASTROENTEROLOGY INPATIENT CONSULTATION        IDENTIFYING DATA/REASON FOR CONSULTATION   PATIENT:  Wes Schneider  MRN:  [id number]  ADMIT DATE: 10/28/2024  TIME OF EVALUATION: 11/1/2024 3:06 PM  HOSPITAL STAY:   LOS: 4 days     REASON FOR CONSULTATION:  Dysphagia    HISTORY OF PRESENT ILLNESS   Wes Schneider is a 79 y.o. male with a PMH former smoker, HTN, HLD, DM2, Afib, prostate cancer, and stage 4 metastatic adenocarcinoma diagnosed 2021 treated with chemo, then found to have mets to brain in 9/2023 s/p gamme knife, with ongoing chemo treatment, most recently on 10/18 through [facility]. His chemo course has been complicated by pancytopenia and he has required many transfusions, most recently as an outpatient on 10/17. He presented to the ED on 10/28/2024 with worsening generalized weakness and lethargy and was found to have a Hb of 5.5, otherwise with ongoing pancytopenia. We have been consulted regarding dysphagia. Patient reports dysphagia with solid foods including meats and bread. No dysphagia with liquids. Symptoms present for a few months. No heartburn. At times feels difficultly chewing and initiating swallowing. MBS in 8/2024 with Moderate residue throughout the examination.  Variable episodes of laryngeal penetration.    Prior Endoscopic Evaluations:   colonoscopy in 2002 for right sided abdominal pain with findings of severe pandiverticulosis.     PAST MEDICAL, SURGICAL, FAMILY, and SOCIAL HISTORY     Past Medical History:   Diagnosis Date    BPH (benign prostatic hyperplasia)     Cancer (HCC) 2016    prostate    Diabetes mellitus (HCC)     Esophageal reflux     Hyperlipidemia     Hypertension     Kidney stone     Lung cancer (HCC) 10/02/2021    Stage 4    Lung cancer (HCC)     Neuropathy      Past Surgical History:   Procedure Laterality Date    BACK SURGERY  2012    lumber, no hardware, \"cleaned it out\"    BRONCHOSCOPY N/A 8/21/2024    BRONCHOSCOPY DIAGNOSTIC OR CELL WASH ONLY performed by Adina 
management, PT/OT/ST, oncology  Discussion of management or test with external physician/qualified health care professional: Hospitalist, Case management, oncology  Unique test results reviewed: CBC and BMP, Nutrition labs, Hepatic studies, cardiac labs, CXR, echo        Risk of Complications/Morbidity: High   Illness(es)/ Infection present that pose threat to bodily function.   There is potential for severe exacerbation of condition/side effects of treatment.                      Signed By: Electronically signed by PUSHPA Hernandez CNP on 10/29/2024 at 9:50 AM  Palliative Medicine   [phone]    October 29, 2024          
be contributing to oxygen requirements.  - Cont smoking cessation    Rojelio Hastings MD, FCCP    2:55 PM

## 2024-11-05 LAB
ANION GAP SERPL CALCULATED.3IONS-SCNC: 10 MMOL/L (ref 3–16)
BLOOD BANK DISPENSE STATUS: NORMAL
BLOOD BANK PRODUCT CODE: NORMAL
BPU ID: NORMAL
BUN SERPL-MCNC: 23 MG/DL (ref 7–20)
CALCIUM SERPL-MCNC: 6.8 MG/DL (ref 8.3–10.6)
CHLORIDE SERPL-SCNC: 108 MMOL/L (ref 99–110)
CO2 SERPL-SCNC: 21 MMOL/L (ref 21–32)
CREAT SERPL-MCNC: 1 MG/DL (ref 0.8–1.3)
DEPRECATED RDW RBC AUTO: 17.1 % (ref 12.4–15.4)
DESCRIPTION BLOOD BANK: NORMAL
GFR SERPLBLD CREATININE-BSD FMLA CKD-EPI: 76 ML/MIN/{1.73_M2}
GLUCOSE BLD-MCNC: 181 MG/DL (ref 70–99)
GLUCOSE BLD-MCNC: 184 MG/DL (ref 70–99)
GLUCOSE BLD-MCNC: 236 MG/DL (ref 70–99)
GLUCOSE BLD-MCNC: 337 MG/DL (ref 70–99)
GLUCOSE BLD-MCNC: 386 MG/DL (ref 70–99)
GLUCOSE SERPL-MCNC: 164 MG/DL (ref 70–99)
HCT VFR BLD AUTO: 20.3 % (ref 40.5–52.5)
HCT VFR BLD AUTO: 25 % (ref 40.5–52.5)
HGB BLD-MCNC: 6.6 G/DL (ref 13.5–17.5)
HGB BLD-MCNC: 8.2 G/DL (ref 13.5–17.5)
MCH RBC QN AUTO: 30.6 PG (ref 26–34)
MCHC RBC AUTO-ENTMCNC: 32.7 G/DL (ref 31–36)
MCV RBC AUTO: 93.4 FL (ref 80–100)
PERFORMED ON: ABNORMAL
PLATELET # BLD AUTO: 186 K/UL (ref 135–450)
PMV BLD AUTO: 7.3 FL (ref 5–10.5)
POTASSIUM SERPL-SCNC: 4.4 MMOL/L (ref 3.5–5.1)
RBC # BLD AUTO: 2.17 M/UL (ref 4.2–5.9)
SODIUM SERPL-SCNC: 139 MMOL/L (ref 136–145)
WBC # BLD AUTO: 3.2 K/UL (ref 4–11)

## 2024-11-05 PROCEDURE — 36430 TRANSFUSION BLD/BLD COMPNT: CPT

## 2024-11-05 PROCEDURE — 6370000000 HC RX 637 (ALT 250 FOR IP): Performed by: NURSE PRACTITIONER

## 2024-11-05 PROCEDURE — 85018 HEMOGLOBIN: CPT

## 2024-11-05 PROCEDURE — 6370000000 HC RX 637 (ALT 250 FOR IP): Performed by: STUDENT IN AN ORGANIZED HEALTH CARE EDUCATION/TRAINING PROGRAM

## 2024-11-05 PROCEDURE — 92526 ORAL FUNCTION THERAPY: CPT

## 2024-11-05 PROCEDURE — 97116 GAIT TRAINING THERAPY: CPT

## 2024-11-05 PROCEDURE — 2580000003 HC RX 258: Performed by: STUDENT IN AN ORGANIZED HEALTH CARE EDUCATION/TRAINING PROGRAM

## 2024-11-05 PROCEDURE — 99232 SBSQ HOSP IP/OBS MODERATE 35: CPT | Performed by: STUDENT IN AN ORGANIZED HEALTH CARE EDUCATION/TRAINING PROGRAM

## 2024-11-05 PROCEDURE — 97530 THERAPEUTIC ACTIVITIES: CPT

## 2024-11-05 PROCEDURE — 2580000003 HC RX 258: Performed by: INTERNAL MEDICINE

## 2024-11-05 PROCEDURE — 6360000002 HC RX W HCPCS: Performed by: INTERNAL MEDICINE

## 2024-11-05 PROCEDURE — 6370000000 HC RX 637 (ALT 250 FOR IP): Performed by: INTERNAL MEDICINE

## 2024-11-05 PROCEDURE — 36591 DRAW BLOOD OFF VENOUS DEVICE: CPT

## 2024-11-05 PROCEDURE — 85027 COMPLETE CBC AUTOMATED: CPT

## 2024-11-05 PROCEDURE — 80048 BASIC METABOLIC PNL TOTAL CA: CPT

## 2024-11-05 PROCEDURE — 94761 N-INVAS EAR/PLS OXIMETRY MLT: CPT

## 2024-11-05 PROCEDURE — 94640 AIRWAY INHALATION TREATMENT: CPT

## 2024-11-05 PROCEDURE — 2700000000 HC OXYGEN THERAPY PER DAY

## 2024-11-05 PROCEDURE — 36415 COLL VENOUS BLD VENIPUNCTURE: CPT

## 2024-11-05 PROCEDURE — 6360000002 HC RX W HCPCS: Performed by: STUDENT IN AN ORGANIZED HEALTH CARE EDUCATION/TRAINING PROGRAM

## 2024-11-05 PROCEDURE — 85014 HEMATOCRIT: CPT

## 2024-11-05 PROCEDURE — 1200000000 HC SEMI PRIVATE

## 2024-11-05 RX ORDER — SODIUM CHLORIDE 9 MG/ML
INJECTION, SOLUTION INTRAVENOUS PRN
Status: DISCONTINUED | OUTPATIENT
Start: 2024-11-05 | End: 2024-11-05

## 2024-11-05 RX ORDER — PREDNISONE 20 MG/1
40 TABLET ORAL DAILY
Status: COMPLETED | OUTPATIENT
Start: 2024-11-05 | End: 2024-11-07

## 2024-11-05 RX ADMIN — FOLIC ACID 1 MG: 1 TABLET ORAL at 09:51

## 2024-11-05 RX ADMIN — IPRATROPIUM BROMIDE AND ALBUTEROL SULFATE 1 DOSE: 2.5; .5 SOLUTION RESPIRATORY (INHALATION) at 19:35

## 2024-11-05 RX ADMIN — INSULIN LISPRO 1 UNITS: 100 INJECTION, SOLUTION INTRAVENOUS; SUBCUTANEOUS at 12:22

## 2024-11-05 RX ADMIN — Medication 1 TABLET: at 09:51

## 2024-11-05 RX ADMIN — INSULIN LISPRO 4 UNITS: 100 INJECTION, SOLUTION INTRAVENOUS; SUBCUTANEOUS at 21:09

## 2024-11-05 RX ADMIN — INSULIN LISPRO 3 UNITS: 100 INJECTION, SOLUTION INTRAVENOUS; SUBCUTANEOUS at 16:49

## 2024-11-05 RX ADMIN — METHYLPREDNISOLONE SODIUM SUCCINATE 40 MG: 40 INJECTION INTRAMUSCULAR; INTRAVENOUS at 05:39

## 2024-11-05 RX ADMIN — MEROPENEM 1000 MG: 1 INJECTION INTRAVENOUS at 00:00

## 2024-11-05 RX ADMIN — METOPROLOL SUCCINATE 12.5 MG: 25 TABLET, EXTENDED RELEASE ORAL at 09:51

## 2024-11-05 RX ADMIN — AMIODARONE HYDROCHLORIDE 250 MG: 200 TABLET ORAL at 09:52

## 2024-11-05 RX ADMIN — INSULIN GLARGINE 20 UNITS: 100 INJECTION, SOLUTION SUBCUTANEOUS at 21:09

## 2024-11-05 RX ADMIN — POLYMYXIN B SULFATE AND TRIMETHOPRIM 1 DROP: 10000; 1 SOLUTION OPHTHALMIC at 16:49

## 2024-11-05 RX ADMIN — POLYMYXIN B SULFATE AND TRIMETHOPRIM 1 DROP: 10000; 1 SOLUTION OPHTHALMIC at 09:52

## 2024-11-05 RX ADMIN — ATORVASTATIN CALCIUM 40 MG: 40 TABLET, FILM COATED ORAL at 21:08

## 2024-11-05 RX ADMIN — CHOLECALCIFEROL (VITAMIN D3) 10 MCG (400 UNIT) TABLET 400 UNITS: at 12:36

## 2024-11-05 RX ADMIN — PREDNISONE 40 MG: 20 TABLET ORAL at 17:44

## 2024-11-05 RX ADMIN — MEROPENEM 1000 MG: 1 INJECTION INTRAVENOUS at 10:18

## 2024-11-05 RX ADMIN — IPRATROPIUM BROMIDE AND ALBUTEROL SULFATE 1 DOSE: 2.5; .5 SOLUTION RESPIRATORY (INHALATION) at 08:54

## 2024-11-05 RX ADMIN — ESCITALOPRAM OXALATE 10 MG: 10 TABLET ORAL at 21:08

## 2024-11-05 RX ADMIN — MEROPENEM 1000 MG: 1 INJECTION INTRAVENOUS at 16:52

## 2024-11-05 RX ADMIN — IPRATROPIUM BROMIDE AND ALBUTEROL SULFATE 1 DOSE: 2.5; .5 SOLUTION RESPIRATORY (INHALATION) at 15:13

## 2024-11-05 RX ADMIN — INSULIN LISPRO 1 UNITS: 100 INJECTION, SOLUTION INTRAVENOUS; SUBCUTANEOUS at 09:51

## 2024-11-05 RX ADMIN — Medication 10 MG: at 21:08

## 2024-11-05 RX ADMIN — FAMOTIDINE 20 MG: 20 TABLET, FILM COATED ORAL at 21:08

## 2024-11-05 RX ADMIN — Medication 2 CAPSULE: at 09:52

## 2024-11-05 RX ADMIN — POLYMYXIN B SULFATE AND TRIMETHOPRIM 1 DROP: 10000; 1 SOLUTION OPHTHALMIC at 12:23

## 2024-11-05 RX ADMIN — FAMOTIDINE 20 MG: 20 TABLET, FILM COATED ORAL at 09:51

## 2024-11-05 RX ADMIN — SODIUM CHLORIDE, PRESERVATIVE FREE 10 ML: 5 INJECTION INTRAVENOUS at 10:19

## 2024-11-05 ASSESSMENT — PAIN SCALES - GENERAL: PAINLEVEL_OUTOF10: 0

## 2024-11-06 LAB
BASOPHILS # BLD: 0 K/UL (ref 0–0.2)
BASOPHILS NFR BLD: 0.1 %
DEPRECATED RDW RBC AUTO: 17 % (ref 12.4–15.4)
EOSINOPHIL # BLD: 0 K/UL (ref 0–0.6)
EOSINOPHIL NFR BLD: 0.1 %
GLUCOSE BLD-MCNC: 380 MG/DL (ref 70–99)
GLUCOSE BLD-MCNC: 382 MG/DL (ref 70–99)
GLUCOSE BLD-MCNC: 382 MG/DL (ref 70–99)
GLUCOSE BLD-MCNC: 399 MG/DL (ref 70–99)
GLUCOSE BLD-MCNC: 412 MG/DL (ref 70–99)
HCT VFR BLD AUTO: 24.4 % (ref 40.5–52.5)
HGB BLD-MCNC: 8 G/DL (ref 13.5–17.5)
LYMPHOCYTES # BLD: 0.1 K/UL (ref 1–5.1)
LYMPHOCYTES NFR BLD: 1.7 %
MCH RBC QN AUTO: 30.3 PG (ref 26–34)
MCHC RBC AUTO-ENTMCNC: 32.6 G/DL (ref 31–36)
MCV RBC AUTO: 93 FL (ref 80–100)
MONOCYTES # BLD: 0.6 K/UL (ref 0–1.3)
MONOCYTES NFR BLD: 7.6 %
NEUTROPHILS # BLD: 6.6 K/UL (ref 1.7–7.7)
NEUTROPHILS NFR BLD: 90.5 %
PERFORMED ON: ABNORMAL
PLATELET # BLD AUTO: 266 K/UL (ref 135–450)
PMV BLD AUTO: 7.1 FL (ref 5–10.5)
RBC # BLD AUTO: 2.63 M/UL (ref 4.2–5.9)
WBC # BLD AUTO: 7.3 K/UL (ref 4–11)

## 2024-11-06 PROCEDURE — 97530 THERAPEUTIC ACTIVITIES: CPT

## 2024-11-06 PROCEDURE — 6370000000 HC RX 637 (ALT 250 FOR IP): Performed by: STUDENT IN AN ORGANIZED HEALTH CARE EDUCATION/TRAINING PROGRAM

## 2024-11-06 PROCEDURE — 6370000000 HC RX 637 (ALT 250 FOR IP): Performed by: INTERNAL MEDICINE

## 2024-11-06 PROCEDURE — 2580000003 HC RX 258: Performed by: INTERNAL MEDICINE

## 2024-11-06 PROCEDURE — 2580000003 HC RX 258: Performed by: NURSE PRACTITIONER

## 2024-11-06 PROCEDURE — 6360000002 HC RX W HCPCS: Performed by: INTERNAL MEDICINE

## 2024-11-06 PROCEDURE — 6370000000 HC RX 637 (ALT 250 FOR IP): Performed by: NURSE PRACTITIONER

## 2024-11-06 PROCEDURE — 97110 THERAPEUTIC EXERCISES: CPT

## 2024-11-06 PROCEDURE — 94640 AIRWAY INHALATION TREATMENT: CPT

## 2024-11-06 PROCEDURE — 85025 COMPLETE CBC W/AUTO DIFF WBC: CPT

## 2024-11-06 PROCEDURE — 1200000000 HC SEMI PRIVATE

## 2024-11-06 PROCEDURE — 36591 DRAW BLOOD OFF VENOUS DEVICE: CPT

## 2024-11-06 RX ORDER — INSULIN LISPRO 100 [IU]/ML
0-8 INJECTION, SOLUTION INTRAVENOUS; SUBCUTANEOUS
Status: DISCONTINUED | OUTPATIENT
Start: 2024-11-06 | End: 2024-11-12 | Stop reason: HOSPADM

## 2024-11-06 RX ORDER — INSULIN GLARGINE 100 [IU]/ML
22 INJECTION, SOLUTION SUBCUTANEOUS NIGHTLY
Status: DISCONTINUED | OUTPATIENT
Start: 2024-11-06 | End: 2024-11-12 | Stop reason: HOSPADM

## 2024-11-06 RX ADMIN — MEROPENEM 1000 MG: 1 INJECTION INTRAVENOUS at 16:50

## 2024-11-06 RX ADMIN — AMIODARONE HYDROCHLORIDE 250 MG: 200 TABLET ORAL at 09:50

## 2024-11-06 RX ADMIN — SODIUM CHLORIDE: 9 INJECTION, SOLUTION INTRAVENOUS at 03:35

## 2024-11-06 RX ADMIN — Medication 1 PUFF: at 01:06

## 2024-11-06 RX ADMIN — ATORVASTATIN CALCIUM 40 MG: 40 TABLET, FILM COATED ORAL at 21:53

## 2024-11-06 RX ADMIN — POLYMYXIN B SULFATE AND TRIMETHOPRIM 1 DROP: 10000; 1 SOLUTION OPHTHALMIC at 00:08

## 2024-11-06 RX ADMIN — FAMOTIDINE 20 MG: 20 TABLET, FILM COATED ORAL at 09:50

## 2024-11-06 RX ADMIN — MEROPENEM 1000 MG: 1 INJECTION INTRAVENOUS at 00:07

## 2024-11-06 RX ADMIN — Medication 10 MG: at 21:53

## 2024-11-06 RX ADMIN — INSULIN LISPRO 4 UNITS: 100 INJECTION, SOLUTION INTRAVENOUS; SUBCUTANEOUS at 09:50

## 2024-11-06 RX ADMIN — FAMOTIDINE 20 MG: 20 TABLET, FILM COATED ORAL at 21:53

## 2024-11-06 RX ADMIN — IPRATROPIUM BROMIDE AND ALBUTEROL SULFATE 1 DOSE: 2.5; .5 SOLUTION RESPIRATORY (INHALATION) at 20:27

## 2024-11-06 RX ADMIN — Medication 2 CAPSULE: at 09:53

## 2024-11-06 RX ADMIN — IPRATROPIUM BROMIDE AND ALBUTEROL SULFATE 1 DOSE: 2.5; .5 SOLUTION RESPIRATORY (INHALATION) at 11:59

## 2024-11-06 RX ADMIN — SODIUM CHLORIDE, PRESERVATIVE FREE 10 ML: 5 INJECTION INTRAVENOUS at 09:50

## 2024-11-06 RX ADMIN — CHOLECALCIFEROL (VITAMIN D3) 10 MCG (400 UNIT) TABLET 400 UNITS: at 09:51

## 2024-11-06 RX ADMIN — POLYMYXIN B SULFATE AND TRIMETHOPRIM 1 DROP: 10000; 1 SOLUTION OPHTHALMIC at 12:09

## 2024-11-06 RX ADMIN — POLYMYXIN B SULFATE AND TRIMETHOPRIM 1 DROP: 10000; 1 SOLUTION OPHTHALMIC at 09:54

## 2024-11-06 RX ADMIN — ESCITALOPRAM OXALATE 10 MG: 10 TABLET ORAL at 21:53

## 2024-11-06 RX ADMIN — SODIUM CHLORIDE: 9 INJECTION, SOLUTION INTRAVENOUS at 16:53

## 2024-11-06 RX ADMIN — INSULIN GLARGINE 22 UNITS: 100 INJECTION, SOLUTION SUBCUTANEOUS at 21:53

## 2024-11-06 RX ADMIN — IPRATROPIUM BROMIDE AND ALBUTEROL SULFATE 1 DOSE: 2.5; .5 SOLUTION RESPIRATORY (INHALATION) at 08:18

## 2024-11-06 RX ADMIN — POLYMYXIN B SULFATE AND TRIMETHOPRIM 1 DROP: 10000; 1 SOLUTION OPHTHALMIC at 21:55

## 2024-11-06 RX ADMIN — POLYMYXIN B SULFATE AND TRIMETHOPRIM 1 DROP: 10000; 1 SOLUTION OPHTHALMIC at 15:00

## 2024-11-06 RX ADMIN — Medication 1 TABLET: at 09:51

## 2024-11-06 RX ADMIN — INSULIN LISPRO 8 UNITS: 100 INJECTION, SOLUTION INTRAVENOUS; SUBCUTANEOUS at 21:52

## 2024-11-06 RX ADMIN — MEROPENEM 1000 MG: 1 INJECTION INTRAVENOUS at 09:48

## 2024-11-06 RX ADMIN — FOLIC ACID 1 MG: 1 TABLET ORAL at 09:51

## 2024-11-06 RX ADMIN — METOPROLOL SUCCINATE 12.5 MG: 25 TABLET, EXTENDED RELEASE ORAL at 09:50

## 2024-11-06 RX ADMIN — IPRATROPIUM BROMIDE AND ALBUTEROL SULFATE 1 DOSE: 2.5; .5 SOLUTION RESPIRATORY (INHALATION) at 15:28

## 2024-11-06 RX ADMIN — SODIUM CHLORIDE, PRESERVATIVE FREE 10 ML: 5 INJECTION INTRAVENOUS at 21:54

## 2024-11-06 RX ADMIN — Medication: at 10:41

## 2024-11-06 RX ADMIN — Medication: at 21:54

## 2024-11-06 RX ADMIN — PREDNISONE 40 MG: 20 TABLET ORAL at 09:50

## 2024-11-06 RX ADMIN — INSULIN LISPRO 8 UNITS: 100 INJECTION, SOLUTION INTRAVENOUS; SUBCUTANEOUS at 12:07

## 2024-11-06 RX ADMIN — INSULIN LISPRO 8 UNITS: 100 INJECTION, SOLUTION INTRAVENOUS; SUBCUTANEOUS at 16:56

## 2024-11-06 ASSESSMENT — PAIN SCALES - WONG BAKER: WONGBAKER_NUMERICALRESPONSE: NO HURT

## 2024-11-07 LAB
ALBUMIN SERPL-MCNC: 2.1 G/DL (ref 3.4–5)
ANION GAP SERPL CALCULATED.3IONS-SCNC: 7 MMOL/L (ref 3–16)
BASOPHILS # BLD: 0 K/UL (ref 0–0.2)
BASOPHILS NFR BLD: 0.3 %
BUN SERPL-MCNC: 25 MG/DL (ref 7–20)
CALCIUM SERPL-MCNC: 7 MG/DL (ref 8.3–10.6)
CHLORIDE SERPL-SCNC: 109 MMOL/L (ref 99–110)
CO2 SERPL-SCNC: 24 MMOL/L (ref 21–32)
CREAT SERPL-MCNC: 0.9 MG/DL (ref 0.8–1.3)
DEPRECATED RDW RBC AUTO: 16.8 % (ref 12.4–15.4)
EOSINOPHIL # BLD: 0.1 K/UL (ref 0–0.6)
EOSINOPHIL NFR BLD: 1.9 %
GFR SERPLBLD CREATININE-BSD FMLA CKD-EPI: 86 ML/MIN/{1.73_M2}
GLUCOSE BLD-MCNC: 183 MG/DL (ref 70–99)
GLUCOSE BLD-MCNC: 186 MG/DL (ref 70–99)
GLUCOSE BLD-MCNC: 293 MG/DL (ref 70–99)
GLUCOSE BLD-MCNC: 385 MG/DL (ref 70–99)
GLUCOSE SERPL-MCNC: 182 MG/DL (ref 70–99)
HCT VFR BLD AUTO: 24.8 % (ref 40.5–52.5)
HGB BLD-MCNC: 8.2 G/DL (ref 13.5–17.5)
LYMPHOCYTES # BLD: 0.3 K/UL (ref 1–5.1)
LYMPHOCYTES NFR BLD: 3.7 %
MCH RBC QN AUTO: 30.5 PG (ref 26–34)
MCHC RBC AUTO-ENTMCNC: 32.9 G/DL (ref 31–36)
MCV RBC AUTO: 92.8 FL (ref 80–100)
MONOCYTES # BLD: 0.9 K/UL (ref 0–1.3)
MONOCYTES NFR BLD: 11.8 %
NEUTROPHILS # BLD: 6.1 K/UL (ref 1.7–7.7)
NEUTROPHILS NFR BLD: 82.3 %
PERFORMED ON: ABNORMAL
PHOSPHATE SERPL-MCNC: 1.7 MG/DL (ref 2.5–4.9)
PLATELET # BLD AUTO: 243 K/UL (ref 135–450)
PMV BLD AUTO: 6.8 FL (ref 5–10.5)
POTASSIUM SERPL-SCNC: 4.1 MMOL/L (ref 3.5–5.1)
RBC # BLD AUTO: 2.68 M/UL (ref 4.2–5.9)
SODIUM SERPL-SCNC: 140 MMOL/L (ref 136–145)
WBC # BLD AUTO: 7.4 K/UL (ref 4–11)

## 2024-11-07 PROCEDURE — 6370000000 HC RX 637 (ALT 250 FOR IP): Performed by: STUDENT IN AN ORGANIZED HEALTH CARE EDUCATION/TRAINING PROGRAM

## 2024-11-07 PROCEDURE — 6370000000 HC RX 637 (ALT 250 FOR IP): Performed by: NURSE PRACTITIONER

## 2024-11-07 PROCEDURE — 80069 RENAL FUNCTION PANEL: CPT

## 2024-11-07 PROCEDURE — 85025 COMPLETE CBC W/AUTO DIFF WBC: CPT

## 2024-11-07 PROCEDURE — 6370000000 HC RX 637 (ALT 250 FOR IP): Performed by: INTERNAL MEDICINE

## 2024-11-07 PROCEDURE — 36592 COLLECT BLOOD FROM PICC: CPT

## 2024-11-07 PROCEDURE — 2580000003 HC RX 258: Performed by: INTERNAL MEDICINE

## 2024-11-07 PROCEDURE — 97110 THERAPEUTIC EXERCISES: CPT

## 2024-11-07 PROCEDURE — 6360000002 HC RX W HCPCS: Performed by: INTERNAL MEDICINE

## 2024-11-07 PROCEDURE — 2580000003 HC RX 258: Performed by: NURSE PRACTITIONER

## 2024-11-07 PROCEDURE — 94640 AIRWAY INHALATION TREATMENT: CPT

## 2024-11-07 PROCEDURE — 97116 GAIT TRAINING THERAPY: CPT

## 2024-11-07 PROCEDURE — 1200000000 HC SEMI PRIVATE

## 2024-11-07 PROCEDURE — 97530 THERAPEUTIC ACTIVITIES: CPT

## 2024-11-07 PROCEDURE — 92526 ORAL FUNCTION THERAPY: CPT

## 2024-11-07 RX ADMIN — SODIUM CHLORIDE, PRESERVATIVE FREE 10 ML: 5 INJECTION INTRAVENOUS at 08:33

## 2024-11-07 RX ADMIN — IPRATROPIUM BROMIDE AND ALBUTEROL SULFATE 1 DOSE: 2.5; .5 SOLUTION RESPIRATORY (INHALATION) at 20:41

## 2024-11-07 RX ADMIN — POLYMYXIN B SULFATE AND TRIMETHOPRIM 1 DROP: 10000; 1 SOLUTION OPHTHALMIC at 13:37

## 2024-11-07 RX ADMIN — MEROPENEM 1000 MG: 1 INJECTION INTRAVENOUS at 00:54

## 2024-11-07 RX ADMIN — PREDNISONE 40 MG: 20 TABLET ORAL at 08:29

## 2024-11-07 RX ADMIN — METOPROLOL SUCCINATE 12.5 MG: 25 TABLET, EXTENDED RELEASE ORAL at 08:31

## 2024-11-07 RX ADMIN — IPRATROPIUM BROMIDE AND ALBUTEROL SULFATE 1 DOSE: 2.5; .5 SOLUTION RESPIRATORY (INHALATION) at 08:46

## 2024-11-07 RX ADMIN — Medication 10 MG: at 20:58

## 2024-11-07 RX ADMIN — POLYMYXIN B SULFATE AND TRIMETHOPRIM 1 DROP: 10000; 1 SOLUTION OPHTHALMIC at 16:58

## 2024-11-07 RX ADMIN — INSULIN LISPRO 4 UNITS: 100 INJECTION, SOLUTION INTRAVENOUS; SUBCUTANEOUS at 16:48

## 2024-11-07 RX ADMIN — MEROPENEM 1000 MG: 1 INJECTION INTRAVENOUS at 16:57

## 2024-11-07 RX ADMIN — FAMOTIDINE 20 MG: 20 TABLET, FILM COATED ORAL at 08:31

## 2024-11-07 RX ADMIN — FOLIC ACID 1 MG: 1 TABLET ORAL at 08:29

## 2024-11-07 RX ADMIN — ATORVASTATIN CALCIUM 40 MG: 40 TABLET, FILM COATED ORAL at 20:59

## 2024-11-07 RX ADMIN — MEROPENEM 1000 MG: 1 INJECTION INTRAVENOUS at 08:52

## 2024-11-07 RX ADMIN — Medication 1 TABLET: at 08:29

## 2024-11-07 RX ADMIN — CHOLECALCIFEROL (VITAMIN D3) 10 MCG (400 UNIT) TABLET 400 UNITS: at 08:38

## 2024-11-07 RX ADMIN — INSULIN LISPRO 2 UNITS: 100 INJECTION, SOLUTION INTRAVENOUS; SUBCUTANEOUS at 08:29

## 2024-11-07 RX ADMIN — FAMOTIDINE 20 MG: 20 TABLET, FILM COATED ORAL at 20:59

## 2024-11-07 RX ADMIN — ESCITALOPRAM OXALATE 10 MG: 10 TABLET ORAL at 21:02

## 2024-11-07 RX ADMIN — POLYMYXIN B SULFATE AND TRIMETHOPRIM 1 DROP: 10000; 1 SOLUTION OPHTHALMIC at 00:59

## 2024-11-07 RX ADMIN — POLYMYXIN B SULFATE AND TRIMETHOPRIM 1 DROP: 10000; 1 SOLUTION OPHTHALMIC at 21:00

## 2024-11-07 RX ADMIN — ACETAMINOPHEN 650 MG: 325 TABLET ORAL at 00:49

## 2024-11-07 RX ADMIN — IPRATROPIUM BROMIDE AND ALBUTEROL SULFATE 1 DOSE: 2.5; .5 SOLUTION RESPIRATORY (INHALATION) at 11:31

## 2024-11-07 RX ADMIN — Medication 2 CAPSULE: at 08:41

## 2024-11-07 RX ADMIN — INSULIN GLARGINE 22 UNITS: 100 INJECTION, SOLUTION SUBCUTANEOUS at 20:59

## 2024-11-07 RX ADMIN — AMIODARONE HYDROCHLORIDE 250 MG: 200 TABLET ORAL at 08:29

## 2024-11-07 RX ADMIN — POLYMYXIN B SULFATE AND TRIMETHOPRIM 1 DROP: 10000; 1 SOLUTION OPHTHALMIC at 08:34

## 2024-11-07 RX ADMIN — IPRATROPIUM BROMIDE AND ALBUTEROL SULFATE 1 DOSE: 2.5; .5 SOLUTION RESPIRATORY (INHALATION) at 15:48

## 2024-11-07 RX ADMIN — INSULIN LISPRO 8 UNITS: 100 INJECTION, SOLUTION INTRAVENOUS; SUBCUTANEOUS at 20:59

## 2024-11-07 RX ADMIN — Medication: at 21:00

## 2024-11-07 RX ADMIN — INSULIN LISPRO 2 UNITS: 100 INJECTION, SOLUTION INTRAVENOUS; SUBCUTANEOUS at 11:50

## 2024-11-07 RX ADMIN — SODIUM CHLORIDE: 9 INJECTION, SOLUTION INTRAVENOUS at 16:56

## 2024-11-07 ASSESSMENT — PAIN DESCRIPTION - LOCATION: LOCATION: ABDOMEN

## 2024-11-07 ASSESSMENT — PAIN DESCRIPTION - ORIENTATION: ORIENTATION: RIGHT

## 2024-11-07 ASSESSMENT — PAIN DESCRIPTION - DESCRIPTORS: DESCRIPTORS: ACHING

## 2024-11-07 ASSESSMENT — PAIN SCALES - GENERAL: PAINLEVEL_OUTOF10: 4

## 2024-11-08 PROBLEM — E44.0 MODERATE PROTEIN-CALORIE MALNUTRITION (HCC): Status: ACTIVE | Noted: 2024-11-08

## 2024-11-08 LAB
ALBUMIN SERPL-MCNC: 2.2 G/DL (ref 3.4–5)
ANION GAP SERPL CALCULATED.3IONS-SCNC: 7 MMOL/L (ref 3–16)
ANISOCYTOSIS BLD QL SMEAR: ABNORMAL
BACTERIA BLD CULT ORG #2: NORMAL
BACTERIA BLD CULT: NORMAL
BASOPHILS # BLD: 0 K/UL (ref 0–0.2)
BASOPHILS NFR BLD: 0 %
BUN SERPL-MCNC: 22 MG/DL (ref 7–20)
CALCIUM SERPL-MCNC: 7.2 MG/DL (ref 8.3–10.6)
CHLORIDE SERPL-SCNC: 109 MMOL/L (ref 99–110)
CO2 SERPL-SCNC: 25 MMOL/L (ref 21–32)
CREAT SERPL-MCNC: 0.8 MG/DL (ref 0.8–1.3)
DEPRECATED RDW RBC AUTO: 17.6 % (ref 12.4–15.4)
EOSINOPHIL # BLD: 0.1 K/UL (ref 0–0.6)
EOSINOPHIL NFR BLD: 1 %
GFR SERPLBLD CREATININE-BSD FMLA CKD-EPI: 89 ML/MIN/{1.73_M2}
GLUCOSE BLD-MCNC: 107 MG/DL (ref 70–99)
GLUCOSE BLD-MCNC: 113 MG/DL (ref 70–99)
GLUCOSE BLD-MCNC: 126 MG/DL (ref 70–99)
GLUCOSE BLD-MCNC: 149 MG/DL (ref 70–99)
GLUCOSE BLD-MCNC: 201 MG/DL (ref 70–99)
GLUCOSE SERPL-MCNC: 124 MG/DL (ref 70–99)
HCT VFR BLD AUTO: 25.1 % (ref 40.5–52.5)
HGB BLD-MCNC: 8.3 G/DL (ref 13.5–17.5)
LYMPHOCYTES # BLD: 0.3 K/UL (ref 1–5.1)
LYMPHOCYTES NFR BLD: 3 %
MACROCYTES BLD QL SMEAR: ABNORMAL
MCH RBC QN AUTO: 31.1 PG (ref 26–34)
MCHC RBC AUTO-ENTMCNC: 33.1 G/DL (ref 31–36)
MCV RBC AUTO: 94 FL (ref 80–100)
METAMYELOCYTES NFR BLD MANUAL: 1 %
MICROCYTES BLD QL SMEAR: ABNORMAL
MONOCYTES # BLD: 1 K/UL (ref 0–1.3)
MONOCYTES NFR BLD: 14 %
MYELOCYTES NFR BLD MANUAL: 1 %
NEUTROPHILS # BLD: 5.6 K/UL (ref 1.7–7.7)
NEUTROPHILS NFR BLD: 65 %
NEUTS BAND NFR BLD MANUAL: 14 % (ref 0–7)
NRBC BLD-RTO: 6 /100 WBC
PATH INTERP BLD-IMP: NO
PERFORMED ON: ABNORMAL
PHOSPHATE SERPL-MCNC: 1.8 MG/DL (ref 2.5–4.9)
PLATELET # BLD AUTO: 246 K/UL (ref 135–450)
PLATELET BLD QL SMEAR: ADEQUATE
PMV BLD AUTO: 6.7 FL (ref 5–10.5)
POLYCHROMASIA BLD QL SMEAR: ABNORMAL
POTASSIUM SERPL-SCNC: 4.2 MMOL/L (ref 3.5–5.1)
RBC # BLD AUTO: 2.67 M/UL (ref 4.2–5.9)
SLIDE REVIEW: ABNORMAL
SODIUM SERPL-SCNC: 141 MMOL/L (ref 136–145)
VARIANT LYMPHS NFR BLD MANUAL: 1 % (ref 0–6)
WBC # BLD AUTO: 6.9 K/UL (ref 4–11)

## 2024-11-08 PROCEDURE — 6360000002 HC RX W HCPCS

## 2024-11-08 PROCEDURE — 6370000000 HC RX 637 (ALT 250 FOR IP): Performed by: NURSE PRACTITIONER

## 2024-11-08 PROCEDURE — 94761 N-INVAS EAR/PLS OXIMETRY MLT: CPT

## 2024-11-08 PROCEDURE — 94640 AIRWAY INHALATION TREATMENT: CPT

## 2024-11-08 PROCEDURE — 80069 RENAL FUNCTION PANEL: CPT

## 2024-11-08 PROCEDURE — 2580000003 HC RX 258: Performed by: INTERNAL MEDICINE

## 2024-11-08 PROCEDURE — 6370000000 HC RX 637 (ALT 250 FOR IP): Performed by: STUDENT IN AN ORGANIZED HEALTH CARE EDUCATION/TRAINING PROGRAM

## 2024-11-08 PROCEDURE — 2580000003 HC RX 258: Performed by: NURSE PRACTITIONER

## 2024-11-08 PROCEDURE — 6370000000 HC RX 637 (ALT 250 FOR IP): Performed by: INTERNAL MEDICINE

## 2024-11-08 PROCEDURE — 2700000000 HC OXYGEN THERAPY PER DAY

## 2024-11-08 PROCEDURE — 6360000002 HC RX W HCPCS: Performed by: INTERNAL MEDICINE

## 2024-11-08 PROCEDURE — 1200000000 HC SEMI PRIVATE

## 2024-11-08 PROCEDURE — 85025 COMPLETE CBC W/AUTO DIFF WBC: CPT

## 2024-11-08 RX ORDER — POLYMYXIN B SULFATE AND TRIMETHOPRIM 1; 10000 MG/ML; [USP'U]/ML
1 SOLUTION OPHTHALMIC
Status: COMPLETED | OUTPATIENT
Start: 2024-11-08 | End: 2024-11-08

## 2024-11-08 RX ADMIN — IPRATROPIUM BROMIDE AND ALBUTEROL SULFATE 1 DOSE: 2.5; .5 SOLUTION RESPIRATORY (INHALATION) at 16:06

## 2024-11-08 RX ADMIN — POLYMYXIN B SULFATE AND TRIMETHOPRIM 1 DROP: 10000; 1 SOLUTION OPHTHALMIC at 20:24

## 2024-11-08 RX ADMIN — MEROPENEM 1000 MG: 1 INJECTION INTRAVENOUS at 17:08

## 2024-11-08 RX ADMIN — FAMOTIDINE 20 MG: 20 TABLET, FILM COATED ORAL at 08:45

## 2024-11-08 RX ADMIN — Medication 1 TABLET: at 08:45

## 2024-11-08 RX ADMIN — SODIUM CHLORIDE: 9 INJECTION, SOLUTION INTRAVENOUS at 18:24

## 2024-11-08 RX ADMIN — CHOLECALCIFEROL (VITAMIN D3) 10 MCG (400 UNIT) TABLET 400 UNITS: at 11:54

## 2024-11-08 RX ADMIN — EPOETIN ALFA-EPBX 40000 UNITS: 20000 INJECTION, SOLUTION INTRAVENOUS; SUBCUTANEOUS at 17:28

## 2024-11-08 RX ADMIN — POLYMYXIN B SULFATE AND TRIMETHOPRIM 1 DROP: 10000; 1 SOLUTION OPHTHALMIC at 04:02

## 2024-11-08 RX ADMIN — ATORVASTATIN CALCIUM 40 MG: 40 TABLET, FILM COATED ORAL at 20:18

## 2024-11-08 RX ADMIN — FAMOTIDINE 20 MG: 20 TABLET, FILM COATED ORAL at 20:18

## 2024-11-08 RX ADMIN — POLYMYXIN B SULFATE AND TRIMETHOPRIM 1 DROP: 10000; 1 SOLUTION OPHTHALMIC at 00:00

## 2024-11-08 RX ADMIN — MEROPENEM 1000 MG: 1 INJECTION INTRAVENOUS at 08:38

## 2024-11-08 RX ADMIN — IPRATROPIUM BROMIDE AND ALBUTEROL SULFATE 1 DOSE: 2.5; .5 SOLUTION RESPIRATORY (INHALATION) at 08:38

## 2024-11-08 RX ADMIN — POLYMYXIN B SULFATE AND TRIMETHOPRIM 1 DROP: 10000; 1 SOLUTION OPHTHALMIC at 11:54

## 2024-11-08 RX ADMIN — POLYETHYLENE GLYCOL 3350 17 G: 17 POWDER, FOR SOLUTION ORAL at 06:05

## 2024-11-08 RX ADMIN — ESCITALOPRAM OXALATE 10 MG: 10 TABLET ORAL at 20:18

## 2024-11-08 RX ADMIN — SODIUM CHLORIDE, PRESERVATIVE FREE 10 ML: 5 INJECTION INTRAVENOUS at 08:42

## 2024-11-08 RX ADMIN — INSULIN GLARGINE 22 UNITS: 100 INJECTION, SOLUTION SUBCUTANEOUS at 20:19

## 2024-11-08 RX ADMIN — Medication 2 CAPSULE: at 08:43

## 2024-11-08 RX ADMIN — SODIUM CHLORIDE: 9 INJECTION, SOLUTION INTRAVENOUS at 08:39

## 2024-11-08 RX ADMIN — FOLIC ACID 1 MG: 1 TABLET ORAL at 08:43

## 2024-11-08 RX ADMIN — Medication 10 MG: at 20:18

## 2024-11-08 RX ADMIN — IPRATROPIUM BROMIDE AND ALBUTEROL SULFATE 1 DOSE: 2.5; .5 SOLUTION RESPIRATORY (INHALATION) at 20:34

## 2024-11-08 RX ADMIN — AMIODARONE HYDROCHLORIDE 250 MG: 200 TABLET ORAL at 08:44

## 2024-11-08 RX ADMIN — Medication: at 20:19

## 2024-11-08 RX ADMIN — POLYMYXIN B SULFATE AND TRIMETHOPRIM 1 DROP: 10000; 1 SOLUTION OPHTHALMIC at 08:42

## 2024-11-08 RX ADMIN — METOPROLOL SUCCINATE 12.5 MG: 25 TABLET, EXTENDED RELEASE ORAL at 08:43

## 2024-11-08 RX ADMIN — MEROPENEM 1000 MG: 1 INJECTION INTRAVENOUS at 00:31

## 2024-11-08 RX ADMIN — POLYMYXIN B SULFATE AND TRIMETHOPRIM 1 DROP: 10000; 1 SOLUTION OPHTHALMIC at 17:06

## 2024-11-08 RX ADMIN — IPRATROPIUM BROMIDE AND ALBUTEROL SULFATE 1 DOSE: 2.5; .5 SOLUTION RESPIRATORY (INHALATION) at 11:54

## 2024-11-09 LAB
ALBUMIN SERPL-MCNC: 2.1 G/DL (ref 3.4–5)
ANION GAP SERPL CALCULATED.3IONS-SCNC: 6 MMOL/L (ref 3–16)
ANISOCYTOSIS BLD QL SMEAR: ABNORMAL
BASOPHILS # BLD: 0 K/UL (ref 0–0.2)
BASOPHILS NFR BLD: 0 %
BUN SERPL-MCNC: 18 MG/DL (ref 7–20)
CALCIUM SERPL-MCNC: 7 MG/DL (ref 8.3–10.6)
CHLORIDE SERPL-SCNC: 109 MMOL/L (ref 99–110)
CO2 SERPL-SCNC: 26 MMOL/L (ref 21–32)
CREAT SERPL-MCNC: 0.8 MG/DL (ref 0.8–1.3)
DEPRECATED RDW RBC AUTO: 19 % (ref 12.4–15.4)
EOSINOPHIL # BLD: 0 K/UL (ref 0–0.6)
EOSINOPHIL NFR BLD: 0 %
GFR SERPLBLD CREATININE-BSD FMLA CKD-EPI: 89 ML/MIN/{1.73_M2}
GLUCOSE BLD-MCNC: 118 MG/DL (ref 70–99)
GLUCOSE BLD-MCNC: 126 MG/DL (ref 70–99)
GLUCOSE BLD-MCNC: 133 MG/DL (ref 70–99)
GLUCOSE BLD-MCNC: 210 MG/DL (ref 70–99)
GLUCOSE BLD-MCNC: 61 MG/DL (ref 70–99)
GLUCOSE BLD-MCNC: 74 MG/DL (ref 70–99)
GLUCOSE BLD-MCNC: 83 MG/DL (ref 70–99)
GLUCOSE BLD-MCNC: 94 MG/DL (ref 70–99)
GLUCOSE SERPL-MCNC: 62 MG/DL (ref 70–99)
HCT VFR BLD AUTO: 26.3 % (ref 40.5–52.5)
HGB BLD-MCNC: 8.6 G/DL (ref 13.5–17.5)
HYPOCHROMIA BLD QL SMEAR: ABNORMAL
LYMPHOCYTES # BLD: 0.3 K/UL (ref 1–5.1)
LYMPHOCYTES NFR BLD: 4 %
MACROCYTES BLD QL SMEAR: ABNORMAL
MCH RBC QN AUTO: 31.3 PG (ref 26–34)
MCHC RBC AUTO-ENTMCNC: 32.9 G/DL (ref 31–36)
MCV RBC AUTO: 95 FL (ref 80–100)
MICROCYTES BLD QL SMEAR: ABNORMAL
MONOCYTES # BLD: 0.3 K/UL (ref 0–1.3)
MONOCYTES NFR BLD: 4 %
MYELOCYTES NFR BLD MANUAL: 2 %
NEUTROPHILS # BLD: 7 K/UL (ref 1.7–7.7)
NEUTROPHILS NFR BLD: 69 %
NEUTS BAND NFR BLD MANUAL: 21 % (ref 0–7)
OVALOCYTES BLD QL SMEAR: ABNORMAL
PERFORMED ON: ABNORMAL
PERFORMED ON: NORMAL
PHOSPHATE SERPL-MCNC: 2 MG/DL (ref 2.5–4.9)
PLATELET # BLD AUTO: 240 K/UL (ref 135–450)
PMV BLD AUTO: 6.3 FL (ref 5–10.5)
POIKILOCYTOSIS BLD QL SMEAR: ABNORMAL
POLYCHROMASIA BLD QL SMEAR: ABNORMAL
POTASSIUM SERPL-SCNC: 4.4 MMOL/L (ref 3.5–5.1)
RBC # BLD AUTO: 2.76 M/UL (ref 4.2–5.9)
SODIUM SERPL-SCNC: 141 MMOL/L (ref 136–145)
WBC # BLD AUTO: 7.6 K/UL (ref 4–11)

## 2024-11-09 PROCEDURE — 85025 COMPLETE CBC W/AUTO DIFF WBC: CPT

## 2024-11-09 PROCEDURE — 6370000000 HC RX 637 (ALT 250 FOR IP): Performed by: INTERNAL MEDICINE

## 2024-11-09 PROCEDURE — 92526 ORAL FUNCTION THERAPY: CPT

## 2024-11-09 PROCEDURE — 94761 N-INVAS EAR/PLS OXIMETRY MLT: CPT

## 2024-11-09 PROCEDURE — 6370000000 HC RX 637 (ALT 250 FOR IP): Performed by: STUDENT IN AN ORGANIZED HEALTH CARE EDUCATION/TRAINING PROGRAM

## 2024-11-09 PROCEDURE — 2580000003 HC RX 258: Performed by: INTERNAL MEDICINE

## 2024-11-09 PROCEDURE — 97530 THERAPEUTIC ACTIVITIES: CPT

## 2024-11-09 PROCEDURE — 2700000000 HC OXYGEN THERAPY PER DAY

## 2024-11-09 PROCEDURE — 80069 RENAL FUNCTION PANEL: CPT

## 2024-11-09 PROCEDURE — 6370000000 HC RX 637 (ALT 250 FOR IP): Performed by: NURSE PRACTITIONER

## 2024-11-09 PROCEDURE — 6360000002 HC RX W HCPCS: Performed by: INTERNAL MEDICINE

## 2024-11-09 PROCEDURE — 94640 AIRWAY INHALATION TREATMENT: CPT

## 2024-11-09 PROCEDURE — 2580000003 HC RX 258: Performed by: NURSE PRACTITIONER

## 2024-11-09 PROCEDURE — 97535 SELF CARE MNGMENT TRAINING: CPT

## 2024-11-09 PROCEDURE — 1200000000 HC SEMI PRIVATE

## 2024-11-09 RX ADMIN — ESCITALOPRAM OXALATE 10 MG: 10 TABLET ORAL at 22:16

## 2024-11-09 RX ADMIN — IPRATROPIUM BROMIDE AND ALBUTEROL SULFATE 1 DOSE: 2.5; .5 SOLUTION RESPIRATORY (INHALATION) at 20:11

## 2024-11-09 RX ADMIN — CHOLECALCIFEROL (VITAMIN D3) 10 MCG (400 UNIT) TABLET 400 UNITS: at 09:13

## 2024-11-09 RX ADMIN — SODIUM CHLORIDE: 9 INJECTION, SOLUTION INTRAVENOUS at 09:09

## 2024-11-09 RX ADMIN — MEROPENEM 1000 MG: 1 INJECTION INTRAVENOUS at 01:14

## 2024-11-09 RX ADMIN — ATORVASTATIN CALCIUM 40 MG: 40 TABLET, FILM COATED ORAL at 22:16

## 2024-11-09 RX ADMIN — INSULIN LISPRO 2 UNITS: 100 INJECTION, SOLUTION INTRAVENOUS; SUBCUTANEOUS at 22:16

## 2024-11-09 RX ADMIN — IPRATROPIUM BROMIDE AND ALBUTEROL SULFATE 1 DOSE: 2.5; .5 SOLUTION RESPIRATORY (INHALATION) at 15:25

## 2024-11-09 RX ADMIN — FOLIC ACID 1 MG: 1 TABLET ORAL at 09:12

## 2024-11-09 RX ADMIN — FAMOTIDINE 20 MG: 20 TABLET, FILM COATED ORAL at 22:16

## 2024-11-09 RX ADMIN — MEROPENEM 1000 MG: 1 INJECTION INTRAVENOUS at 09:10

## 2024-11-09 RX ADMIN — Medication 10 MG: at 22:16

## 2024-11-09 RX ADMIN — SODIUM CHLORIDE: 9 INJECTION, SOLUTION INTRAVENOUS at 07:49

## 2024-11-09 RX ADMIN — FAMOTIDINE 20 MG: 20 TABLET, FILM COATED ORAL at 09:12

## 2024-11-09 RX ADMIN — INSULIN GLARGINE 22 UNITS: 100 INJECTION, SOLUTION SUBCUTANEOUS at 22:16

## 2024-11-09 RX ADMIN — SODIUM CHLORIDE: 9 INJECTION, SOLUTION INTRAVENOUS at 18:04

## 2024-11-09 RX ADMIN — Medication 2 CAPSULE: at 09:15

## 2024-11-09 RX ADMIN — SODIUM CHLORIDE, PRESERVATIVE FREE 10 ML: 5 INJECTION INTRAVENOUS at 22:14

## 2024-11-09 RX ADMIN — IPRATROPIUM BROMIDE AND ALBUTEROL SULFATE 1 DOSE: 2.5; .5 SOLUTION RESPIRATORY (INHALATION) at 08:33

## 2024-11-09 RX ADMIN — METOPROLOL SUCCINATE 12.5 MG: 25 TABLET, EXTENDED RELEASE ORAL at 09:13

## 2024-11-09 RX ADMIN — SODIUM CHLORIDE, PRESERVATIVE FREE 10 ML: 5 INJECTION INTRAVENOUS at 09:16

## 2024-11-09 RX ADMIN — Medication 1 TABLET: at 09:12

## 2024-11-09 RX ADMIN — APIXABAN 5 MG: 5 TABLET, FILM COATED ORAL at 22:15

## 2024-11-09 RX ADMIN — AMIODARONE HYDROCHLORIDE 250 MG: 200 TABLET ORAL at 09:12

## 2024-11-10 LAB
GLUCOSE BLD-MCNC: 186 MG/DL (ref 70–99)
GLUCOSE BLD-MCNC: 208 MG/DL (ref 70–99)
GLUCOSE BLD-MCNC: 264 MG/DL (ref 70–99)
GLUCOSE BLD-MCNC: 52 MG/DL (ref 70–99)
GLUCOSE BLD-MCNC: 80 MG/DL (ref 70–99)
PERFORMED ON: ABNORMAL
PERFORMED ON: NORMAL

## 2024-11-10 PROCEDURE — 6370000000 HC RX 637 (ALT 250 FOR IP): Performed by: INTERNAL MEDICINE

## 2024-11-10 PROCEDURE — 94761 N-INVAS EAR/PLS OXIMETRY MLT: CPT

## 2024-11-10 PROCEDURE — 2580000003 HC RX 258: Performed by: NURSE PRACTITIONER

## 2024-11-10 PROCEDURE — 2700000000 HC OXYGEN THERAPY PER DAY

## 2024-11-10 PROCEDURE — 6370000000 HC RX 637 (ALT 250 FOR IP): Performed by: NURSE PRACTITIONER

## 2024-11-10 PROCEDURE — 92526 ORAL FUNCTION THERAPY: CPT

## 2024-11-10 PROCEDURE — 94640 AIRWAY INHALATION TREATMENT: CPT

## 2024-11-10 PROCEDURE — 2580000003 HC RX 258: Performed by: INTERNAL MEDICINE

## 2024-11-10 PROCEDURE — 6370000000 HC RX 637 (ALT 250 FOR IP): Performed by: STUDENT IN AN ORGANIZED HEALTH CARE EDUCATION/TRAINING PROGRAM

## 2024-11-10 PROCEDURE — 1200000000 HC SEMI PRIVATE

## 2024-11-10 RX ADMIN — INSULIN LISPRO 2 UNITS: 100 INJECTION, SOLUTION INTRAVENOUS; SUBCUTANEOUS at 16:44

## 2024-11-10 RX ADMIN — IPRATROPIUM BROMIDE AND ALBUTEROL SULFATE 1 DOSE: 2.5; .5 SOLUTION RESPIRATORY (INHALATION) at 16:05

## 2024-11-10 RX ADMIN — Medication 2 CAPSULE: at 09:43

## 2024-11-10 RX ADMIN — Medication: at 06:55

## 2024-11-10 RX ADMIN — IPRATROPIUM BROMIDE AND ALBUTEROL SULFATE 1 DOSE: 2.5; .5 SOLUTION RESPIRATORY (INHALATION) at 11:24

## 2024-11-10 RX ADMIN — SODIUM CHLORIDE, PRESERVATIVE FREE 10 ML: 5 INJECTION INTRAVENOUS at 09:47

## 2024-11-10 RX ADMIN — IPRATROPIUM BROMIDE AND ALBUTEROL SULFATE 1 DOSE: 2.5; .5 SOLUTION RESPIRATORY (INHALATION) at 19:19

## 2024-11-10 RX ADMIN — FOLIC ACID 1 MG: 1 TABLET ORAL at 09:46

## 2024-11-10 RX ADMIN — Medication 10 MG: at 20:34

## 2024-11-10 RX ADMIN — METOPROLOL SUCCINATE 12.5 MG: 25 TABLET, EXTENDED RELEASE ORAL at 09:44

## 2024-11-10 RX ADMIN — INSULIN GLARGINE 22 UNITS: 100 INJECTION, SOLUTION SUBCUTANEOUS at 20:33

## 2024-11-10 RX ADMIN — FAMOTIDINE 20 MG: 20 TABLET, FILM COATED ORAL at 20:34

## 2024-11-10 RX ADMIN — ESCITALOPRAM OXALATE 10 MG: 10 TABLET ORAL at 20:34

## 2024-11-10 RX ADMIN — ATORVASTATIN CALCIUM 40 MG: 40 TABLET, FILM COATED ORAL at 20:34

## 2024-11-10 RX ADMIN — IPRATROPIUM BROMIDE AND ALBUTEROL SULFATE 1 DOSE: 2.5; .5 SOLUTION RESPIRATORY (INHALATION) at 07:28

## 2024-11-10 RX ADMIN — APIXABAN 5 MG: 5 TABLET, FILM COATED ORAL at 09:44

## 2024-11-10 RX ADMIN — FAMOTIDINE 20 MG: 20 TABLET, FILM COATED ORAL at 09:46

## 2024-11-10 RX ADMIN — Medication 1 TABLET: at 09:44

## 2024-11-10 RX ADMIN — SODIUM CHLORIDE, PRESERVATIVE FREE 10 ML: 5 INJECTION INTRAVENOUS at 20:40

## 2024-11-10 RX ADMIN — AMIODARONE HYDROCHLORIDE 250 MG: 200 TABLET ORAL at 09:46

## 2024-11-10 RX ADMIN — APIXABAN 5 MG: 5 TABLET, FILM COATED ORAL at 20:34

## 2024-11-10 RX ADMIN — SODIUM CHLORIDE: 9 INJECTION, SOLUTION INTRAVENOUS at 04:15

## 2024-11-10 RX ADMIN — CHOLECALCIFEROL (VITAMIN D3) 10 MCG (400 UNIT) TABLET 400 UNITS: at 09:45

## 2024-11-10 NOTE — RT PROTOCOL NOTE
RT Inhaler-Nebulizer Bronchodilator Protocol Note    There is a bronchodilator order in the chart from a provider indicating to follow the RT Bronchodilator Protocol and there is an “Initiate RT Inhaler-Nebulizer Bronchodilator Protocol” order as well (see protocol at bottom of note).    CXR Findings:  No results found.    The findings from the last RT Protocol Assessment were as follows:   History Pulmonary Disease: Smoker 15 pack years or more  Respiratory Pattern: Regular pattern and RR 12-20 bpm  Breath Sounds: Slightly diminished and/or crackles  Cough: Strong, spontaneous, non-productive  Indication for Bronchodilator Therapy: None  Bronchodilator Assessment Score: 3    Aerosolized bronchodilator medication orders have been revised according to the RT Inhaler-Nebulizer Bronchodilator Protocol below.    Respiratory Therapist to perform RT Therapy Protocol Assessment initially then follow the protocol.  Repeat RT Therapy Protocol Assessment PRN for score 0-3 or on second treatment, BID, and PRN for scores above 3.    No Indications - adjust the frequency to every 6 hours PRN wheezing or bronchospasm, if no treatments needed after 48 hours then discontinue using Per Protocol order mode.     If indication present, adjust the RT bronchodilator orders based on the Bronchodilator Assessment Score as indicated below.  Use Inhaler orders unless patient has one or more of the following: on home nebulizer, not able to hold breath for 10 seconds, is not alert and oriented, cannot activate and use MDI correctly, or respiratory rate 25 breaths per minute or more, then use the equivalent nebulizer order(s) with same Frequency and PRN reasons based on the score.  If a patient is on this medication at home then do not decrease Frequency below that used at home.    0-3 - enter or revise RT bronchodilator order(s) to equivalent RT Bronchodilator order with Frequency of every 4 hours PRN for wheezing or increased work of

## 2024-11-11 LAB
ALBUMIN SERPL-MCNC: 3.2 G/DL (ref 3.4–5)
ANION GAP SERPL CALCULATED.3IONS-SCNC: 12 MMOL/L (ref 3–16)
ANISOCYTOSIS BLD QL SMEAR: ABNORMAL
BASOPHILS # BLD: 0.2 K/UL (ref 0–0.2)
BASOPHILS NFR BLD: 1 %
BUN SERPL-MCNC: 18 MG/DL (ref 7–20)
CALCIUM SERPL-MCNC: 8.5 MG/DL (ref 8.3–10.6)
CHLORIDE SERPL-SCNC: 99 MMOL/L (ref 99–110)
CO2 SERPL-SCNC: 23 MMOL/L (ref 21–32)
CREAT SERPL-MCNC: 0.8 MG/DL (ref 0.8–1.3)
DEPRECATED RDW RBC AUTO: 14.3 % (ref 12.4–15.4)
EOSINOPHIL # BLD: 0.5 K/UL (ref 0–0.6)
EOSINOPHIL NFR BLD: 3 %
GFR SERPLBLD CREATININE-BSD FMLA CKD-EPI: 89 ML/MIN/{1.73_M2}
GLUCOSE BLD-MCNC: 165 MG/DL (ref 70–99)
GLUCOSE BLD-MCNC: 202 MG/DL (ref 70–99)
GLUCOSE BLD-MCNC: 261 MG/DL (ref 70–99)
GLUCOSE BLD-MCNC: 91 MG/DL (ref 70–99)
GLUCOSE BLD-MCNC: 94 MG/DL (ref 70–99)
GLUCOSE SERPL-MCNC: 108 MG/DL (ref 70–99)
HCT VFR BLD AUTO: 23.4 % (ref 40.5–52.5)
HGB BLD-MCNC: 7.7 G/DL (ref 13.5–17.5)
HYPOCHROMIA BLD QL SMEAR: ABNORMAL
LYMPHOCYTES # BLD: 1.7 K/UL (ref 1–5.1)
LYMPHOCYTES NFR BLD: 11 %
MACROCYTES BLD QL SMEAR: ABNORMAL
MAGNESIUM SERPL-MCNC: 2.26 MG/DL (ref 1.8–2.4)
MCH RBC QN AUTO: 29.3 PG (ref 26–34)
MCHC RBC AUTO-ENTMCNC: 33.1 G/DL (ref 31–36)
MCV RBC AUTO: 88.4 FL (ref 80–100)
MICROCYTES BLD QL SMEAR: ABNORMAL
MONOCYTES # BLD: 1.7 K/UL (ref 0–1.3)
MONOCYTES NFR BLD: 11 %
NEUTROPHILS # BLD: 11.3 K/UL (ref 1.7–7.7)
NEUTROPHILS NFR BLD: 71 %
NEUTS BAND NFR BLD MANUAL: 3 % (ref 0–7)
NEUTS HYPERSEG BLD QL SMEAR: PRESENT
OVALOCYTES BLD QL SMEAR: ABNORMAL
PATH INTERP BLD-IMP: NORMAL
PATH INTERP BLD-IMP: YES
PERFORMED ON: ABNORMAL
PERFORMED ON: NORMAL
PERFORMED ON: NORMAL
PHOSPHATE SERPL-MCNC: 3.9 MG/DL (ref 2.5–4.9)
PLATELET # BLD AUTO: 359 K/UL (ref 135–450)
PLATELET BLD QL SMEAR: ADEQUATE
PMV BLD AUTO: 6.6 FL (ref 5–10.5)
POIKILOCYTOSIS BLD QL SMEAR: ABNORMAL
POLYCHROMASIA BLD QL SMEAR: ABNORMAL
POTASSIUM SERPL-SCNC: 4.1 MMOL/L (ref 3.5–5.1)
RBC # BLD AUTO: 2.64 M/UL (ref 4.2–5.9)
SLIDE REVIEW: ABNORMAL
SMUDGE CELLS BLD QL SMEAR: PRESENT
SODIUM SERPL-SCNC: 134 MMOL/L (ref 136–145)
SPHEROCYTES BLD QL SMEAR: ABNORMAL
TOXIC GRANULES BLD QL SMEAR: PRESENT
WBC # BLD AUTO: 15.3 K/UL (ref 4–11)

## 2024-11-11 PROCEDURE — 6370000000 HC RX 637 (ALT 250 FOR IP): Performed by: INTERNAL MEDICINE

## 2024-11-11 PROCEDURE — 2700000000 HC OXYGEN THERAPY PER DAY

## 2024-11-11 PROCEDURE — 6370000000 HC RX 637 (ALT 250 FOR IP): Performed by: NURSE PRACTITIONER

## 2024-11-11 PROCEDURE — 94761 N-INVAS EAR/PLS OXIMETRY MLT: CPT

## 2024-11-11 PROCEDURE — 94640 AIRWAY INHALATION TREATMENT: CPT

## 2024-11-11 PROCEDURE — 85025 COMPLETE CBC W/AUTO DIFF WBC: CPT

## 2024-11-11 PROCEDURE — 2580000003 HC RX 258: Performed by: INTERNAL MEDICINE

## 2024-11-11 PROCEDURE — 97535 SELF CARE MNGMENT TRAINING: CPT

## 2024-11-11 PROCEDURE — 80069 RENAL FUNCTION PANEL: CPT

## 2024-11-11 PROCEDURE — 97110 THERAPEUTIC EXERCISES: CPT

## 2024-11-11 PROCEDURE — 6370000000 HC RX 637 (ALT 250 FOR IP): Performed by: STUDENT IN AN ORGANIZED HEALTH CARE EDUCATION/TRAINING PROGRAM

## 2024-11-11 PROCEDURE — 36415 COLL VENOUS BLD VENIPUNCTURE: CPT

## 2024-11-11 PROCEDURE — 97530 THERAPEUTIC ACTIVITIES: CPT

## 2024-11-11 PROCEDURE — 83735 ASSAY OF MAGNESIUM: CPT

## 2024-11-11 PROCEDURE — 1200000000 HC SEMI PRIVATE

## 2024-11-11 RX ADMIN — FOLIC ACID 1 MG: 1 TABLET ORAL at 09:27

## 2024-11-11 RX ADMIN — ESCITALOPRAM OXALATE 10 MG: 10 TABLET ORAL at 21:03

## 2024-11-11 RX ADMIN — FAMOTIDINE 20 MG: 20 TABLET, FILM COATED ORAL at 21:03

## 2024-11-11 RX ADMIN — IPRATROPIUM BROMIDE AND ALBUTEROL SULFATE 1 DOSE: 2.5; .5 SOLUTION RESPIRATORY (INHALATION) at 11:33

## 2024-11-11 RX ADMIN — Medication 10 MG: at 21:03

## 2024-11-11 RX ADMIN — Medication 1 TABLET: at 09:26

## 2024-11-11 RX ADMIN — APIXABAN 5 MG: 5 TABLET, FILM COATED ORAL at 09:26

## 2024-11-11 RX ADMIN — Medication: at 21:17

## 2024-11-11 RX ADMIN — SODIUM CHLORIDE, PRESERVATIVE FREE 10 ML: 5 INJECTION INTRAVENOUS at 21:11

## 2024-11-11 RX ADMIN — INSULIN GLARGINE 22 UNITS: 100 INJECTION, SOLUTION SUBCUTANEOUS at 21:10

## 2024-11-11 RX ADMIN — IPRATROPIUM BROMIDE AND ALBUTEROL SULFATE 1 DOSE: 2.5; .5 SOLUTION RESPIRATORY (INHALATION) at 15:59

## 2024-11-11 RX ADMIN — ATORVASTATIN CALCIUM 40 MG: 40 TABLET, FILM COATED ORAL at 21:03

## 2024-11-11 RX ADMIN — APIXABAN 5 MG: 5 TABLET, FILM COATED ORAL at 21:03

## 2024-11-11 RX ADMIN — SODIUM CHLORIDE, PRESERVATIVE FREE 10 ML: 5 INJECTION INTRAVENOUS at 09:28

## 2024-11-11 RX ADMIN — CHOLECALCIFEROL (VITAMIN D3) 10 MCG (400 UNIT) TABLET 400 UNITS: at 09:27

## 2024-11-11 RX ADMIN — IPRATROPIUM BROMIDE AND ALBUTEROL SULFATE 1 DOSE: 2.5; .5 SOLUTION RESPIRATORY (INHALATION) at 20:09

## 2024-11-11 RX ADMIN — AMIODARONE HYDROCHLORIDE 250 MG: 200 TABLET ORAL at 09:27

## 2024-11-11 RX ADMIN — METOPROLOL SUCCINATE 12.5 MG: 25 TABLET, EXTENDED RELEASE ORAL at 09:26

## 2024-11-11 RX ADMIN — FAMOTIDINE 20 MG: 20 TABLET, FILM COATED ORAL at 09:26

## 2024-11-11 RX ADMIN — Medication 2 CAPSULE: at 09:33

## 2024-11-11 ASSESSMENT — PAIN SCALES - WONG BAKER
WONGBAKER_NUMERICALRESPONSE: NO HURT
WONGBAKER_NUMERICALRESPONSE: NO HURT

## 2024-11-11 ASSESSMENT — PAIN SCALES - GENERAL: PAINLEVEL_OUTOF10: 0

## 2024-11-12 VITALS
BODY MASS INDEX: 25.11 KG/M2 | RESPIRATION RATE: 18 BRPM | HEART RATE: 76 BPM | SYSTOLIC BLOOD PRESSURE: 120 MMHG | DIASTOLIC BLOOD PRESSURE: 69 MMHG | TEMPERATURE: 97.9 F | WEIGHT: 160 LBS | HEIGHT: 67 IN | OXYGEN SATURATION: 90 %

## 2024-11-12 LAB
ALBUMIN SERPL-MCNC: 2 G/DL (ref 3.4–5)
ANION GAP SERPL CALCULATED.3IONS-SCNC: 6 MMOL/L (ref 3–16)
ANISOCYTOSIS BLD QL SMEAR: ABNORMAL
BASOPHILS # BLD: 0 K/UL (ref 0–0.2)
BASOPHILS NFR BLD: 0 %
BUN SERPL-MCNC: 22 MG/DL (ref 7–20)
CALCIUM SERPL-MCNC: 7.5 MG/DL (ref 8.3–10.6)
CHLORIDE SERPL-SCNC: 102 MMOL/L (ref 99–110)
CO2 SERPL-SCNC: 29 MMOL/L (ref 21–32)
CREAT SERPL-MCNC: 0.9 MG/DL (ref 0.8–1.3)
DEPRECATED RDW RBC AUTO: 21.1 % (ref 12.4–15.4)
EOSINOPHIL # BLD: 0 K/UL (ref 0–0.6)
EOSINOPHIL NFR BLD: 0 %
GFR SERPLBLD CREATININE-BSD FMLA CKD-EPI: 86 ML/MIN/{1.73_M2}
GLUCOSE BLD-MCNC: 127 MG/DL (ref 70–99)
GLUCOSE BLD-MCNC: 157 MG/DL (ref 70–99)
GLUCOSE SERPL-MCNC: 153 MG/DL (ref 70–99)
HCT VFR BLD AUTO: 25.3 % (ref 40.5–52.5)
HGB BLD-MCNC: 8.3 G/DL (ref 13.5–17.5)
LYMPHOCYTES # BLD: 0.4 K/UL (ref 1–5.1)
LYMPHOCYTES NFR BLD: 5 %
MACROCYTES BLD QL SMEAR: ABNORMAL
MAGNESIUM SERPL-MCNC: 2.02 MG/DL (ref 1.8–2.4)
MCH RBC QN AUTO: 31.4 PG (ref 26–34)
MCHC RBC AUTO-ENTMCNC: 32.8 G/DL (ref 31–36)
MCV RBC AUTO: 95.7 FL (ref 80–100)
MICROCYTES BLD QL SMEAR: ABNORMAL
MONOCYTES # BLD: 0.6 K/UL (ref 0–1.3)
MONOCYTES NFR BLD: 7 %
NEUTROPHILS # BLD: 7.4 K/UL (ref 1.7–7.7)
NEUTROPHILS NFR BLD: 84 %
NEUTS BAND NFR BLD MANUAL: 4 % (ref 0–7)
OVALOCYTES BLD QL SMEAR: ABNORMAL
PATH INTERP BLD-IMP: NO
PERFORMED ON: ABNORMAL
PERFORMED ON: ABNORMAL
PHOSPHATE SERPL-MCNC: 2.6 MG/DL (ref 2.5–4.9)
PLATELET # BLD AUTO: 132 K/UL (ref 135–450)
PLATELET BLD QL SMEAR: ABNORMAL
PMV BLD AUTO: 6.8 FL (ref 5–10.5)
POLYCHROMASIA BLD QL SMEAR: ABNORMAL
POTASSIUM SERPL-SCNC: 4.2 MMOL/L (ref 3.5–5.1)
RBC # BLD AUTO: 2.64 M/UL (ref 4.2–5.9)
SLIDE REVIEW: ABNORMAL
SODIUM SERPL-SCNC: 137 MMOL/L (ref 136–145)
TOXIC GRANULES BLD QL SMEAR: PRESENT
WBC # BLD AUTO: 8.4 K/UL (ref 4–11)

## 2024-11-12 PROCEDURE — 6370000000 HC RX 637 (ALT 250 FOR IP): Performed by: INTERNAL MEDICINE

## 2024-11-12 PROCEDURE — 2580000003 HC RX 258: Performed by: INTERNAL MEDICINE

## 2024-11-12 PROCEDURE — 83735 ASSAY OF MAGNESIUM: CPT

## 2024-11-12 PROCEDURE — 80069 RENAL FUNCTION PANEL: CPT

## 2024-11-12 PROCEDURE — 94640 AIRWAY INHALATION TREATMENT: CPT

## 2024-11-12 PROCEDURE — 92526 ORAL FUNCTION THERAPY: CPT

## 2024-11-12 PROCEDURE — 6370000000 HC RX 637 (ALT 250 FOR IP): Performed by: STUDENT IN AN ORGANIZED HEALTH CARE EDUCATION/TRAINING PROGRAM

## 2024-11-12 PROCEDURE — 36592 COLLECT BLOOD FROM PICC: CPT

## 2024-11-12 PROCEDURE — 85025 COMPLETE CBC W/AUTO DIFF WBC: CPT

## 2024-11-12 PROCEDURE — 6370000000 HC RX 637 (ALT 250 FOR IP): Performed by: NURSE PRACTITIONER

## 2024-11-12 RX ORDER — HONEY 100 %
1 PASTE (ML) TOPICAL DAILY
Qty: 15 ML | Refills: 1 | Status: SHIPPED | OUTPATIENT
Start: 2024-11-12

## 2024-11-12 RX ORDER — METOPROLOL SUCCINATE 25 MG/1
12.5 TABLET, EXTENDED RELEASE ORAL DAILY
Qty: 30 TABLET | Refills: 0 | Status: SHIPPED | OUTPATIENT
Start: 2024-11-13

## 2024-11-12 RX ORDER — MICONAZOLE NITRATE 20 MG/G
CREAM TOPICAL 2 TIMES DAILY
Status: DISCONTINUED | OUTPATIENT
Start: 2024-11-12 | End: 2024-11-12 | Stop reason: SDUPTHER

## 2024-11-12 RX ORDER — NYSTATIN 100000 U/G
CREAM TOPICAL 2 TIMES DAILY
Status: DISCONTINUED | OUTPATIENT
Start: 2024-11-12 | End: 2024-11-12 | Stop reason: HOSPADM

## 2024-11-12 RX ORDER — ESCITALOPRAM OXALATE 10 MG/1
10 TABLET ORAL NIGHTLY
Qty: 30 TABLET | Refills: 1 | Status: SHIPPED | OUTPATIENT
Start: 2024-11-12

## 2024-11-12 RX ORDER — NYSTATIN 100000 U/G
CREAM TOPICAL
Qty: 1 EACH | Refills: 0 | Status: SHIPPED | OUTPATIENT
Start: 2024-11-12

## 2024-11-12 RX ADMIN — IPRATROPIUM BROMIDE AND ALBUTEROL SULFATE 1 DOSE: 2.5; .5 SOLUTION RESPIRATORY (INHALATION) at 11:39

## 2024-11-12 RX ADMIN — AMIODARONE HYDROCHLORIDE 250 MG: 200 TABLET ORAL at 07:44

## 2024-11-12 RX ADMIN — METOPROLOL SUCCINATE 12.5 MG: 25 TABLET, EXTENDED RELEASE ORAL at 07:44

## 2024-11-12 RX ADMIN — FOLIC ACID 1 MG: 1 TABLET ORAL at 07:44

## 2024-11-12 RX ADMIN — FAMOTIDINE 20 MG: 20 TABLET, FILM COATED ORAL at 07:44

## 2024-11-12 RX ADMIN — Medication 1 TABLET: at 07:44

## 2024-11-12 RX ADMIN — SODIUM CHLORIDE, PRESERVATIVE FREE 10 ML: 5 INJECTION INTRAVENOUS at 07:47

## 2024-11-12 RX ADMIN — CHOLECALCIFEROL (VITAMIN D3) 10 MCG (400 UNIT) TABLET 400 UNITS: at 07:44

## 2024-11-12 RX ADMIN — NYSTATIN: 100000 CREAM TOPICAL at 01:41

## 2024-11-12 RX ADMIN — APIXABAN 5 MG: 5 TABLET, FILM COATED ORAL at 07:44

## 2024-11-12 RX ADMIN — Medication 2 CAPSULE: at 09:56

## 2024-11-12 RX ADMIN — IPRATROPIUM BROMIDE AND ALBUTEROL SULFATE 1 DOSE: 2.5; .5 SOLUTION RESPIRATORY (INHALATION) at 08:29

## 2024-11-12 RX ADMIN — NYSTATIN: 100000 CREAM TOPICAL at 14:52

## 2024-11-12 ASSESSMENT — PAIN SCALES - GENERAL
PAINLEVEL_OUTOF10: 0
PAINLEVEL_OUTOF10: 0

## 2024-11-12 NOTE — PROGRESS NOTES
ONCOLOGY HEMATOLOGY CARE PROGRESS NOTE      SUBJECTIVE:    PT/OT continues to recommend SNF.   Afebrile  Patient is alert and oriented. He states that he would like to \"just be done\".   Patient ate small amount of breakfast this AM and then had an episode of desaturation this AM requiring replacement of O2.      ROS:     Constitutional:  No weight loss, No fever, No chills, No night sweats. Fatigue.   Eyes:  No impairment or change in vision  ENT / Mouth:  No pain, abnormal ulceration, bleeding, nasal drip or change in voice or hearing  Cardiovascular:  No chest pain, palpitations, new edema, or calf discomfort  Respiratory:  on o2. No pain, hemoptysis, change to breathing  Breast:  No pain, discharge, change in appearance or texture  Gastrointestinal:  No pain, cramping, jaundice, change to eating and bowel habits  Urinary:  No pain, bleeding or change in continence  Genitalia: No pain, bleeding or discharge  Musculoskeletal:  weakness.   Skin:  No pruritus, rash, change to nodules or lesions  Neurologic:  No discomfort, change in mental status, speech, sensory or motor activity  Psychiatric:  No change in concentration or change to affect or mood  Endocrine:  No hot flashes, increased thirst, or change to urine production  Hematologic: No petechiae, ecchymosis or bleeding  Lymphatic:  No lymphadenopathy or lymphedema  Allergy / Immunologic:  No eczema, hives, frequent or recurrent infections    OBJECTIVE        Physical    VITALS:  Patient Vitals for the past 24 hrs:   BP Temp Temp src Pulse Resp SpO2   11/08/24 0744 -- -- -- -- -- 93 %   11/08/24 0740 -- 97.4 °F (36.3 °C) Oral 73 16 (!) 89 %   11/08/24 0032 125/67 97.3 °F (36.3 °C) Oral 71 16 96 %   11/07/24 2043 -- -- -- -- -- 95 %   11/07/24 1958 120/62 -- -- -- -- --   11/07/24 1957 129/71 97.6 °F (36.4 °C) Oral 75 -- 96 %   11/07/24 1550 -- -- -- -- -- 93 %   11/07/24 1210 117/70 97.7 °F (36.5 °C) -- -- -- --   11/07/24 
                                      ONCOLOGY HEMATOLOGY CARE PROGRESS NOTE      SUBJECTIVE:    Rapid response called this AM for respiratory distress. Placed on HFNC at 8L. Minimally responsive at that time. Revisited later in the AM and patient more alert and conversational.   Wife at bedside.     ROS:     Unclear if entirely accurate given mental status.     OBJECTIVE        Physical    VITALS:  Patient Vitals for the past 24 hrs:   BP Temp Temp src Pulse Resp SpO2   11/04/24 0830 -- -- Axillary -- -- 95 %   11/04/24 0826 (!) 150/72 -- -- 88 -- 94 %   11/04/24 0826 -- -- -- -- 18 --   11/04/24 0823 (!) 164/65 -- -- 87 -- (!) 86 %   11/04/24 0715 108/62 97.6 °F (36.4 °C) Oral 66 18 92 %   11/04/24 0435 122/63 97.5 °F (36.4 °C) Oral 68 16 94 %   11/03/24 2341 (!) 93/45 -- -- -- 16 --   11/03/24 2340 -- 97.5 °F (36.4 °C) Oral -- -- --   11/03/24 2053 111/62 97.2 °F (36.2 °C) Oral 85 16 93 %   11/03/24 1455 97/65 98.1 °F (36.7 °C) Axillary 77 18 92 %   11/03/24 1023 117/60 98 °F (36.7 °C) Oral 80 16 93 %       24HR INTAKE/OUTPUT:    Intake/Output Summary (Last 24 hours) at 11/4/2024 0939  Last data filed at 11/4/2024 0436  Gross per 24 hour   Intake 874.26 ml   Output 600 ml   Net 274.26 ml       CONSTITUTIONAL: awake, alert, cooperative, no apparent distress. Sleeping.   EYES: pupils equal, round and reactive to light, sclera clear and conjunctiva normal  ENT: Normocephalic, without obvious abnormality, atraumatic  NECK: supple, symmetrical, no jugular venous distension  HEMATOLOGIC/LYMPHATIC: no cervical, supraclavicular or axillary lymphadenopathy   LUNGS: mild increased work of breathing. Diffuse congestion noted.   CARDIOVASCULAR: regular rate and rhythm, normal S1 and S2, no murmur noted  ABDOMEN: normal bowel sounds x 4, soft, non-distended, non-tender, no masses palpated, no hepatosplenomgaly   MUSCULOSKELETAL: full range of motion noted, tone is normal  NEUROLOGIC: awake, alert, oriented to name, place and 
                                      ONCOLOGY HEMATOLOGY CARE PROGRESS NOTE      SUBJECTIVE:    Wes is sleeping this morning. Wife at bedside states that he finally fell asleep about an hour after receiving seroquel dose. He was able to wake up and go to the bathroom this morning.  Awaiting brain MRI.     ROS:     Unable to assess; patient sleeping. Will allow to rest per family preference.     OBJECTIVE        Physical    VITALS:  Patient Vitals for the past 24 hrs:   BP Temp Temp src Pulse Resp SpO2   10/31/24 0900 (!) 118/55 98.7 °F (37.1 °C) Axillary 74 14 93 %   10/31/24 0347 122/69 97.4 °F (36.3 °C) Axillary 74 16 94 %   10/30/24 2342 (!) 114/58 98.1 °F (36.7 °C) Oral 76 16 93 %   10/30/24 2049 (!) 155/67 97.6 °F (36.4 °C) Axillary 85 16 94 %   10/30/24 1636 129/75 97.1 °F (36.2 °C) Oral 83 16 95 %   10/30/24 1515 (!) 143/67 98.5 °F (36.9 °C) Oral 83 16 95 %   10/30/24 1508 (!) 144/70 99.3 °F (37.4 °C) Oral 86 16 96 %   10/30/24 1503 (!) 147/71 98.1 °F (36.7 °C) Oral 89 16 96 %   10/30/24 1458 (!) 143/83 99.1 °F (37.3 °C) Oral 91 16 96 %   10/30/24 1438 (!) 151/76 97.3 °F (36.3 °C) Oral 80 16 96 %       24HR INTAKE/OUTPUT:    Intake/Output Summary (Last 24 hours) at 10/31/2024 1213  Last data filed at 10/30/2024 2049  Gross per 24 hour   Intake 349.92 ml   Output 350 ml   Net -0.08 ml       CONSTITUTIONAL: eyes closed, allowing patient to rest.   EYES: pupils equal, round and reactive to light, sclera clear and conjunctiva normal  ENT: Normocephalic, without obvious abnormality, atraumatic  NECK: supple, symmetrical, no jugular venous distension  HEMATOLOGIC/LYMPHATIC: no cervical, supraclavicular or axillary lymphadenopathy   LUNGS: no increased work of breathing.   MUSCULOSKELETAL: full range of motion noted, tone is normal  NEUROLOGIC:  patient sleeping. Will allow to sleep per family preference.   SKIN: Normal skin color, texture, turgor and no jaundice. appears intact   EXTREMITIES: no LE edema 
      Gastroenterology Progress Note      Patient Wes Schneider  MRN: [id number]  YOB: 1945 Age: 79 y.o. Sex: male  Room: [address] [place] [address]       Admitting Physician: Brandon Phillip MD   Date of Admission: 10/28/2024 10:25 AM   Primary Care Physician: Jourdan Russ MD     Subjective:  We are following for dysphagia. Wes Schneider was seen and examined. Denies abdominal pain, nausea, vomiting, fever, chills.    Current Hospital [facility] Meds   epoetin samm-epbx  40,000 Units SubCUTAneous Weekly    famotidine  20 mg Oral BID    escitalopram  10 mg Oral Nightly    Boswellia  2 capsule Oral Daily    insulin glargine  20 Units SubCUTAneous Nightly    wound/burn dressing   Topical Daily    trimethoprim-polymyxin b  1 drop Both Eyes 6 times per day    amiodarone  250 mg Oral Daily    atorvastatin  40 mg Oral Nightly    folic acid  1 mg Oral Daily    insulin lispro  0-4 Units SubCUTAneous 4x Daily AC & HS    therapeutic multivitamin-minerals  1 tablet Oral Daily    cholecalciferol  400 Units Oral Daily    sodium chloride flush  5-40 mL IntraVENous 2 times per day    metoprolol succinate  12.5 mg Oral Daily    apixaban  5 mg Oral BID     Current Hospital IV Meds   sodium chloride      dextrose      sodium chloride 30 mL (10/29/24 1425)     Current Hospital Prn Meds  sodium chloride, albuterol sulfate HFA, glucose, dextrose bolus **OR** dextrose bolus, glucagon (rDNA), dextrose, sodium chloride flush, sodium chloride, potassium chloride **OR** potassium alternative oral replacement, magnesium sulfate, polyethylene glycol, acetaminophen **OR** acetaminophen, prochlorperazine    I/O last 3 completed shifts:  In: -   Out: 1370 [Urine:1370]    Physical Exam:  VITAL SIGNS: /62   Pulse 76   Temp 98.5 °F (36.9 °C) (Oral)   Resp 18   Ht 1.702 m (5' 7\")   Wt 72.6 kg (160 lb)   SpO2 96%   BMI 28.00 kg/m²   Wt Readings from Last 3 Encounters:   10/28/24 72.6 kg (160 lb)   10/30/24 81.1 kg (178 lb 12.7 
   10/28/24 1616   RT Protocol   History Pulmonary Disease 1   Respiratory pattern 2   Breath sounds 0   Cough 0   Indications for Bronchodilator Therapy None   Bronchodilator Assessment Score 3       
   11/02/24 0220   Oxygen Therapy/Pulse Ox   O2 Therapy Room air   Pulse 92   SpO2 (!) 64 %       
   11/02/24 0220   Treatment   Treatment Type MDI   $Treatment Type $Inhaled Therapy/Meds   Medications Albuterol   Pre-Tx Pulse 92   Pre-Tx Resps 22   Breath Sounds Pre-Tx LORE Crackles   Breath Sounds Pre-Tx LLL Crackles   Breath Sounds Pre-Tx RUL Crackles   Breath Sounds Pre-Tx RML Crackles   Breath Sounds Pre-Tx RLL Crackles   Breath Sounds Post-Tx LORE Crackles   Breath Sounds Post-Tx LLL Crackles   Breath Sounds Post-Tx RUL Crackles   Breath Sounds Post-Tx RML Crackles   Breath Sounds Post-Tx RLL Crackles   Post-Tx Pulse 92   Post-Tx Resps 22   Delivery Source MDI with spacer   Position Semi-Julio's   Treatment Tolerance Well   Duration 5   Is patient on O2? N   Oxygen Therapy/Pulse Ox   O2 Therapy Room air   Pulse 92   SpO2 (!) 64 %       
   11/02/24 0234   Oxygen Therapy/Pulse Ox   O2 Therapy Oxygen   $Oxygen $Daily Charge   O2 Device Nasal cannula   O2 Flow Rate (L/min) 4 L/min   Pulse 94   Respirations 22   SpO2 90 %   $Pulse Oximeter $Spot check (multiple/continuous)       
   11/04/24 0826   Oxygen Therapy/Pulse Ox   O2 Therapy Oxygen humidified   $Oxygen $Daily Charge   O2 Device High flow nasal cannula   O2 Flow Rate (L/min) 8 L/min   Pulse 88   SpO2 94 %   $Pulse Oximeter $Spot check (multiple/continuous)   Blood Gas  Performed? Yes   $ABG $Arterial Puncture   Albert's Test #1 Pos   Site #1 Right Brachial   Site Prepped #1 Yes   Number of Attempts #1 1   Pressure Held #1 Yes   Complications #1 None   Post-procedure #1 Standard   Specimen Status #1 Point of care   How Tolerated? Tolerated well       
   11/04/24 1937   Oxygen Therapy/Pulse Ox   O2 Therapy Oxygen humidified   O2 Device (S)  Nasal cannula   O2 Flow Rate (L/min) (S)  2 L/min   Pulse 73   Respirations 18   SpO2 97 %     RT found pt on a 3L HFNC at this time. This RT switched pt to a regular NC on 2L. Tolerating well 96-98%.  
  4 Eyes Skin Assessment and Patient belongings     The patient is being assess for  Admission    I agree that 2 Nurses have performed a thorough Head to Toe Skin Assessment on the patient. ALL assessment sites listed below have been assessed.       Areas assessed by both nurses:   Carol Farr    [x]   Head, Face, and Ears   [x]   Shoulders, Back, and Chest  [x]   Arms, Elbows, and Hands   [x]   Coccyx, Sacrum, and IschIum  [x]   Legs, Feet, and Heels        Does the Patient have Skin Breakdown?  Yes LDA WOUND CARE was Initiated documentation include the Brenda-wound, Wound Assessment, Measurements, Dressing Treatment, Drainage, and Color\",   Pt has scattered bruising and dry skin all over, skin tear next to his IV on his right arm, open old scab on his coccyx    Sigifredo Prevention initiated:  Yes   Wound Care Orders initiated:  No      [facility] nurse consulted for Pressure Injury (Stage 3,4, Unstageable, DTI, NWPT, and Complex wounds), New and Established Ostomies:  No      I agree that 2 Nurses have reviewed patient belongings with the patient/family and documented in the flowsheet upon admission or transfer to the unit.     Belongings  Dental Appliances: None  Vision - Corrective Lenses: None  Hearing Aid: None  Clothing: Socks  Jewelry: None  Electronic Devices: None  Weapons (Notify Protective Services/Security): None  Home Medications: None  Valuables Given To: Patient  Provide Name(s) of Who Valuable(s) Were Given To: PATIENT       Nurse 1 eSignature: Electronically signed by Chika Patel RN on 10/28/24 at 4:40 PM EDT    **SHARE this note so that the co-signing nurse is able to place an eSignature**    Nurse 2 eSignature: Electronically signed by CAROL BROOKS RN on 10/28/24 at 5:03 PM EDT  
  4 Eyes Skin Assessment and Patient belongings     The patient is being assess for  Low Sigifredo    I agree that 2 Nurses have performed a thorough Head to Toe Skin Assessment on the patient. ALL assessment sites listed below have been assessed.       Areas assessed by both nurses:   [x]   Head, Face, and Ears   [x]   Shoulders, Back, and Chest  [x]   Arms, Elbows, and Hands   [x]   Coccyx, Sacrum, and IschIum  [x]   Legs, Feet, and Heels        Does the Patient have Skin Breakdown?  Yes LDA WOUND CARE was Initiated documentation include the Brenda-wound, Wound Assessment, Measurements, Dressing Treatment, Drainage, and Color\",         Sigifredo Prevention initiated:  Yes   Wound Care Orders initiated:  Yes      [facility] nurse consulted for Pressure Injury (Stage 3,4, Unstageable, DTI, NWPT, and Complex wounds), New and Established Ostomies:  Yes      I agree that 2 Nurses have reviewed patient belongings with the patient/family and documented in the flowsheet upon admission or transfer to the unit.     Belongings  Dental Appliances: None  Vision - Corrective Lenses: None  Hearing Aid: None  Clothing: Socks  Jewelry: None  Electronic Devices: None  Weapons (Notify Protective Services/Security): None  Home Medications: None  Valuables Given To: Patient  Provide Name(s) of Who Valuable(s) Were Given To: PATIENT       Nurse 1 eSignature: Electronically signed by EMILIO COTE RN on 11/11/24 at 11:01 PM EST    **SHARE this note so that the co-signing nurse is able to place an eSignature**    Nurse 2 eSignature: {Esignature:[id number]}  
  4 Eyes Skin Assessment and Patient belongings     The patient is being assess for  Low Sigifredo    I agree that 2 Nurses have performed a thorough Head to Toe Skin Assessment on the patient. ALL assessment sites listed below have been assessed.       Areas assessed by both nurses: Claribel RN/Ariela RN  [x]   Head, Face, and Ears   [x]   Shoulders, Back, and Chest  [x]   Arms, Elbows, and Hands   [x]   Coccyx, Sacrum, and IschIum  [x]   Legs, Feet, and Heels        Does the Patient have Skin Breakdown?  Yes LDA WOUND CARE was Initiated documentation include the Brenda-wound, Wound Assessment, Measurements, Dressing Treatment, Drainage, and Color\",   R heel wound, coccyx with stage 2/moisture shearing, scattered bruising/scabs        Sigifredo Prevention initiated:  Yes already in place  Wound Care Orders initiated:  Yes already in place      [facility] nurse consulted for Pressure Injury (Stage 3,4, Unstageable, DTI, NWPT, and Complex wounds), New and Established Ostomies:  Yes already seen patient           Nurse 1 eSignature: Electronically signed by Claribel Scott RN on 10/30/24 at 5:55 AM EDT    **SHARE this note so that the co-signing nurse is able to place an eSignature**    Nurse 2 eSignature: Electronically signed by Ariela Cisneros RN on 10/31/24 at 1:04 AM EDT  
  4 Eyes Skin Assessment and Patient belongings     The patient is being assess for  Low Sigifredo    I agree that 2 Nurses have performed a thorough Head to Toe Skin Assessment on the patient. ALL assessment sites listed below have been assessed.       Areas assessed by both nurses: Patricia EUGENE, Judd RN  [x]   Head, Face, and Ears   [x]   Shoulders, Back, and Chest  [x]   Arms, Elbows, and Hands   [x]   Coccyx, Sacrum, and IschIum  [x]   Legs, Feet, and Heels        Does the Patient have Skin Breakdown?  Yes LDA WOUND CARE was Initiated documentation include the Brenda-wound, Wound Assessment, Measurements, Dressing Treatment, Drainage, and Color\",         Sigifredo Prevention initiated:  Yes   Wound Care Orders initiated:  Yes      [facility] nurse consulted for Pressure Injury (Stage 3,4, Unstageable, DTI, NWPT, and Complex wounds), New and Established Ostomies:  Yes      I agree that 2 Nurses have reviewed patient belongings with the patient/family and documented in the flowsheet upon admission or transfer to the unit.     Belongings  Dental Appliances: None  Vision - Corrective Lenses: None  Hearing Aid: None  Clothing: Socks  Jewelry: None  Electronic Devices: None  Weapons (Notify Protective Services/Security): None  Home Medications: None  Valuables Given To: Patient  Provide Name(s) of Who Valuable(s) Were Given To: PATIENT       Nurse 1 eSignature: Electronically signed by Patricia Serna RN on 11/3/24 at 11:40 AM EST    **SHARE this note so that the co-signing nurse is able to place an eSignature**    Nurse 2 eSignature: Electronically signed by Judd Vallecillo RN on 11/3/24 at 6:17 PM EST    
  4 Eyes Skin Assessment and Patient belongings     The patient is being assess for  Low mireya    I agree that 2 Nurses have performed a thorough Head to Toe Skin Assessment on the patient. ALL assessment sites listed below have been assessed.       Areas assessed by both nurses: Patricia RN, Judd RN  [x]   Head, Face, and Ears   [x]   Shoulders, Back, and Chest  [x]   Arms, Elbows, and Hands   [x]   Coccyx, Sacrum, and IschIum  [x]   Legs, Feet, and Heels        Does the Patient have Skin Breakdown?  Yes LDA WOUND CARE was Initiated documentation include the Brenad-wound, Wound Assessment, Measurements, Dressing Treatment, Drainage, and Color\",         Mireya Prevention initiated:  Yes   Wound Care Orders initiated:  Yes      [facility] nurse consulted for Pressure Injury (Stage 3,4, Unstageable, DTI, NWPT, and Complex wounds), New and Established Ostomies:  Yes      I agree that 2 Nurses have reviewed patient belongings with the patient/family and documented in the flowsheet upon admission or transfer to the unit.     Belongings  Dental Appliances: None  Vision - Corrective Lenses: None  Hearing Aid: None  Clothing: Socks  Jewelry: None  Electronic Devices: None  Weapons (Notify Protective Services/Security): None  Home Medications: None  Valuables Given To: Patient  Provide Name(s) of Who Valuable(s) Were Given To: PATIENT       Nurse 1 eSignature: Electronically signed by Patricia Serna RN on 11/2/24 at 10:51 AM EDT    **SHARE this note so that the co-signing nurse is able to place an eSignature**    Nurse 2 eSignature: Electronically signed by Judd Vallecillo RN on 11/2/24 at 10:59 AM EDT    
  4 Eyes Skin Assessment and Patient belongings     The patient is being assess for  Shift Handoff    I agree that 2 Nurses have performed a thorough Head to Toe Skin Assessment on the patient. ALL assessment sites listed below have been assessed.       Areas assessed by both nurses:   [x]   Head, Face, and Ears   [x]   Shoulders, Back, and Chest  [x]   Arms, Elbows, and Hands   [x]   Coccyx, Sacrum, and IschIum  [x]   Legs, Feet, and Heels        Does the Patient have Skin Breakdown?  Yes LDA WOUND CARE was Initiated documentation include the Brenda-wound, Wound Assessment, Measurements, Dressing Treatment, Drainage, and Color\",         Sigifredo Prevention initiated:  Yes   Wound Care Orders initiated:  Yes      [facility] nurse consulted for Pressure Injury (Stage 3,4, Unstageable, DTI, NWPT, and Complex wounds), New and Established Ostomies:  Yes      I agree that 2 Nurses have reviewed patient belongings with the patient/family and documented in the flowsheet upon admission or transfer to the unit.     Belongings  Dental Appliances: None  Vision - Corrective Lenses: None  Hearing Aid: None  Clothing: Socks  Jewelry: None  Electronic Devices: None  Weapons (Notify Protective Services/Security): None  Home Medications: None  Valuables Given To: Patient  Provide Name(s) of Who Valuable(s) Were Given To: PATIENT       Nurse 1 eSignature: Electronically signed by Paula Stern RN on 11/9/24 at 10:01 AM EST    **SHARE this note so that the co-signing nurse is able to place an eSignature**    Nurse 2 eSignature: Electronically signed by Jennifer Rios RN on 11/10/24 at 3:53 PM EST   
  4 Eyes Skin Assessment and Patient belongings     The patient is being assess for  Shift Handoff    I agree that 2 Nurses have performed a thorough Head to Toe Skin Assessment on the patient. ALL assessment sites listed below have been assessed.       Areas assessed by both nurses: ***  [x]   Head, Face, and Ears   [x]   Shoulders, Back, and Chest  [x]   Arms, Elbows, and Hands   [x]   Coccyx, Sacrum, and IschIum  [x]   Legs, Feet, and Heels        Does the Patient have Skin Breakdown?  Yes LDA WOUND CARE was Initiated documentation include the Brenda-wound, Wound Assessment, Measurements, Dressing Treatment, Drainage, and Color\",         Sigifredo Prevention initiated:  Yes   Wound Care Orders initiated:  Yes      [facility] nurse consulted for Pressure Injury (Stage 3,4, Unstageable, DTI, NWPT, and Complex wounds), New and Established Ostomies:  Yes      I agree that 2 Nurses have reviewed patient belongings with the patient/family and documented in the flowsheet upon admission or transfer to the unit.     Belongings  Dental Appliances: None  Vision - Corrective Lenses: None  Hearing Aid: None  Clothing: Socks  Jewelry: None  Electronic Devices: None  Weapons (Notify Protective Services/Security): None  Home Medications: None  Valuables Given To: Patient  Provide Name(s) of Who Valuable(s) Were Given To: PATIENT       Nurse 1 eSignature: Electronically signed by Paula Stern RN on 11/10/24 at 7:52 AM EST    **SHARE this note so that the co-signing nurse is able to place an eSignature**    Nurse 2 eSignature: {Esignature:[id number]}   
  4 Eyes Skin Assessment and Patient belongings     The patient is being assess for  Shift Handoff    I agree that 2 Nurses have performed a thorough Head to Toe Skin Assessment on the patient. ALL assessment sites listed below have been assessed.       Areas assessed by both nurses: Paula & Jennifer   [x]   Head, Face, and Ears   [x]   Shoulders, Back, and Chest  [x]   Arms, Elbows, and Hands   [x]   Coccyx, Sacrum, and IschIum  [x]   Legs, Feet, and Heels        Does the Patient have Skin Breakdown?  Yes LDA WOUND CARE was Initiated documentation include the Brenda-wound, Wound Assessment, Measurements, Dressing Treatment, Drainage, and Color\",         Sigifredo Prevention initiated:  Yes   Wound Care Orders initiated:  Yes      [facility] nurse consulted for Pressure Injury (Stage 3,4, Unstageable, DTI, NWPT, and Complex wounds), New and Established Ostomies:  Yes      I agree that 2 Nurses have reviewed patient belongings with the patient/family and documented in the flowsheet upon admission or transfer to the unit.     Belongings  Dental Appliances: None  Vision - Corrective Lenses: None  Hearing Aid: None  Clothing: Socks  Jewelry: None  Electronic Devices: None  Weapons (Notify Protective Services/Security): None  Home Medications: None  Valuables Given To: Patient  Provide Name(s) of Who Valuable(s) Were Given To: PATIENT       Nurse 1 eSignature: Electronically signed by Paula Stern RN on 11/10/24 at 7:52 AM EST    **SHARE this note so that the co-signing nurse is able to place an eSignature**    Nurse 2 eSignature: Electronically signed by Jennifer Rios RN on 11/10/24 at 3:53 PM EST   
  4 Eyes Skin Assessment and Patient belongings     The patient is being assess for  Shift Handoff    I agree that 2 Nurses have performed a thorough Head to Toe Skin Assessment on the patient. ALL assessment sites listed below have been assessed.       Areas assessed by both nurses: Vero Limon RN   [x]   Head, Face, and Ears   [x]   Shoulders, Back, and Chest  [x]   Arms, Elbows, and Hands   [x]   Coccyx, Sacrum, and IschIum  [x]   Legs, Feet, and Heels        Does the Patient have Skin Breakdown?  No         Sigifredo Prevention initiated:  Yes   Wound Care Orders initiated:  No      [facility] nurse consulted for Pressure Injury (Stage 3,4, Unstageable, DTI, NWPT, and Complex wounds), New and Established Ostomies:  No      I agree that 2 Nurses have reviewed patient belongings with the patient/family and documented in the flowsheet upon admission or transfer to the unit.     Belongings  Dental Appliances: None  Vision - Corrective Lenses: None  Hearing Aid: None  Clothing: Socks  Jewelry: None  Electronic Devices: None  Weapons (Notify Protective Services/Security): None  Home Medications: None  Valuables Given To: Patient  Provide Name(s) of Who Valuable(s) Were Given To: PATIENT       Nurse 1 eSignature: Electronically signed by Ashly Castillo RN on 11/7/24 at 2:24 AM EST    **SHARE this note so that the co-signing nurse is able to place an eSignature**    Nurse 2 eSignature: Electronically signed by Cathy Salinas RN on 11/7/24 at 2:44 AM EST   
  4 Eyes Skin Assessment and Patient belongings     The patient is being assess for  Shift Handoff    I agree that 2 Nurses have performed a thorough Head to Toe Skin Assessment on the patient. ALL assessment sites listed below have been assessed.       Areas assessed by both nurses:Odilia RN / Erick RN  [x]   Head, Face, and Ears   [x]   Shoulders, Back, and Chest  [x]   Arms, Elbows, and Hands   [x]   Coccyx, Sacrum, and IschIum  [x]   Legs, Feet, and Heels        Does the Patient have Skin Breakdown?  Yes LDA WOUND CARE was Initiated documentation include the Brenda-wound, Wound Assessment, Measurements, Dressing Treatment, Drainage, and Color\",         Sigifredo Prevention initiated:  Yes   Wound Care Orders initiated:  Yes      WOC nurse consulted for Pressure Injury (Stage 3,4, Unstageable, DTI, NWPT, and Complex wounds), New and Established Ostomies:  Yes      I agree that 2 Nurses have reviewed patient belongings with the patient/family and documented in the flowsheet upon admission or transfer to the unit.     Belongings  Dental Appliances: None  Vision - Corrective Lenses: None  Hearing Aid: None  Clothing: Socks  Jewelry: None  Electronic Devices: None  Weapons (Notify Protective Services/Security): None  Home Medications: None  Valuables Given To: Patient  Provide Name(s) of Who Valuable(s) Were Given To: PATIENT       Nurse 1 eSignature: Electronically signed by Odilia Michael RN on 11/8/24 at 3:32 AM EST    **SHARE this note so that the co-signing nurse is able to place an eSignature**    Nurse 2 eSignature: Electronically signed by Erick Glez RN on 11/8/24 at 5:24 AM EST  
  [facility] Medicine Progress Note  V10/16      Date of Admission: 10/28/2024  Hospital Day: 2    Chief Admission Complaint:    Chief Complaint   Patient presents with    Altered Mental Status     Pt to er with lethargy and altered mental status. Pt is lung cancer pt , last chemo one week ago, has been more lethargic last couple days and per wife mental status has declined.     Subjective:    Pt sleeping during my visit, easily awakens and is pleasantly confused. His wife is at bedside along with the palliative care NP during my visit. 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 His chemo course has been complicated by pancytopenia and he has required many transfusions, most recently as an outpatient on 10/17. He presented to the ED today with worsening generalized weakness and lethargy and was found to have a Hb of 5.5, otherwise with ongoing pancytopenia. The patient himself had no specific complaints, just mentioned that \"I'm here because I can't get up.\" His wife says that for weeks now he has been getting weaker and more tired. She says that when he stands up he \"eyes often look glazed over and then he slumps over unconscious.\" There has been some consideration of whether he should continue with chemotherapy but no definite decision has been made. The wife wants the patient to remain full code, despite our detailed discussion. The patient cannot really focus long enough to comprehend the details of our conversation.     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  ED ordered 2u pRBCs.  Trend Hgb.     Metastatic lung 
  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 His chemo course has been complicated by pancytopenia and he has required many transfusions, most recently as an outpatient on 10/17. He presented to the ED today with worsening generalized weakness and lethargy and was found to have a Hb of 5.5, otherwise with ongoing pancytopenia. The patient himself had no specific complaints, just mentioned that \"I'm here because I can't get up.\" His wife says that for weeks now he has been getting weaker and more tired. She says that when he stands up he \"eyes often look glazed over and then he slumps over unconscious.\" There has been some consideration of whether he should continue with chemotherapy but no definite decision has been made. The wife wants the patient to remain full code, despite our detailed discussion. The patient cannot really focus long enough to comprehend the details of our conversation.      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  ED ordered 2u pRBCs.  Trend Hgb.   - labs reviewed   - 11/3 h/h down to 7.3/22    -prbc given 11/4 by hemonc     Metastatic lung adenocarcinoma.  [facility] consulted for continuity.  Defer to them whether another brain MRI would .  Palliative care consulted - pt to remain a full 
  Hospital Medicine Progress Note  V10/16      Date of Admission: 10/28/2024  Hospital Day: 4    Chief Admission Complaint:    Chief Complaint   Patient presents with    Altered Mental Status     Pt to er with lethargy and altered mental status. Pt is lung cancer pt , last chemo one week ago, has been more lethargic last couple days and per wife mental status has declined.     Subjective:    EMR and notes reviewed pt seen and examined. 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 His chemo course has been complicated by pancytopenia and he has required many transfusions, most recently as an outpatient on 10/17. He presented to the ED today with worsening generalized weakness and lethargy and was found to have a Hb of 5.5, otherwise with ongoing pancytopenia. The patient himself had no specific complaints, just mentioned that \"I'm here because I can't get up.\" His wife says that for weeks now he has been getting weaker and more tired. She says that when he stands up he \"eyes often look glazed over and then he slumps over unconscious.\" There has been some consideration of whether he should continue with chemotherapy but no definite decision has been made. The wife wants the patient to remain full code, despite our detailed discussion. The patient cannot really focus long enough to comprehend the details of our conversation.     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  ED ordered 2u pRBCs.  Trend Hgb. Repeat MRI brain pending.  - labs pending      Metastatic lung 
  [facility] Medicine Progress Note  V10/16      Date of Admission: 10/28/2024  Hospital Day: 3    Chief Admission Complaint:    Chief Complaint   Patient presents with    Altered Mental Status     Pt to er with lethargy and altered mental status. Pt is lung cancer pt , last chemo one week ago, has been more lethargic last couple days and per wife mental status has declined.     Subjective:    Pt  having hallucinations and more confused today. MRI brain pending per oncology.    Blood cultures NGTD and patient afebrile. Will discontinue antibiotics for now and monitor. Suspect the cefepime may be causing confusion.    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 His chemo course has been complicated by pancytopenia and he has required many transfusions, most recently as an outpatient on 10/17. He presented to the ED today with worsening generalized weakness and lethargy and was found to have a Hb of 5.5, otherwise with ongoing pancytopenia. The patient himself had no specific complaints, just mentioned that \"I'm here because I can't get up.\" His wife says that for weeks now he has been getting weaker and more tired. She says that when he stands up he \"eyes often look glazed over and then he slumps over unconscious.\" There has been some consideration of whether he should continue with chemotherapy but no definite decision has been made. The wife wants the patient to remain full code, despite our detailed discussion. The patient cannot really focus long enough to comprehend the details of our conversation.     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  ED 
  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 His chemo course has been complicated by pancytopenia and he has required many transfusions, most recently as an outpatient on 10/17. He presented to the ED today with worsening generalized weakness and lethargy and was found to have a Hb of 5.5, otherwise with ongoing pancytopenia. The patient himself had no specific complaints, just mentioned that \"I'm here because I can't get up.\" His wife says that for weeks now he has been getting weaker and more tired. She says that when he stands up he \"eyes often look glazed over and then he slumps over unconscious.\" There has been some consideration of whether he should continue with chemotherapy but no definite decision has been made. The wife wants the patient to remain full code, despite our detailed discussion. The patient cannot really focus long enough to comprehend the details of our conversation.      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  ED ordered 2u pRBCs.  Trend Hgb.   - labs reviewed   - 11/3 h/h down to 7.3/22    -prbc given 11/4 by hemonc     Metastatic lung adenocarcinoma.  [facility] consulted for continuity.  Defer to them whether another brain MRI would .  Palliative care consulted - pt to remain a full code as of 11/4([facility] was to address).   - MRI IMPRESSION:  1. No acute intracranial abnormality.  2. Multiple 
  PALLIATIVE MEDICINE PROGRESS NOTE     Patient name:Wes Schneider    MRN:[id number] :1945  Room/Bed:0329/0329-01    LOS: 10 days        ASSESSMENT/RECOMMENDATIONS     79 y.o. male with wife with stage IV lung cancer.  Follows at [facility] and last treatment was 10/18/24.  Imaging shows stable disease, but he has difficulty tolerating his cancer treatments and has had 3 hospital admissions in the last 90 days.  He is admitted with pancytopenia and concerns for pneumonia.   Wife has concerns for esophageal nodules that sometimes makes swallowing difficult for him.  His mentation waxes and wanes.  Had a rapid response 24 for acute respiratory failure, concern for aspiration.        Goals of care:  Patient is unable to speak for himself, leaving wife as the decision maker.  Wife wants patient to make his own decisions, but this does not seem feasible given his mental status.  Wife (and family) do want to bring patient home, but wife will not consider hospice support or comfort care at this time.  Wife will not agree to DNR.       Recommendations:   Spoke with oncology today.  Oncology had very deanna discussion with wife this morning recommending no further cancer treatment and hospice care. Discussed with oncology the need to give wife more space/time before re-engaging with palliative care's [facility] discussions      Patient/Family Goals of Care :    24    Spoke with wife, Miranda, at the bedside.  As soon as I walked in the door she stated \"I am not calling hospice in\".  Affirmed her wish to not involve hospice.   She states she thinks someone talked to her son about bringing in hospice, but she told her son \"we are not doing that\".       She states she thinks patient probably aspirated this morning.  She thinks he is doing better now.  She would like to have the nodules in his throat looked at so she can take him home.  She would like him to be at home.  Explained to her that we can talk about other ways to 
  Physician Progress Note      PATIENT:               MERARI THURSTON  CSN #:                  [id number]  :                       1945  ADMIT DATE:       10/28/2024 10:25 AM  DISCH DATE:  RESPONDING  PROVIDER #:        KARINA Mercado NP          QUERY TEXT:    Pt admitted with AMS.  Pt noted to have Possible pneumonia (H/P 10/28). If   possible, please document in the progress notes and discharge summary if you   are evaluating and/or treating any of the following:    Note: CAP and HCAP indicate where the pneumonia was acquired, not a specific   type.    The medical record reflects the following:  Risk Factors: pancytopenia d/t chemotherapy  Clinical Indicators: H/P \"Possible pneumonia on CXR.  Neutropenic patient.    Empiric vanc and cefepime\" CXR \"Extensive left lung airspace consolidation   again noted stable since prior examination.\"; 10/28-WBC=1.4,   Procalcitonin=0.78  Treatment: CXR/Chest CT, Oncology/Palliative care consult, Meds-IV Cefepime,   IV Vancomycin, Neutropenic precaution  Options provided:  -- PNA treated Gram negative pneumonia  -- Other - I will add my own diagnosis  -- Disagree - Not applicable / Not valid  -- Disagree - Clinically unable to determine / Unknown  -- Refer to Clinical Documentation Reviewer    PROVIDER RESPONSE TEXT:    Provider disagreed with this query.    Query created by: Ynes Sosa on 10/30/2024 1:24 PM      Electronically signed by:  KARINA Mercado NP 10/30/2024 3:04 PM          
  Physician Progress Note      PATIENT:               MERARI THURSTON  CSN #:                  [id number]  :                       1945  ADMIT DATE:       10/28/2024 10:25 AM  DISCH DATE:  RESPONDING  PROVIDER #:        Chapin MUNOZ MD          QUERY TEXT:    Patient admitted with Pancytopenia due to chemotherapy (H/P) Per WOCN on    stage 2 decubitus ulcer on the right heel and coccyx, noted. If possible,   please document in progress notes and discharge summary the location, present   on admission status and stage of the pressure ulcer:    The medical record reflects the following:  Risk Factors: diabetes, chronic pressure, decreased mobility, and shear force  Clinical Indicators: WOCN notes  \" stage 2 decubitus ulcer on the right   heel and coccyx\"  Treatment: WOCN consult, per recommendation \"Dressing/Treatment: Triad   hydro/zinc oxide-based hydrophilic paste, specialty bed requested    Stage 1:  Non-blanchable erythema of intact skin  Stage 2:  Abrasion, Blister, Partial-thickness skin loss, with exposed dermis  Stage 3:  Full-thickness skin loss with damage or necrosis of subcutaneous   tissue  Stage 4:  Full-thickness skin & soft tissue loss through to underlying muscle,   tendon or bone  Unstageable: Obscured full-thickness skin & tissue loss  Options provided:  -- Stage 2 Pressure Ulcer of right heel and coccyx present on admission  -- Other - I will add my own diagnosis  -- Disagree - Not applicable / Not valid  -- Disagree - Clinically unable to determine / Unknown  -- Refer to Clinical Documentation Reviewer    PROVIDER RESPONSE TEXT:    Stage 2 Pressure Ulcer of right heel and coccyx present on admission    Query created by: Ynes Sosa on 2024 2:45 PM      Electronically signed by:  Chapin MUNOZ MD 11/10/2024 7:11 PM          
  [facility] Medicine Progress Note  V10/16      Date of Admission: 10/28/2024  Hospital Day: 7    Chief Admission Complaint:    Chief Complaint   Patient presents with    Altered Mental Status     Pt to er with lethargy and altered mental status. Pt is lung cancer pt , last chemo one week ago, has been more lethargic last couple days and per wife mental status has declined.     Subjective:    EMR and notes reviewed pt seen and examined.  Much more lethargic and poor sleep and worsening confusion overnight.   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 His chemo course has been complicated by pancytopenia and he has required many transfusions, most recently as an outpatient on 10/17. He presented to the ED today with worsening generalized weakness and lethargy and was found to have a Hb of 5.5, otherwise with ongoing pancytopenia. The patient himself had no specific complaints, just mentioned that \"I'm here because I can't get up.\" His wife says that for weeks now he has been getting weaker and more tired. She says that when he stands up he \"eyes often look glazed over and then he slumps over unconscious.\" There has been some consideration of whether he should continue with chemotherapy but no definite decision has been made. The wife wants the patient to remain full code, despite our detailed discussion. The patient cannot really focus long enough to comprehend the details of our conversation.     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  ED ordered 2u pRBCs.  Trend Hgb.   - labs reviewed   - 11/3 h/h down to 7.3/22      Metastatic lung 
  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 His chemo course has been complicated by pancytopenia and he has required many transfusions, most recently as an outpatient on 10/17. He presented to the ED today with worsening generalized weakness and lethargy and was found to have a Hb of 5.5, otherwise with ongoing pancytopenia. The patient himself had no specific complaints, just mentioned that \"I'm here because I can't get up.\" His wife says that for weeks now he has been getting weaker and more tired. She says that when he stands up he \"eyes often look glazed over and then he slumps over unconscious.\" There has been some consideration of whether he should continue with chemotherapy but no definite decision has been made. The wife wants the patient to remain full code, despite our detailed discussion. The patient cannot really focus long enough to comprehend the details of our conversation.      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  ED ordered 2u pRBCs.  Trend Hgb.   - labs reviewed   - 11/3 h/h down to 7.3/22    -prbc given 11/4 by [facility]     Metastatic lung adenocarcinoma.  [facility] consulted for continuity.  Defer to them whether another brain MRI would .  Palliative care consulted - pt to remain a full code as of 11/4([facility] was to address).   - MRI IMPRESSION:  1. No acute intracranial 
  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  Pt currently NPO for EGD this date.  SLP to re-attempt eval at a later time as pt is able and appropriate and as schedule allows. RN aware. No charges.    Charley Yancey M.S. CCC-SLP  Speech-language pathologist  [id number]      
  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 Pt asleep in bed upon SLP arrival with wife momentarily at bedside. Pt awakened to SLP but declined participation in therapy d/t fatigue. Pt requested SLP return later this date. SLP to re-attempt as pt's condition and schedule allows. RN aware. No charges.    Thank you,    Luiza Velasquez M.A. CCC-SLP   Speech-Language Pathologist     
  Speech Language Pathology  Clinical Bedside Swallow Assessment  Facility/Department: [facility] C3 TELE/MED SURG/ONC        Recommendations:  Diet recommendation: NPO; NPO and Ice chips with SLP/RN only (oral care must be completed prior); Meds via alt means of nutrition  Instrumentation: MBSS is recommended to further assess oropharyngeal structures and functions   Risk management: upright for all intake, stay upright for at least 30 mins after intake, small bites/sips, 1:1 supervision with intake, oral care q4 hrs to reduce adverse affects in the event of aspiration, slow rate of intake, STRICT aspiration precautions, and hold PO and contact SLP if s/s of aspiration or worsening respiratory status develop.      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 Pt is lung cancer pt , last chemo one week ago, has been more lethargic last couple days and per wife mental status has declined.     Patient Active Problem List    Diagnosis Date Noted    Gram-negative bacteremia 10/04/2022    Immunocompromised state due to drug therapy (HCC) 10/03/2022    Pancytopenia (HCC) 10/03/2022    Hyperglycemia due to type 2 diabetes mellitus (HCC) 10/02/2022    Neutropenia (HCC) 10/02/2022    Cancer of right lung (HCC) 10/02/2022    Elevated troponin level not due myocardial infarction 10/02/2022    Gram-negative pneumonia (HCC) 2022    Sepsis (HCC) 2022    Pneumonia of left lower lobe due to Pseudomonas species (HCC) 2024    Normal anion gap metabolic acidosis 2024    Atrial fibrillation with RVR (HCC) 2024    Acute hypoxemic respiratory failure 2024    
  Speech Language Pathology  Dysphagia Treatment/Follow-Up Note  Facility/Department: [facility] C3 TELE/MED SURG/ONC    Recommendations:  Diet recommendation: IDDSI 4 Puree Solids; IDDSI 0 Thin Liquids - NO straws ; Meds crushed in puree as able; encourage use of double swallow with PO as able  Risk management: upright for all intake, stay upright for at least 30 mins after intake, small bites/sips, assist feed, downgrade to mildly thick if s/s of aspiration develop, intermittent cued use of chin tuck post-swallow to assist with clearance of pharyngeal residue, 1:1 supervision with intake, oral care q4 hrs to reduce adverse affects in the event of aspiration, increase physical mobility as able, alternate bites/sips, slow rate of intake, general GERD precautions, general aspiration precautions, and hold PO and contact SLP if s/s of aspiration or worsening respiratory status develop.     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  Consistent min oral/lingual residue noted post-swallow with all PO trials, majority successfully cleared with cued second swallow.  Pt required total feed assistance from SLP with all PO trials during today's study.   Pharyngeal phase comment:   No 
  Speech Language Pathology  Dysphagia Treatment/Follow-Up Note  Facility/Department: [facility] C3 TELE/MED SURG/ONC    Recommendations:  Diet recommendation: IDDSI 4 Puree Solids; IDDSI 0 Thin Liquids - NO straws ; Meds crushed in puree as able; encourage use of double swallow with PO as able  Recommend consideration for repeat MBSS in the OP setting following dysphagia therapy in  setting (pt lethargic and declined PO with SLP this date)  Risk management: upright for all intake, stay upright for at least 30 mins after intake, small bites/sips, assist feed, downgrade to mildly thick if s/s of aspiration develop, intermittent cued use of chin tuck post-swallow to assist with clearance of pharyngeal residue, 1:1 supervision with intake, oral care q4 hrs to reduce adverse affects in the event of aspiration, increase physical mobility as able, alternate bites/sips, slow rate of intake, general GERD precautions, general aspiration precautions, and hold PO and contact SLP if s/s of aspiration or worsening respiratory status develop.     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  Consistent min oral/lingual residue noted post-swallow with all PO trials, majority successfully cleared with cued 
  [facility] Medicine Progress Note  V10/16      Date of Admission: 10/28/2024  Hospital Day: 5    Chief Admission Complaint:    Chief Complaint   Patient presents with    Altered Mental Status     Pt to er with lethargy and altered mental status. Pt is lung cancer pt , last chemo one week ago, has been more lethargic last couple days and per wife mental status has declined.     Subjective:    EMR and notes reviewed pt seen and examined.  Sitting up and wake, son at bedside, more awake and alert. Oriented to self, place and year.     Presenting Admission History:       The patient is a pleasant 79 Y M with a h/o former smoking, HTN, HLD, DM2, Afib, prostate cancer, and stage 4 metastatic adenocarcinoma diagnosed 2021 treated with chemo, then found to have mets to brain in 9/2023 s/p gamme knife, with ongoing chemo treatment, most recently on 10/18 through [facility]. His chemo course has been complicated by pancytopenia and he has required many transfusions, most recently as an outpatient on 10/17. He presented to the ED today with worsening generalized weakness and lethargy and was found to have a Hb of 5.5, otherwise with ongoing pancytopenia. The patient himself had no specific complaints, just mentioned that \"I'm here because I can't get up.\" His wife says that for weeks now he has been getting weaker and more tired. She says that when he stands up he \"eyes often look glazed over and then he slumps over unconscious.\" There has been some consideration of whether he should continue with chemotherapy but no definite decision has been made. The wife wants the patient to remain full code, despite our detailed discussion. The patient cannot really focus long enough to comprehend the details of our conversation.     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  ED ordered 2u pRBCs.  Trend Hgb.   - labs reviewed      Metastatic lung adenocarcinoma.  
.  4 Eyes Skin Assessment and Patient belongings     The patient is being assess for  4 Eye Shift Assessment     I agree that 2 Nurses have performed a thorough Head to Toe Skin Assessment on the patient. ALL assessment sites listed below have been assessed.      Areas assessed by both nurses: VALORIE Bradley &   [x]   Head, Face, and Ears   [x]   Shoulders, Back, and Chest  [x]   Arms, Elbows, and Hands   [x]   Coccyx, Sacrum, and IschIum  [x]   Legs, Feet, and Heels        Does the Patient have Skin Breakdown?  Yes LDA WOUND CARE was Initiated documentation include the Brenda-wound, Wound Assessment, Measurements, Dressing Treatment, Drainage, and Color\",         Sigifredo Prevention initiated:  Yes   Wound Care Orders initiated:  Yes      WOC nurse consulted for Pressure Injury (Stage 3,4, Unstageable, DTI, NWPT, and Complex wounds), New and Established Ostomies:  Yes             Nurse 1 eSignature: Electronically signed by Mirna De Santiago RN on 10/30/24 at 10:39 AM EDT    **SHARE this note so that the co-signing nurse is able to place an eSignature**    Nurse 2 eSignature: Electronically signed by Claribel Scott RN on 10/31/24 at 5:39 AM EDT    
4 Eyes Skin Assessment     NAME:  Wes Schneider  YOB: 1945  MEDICAL RECORD NUMBER:  [id number]    The patient is being assessed for  Other low  mireya    I agree that at least one RN has performed a thorough Head to Toe Skin Assessment on the patient. ALL assessment sites listed below have been assessed.      Areas assessed by both nurses:    Head, Face, Ears, Shoulders, Back, Chest, Arms, Elbows, Hands, Sacrum. Buttock, Coccyx, Ischium, and Legs. Feet and Heels        Does the Patient have a Wound? Yes wound(s) were present on assessment. LDA wound assessment was Initiated and completed by RN       Mireya Prevention initiated by RN: Yes  Wound Care Orders initiated by RN: Yes    Pressure Injury (Stage 3,4, Unstageable, DTI, NWPT, and Complex wounds) if present, place Wound referral order by RN under : No    New Ostomies, if present place, Ostomy referral order under : No     Nurse 1 eSignature: Electronically signed by Cassidy Dumont RN on 11/1/24 at 10:39 AM EDT    **SHARE this note so that the co-signing nurse can place an eSignature**    Nurse 2 eSignature: Electronically signed by Jennifer Rios RN on 11/1/24 at 10:41 AM EDT   
4 Eyes Skin Assessment     The patient is being assess for  Shift Handoff    I agree that 2 RN's have performed a thorough Head to Toe Skin Assessment on the patient. ALL assessment sites listed below have been assessed.       Areas assessed by both nurses: VALORIE Ham/VALORIE Pinedo  [x]   Head, Face, and Ears   [x]   Shoulders, Back, and Chest  [x]   Arms, Elbows, and Hands   [x]   Coccyx, Sacrum, and IschIum  [x]   Legs, Feet, and Heels        Does the Patient have Skin Breakdown?  Yes LDA WOUND CARE was Initiated documentation include the Brenda-wound, Wound Assessment, Measurements, Dressing Treatment, Drainage, and Color\",         Sigifredo Prevention initiated:  Yes   Wound Care Orders initiated:  Yes      WOC nurse consulted for Pressure Injury (Stage 3,4, Unstageable, DTI, NWPT, and Complex wounds), New and Established Ostomies:  Yes      Nurse 1 eSignature: Electronically signed by Shabbir Clements RN on 11/2/24 at 6:57 AM EDT    **SHARE this note so that the co-signing nurse is able to place an eSignature**    Nurse 2 eSignature: Electronically signed by Keli Ngo RN on 11/2/24 at 7:33 AM EDT          
4 Eyes Skin Assessment     The patient is being assess for  Shift Handoff    I agree that 2 RN's have performed a thorough Head to Toe Skin Assessment on the patient. ALL assessment sites listed below have been assessed.       Areas assessed by both nurses: VALORIE Mitchell RN  [x]   Head, Face, and Ears   [x]   Shoulders, Back, and Chest  [x]   Arms, Elbows, and Hands   [x]   Coccyx, Sacrum, and IschIum  [x]   Legs, Feet, and Heels        Does the Patient have Skin Breakdown?  Yes LDA WOUND CARE was Initiated documentation include the Brenda-wound, Wound Assessment, Measurements, Dressing Treatment, Drainage, and Color\",         Sigifredo Prevention initiated:  Yes   Wound Care Orders initiated:  Yes      WOC nurse consulted for Pressure Injury (Stage 3,4, Unstageable, DTI, NWPT, and Complex wounds), New and Established Ostomies:  Yes      Nurse 1 eSignature: Electronically signed by Paula Stern RN on 11/8/24 at 6:38 PM EST    **SHARE this note so that the co-signing nurse is able to place an eSignature**    Nurse 2 eSignature: Electronically signed by Judd Vallecillo RN on 11/8/24 at 6:39 PM EST             
4 Eyes Skin Assessment and Patient belongings     The patient is being assess for  Shift Handoff    I agree that 2 Nurses have performed a thorough Head to Toe Skin Assessment on the patient. ALL assessment sites listed below have been assessed.       Areas assessed by both nurses: VALORIE Quick/Patricia EUGENE  [x]   Head, Face, and Ears   [x]   Shoulders, Back, and Chest  [x]   Arms, Elbows, and Hands   [x]   Coccyx, Sacrum, and IschIum  [x]   Legs, Feet, and Heels        Does the Patient have Skin Breakdown?  Yes LDA WOUND CARE was Initiated documentation include the Brenda-wound, Wound Assessment, Measurements, Dressing Treatment, Drainage, and Color\",    Pt with moisture sheering to buttocks/coccyx, wound to right heel, scattered abrasions/bruises, large bruise to right upper arm.         Sigifredo Prevention initiated:  Yes   Wound Care Orders initiated:  Yes      WOC nurse consulted for Pressure Injury (Stage 3,4, Unstageable, DTI, NWPT, and Complex wounds), New and Established Ostomies:  NA  wound care nurse already consulted     I agree that 2 Nurses have reviewed patient belongings with the patient/family and documented in the flowsheet upon admission or transfer to the unit.     Belongings  Dental Appliances: None  Vision - Corrective Lenses: None  Hearing Aid: None  Clothing: Socks  Jewelry: None  Electronic Devices: None  Weapons (Notify Protective Services/Security): None  Home Medications: None  Valuables Given To: Patient  Provide Name(s) of Who Valuable(s) Were Given To: PATIENT       Nurse 1 eSignature: Electronically signed by TERESA HURT RN on 11/5/24 at 5:17 PM EST    **SHARE this note so that the co-signing nurse is able to place an eSignature**    Nurse 2 eSignature: Electronically signed by Patricia Serna RN on 11/5/24 at 5:22 PM EST   
A&Ox4. Denied chest pain/heaviness, SOB/.   Gave oral meds with strict aspiration precaution.   SR x 2.  Call light within reach.   Avasys on.   Bed on lowest position. Bed alarm on.   
A&Ox4. Denied chest pain/heaviness, SOB/.   Gave oral meds with strict aspiration precaution.   SR x 4 as requested by wife.  Call light within reach.   Avasys on.   Bed on lowest position. Bed alarm on.   
A&Ox4. Denied chest pain/heaviness, SOB/. Denied pain or discomfort.   SR x 2.   Call light within reach.   Bed on lowest position. Bed alarm on.   Left chest port patent and with brisk blood return.   
A&Ox4. Denied chest pain/heaviness, SOB/. Instituted aspiration precautions. Put on highfowlers when giving oral meds and feeding.   SR  x2.   Call light within reach.   Bed on lowest position. Bed alarm on.   
Attempted to call MRI to see when MRI will be done, no answer.   
Blood hit pt at 1458.   
Blood transfusion complete. Pt tolerated well. No transfusion reaction noted. Pt is very agitated tonight, may have been from the Ativan before MRI? Pt came back from MRI more agitated and has progressively gotten worse throughout the evening. Wife asking for something for him to relax some tonight. Dr. Renteria paged and asked about something that could possibly be given. Pt changed and turned in bed and relaxing music turned on, per wife's request.   
Comprehensive Nutrition Assessment    Type and Reason for Visit:  Initial, Wound    Nutrition Recommendations/Plan:   Continue 76g CHO/meal diet.  Continue Glucerna supplement x3 daily.   Trial Magic Cup ice cream once/day.   Encourage PO intakes.   Recommend consideration of appetite stimulant per home regimen.   Monitor labs, acceptance of ONS, weights, bowel habits.      Malnutrition Assessment:  Malnutrition Status:  At risk for malnutrition (10/31/24 1223)    Context:  Chronic Illness     Findings of the 6 clinical characteristics of malnutrition:  Energy Intake:  Mild decrease in energy intake  Weight Loss:  Unable to assess     Body Fat Loss:  Unable to assess     Muscle Mass Loss:  Unable to assess    Fluid Accumulation:  Mild Extremities   Strength:  Not Performed    Nutrition Assessment:    Patient seen d/t postive screen (wounds). Admitted for generalized weakness and lethargy. Nutritonally at risk AEB chronic illness/wounds. Patient off floor at time of visit, spoke with pts wife in room. She reports pt has had a very poor appetite the past few weeks. States pt takes marinol at home which seems to help with appetite. Reports not many foods appeal to pt. Enjoys meatloaf, pot roast, navy beans, pasta, nutrition shakes. States he only drank a Glucerna shake this morning. Agreebale to trial Magic Cup ice cream. States pt has gained \"some weight \" back recently. Will continue to monitor.    Nutrition Related Findings:    CO2 15, BUN 22, glucose 230-258, A1C 6.0% (8/20/24). LBM 10/27. Trace edema RUE/LUE. Wound Type: Open Wounds (R heal)       Current Nutrition Intake & Therapies:    Average Meal Intake: 51-75%  Average Supplements Intake: 26-50%  ADULT ORAL NUTRITION SUPPLEMENT; Breakfast, Lunch, Dinner; Diabetic Oral Supplement  ADULT DIET; Regular; 5 carb choices (75 gm/meal)    Anthropometric Measures:  Height: 170.2 cm (5' 7\")  Ideal Body Weight (IBW): 148 lbs (67 kg)       Current Body Weight: 81.1 
Comprehensive Nutrition Assessment    Type and Reason for Visit:  Reassess    Nutrition Recommendations/Plan:   Continue NPO diet per SLP.  Monitor for need of alternate nutrition vs PO diet advancement.   NFPE as appropriate.   Monitor labs, bowel habits, weights, N/V.      Malnutrition Assessment:  Malnutrition Status:  At risk for malnutrition (10/31/24 1223)    Context:  Chronic Illness     Findings of the 6 clinical characteristics of malnutrition:  Energy Intake:  Mild decrease in energy intake  Weight Loss:  Unable to assess     Body Fat Loss:  Unable to assess     Muscle Mass Loss:  Unable to assess    Fluid Accumulation:  Mild Extremities   Strength:  Not Performed    Nutrition Assessment:    Followup: Patient NPO for EGD. Noted rapid response called today, concern for resp distress. Pt with lethargy and confusion. Variable PO intakes of 0-76% of meals. Drinking % of Ensure shakes. LBM 11/3. Per SLP, \"MBSS is recommended to further assess oropharyngeal structures and functions.\" Will continue to monitor need for alternate nutrition therapy vs PO diet advancement.    Nutrition Related Findings:    BUN 21, glucose 104-147. Nonpitting trace edema all extremities. LBM 11/3. Wound Type: Open Wounds (left heel, buttocks)       Current Nutrition Intake & Therapies:    Average Meal Intake: NPO, 0%  Average Supplements Intake: 51-75%, 0%, NPO  Diet NPO Exceptions are: Sips of Water with Meds    Anthropometric Measures:  Height: 170.2 cm (5' 7\")  Ideal Body Weight (IBW): 148 lbs (67 kg)       Current Body Weight: 81.1 kg (178 lb 12.7 oz) (10/30/24), 120.8 % IBW. Weight Source: Not specified  Current BMI (kg/m2): 28        Weight Adjustment For: No Adjustment        BMI Categories: Overweight (BMI 25.0-29.9)    Estimated Daily Nutrient Needs:  Energy Requirements Based On: Kcal/kg  Weight Used for Energy Requirements: Current  Energy (kcal/day): 2028 - 2433 (25-30kcal/kg)  Weight Used for Protein 
Comprehensive Nutrition Assessment    Type and Reason for Visit:  Reassess    Nutrition Recommendations/Plan:   Continue Pureed diet as tolerated.   Continue Ensure Plus HP TID.  Monitor labs, N/V, bowel habits, PO intakes, weights.      Malnutrition Assessment:  Malnutrition Status:  Moderate malnutrition (11/08/24 1144)    Context:  Chronic Illness     Findings of the 6 clinical characteristics of malnutrition:  Energy Intake:  Mild decrease in energy intake  Weight Loss:  Unable to assess     Body Fat Loss:  Mild body fat loss Orbital, Buccal region   Muscle Mass Loss:  Mild muscle mass loss Temples (temporalis), Clavicles (pectoralis & deltoids)  Fluid Accumulation:  Mild Extremities   Strength:  Not Performed    Nutrition Assessment:    Followup: Patient now on pureed diet. No family in room at time of visit. Pt sleeping soundly. Patient nutritionally compromised AEB poor appetite. Ensure supplement on side table, 50% consumed. Pt consuming 26-50% of meals per flowsheets. Per chart review, discharge decisions TBD. Will continue to monitor.    Nutrition Related Findings:    BUN 22, Glucose 107-124, Ca 7.2, phos 1.8, albumin 2.2. Nonpitting edema all extremities. Wound Type: Open Wounds (left heel, buttocks)       Current Nutrition Intake & Therapies:    Average Meal Intake: 1-25%, 26-50%  Average Supplements Intake: 26-50%  ADULT DIET; Dysphagia - Pureed; No Drinking Straws  ADULT ORAL NUTRITION SUPPLEMENT; Breakfast, Lunch, Dinner; Standard High Calorie/High Protein Oral Supplement    Anthropometric Measures:  Height: 170.2 cm (5' 7\")  Ideal Body Weight (IBW): 148 lbs (67 kg)       Current Body Weight: 81.1 kg (178 lb 12.7 oz) (10/30/24), 120.8 % IBW. Weight Source: Not specified  Current BMI (kg/m2): 28        Weight Adjustment For: No Adjustment        BMI Categories: Overweight (BMI 25.0-29.9)    Estimated Daily Nutrient Needs:  Energy Requirements Based On: Kcal/kg  Weight Used for Energy Requirements: 
Comprehensive Nutrition Assessment    Type and Reason for Visit:  Reassess    Nutrition Recommendations/Plan:   Continue Pureed diet.  Continue Ensure Plus HP TID.   Will continue to monitor labs, weights, PO intakes.      Malnutrition Assessment:  Malnutrition Status:  Moderate malnutrition (11/08/24 1144)    Context:  Chronic Illness     Findings of the 6 clinical characteristics of malnutrition:  Energy Intake:  Mild decrease in energy intake  Weight Loss:  Unable to assess     Body Fat Loss:  Mild body fat loss Orbital, Buccal region   Muscle Mass Loss:  Mild muscle mass loss Temples (temporalis), Clavicles (pectoralis & deltoids)  Fluid Accumulation:  Mild Extremities   Strength:  Not Performed    Nutrition Assessment:    Noted patient to discharge home with C today. RD will continue to monitor as needed.    Nutrition Related Findings:    BUN 22, glucose 127-153, Ca 7.5, albumin 2.0. LBM 11/10. +2 pitting edema RLE/LLE. Wound Type: Open Wounds (left heel, buttocks)       Current Nutrition Intake & Therapies:    Average Meal Intake: 1-25%, 26-50%  Average Supplements Intake: 51-75%, %  ADULT DIET; Dysphagia - Pureed; No Drinking Straws  ADULT ORAL NUTRITION SUPPLEMENT; Breakfast, Lunch, Dinner; Standard High Calorie/High Protein Oral Supplement    Anthropometric Measures:  Height: 170.2 cm (5' 7\")  Ideal Body Weight (IBW): 148 lbs (67 kg)       Current Body Weight: 81.1 kg (178 lb 12.7 oz) (10/30/24), 120.8 % IBW. Weight Source: Not specified  Current BMI (kg/m2): 28        Weight Adjustment For: No Adjustment        BMI Categories: Overweight (BMI 25.0-29.9)    Estimated Daily Nutrient Needs:  Energy Requirements Based On: Kcal/kg  Weight Used for Energy Requirements: Current  Energy (kcal/day): 2028 - 2433 (25-30kcal/kg)  Weight Used for Protein Requirements: Current  Protein (g/day): 106 - 122 (1.3-1.5g/kg)  Method Used for Fluid Requirements: 1 ml/kcal  Fluid (ml/day): 2028 - 2433    Nutrition 
Critical Lab: Hbg 6.8. AASHISH Corona with [facility] notified in person.   
New rash noted when changing purewick on pt groin area with redness below bikini area, skin breakdown on head of penis. Purewick removed and pt only with brief. Suction was noted to be set to 130. PS message sent to Leena Macias requesting antifungal cream.  
O2 saturation at REST on ROOM AIR = _94_____%    If saturation is 89% or above please proceed with steps 2 and 3……….    O2 saturation with AMBULATION of __5___ feet on ROOM AIR = __92___%  O2 saturation with AMBULATION on _______ liter/min = ______%    DCP notified: ___Yes___  
Occupational Therapy      Attempted to see pt for follow up OT session and family training (per MD and family request). Wife not in room and pt asleep in bed w/ limited wakefulness. Spoke w/ RN who will call this therapist when wife at bedside. Will follow up as pt condition and therapy schedule permit. Thank you.    Vee Nelson, OTR/L  
Occupational Therapy  Facility/Department: [facility] C3 TELE/MED SURG/ONC  Daily Treatment Note  NAME: Wes Schneider  : 1945  MRN: [id number]    Date of Service: 2024    Discharge Recommendations:  Subacute/Skilled Nursing Facility versus 24 hour supervision or assist, Home with Home health OT  OT Equipment Recommendations  Equipment Needed: No  Other: Per pt and spouse they have a wheelchair, BSC, walker and adjustable bed      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     Assessment   Assessment: Patient and spouse agreed to family training this date. Patient's spouse completed all assistance except for management of medical lines this date. Patient for mobility required minimal assistance overall and dependent for donning of socks. Education provided in regards to: gait belt use, wheelchair management and safety with positioning during transfers, sponge baths, BSC use, LE dressing techniques, hand positions during transfers with RW, safe posture for patient handling, car transfer techniques and O2 line management. Patient and spouse both verbalized feeling safe discharging home. If they are unable to provided strict 24 hour assistance or level of assistance becomes too heavy due to pts status then a skilled nursing facility would be the safest option. Patient will benefit from continued OT services at Home upon discharge to address strength, mobility, coordination, activity tolerance, balance, and cognition in order to increase their independence and safety with ADL and IADL task completion.    Activity Tolerance: Patient tolerated treatment well;Patient limited by fatigue;Patient limited by endurance  Discharge Recommendations: Subacute/Skilled Nursing Facility vs 24 hour and home OT   Equipment Needed: No  Other: Per pt and spouse they have a 
Occupational Therapy  Facility/Department: [facility] C3 TELE/MED SURG/ONC  Daily Treatment Note  NAME: Wes Schneider  : 1945  MRN: [id number]    Date of Service: 10/31/2024    Discharge Recommendations:  Subacute/Skilled Nursing Facility  OT Equipment Recommendations  Equipment Needed: No  Other: defer    Therapy discharge recommendations are subject to collaboration from the patient’s interdisciplinary healthcare team, including MD and case management recommendations.    Barriers to Home Discharge:   [] Steps to access home entry or bed/bath:   [x] Unable to transfer, ambulate, or propel wheelchair household distances without assist   [x] Limited available assist at home upon discharge    [x] Patient or family requests d/c to post-acute facility    [x] Poor cognition/safety awareness for d/c to home alone    [] Unable to maintain ordered weight bearing status    [] Patient with salient signs of long-standing immobility   [x] Decreased independence with ADLs   [x] Increased risk for falls   [] Other:    If pt is unable to be seen after this session, please let this note serve as discharge summary.  Please see case management note for discharge disposition.  Thank you.    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     Assessment   Assessment: Pt agreeable to OT/PT cotx. Co-tx collaboration this date to safely meet goals and will have better occupational performance outcomes with in a co-treatment than 1:1 session. He required modA for bed mobility, modAx2 for sit<>stands, and modAx2 for bed to chair transfer with RW. He performed BUE and BLE therex with fair tolerance. He was very limited by fatigue. Pt is functioning below their baseline and will benefit from continued skilled acute OT services to maximize safety and IND with ADL tasks. SNF is recommended at 
Occupational Therapy  Facility/Department: [facility] C3 TELE/MED SURG/ONC  Daily Treatment Note  NAME: Wes Schneider  : 1945  MRN: [id number]    Date of Service: 2024    Discharge Recommendations:  Subacute/Skilled Nursing Facility  OT Equipment Recommendations  Equipment Needed: No  Other: defer    Therapy discharge recommendations are subject to collaboration from the patient’s interdisciplinary healthcare team, including MD and case management recommendations.     Barriers to Home Discharge:   [] Steps to access home entry or bed/bath:   [x] Unable to transfer, ambulate, or propel wheelchair household distances without assist   [x] Limited available assist at home upon discharge    [x] Patient or family requests d/c to post-acute facility    [x] Poor cognition/safety awareness for d/c to home alone    [] Unable to maintain ordered weight bearing status    [] Patient with salient signs of long-standing immobility   [x] Decreased independence with ADLs   [x] Increased risk for falls   [] Other:     If pt is unable to be seen after this session, please let this note serve as discharge summary.  Please see case management note for discharge disposition.  Thank you.    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     Assessment   Assessment: Pt supine in bed at start of session. Pt tolerates OT session well. Pt completes bed mobility with modA and functional bed to chair stand pivot transfer with RW and modAx2. Pt with poor endurance and weakness and unable to maintain full upright posture during transfer. Pt tolerates seated exercises. Pt is limited by weakness and endurance - OT recommends SNF to increase pt's independence with ADLs/ mobility. Pt will continue to benefit from continued skilled OT services at this time. Co-tx collaboration this date to safely 
Occupational Therapy/ Physical Therapy    Per RN pt not medically appropriate at this time for PT/OT tx. Pt currently receiving blood at this time. Will follow up as schedule allows.     Cherri Clancy, OTR/L  Paige Miles PT, DPT  
Oxygen saturation test: Patient is on 1 L via NC at 93%. Attempted to turn off O2 and patient dropped saturation level to 85%. Oxygen was turned back on to 1 L. Patient returned to 91-93%.  Electronically signed by Paula Stern RN on 11/9/2024 at 6:44 PM    
PS sent to LISA Arce =\" Mayur Whittaker. Lab called for a critical result. Hgb 5.9 and Hct 17.5. Thank you.\"    LISA Whittaker ordered for  Blood transfusion of 1 unit PRBC.  Followed up with Blood bank for availablity.    2309H  1 Unit PRBC avalaible with unit #[id number]  Verified  and double check with VALORIE Ham.   Started BT at 70 cc/hr and will be monitoring for the next 15 minutes for any BT reaction. Educated and encouraged patient to report any signs of transfusion reaction.    10/29/2024 0333H  PS sent to LISA Arce -\" Mayur Whittaker. Lab called for critical result on patient WBC of 1.8. Previously it was 1.4. and also patient latest H and H after BT Hgb 7.2 and Hct 21.8. Thank you.\"    Message seen and no new orders noted.   
PS sent to Leena Macias: Pt has new rash in groin area with skin breakdown on head of penis. Will you place an order for an antifungal cream please. Thanks  
Paged MD Grady, \"pt blood sugar was 412 after eating. I gave 8 units. [place] increased today and added lantus tonight. \"    Addendum: @1708, per MD, \"Check in 2 hours and see.\"  
Patient is coughing frequently, tried suctioning patient oropharynx and nasopharynx but he is very anxious and can't follow instructions. Hooked him to O2 at 5 lpm via nasal cannula, reading 90% O2 sat. No cyanosis or pallor. With use of accessory muscles when breathing. RR: 24 HI: 92 T: 98 BP: 110/72     Messaged NP Panfilo Arce re: this matter. Awaiting response.     
Patient's relative is insisting that we will put him on bathroom. Patient is disoriented and very weak, non-redirectable. Offered bedpan.   
Patient's significant other (Miranda) requested for Miralax. Explained the risk for aspiration to the patient and significant others (Miranda).   
Patient: Wes Schneider MRN: [id number]  Date of  Admission: 10/28/2024   YOB: 1945  Age: 79 y.o.  Sex: male    Unit: [place] [address] TELE/MED/ONC  Room/Bed: [facility]9/0329-01 Admitting Physician: MELI PATEL    Attending Physician:  Chapin Arriola MD         Pulmonary Service Note      SUBJECTIVE:    Requirements have decreased.  He is on room air.  He has no respiratory complaints.        OBJECTIVE    Medications    Continuous Infusions:   sodium chloride 100 mL/hr at 11/03/24 1905    dextrose      sodium chloride 30 mL (10/29/24 1425)       Scheduled Meds:   meropenem  1,000 mg IntraVENous Q8H    methylPREDNISolone  40 mg IntraVENous Q12H    ipratropium 0.5 mg-albuterol 2.5 mg  1 Dose Inhalation Q4H WA RT    melatonin  10 mg Oral Nightly    epoetin samm-epbx  40,000 Units SubCUTAneous Weekly    famotidine  20 mg Oral BID    escitalopram  10 mg Oral Nightly    Boswellia  2 capsule Oral Daily    insulin glargine  20 Units SubCUTAneous Nightly    wound/burn dressing   Topical Daily    trimethoprim-polymyxin b  1 drop Both Eyes 6 times per day    amiodarone  250 mg Oral Daily    atorvastatin  40 mg Oral Nightly    folic acid  1 mg Oral Daily    insulin lispro  0-4 Units SubCUTAneous 4x Daily AC & HS    therapeutic multivitamin-minerals  1 tablet Oral Daily    cholecalciferol  400 Units Oral Daily    sodium chloride flush  5-40 mL IntraVENous 2 times per day    metoprolol succinate  12.5 mg Oral Daily    [Held by provider] apixaban  5 mg Oral BID       PRN Meds:  albuterol sulfate HFA, glucose, dextrose bolus **OR** dextrose bolus, glucagon (rDNA), dextrose, sodium chloride flush, sodium chloride, potassium chloride **OR** potassium alternative oral replacement, magnesium sulfate, polyethylene glycol, acetaminophen **OR** acetaminophen, prochlorperazine    Physical    Patient Vitals for the past 24 hrs:   BP Temp Temp src Pulse Resp SpO2   11/05/24 1235 130/70 98.2 °F (36.8 °C) Oral 75 18 94 %   11/05/24 
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    Assessment  Assessment: Pt con't to require up to mod a for bed mobility, CGA/min A X 2 for T/F and ambulation with RW in room X 10' B knees flexed L>R making him very unsafe.  Pt participated in BLE exs 10-15 reps.  Pt is recommended for con't skilled PT and SNF at D/C.  Activity Tolerance: Patient tolerated treatment well;Patient limited by fatigue;Patient limited by endurance  Equipment Needed: No  Other: TBD at SNF    Plan  Physical Therapy Plan  General Plan: 3-5 times per week  Specific Instructions for Next Treatment: Progress ther ex and mobility as tolerated  Current Treatment Recommendations: Strengthening;ROM;Balance training;Functional mobility training;Transfer training;Endurance training;Gait training;Home exercise program;Safety education & training;Patient/Caregiver education & training;Equipment evaluation, education, & procurement;Therapeutic activities;Co-Treatment    Restrictions  Restrictions/Precautions  Restrictions/Precautions: Fall Risk, Modified Diet  Position Activity Restriction  Other position/activity restrictions: ayan, BERENICE     Subjective   Subjective  Pain: Pt denies pain.  Orientation  Overall Orientation Status: Impaired  Orientation Level: Oriented to place;Oriented to time;Oriented to person;Disoriented to situation    Objective  Vitals  Pulse: 80  BP: 106/62  MAP (Calculated): 77  Comment: Pt dizzy after transfer to chair /60 HR 79; O2 80% and  after short 
Physical Therapy  Facility/Department: [facility] C3 TELE/MED SURG/ONC  Daily Treatment Note  NAME: Wes Schneider  : 1945  MRN: [id number]    Date of Service: 2024    Discharge Recommendations:  Subacute/Skilled Nursing Facility   PT Equipment Recommendations  Equipment Needed: No  Other: TBD at SNF  Therapy discharge recommendations take into account each patient's current medical complexities and are subject to input/oversight from the patient's healthcare team.   Barriers to Home Discharge:   [x] Steps to access home entry or bed/bath:   [x] Unable to transfer, ambulate, or propel wheelchair household distances without assist   [] Limited available assist at home upon discharge    [] Patient or family requests d/c to post-acute facility    [x] Poor cognition/safety awareness for d/c to home alone    []Unable to maintain ordered weight bearing status    [] Patient with salient signs of long-standing immobility   [x] Patient is at risk for falls   [] Other:    If pt is unable to be seen after this session, please let this note serve as discharge summary.  Please see case management note for discharge disposition.  Thank you.    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    Assessment  Assessment: Pt presents to [facility] for pancytopenia. Pt presents below baseline for PT treatment with decreased strength, decreased endurance, decreased mobility, and decreased safety awareness. Pt tolerated session fair. Pt requires MOD A x2 for transfer to chair with RW and cannot tolerate upright position too long due to fatigue/endurance. Pt would continue to benefit from skilled PT to aid in the above deficits and a safe DC SNF when medically appropriate.    Pt seen as co-treat with OT due to complexity and level of assist in order to promote safe environment with transfers 
Physical Therapy  Facility/Department: [facility] C3 TELE/MED SURG/ONC  Daily Treatment Note  NAME: Wes Schneider  : 1945  MRN: [id number]    Date of Service: 2024    Discharge Recommendations:  Subacute/Skilled Nursing Facility   PT Equipment Recommendations  Equipment Needed: No  Other: defer to next level of care.    Therapy discharge recommendations take into account each patient's current medical complexities and are subject to input/oversight from the patient's healthcare team.   Barriers to Home Discharge:   [x] Steps to access home entry or bed/bath:   [x] Unable to transfer, ambulate, or propel wheelchair household distances without assist   [x] Unable to perform ADLs without assist   [x] Limited available assist at home upon discharge    [] Patient or family requests d/c to post-acute facility    [x] Poor cognition/safety awareness for d/c to home alone    [] Unable to maintain ordered weight bearing status    [] Patient with salient signs of long-standing immobility   [] Other: pt at increased risk for falls.      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    Assessment  Assessment: pt found in bed, agreeable to PT treatment, cleared for treatment by RN.  pt demonstrates MIN A X1-2 for bed mobility on today's date.  upon coming up inot seated pt reports dizziness, BP assessed and it had dropped as noted above, pt performs several BUE/BLE exercises in seated EOB over a period of 15 minutes, BP reassessed and continues to be low, pt returned to supine, unsafe to trial trnasfer training on today's date secondary to dizziness and decreasing BP.  pt continues to demonstrate decreased strength, endurance, mobility, activity tolerance, balance, and functional capacity and would continue to benefit from skilled PT to address the above stated deficits 
Physical Therapy and Occupational Therapy    Pt chart reviewed. Pt medical hold due to increased glucose. Glucose 382 earlier this AM and then increased to 399.   Will re-attempt when medically appropriate and can tolerate therapy session.         Reanna Peña, PT  Cherri Clancy, OT      
Physical Therapy and Occupational Therapy  PT/OT held due to pt planned blood transfusion per pt RN.  Will attempt at a later point and continue to follow.  Addy Flores, OT    
Pt A&O, disoriented at times. Pt is quiet, not interactive at times. Pt answers all questions. Pt wife/caregiver at bedside. Pt has lft port. Messaged  for oral laxative, pt abd is hurting. Pt wife believes this is from constipation,  Avaysis on, bed at lowest position, call light and bedside table within reach. Pt has shearing on coccyx, med honey was placed.  
Pt a/o x4. VSS. All prescriptions, discharge and follow up instructions given to the patient spouse .  The patient spouse  verbalizes understanding and denies questions.  All belongings collected and sent with the patient. The patient was discharged off the unit by transport in stable condition.   
Pt a/o. VSS. Shift assessment updated and documented. Nil concerns overnight  , suction at bed bedside , 02 sats stable ,continues on aspiration precautions prefers pills whole given one at time in apple sauce. Patient  assisted with turning in bed , skin assessed and skin trt applied . Spouse at bedside , on Avasys.   
Pt a/o. VSS. Shift assessment updated and documented. Patient wants to lay low in the bed immediately after intakes  , discussed risks of  aspiration , bed locked . Suction catheter at bedside. Has no there concerns.  
Pt alert and oriented x2, VSS, IV fluids infusing in left arm. Pts orientation waxes and wanes. Pt able to follow commands, but is confused at times. Pt ambulated to bathroom with stedy and two person assist. Pt tolerated it fairly well. Wife at bedside. Bed alarm on, bedside table and call light in reach.  
Pt alert with confusion. VSS. Shift assessment updated and documented.   
Pt assessment completed and charted. VSS. Pt a/o. Pt HOB elevated to prevent aspiration. Educate pt on importance of keeping HOB elevated after eating and drinking for at least 30 minutes. Pt verbalized understanding. Bed in lowest position and wheels locked. Call light within reach. Bedside table within reach. Non-skid socks in place. Pt denies any other needs at this time.  Pt calls out appropriately.  
Pt assessment completed and charted. VSS. Pt a/o. Pt denies SOB. Pt denies any pain. Pt tolerating diet. Pt up to chair with physical therapy.  Bed in lowest position and wheels locked. Call light within reach. Bedside table within reach. Non-skid socks in place. Pt denies any other needs at this time.  Pt calls out appropriately.  
Pt assessment completed and charted. VSS. Pt a/o. Pt denies any pain. Pt is planned to go home today with home health care. Bed in lowest position and wheels locked. Call light within reach. Bedside table within reach. Non-skid socks in place. Pt denies any other needs at this time.  Pt calls out appropriately.  
Pt assessment completed and charted. VSS. Pt a/ox4 on room air. Pt ambulates with walker x2 SBA. Male pure-wick in place. STAR sure in place. HOB elevated due to aspiration risk. Left chest port patent with brisk blood return. Bed in lowest position and wheels locked. Call light within reach. Bedside table within reach. Non-skid socks in place. Pt denies any other needs at this time.  Pt calls out appropriately.  
Pt slid off of couch and landed on his knees on the floor. No harm was done to the Pt.   The Pt is A&O x4 and was hurting from sitting in bed he asked to sit on couch. The RN and the student nurse took the Pt to the couch and put a bed alarm down as well as a sheet. We put the Pt on the alarm and proped his feet up with the foot stool. The Pt had yellow socks on, his braclet, chair alarm, and fall light in place. The RN and the nursing student left the room and about ten minutes later the chair alarm started to sound and RN was out side the room charting. RN and nursing student went to check on Pt and he was sliding off of the couch and came to his knees before we could get to him. RN and student got the Pt up and back to bed. RN reassesed and took his vitals his temp was 97.3 /62 O2 sat was 99% on RA. RN notified MD made a note in the patients chart and got an order for a vitual safety companinion, as well as informed the family.   
Pt took off tele leads and swatting at staff attempting to put them back on. Spoke with Dr. Renteria about situation. Stated to leave leads off until pt calms down a little and ordered a one time dose of Seroquel. Will attempt to give to pt.   
Pt was brought back up from MRI due to blood transfusing, blood is unable to run through MRI pump. Spoke with MRI to ask when they would be able to do MRI and are unable to until first thing tomorrow morning. Will notify AASHISH Corona with [facility].   
Pt. Resting in bed. Drowsy, but awakens to voice\touch.  Oriented to person\place only at this time.  Vitals and assessment stable as charted.  Denies any pain/nausea or any other discomfort at present time.  Call light in reach.  Bed alarm in place.  Will continue to monitor.    
Received call back from MRI, they are unable to get pt down to MRI until 4-4:30. Will start blood transfusion beforehand.   
Responded to overhead page for rapid response. Wife present, but stated she was doing okay and had no needs at this time. Assured her of our continued availability for support.    Ever Spence         11/04/24 0822   Encounter Summary   Encounter Overview/Reason Crisis   Service Provided For Family   Support System Spouse   Last Encounter    (11/4 rapid response, wife had no needs)   Complexity of Encounter Moderate   Begin Time 0815   End Time  0823   Total Time Calculated 8 min       
Shift assessment completed. Pt A&O x3 does not know the month or the year.  VSS.  Denies Pain.  IV site patent, flushed, and infusing. Patient on RA.  Patient ambulates by SBA to bathroom.  Medication given per MAR. Pt meet with palliative care and wound care today.               Standard precautions   Denies any needs at this time. Bed alarm on for safety. Bed locked and in lowest position.  Call light within reach. Gripper socks applied. Patient in stable condition when RN leaving room.   
Shift assessment completed. Pt A&O x4. Patient did struggle to answer some questions.   VSS.  Denies Pain. Port accessed and currently being used, patent/ flushed, and infusing. Patient on 2 L via NC.  Patient ambulates with walker with 2 to chair. Medication given per MAR, with apple sauce. Patient is a feed. Patient has SCDS in place. Denies any needs at this time. Bed alarm on for safety. Haily sure in place. Bed locked and in lowest position.  Call light within reach. Gripper socks applied. Patient in stable condition when RN leaving room.   Electronically signed by Paula Stern RN on 11/8/2024 at 4:38 PM    
Shift assessment completed. Pt A&O x4. VSS. Denies Pain.  IV site was changed to port access, patent, flushed, and infusing. Patient o2 at 3L baseline is RA. Denies any needs at this time.     At about 8:15am RN called rapid on Pt. He was being changed and started coughing was unable to cough everything out. He became lethargic and sating at 31% on RA. RN put NC on him with oxygen at 5L that did not seem to be bringing him up quick enough. RN put him HFNC at 8L.     Pt was given one unit of blood today.           Standard precautions   Bed alarm on for safety.   Bed locked and in lowest position.    Call light within reach.   Gripper socks applied.   Patient in stable condition when RN leaving room.   
Shift assessment completed. Pt A&O x4. With some hesitation. VSS.  Denies Pain at this time. Port site patent/ flushed, and infusing. Patient on 1 L via NC. Patient has tele- monitor in place with cont. O2 monitoring.   Patient ambulates with walker as a x 2 SBA to chair. Patient has male pure-wick in place. Medication given per MAR. Denies any needs at this time. Bed alarm on for safety. STAR sure in place. Bed locked and in lowest position.  Call light within reach. Gripper socks applied. Patient in stable condition when RN leaving room.   Electronically signed by Paula Stern RN on 11/9/2024 at 10:05 AM    
Shift assessment completed. Pt A&O x4. With some hesitation. VSS.  Denies Pain at this time. Port site patent/ flushed, and saline locked. Patient on 1 L via NC, saturation is 94 %. Patient has tele- monitor in place with cont. O2 monitoring.   Patient ambulates with walker as a x 2 SBA to chair. Patient was assisted to BSC this am and was extremely weak, unable to hold own weight.  Patient has male pure-wick in place. Replaced this am. Medication given per MAR. Denies any needs at this time. Bed alarm on for safety. STAR sure in place. Bed locked and in lowest position.  Call light within reach. Gripper socks removed this am for comfort.  Patient in stable condition when RN leaving room.   Electronically signed by Paula Stern RN on 11/10/2024 at 11:00 AM    
Son, Freddy, and brother, Jakob, at bedside. Patient with increased confusion today. CESAR Rivas NP aware. New orders noted for IVF d/t patient will be NPO tonight for EGD tomorrow.   
Titrated to RA; 02 sat 96%. Respirations non labored.   
Up this am and ate breakfast. Wife reports no coughing with swallowing. Respirations nonlabored.     Titrated to RA; 02 sat 96% RA.     Transfusion complete. Post H/H ordered for 10am.     1017: Post Hgb 8.2  
Up to chair this am and then to toilet x2 with stedy. A/O to person and place. Disoriented to month and situation. Wife at bedside and reports patient did not sleep last night.     Transferred to bed. Brenda care completed and positioned patient onto R side. Wedge cushion used d/t patient favors laying on his L side.     Patient fell asleep before AM meds administered. Wife agrees to give them when he awakens later this am.   
Verified that there is a signed blood transfusion consent in pt's chart. Awaiting pt's MRI to be done before starting transfusion.   
Wound dressing changed, per order. MRI Questionnaire completed with pt's wife.   
following  
Position ___ O'Clock 0 11/05/24 1013   Undermining Starts ___ O'Clock 0 11/05/24 1013   Undermining Ends___ O'Clock 0 11/05/24 1013   Undermining Maxium Distance (cm) 0 11/05/24 1013   Wound Assessment Pink/red 11/05/24 1013   Drainage Amount Scant (moist but unmeasurable) 11/05/24 1013   Drainage Description Serous 11/05/24 1013   Odor None 11/05/24 1013   Brenda-wound Assessment Intact 11/05/24 1013   Margins Unattached edges 11/05/24 1013   Wound Thickness Description not for Pressure Injury Partial thickness 11/05/24 1013   Number of days: 6       Wound 10/29/24 Coccyx Left;Right stg 2 moisture shearing (Active)   Wound Image   11/05/24 1013   Wound Etiology Pressure Stage 2 11/05/24 1013   Dressing Status New dressing applied 11/05/24 1013   Wound Cleansed Other (Comment) 11/05/24 1013   Dressing/Treatment Triad hydro/zinc oxide-based hydrophilic paste 11/05/24 1013   Offloading for Diabetic Foot Ulcers Offloading ordered 11/05/24 1013   Dressing Change Due 11/06/24 11/05/24 1013   Wound Length (cm) 0.5 cm 10/29/24 1131   Wound Width (cm) 1 cm 10/29/24 1131   Wound Depth (cm) 0.1 cm 10/29/24 1131   Wound Surface Area (cm^2) 0.5 cm^2 10/29/24 1131   Wound Volume (cm^3) 0.05 cm^3 10/29/24 1131   Distance Tunneling (cm) 0 cm 11/05/24 1013   Tunneling Position ___ O'Clock 0 11/05/24 1013   Undermining Starts ___ O'Clock 0 11/05/24 1013   Undermining Ends___ O'Clock 0 11/05/24 1013   Undermining Maxium Distance (cm) 0 11/05/24 1013   Wound Assessment Pink/red 11/05/24 1013   Drainage Amount Scant (moist but unmeasurable) 11/05/24 1013   Drainage Description Serous 11/05/24 1013   Odor None 11/05/24 1013   Brenda-wound Assessment Fragile;Dry/flaky;Blanchable erythema 11/05/24 1013   Margins Unattached edges;Undefined edges 11/05/24 1013   Wound Thickness Description not for Pressure Injury Partial thickness 11/05/24 1013   Number of days: 6          Response to treatment:  Well tolerated by patient.     Pain 
in overall strength and level of (I) with mobility compared to baseline LOF. Continue to recommend SNF at D/C in light of current deficits.  Activity Tolerance: Patient tolerated treatment well;Patient limited by fatigue;Patient limited by endurance  Equipment Needed: No  Other: TBD at SNF    Plan  Physical Therapy Plan  General Plan: 3-5 times per week  Specific Instructions for Next Treatment: Progress ther ex and mobility as tolerated  Current Treatment Recommendations: Strengthening;ROM;Balance training;Functional mobility training;Transfer training;Endurance training;Gait training;Home exercise program;Safety education & training;Patient/Caregiver education & training;Equipment evaluation, education, & procurement;Therapeutic activities;Co-Treatment    Restrictions  Restrictions/Precautions  Restrictions/Precautions: Fall Risk, Modified Diet     Subjective   Subjective: Pt agreeable to work with PT this session.  States he's feeling tired and weak but agreeable to transfer up to chair with min encouragement.  Pain: Pt denies pain at rest.  Overall Orientation Status: Impaired  Orientation Level: Oriented to person;Oriented to place;Disoriented to situation;Disoriented to time    Objective  Vitals  Vitals:    11/05/24 1235   BP: 130/70   Pulse: 75   Resp: 18   Temp: 98.2 °F (36.8 °C)   SpO2: 94%       Bed Mobility Training  Interventions: Safety awareness training;Verbal cues  Supine to Sit: Assist X2;Additional time;Adaptive equipment;Moderate assistance (HOB elevated, towards L with bed rail use)  Sit to Supine:  (in chair at end of session)  Scooting: Minimum assistance;Additional time (to scoot forward to EOB)    Balance  Sitting: High guard  Standing: Impaired;With support  Standing - Static: Fair;Constant support (using walker)  Standing - Dynamic: Poor;Constant support    Transfer Training  Interventions: Safety awareness training;Verbal cues  Sit to Stand: Moderate assistance;Assist X2;Adaptive 
tolerated treatment well;Patient limited by fatigue;Patient limited by endurance  Discharge Recommendations: Subacute/Skilled Nursing Facility  Equipment Needed: No  Other: defer     Plan  Occupational Therapy Plan  Times Per Week: 3-5x  Current Treatment Recommendations: Balance training;Functional mobility training;Endurance training;Safety education & training;Self-Care / ADL;Strengthening;Co-Treatment    Restrictions  Restrictions/Precautions  Restrictions/Precautions: Fall Risk    Subjective  Subjective  Subjective: Pt resting in bed at start of session, agreeable to cotx  Pain: Denies pain  Orientation  Overall Orientation Status: Impaired  Orientation Level: Oriented to person;Oriented to place;Disoriented to situation;Disoriented to time  Pain: denies pain at rest.  Cognition  Overall Cognitive Status: Exceptions  Arousal/Alertness: Delayed responses to stimuli  Following Commands: Follows one step commands with repetition;Follows one step commands with increased time  Attention Span: Attends with cues to redirect;Difficulty attending to directions  Memory: Decreased long term memory;Decreased short term memory  Safety Judgement: Decreased awareness of need for safety;Decreased awareness of need for assistance  Problem Solving: Assistance required to implement solutions;Assistance required to correct errors made  Insights: Decreased awareness of deficits  Initiation: Requires cues for all  Sequencing: Requires cues for all       Objective  Vitals  Vitals:    11/01/24 1405   BP: (!) 102/57   Pulse:    Resp:    Temp:    SpO2:    Comment: HR 74 BPM, SPO2 91% on room airm /57 in supine, HR 81 BPM, /58 in seated, pt performs several exericses in searted, HR 90 BPM, /57        Bed Mobility Training  Bed Mobility Training: Yes  Interventions: Safety awareness training;Manual cues;Verbal cues  Supine to Sit: Assist X2;Additional time;Adaptive equipment;Moderate assistance (toward L side)  Sit to 
Chronic microvascular white matter ischemic disease.   5. 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   6. Paranasal sinus disease and bilateral mastoid effusions.         CT CHEST ABDOMEN PELVIS W CONTRAST Additional Contrast? None   Final Result   1.  Stable reticulonodular opacities in the right upper lobe and stable 1.4   cm pulmonary nodule in the left upper lobe.      2.  Stable left-sided volume loss with consolidative opacities at the left   lung base.      3.  2 subtle hypodensities in the dome the liver measuring 1.8 cm and 1.1 cm.   These were likely present on the prior study but appear more visible on   today's exam.  These are indeterminate.  Cannot exclude metastatic disease.      4.  Similar appearance of diffuse sclerotic osseous metastatic disease.         CT HEAD WO CONTRAST   Final Result   1. An area of hypoattenuation in the right frontal lobe corresponds to a   metastatic lesion seen on recent MRI brain.  Known additional metastatic   lesions are not well seen on this noncontrast CT.   2. No acute intracranial hemorrhage or mass effect.         XR CHEST PORTABLE   Final Result   Extensive left lung airspace consolidation again noted stable since prior   examination.      Resolution of the previous seen bilateral pleural effusions.      Left chest wall Port-A-Cath line with tip in the SVC.                 ASSESSMENT AND RECOMMENDATIONS   Wes Schneider is a 79 y.o. male with PMH of 79 y.o. 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  Found to have a large left-sided pleural effusion and pancytopenia.  GI follows for dysphagia.      MBS 8/21/24: Moderate residue throughout the examination.  Variable episodes of laryngeal penetration.   
Clinical Factors: purewick  Functional Mobility: Dependent/Total  Functional Mobility Skilled Clinical Factors: min A of 1-2 with RW    OT Exercises  Pt performed x10-20 reps of the following BUE exercises:  [x]Grasp/release  [x]Wrist flexion/extension  [x]Forearm pronation/supination   [x]Elbow flexion/extension  [x]Shoulder flexion/extension  []Chest press  [x]Shoulder horizontal Abduction/Adduction  [x]Scapular elevation/retraction        Safety Devices  Type of Devices: All fall risk precautions in place;Patient at risk for falls;Nurse notified;Call light within reach;Left in chair;Gait belt;Chair alarm in place    Patient Education  Education Given To: Patient  Education Provided: Role of Therapy;Transfer Training;Plan of Care;Equipment;Precautions;Orientation;Mobility Training;Fall Prevention Strategies;Home Exercise Program  Education Provided Comments: Pt educated on importance of OOB mobility, prevention of complications of bedrest, and general safety during hospitalization.  Education Method: Demonstration;Verbal  Barriers to Learning: Cognition  Education Outcome: Verbalized understanding;Demonstrated understanding;Continued education needed    Goals  Short Term Goals  Time Frame for Short Term Goals: 1 week by 11/5/24 unless otherwise stated - goals extended to 11/12/24 d/t LOS -- goals ongoing 11/7/24  Short Term Goal 1: Perform functional transfer with Sue  Short Term Goal 2: Perform toilet transfer with Sue  Short Term Goal 3: Perform LB dressing modA AE PRN  Short Term Goal 4: Perform standing grooming task Sue  Short Term Goal 5: Perform x15 reps x2 sets BUE exercises in prep for ADLs and transfers  Patient Goals   Patient goals : to go home    AM-PAC - ADL  AM-PAC Daily Activity - Inpatient   How much help is needed for putting on and taking off regular lower body clothing?: Total  How much help is needed for bathing (which includes washing, rinsing, drying)?: A Lot  How much help is needed for 
Moderate residue throughout the examination.  Variable episodes of laryngeal penetration.     Impression:   1.Stage IV adenocarcinoma of the left lung with metastasis to brain, bone and liver on chemotherapy  2.Pancytopenia due to chemotherapy - improving  3. Large left-sided pleural effusion   4.Solid food dysphagia - differentials include but not limited to oropharyngeal dysphagia due to brain metastasis, reflux esophagitis, esophageal peptic stricture vs neoplasm     Plan:  Continue Famotidine 20mg orally BID   Plan for EGD with esophageal dilation tomorrow  Continue to hold Eliquis (11/2-)  NPO with IV fluids after midnight.   GI to follow       Murali Chahal MD  [facility]  11/3/2024    [phone]. Also available via [employer]    Please note that some or all of this record was generated using voice recognition software. If there are any questions about the content of this document, please contact the author as some errors in translation may have occurred.        
No  Referring Practitioner: Brandon Phillip MD  Referral Date : 10/28/24  Diagnosis: pancytopenia  Follows Commands: Impaired  Subjective  Subjective: Pt agreeable to evaluation.         Social/Functional History  Social/Functional History  Lives With: Spouse  Type of Home: House  Home Layout: Two level  Home Access: Stairs to enter with rails, Ramped entrance  Entrance Stairs - Number of Steps: 3 ANAMARIA  Bathroom Shower/Tub: Walk-in shower  Bathroom Toilet: Handicap height  Bathroom Equipment: Grab bars in shower, Built-in shower seat, Grab bars around toilet  Bathroom Accessibility: Accessible  Home Equipment: Rollator, Wheelchair - Manual  Has the patient had two or more falls in the past year or any fall with injury in the past year?:  (reports 1 fall)  Receives Help From: Home health, Family (home health 2x week for bathing, daughter comes by \"everyday\")  ADL Assistance: Needs assistance (assist with bathing)  Toileting: Independent  Homemaking Responsibilities: No  Ambulation Assistance: Independent  Transfer Assistance: Independent  Vision/Hearing  Vision  Vision: Within Functional Limits  Hearing  Hearing: Exceptions to WFL  Hearing Exceptions: Hard of hearing/hearing concerns      Objective  Temp: 97.2 °F (36.2 °C)  Pulse: 76  Heart Rate Source: Monitor  Respirations: 18  SpO2: 93 %  O2 Device: None (Room air)  BP: 139/67  MAP (Calculated): 91  BP Location: Right upper arm  BP Method: Automatic  Patient Position: Semi fowlers        Gross Assessment  AROM: Generally decreased, functional  Strength: Generally decreased, functional                Bed Mobility Training  Bed Mobility Training: Yes  Supine to Sit: Moderate assistance  Sit to Supine: Minimum assistance  Balance  Sitting: High guard (pt reports dizziness sitting EOB; vitals stable)  Standing: Impaired (with RW)  Standing - Static: Poor  Standing - Dynamic: Poor  Transfer Training  Transfer Training: Yes  Sit to Stand: Minimum assistance;Moderate 
Requires cues for some  Sequencing: Requires cues for some    Objective  Vitals   Vital signs stable  Bed Mobility Training  Bed Mobility Training: Yes  Interventions: Safety awareness training;Verbal cues (family training provided to spouse)  Supine to Sit:  (assist from spouse, therapist supervision)  Balance  Sitting: High guard  Standing - Static: Constant support (RW, support from spouse, therapist supervision)  Standing - Dynamic: Constant support (RW, support from spouse, therapist supervision)  Transfer Training  Transfer Training: Yes  Interventions: Safety awareness training;Verbal cues (family training provided for use of gait belt and appropriate guarding, spouse demonstrated appropriate technique)  Sit to Stand: Adaptive equipment;Additional time (spouse assisting, therapist supervision, RW)  Stand to Sit: Adaptive equipment;Additional time (spouse assisting, therapist supervision, RW)  Stand Pivot Transfers: Adaptive equipment;Additional time (spouse assisting, therapist supervision, RW)  Bed to Chair: Adaptive equipment;Additional time (spouse assisting, therapist supervision, RW)  Gait  Gait Training: No           Safety Devices  Type of Devices: All fall risk precautions in place;Patient at risk for falls;Nurse notified;Call light within reach;Left in chair;Gait belt;Chair alarm in place  Restraints  Restraints Initially in Place: No      Goals  Short Term Goals  Time Frame for Short Term Goals: 7 days (11/05/24) - goals time frame extended to 11/15/24 due to longer-than-expected LOS  Short Term Goal 1: Pt will perform bed mobility with Sue x 1- goal met 11/9, upgraded to SBA  Short Term Goal 2: Pt will perform sit<>stand and bed>chair transfers with CGA x 1  Short Term Goal 3: Pt will ambulate x 50 feet with RW and Sue x 1  Short Term Goal 4: By 11/01/24: Pt will tolerate 10-15 reps of LE ther ex for improved strength and activity tolerance - MET 10/31/24, goal ongoing to continue to build LE 
and alternating solids/liquids to promote pharyngeal clearance). Downgrade to nectar thick liquids recommended if s/s aspiration or worsening respiratory status develops.   Patient tolerance: Pt appears to be tolerating diet recs     Baseline Respiratory Status Measures: Pt with SPO2% of 92-93 on 0.5 LPM with RR of 18    Liquid PO Trials:    IDDSI 0 Thin:  Assessed via cup: anterior bolus loss, suspect premature bolus loss into pharynx, coughing after the swallow x1 on larger cup sip, no overt s/s aspiration on small single cup sips, and vitals stable    Solid PO Trials  IDDSI 4 Puree:   no anterior bolus loss , swallow timing subjectively appears timely, no clinical s/s of aspiration, and vitals stable    Repeat Respiratory Status Measures: Pt with SPO2% of 92-94 on 0.5 LPM with RR of 18    Impressions:  Pt asleep in bed with wife at bedside- awakened and agreeable to tx session. Positioned upright by SLP. Pts wife reported pt implementing chin tuck throughout breakfast this morning and tolerated intake \"well.\"  SLP facilitated pt and caregiver edu re: MBSS results and rationale for diet modification, swallow anatomy and physiology, and safe swallow strategies (chin tuck, double swallow, no straws, etc). Verbal return given from pt and wife.   Pt agreeable to PO to trial strategies. Pt benefited from min-mod cues for chin tuck and double swallow, as well as alternating liquids/solids.  Pt with cough x1 on larger cup sip- uncoordinated self feeding observed with difficulty controlling bolus volume. No overt s/s aspiration on small single sips with hand-over-hand assistance by SLP.   Recommend continuation of puree solids with thin liquids (no straws) with intermittent use of chin tuck and double swallow to promote pharyngeal clearance. Further recommend alternating solids/liquids to promote pharyngeal clearance. If worsening respiratory status or overt s/s aspiration develops, recommend downgrade to nectar thick 
awareness of need for safety  Problem Solving: Impaired;Assistance required to identify errors made;Decreased awareness of errors  Insights: Decreased awareness of deficits  Initiation: Requires cues for some  Sequencing: Requires cues for some       Objective  Vitals  Vitals  Pulse: 73  Heart Rate Source: Monitor  SpO2: 94 %  O2 Device: Nasal cannula (0.5)  BP: 131/64  MAP (Calculated): 86  BP Location: Left upper arm  BP Method: Automatic  Patient Position: Semi fowlers  Bed Mobility Training  Bed Mobility Training: Yes  Supine to Sit: Moderate assistance (assist w/ trunk mgmt from flat bed)  Sit to Supine: Other (comment) (seated in chair EOS)  Scooting: Stand-by assistance;Additional time (EOB)  Balance  Sitting: High guard  Standing: Impaired  Standing - Static: Constant support;Fair  Standing - Dynamic: Constant support;Poor  Transfer Training  Transfer Training: Yes  Sit to Stand: Minimum assistance;Adaptive equipment;Additional time (c RW, cues for body mechanics to reduce posterior lean)  Stand to Sit: Adaptive equipment;Additional time;Moderate assistance (c RW, cues for hand placement and mod A for balance/ safety awareness as pt repeatedly attempting to sit in unsafe manner)  Bed to Chair: Moderate assistance;Adaptive equipment;Additional time (c RW, repeated cues/ assist for safety + AD mgmt as pt attempting to sit x3 in unsafe manner and presenting w/ posterior lean requiring A for upright posture/ balance)     ADL  LE Dressing: Maximum assistance  LE Dressing Skilled Clinical Factors: brief change  Putting On/Taking Off Footwear: Dependent/Total  Putting On/Taking Off Footwear Skilled Clinical Factors: don B socks  Toileting: Dependent/Total  Toileting Skilled Clinical Factors: purewick, noted brief saturated 2/2 faulty purewick requiring max A brief change / CM  Functional Mobility: Unable to assess (Comment)  Functional Mobility Skilled Clinical Factors: mod A stand pivot EOB > chair c RW, inc GENET 
safety during hospitalization.  Education Method: Verbal  Barriers to Learning: Cognition  Education Outcome: Verbalized understanding;Continued education needed    AM-PAC - Mobility    AM-PAC Basic Mobility - Inpatient   How much help is needed turning from your back to your side while in a flat bed without using bedrails?: A Lot  How much help is needed moving from lying on your back to sitting on the side of a flat bed without using bedrails?: A Lot  How much help is needed moving to and from a bed to a chair?: A Lot  How much help is needed standing up from a chair using your arms?: A Lot  How much help is needed walking in hospital room?: Total  How much help is needed climbing 3-5 steps with a railing?: Total  AM-PAC Inpatient Mobility Raw Score : 10  AM-PAC Inpatient T-Scale Score : 32.29  Mobility Inpatient CMS 0-100% Score: 76.75  Mobility Inpatient CMS G-Code Modifier : CL         Therapy Time   Individual Concurrent Group Co-treatment   Time In       1417   Time Out       1441   Minutes       24   Timed Code Treatment Minutes: 24 Minutes       Matt Krueger           
with SPO2% of 96 on 1LPM with RR of 12    Liquid PO Trials:    IDDSI 0 Thin:  Assessed via cup: anterior bolus loss, no overt s/s aspiration on small single cup sips, and vitals stable    Solid PO Trials  IDDSI 4 Puree:   no anterior bolus loss , swallow timing subjectively appears timely, no clinical s/s of aspiration, and vitals stable    Repeat Respiratory Status Measures: Pt with SPO2% of 95 on 1LPM with RR of 16    Impressions:  Pt seen at bedside and agreeable to tx. SLP quizzed pt regarding his swallowing (what diet are you on, what are the strategies we rec, and what are the strengthening exercises we rec). Pt able to mostly recall all information. SLP and pt completed 5x effortful swallows with and without PO and x5 maximo exercises. Pt appeared be fatigued during and following exercises. SLP encouraged pt to complete more throughout the day. SLP to follow.     Recommend continuation of puree solids with thin liquids (no straws) with intermittent use of chin tuck and double swallow to promote pharyngeal clearance. Further recommend alternating solids/liquids to promote pharyngeal clearance. If worsening respiratory status or overt s/s aspiration develops, recommend downgrade to nectar thick liquids. Pt will benefit from repeat imaging to assess safety for diet advancement following dysphagia tx- SLP to monitor.                  Dysphagia Goals:  Timeframe for Long-term Goals: (7 days 11/11/24)  Goal 1: The patient will tolerate least restrictive diet with no clinical s/s of aspiration or worsening respiratory/pulmonary status 11/10/2024 : Ongoing, progressing.     Short-term Goals  Timeframe for Short-term Goals: (5 days 11/09/24)  Goal 1: The patient will tolerate recommended diet with no clinical s/s of aspiration 5/5 11/10/2024 : Ongoing, progressing.   Goal 2: The patient will tolerate therapeutic diet upgrade trials with no clinical s/s of aspiration 5/5 11/10/2024 : Ongoing, not yet progressing. 
putting on and taking off regular upper body clothing?: A Little  How much help is needed for taking care of personal grooming?: A Little  How much help for eating meals?: A Little  AM-PAC Inpatient Daily Activity Raw Score: 13  AM-PAC Inpatient ADL T-Scale Score : 32.03  ADL Inpatient CMS 0-100% Score: 63.03  ADL Inpatient CMS G-Code Modifier : CL    Therapy Time   Individual Concurrent Group Co-treatment   Time In       1103   Time Out       1129   Minutes       26   Timed Code Treatment Minutes: 26 Minutes       Cherri Clancy, OT     
 11/10?  Barriers to Discharge: Pending workup, wean off o2, family training with pt/ot   --------------------------------------------------    MDM (any 2 required for High level billing)    A. Problems (any 1)  [x] Acute/Chronic Illness/injury posing ongoing threat to life and/or bodily function without ongoing treatment    [] Severe exacerbation of chronic illness    --------------------------------------------------  B. Risk of Treatment (any 1)    [x] Drugs/treatments that require intensive monitoring for toxicity    [x] IV ABX (Vancomycin, Aminoglycosides, etc)     [] Post-Cath/Contrast study requiring serial monitoring    [] IV Narcotic analgesia    [] Aggressive IV diuresis    [] Hypertonic Saline    [] Critical electrolyte abnormalities requiring IV replacement    [] Insulin - Scheduled/SSI or Insulin gtt    [] Anticoagulation (Heparin gtt or Coumadin - other anticoagulants in special circumstances)    [] Cardiac Medications (IV Amiodarone/Diltiazem, Tikosyn, etc)    [] Hemodialysis    [] Other -    [] Change in code status    [] Decision to escalate care    [] Major surgery/procedure with associated risk factors    --------------------------------------------------  C. Data (any 2)    [x] Data Review (any 3)    [x] Consultant notes from yesterday/today    [x] All available current labs reviewed interpreted for clinical significance    [x] Appropriate follow-up labs were ordered  [] Collateral history obtained     [] Independent Interpretation of tests (any 1)    [] Telemetry (Rhythm Strip) personally reviewed and interpreted        [] Imaging personally reviewed and interpreted     [x] Discussion (any 1)  [x] Multi-Disciplinary Rounds with Case Management  [] Discussed management of the case with           Labs:  Personally reviewed on 11/9/2024 and interpreted for clinical significance as documented above.     Recent Labs     11/07/24  0853 11/08/24  0600 11/09/24  0600   WBC 7.4 6.9 7.6   HGB 8.2* 8.3* 
11/12?  Barriers to Discharge: Pending wbc trend, pt/ot eval today  --------------------------------------------------    MDM (any 2 required for High level billing)    A. Problems (any 1)  [x] Acute/Chronic Illness/injury posing ongoing threat to life and/or bodily function without ongoing treatment    [] Severe exacerbation of chronic illness    --------------------------------------------------  B. Risk of Treatment (any 1)    [] Drugs/treatments that require intensive monitoring for toxicity    [] IV ABX (Vancomycin, Aminoglycosides, etc)     [] Post-Cath/Contrast study requiring serial monitoring    [] IV Narcotic analgesia    [] Aggressive IV diuresis    [] Hypertonic Saline    [] Critical electrolyte abnormalities requiring IV replacement    [] Insulin - Scheduled/SSI or Insulin gtt    [] Anticoagulation (Heparin gtt or Coumadin - other anticoagulants in special circumstances)    [] Cardiac Medications (IV Amiodarone/Diltiazem, Tikosyn, etc)    [] Hemodialysis    [] Other -    [] Change in code status    [] Decision to escalate care    [] Major surgery/procedure with associated risk factors    --------------------------------------------------  C. Data (any 2)    [x] Data Review (any 3)    [x] Consultant notes from yesterday/today    [x] All available current labs reviewed interpreted for clinical significance    [x] Appropriate follow-up labs were ordered  [] Collateral history obtained     [] Independent Interpretation of tests (any 1)    [] Telemetry (Rhythm Strip) personally reviewed and interpreted        [] Imaging personally reviewed and interpreted     [x] Discussion (any 1)  [x] Multi-Disciplinary Rounds with Case Management  [] Discussed management of the case with           Labs:  Personally reviewed on 11/11/2024 and interpreted for clinical significance as documented above.     Recent Labs     11/09/24  0600 11/11/24  0525   WBC 7.6 15.3*   HGB 8.6* 7.7*   HCT 26.3* 23.4*    359     Recent 
Acute/Chronic Illness/injury posing ongoing threat to life and/or bodily function without ongoing treatment    [] Severe exacerbation of chronic illness    --------------------------------------------------  B. Risk of Treatment (any 1)    [x] Drugs/treatments that require intensive monitoring for toxicity    [x] IV ABX (Vancomycin, Aminoglycosides, etc)     [] Post-Cath/Contrast study requiring serial monitoring    [] IV Narcotic analgesia    [] Aggressive IV diuresis    [] Hypertonic Saline    [] Critical electrolyte abnormalities requiring IV replacement    [] Insulin - Scheduled/SSI or Insulin gtt    [] Anticoagulation (Heparin gtt or Coumadin - other anticoagulants in special circumstances)    [] Cardiac Medications (IV Amiodarone/Diltiazem, Tikosyn, etc)    [] Hemodialysis    [] Other -    [] Change in code status    [] Decision to escalate care    [] Major surgery/procedure with associated risk factors    --------------------------------------------------  C. Data (any 2)    [x] Data Review (any 3)    [x] Consultant notes from yesterday/today    [x] All available current labs reviewed interpreted for clinical significance    [x] Appropriate follow-up labs were ordered  [] Collateral history obtained     [] Independent Interpretation of tests (any 1)    [] Telemetry (Rhythm Strip) personally reviewed and interpreted        [] Imaging personally reviewed and interpreted     [x] Discussion (any 1)  [x] Multi-Disciplinary Rounds with Case Management  [] Discussed management of the case with           Labs:  Personally reviewed on 11/4/2024 and interpreted for clinical significance as documented above.     Recent Labs     11/03/24  0720 11/04/24  0704   WBC 4.5 3.6*   HGB 7.3* 6.2*   HCT 22.3* 18.7*   * 141     Recent Labs     11/03/24  0523 11/04/24  0704   * 138   K 4.2 4.2    108   CO2 17* 21   BUN 24* 21*   CREATININE 0.9 0.9   CALCIUM 7.2* 7.0*     No results for input(s): \"PROBNP\", 
CK    Goals  Short Term Goals  Time Frame for Short Term Goals: 1 week by 11/5/24 unless otherwise stated  Short Term Goal 1: Perform functional transfer with Sue  Short Term Goal 2: Perform toilet transfer with Sue  Short Term Goal 3: Perform LB dressing modA AE PRN  Short Term Goal 4: Perform standing grooming task Sue  Short Term Goal 5: Perform x15 reps x2 sets BUE exercises in prep for ADLs and transfers by 11/4/24  Patient Goals   Patient goals : to go home      Therapy Time   Individual Concurrent Group Co-treatment   Time In 1339         Time Out 1414         Minutes 35         Timed Code Treatment Minutes: 25 Minutes (10 min eval)       Cherri Clancy, OT     
for High level billing)    A. Problems (any 1)  [x] Acute/Chronic Illness/injury posing ongoing threat to life and/or bodily function without ongoing treatment    [] Severe exacerbation of chronic illness    --------------------------------------------------  B. Risk of Treatment (any 1)    [x] Drugs/treatments that require intensive monitoring for toxicity    [x] IV ABX (Vancomycin, Aminoglycosides, etc)     [] Post-Cath/Contrast study requiring serial monitoring    [] IV Narcotic analgesia    [] Aggressive IV diuresis    [] Hypertonic Saline    [] Critical electrolyte abnormalities requiring IV replacement    [] Insulin - Scheduled/SSI or Insulin gtt    [] Anticoagulation (Heparin gtt or Coumadin - other anticoagulants in special circumstances)    [] Cardiac Medications (IV Amiodarone/Diltiazem, Tikosyn, etc)    [] Hemodialysis    [] Other -    [] Change in code status    [] Decision to escalate care    [] Major surgery/procedure with associated risk factors    --------------------------------------------------  C. Data (any 2)    [x] Data Review (any 3)    [x] Consultant notes from yesterday/today    [x] All available current labs reviewed interpreted for clinical significance    [x] Appropriate follow-up labs were ordered  [] Collateral history obtained     [x] Independent Interpretation of tests (any 1)    [x] Telemetry (Rhythm Strip) personally reviewed and interpreted        [] Imaging personally reviewed and interpreted     [x] Discussion (any 1)  [x] Multi-Disciplinary Rounds with Case Management  [] Discussed management of the case with           Labs:  Personally reviewed on 11/6/2024 and interpreted for clinical significance as documented above.     Recent Labs     11/04/24  0704 11/04/24  0831 11/05/24  0451 11/05/24  1017 11/06/24  1200   WBC 3.6*  --  3.2*  --  7.3   HGB 6.2*   < > 6.6* 8.2* 8.0*   HCT 18.7*   < > 20.3* 25.0* 24.4*     --  186  --  266    < > = values in this interval not 
for dyspnea in the [facility] ER on 5/28/2021.  A CTPA at that time showed no evidence of a PE.  A large left-sided pleural effusion was present.  A 1.7 cm area of rounded atelectasis in the LLL was present.  Scattered granulomas were present as well as calcified left hilar and left lower lobe paratracheal lymph nodes.  He was discharged home from the ER.  - A follow-up CT of the chest on 9/01/2021 showed stable findings.  As a consequence, he was seen in consultation by Dr. Bush on 9/27/2021 and an ultrasound-guided paracentesis was ordered.  A left-sided ultrasound-guided paracentesis was performed on 9/28/2021, which yielded 1 L of clear yellow fluid.  Cytology showed a metastatic adenocarcinoma.  IHC was consistent with a lung primary.  - A PET/CT on 10/21/2021 showed a 2.2 x 1.4 cm opacity in the superior segment of the left lower lobe with max SUV 8.0.  A 1.7 x 1.4 cm right hepatic lobe metastasis with peak SUV 13.6 was present.  Multiple hypermetabolic bone mets involving the thoracolumbar spine and pelvis were present.  An MRI of the brain on 10/25/2021 was negative.  - Treatment   - He received Carbo/Alimta/Pembro x 4 cycles between 11/08/2021 - 1/10/2022.  He received maintenance Pembrolizumab between 1/31 - 7/19/2022.  Treatment was discontinued after a CT on 8/12/2022 showed slight interval progression in the LLL, left hilum, liver, thoracic spine.  - Started Alimta on 08/15/2022.   - last received Alimta on 10/18/2024  - A CT of the chest/abdomen/pelvis performed on 8/06/2024, showed a stable LORE spiculated nodule, stable left sided volume loss with patchy consolidation, stable osteoblastic metastatic disease, and a 1 cm subcutaneous nodule along the left chest wall that was slightly increased in size compared to previously.  - plan was to repeat scans after this treatment 10/18/2024 and discuss possible discontinuation of chemo to preserve quality of life.   - CT C/A/P 10/29/2024 showed  - Stable 
respiratory status develop.     If pt has overt s/s of aspiration with thin liquids or worsening respiratory status, recommend downgrade to mildly (nectar) thick liquids (IDDSI 2) / no straws    Education: SLP edu pt re: Role of SLP, Rationale for dysphagia tx, Recommended compensatory strategies, Aspiration precautions, MBS results, Risks of not following recommendations, Risks of not following recommended diet, and Importance of oral care to reduce adverse affects in the event of aspiration. Pt verbalized understanding, would benefit from ongoing education, and RN aware of recommendations    Plan:    Continued Dysphagia treatment with goals per plan of care.    Discharge Recommendations: SLP at discharge is pending clinical progression    If pt discharges from hospital prior to Speech/Swallowing discharge, this note serves as tx and discharge summary.     Total Treatment Time / Charges     Time in Time out Total Time / units   Cognitive Tx         Speech Tx      Dysphagia Tx  1424  1443  19 min/ 1 unit      Signature:  Luiza Velasquez M.A. CCC-SLP   Speech-Language Pathologist     
seen in consultation by Dr. Bush on 9/27/2021 and an ultrasound-guided paracentesis was ordered.  A left-sided ultrasound-guided paracentesis was performed on 9/28/2021, which yielded 1 L of clear yellow fluid.  Cytology showed a metastatic adenocarcinoma.  IHC was consistent with a lung primary.  - A PET/CT on 10/21/2021 showed a 2.2 x 1.4 cm opacity in the superior segment of the left lower lobe with max SUV 8.0.  A 1.7 x 1.4 cm right hepatic lobe metastasis with peak SUV 13.6 was present.  Multiple hypermetabolic bone mets involving the thoracolumbar spine and pelvis were present.  An MRI of the brain on 10/25/2021 was negative.  - Treatment   - He received Carbo/Alimta/Pembro x 4 cycles between 11/08/2021 - 1/10/2022.  He received maintenance Pembrolizumab between 1/31 - 7/19/2022.  Treatment was discontinued after a CT on 8/12/2022 showed slight interval progression in the LLL, left hilum, liver, thoracic spine.  - Started Alimta on 08/15/2022.   - last received Alimta on 10/18/2024  - A CT of the chest/abdomen/pelvis performed on 8/06/2024, showed a stable LORE spiculated nodule, stable left sided volume loss with patchy consolidation, stable osteoblastic metastatic disease, and a 1 cm subcutaneous nodule along the left chest wall that was slightly increased in size compared to previously.  - plan was to repeat scans after this treatment 10/18/2024 and discuss possible discontinuation of chemo to preserve quality of life.   - CT C/A/P 10/29/2024 showed  - Stable reticulonodular infiltrates in the left lung  - Stable liver mets  - Stable sclerotic bone mets  - palliative care consulted and appreciated. Patient and wife would like to remain aggressive with care.  - 10/31: reviewed and discussed imaging with patient/wife. Disease appears stable overall. Could transition to 4 week cycle to preserve quality of life. Discussed with wife today. She states that the patient was leaning towards a desire to stop chemo 
  Lab Results   Component Value Date/Time    BC No Growth after 4 days of incubation. 10/28/2024 10:45 AM     Lab Results   Component Value Date/Time    BLOODCULT2 No Growth after 4 days of incubation. 10/28/2024 12:51 PM     Organism:   Lab Results   Component Value Date/Time    ORG Candida albicans 08/21/2024 10:55 AM         Morenita Blair, APRN - CNP    
 266 243     Recent Labs     11/05/24  0451 11/07/24  0610    140   K 4.4 4.1    109   CO2 21 24   BUN 23* 25*   CREATININE 1.0 0.9   CALCIUM 6.8* 7.0*   PHOS  --  1.7*     No results for input(s): \"PROBNP\", \"TROPHS\" in the last 72 hours.  No results for input(s): \"LABA1C\" in the last 72 hours.  No results for input(s): \"AST\", \"ALT\", \"BILIDIR\", \"BILITOT\", \"ALKPHOS\" in the last 72 hours.  No results for input(s): \"INR\", \"LACTA\", \"TSH\" in the last 72 hours.    Urine Cultures:   Lab Results   Component Value Date/Time    LABURIN  08/16/2024 09:29 PM     <10,000 CFU/ml mixed skin/urogenital cora. No further workup    LABURIN 25,000 CFU/ml  No further workup   08/16/2024 09:29 PM     Blood Cultures:   Lab Results   Component Value Date/Time    BC  11/04/2024 09:22 AM     No Growth to date.  Any change in status will be called.     Lab Results   Component Value Date/Time    BLOODCULT2  11/04/2024 09:21 AM     No Growth to date.  Any change in status will be called.     Organism:   Lab Results   Component Value Date/Time    ORG Candida albicans 08/21/2024 10:55 AM         Chapin Arriola MD  
She is only able to help him out of bed so much.  He wants to be able to use the bedside commode, but he is often too weak to get back into bed. She also worries about these \"spells\" he is having which look to her like seizures.  We discussed the safety issues with her trying to be the one to ambulate patient.  We discussed the possibility that patient will become bed bound and need to use a bed pan instead of a BSC.      Attempted to discuss code status.  Miranda warned me that she would never agree to a DNR. Like with hospice care, she shares several \"DNR\" anecdotes that she unfortunately believes to be true but are completely false.  She is unwilling to consider the accurate information I shared with her. I did make recommendation that she and her  (when he is awake and capable of discussion) talk about changing his code status to a DNR.      Total ACP discussion time: 60 minutes    Disposition/Discharge Plan :    - advised pulmonology of wife's plan to follow his recommendations about intubation  - hospice materials left outside the room (wife does not want them)  - son will speak with his wife and then to patient's wife to try to encourage her to consider hospice and DNR/DNI  - will continue to follow    Advance Directives:  Code status:  Full Code  Decision Maker: WifeMiranda    Case Discussed with:  patient, family, floor RN, hospitalist, case management, oncology, pulmonology    Thank you for allowing us to participate in the care of this patient.     SUBJECTIVE     Chief Complaint: weakness    Last 24 hours:   Continues with AMS.  Had RR this morning for acute resp failure/possible aspiration. Currently on 8 L with pulm consult pending                   Palliative Medicine Interventions:    patient/family support  Goals of Care discussions with patient/surrogate  Spiritual Interventions: following  Counseling and educating the patient/family/caregiver  Preparing to see the patient (e.g., review of 
did make recommendation that she and her  (when he is awake and capable of discussion) talk about changing his code status to a DNR.      Total ACP discussion time: 60 minutes    Disposition/Discharge Plan :     - will continue to follow and re-engage with wife as she will allow.  Wife resistant to discussion with palliative care.  PT/OT recommending SNF.  Wife does not want SNF.  She does not want hospice.  She wants to take patient home with HH, but patient needs will likely exceed what wife and HH can provide.        Advance Directives:  Code status:  Full Code  Decision Maker: WifeMiranda    Case Discussed with:  patient, family, floor RN, hospitalist, case management, oncology,    Thank you for allowing us to participate in the care of this patient.     SUBJECTIVE     Chief Complaint: weakness    Last 24 hours:   WBC bump to 15.3                 Palliative Medicine Interventions:    patient/family support  Goals of Care discussions with patient/surrogate  Spiritual Interventions: following  Counseling and educating the patient/family/caregiver  Preparing to see the patient (e.g., review of tests)  Referring / communicating with other healthcare professionals including care coordination (not separately reported): Hospitalist, Case management  Documenting clinical information in the electronic health record          DATA:  Current labs in the epic chart reviewed as of 11/11/2024   Review of previous notes, admits, labs, radiology and testing relevant to this consult done in this chart today 11/11/2024    Data Reviewed related to this consultation:    Review of prior external note(s) from each unique source relevant to today's visit: Hospitalist, Case management, PT/OT/ST, pulmonology, oncology  Discussion of management or test with external physician/qualified health care professional: Hospitalist, Case management, pulmonology, oncology  Unique test results reviewed: CBC and BMP, Liver studies, cardiac 
collected               - procalcitonin elevated 0.78  - started broad spec abx in ED   - monitor temp  - HGB 6.8 g/dL this AM. pRBCs ordered for transfusion. Discussed with wife who agrees to proceed.   - cultures NGTD with no infectious source. Recommend d/c abx.   - transfuse as needed for HGB <7, plts <10K    Brain mets   - An MRI of the brain on 8/22/2023 showed a new 9 mm enhancing lesion in the right superior frontal gyrus and a new 3 mm enhancing lesion in the left cerebellum.  At the time of a planning MRI of the brain on 9/25/2023, 9 total brain mets were identified and treated by [facility] with Dr. Carl Ojeda and Dr. Gerry Lofton.  - A follow-up MRI on 12/12/2023 showed mild enlargement of lesions in the right temporal/occipital lobe and left frontal lobe.  This was felt to be due to treatment effect/radiation necrosis.  5 Loxin was recommended.  - Another MRI of the brain on 4/04/2024 showed an increase in the size of a left frontal periventricular met (from 2 mm to 6 mm) and an increase in the size of the right parietal met (from 2 mm to 3 mm).  Another MRI of the brain on 5/03/2024 showed stable brain mets with an increase in peritumoral edema surrounding the 2 larger brain mets.  Another MRI on 7/26/2024 showed stable findings.   - Another MRI on 10/10/2024 showed stable findings.     Prevention of SRE  - started Zometa on 11/08/2021. Last received 09/27/2024. Due next 11/08/2024.      T2DM  - on insulin glargine   - per IM     AFIB  - on metoprolol succinate and amiodarone  - per IM     ONCOLOGIC DISPOSITION:      AASHISH XIONG  Please contact through [employer]   
straws. Chin tuck.     Respiratory Failure  - rapid called 11/04  - concern for aspiration   - patient requiring 8L O2 and minimally responsive  - CXR shows findings consistent with pneumonia   - placed on merrem per IM   - improved mental status later in AM   - now back on room air with no residual symptoms.      Neutropenia   Anemia  Thrombocytopenia   - secondary to recent chemo 10/18/2024   - Started Procrit 40,000 units weekly on 9/01/2023.  HGB was 7.4 g/dL at that time.  He last received Procrit on 10/24/2024. Given inpatient 11/01/2024.   - HGB 5.2 on admission, plts 42K, ANC 0.9 received blood transfusion   - afebrile   - please continue patients folic acid as Alimta is a folic acid inhibitor   - Infectious Workup:              - blood cultures from 10/28/2024 NGTD               - COVID/Flu negative               - UA unremarkable               - GI pathogen panel not collected               - procalcitonin elevated 0.78  - started broad spec abx in ED ; d/moni on 10/30   - monitor temp  - HGB 6.8 on 10/30/2024, 1 unit pRBCs transfused.   - HGB down to 6.2 on 11/04. Transfused 1 unit pRBCs. Plts improved to 141.   - HGB down to 6.6 g/dL on 11/05. Transfusing pRBCs.   - transfuse as needed for HGB <7, plts <10K    Brain mets   - An MRI of the brain on 8/22/2023 showed a new 9 mm enhancing lesion in the right superior frontal gyrus and a new 3 mm enhancing lesion in the left cerebellum.  At the time of a planning MRI of the brain on 9/25/2023, 9 total brain mets were identified and treated by [facility] with Dr. Carl Ojeda and Dr. Gerry Lofton.  - A follow-up MRI on 12/12/2023 showed mild enlargement of lesions in the right temporal/occipital lobe and left frontal lobe.  This was felt to be due to treatment effect/radiation necrosis.  5 Loxin was recommended.  - Another MRI of the brain on 4/04/2024 showed an increase in the size of a left frontal periventricular met (from 2 mm to 6 mm) and an increase in the 
141.   - HGB down to 6.6 g/dL on 11/05. Transfusing pRBCs.   - HGB stable at 7.7 g/dL this AM.   - transfuse as needed for HGB <7, plts <10K    Brain mets   - An MRI of the brain on 8/22/2023 showed a new 9 mm enhancing lesion in the right superior frontal gyrus and a new 3 mm enhancing lesion in the left cerebellum.  At the time of a planning MRI of the brain on 9/25/2023, 9 total brain mets were identified and treated by [facility] with Dr. Carl Ojeda and Dr. Gerry Lofton.  - A follow-up MRI on 12/12/2023 showed mild enlargement of lesions in the right temporal/occipital lobe and left frontal lobe.  This was felt to be due to treatment effect/radiation necrosis.  5 Loxin was recommended.  - Another MRI of the brain on 4/04/2024 showed an increase in the size of a left frontal periventricular met (from 2 mm to 6 mm) and an increase in the size of the right parietal met (from 2 mm to 3 mm).  Another MRI of the brain on 5/03/2024 showed stable brain mets with an increase in peritumoral edema surrounding the 2 larger brain mets.  Another MRI on 7/26/2024 showed stable findings.   - Another MRI on 10/10/2024 showed stable findings.  - MRI 10/31/2024 showed multiple intracranial mets some of which are smaller than prior. 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      Prevention of SRE  - started Zometa on 11/08/2021. Last received 09/27/2024. Due next 11/08/2024.      T2DM  - on insulin glargine   - per IM     AFIB  - on metoprolol succinate and amiodarone  - per IM     ONCOLOGIC DISPOSITION:      AASHISH XIONG  Please contact through [employer]

## 2024-11-12 NOTE — PLAN OF CARE
Problem: Gastrointestinal - Adult  Goal: Maintains or returns to baseline bowel function  Outcome: Not Progressing- NO BM since 10/26  Flowsheets (Taken 10/30/2024 0158)  Maintains or returns to baseline bowel function:   Assess bowel function   Encourage mobilization and activity   Encourage oral fluids to ensure adequate hydration   Administer ordered medications as needed  Goal: Maintains adequate nutritional intake  Outcome: Not Progressing- Pt/Family report minimal intake  Flowsheets (Taken 10/30/2024 0158)  Maintains adequate nutritional intake:   Monitor percentage of each meal consumed   Monitor intake and output, weight and lab values   Obtain nutritional consult as needed   Assist with meals as needed     
  Problem: Neurosensory - Adult  Goal: Achieves stable or improved neurological status  Outcome: Not Progressing  Flowsheets (Taken 10/28/2024 1932 by Cecy Hansen, RN)  Achieves stable or improved neurological status:   Assess for and report changes in neurological status   Initiate measures to prevent increased intracranial pressure   Maintain blood pressure and fluid volume within ordered parameters to optimize cerebral perfusion and minimize risk of hemorrhage   Monitor temperature, glucose, and sodium. Initiate appropriate interventions as ordered       Problem: Safety - Adult  Goal: Free from fall injury  Outcome: Progressing  Flowsheets (Taken 11/3/2024 1322)  Free From Fall Injury: Instruct family/caregiver on patient safety     Problem: Skin/Tissue Integrity  Goal: Absence of new skin breakdown  Description: 1.  Monitor for areas of redness and/or skin breakdown  2.  Assess vascular access sites hourly  3.  Every 4-6 hours minimum:  Change oxygen saturation probe site  4.  Every 4-6 hours:  If on nasal continuous positive airway pressure, respiratory therapy assess nares and determine need for appliance change or resting period.  Outcome: Progressing     Problem: Discharge Planning  Goal: Discharge to home or other facility with appropriate resources  Outcome: Progressing  Flowsheets (Taken 10/28/2024 1932 by Cecy Hansen, RN)  Discharge to home or other facility with appropriate resources:   Identify barriers to discharge with patient and caregiver   Arrange for needed discharge resources and transportation as appropriate   Identify discharge learning needs (meds, wound care, etc)     Problem: Neurosensory - Adult  Goal: Absence of seizures  Outcome: Progressing  Flowsheets (Taken 11/1/2024 0014 by Shabbir Clements Jr, RN)  Absence of seizures: Monitor for seizure activity.  If seizure occurs, document type and location of movements and any associated apnea  Goal: Remains free of injury 
  Problem: Safety - Adult  Goal: Free from fall injury  10/28/2024 1932 by Cecy Hansen RN  Outcome: Progressing  Flowsheets (Taken 10/28/2024 1932)  Free From Fall Injury:   Instruct family/caregiver on patient safety   Based on caregiver fall risk screen, instruct family/caregiver to ask for assistance with transferring infant if caregiver noted to have fall risk factors     Problem: Chronic Conditions and Co-morbidities  Goal: Patient's chronic conditions and co-morbidity symptoms are monitored and maintained or improved  10/28/2024 1932 by Cecy Hansen RN  Outcome: Progressing  Flowsheets (Taken 10/28/2024 1932)  Care Plan - Patient's Chronic Conditions and Co-Morbidity Symptoms are Monitored and Maintained or Improved:   Monitor and assess patient's chronic conditions and comorbid symptoms for stability, deterioration, or improvement   Update acute care plan with appropriate goals if chronic or comorbid symptoms are exacerbated and prevent overall improvement and discharge   Collaborate with multidisciplinary team to address chronic and comorbid conditions and prevent exacerbation or deterioration     Problem: Skin/Tissue Integrity  Goal: Absence of new skin breakdown  Description: 1.  Monitor for areas of redness and/or skin breakdown  2.  Assess vascular access sites hourly  3.  Every 4-6 hours minimum:  Change oxygen saturation probe site  4.  Every 4-6 hours:  If on nasal continuous positive airway pressure, respiratory therapy assess nares and determine need for appliance change or resting period.  10/28/2024 1932 by Cecy Hansen RN  Outcome: Progressing     Problem: Discharge Planning  Goal: Discharge to home or other facility with appropriate resources  Outcome: Progressing  Flowsheets (Taken 10/28/2024 1932)  Discharge to home or other facility with appropriate resources:   Identify barriers to discharge with patient and caregiver   Arrange for needed discharge resources and transportation as 
  Problem: Safety - Adult  Goal: Free from fall injury  10/29/2024 1709 by Chika Patel RN  Outcome: Progressing     Problem: Chronic Conditions and Co-morbidities  Goal: Patient's chronic conditions and co-morbidity symptoms are monitored and maintained or improved  10/29/2024 1709 by Chika Patel RN  Outcome: Progressing     Problem: Skin/Tissue Integrity  Goal: Absence of new skin breakdown  Description: 1.  Monitor for areas of redness and/or skin breakdown  2.  Assess vascular access sites hourly  3.  Every 4-6 hours minimum:  Change oxygen saturation probe site  4.  Every 4-6 hours:  If on nasal continuous positive airway pressure, respiratory therapy assess nares and determine need for appliance change or resting period.  10/29/2024 1709 by Chika Patel RN  Outcome: Progressing     Problem: Discharge Planning  Goal: Discharge to home or other facility with appropriate resources  10/29/2024 1709 by Chika Patel RN  Outcome: Progressing     Problem: Neurosensory - Adult  Goal: Achieves stable or improved neurological status  10/29/2024 1709 by Chika Patel RN  Outcome: Progressing     Problem: Neurosensory - Adult  Goal: Absence of seizures  Outcome: Progressing     Problem: Neurosensory - Adult  Goal: Remains free of injury related to seizures activity  Outcome: Progressing     Problem: Neurosensory - Adult  Goal: Achieves maximal functionality and self care  Outcome: Progressing     Problem: Skin/Tissue Integrity - Adult  Goal: Skin integrity remains intact  Outcome: Progressing     Problem: Skin/Tissue Integrity - Adult  Goal: Incisions, wounds, or drain sites healing without S/S of infection  Outcome: Progressing     
  Problem: Safety - Adult  Goal: Free from fall injury  11/1/2024 0934 by Cassidy Dumont RN  Outcome: Progressing  11/1/2024 0014 by Shabbir Clements Jr, RN  Outcome: Progressing  Flowsheets (Taken 10/28/2024 1932 by Cecy Hansen, RN)  Free From Fall Injury:   Instruct family/caregiver on patient safety   Based on caregiver fall risk screen, instruct family/caregiver to ask for assistance with transferring infant if caregiver noted to have fall risk factors     Problem: Chronic Conditions and Co-morbidities  Goal: Patient's chronic conditions and co-morbidity symptoms are monitored and maintained or improved  11/1/2024 0934 by Cassidy Dumont RN  Outcome: Progressing  11/1/2024 0014 by Shabbir Clements Jr, RN  Outcome: Progressing  Flowsheets (Taken 10/28/2024 1932 by Cecy Hansen RN)  Care Plan - Patient's Chronic Conditions and Co-Morbidity Symptoms are Monitored and Maintained or Improved:   Monitor and assess patient's chronic conditions and comorbid symptoms for stability, deterioration, or improvement   Update acute care plan with appropriate goals if chronic or comorbid symptoms are exacerbated and prevent overall improvement and discharge   Collaborate with multidisciplinary team to address chronic and comorbid conditions and prevent exacerbation or deterioration     Problem: Skin/Tissue Integrity  Goal: Absence of new skin breakdown  Description: 1.  Monitor for areas of redness and/or skin breakdown  2.  Assess vascular access sites hourly  3.  Every 4-6 hours minimum:  Change oxygen saturation probe site  4.  Every 4-6 hours:  If on nasal continuous positive airway pressure, respiratory therapy assess nares and determine need for appliance change or resting period.  11/1/2024 0934 by Cassidy Dumont RN  Outcome: Progressing  11/1/2024 0014 by Shabbir Clements Jr, RN  Outcome: Progressing     Problem: Discharge Planning  Goal: Discharge to home or other facility with 
  Problem: Safety - Adult  Goal: Free from fall injury  11/11/2024 2300 by Leena Valencia RN  Outcome: Progressing  Flowsheets (Taken 11/11/2024 1035 by Rebecca Mullen, RN)  Free From Fall Injury:   Instruct family/caregiver on patient safety   Based on caregiver fall risk screen, instruct family/caregiver to ask for assistance with transferring infant if caregiver noted to have fall risk factors  11/11/2024 1035 by Rebecca Mullen RN  Outcome: Progressing  Flowsheets (Taken 11/11/2024 1035)  Free From Fall Injury:   Instruct family/caregiver on patient safety   Based on caregiver fall risk screen, instruct family/caregiver to ask for assistance with transferring infant if caregiver noted to have fall risk factors     Problem: Discharge Planning  Goal: Discharge to home or other facility with appropriate resources  11/11/2024 2300 by Leena Valencia RN  Outcome: Progressing  Flowsheets (Taken 11/11/2024 1035 by Rebecca Mullen, RN)  Discharge to home or other facility with appropriate resources:   Identify barriers to discharge with patient and caregiver   Arrange for needed discharge resources and transportation as appropriate   Identify discharge learning needs (meds, wound care, etc)   Refer to discharge planning if patient needs post-hospital services based on physician order or complex needs related to functional status, cognitive ability or social support system  11/11/2024 1035 by Rebecca Mullen RN  Outcome: Progressing  Flowsheets (Taken 11/11/2024 1035)  Discharge to home or other facility with appropriate resources:   Identify barriers to discharge with patient and caregiver   Arrange for needed discharge resources and transportation as appropriate   Identify discharge learning needs (meds, wound care, etc)   Refer to discharge planning if patient needs post-hospital services based on physician order or complex needs related to functional status, cognitive ability or social support system     
  Problem: Safety - Adult  Goal: Free from fall injury  11/5/2024 2316 by Veronica Yadav RN  Outcome: Progressing  11/5/2024 1518 by Ynes Mitchell RN  Outcome: Progressing  Flowsheets (Taken 11/5/2024 1518)  Free From Fall Injury: Instruct family/caregiver on patient safety     Problem: Chronic Conditions and Co-morbidities  Goal: Patient's chronic conditions and co-morbidity symptoms are monitored and maintained or improved  11/5/2024 2316 by Veronica Yadav RN  Outcome: Progressing  11/5/2024 1518 by Ynes Mitchell RN  Outcome: Progressing  Flowsheets (Taken 11/5/2024 1518)  Care Plan - Patient's Chronic Conditions and Co-Morbidity Symptoms are Monitored and Maintained or Improved: Monitor and assess patient's chronic conditions and comorbid symptoms for stability, deterioration, or improvement     Problem: Skin/Tissue Integrity  Goal: Absence of new skin breakdown  Description: 1.  Monitor for areas of redness and/or skin breakdown  2.  Assess vascular access sites hourly  3.  Every 4-6 hours minimum:  Change oxygen saturation probe site  4.  Every 4-6 hours:  If on nasal continuous positive airway pressure, respiratory therapy assess nares and determine need for appliance change or resting period.  Outcome: Progressing     Problem: Discharge Planning  Goal: Discharge to home or other facility with appropriate resources  11/5/2024 2316 by Veronica Yadav RN  Outcome: Progressing  11/5/2024 1518 by Ynes Mitchell RN  Outcome: Progressing  Flowsheets (Taken 11/5/2024 1518)  Discharge to home or other facility with appropriate resources: Identify barriers to discharge with patient and caregiver     Problem: Neurosensory - Adult  Goal: Achieves stable or improved neurological status  Outcome: Progressing  Goal: Absence of seizures  Outcome: Progressing  Goal: Remains free of injury related to seizures activity  Outcome: Progressing     
  Problem: Safety - Adult  Goal: Free from fall injury  11/6/2024 1040 by Misti Kerr, RN  Outcome: Progressing     Problem: Chronic Conditions and Co-morbidities  Goal: Patient's chronic conditions and co-morbidity symptoms are monitored and maintained or improved  11/6/2024 1040 by Misti Kerr, RN  Outcome: Progressing     Problem: Skin/Tissue Integrity  Goal: Absence of new skin breakdown  Description: 1.  Monitor for areas of redness and/or skin breakdown  2.  Assess vascular access sites hourly  3.  Every 4-6 hours minimum:  Change oxygen saturation probe site  4.  Every 4-6 hours:  If on nasal continuous positive airway pressure, respiratory therapy assess nares and determine need for appliance change or resting period.  11/6/2024 1040 by Misti Kerr, RN  Outcome: Progressing     
  Problem: Safety - Adult  Goal: Free from fall injury  11/7/2024 1057 by Rebecca Mullen, RN  Outcome: Progressing  Flowsheets (Taken 11/7/2024 1057)  Free From Fall Injury:   Instruct family/caregiver on patient safety   Based on caregiver fall risk screen, instruct family/caregiver to ask for assistance with transferring infant if caregiver noted to have fall risk factors       Problem: Chronic Conditions and Co-morbidities  Goal: Patient's chronic conditions and co-morbidity symptoms are monitored and maintained or improved  11/7/2024 1057 by Rebecca Mullen RN  Outcome: Progressing  Flowsheets (Taken 11/7/2024 1057)  Care Plan - Patient's Chronic Conditions and Co-Morbidity Symptoms are Monitored and Maintained or Improved:   Monitor and assess patient's chronic conditions and comorbid symptoms for stability, deterioration, or improvement   Collaborate with multidisciplinary team to address chronic and comorbid conditions and prevent exacerbation or deterioration   Update acute care plan with appropriate goals if chronic or comorbid symptoms are exacerbated and prevent overall improvement and discharge  11/7/2024 0130 by Ashly Castillo RN  Outcome: Progressing  Flowsheets (Taken 11/5/2024 2000 by Veronica Yadav, RN)  Care Plan - Patient's Chronic Conditions and Co-Morbidity Symptoms are Monitored and Maintained or Improved: Monitor and assess patient's chronic conditions and comorbid symptoms for stability, deterioration, or improvement     Problem: Skin/Tissue Integrity  Goal: Absence of new skin breakdown  Description: 1.  Monitor for areas of redness and/or skin breakdown  2.  Assess vascular access sites hourly  3.  Every 4-6 hours minimum:  Change oxygen saturation probe site  4.  Every 4-6 hours:  If on nasal continuous positive airway pressure, respiratory therapy assess nares and determine need for appliance change or resting period.  11/7/2024 1057 by Rebecca Mullen, RN  Outcome: 
  Problem: Safety - Adult  Goal: Free from fall injury  Outcome: Completed     Problem: Chronic Conditions and Co-morbidities  Goal: Patient's chronic conditions and co-morbidity symptoms are monitored and maintained or improved  Outcome: Completed     Problem: Skin/Tissue Integrity  Goal: Absence of new skin breakdown  Description: 1.  Monitor for areas of redness and/or skin breakdown  2.  Assess vascular access sites hourly  3.  Every 4-6 hours minimum:  Change oxygen saturation probe site  4.  Every 4-6 hours:  If on nasal continuous positive airway pressure, respiratory therapy assess nares and determine need for appliance change or resting period.  Outcome: Completed     Problem: Discharge Planning  Goal: Discharge to home or other facility with appropriate resources  Outcome: Completed     Problem: Neurosensory - Adult  Goal: Achieves stable or improved neurological status  Outcome: Completed  Goal: Absence of seizures  Outcome: Completed  Goal: Remains free of injury related to seizures activity  Outcome: Completed  Goal: Achieves maximal functionality and self care  Outcome: Completed     Problem: Skin/Tissue Integrity - Adult  Goal: Skin integrity remains intact  Outcome: Completed  Goal: Incisions, wounds, or drain sites healing without S/S of infection  Outcome: Completed  Goal: Oral mucous membranes remain intact  Outcome: Completed     Problem: Infection - Adult  Goal: Absence of infection at discharge  Outcome: Completed  Goal: Absence of infection during hospitalization  Outcome: Completed  Goal: Absence of fever/infection during anticipated neutropenic period  Outcome: Completed     Problem: Metabolic/Fluid and Electrolytes - Adult  Goal: Electrolytes maintained within normal limits  Outcome: Completed  Goal: Hemodynamic stability and optimal renal function maintained  Outcome: Completed  Goal: Glucose maintained within prescribed range  Outcome: Completed     Problem: Hematologic - Adult  Goal: 
  Problem: Safety - Adult  Goal: Free from fall injury  Outcome: Progressing     Problem: Chronic Conditions and Co-morbidities  Goal: Patient's chronic conditions and co-morbidity symptoms are monitored and maintained or improved  Outcome: Progressing     Problem: Skin/Tissue Integrity  Goal: Absence of new skin breakdown  Description: 1.  Monitor for areas of redness and/or skin breakdown  2.  Assess vascular access sites hourly  3.  Every 4-6 hours minimum:  Change oxygen saturation probe site  4.  Every 4-6 hours:  If on nasal continuous positive airway pressure, respiratory therapy assess nares and determine need for appliance change or resting period.  Outcome: Progressing     
  Problem: Safety - Adult  Goal: Free from fall injury  Outcome: Progressing     Problem: Chronic Conditions and Co-morbidities  Goal: Patient's chronic conditions and co-morbidity symptoms are monitored and maintained or improved  Outcome: Progressing     Problem: Skin/Tissue Integrity  Goal: Absence of new skin breakdown  Description: 1.  Monitor for areas of redness and/or skin breakdown  2.  Assess vascular access sites hourly  3.  Every 4-6 hours minimum:  Change oxygen saturation probe site  4.  Every 4-6 hours:  If on nasal continuous positive airway pressure, respiratory therapy assess nares and determine need for appliance change or resting period.  Outcome: Progressing     Problem: Discharge Planning  Goal: Discharge to home or other facility with appropriate resources  Outcome: Progressing     Problem: Neurosensory - Adult  Goal: Achieves stable or improved neurological status  Outcome: Progressing     
  Problem: Safety - Adult  Goal: Free from fall injury  Outcome: Progressing     Problem: Chronic Conditions and Co-morbidities  Goal: Patient's chronic conditions and co-morbidity symptoms are monitored and maintained or improved  Outcome: Progressing     Problem: Skin/Tissue Integrity  Goal: Absence of new skin breakdown  Description: 1.  Monitor for areas of redness and/or skin breakdown  2.  Assess vascular access sites hourly  3.  Every 4-6 hours minimum:  Change oxygen saturation probe site  4.  Every 4-6 hours:  If on nasal continuous positive airway pressure, respiratory therapy assess nares and determine need for appliance change or resting period.  Outcome: Progressing     Problem: Discharge Planning  Goal: Discharge to home or other facility with appropriate resources  Outcome: Progressing     Problem: Neurosensory - Adult  Goal: Achieves stable or improved neurological status  Outcome: Progressing     Problem: Neurosensory - Adult  Goal: Absence of seizures  Outcome: Progressing     Problem: Neurosensory - Adult  Goal: Remains free of injury related to seizures activity  Outcome: Progressing     Problem: Neurosensory - Adult  Goal: Achieves maximal functionality and self care  Outcome: Progressing     Problem: Skin/Tissue Integrity - Adult  Goal: Skin integrity remains intact  Outcome: Progressing     Problem: Skin/Tissue Integrity - Adult  Goal: Incisions, wounds, or drain sites healing without S/S of infection  Outcome: Progressing     Problem: Skin/Tissue Integrity - Adult  Goal: Oral mucous membranes remain intact  Outcome: Progressing     Problem: Infection - Adult  Goal: Absence of infection at discharge  Outcome: Progressing     Problem: Infection - Adult  Goal: Absence of infection during hospitalization  Outcome: Progressing     Problem: Infection - Adult  Goal: Absence of fever/infection during anticipated neutropenic period  Outcome: Progressing     Problem: Metabolic/Fluid and Electrolytes - 
  Problem: Safety - Adult  Goal: Free from fall injury  Outcome: Progressing     Problem: Chronic Conditions and Co-morbidities  Goal: Patient's chronic conditions and co-morbidity symptoms are monitored and maintained or improved  Outcome: Progressing     Problem: Skin/Tissue Integrity  Goal: Absence of new skin breakdown  Description: 1.  Monitor for areas of redness and/or skin breakdown  2.  Assess vascular access sites hourly  3.  Every 4-6 hours minimum:  Change oxygen saturation probe site  4.  Every 4-6 hours:  If on nasal continuous positive airway pressure, respiratory therapy assess nares and determine need for appliance change or resting period.  Outcome: Progressing     Problem: Discharge Planning  Goal: Discharge to home or other facility with appropriate resources  Outcome: Progressing     Problem: Neurosensory - Adult  Goal: Achieves stable or improved neurological status  Outcome: Progressing  Goal: Absence of seizures  Outcome: Progressing  Goal: Remains free of injury related to seizures activity  Outcome: Progressing  Goal: Achieves maximal functionality and self care  Outcome: Progressing     Problem: Skin/Tissue Integrity - Adult  Goal: Skin integrity remains intact  Outcome: Progressing  Goal: Incisions, wounds, or drain sites healing without S/S of infection  Outcome: Progressing  Goal: Oral mucous membranes remain intact  Outcome: Progressing     Problem: Infection - Adult  Goal: Absence of infection at discharge  Outcome: Progressing  Goal: Absence of infection during hospitalization  Outcome: Progressing  Goal: Absence of fever/infection during anticipated neutropenic period  Outcome: Progressing     Problem: Metabolic/Fluid and Electrolytes - Adult  Goal: Electrolytes maintained within normal limits  Outcome: Progressing  Goal: Hemodynamic stability and optimal renal function maintained  Outcome: Progressing  Goal: Glucose maintained within prescribed range  Outcome: Progressing   
  Problem: Safety - Adult  Goal: Free from fall injury  Outcome: Progressing     Problem: Chronic Conditions and Co-morbidities  Goal: Patient's chronic conditions and co-morbidity symptoms are monitored and maintained or improved  Outcome: Progressing     Problem: Skin/Tissue Integrity  Goal: Absence of new skin breakdown  Description: 1.  Monitor for areas of redness and/or skin breakdown  2.  Assess vascular access sites hourly  3.  Every 4-6 hours minimum:  Change oxygen saturation probe site  4.  Every 4-6 hours:  If on nasal continuous positive airway pressure, respiratory therapy assess nares and determine need for appliance change or resting period.  Outcome: Progressing     Problem: Discharge Planning  Goal: Discharge to home or other facility with appropriate resources  Outcome: Progressing     Problem: Neurosensory - Adult  Goal: Achieves stable or improved neurological status  Outcome: Progressing  Goal: Absence of seizures  Outcome: Progressing  Goal: Remains free of injury related to seizures activity  Outcome: Progressing  Goal: Achieves maximal functionality and self care  Outcome: Progressing     Problem: Skin/Tissue Integrity - Adult  Goal: Skin integrity remains intact  Outcome: Progressing  Goal: Incisions, wounds, or drain sites healing without S/S of infection  Outcome: Progressing  Goal: Oral mucous membranes remain intact  Outcome: Progressing     Problem: Infection - Adult  Goal: Absence of infection at discharge  Outcome: Progressing  Goal: Absence of infection during hospitalization  Outcome: Progressing  Goal: Absence of fever/infection during anticipated neutropenic period  Outcome: Progressing     Problem: Metabolic/Fluid and Electrolytes - Adult  Goal: Electrolytes maintained within normal limits  Outcome: Progressing  Goal: Hemodynamic stability and optimal renal function maintained  Outcome: Progressing  Goal: Glucose maintained within prescribed range  Outcome: Progressing   
  Problem: Safety - Adult  Goal: Free from fall injury  Outcome: Progressing     Problem: Neurosensory - Adult  Goal: Achieves stable or improved neurological status  Outcome: Not Progressing    Pt still confused/agitated. MRI brain in AM.     
  Problem: Safety - Adult  Goal: Free from fall injury  Outcome: Progressing   Pt has been free from falls this shift, bed alarm on, bed in lowest position, 2/4 side rails up, nonskid socks on, wheels locked, bedside table and call light in reach. Encouraged pt to call out if needed anything.     Problem: Skin/Tissue Integrity  Goal: Absence of new skin breakdown  Description: 1.  Monitor for areas of redness and/or skin breakdown  2.  Assess vascular access sites hourly  3.  Every 4-6 hours minimum:  Change oxygen saturation probe site  4.  Every 4-6 hours:  If on nasal continuous positive airway pressure, respiratory therapy assess nares and determine need for appliance change or resting period.  Outcome: Progressing   Pt is being turned and repositioned q2h with pillow support. Pt has scattered bruising and abrasions. Right heel wound dressing changed last shift, heel dressing is clean, dry and intact. Buttocks is excoriated and peeling. Triad cream applied per order. Will continue to assess skin integrity.  
  Problem: Safety - Adult  Goal: Free from fall injury  Outcome: Progressing  Flowsheets (Taken 11/5/2024 1518 by Ynes Mitchell, RN)  Free From Fall Injury: Instruct family/caregiver on patient safety     Problem: Chronic Conditions and Co-morbidities  Goal: Patient's chronic conditions and co-morbidity symptoms are monitored and maintained or improved  Outcome: Progressing  Flowsheets (Taken 11/5/2024 2000 by Veronica Yadav, RN)  Care Plan - Patient's Chronic Conditions and Co-Morbidity Symptoms are Monitored and Maintained or Improved: Monitor and assess patient's chronic conditions and comorbid symptoms for stability, deterioration, or improvement     Problem: Skin/Tissue Integrity  Goal: Absence of new skin breakdown  Description: 1.  Monitor for areas of redness and/or skin breakdown  2.  Assess vascular access sites hourly  3.  Every 4-6 hours minimum:  Change oxygen saturation probe site  4.  Every 4-6 hours:  If on nasal continuous positive airway pressure, respiratory therapy assess nares and determine need for appliance change or resting period.  Outcome: Progressing     Problem: Discharge Planning  Goal: Discharge to home or other facility with appropriate resources  Outcome: Progressing  Flowsheets (Taken 11/5/2024 2000 by Veronica Yadav, RN)  Discharge to home or other facility with appropriate resources: Identify barriers to discharge with patient and caregiver     Problem: Neurosensory - Adult  Goal: Achieves stable or improved neurological status  Outcome: Progressing  Flowsheets (Taken 10/28/2024 1932 by Cecy Hansen, RN)  Achieves stable or improved neurological status:   Assess for and report changes in neurological status   Initiate measures to prevent increased intracranial pressure   Maintain blood pressure and fluid volume within ordered parameters to optimize cerebral perfusion and minimize risk of hemorrhage   Monitor temperature, glucose, and sodium. Initiate appropriate 
  Problem: Safety - Adult  Goal: Free from fall injury  Outcome: Progressing  Flowsheets (Taken 11/5/2024 1518)  Free From Fall Injury: Instruct family/caregiver on patient safety     Problem: Chronic Conditions and Co-morbidities  Goal: Patient's chronic conditions and co-morbidity symptoms are monitored and maintained or improved  Outcome: Progressing  Flowsheets (Taken 11/5/2024 1518)  Care Plan - Patient's Chronic Conditions and Co-Morbidity Symptoms are Monitored and Maintained or Improved: Monitor and assess patient's chronic conditions and comorbid symptoms for stability, deterioration, or improvement     Problem: Discharge Planning  Goal: Discharge to home or other facility with appropriate resources  Outcome: Progressing  Flowsheets (Taken 11/5/2024 1518)  Discharge to home or other facility with appropriate resources: Identify barriers to discharge with patient and caregiver     
  Problem: Safety - Adult  Goal: Free from fall injury  Outcome: Progressing  Flowsheets (Taken 11/9/2024 0957)  Free From Fall Injury: Instruct family/caregiver on patient safety     Problem: Chronic Conditions and Co-morbidities  Goal: Patient's chronic conditions and co-morbidity symptoms are monitored and maintained or improved  Outcome: Progressing  Flowsheets (Taken 11/9/2024 0957)  Care Plan - Patient's Chronic Conditions and Co-Morbidity Symptoms are Monitored and Maintained or Improved: Monitor and assess patient's chronic conditions and comorbid symptoms for stability, deterioration, or improvement     Problem: Skin/Tissue Integrity  Goal: Absence of new skin breakdown  Description: 1.  Monitor for areas of redness and/or skin breakdown  2.  Assess vascular access sites hourly  3.  Every 4-6 hours minimum:  Change oxygen saturation probe site  4.  Every 4-6 hours:  If on nasal continuous positive airway pressure, respiratory therapy assess nares and determine need for appliance change or resting period.  Outcome: Progressing     Problem: Discharge Planning  Goal: Discharge to home or other facility with appropriate resources  Outcome: Progressing  Flowsheets (Taken 11/9/2024 0957)  Discharge to home or other facility with appropriate resources:   Identify barriers to discharge with patient and caregiver   Arrange for needed discharge resources and transportation as appropriate     Problem: Neurosensory - Adult  Goal: Achieves stable or improved neurological status  Outcome: Progressing  Flowsheets (Taken 11/9/2024 0957)  Achieves stable or improved neurological status: Assess for and report changes in neurological status     Problem: Neurosensory - Adult  Goal: Absence of seizures  Outcome: Progressing  Flowsheets (Taken 11/9/2024 0957)  Absence of seizures:   Monitor for seizure activity.  If seizure occurs, document type and location of movements and any associated apnea   If seizure occurs, turn head to 
Increase patients ADLs/functional status to baseline.    
Problem: Safety - Adult  Goal: Free from fall injury  Outcome: Progressing  Flowsheets (Taken 10/28/2024 1932 by Cecy Hansen RN)  Free From Fall Injury:   Instruct family/caregiver on patient safety   Based on caregiver fall risk screen, instruct family/caregiver to ask for assistance with transferring infant if caregiver noted to have fall risk factors     
Return function to baseline.  
SLP completed evaluation. Please refer to notes in EMR.    Magnolia Keys MA CF-SLP   Speech Language Pathologist     
discharge with patient and caregiver   Arrange for needed discharge resources and transportation as appropriate   Identify discharge learning needs (meds, wound care, etc)     Problem: Neurosensory - Adult  Goal: Achieves stable or improved neurological status  Outcome: Progressing  Goal: Absence of seizures  11/2/2024 1854 by Patricia Serna RN  Outcome: Progressing     Problem: Skin/Tissue Integrity - Adult  Goal: Skin integrity remains intact  11/2/2024 2217 by Leai Best RN  Outcome: Progressing  11/2/2024 1854 by Patricia Serna RN  Outcome: Progressing  Flowsheets (Taken 11/2/2024 1854)  Skin Integrity Remains Intact:   Monitor for areas of redness and/or skin breakdown   Assess vascular access sites hourly  Goal: Incisions, wounds, or drain sites healing without S/S of infection  11/2/2024 1854 by Patricia Serna RN  Outcome: Progressing  Flowsheets (Taken 11/2/2024 1854)  Incisions, Wounds, or Drain Sites Healing Without Sign and Symptoms of Infection:   ADMISSION and DAILY: Assess and document risk factors for pressure ulcer development   TWICE DAILY: Assess and document skin integrity   TWICE DAILY: Assess and document dressing/incision, wound bed, drain sites and surrounding tissue   Implement wound care per orders   Initiate isolation precautions as appropriate   Initiate pressure ulcer prevention bundle as indicated     Problem: Infection - Adult  Goal: Absence of fever/infection during anticipated neutropenic period  11/2/2024 1854 by Patricia Serna RN  Outcome: Progressing  Flowsheets (Taken 11/2/2024 1854)  Absence of fever/infection during anticipated neutropenic period:   Monitor white blood cell count   Administer growth factors as ordered   Implement neutropenic guidelines     Problem: Nutrition Deficit:  Goal: Optimize nutritional status  11/2/2024 1854 by Patricia Serna RN  Outcome: Progressing  Flowsheets (Taken 11/2/2024 1854)  Nutrient intake appropriate for improving, restoring, or 
intact  Outcome: Progressing  Flowsheets (Taken 11/2/2024 1854)  Skin Integrity Remains Intact:   Monitor for areas of redness and/or skin breakdown   Assess vascular access sites hourly  Goal: Incisions, wounds, or drain sites healing without S/S of infection  Outcome: Progressing  Flowsheets (Taken 11/2/2024 1854)  Incisions, Wounds, or Drain Sites Healing Without Sign and Symptoms of Infection:   ADMISSION and DAILY: Assess and document risk factors for pressure ulcer development   TWICE DAILY: Assess and document skin integrity   TWICE DAILY: Assess and document dressing/incision, wound bed, drain sites and surrounding tissue   Implement wound care per orders   Initiate isolation precautions as appropriate   Initiate pressure ulcer prevention bundle as indicated     Problem: Infection - Adult  Goal: Absence of fever/infection during anticipated neutropenic period  Outcome: Progressing  Flowsheets (Taken 11/2/2024 1854)  Absence of fever/infection during anticipated neutropenic period:   Monitor white blood cell count   Administer growth factors as ordered   Implement neutropenic guidelines     Problem: Nutrition Deficit:  Goal: Optimize nutritional status  Outcome: Progressing  Flowsheets (Taken 11/2/2024 1854)  Nutrient intake appropriate for improving, restoring, or maintaining nutritional needs:   Assess nutritional status and recommend course of action   Monitor oral intake, labs, and treatment plans   Recommend appropriate diets, oral nutritional supplements, and vitamin/mineral supplements   Order, calculate, and assess calorie counts as needed   Recommend, monitor, and adjust tube feedings and TPN/PPN based on assessed needs   Provide specific nutrition education to patient or family as appropriate     
post-hospital services based on physician order or complex needs related to functional status, cognitive ability or social support system     Problem: Neurosensory - Adult  Goal: Achieves stable or improved neurological status  Outcome: Progressing  Flowsheets (Taken 11/11/2024 1035)  Achieves stable or improved neurological status:   Assess for and report changes in neurological status   Monitor temperature, glucose, and sodium. Initiate appropriate interventions as ordered     Problem: Skin/Tissue Integrity - Adult  Goal: Skin integrity remains intact  Outcome: Progressing  Flowsheets (Taken 11/11/2024 1035)  Skin Integrity Remains Intact:   Monitor for areas of redness and/or skin breakdown   Assess vascular access sites hourly   Every 4-6 hours minimum: Change oxygen saturation probe site     Problem: Infection - Adult  Goal: Absence of infection at discharge  Outcome: Progressing  Flowsheets (Taken 11/11/2024 1035)  Absence of infection at discharge:   Assess and monitor for signs and symptoms of infection   Monitor lab/diagnostic results   Monitor all insertion sites i.e., indwelling lines, tubes and drains   Administer medications as ordered   Instruct and encourage patient and family to use good hand hygiene technique   Identify and instruct in appropriate isolation precautions for identified infection/condition     Problem: Metabolic/Fluid and Electrolytes - Adult  Goal: Electrolytes maintained within normal limits  Outcome: Progressing  Flowsheets (Taken 11/11/2024 1035)  Electrolytes maintained within normal limits:   Monitor labs and assess patient for signs and symptoms of electrolyte imbalances   Administer electrolyte replacement as ordered   Instruct patient on fluid and nutrition restrictions as appropriate   Monitor response to electrolyte replacements, including repeat lab results as appropriate     Problem: Hematologic - Adult  Goal: Maintains hematologic stability  Outcome: 
seizure activity.  If seizure occurs, document type and location of movements and any associated apnea     Problem: Neurosensory - Adult  Goal: Remains free of injury related to seizures activity  11/8/2024 1205 by Paula Stern RN  Outcome: Progressing  Flowsheets (Taken 11/8/2024 1205)  Remains free of injury related to seizure activity:   Maintain airway, patient safety  and administer oxygen as ordered   Monitor patient for seizure activity, document and report duration and description of seizure to Licensed Independent Practitioner     Problem: Neurosensory - Adult  Goal: Achieves maximal functionality and self care  11/8/2024 1205 by Paula Stern RN  Outcome: Progressing  Flowsheets (Taken 11/8/2024 1205)  Achieves maximal functionality and self care: Monitor swallowing and airway patency with patient fatigue and changes in neurological status     Problem: Skin/Tissue Integrity - Adult  Goal: Skin integrity remains intact  11/8/2024 1205 by Paula Stern RN  Outcome: Progressing  Flowsheets (Taken 11/8/2024 1205)  Skin Integrity Remains Intact: Monitor for areas of redness and/or skin breakdown     Problem: Skin/Tissue Integrity - Adult  Goal: Incisions, wounds, or drain sites healing without S/S of infection  11/8/2024 1205 by Paula Stern RN  Outcome: Progressing     Problem: Skin/Tissue Integrity - Adult  Goal: Oral mucous membranes remain intact  11/8/2024 0254 by Odilia Michael RN  Outcome: Progressing     Problem: Infection - Adult  Goal: Absence of infection at discharge  11/8/2024 0254 by Odilia Michael RN  Outcome: Progressing     Problem: Infection - Adult  Goal: Absence of infection during hospitalization  11/8/2024 0254 by Odilia Michael RN  Outcome: Progressing     Problem: Infection - Adult  Goal: Absence of fever/infection during anticipated neutropenic period  11/8/2024 0254 by Odilia Michael RN  Outcome: Progressing     Problem: Metabolic/Fluid and Electrolytes - Adult  Goal: 
Progressing  Goal: Maintain proper alignment of affected body part  Outcome: Progressing  Goal: Return ADL status to a safe level of function  Outcome: Progressing     Problem: Gastrointestinal - Adult  Goal: Maintains or returns to baseline bowel function  Outcome: Progressing  Goal: Maintains adequate nutritional intake  Outcome: Progressing     Problem: Confusion  Goal: Confusion, delirium, dementia, or psychosis is improved or at baseline  Description: INTERVENTIONS:  1. Assess for possible contributors to thought disturbance, including medications, impaired vision or hearing, underlying metabolic abnormalities, dehydration, psychiatric diagnoses, and notify attending LIP  2. [place] high risk fall precautions, as indicated  3. Provide frequent short contacts to provide reality reorientation, refocusing and direction  4. Decrease environmental stimuli, including noise as appropriate  5. Monitor and intervene to maintain adequate nutrition, hydration, elimination, sleep and activity  6. If unable to ensure safety without constant attention obtain sitter and review sitter guidelines with assigned personnel  7. Initiate Psychosocial CNS and Spiritual Care consult, as indicated  Outcome: Progressing     Problem: Nutrition Deficit:  Goal: Optimize nutritional status  Outcome: Progressing

## 2024-11-12 NOTE — CARE COORDINATION
CASE MANAGEMENT DISCHARGE SUMMARY    Discharge to: Home with spouse, family, and PRN private duty support. [facility] following for Nursing, PT, OT, ST     IMM given: (date) 11/12/2024    New Durable Medical Equipment ordered/agency: Spouse reports has all needed DME    Transportation: After much discussion spouse agreeable to [employer]  transport home via [employer]     Confirmed discharge plan with: Patient Spouse bedside as Patient limited participation in conversation     Facility/Agency, name:  [facility]  [facility] FAN/AVS pulled by intake      RN, name: VALORIE Fernandez    Note: Discharging nurse to complete FAN, reconcile AVS, and place final copy with patient's discharge packet. NOMAN Toribio         
Chart reviewed, pt discussed during huddle with primary RN and then with Dr Arriola.  Plan for family training with PT/OT when wife available, as wife has refused SNF and hospice; wife has not been in yet today.  CM will continue to follow pt's progress and coordinate discharge arrangements as wife agrees to.  BEN Fonseca-VALORIE     
Hospital day 10: Patient on C3 care managed by IM, Hem/Onc. Patient from home with spouse, [facility] and Private caregiver x2 days a week. Patient has SNF recs. Spouse has been resistant to SNF placement, but physical needs not improved. Spouse not bedside call placed to both contact numbers on chart, vm left at both.  Patient weaned to room air. Patient on IV ATB. Following.NOMAN Toribio  [id number]: VM from spouse as on other line, return call place, had to leave .NOMAN Toribio    
Hospital day 14: Patient on C3 care managed by IM. Patient from home with Spouse. Patient seen by therapy recs split Subacute/Skilled Nursing Facility versus 24 hour supervision or assist, Home with Home health OT. New DME recs gait belt. [facility] now [facility] following for needs. Patient WBC ^ this am. Per do not anticipate dc this date, possible tomorrow if find sc ^WBC. Following.NOMAN Toribio    
Hospital day 3: Patient on C3 re Pancytopenia care managed by IM and Hem/Onc. Palliative care following. Patient from home with spouse, active with [facility]. At this time refusing SNF vs Hospice. Pending MRI. SW following.NOMAN Toribio    
Hospital day 4: Patient on C3 care managed by IM, GI, and Hem/Onc. Patient from home with spouse. Patient is an assist x2 person. Declining SNF, [facility] following. Spoke with son bedside edu on level of assist need - he saw therapy today. Differing to Patient and Spouse. Patient refusing as spouse did when met prior. Plans for EGD on Monday.Will ct to follow. .NOMAN Toribio    
Hospital day 7: Patient on C3 re Pancytopenia care managed by IM, Pulm, Hem/Onc, and GI. Palliative care supporting GOC. Patient from home with spouse active with [facility]. Spouse has private support that comes in to help her. Patient getting PRBC this date. Unable to complete EGD. Patient 98% on 8L 02, baseline room air. SW Following.NOMAN Toribio    
Hospital day 8: Patient on C3 care managed by IM, Hem/Onc, Pulmonology, GI -signed off. Palliative Care following for goals of care.  Patient with SNF recs declining. [facility] was active prior to admission and following. Plans for PRBC this date. Patient getting IV ATB and steroids. Following for DCP needs.NOMAN Toribio    
Hospital day 9: Patient on C3 care managed by IM, Hem/Onc- both GI and Pulmonology signed off. Patient weaned off 02, 92% on room air. Patient change from IV steroid to orals. Patient on IV ATB. H/H improved this date. Patient sitting in chair when seen this date, reports his spouse is at home. Current plan [facility] with [facility], but need are great. Hem/Onc inquiring about ARU placement, note let with therapy re thoughts on ability to tolerate this level of care. SW ct to follow..NOMAN Toribio    
Patient confused, spouse not bedside. Writer placed call to spouse reports unable to talk call back at later time. Per chart review no agreeable to code status change, SNF placement, and/or hospice services. NOMAN Toribio    
Writer attempted to complete assessment working with therapy. SW following.Marilynn Garcia, ONURW    
Writer placed follow up call to spouse per her request no response VM left for return call.NOMAN Toribio    
AM-PAC: 12 /24  OT AM-PAC: 14 /24    Family can provide assistance at DC: Yes  Would you like Case Management to discuss the discharge plan with any other family members/significant others, and if so, who? Yes (Miranda)  Plans to Return to Present Housing: Unknown at present  Potential Assistance needed at discharge: Skilled Nursing Facility, Home Care  Patient expects to discharge to: House  Plan for transportation at discharge: Family    Financial    Payor: MEDICARE / Plan: MEDICARE PART A / Product Type: *No Product type* /     Does insurance require precert for SNF: No    Potential assistance Purchasing Medications: No  Meds-to-Beds request: Yes      [facility] [address] [place] [address] [address] [place] [phone] - F [phone] [address]  [facility] [address]  Phone: [phone] Fax: [phone]      Notes:    Factors facilitating achievement of predicted outcomes: Family support    Barriers to discharge: Cognitive deficit, Impulsivity, Limited safety awareness, Limited participation, Decreased endurance, Upper extremity weakness, Lower extremity weakness, Medical complications, and Wound Care    Additional Case Management Notes: Patient confused interview completed with spouse. Patient from home with spouse lives on main floor. Per Spouse recently put a ramp in. Patient has private aide that comes x2-3 week to assist with bathing. Patient active with [facility]. Spouse edu on level of assistance need ie two staff members. Reports she will be fine at home able to increase private aide if needed, declined extra resc. She also reports Patient cousin is supportive to them. Declines SNF. Met with Palliative care.        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

## 2024-11-12 NOTE — DISCHARGE SUMMARY
Hospital Medicine Discharge Summary    Patient: Wes Schneider   : 1945     Admit Date: 10/28/2024   Discharge Date: 2024    Disposition:  []Home   []HHC  []SNF  []Acute Rehab  []LTAC  []Hospice  Code status:  []Full  []DNR/CCA  []Limited (DNR/CCA with Do Not Intubate)  []DNRCC  Condition at Discharge: Stable  Primary Care Provider: Jourdan Russ MD    Admitting Provider: Chapin Arriola MD  Discharge Provider: Chapin Arriola MD     Discharge Diagnoses:      Active Hospital Problems    Diagnosis     Pancytopenia (HCC) [D61.818]      Priority: Medium    Moderate protein-calorie malnutrition (HCC) [E44.0]        Presenting Admission History:        The patient is a pleasant 79 Y M with a h/o former smoking, HTN, HLD, DM2, Afib, prostate cancer, and 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 His chemo course has been complicated by pancytopenia and he has required many transfusions, most recently as an outpatient on 10/17. He presented to the ED today with worsening generalized weakness and lethargy and was found to have a Hb of 5.5, otherwise with ongoing pancytopenia. The patient himself had no specific complaints, just mentioned that \"I'm here because I can't get up.\" His wife says that for weeks now he has been getting weaker and more tired. She says that when he stands up he \"eyes often look glazed over and then he slumps over unconscious.\" There has been some consideration of whether he should continue with chemotherapy but no definite decision has been made. The wife wants the patient to remain full code, despite our detailed discussion. The patient cannot really focus long enough to comprehend the details of our conversation.      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  ED ordered 2u pRBCs.  Trend

## 2024-11-13 ENCOUNTER — APPOINTMENT (OUTPATIENT)
Dept: GENERAL RADIOLOGY | Age: 79
End: 2024-11-13
Payer: MEDICARE

## 2024-11-13 ENCOUNTER — HOSPITAL ENCOUNTER (INPATIENT)
Age: 79
LOS: 2 days | Discharge: HOME HEALTH | End: 2024-11-15
Attending: EMERGENCY MEDICINE
Payer: MEDICARE

## 2024-11-13 DIAGNOSIS — R53.1 GENERALIZED WEAKNESS: ICD-10-CM

## 2024-11-13 DIAGNOSIS — D72.825 BANDEMIA: ICD-10-CM

## 2024-11-13 DIAGNOSIS — R06.02 SHORTNESS OF BREATH: Primary | ICD-10-CM

## 2024-11-13 DIAGNOSIS — E88.09 HYPOALBUMINEMIA: ICD-10-CM

## 2024-11-13 DIAGNOSIS — R09.02 HYPOXEMIA: ICD-10-CM

## 2024-11-13 DIAGNOSIS — D64.9 ANEMIA, UNSPECIFIED TYPE: ICD-10-CM

## 2024-11-13 DIAGNOSIS — R79.89 ELEVATED TROPONIN: ICD-10-CM

## 2024-11-13 PROBLEM — J96.01 ACUTE RESPIRATORY FAILURE WITH HYPOXIA: Status: ACTIVE | Noted: 2024-11-13

## 2024-11-13 LAB
ALBUMIN SERPL-MCNC: 2.2 G/DL (ref 3.4–5)
ALBUMIN/GLOB SERPL: 1 {RATIO} (ref 1.1–2.2)
ALP SERPL-CCNC: 157 U/L (ref 40–129)
ALT SERPL-CCNC: 23 U/L (ref 10–40)
ANION GAP SERPL CALCULATED.3IONS-SCNC: 6 MMOL/L (ref 3–16)
ANISOCYTOSIS BLD QL SMEAR: ABNORMAL
AST SERPL-CCNC: 42 U/L (ref 15–37)
BASE EXCESS BLDV CALC-SCNC: 3.5 MMOL/L (ref -3–3)
BASOPHILS # BLD: 0 K/UL (ref 0–0.2)
BASOPHILS NFR BLD: 0 %
BILIRUB SERPL-MCNC: 0.9 MG/DL (ref 0–1)
BUN SERPL-MCNC: 19 MG/DL (ref 7–20)
CALCIUM SERPL-MCNC: 7.4 MG/DL (ref 8.3–10.6)
CHLORIDE SERPL-SCNC: 103 MMOL/L (ref 99–110)
CO2 BLDV-SCNC: 29 MMOL/L
CO2 SERPL-SCNC: 29 MMOL/L (ref 21–32)
COHGB MFR BLDV: 3.7 % (ref 0–1.5)
CREAT SERPL-MCNC: 0.8 MG/DL (ref 0.8–1.3)
DEPRECATED RDW RBC AUTO: 22.3 % (ref 12.4–15.4)
EKG ATRIAL RATE: 76 BPM
EKG DIAGNOSIS: NORMAL
EKG Q-T INTERVAL: 402 MS
EKG QRS DURATION: 94 MS
EKG QTC CALCULATION (BAZETT): 446 MS
EKG R AXIS: 88 DEGREES
EKG T AXIS: 112 DEGREES
EKG VENTRICULAR RATE: 74 BPM
EOSINOPHIL # BLD: 0 K/UL (ref 0–0.6)
EOSINOPHIL NFR BLD: 0 %
FLUAV RNA RESP QL NAA+PROBE: NOT DETECTED
FLUBV RNA RESP QL NAA+PROBE: NOT DETECTED
GFR SERPLBLD CREATININE-BSD FMLA CKD-EPI: 89 ML/MIN/{1.73_M2}
GLUCOSE BLD-MCNC: 92 MG/DL (ref 70–99)
GLUCOSE SERPL-MCNC: 113 MG/DL (ref 70–99)
HCO3 BLDV-SCNC: 27.5 MMOL/L (ref 23–29)
HCT VFR BLD AUTO: 26.5 % (ref 40.5–52.5)
HGB BLD-MCNC: 8.7 G/DL (ref 13.5–17.5)
LYMPHOCYTES # BLD: 0.4 K/UL (ref 1–5.1)
LYMPHOCYTES NFR BLD: 5 %
MACROCYTES BLD QL SMEAR: ABNORMAL
MAGNESIUM SERPL-MCNC: 2.07 MG/DL (ref 1.8–2.4)
MCH RBC QN AUTO: 31.3 PG (ref 26–34)
MCHC RBC AUTO-ENTMCNC: 32.7 G/DL (ref 31–36)
MCV RBC AUTO: 95.7 FL (ref 80–100)
METHGB MFR BLDV: 0.2 %
MICROCYTES BLD QL SMEAR: ABNORMAL
MONOCYTES # BLD: 0.4 K/UL (ref 0–1.3)
MONOCYTES NFR BLD: 5 %
NEUTROPHILS # BLD: 7.8 K/UL (ref 1.7–7.7)
NEUTROPHILS NFR BLD: 80 %
NEUTS BAND NFR BLD MANUAL: 10 % (ref 0–7)
NT-PROBNP SERPL-MCNC: 1146 PG/ML (ref 0–449)
O2 THERAPY: ABNORMAL
PCO2 BLDV: 39.6 MMHG (ref 40–50)
PERFORMED ON: NORMAL
PH BLDV: 7.46 [PH] (ref 7.35–7.45)
PLATELET # BLD AUTO: 117 K/UL (ref 135–450)
PLATELET BLD QL SMEAR: ABNORMAL
PMV BLD AUTO: 6.9 FL (ref 5–10.5)
PO2 BLDV: 51.1 MMHG (ref 25–40)
POTASSIUM SERPL-SCNC: 4 MMOL/L (ref 3.5–5.1)
PROT SERPL-MCNC: 4.5 G/DL (ref 6.4–8.2)
RBC # BLD AUTO: 2.77 M/UL (ref 4.2–5.9)
SAO2 % BLDV: 86 %
SARS-COV-2 RNA RESP QL NAA+PROBE: NOT DETECTED
SLIDE REVIEW: ABNORMAL
SODIUM SERPL-SCNC: 138 MMOL/L (ref 136–145)
TROPONIN, HIGH SENSITIVITY: 118 NG/L (ref 0–22)
TROPONIN, HIGH SENSITIVITY: 121 NG/L (ref 0–22)
WBC # BLD AUTO: 8.7 K/UL (ref 4–11)

## 2024-11-13 PROCEDURE — 83880 ASSAY OF NATRIURETIC PEPTIDE: CPT

## 2024-11-13 PROCEDURE — 83735 ASSAY OF MAGNESIUM: CPT

## 2024-11-13 PROCEDURE — 87636 SARSCOV2 & INF A&B AMP PRB: CPT

## 2024-11-13 PROCEDURE — 6370000000 HC RX 637 (ALT 250 FOR IP)

## 2024-11-13 PROCEDURE — 6370000000 HC RX 637 (ALT 250 FOR IP): Performed by: EMERGENCY MEDICINE

## 2024-11-13 PROCEDURE — 84484 ASSAY OF TROPONIN QUANT: CPT

## 2024-11-13 PROCEDURE — 71045 X-RAY EXAM CHEST 1 VIEW: CPT

## 2024-11-13 PROCEDURE — 93005 ELECTROCARDIOGRAM TRACING: CPT | Performed by: NURSE PRACTITIONER

## 2024-11-13 PROCEDURE — 96374 THER/PROPH/DIAG INJ IV PUSH: CPT

## 2024-11-13 PROCEDURE — 85025 COMPLETE CBC W/AUTO DIFF WBC: CPT

## 2024-11-13 PROCEDURE — 2580000003 HC RX 258

## 2024-11-13 PROCEDURE — 2700000000 HC OXYGEN THERAPY PER DAY

## 2024-11-13 PROCEDURE — 82803 BLOOD GASES ANY COMBINATION: CPT

## 2024-11-13 PROCEDURE — 1200000000 HC SEMI PRIVATE

## 2024-11-13 PROCEDURE — 94640 AIRWAY INHALATION TREATMENT: CPT

## 2024-11-13 PROCEDURE — 6360000002 HC RX W HCPCS: Performed by: EMERGENCY MEDICINE

## 2024-11-13 PROCEDURE — 93010 ELECTROCARDIOGRAM REPORT: CPT | Performed by: INTERNAL MEDICINE

## 2024-11-13 PROCEDURE — 80053 COMPREHEN METABOLIC PANEL: CPT

## 2024-11-13 PROCEDURE — 36415 COLL VENOUS BLD VENIPUNCTURE: CPT

## 2024-11-13 PROCEDURE — 99285 EMERGENCY DEPT VISIT HI MDM: CPT

## 2024-11-13 PROCEDURE — 94761 N-INVAS EAR/PLS OXIMETRY MLT: CPT

## 2024-11-13 RX ORDER — POLYETHYLENE GLYCOL 3350 17 G/17G
17 POWDER, FOR SOLUTION ORAL DAILY PRN
Status: DISCONTINUED | OUTPATIENT
Start: 2024-11-13 | End: 2024-11-15 | Stop reason: HOSPADM

## 2024-11-13 RX ORDER — AMIODARONE HYDROCHLORIDE 200 MG/1
250 TABLET ORAL DAILY
Status: DISCONTINUED | OUTPATIENT
Start: 2024-11-13 | End: 2024-11-15 | Stop reason: HOSPADM

## 2024-11-13 RX ORDER — FUROSEMIDE 10 MG/ML
20 INJECTION INTRAMUSCULAR; INTRAVENOUS ONCE
Status: COMPLETED | OUTPATIENT
Start: 2024-11-13 | End: 2024-11-13

## 2024-11-13 RX ORDER — IPRATROPIUM BROMIDE AND ALBUTEROL SULFATE 2.5; .5 MG/3ML; MG/3ML
1 SOLUTION RESPIRATORY (INHALATION) ONCE
Status: COMPLETED | OUTPATIENT
Start: 2024-11-13 | End: 2024-11-13

## 2024-11-13 RX ORDER — ATORVASTATIN CALCIUM 40 MG/1
40 TABLET, FILM COATED ORAL NIGHTLY
Status: DISCONTINUED | OUTPATIENT
Start: 2024-11-13 | End: 2024-11-15 | Stop reason: HOSPADM

## 2024-11-13 RX ORDER — INSULIN LISPRO 100 [IU]/ML
0-8 INJECTION, SOLUTION INTRAVENOUS; SUBCUTANEOUS
Status: DISCONTINUED | OUTPATIENT
Start: 2024-11-13 | End: 2024-11-13

## 2024-11-13 RX ORDER — ESCITALOPRAM OXALATE 10 MG/1
10 TABLET ORAL NIGHTLY
Status: DISCONTINUED | OUTPATIENT
Start: 2024-11-13 | End: 2024-11-15 | Stop reason: HOSPADM

## 2024-11-13 RX ORDER — ALBUTEROL SULFATE 90 UG/1
1 INHALANT RESPIRATORY (INHALATION) EVERY 4 HOURS PRN
Status: DISCONTINUED | OUTPATIENT
Start: 2024-11-13 | End: 2024-11-15 | Stop reason: HOSPADM

## 2024-11-13 RX ORDER — ACETAMINOPHEN 650 MG/1
650 SUPPOSITORY RECTAL EVERY 6 HOURS PRN
Status: DISCONTINUED | OUTPATIENT
Start: 2024-11-13 | End: 2024-11-15 | Stop reason: HOSPADM

## 2024-11-13 RX ORDER — SODIUM CHLORIDE 9 MG/ML
INJECTION, SOLUTION INTRAVENOUS PRN
Status: DISCONTINUED | OUTPATIENT
Start: 2024-11-13 | End: 2024-11-15 | Stop reason: HOSPADM

## 2024-11-13 RX ORDER — METOPROLOL SUCCINATE 25 MG/1
12.5 TABLET, EXTENDED RELEASE ORAL DAILY
Status: DISCONTINUED | OUTPATIENT
Start: 2024-11-13 | End: 2024-11-15 | Stop reason: HOSPADM

## 2024-11-13 RX ORDER — ONDANSETRON 4 MG/1
4 TABLET, ORALLY DISINTEGRATING ORAL EVERY 8 HOURS PRN
Status: DISCONTINUED | OUTPATIENT
Start: 2024-11-13 | End: 2024-11-15 | Stop reason: HOSPADM

## 2024-11-13 RX ORDER — ONDANSETRON 2 MG/ML
4 INJECTION INTRAMUSCULAR; INTRAVENOUS EVERY 6 HOURS PRN
Status: DISCONTINUED | OUTPATIENT
Start: 2024-11-13 | End: 2024-11-15 | Stop reason: HOSPADM

## 2024-11-13 RX ORDER — DEXTROSE MONOHYDRATE 100 MG/ML
INJECTION, SOLUTION INTRAVENOUS CONTINUOUS PRN
Status: DISCONTINUED | OUTPATIENT
Start: 2024-11-13 | End: 2024-11-15 | Stop reason: HOSPADM

## 2024-11-13 RX ORDER — ACETAMINOPHEN 325 MG/1
650 TABLET ORAL EVERY 6 HOURS PRN
Status: DISCONTINUED | OUTPATIENT
Start: 2024-11-13 | End: 2024-11-15 | Stop reason: HOSPADM

## 2024-11-13 RX ORDER — SODIUM CHLORIDE 0.9 % (FLUSH) 0.9 %
5-40 SYRINGE (ML) INJECTION EVERY 12 HOURS SCHEDULED
Status: DISCONTINUED | OUTPATIENT
Start: 2024-11-13 | End: 2024-11-15 | Stop reason: HOSPADM

## 2024-11-13 RX ORDER — GLUCAGON 1 MG/ML
1 KIT INJECTION PRN
Status: DISCONTINUED | OUTPATIENT
Start: 2024-11-13 | End: 2024-11-15 | Stop reason: HOSPADM

## 2024-11-13 RX ORDER — INSULIN GLARGINE 100 [IU]/ML
15 INJECTION, SOLUTION SUBCUTANEOUS NIGHTLY
Status: DISCONTINUED | OUTPATIENT
Start: 2024-11-13 | End: 2024-11-13

## 2024-11-13 RX ORDER — FOLIC ACID 1 MG/1
1 TABLET ORAL DAILY
Status: DISCONTINUED | OUTPATIENT
Start: 2024-11-13 | End: 2024-11-15 | Stop reason: HOSPADM

## 2024-11-13 RX ORDER — SODIUM CHLORIDE 0.9 % (FLUSH) 0.9 %
5-40 SYRINGE (ML) INJECTION PRN
Status: DISCONTINUED | OUTPATIENT
Start: 2024-11-13 | End: 2024-11-15 | Stop reason: HOSPADM

## 2024-11-13 RX ADMIN — FUROSEMIDE 20 MG: 10 INJECTION, SOLUTION INTRAMUSCULAR; INTRAVENOUS at 15:06

## 2024-11-13 RX ADMIN — APIXABAN 5 MG: 5 TABLET, FILM COATED ORAL at 21:19

## 2024-11-13 RX ADMIN — SODIUM CHLORIDE, PRESERVATIVE FREE 10 ML: 5 INJECTION INTRAVENOUS at 21:20

## 2024-11-13 RX ADMIN — IPRATROPIUM BROMIDE AND ALBUTEROL SULFATE 1 DOSE: 2.5; .5 SOLUTION RESPIRATORY (INHALATION) at 12:15

## 2024-11-13 RX ADMIN — ATORVASTATIN CALCIUM 40 MG: 40 TABLET, FILM COATED ORAL at 21:19

## 2024-11-13 RX ADMIN — ESCITALOPRAM OXALATE 10 MG: 10 TABLET ORAL at 21:19

## 2024-11-13 ASSESSMENT — ENCOUNTER SYMPTOMS
DIARRHEA: 0
CHEST TIGHTNESS: 0
SHORTNESS OF BREATH: 1
COLOR CHANGE: 1
BLOOD IN STOOL: 0
ABDOMINAL PAIN: 0

## 2024-11-13 NOTE — ED NOTES
Went to ambulate patient with pulse ox. Pt couldn't ambulate due too feeling legs were \"too weak\".    aware.

## 2024-11-13 NOTE — ED NOTES
Wes Schneider is a 79 y.o. male admitted for  Principal Problem:    Acute respiratory failure with hypoxia  Resolved Problems:    * No resolved hospital problems. *  .   Patient Home via EMS transportation with   Chief Complaint   Patient presents with    Other     Wife called EMS for SOB, patient recently discharged after being diagnosed dith pneumonia. No supplemental 02 needed at home, low to mid 80's when wife checked it today. Denies chest pain   .  Patient is alert and Person and Situation  Patient's baseline mobility: Baseline Mobility: Walker  Code Status: Prior   Cardiac Rhythm:    O2 Flow Rate (L/min): 2 L/min  Is patient on baseline Oxygen: yes,  how many Liters 2L:   Abnormal Assessment Findings: Generalized weakness, no other complaints per patient.    Isolation: None      NIH Score:    C-SSRS: Risk of Suicide: No Risk  Bedside swallow:              Family/Caregiver Present no Any Concerns: no   Restraints no  Sitter no         Vitals:      Vitals:    11/13/24 1401 11/13/24 1431 11/13/24 1450 11/13/24 1502   BP: (!) 109/58 (!) 125/108 112/60 (!) 106/56   Pulse: 75 77 76 74   Resp: 13 14 15 14   Temp:       TempSrc:       SpO2: 100% 97% 99% 100%   PF:           Last documented pain score (0-10 scale)    Pain medication administered No.    Pertinent or High Risk Medications/Drips: No.    Pending Blood Product Administration: no    Abnormal labs:   Abnormal Labs Reviewed   CBC WITH AUTO DIFFERENTIAL - Abnormal; Notable for the following components:       Result Value    RBC 2.77 (*)     Hemoglobin 8.7 (*)     Hematocrit 26.5 (*)     RDW 22.3 (*)     Platelets 117 (*)     Neutrophils Absolute 7.8 (*)     Lymphocytes Absolute 0.4 (*)     Bands Relative 10 (*)     Anisocytosis 1+ (*)     Macrocytes Occasional (*)     Microcytes 1+ (*)     All other components within normal limits   COMPREHENSIVE METABOLIC PANEL - Abnormal; Notable for the following components:    Glucose 113 (*)     Calcium 7.4 (*)

## 2024-11-13 NOTE — ED NOTES
Pt was falling asleep and o2 was 88%.   Maggie EUGENE said it was okay to put pt on 2liters of o2.

## 2024-11-13 NOTE — ED PROVIDER NOTES
breath (Earlier this morning, not currently). Negative for chest tightness.    Cardiovascular:  Negative for chest pain.   Gastrointestinal:  Negative for abdominal pain, blood in stool and diarrhea.   Skin:  Positive for color change (right arm since being admitted associated with bruising that wife states is related to them trying to get an IV on the right arm).   Allergic/Immunologic: Positive for immunocompromised state (on chemo).   Neurological:  Negative for weakness, numbness and headaches.   Hematological:  Bruises/bleeds easily (on Eliquis).   Psychiatric/Behavioral:  The patient is not nervous/anxious.          Positives and Pertinent negatives as per HPI.      PASTMEDICAL HISTORY     Past Medical History:   Diagnosis Date    BPH (benign prostatic hyperplasia)     Cancer (HCC) 2016    prostate    Diabetes mellitus (HCC)     Esophageal reflux     Hyperlipidemia     Hypertension     Kidney stone     Lung cancer (HCC) 10/02/2021    Stage 4    Lung cancer (HCC)     Neuropathy          SURGICAL HISTORY       Past Surgical History:   Procedure Laterality Date    BACK SURGERY  2012    lumber, no hardware, \"cleaned it out\"    BRONCHOSCOPY N/A 8/21/2024    BRONCHOSCOPY DIAGNOSTIC OR CELL WASH ONLY performed by Edison Holden MD at [facility] ENDOSCOPY    BRONCHOSCOPY  8/21/2024    BRONCHOSCOPY ALVEOLAR LAVAGE performed by Edison Holden MD at [facility] ENDOSCOPY    BRONCHOSCOPY  8/21/2024    BRONCHOSCOPY BRUSHINGS performed by Edison Holden MD at [facility] ENDOSCOPY    CATARACT EXTRACTION EXTRACAPSULAR W/ INTRAOCULAR LENS IMPLANTATION      CT NEEDLE BIOPSY LUNG PERCUTANEOUS  04/07/2023    CT NEEDLE BIOPSY LUNG PERCUTANEOUS 4/7/2023 Robert Palma MD [facility] CT SCAN    CYSTOSCOPY Right 11/09/2020    CYSTOSCOPY,  RIGHT URETEROSCOPY, RIGHT RETRO PYELOGRAM, REMOVAL STONE FROM BLADDER, URETHRAL DILITATION performed by Lucien Shultz MD at [facility] OR    NEPHROLITHOTOMY Left 12/04/2020    LEFT PERCUTANEOUS  no administration in time range)     Or   dextrose bolus 10% 250 mL (has no administration in time range)   glucagon injection 1 mg (has no administration in time range)   dextrose 10 % infusion (has no administration in time range)   ipratropium 0.5 mg-albuterol 2.5 mg (DUONEB) nebulizer solution 1 Dose (1 Dose Inhalation Given 11/13/24 1215)   furosemide (LASIX) injection 20 mg (20 mg IntraVENous Given 11/13/24 1506)     Patient was evaluated due to having low oxygen saturations at home along with complaining of shortness of breath today.  On arrival to the emergency department his oxygen saturation was 92%.  I did order a chest x-ray to assess for possible pneumonia or fluid overload along with basic labs.  COVID test was also ordered.  He denied any current shortness of breath although I did order a DuoNeb to see if this helps with his earlier symptoms.  He was not sent home on any oxygen yesterday and therefore may benefit from this if he is ultimately discharged.  The ER technician tried to walk the patient but he was too weak to stand up which is something new for him as well.  While he was resting in the room his oxygen did drop to 88% per ER technician.  His troponin was also higher than typical baseline which could be related to fluid overload or possible acute coronary syndrome.  Repeat troponin did improve and again he denied any chest pain.  This can be trended in the hospital.  He is currently on Eliquis and therefore I have lower suspicion for pulmonary embolism at this time.  At this point, his wife felt that admission was the safest thing for the patient and at this time I do feel this is reasonable with new trouble walking and reported shortness of breath.  X-ray did mention possibility of an infiltrate although he had no fever on arrival and normal white blood cell count.  This can be further addressed in the hospital and if it persists then he may need antibiotics although this may be related to

## 2024-11-13 NOTE — H&P
the need for Hospitalization of the patient w/ the Emergency Department Provider: Manish Flower MD    CXR: I have reviewed the CXR with the following interpretation: No significant change from previous   EKG:  I have reviewed the EKG with the following interpretation: Sinus rhythm     Physical Exam Performed:      /75   Pulse 75   Temp 98 °F (36.7 °C) (Oral)   Resp 18   Ht 1.702 m (5' 7\")   Wt 80.7 kg (178 lb)   SpO2 96%   PF (!) 16 L/min   BMI 27.88 kg/m²     General appearance:  Mild distress, ill-appearing   HEENT:  Normal cephalic, atraumatic without obvious deformity. Conjunctivae/corneas clear.  Neck: No jugular venous distention.    Respiratory:  Normal respiratory effort. Clear to auscultation, bilaterally without Rales/Wheezes/Rhonchi.  Cardiovascular:  Regular rate and rhythm without murmurs, rubs or gallops.  Abdomen: Soft, non-tender, non-distended with normal bowel sounds.  Musculoskeletal:  No clubbing, cyanosis or edema bilaterally.    Skin: Skin color, texture, turgor normal.  No rashes or lesions.  Neurologic:  Neurovascularly intact without any focal sensory/motor deficits.   Psychiatric:  Drowsy throughout the examination     Diet: []Adult/General  []Cardiac  []Diabetic  []Low Fat  []NPO  []NPO after Midnight  [x]Other     DVT Prophylaxis: []PPx LMWH  []SQ Heparin  []IPC/SCDs  [x]Eliquis  []Xarelto  []Coumadin     Code status: [x]Full  []DNR/CCA  []Limited (DNR/CCA with Do Not Intubate)  []DNRCC    PT/OT Eval Status:   []NOT yet ordered  [x]Ordered and Pending   []Seen with Recommendations for:  []Home independently  []Home w/ assist  []HHC  []SNF  []Acute Rehab    Anticipated Discharge Day/Date:  2-3 days pending clinical improvement     Anticipated Discharge Location: [x]Home  [x]HHC  [x]SNF  []Acute Rehab  []ECF  []LTAC  [x]Hospice    Consults:      IP CONSULT TO HOSPITALIST  IP CONSULT TO ONCOLOGY    I personally have obtained, updated and/or reviewed the patient’s medication  last 72 hours.  Recent Labs     11/13/24  1123   AST 42*   ALT 23   BILITOT 0.9   ALKPHOS 157*     No results for input(s): \"INR\", \"LACTA\", \"TSH\" in the last 72 hours.     Richa Beaver, DO

## 2024-11-14 LAB
ANION GAP SERPL CALCULATED.3IONS-SCNC: 6 MMOL/L (ref 3–16)
BASOPHILS # BLD: 0 K/UL (ref 0–0.2)
BASOPHILS NFR BLD: 0.3 %
BUN SERPL-MCNC: 17 MG/DL (ref 7–20)
CALCIUM SERPL-MCNC: 7 MG/DL (ref 8.3–10.6)
CHLORIDE SERPL-SCNC: 102 MMOL/L (ref 99–110)
CO2 SERPL-SCNC: 31 MMOL/L (ref 21–32)
CREAT SERPL-MCNC: 0.8 MG/DL (ref 0.8–1.3)
DEPRECATED RDW RBC AUTO: 21.7 % (ref 12.4–15.4)
EOSINOPHIL # BLD: 0.1 K/UL (ref 0–0.6)
EOSINOPHIL NFR BLD: 0.9 %
GFR SERPLBLD CREATININE-BSD FMLA CKD-EPI: 89 ML/MIN/{1.73_M2}
GLUCOSE BLD-MCNC: 124 MG/DL (ref 70–99)
GLUCOSE SERPL-MCNC: 135 MG/DL (ref 70–99)
HCT VFR BLD AUTO: 23.6 % (ref 40.5–52.5)
HGB BLD-MCNC: 7.7 G/DL (ref 13.5–17.5)
LYMPHOCYTES # BLD: 0.3 K/UL (ref 1–5.1)
LYMPHOCYTES NFR BLD: 4 %
MCH RBC QN AUTO: 31.2 PG (ref 26–34)
MCHC RBC AUTO-ENTMCNC: 32.7 G/DL (ref 31–36)
MCV RBC AUTO: 95.3 FL (ref 80–100)
MONOCYTES # BLD: 0.7 K/UL (ref 0–1.3)
MONOCYTES NFR BLD: 11.3 %
NEUTROPHILS # BLD: 5.4 K/UL (ref 1.7–7.7)
NEUTROPHILS NFR BLD: 83.5 %
PERFORMED ON: ABNORMAL
PLATELET # BLD AUTO: 104 K/UL (ref 135–450)
PMV BLD AUTO: 6.9 FL (ref 5–10.5)
POTASSIUM SERPL-SCNC: 4 MMOL/L (ref 3.5–5.1)
RBC # BLD AUTO: 2.48 M/UL (ref 4.2–5.9)
SODIUM SERPL-SCNC: 139 MMOL/L (ref 136–145)
WBC # BLD AUTO: 6.5 K/UL (ref 4–11)

## 2024-11-14 PROCEDURE — 6370000000 HC RX 637 (ALT 250 FOR IP): Performed by: NURSE PRACTITIONER

## 2024-11-14 PROCEDURE — 94761 N-INVAS EAR/PLS OXIMETRY MLT: CPT

## 2024-11-14 PROCEDURE — 97535 SELF CARE MNGMENT TRAINING: CPT

## 2024-11-14 PROCEDURE — 2700000000 HC OXYGEN THERAPY PER DAY

## 2024-11-14 PROCEDURE — 80048 BASIC METABOLIC PNL TOTAL CA: CPT

## 2024-11-14 PROCEDURE — 6370000000 HC RX 637 (ALT 250 FOR IP)

## 2024-11-14 PROCEDURE — 97530 THERAPEUTIC ACTIVITIES: CPT

## 2024-11-14 PROCEDURE — 2580000003 HC RX 258

## 2024-11-14 PROCEDURE — 1200000000 HC SEMI PRIVATE

## 2024-11-14 PROCEDURE — 85025 COMPLETE CBC W/AUTO DIFF WBC: CPT

## 2024-11-14 PROCEDURE — 97162 PT EVAL MOD COMPLEX 30 MIN: CPT

## 2024-11-14 PROCEDURE — 97166 OT EVAL MOD COMPLEX 45 MIN: CPT

## 2024-11-14 PROCEDURE — 6360000002 HC RX W HCPCS

## 2024-11-14 RX ORDER — MECOBALAMIN 5000 MCG
5 TABLET,DISINTEGRATING ORAL NIGHTLY PRN
Status: DISCONTINUED | OUTPATIENT
Start: 2024-11-14 | End: 2024-11-15 | Stop reason: HOSPADM

## 2024-11-14 RX ADMIN — METOPROLOL SUCCINATE 12.5 MG: 25 TABLET, EXTENDED RELEASE ORAL at 08:36

## 2024-11-14 RX ADMIN — SODIUM CHLORIDE, PRESERVATIVE FREE 10 ML: 5 INJECTION INTRAVENOUS at 08:52

## 2024-11-14 RX ADMIN — AMIODARONE HYDROCHLORIDE 250 MG: 200 TABLET ORAL at 08:36

## 2024-11-14 RX ADMIN — EPOETIN ALFA-EPBX 40000 UNITS: 20000 INJECTION, SOLUTION INTRAVENOUS; SUBCUTANEOUS at 17:52

## 2024-11-14 RX ADMIN — APIXABAN 5 MG: 5 TABLET, FILM COATED ORAL at 20:57

## 2024-11-14 RX ADMIN — Medication 5 MG: at 23:00

## 2024-11-14 RX ADMIN — ESCITALOPRAM OXALATE 10 MG: 10 TABLET ORAL at 20:57

## 2024-11-14 RX ADMIN — FOLIC ACID 1 MG: 1 TABLET ORAL at 08:36

## 2024-11-14 RX ADMIN — ATORVASTATIN CALCIUM 40 MG: 40 TABLET, FILM COATED ORAL at 20:57

## 2024-11-14 RX ADMIN — SODIUM CHLORIDE, PRESERVATIVE FREE 10 ML: 5 INJECTION INTRAVENOUS at 23:00

## 2024-11-14 RX ADMIN — APIXABAN 5 MG: 5 TABLET, FILM COATED ORAL at 08:36

## 2024-11-14 NOTE — CONSULTS
Consult Placed   Consult sent via perfect serve  Who: Dashawn Perez  Date: 11/14/2024  Time: 7:39 AM       Electronically signed by Yissel Fink on 11/14/2024 at 7:39 AM

## 2024-11-14 NOTE — CARE COORDINATION
Case Management Assessment  Initial Evaluation    Date/Time of Evaluation: 11/14/2024 1:35 PM  Assessment Completed by: Raven Stone RN    If patient is discharged prior to next notation, then this note serves as note for discharge by case management.    Patient Name: Wes Schneider                   YOB: 1945  Diagnosis: Shortness of breath [R06.02]  Hypoxemia [R09.02]  Hypoalbuminemia [E88.09]  Generalized weakness [R53.1]  Elevated troponin [R79.89]  Bandemia [D72.825]  Acute respiratory failure with hypoxia [J96.01]  Anemia, unspecified type [D64.9]                   Date / Time: 11/13/2024 10:37 AM    Patient Admission Status: Inpatient   Readmission Risk (Low < 19, Mod (19-27), High > 27): Readmission Risk Score: 30    Current PCP: Jourdan Russ MD  PCP verified by CM? Yes    Chart Reviewed: Yes      History Provided by: Patient  Patient Orientation: Alert and Oriented, Person, Place, Situation, Self    Patient Cognition: Alert    Hospitalization in the last 30 days (Readmission):  No    If yes, Readmission Assessment in  Navigator will be completed.    Advance Directives:      Code Status: Full Code   Patient's Primary Decision Maker is: Legal Next of Kin    Primary Decision Maker: Miranda Schneider - Spouse - [phone]    Discharge Planning:    Patient lives with: Spouse/Significant Other Type of Home: House  Primary Care Giver: Family  Patient Support Systems include: Spouse/Significant Other, Family Members   Current Financial resources: Medicare  Current community resources: None  Current services prior to admission: Durable Medical Equipment            Current DME: Hospital Bed, Bedside Commode, Wheelchair, Walker            Type of Home Care services:  None    ADLS  Prior functional level: Assistance with the following:, Bathing, Dressing, Toileting, Shopping, Housework, Cooking, Mobility  Current functional level: Assistance with the following:, Bathing, Dressing, Cooking,

## 2024-11-14 NOTE — CONSULTS
[facility]ound Ostomy Continence Nurse  Consult Note       NAME:  Wes Schneider  MEDICAL RECORD NUMBER:  [id number]  AGE: 79 y.o.   GENDER: male  : 1945  TODAY'S DATE:  2024    Subjective; Oh, sure.     Reason for WOCN Evaluation and Assessment: rt heels, buttocks     Wound Care last evaluated patient 2024 for buttocks and heels.   Right heel at that time had a small open area.   This is now callous, dry and flaky.  Bi lateral heels red, blanchable and boggy.  Patient's bi lateral buttocks continues discolored with abraded areas.      Bi lateral feet are discolored spotty red and dry.  Applied anti fungal ointment over whole feet and placed Molnlycke heel boots to bi lateral feet related to soft heels.  Applied zinc to bi lateral buttocks.  Would like mixture of zinc and anti fungal to buttocks.    Patient follows Dr. Minor, with [facility], Podiatrist for right heel.      Wes Schneider is a 79 y.o. male referred by:   [] Physician  [x] Nursing  [] Other:     Wound Identification:  Wound Type: diabetic and pressure  Contributing Factors: chronic pressure, incontinence of stool, and incontinence of urine    Wound History: Chronic DM heels, follows Podiatry.   Buttocks is abraded each time patient is hospitalized.  Current Wound Care Treatment:  zinc     Patient Goal of Care:  [x] Wound Healing  [] Odor Control  [] Palliative Care  [x] Pain Control   [] Other:         PAST MEDICAL HISTORY        Diagnosis Date    BPH (benign prostatic hyperplasia)     Cancer (HCC) 2016    prostate    Diabetes mellitus (HCC)     Esophageal reflux     Hyperlipidemia     Hypertension     Kidney stone     Lung cancer (HCC) 10/02/2021    Stage 4    Lung cancer (HCC)     Neuropathy        PAST SURGICAL HISTORY    Past Surgical History:   Procedure Laterality Date    BACK SURGERY      lumber, no hardware, \"cleaned it out\"    BRONCHOSCOPY N/A 2024    BRONCHOSCOPY DIAGNOSTIC OR CELL WASH ONLY performed by  cm 10/29/24 1131   Wound Width (cm) 1 cm 10/29/24 1131   Wound Depth (cm) 0.1 cm 10/29/24 1131   Wound Surface Area (cm^2) 0.5 cm^2 10/29/24 1131   Wound Volume (cm^3) 0.05 cm^3 10/29/24 1131   Distance Tunneling (cm) 0 cm 11/14/24 0907   Tunneling Position ___ O'Clock 0 11/14/24 0907   Undermining Starts ___ O'Clock 0 11/14/24 0907   Undermining Ends___ O'Clock 0 11/14/24 0907   Undermining Maxium Distance (cm) 0 11/14/24 0907   Wound Assessment Dry;Pink/red 11/14/24 0907   Drainage Amount None (dry) 11/14/24 0907   Drainage Description Serous 11/12/24 0830   Odor None 11/14/24 0907   Tanisha-wound Assessment Fragile;Dry/flaky;Blanchable erythema 11/14/24 0907   Margins Unattached edges;Undefined edges 11/14/24 0907   Wound Thickness Description not for Pressure Injury Partial thickness 11/14/24 0907   Number of days: 15            Response to treatment:  Well tolerated by patient.     Pain Assessment:  Severity:  0 / 10  Quality of pain: N/A  Wound Pain Timing/Severity: none  Premedicated: No    Plan   Plan of Care: Wound 10/29/24 Heel Right;Left-Dressing/Treatment: Zinc paste  Wound 10/29/24 Coccyx Left;Right stg 2 moisture shearing-Dressing/Treatment: Zinc paste    Recommendation  Daily and PRN with tanisha care cleanse bi lateral buttocks with bath wipe, & pat dry.  Apply Zinc paste with anti fungal ointment to buttocks.     Daily cleanse bi lateral feet & heels with bath wipes, pat dry.  Apply Anti fungal ointment to all discolored areas lateral, medial and anterior feet.   Keep Molnlycke boots on at all times, take off to transfer.    CESAR Blair hospitalist for Dr. Renteria updated on treatment plan.    Specialty Bed Required : Yes   [x] Low Air Loss to end of bed  [x] Pressure Redistribution  [] Fluid Immersion  [] Bariatric  [] Total Pressure Relief  [] Other:     Current Diet: ADULT DIET; Dysphagia - Pureed; No Drinking Straws  Dietician consult:  Yes    Discharge Plan:  Placement for patient upon discharge:

## 2024-11-14 NOTE — PROGRESS NOTES
Comprehensive Nutrition Assessment    Type and Reason for Visit:  Initial, Wound    Nutrition Recommendations/Plan:   Continue pureed diet as tolerated.   Encourage PO intakes.   Send Glucerna BID - monitor for acceptance.   New weight ordered.   Will continue to monitor labs, weights, N/V, PO intakes.      Malnutrition Assessment:  Malnutrition Status:  Moderate malnutrition (11/14/24 1435)    Context:  Chronic Illness     Findings of the 6 clinical characteristics of malnutrition:  Energy Intake:  Unable to assess  Weight Loss:  Unable to assess     Body Fat Loss:  Mild body fat loss Triceps, Orbital, Buccal region   Muscle Mass Loss:  Mild muscle mass loss Temples (temporalis), Clavicles (pectoralis & deltoids), Hand (interosseous)  Fluid Accumulation:  Mild Extremities   Strength:  Not Performed    Nutrition Assessment:    Pt seen d/t wound. Wound care following. Patient admitted for Acute respiratory failure with hypoxia. History of former smoking, HTN, HLD, DM2, Afib, prostate cancer, and stage 4 metastatic adenocarcinoma diagnosed 2021 treated with chemo, mets to brain in 9/2023 on chemo. Pt sleeping soundly at x2 attempts, no family in room at time of visit. Seen by this RD last admission 10/31-11/12/24. Pt had been consuming % of meals throughout admission. Currently on Pureed diet. Will send Glucerna BID per last admission regimen. LBM 11/13. Will continue to monitor.    Nutrition Related Findings:    Glucose 124-135, Ca 7.0, albumin 2.2. +1 pitting edema RLE/LLE. Wound Type: Multiple (heels, buttocks)       Current Nutrition Intake & Therapies:    Average Meal Intake: Unable to assess  Average Supplements Intake: None Ordered  ADULT DIET; Dysphagia - Pureed; No Drinking Straws    Anthropometric Measures:  Height: 170.2 cm (5' 7\")  Ideal Body Weight (IBW): 148 lbs (67 kg)       Current Body Weight: 80.7 kg (178 lb), 120.3 % IBW. Weight Source: Other (estimated)  Current BMI (kg/m2): 27.9

## 2024-11-14 NOTE — CONSULTS
Consult Call Back    Who:Dashawn Perez PA   Date:11/14/2024,  Time:4:06 PM    Electronically signed by Yissel Fink on 11/14/24 at 4:06 PM EST

## 2024-11-14 NOTE — PROGRESS NOTES
[facility] Medicine Progress Note  V 10.25      Date of Admission: 11/13/2024    Hospital Day: 2      Chief Admission Complaint:    O2 sats of 83% at home on RA       Subjective:  EMR and notes reviewed pt seen and examined, seen at bedside with Hem/Onc NP, pt was alone in room awake and very lucid. He denies any chest pain or sob, stated that he was asymptomatic when his wife found him to have sats in the the 80's. He states that he feels fine right now. Verbalized that he is done with treatments and is ready to go, but \" my wife makes the decisions\"     Presenting Admission History:       Patient is an 79-year-old male with a past medical history of hypertension, hyperlipidemia, type 2 diabetes, atrial fibrillation, prostate cancer and stage IV metastatic adenocarcinoma with brain metastasis undergoing active chemotherapy who presented to the ED due to having an SpO2 of 83% at home on room air. Upon my examination he was difficult to obtain history from. He denied any shortness of breath during his episode or chest pain. Of note he was [facility] on [facility]. During this previous admission he was treated for pancytopenia due to chemotherapy and acute respiratory failure. He did receive 5 days of meropenem that ended on 11/8. ED workup was remarkable for a new oxygen requirement of 2 L. We were asked to admit the patient due to this new oxygen requirement.     Assessment/Plan:      Current Principal Problem:  Acute respiratory failure with hypoxia    Acute respiratory failure with hypoxia-present on admission  - Secondary to lung cancer, suspect aspiration PNA, given known hx of dysphagia   - Continue nasal cannula   - Assess need for home O2, suspect will need at DC      Metastatic lung adenocarcinoma  - Per heme-onc note on 11/8 patient wanted to be done with treatment and move forward with comfort care. Wife refused SNF placement and hospice care.  - Heme-onc consulted given readmission an    Component Value Date/Time    BC No Growth after 4 days of incubation. 11/04/2024 09:22 AM     Lab Results   Component Value Date/Time    BLOODCULT2 No Growth after 4 days of incubation. 11/04/2024 09:21 AM     Organism:   Lab Results   Component Value Date/Time    ORG Candida albicans 08/21/2024 10:55 AM         Morenita Blair, APRN - CNP

## 2024-11-14 NOTE — PROGRESS NOTES
Occupational Therapy  Facility/Department: [facility] C3 TELE/MED SURG/ONC  Occupational Therapy Initial Assessment/ Treatment Note    Name: Wes Schneider  : 1945  MRN: [id number]  Date of Service: 2024    Discharge Recommendations:  Subacute/Skilled Nursing Facility  OT Equipment Recommendations  Equipment Needed: No (defer)     Therapy discharge recommendations are subject to collaboration from the patient’s interdisciplinary healthcare team, including MD and case management recommendations.    Barriers to Home Discharge:   [x] Steps to access home entry or bed/bath:   [x] Unable to transfer, ambulate, or propel wheelchair household distances without assist   [] Limited available assist at home upon discharge    [] Patient or family requests d/c to post-acute facility    [x] Poor cognition/safety awareness for d/c to home alone    [] Unable to maintain ordered weight bearing status    [x] Patient with salient signs of long-standing immobility   [x] Decreased independence with ADLs   [x] Increased risk for falls   [] Other:    If pt is unable to be seen after this session, please let this note serve as discharge summary.  Please see case management note for discharge disposition.  Thank you.    Patient Diagnosis(es): The primary encounter diagnosis was Shortness of breath. Diagnoses of Elevated troponin, Generalized weakness, Bandemia, Anemia, unspecified type, Hypoalbuminemia, and Hypoxemia were also pertinent to this visit.  Past Medical History:  has a past medical history of BPH (benign prostatic hyperplasia), Cancer (HCC), Diabetes mellitus (HCC), Esophageal reflux, Hyperlipidemia, Hypertension, Kidney stone, Lung cancer (HCC), Lung cancer (HCC), and Neuropathy.  Past Surgical History:  has a past surgical history that includes Prostate surgery; Cystoscopy (Right, 2020); back surgery (); NEPHROLITHOTOMY (Left, 2020); shoulder surgery (Right); Port Surgery (N/A, 2021); Port

## 2024-11-14 NOTE — PROGRESS NOTES
4 Eyes Skin Assessment and Patient belongings     The patient is being assess for  Admission    I agree that 2 Nurses have performed a thorough Head to Toe Skin Assessment on the patient. ALL assessment sites listed below have been assessed.       Areas assessed by both nurses: Cinthya RN and Patricia RN   [x]   Head, Face, and Ears   [x]   Shoulders, Back, and Chest  [x]   Arms, Elbows, and Hands   [x]   Coccyx, Sacrum, and IschIum  [x]   Legs, Feet, and Heels        Does the Patient have Skin Breakdown?  Yes LDA WOUND CARE was Initiated documentation include the Brenda-wound, Wound Assessment, Measurements, Dressing Treatment, Drainage, and Color\",         Sigifredo Prevention initiated:  Yes   Wound Care Orders initiated:  Yes      WOC nurse consulted for Pressure Injury (Stage 3,4, Unstageable, DTI, NWPT, and Complex wounds), New and Established Ostomies:  Yes      I agree that 2 Nurses have reviewed patient belongings with the patient/family and documented in the flowsheet upon admission or transfer to the unit.     Belongings  Dental Appliances: Uppers, Lowers, Other (Comment) (pt wearing)  Vision - Corrective Lenses: None  Hearing Aid: None  Clothing: Pants, Shirt, Socks  Jewelry: None  Body Piercings Removed: N/A  Electronic Devices: None  Weapons (Notify Protective Services/Security): None  Home Medications: None  Valuables Given To: Patient  Provide Name(s) of Who Valuable(s) Were Given To: patient       Nurse 1 eSignature: Electronically signed by Cinthya Kay RN on 11/13/24 at 7:04 PM EST    **SHARE this note so that the co-signing nurse is able to place an eSignature**    Nurse 2 eSignature: Electronically signed by Patricia Serna RN on 11/13/24 at 7:43 PM EST

## 2024-11-14 NOTE — CONSULTS
appeared to be improving with discontinuation of cefepime although reported worsening mental status again 11/03. Improved later in AM.   - 11/05- patient appears close to baseline mental status. Oriented and communicative. Responds well to questions.    - 11/11: appears this has resolved. Patient returned to baseline.       Dysphagia  - modified barium 11/04 showed aspiration confirmed with thin barium due to significant resiude from prior ingestions   - speech recommending puree solids, thin liquids no straws. Chin tuck.      Respiratory Failure  - rapid called 11/04  - concern for aspiration   - patient requiring 8L O2 and minimally responsive  - CXR shows findings consistent with pneumonia   - intermittent O2 requirements during admission due to aspiration episodes. Walk test prior to d/c 11/12 showed no indication for home O2  - returned to ED on 11/13 with hypoxia 83% on room air. Now requiring 2L o2 via NC. CXR 11/13 showed ill defined patchy airspace infiltrates in right lung mainly RUL. No significant interval change. Afebrile.     Neutropenia   Anemia  Thrombocytopenia   - secondary to recent chemo 10/18/2024   - Started Procrit 40,000 units weekly on 9/01/2023.  HGB was 7.4 g/dL at that time.  He last received Procrit on 10/24/2024. Given inpatient 11/01/2024 and 11/07/2024. Due again 11/14.   - HGB 5.2 on admission, plts 42K, ANC 0.9 received blood transfusion   - afebrile   - please continue patients folic acid as Alimta is a folic acid inhibitor   - Infectious Workup:              - blood cultures from 10/28/2024 NGTD               - COVID/Flu negative               - UA unremarkable               - GI pathogen panel not collected               - procalcitonin elevated 0.78  - started broad spec abx in ED ; d/moni on 10/30   - monitor temp  - HGB 6.8 on 10/30/2024, 1 unit pRBCs transfused.   - HGB down to 6.2 on 11/04. Transfused 1 unit pRBCs. Plts improved to 141.   - HGB down to 6.6 g/dL on 11/05.  Transfusing pRBCs.   - HGB 7.7 g/dL 11/14  - transfuse as needed for HGB <7, plts <10K    Brain mets   - An MRI of the brain on 8/22/2023 showed a new 9 mm enhancing lesion in the right superior frontal gyrus and a new 3 mm enhancing lesion in the left cerebellum.  At the time of a planning MRI of the brain on 9/25/2023, 9 total brain mets were identified and treated by [facility] with Dr. Carl Ojeda and Dr. Gerry Lofton.  - A follow-up MRI on 12/12/2023 showed mild enlargement of lesions in the right temporal/occipital lobe and left frontal lobe.  This was felt to be due to treatment effect/radiation necrosis.  5 Loxin was recommended.  - Another MRI of the brain on 4/04/2024 showed an increase in the size of a left frontal periventricular met (from 2 mm to 6 mm) and an increase in the size of the right parietal met (from 2 mm to 3 mm).  Another MRI of the brain on 5/03/2024 showed stable brain mets with an increase in peritumoral edema surrounding the 2 larger brain mets.  Another MRI on 7/26/2024 showed stable findings.   - Another MRI on 10/10/2024 showed stable findings.  - MRI 10/31/2024 showed multiple intracranial mets some of which are smaller than prior. 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      Prevention of SRE  - started Zometa on 11/08/2021. Last received 09/27/2024. Due next 11/08/2024.      T2DM  - on insulin glargine   - per IM     AFIB  - on metoprolol succinate and amiodarone  - per IM     75 minutes spent reviewing records, labs, imaging and discussion with patient, wife, IM, nursing.     Thank you for asking me to see the patient.       AASHISH XIONG  Please Contact Through [employer]

## 2024-11-14 NOTE — PROGRESS NOTES
Physical Therapy  Facility/Department: [facility] C3 TELE/MED SURG/ONC  Physical Therapy Initial Assessment/Treatment    Name: Wes Schneider  : 1945  MRN: [id number]  Date of Service: 2024    Discharge Recommendations:  Subacute/Skilled Nursing Facility   PT Equipment Recommendations  Equipment Needed: Yes  Mobility Devices: Hospital Bed  Hospital Bed : Electric - Full (Head of Bed);Bed Rails - Full  Other: If d/c home, recommend hospital better for better positioning, prevention of skin break down and ability to perform bed mobility without assist.      Patient Diagnosis(es): The primary encounter diagnosis was Shortness of breath. Diagnoses of Elevated troponin, Generalized weakness, Bandemia, Anemia, unspecified type, Hypoalbuminemia, and Hypoxemia were also pertinent to this visit.  Past Medical History:  has a past medical history of BPH (benign prostatic hyperplasia), Cancer (HCC), Diabetes mellitus (HCC), Esophageal reflux, Hyperlipidemia, Hypertension, Kidney stone, Lung cancer (HCC), Lung cancer (HCC), and Neuropathy.  Past Surgical History:  has a past surgical history that includes Prostate surgery; Cystoscopy (Right, 2020); back surgery (); NEPHROLITHOTOMY (Left, 2020); shoulder surgery (Right); Port Surgery (N/A, 2021); Port Surgery (N/A, 10/04/2022); Port Surgery (Left, 2022); CT NEEDLE BIOPSY LUNG PERCUTANEOUS (2023); Cataract extraction, extracapsular w/ intraocular lens implant; bronchoscopy (N/A, 2024); bronchoscopy (2024); and bronchoscopy (2024).    Assessment  Body Structures, Functions, Activity Limitations Requiring Skilled Therapeutic Intervention: Decreased functional mobility ;Decreased endurance;Decreased balance;Decreased strength;Decreased safe awareness  Assessment: Pt presents to [facility] due to acute respiratory failure. PTA, pt lives with spouse and performs functional mobility with rollator, has been needing increased help for

## 2024-11-14 NOTE — PLAN OF CARE
Problem: Safety - Adult  Goal: Free from fall injury  Outcome: Progressing     Problem: Chronic Conditions and Co-morbidities  Goal: Patient's chronic conditions and co-morbidity symptoms are monitored and maintained or improved  Outcome: Progressing     Problem: Skin/Tissue Integrity  Goal: Absence of new skin breakdown  Description: 1.  Monitor for areas of redness and/or skin breakdown  2.  Assess vascular access sites hourly  3.  Every 4-6 hours minimum:  Change oxygen saturation probe site  4.  Every 4-6 hours:  If on nasal continuous positive airway pressure, respiratory therapy assess nares and determine need for appliance change or resting period.  Outcome: Progressing     Problem: ABCDS Injury Assessment  Goal: Absence of physical injury  Outcome: Progressing

## 2024-11-14 NOTE — ACP (ADVANCE CARE PLANNING)
Advance Care Planning     General Advance Care Planning (ACP) Conversation    Date of Conversation: 11/14/2024  Conducted with: Patient with Decision Making Capacity  Other persons present: None    Healthcare Decision Maker:   Primary Decision Maker: SchneiderMiranda [email or web address] - Spouse - [phone]       Content/Action Overview:  Has NO ACP documents-Information provided  Reviewed DNR/DNI and patient elects Full Code (Attempt Resuscitation)      Length of Voluntary ACP Conversation in minutes:  <16 minutes (Non-Billable)    Raven Stone RN

## 2024-11-15 VITALS
HEIGHT: 67 IN | HEART RATE: 71 BPM | BODY MASS INDEX: 27.94 KG/M2 | OXYGEN SATURATION: 93 % | TEMPERATURE: 97.1 F | WEIGHT: 178 LBS | SYSTOLIC BLOOD PRESSURE: 146 MMHG | DIASTOLIC BLOOD PRESSURE: 73 MMHG | RESPIRATION RATE: 12 BRPM

## 2024-11-15 LAB
ANION GAP SERPL CALCULATED.3IONS-SCNC: 6 MMOL/L (ref 3–16)
BUN SERPL-MCNC: 18 MG/DL (ref 7–20)
CALCIUM SERPL-MCNC: 6.7 MG/DL (ref 8.3–10.6)
CHLORIDE SERPL-SCNC: 102 MMOL/L (ref 99–110)
CO2 SERPL-SCNC: 29 MMOL/L (ref 21–32)
CREAT SERPL-MCNC: 0.9 MG/DL (ref 0.8–1.3)
DEPRECATED RDW RBC AUTO: 23.4 % (ref 12.4–15.4)
GFR SERPLBLD CREATININE-BSD FMLA CKD-EPI: 86 ML/MIN/{1.73_M2}
GLUCOSE SERPL-MCNC: 241 MG/DL (ref 70–99)
HCT VFR BLD AUTO: 23.3 % (ref 40.5–52.5)
HGB BLD-MCNC: 7.5 G/DL (ref 13.5–17.5)
MCH RBC QN AUTO: 31.3 PG (ref 26–34)
MCHC RBC AUTO-ENTMCNC: 32.1 G/DL (ref 31–36)
MCV RBC AUTO: 97.3 FL (ref 80–100)
PLATELET # BLD AUTO: 95 K/UL (ref 135–450)
PLATELET BLD QL SMEAR: ABNORMAL
PMV BLD AUTO: 7.2 FL (ref 5–10.5)
POTASSIUM SERPL-SCNC: 3.9 MMOL/L (ref 3.5–5.1)
RBC # BLD AUTO: 2.39 M/UL (ref 4.2–5.9)
SLIDE REVIEW: ABNORMAL
SODIUM SERPL-SCNC: 137 MMOL/L (ref 136–145)
WBC # BLD AUTO: 7.1 K/UL (ref 4–11)

## 2024-11-15 PROCEDURE — 6370000000 HC RX 637 (ALT 250 FOR IP)

## 2024-11-15 PROCEDURE — 80048 BASIC METABOLIC PNL TOTAL CA: CPT

## 2024-11-15 PROCEDURE — 2700000000 HC OXYGEN THERAPY PER DAY

## 2024-11-15 PROCEDURE — 94761 N-INVAS EAR/PLS OXIMETRY MLT: CPT

## 2024-11-15 PROCEDURE — 2580000003 HC RX 258

## 2024-11-15 PROCEDURE — 97530 THERAPEUTIC ACTIVITIES: CPT

## 2024-11-15 PROCEDURE — 97110 THERAPEUTIC EXERCISES: CPT

## 2024-11-15 PROCEDURE — 85027 COMPLETE CBC AUTOMATED: CPT

## 2024-11-15 RX ADMIN — SODIUM CHLORIDE, PRESERVATIVE FREE 10 ML: 5 INJECTION INTRAVENOUS at 09:09

## 2024-11-15 RX ADMIN — FOLIC ACID 1 MG: 1 TABLET ORAL at 09:02

## 2024-11-15 RX ADMIN — AMIODARONE HYDROCHLORIDE 250 MG: 200 TABLET ORAL at 09:02

## 2024-11-15 RX ADMIN — APIXABAN 5 MG: 5 TABLET, FILM COATED ORAL at 09:02

## 2024-11-15 NOTE — CARE COORDINATION
Chart reviewed. Spoke with patient and wife. Plan to return home with resumption of [facility]. Following for likely needed new O2. Will need walk test. Raven Stone RN    Ethics consult completed today. Walk test completed. Jakob with [facility] reviewing documentation. Plan for d/c today. Raven Stone RN          CASE MANAGEMENT DISCHARGE SUMMARY      Discharge to: home with [facility]      IMM given: 11/15    New Durable Medical Equipment ordered/agency: O2 provided by [facility]    Transportation:    Family/car:   Medical Transport explained to pt/family. Pt/family voice no agency preference.    Agency used:[facility]   time:   Ambulance form completed: Yes    Confirmed discharge plan with:     Patient: yes     Family:  yes     Facility/Agency, name:  FAN/AVS faxed pulled per Ольга EUGENE, name: Cinthya    Note: Discharging nurse to complete FAN, reconcile AVS, and place final copy with patient's discharge packet. RN to ensure that written prescriptions for  Level II medications are sent with patient to the facility as per protocol.  Raven Stone RN

## 2024-11-15 NOTE — PLAN OF CARE
Problem: Safety - Adult  Goal: Free from fall injury  11/15/2024 0734 by Cinthya Kay, RN  Outcome: Progressing  Flowsheets (Taken 11/15/2024 0734)  Free From Fall Injury:   Instruct family/caregiver on patient safety   Based on caregiver fall risk screen, instruct family/caregiver to ask for assistance with transferring infant if caregiver noted to have fall risk factors  11/14/2024 2337 by Miranda Velasco, RN  Outcome: Progressing     Problem: Chronic Conditions and Co-morbidities  Goal: Patient's chronic conditions and co-morbidity symptoms are monitored and maintained or improved  Outcome: Progressing  Flowsheets (Taken 11/15/2024 0734)  Care Plan - Patient's Chronic Conditions and Co-Morbidity Symptoms are Monitored and Maintained or Improved:   Monitor and assess patient's chronic conditions and comorbid symptoms for stability, deterioration, or improvement   Collaborate with multidisciplinary team to address chronic and comorbid conditions and prevent exacerbation or deterioration   Update acute care plan with appropriate goals if chronic or comorbid symptoms are exacerbated and prevent overall improvement and discharge

## 2024-11-15 NOTE — PROGRESS NOTES
Occupational Therapy  Facility/Department: [facility] C3 TELE/MED SURG/ONC  Daily Treatment Note  NAME: Wes Schneider  : 1945  MRN: [id number]    Date of Service: 11/15/2024    Discharge Recommendations:  Subacute/Skilled Nursing Facility       Therapy discharge recommendations are subject to collaboration from the patient’s interdisciplinary healthcare team, including MD and case management recommendations.  Barriers to Home Discharge:   [x] Steps to access home entry or bed/bath:   [x] Unable to transfer, ambulate, or propel wheelchair household distances without assist   [] Limited available assist at home upon discharge    [] Patient or family requests d/c to post-acute facility    [x] Poor cognition/safety awareness for d/c to home alone    [] Unable to maintain ordered weight bearing status    [x] Patient with salient signs of long-standing immobility   [x] Decreased independence with ADLs   [x] Increased risk for falls   [] Other:     Patient Diagnosis(es): The primary encounter diagnosis was Shortness of breath. Diagnoses of Elevated troponin, Generalized weakness, Bandemia, Anemia, unspecified type, Hypoalbuminemia, and Hypoxemia were also pertinent to this visit.     Assessment   Assessment: Pt tolerated session fair, limited by both fatigue and soft BPs/symptoms of low BP. Co-tx collaboration this date with PT staff to safely progress pt toward goals. Pt will have better occupational performance outcomes within a co-treatment than 1:1 session. Pt requiring rest breaks with UE therex both EOB and in chair. Pt continues to function below his baseline, recommend SNF at d/c.   Activity Tolerance: Patient limited by fatigue;Patient limited by endurance  Discharge Recommendations: Subacute/Skilled Nursing Facility     Plan  Occupational Therapy Plan  Times Per Week: x3-5/week    Restrictions  Restrictions/Precautions  Restrictions/Precautions: Fall Risk;General Precautions  Position Activity

## 2024-11-15 NOTE — DISCHARGE INSTR - COC
Charles Lamar MD at [facility] OR    PORT SURGERY N/A 10/04/2022    PORT REMOVAL performed by Danny Andrade MD at [facility] OR    PORT SURGERY Left 11/02/2022    SURGICAL PORT PLACEMENT performed by Ever Lamar MD at [facility] OR    PROSTATE SURGERY      CYBER KNIFE    SHOULDER SURGERY Right     ROTATOR CUFF       Immunization History:   Immunization History   Administered Date(s) Administered    Pneumococcal, PPSV23, PNEUMOVAX 23, (age 2y+), SC/IM, 0.5mL 08/12/2008, 11/29/2012    TDaP, ADACEL (age 10y-64y), BOOSTRIX (age 10y+), IM, 0.5mL 08/12/2008    Td vaccine (adult) 03/18/2016       Active Problems:  Patient Active Problem List   Diagnosis Code    Transient global amnesia G45.4    Acute encephalopathy G93.40    Hemispheric carotid artery syndrome G45.1    Uncontrolled type 2 diabetes mellitus with complication, without long-term current use of insulin [id number]    HTN (hypertension), benign I10    Dyslipidemia E78.5    Urolithiasis N20.9    SOB (shortness of breath) R06.02    Left renal stone N20.0    Lung cancer (HCC) C34.90    Shortness of breath R06.02    Gram-negative pneumonia (HCC) J15.69    Sepsis (HCC) A41.9    Hyperglycemia due to type 2 diabetes mellitus (HCC) E11.65    Neutropenia (HCC) D70.9    Cancer of right lung (HCC) C34.91    Elevated troponin level not due myocardial infarction R79.89    Immunocompromised state due to drug therapy (HCC) D84.821, Z79.899    Pancytopenia (HCC) D61.818    Gram-negative bacteremia R78.81    COVID-19 U07.1    Normocytic normochromic anemia D64.9    Generalized weakness R53.1    Acute hypoxemic respiratory failure J96.01    Hypoxemia R09.02    Pulmonary infiltrate R91.8    Loculated pleural effusion J90    Atelectasis J98.11    Metastatic adenocarcinoma (HCC) C79.9    Primary malignant neoplasm of lung with metastasis to brain (HCC) C34.90, C79.31    Vasogenic edema (HCC) G93.6    Uncontrolled type 2 diabetes mellitus with hyperglycemia (HCC) E11.65    Former  Follow-up, Labs or Other Treatments After Discharge:    Home care, PT/OT and SN  New O2 requirements   Cont to recommend home pallitive care/ hospice code status remains full   - Op follow up 11/19 with Hem/Onc  - Dysphagia; SLP recs pureed, meds crushed in pudding and No straws.               Physician Certification: I certify the above information and transfer of Wes Schneider  is necessary for the continuing treatment of the diagnosis listed and that he requires Home Care for greater 30 days.     Update Admission H&P: No change in H&P    PHYSICIAN SIGNATURE:  Electronically signed by PUSHPA Curry CNP/ Dr. Moreno  on 11/15/24 at 3:15 PM EST

## 2024-11-15 NOTE — PLAN OF CARE
Problem: Safety - Adult  Goal: Free from fall injury  11/15/2024 1653 by Cinthya Kay RN  Outcome: Adequate for Discharge  11/15/2024 0734 by Cinthya Kay RN  Outcome: Progressing  Flowsheets (Taken 11/15/2024 0734)  Free From Fall Injury:   Instruct family/caregiver on patient safety   Based on caregiver fall risk screen, instruct family/caregiver to ask for assistance with transferring infant if caregiver noted to have fall risk factors     Problem: Chronic Conditions and Co-morbidities  Goal: Patient's chronic conditions and co-morbidity symptoms are monitored and maintained or improved  11/15/2024 1653 by Cinthya Kay RN  Outcome: Adequate for Discharge  11/15/2024 0734 by Cinthya Kay RN  Outcome: Progressing  Flowsheets (Taken 11/15/2024 0734)  Care Plan - Patient's Chronic Conditions and Co-Morbidity Symptoms are Monitored and Maintained or Improved:   Monitor and assess patient's chronic conditions and comorbid symptoms for stability, deterioration, or improvement   Collaborate with multidisciplinary team to address chronic and comorbid conditions and prevent exacerbation or deterioration   Update acute care plan with appropriate goals if chronic or comorbid symptoms are exacerbated and prevent overall improvement and discharge     Problem: Skin/Tissue Integrity  Goal: Absence of new skin breakdown  Description: 1.  Monitor for areas of redness and/or skin breakdown  2.  Assess vascular access sites hourly  3.  Every 4-6 hours minimum:  Change oxygen saturation probe site  4.  Every 4-6 hours:  If on nasal continuous positive airway pressure, respiratory therapy assess nares and determine need for appliance change or resting period.  Outcome: Adequate for Discharge     Problem: ABCDS Injury Assessment  Goal: Absence of physical injury  Outcome: Adequate for Discharge     Problem: Nutrition Deficit:  Goal: Optimize nutritional status  Outcome: Adequate for Discharge

## 2024-11-15 NOTE — PROGRESS NOTES
Oxygen documentation:     O2 saturation at REST on ROOM AIR = ___91___%     If saturation is 89% or above please proceed with steps 2 and 3..........     O2 saturation with exertion ROOM AIR = __87___%   O2 saturation with exertion on current 2 liter flow = ___92___%     [facility] notified: ______     Signature: _________________________ Date: __________   Time: ______

## 2024-11-15 NOTE — PROGRESS NOTES
ONCOLOGY HEMATOLOGY CARE PROGRESS NOTE      SUBJECTIVE:    Wes reports feeling well this AM. No current concerns. On o2 via NC at 1L  Wife at bedside.  Afebrile.      ROS:     Constitutional:  No weight loss, No fever, No chills, No night sweats.  Energy level good.  Eyes:  No impairment or change in vision  ENT / Mouth:  No pain, abnormal ulceration, bleeding, nasal drip or change in voice or hearing  Cardiovascular:  No chest pain, palpitations, new edema, or calf discomfort  Respiratory:  No pain, hemoptysis, change to breathing  Breast:  No pain, discharge, change in appearance or texture  Gastrointestinal:  No pain, cramping, jaundice, change to eating and bowel habits  Urinary:  No pain, bleeding or change in continence  Genitalia: No pain, bleeding or discharge  Musculoskeletal:  No redness, pain, edema or weakness  Skin:  No pruritus, rash, change to nodules or lesions  Neurologic:  No discomfort, change in mental status, speech, sensory or motor activity  Psychiatric:  No change in concentration or change to affect or mood  Endocrine:  No hot flashes, increased thirst, or change to urine production  Hematologic: No petechiae, ecchymosis or bleeding  Lymphatic:  No lymphadenopathy or lymphedema  Allergy / Immunologic:  No eczema, hives, frequent or recurrent infections    OBJECTIVE        Physical    VITALS:  Patient Vitals for the past 24 hrs:   BP Temp Temp src Pulse Resp SpO2 Height   11/15/24 0900 (!) 99/58 98 °F (36.7 °C) Axillary -- 18 -- --   11/15/24 0446 110/62 97.6 °F (36.4 °C) Oral 69 -- 96 % --   11/14/24 2357 114/63 97.8 °F (36.6 °C) Oral 73 -- 92 % --   11/14/24 1933 115/63 97.9 °F (36.6 °C) Oral 74 14 93 % --   11/14/24 1512 115/69 -- -- 70 -- 93 % --   11/14/24 1410 -- -- -- -- -- -- 1.702 m (5' 7\")       24HR INTAKE/OUTPUT:    Intake/Output Summary (Last 24 hours) at 11/15/2024 1049  Last data filed at 11/15/2024 0908  Gross per 24 hour   Intake 460  treatment effect/radiation necrosis.  5 Loxin was recommended.  - Another MRI of the brain on 4/04/2024 showed an increase in the size of a left frontal periventricular met (from 2 mm to 6 mm) and an increase in the size of the right parietal met (from 2 mm to 3 mm).  Another MRI of the brain on 5/03/2024 showed stable brain mets with an increase in peritumoral edema surrounding the 2 larger brain mets.  Another MRI on 7/26/2024 showed stable findings.   - Another MRI on 10/10/2024 showed stable findings.  - MRI 10/31/2024 showed multiple intracranial mets some of which are smaller than prior. 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      Prevention of SRE  - started Zometa on 11/08/2021. Last received 09/27/2024. Due next 11/08/2024.      T2DM  - on insulin glargine   - per IM     AFIB  - on metoprolol succinate and amiodarone  - per IM     ONCOLOGIC DISPOSITION:  Per primary; patient has follow up scheduled at [facility] for 11/19/2024    AASHISH XIONG  Please contact through [employer]

## 2024-11-15 NOTE — DISCHARGE SUMMARY
Hospital Medicine Discharge Summary    Patient: Wes Schneider   : 1945     Admit Date: 2024   Discharge Date:     Disposition:  []Home   []HHC  []SNF  []Acute Rehab  []LTAC  []Hospice  Code status:  []Full  []DNR/CCA  []Limited (DNR/CCA with Do Not Intubate)  []DNRCC  Condition at Discharge: Stable  Primary Care Provider: Jourdan Russ MD    Admitting Provider: Richa Beaver DO  Discharge Provider: Morenita Blair APRN - CNP     Discharge Diagnoses:      Active Hospital Problems    Diagnosis     Acute respiratory failure with hypoxia [J96.01]     Moderate protein-calorie malnutrition (HCC) [E44.0]        Presenting Admission History:      ***     Assessment/Plan:      ***    Physical Exam Performed:      BP (!) 146/73   Pulse 71   Temp 97.1 °F (36.2 °C) (Axillary)   Resp 12   Ht 1.702 m (5' 7\")   Wt 80.7 kg (178 lb)   SpO2 93%   PF (!) 16 L/min   BMI 27.88 kg/m²       General appearance:  No apparent distress, appears stated age and cooperative.  Respiratory:  Normal respiratory effort.   Cardiovascular:  Regular rate and rhythm.  Abdomen:  Soft, non-tender, non-distended.  Musculoskeletal:  No edema  Neurologic:  Non-focal  Psychiatric:  Alert and oriented    Patient Discharge Instructions:      Follow up:    1.  Primary Care Provider Jourdan Russ MD in the next 1-2 weeks.      The patient was seen and examined on day of discharge and this discharge summary is in conjunction with any daily progress note from day of discharge. Time spent on discharge: 3*** minutes in the examination, evaluation, counseling and review of medications and discharge plan.    ------------------------------------------------------------------------------------------------------------------------------------------------------    Discharge Medications:   Current Discharge Medication List        Current Discharge Medication List        Current Discharge Medication List        CONTINUE these medications

## 2024-11-15 NOTE — CONSULTS
Clinical Ethics Consultation Note    History and Context:  The pt has stage IV cancer, metastasized to many organs. His prognosis is very poor. He has had multiple hospitalizations recently. His last period between readmissions was <24 hrs. During previous admission, palliative care spoke with the pt and family. The pt had indicated a preference for comfort care but deferred to his wife, who resisted hospice. Ethics consulted during this admission because pt has decision making capacity and is expressing a desire for comfort care. Pt has capacity, and his wife acknowledges that.  Length of Stay: 2  Valid medical advanced directives?: No    Process:  Chart review, interview with pt (wife present), private conversation with pt's wife, private conversation with bedside RN and APRN.    Ethical Question(s) and Concern(s):  If a capable patient is clearly expressing a preference for goals of care, can this be resisted by a family member?    Analysis:  The simple answer to this question is no. It can be complicated if the patient vacillates between options and/or defers to a family member. According to the notes and the care team today, that has been the case.    Based on my conversation with the patient today, I am confident that the patient has expressed a clear wish to decline aggressive treatment. He did not explicitly say that he would want hospice care. He said that he wants to go home and does not want to return to the hospital. He recognizes that his condition is terminal and that the cancer will kill him. He said he \"doesn't want to jump out the window\" but he said he is okay with dying.    Recommendations:  Have a clear private conversation with the patient regarding code status and hospice care without family in the room.    Closing:  Thank you for consulting me. Ethics is signing off. I am available for consult if needed further.  Rafiq Pavon  Director of [facility]  [phone]       Secondary Ethical

## 2024-11-15 NOTE — DISCHARGE INSTRUCTIONS
Adherence to dietary restrictions for dysphagia       Aspiration Pneumonia: Care Instructions  Your Care Instructions     Aspiration pneumonia is an inflammation of the lungs. It may occur after you breathe in foreign material, such as food, liquid, vomit, or mucus.  Aspiration may happen because of a health problem that makes it hard to swallow. These problems include stroke or seizure.  Pneumonia makes it hard to breathe.  Follow-up care is a key part of your treatment and safety. Be sure to make and go to all appointments, and call your doctor if you are having problems. It's also a good idea to know your test results and keep a list of the medicines you take.  How can you care for yourself at home?  To help with swallowing   You may need to do exercises to train your muscles to work together to help you swallow. You may also need to learn how to position your body or how to put food in your mouth to be able to swallow better.  You may need to change the foods you eat. Your doctor may tell you to eat certain foods and liquids to make swallowing easier.  You may need to change how you prepare foods. For example, you may need to add thickeners to some liquids, or puree certain foods in a .  To help with pneumonia   Take your antibiotics as directed. Do not stop taking them just because you feel better. You need to take the full course of antibiotics.  Take your medicines exactly as prescribed. For example, your doctor may have given you medicine that makes breathing easier. Call your doctor if you think you are having a problem with your medicine.  Get plenty of rest and sleep. You may feel weak and tired for a while, but your energy level will improve with time.  Take care of your cough so you can rest. A cough that brings up mucus from your lungs is common with pneumonia. It is one way your body gets rid of the infection. But if coughing keeps you from resting or causes severe fatigue and chest-wall pain,

## 2024-11-15 NOTE — PROGRESS NOTES
Head,  normocephalic,  atraumatic,  Face,  Face within normal limits,  Ears,  External ears within normal limits,  Nose/Nasopharynx,  External nose  normal appearance,  nares patent,  no nasal discharge,  Mouth and Throat,  Oral cavity appearance normal,  Breath odor normal,  Lips,  Appearance normal Physical Therapy  Facility/Department: [facility] C3 TELE/MED SURG/ONC  Daily Treatment Note  NAME: Wes Schneider  : 1945  MRN: [id number]    Date of Service: 11/15/2024    Discharge Recommendations:  Subacute/Skilled Nursing Facility   PT Equipment Recommendations  Equipment Needed: Yes  Other: If d/c home, recommend hospital bed for better positioning, prevention of skin break down and ability to perform bed mobility without assist.    Therapy discharge recommendations take into account each patient's current medical complexities and are subject to input/oversight from the patient's healthcare team.   Barriers to Home Discharge:   [] Steps to access home entry or bed/bath:   [x] Unable to transfer, ambulate, or propel wheelchair household distances without assist   [x] Unable to perform ADLs without assist   [] Limited available assist at home upon discharge    [] Patient or family requests d/c to post-acute facility    [] Poor cognition/safety awareness for d/c to home alone    [] Unable to maintain ordered weight bearing status    [] Patient with salient signs of long-standing immobility   [x] Other: pt at increased risk for falls.    Patient Diagnosis(es): The primary encounter diagnosis was Shortness of breath. Diagnoses of Elevated troponin, Generalized weakness, Bandemia, Anemia, unspecified type, Hypoalbuminemia, and Hypoxemia were also pertinent to this visit.    Assessment  Assessment: pt found in bed, agreeable to PT treatment, cleared for treatment by RN.  pt seen in conjunction with OT to maximize pt safety and safely progress mobility. pt demonstrates MIN A X2 for bed mobility, MIN A for functional transfers with RW.  ptperforms several exercises in seated before and after transfer to chair.  pt demonstrates decreasing BP with activity as noted above.  pt demonstrates decreased strength, endurance, mobility, activity tolerance, and balance and would continue to benefit from skiilled PT to address  the above stated deficits during his stay in the acute care setting.  continue to recommend DC to SNF when medically stable.  Activity Tolerance: Patient limited by fatigue;Patient limited by endurance  Equipment Needed: Yes  Other: If d/c home, recommend hospital bed for better positioning, prevention of skin break down and ability to perform bed mobility without assist.    Plan  Physical Therapy Plan  General Plan: 3-5 times per week  Specific Instructions for Next Treatment: progress functional mobility as indicated  Current Treatment Recommendations: Strengthening;Balance training;Functional mobility training;Endurance training;Pain management;Transfer training;Neuromuscular re-education;Home exercise program;Safety education & training;Patient/Caregiver education & training;Therapeutic activities;Gait training    Restrictions  Restrictions/Precautions  Restrictions/Precautions: Fall Risk, General Precautions  Position Activity Restriction  Other position/activity restrictions: tele, L port, O2     Subjective   Subjective  Subjective: pt found in bed, agreeable to PT treatment, cleared for treatment by RN.  Pain: pt denies pain at rest.  Orientation  Overall Orientation Status: Within Functional Limits  Orientation Level: Oriented X4  Cognition  Overall Cognitive Status: Exceptions  Insights: Decreased awareness of deficits    Objective  Vitals  Pulse: 70  BP: 112/61  MAP (Calculated): 78  SpO2: 95 %  Comment: HR 70 BPM, SPO2 95% on 1L NC, /61 in supine, HR 74 BPM, SPO2 % on 1L NC, BP 95/54 in seated, HR 75 BPM, SPO2 94% on 1L NC, /55 in seated after exercises, HR 77 BPM, SPO2 94% on 1L NC, BP 92/49 in seated after transfer, HR 76 BPM, /67 in seated after exercises.  Bed Mobility Training  Bed Mobility Training: Yes  Overall Level of Assistance: Minimum assistance;Adaptive equipment;Additional time;Assist X2 (with HOB elevated and use of BR.)  Interventions: Verbal cues  Supine to Sit: Minimum

## 2025-01-20 ENCOUNTER — APPOINTMENT (OUTPATIENT)
Dept: CT IMAGING | Age: 80
DRG: 951 | End: 2025-01-20
Payer: MEDICARE

## 2025-01-20 ENCOUNTER — ANCILLARY PROCEDURE (OUTPATIENT)
Dept: EMERGENCY DEPT | Age: 80
DRG: 951 | End: 2025-01-20
Attending: EMERGENCY MEDICINE
Payer: MEDICARE

## 2025-01-20 ENCOUNTER — HOSPITAL ENCOUNTER (INPATIENT)
Age: 80
LOS: 2 days | DRG: 951 | End: 2025-01-22
Attending: EMERGENCY MEDICINE | Admitting: STUDENT IN AN ORGANIZED HEALTH CARE EDUCATION/TRAINING PROGRAM
Payer: MEDICARE

## 2025-01-20 ENCOUNTER — APPOINTMENT (OUTPATIENT)
Dept: GENERAL RADIOLOGY | Age: 80
DRG: 951 | End: 2025-01-20
Payer: MEDICARE

## 2025-01-20 DIAGNOSIS — N39.0 URINARY TRACT INFECTION WITH HEMATURIA, SITE UNSPECIFIED: ICD-10-CM

## 2025-01-20 DIAGNOSIS — R31.9 URINARY TRACT INFECTION WITH HEMATURIA, SITE UNSPECIFIED: ICD-10-CM

## 2025-01-20 DIAGNOSIS — J18.9 PNEUMONIA OF LEFT LUNG DUE TO INFECTIOUS ORGANISM, UNSPECIFIED PART OF LUNG: ICD-10-CM

## 2025-01-20 DIAGNOSIS — R41.82 ALTERED MENTAL STATUS, UNSPECIFIED ALTERED MENTAL STATUS TYPE: Primary | ICD-10-CM

## 2025-01-20 PROBLEM — R65.21 SEPTIC SHOCK (HCC): Status: ACTIVE | Noted: 2025-01-20

## 2025-01-20 PROBLEM — A41.9 SEPTIC SHOCK (HCC): Status: ACTIVE | Noted: 2025-01-20

## 2025-01-20 LAB
ALBUMIN SERPL-MCNC: 2 G/DL (ref 3.4–5)
ALBUMIN/GLOB SERPL: 1.1 {RATIO} (ref 1.1–2.2)
ALP SERPL-CCNC: 134 U/L (ref 40–129)
ALT SERPL-CCNC: 28 U/L (ref 10–40)
AMMONIA PLAS-SCNC: 13 UMOL/L (ref 16–60)
AMORPH SED URNS QL MICRO: ABNORMAL /HPF
ANION GAP SERPL CALCULATED.3IONS-SCNC: 10 MMOL/L (ref 3–16)
ANISOCYTOSIS BLD QL SMEAR: ABNORMAL
AST SERPL-CCNC: 34 U/L (ref 15–37)
BACTERIA URNS QL MICRO: ABNORMAL /HPF
BASOPHILS # BLD: 0 K/UL (ref 0–0.2)
BASOPHILS NFR BLD: 0.3 %
BILIRUB SERPL-MCNC: 0.7 MG/DL (ref 0–1)
BILIRUB UR QL STRIP.AUTO: ABNORMAL
BUN SERPL-MCNC: 25 MG/DL (ref 7–20)
CALCIUM SERPL-MCNC: 6.7 MG/DL (ref 8.3–10.6)
CHLORIDE SERPL-SCNC: 104 MMOL/L (ref 99–110)
CLARITY UR: ABNORMAL
CO2 SERPL-SCNC: 24 MMOL/L (ref 21–32)
COLOR UR: ABNORMAL
CREAT SERPL-MCNC: 1.3 MG/DL (ref 0.8–1.3)
CRYSTALS URNS MICRO: ABNORMAL /HPF
DEPRECATED RDW RBC AUTO: 20.1 % (ref 12.4–15.4)
EOSINOPHIL # BLD: 0 K/UL (ref 0–0.6)
EOSINOPHIL NFR BLD: 0.2 %
GFR SERPLBLD CREATININE-BSD FMLA CKD-EPI: 55 ML/MIN/{1.73_M2}
GLUCOSE SERPL-MCNC: 102 MG/DL (ref 70–99)
GLUCOSE UR STRIP.AUTO-MCNC: NEGATIVE MG/DL
HCT VFR BLD AUTO: 30.7 % (ref 40.5–52.5)
HGB BLD-MCNC: 9.9 G/DL (ref 13.5–17.5)
HGB UR QL STRIP.AUTO: ABNORMAL
KETONES UR STRIP.AUTO-MCNC: NEGATIVE MG/DL
LACTATE BLDV-SCNC: 3.8 MMOL/L (ref 0.4–2)
LEUKOCYTE ESTERASE UR QL STRIP.AUTO: ABNORMAL
LYMPHOCYTES # BLD: 0.2 K/UL (ref 1–5.1)
LYMPHOCYTES NFR BLD: 1.3 %
MACROCYTES BLD QL SMEAR: ABNORMAL
MCH RBC QN AUTO: 30.9 PG (ref 26–34)
MCHC RBC AUTO-ENTMCNC: 32.3 G/DL (ref 31–36)
MCV RBC AUTO: 95.5 FL (ref 80–100)
MONOCYTES # BLD: 0.9 K/UL (ref 0–1.3)
MONOCYTES NFR BLD: 6.2 %
NEUTROPHILS # BLD: 12.6 K/UL (ref 1.7–7.7)
NEUTROPHILS NFR BLD: 92 %
NITRITE UR QL STRIP.AUTO: NEGATIVE
PH UR STRIP.AUTO: 5.5 [PH] (ref 5–8)
PLATELET # BLD AUTO: 93 K/UL (ref 135–450)
PLATELET BLD QL SMEAR: ABNORMAL
PMV BLD AUTO: 8.8 FL (ref 5–10.5)
POLYCHROMASIA BLD QL SMEAR: ABNORMAL
POTASSIUM SERPL-SCNC: 4.4 MMOL/L (ref 3.5–5.1)
PROT SERPL-MCNC: 3.8 G/DL (ref 6.4–8.2)
PROT UR STRIP.AUTO-MCNC: 100 MG/DL
RBC # BLD AUTO: 3.21 M/UL (ref 4.2–5.9)
RBC #/AREA URNS HPF: >100 /HPF (ref 0–4)
SLIDE REVIEW: ABNORMAL
SODIUM SERPL-SCNC: 138 MMOL/L (ref 136–145)
SP GR UR STRIP.AUTO: 1.01 (ref 1–1.03)
UA COMPLETE W REFLEX CULTURE PNL UR: ABNORMAL
UA DIPSTICK W REFLEX MICRO PNL UR: YES
URN SPEC COLLECT METH UR: ABNORMAL
UROBILINOGEN UR STRIP-ACNC: 1 E.U./DL
WBC # BLD AUTO: 13.7 K/UL (ref 4–11)
WBC #/AREA URNS HPF: ABNORMAL /HPF (ref 0–5)

## 2025-01-20 PROCEDURE — 84443 ASSAY THYROID STIM HORMONE: CPT

## 2025-01-20 PROCEDURE — 71045 X-RAY EXAM CHEST 1 VIEW: CPT

## 2025-01-20 PROCEDURE — 85025 COMPLETE CBC W/AUTO DIFF WBC: CPT

## 2025-01-20 PROCEDURE — 74176 CT ABD & PELVIS W/O CONTRAST: CPT

## 2025-01-20 PROCEDURE — 1200000000 HC SEMI PRIVATE

## 2025-01-20 PROCEDURE — 2500000003 HC RX 250 WO HCPCS: Performed by: STUDENT IN AN ORGANIZED HEALTH CARE EDUCATION/TRAINING PROGRAM

## 2025-01-20 PROCEDURE — 83605 ASSAY OF LACTIC ACID: CPT

## 2025-01-20 PROCEDURE — 93005 ELECTROCARDIOGRAM TRACING: CPT | Performed by: EMERGENCY MEDICINE

## 2025-01-20 PROCEDURE — 93308 TTE F-UP OR LMTD: CPT

## 2025-01-20 PROCEDURE — 70450 CT HEAD/BRAIN W/O DYE: CPT

## 2025-01-20 PROCEDURE — 76604 US EXAM CHEST: CPT

## 2025-01-20 PROCEDURE — 96365 THER/PROPH/DIAG IV INF INIT: CPT

## 2025-01-20 PROCEDURE — 84439 ASSAY OF FREE THYROXINE: CPT

## 2025-01-20 PROCEDURE — 2580000003 HC RX 258: Performed by: EMERGENCY MEDICINE

## 2025-01-20 PROCEDURE — 96361 HYDRATE IV INFUSION ADD-ON: CPT

## 2025-01-20 PROCEDURE — 3E033XZ INTRODUCTION OF VASOPRESSOR INTO PERIPHERAL VEIN, PERCUTANEOUS APPROACH: ICD-10-PCS | Performed by: INTERNAL MEDICINE

## 2025-01-20 PROCEDURE — 6360000002 HC RX W HCPCS: Performed by: EMERGENCY MEDICINE

## 2025-01-20 PROCEDURE — 99285 EMERGENCY DEPT VISIT HI MDM: CPT

## 2025-01-20 PROCEDURE — 81001 URINALYSIS AUTO W/SCOPE: CPT

## 2025-01-20 PROCEDURE — 80053 COMPREHEN METABOLIC PANEL: CPT

## 2025-01-20 PROCEDURE — 96375 TX/PRO/DX INJ NEW DRUG ADDON: CPT

## 2025-01-20 PROCEDURE — 82140 ASSAY OF AMMONIA: CPT

## 2025-01-20 PROCEDURE — 96374 THER/PROPH/DIAG INJ IV PUSH: CPT

## 2025-01-20 RX ORDER — SODIUM CHLORIDE 9 MG/ML
INJECTION, SOLUTION INTRAVENOUS CONTINUOUS
Status: DISCONTINUED | OUTPATIENT
Start: 2025-01-20 | End: 2025-01-22 | Stop reason: HOSPADM

## 2025-01-20 RX ORDER — VANCOMYCIN HYDROCHLORIDE 1.5 G/300ML
1500 INJECTION, SOLUTION INTRAVITREAL ONCE
Status: DISCONTINUED | OUTPATIENT
Start: 2025-01-20 | End: 2025-01-20

## 2025-01-20 RX ORDER — NOREPINEPHRINE BITARTRATE 0.06 MG/ML
1-100 INJECTION, SOLUTION INTRAVENOUS CONTINUOUS
Status: DISCONTINUED | OUTPATIENT
Start: 2025-01-20 | End: 2025-01-22 | Stop reason: HOSPADM

## 2025-01-20 RX ORDER — GLYCOPYRROLATE 0.2 MG/ML
0.2 INJECTION INTRAMUSCULAR; INTRAVENOUS EVERY 4 HOURS PRN
Status: DISCONTINUED | OUTPATIENT
Start: 2025-01-20 | End: 2025-01-22 | Stop reason: HOSPADM

## 2025-01-20 RX ORDER — ENOXAPARIN SODIUM 100 MG/ML
40 INJECTION SUBCUTANEOUS DAILY
Status: DISCONTINUED | OUTPATIENT
Start: 2025-01-21 | End: 2025-01-20

## 2025-01-20 RX ORDER — SODIUM CHLORIDE 0.9 % (FLUSH) 0.9 %
5-40 SYRINGE (ML) INJECTION EVERY 12 HOURS SCHEDULED
Status: DISCONTINUED | OUTPATIENT
Start: 2025-01-20 | End: 2025-01-22 | Stop reason: HOSPADM

## 2025-01-20 RX ORDER — SODIUM CHLORIDE 0.9 % (FLUSH) 0.9 %
5-40 SYRINGE (ML) INJECTION PRN
Status: DISCONTINUED | OUTPATIENT
Start: 2025-01-20 | End: 2025-01-22 | Stop reason: HOSPADM

## 2025-01-20 RX ORDER — ACETAMINOPHEN 650 MG/1
650 SUPPOSITORY RECTAL EVERY 6 HOURS PRN
Status: DISCONTINUED | OUTPATIENT
Start: 2025-01-20 | End: 2025-01-22 | Stop reason: HOSPADM

## 2025-01-20 RX ORDER — 0.9 % SODIUM CHLORIDE 0.9 %
2000 INTRAVENOUS SOLUTION INTRAVENOUS ONCE
Status: COMPLETED | OUTPATIENT
Start: 2025-01-20 | End: 2025-01-20

## 2025-01-20 RX ORDER — SODIUM CHLORIDE 9 MG/ML
INJECTION, SOLUTION INTRAVENOUS PRN
Status: DISCONTINUED | OUTPATIENT
Start: 2025-01-20 | End: 2025-01-20

## 2025-01-20 RX ORDER — ACETAMINOPHEN 325 MG/1
650 TABLET ORAL EVERY 6 HOURS PRN
Status: DISCONTINUED | OUTPATIENT
Start: 2025-01-20 | End: 2025-01-22 | Stop reason: HOSPADM

## 2025-01-20 RX ORDER — NOREPINEPHRINE BITARTRATE 0.06 MG/ML
1-100 INJECTION, SOLUTION INTRAVENOUS CONTINUOUS
Status: DISCONTINUED | OUTPATIENT
Start: 2025-01-20 | End: 2025-01-20

## 2025-01-20 RX ORDER — LORAZEPAM 2 MG/ML
1 INJECTION INTRAMUSCULAR
Status: DISCONTINUED | OUTPATIENT
Start: 2025-01-20 | End: 2025-01-21

## 2025-01-20 RX ADMIN — SODIUM CHLORIDE, PRESERVATIVE FREE 10 ML: 5 INJECTION INTRAVENOUS at 23:45

## 2025-01-20 RX ADMIN — PIPERACILLIN AND TAZOBACTAM 4500 MG: 4; .5 INJECTION, POWDER, LYOPHILIZED, FOR SOLUTION INTRAVENOUS at 20:39

## 2025-01-20 RX ADMIN — SODIUM CHLORIDE 2000 ML: 9 INJECTION, SOLUTION INTRAVENOUS at 20:03

## 2025-01-20 RX ADMIN — NOREPINEPHRINE BITARTRATE 5 MCG/MIN: 64 SOLUTION INTRAVENOUS at 22:03

## 2025-01-20 RX ADMIN — VANCOMYCIN HYDROCHLORIDE 1500 MG: 1.5 INJECTION, SOLUTION INTRAVITREAL at 21:35

## 2025-01-20 RX ADMIN — HYDROMORPHONE HYDROCHLORIDE 0.5 MG: 1 INJECTION, SOLUTION INTRAMUSCULAR; INTRAVENOUS; SUBCUTANEOUS at 22:40

## 2025-01-21 LAB
T4 FREE SERPL-MCNC: 0.8 NG/DL (ref 0.9–1.8)
TSH SERPL DL<=0.005 MIU/L-ACNC: 7.33 UIU/ML (ref 0.27–4.2)

## 2025-01-21 PROCEDURE — 2500000003 HC RX 250 WO HCPCS: Performed by: STUDENT IN AN ORGANIZED HEALTH CARE EDUCATION/TRAINING PROGRAM

## 2025-01-21 PROCEDURE — 1200000000 HC SEMI PRIVATE

## 2025-01-21 PROCEDURE — 6360000002 HC RX W HCPCS: Performed by: STUDENT IN AN ORGANIZED HEALTH CARE EDUCATION/TRAINING PROGRAM

## 2025-01-21 PROCEDURE — 6360000002 HC RX W HCPCS: Performed by: EMERGENCY MEDICINE

## 2025-01-21 RX ORDER — HYDROMORPHONE HYDROCHLORIDE 1 MG/ML
1 INJECTION, SOLUTION INTRAMUSCULAR; INTRAVENOUS; SUBCUTANEOUS
Status: DISCONTINUED | OUTPATIENT
Start: 2025-01-21 | End: 2025-01-22 | Stop reason: HOSPADM

## 2025-01-21 RX ADMIN — HYDROMORPHONE HYDROCHLORIDE 0.5 MG: 1 INJECTION, SOLUTION INTRAMUSCULAR; INTRAVENOUS; SUBCUTANEOUS at 23:16

## 2025-01-21 RX ADMIN — HYDROMORPHONE HYDROCHLORIDE 0.5 MG: 1 INJECTION, SOLUTION INTRAMUSCULAR; INTRAVENOUS; SUBCUTANEOUS at 03:18

## 2025-01-21 RX ADMIN — HYDROMORPHONE HYDROCHLORIDE 0.5 MG: 1 INJECTION, SOLUTION INTRAMUSCULAR; INTRAVENOUS; SUBCUTANEOUS at 17:12

## 2025-01-21 RX ADMIN — HYDROMORPHONE HYDROCHLORIDE 0.5 MG: 1 INJECTION, SOLUTION INTRAMUSCULAR; INTRAVENOUS; SUBCUTANEOUS at 01:02

## 2025-01-21 RX ADMIN — SODIUM CHLORIDE, PRESERVATIVE FREE 10 ML: 5 INJECTION INTRAVENOUS at 20:36

## 2025-01-21 RX ADMIN — HYDROMORPHONE HYDROCHLORIDE 1 MG: 1 INJECTION, SOLUTION INTRAMUSCULAR; INTRAVENOUS; SUBCUTANEOUS at 10:20

## 2025-01-21 RX ADMIN — GLYCOPYRROLATE 0.2 MG: 0.2 INJECTION INTRAMUSCULAR; INTRAVENOUS at 00:20

## 2025-01-21 RX ADMIN — HYDROMORPHONE HYDROCHLORIDE 0.5 MG: 1 INJECTION, SOLUTION INTRAMUSCULAR; INTRAVENOUS; SUBCUTANEOUS at 05:37

## 2025-01-21 RX ADMIN — HYDROMORPHONE HYDROCHLORIDE 0.5 MG: 1 INJECTION, SOLUTION INTRAMUSCULAR; INTRAVENOUS; SUBCUTANEOUS at 20:36

## 2025-01-21 RX ADMIN — HYDROMORPHONE HYDROCHLORIDE 0.5 MG: 1 INJECTION, SOLUTION INTRAMUSCULAR; INTRAVENOUS; SUBCUTANEOUS at 13:34

## 2025-01-21 ASSESSMENT — PAIN DESCRIPTION - LOCATION: LOCATION: GENERALIZED

## 2025-01-21 ASSESSMENT — PAIN DESCRIPTION - DESCRIPTORS: DESCRIPTORS: ACHING;DISCOMFORT;TENDER

## 2025-01-21 ASSESSMENT — PAIN SCALES - GENERAL: PAINLEVEL_OUTOF10: 6
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
## 2025-01-21 NOTE — CARE COORDINATION
Case Management Assessment  Initial Evaluation    Date/Time of Evaluation: 1/21/2025 8:57 AM  Assessment Completed by: IND[place] Hussein    If patient is discharged prior to next notation, then this note serves as note for discharge by case management.    Patient Name: Wes Schneider                   YOB: 1945  Diagnosis: Septic shock (HCC) [A41.9, R65.21]                   Date / Time: 1/20/2025  7:23 PM    Patient Admission Status: Inpatient   Readmission Risk (Low < 19, Mod (19-27), High > 27): Readmission Risk Score: 26.8    Current PCP: Jourdan Russ MD  PCP verified by CM? Yes    Chart Reviewed: Yes      History Provided by: Spouse  Patient Orientation: Unresponsive    Patient Cognition: Other (see comment)    Hospitalization in the last 30 days (Readmission):  Yes    If yes, Readmission Assessment in  Navigator will be completed.    Advance Directives:      Code Status: DNR-CC   Patient's Primary Decision Maker is: Legal Next of Kin    Primary Decision Maker: Miranda Schneider - Spouse - [phone]    Discharge Planning:    Patient lives with: (P) Spouse/Significant Other Type of Home: (P) House  Primary Care Giver: Family  Patient Support Systems include: Spouse/Significant Other, Children, Family Members   Current Financial resources: (P) None  Current community resources: (P) Hospice  Current services prior to admission: (P) Durable Medical Equipment, Home Care ([facility])            Current DME: (P) Walker, Wheelchair, Hospital Bed, Oxygen Therapy (Comment) ([facility])            Type of Home Care services:  (P) Nursing Services, OT, PT    ADLS  Prior functional level: Assistance with the following:, Bathing, Dressing, Toileting, Cooking, Housework, Shopping, Mobility  Current functional level: Assistance with the following:, Bathing, Dressing, Toileting, Cooking, Housework, Shopping, Mobility    PT AM-PAC:   /24  OT AM-PAC:   /24    Family can provide assistance at DC: Yes  Would

## 2025-01-21 NOTE — CARE COORDINATION
Hand off report from CM in ED. NO aggressive treatment planned. Decline hospice services. Per MD comfort care wo hospice. Will support as appropriate.Marilynn Garcia, NOMAN

## 2025-01-21 NOTE — PLAN OF CARE
Problem: Safety - Adult  Goal: Free from fall injury  1/21/2025 1053 by Mary Jane Urbano RN  Outcome: Progressing  Flowsheets (Taken 1/21/2025 1053)  Free From Fall Injury:   Based on caregiver fall risk screen, instruct family/caregiver to ask for assistance with transferring infant if caregiver noted to have fall risk factors   Instruct family/caregiver on patient safety

## 2025-01-21 NOTE — ED PROVIDER NOTES
[facility] EMERGENCY DEPARTMENT  EMERGENCY DEPARTMENT ENCOUNTER        Patient Name: Wes Schneider  MRN: [id number]  Birthdate 1945  Date of evaluation: 1/20/2025  Provider: Wale Kim MD  PCP: Jourdan Russ MD  Note Started: 8:14 PM EST 1/20/25    CHIEF COMPLAINT       Altered Mental Status (EMS reports wife called for AMS. EMS states patient 02 was 70% on 2L, BP 70/30. EMS states pt was able to answer LOC questions. Pt lethargic upon arrival )      HISTORY OF PRESENT ILLNESS: 1 or more Elements     History from : EMS and Significant Other wife    Limitations to history : Altered Mental Status    Wes Schneider is a 79 y.o. male who presents for evaluation of altered mental status, shortness of breath.  According to EMS, patient had SpO2 in the 60s to 70s on room air was placed on nonrebreather, he is also having a low blood pressure in the 70s, he started IV fluids with improvement to the 90s.  He is recently at outside hospital with findings of pneumonia, had thoracentesis.  Patient presents with altered mental status he is not able to provide significant history he does answer yes or no questions.    Nursing Notes were all reviewed and agreed with or any disagreements were addressed in the HPI.    REVIEW OF SYSTEMS :      Review of Systems    Positives and Pertinent negatives as per HPI.     SURGICAL HISTORY     Past Surgical History:   Procedure Laterality Date    BACK SURGERY  2012    lumber, no hardware, \"cleaned it out\"    BRONCHOSCOPY N/A 8/21/2024    BRONCHOSCOPY DIAGNOSTIC OR CELL WASH ONLY performed by Edison Holden MD at [facility] ENDOSCOPY    BRONCHOSCOPY  8/21/2024    BRONCHOSCOPY ALVEOLAR LAVAGE performed by Edison Holden MD at [facility] ENDOSCOPY    BRONCHOSCOPY  8/21/2024    BRONCHOSCOPY BRUSHINGS performed by Edison Holden MD at [facility] ENDOSCOPY    CATARACT EXTRACTION EXTRACAPSULAR W/ INTRAOCULAR LENS IMPLANTATION      CT NEEDLE BIOPSY LUNG PERCUTANEOUS W IMAGING

## 2025-01-21 NOTE — ED NOTES
M/S 325 @ 5564  
Port is CT safe verified by chest X-ray with Dr. Kim.  
with hospice and decline hospice care.  If any further questions, please call Sending RN at [phone]

## 2025-01-21 NOTE — PLAN OF CARE
Problem: Chronic Conditions and Co-morbidities  Goal: Patient's chronic conditions and co-morbidity symptoms are monitored and maintained or improved  Outcome: Progressing  Flowsheets (Taken 1/21/2025 1624)  Care Plan - Patient's Chronic Conditions and Co-Morbidity Symptoms are Monitored and Maintained or Improved:   Monitor and assess patient's chronic conditions and comorbid symptoms for stability, deterioration, or improvement   Collaborate with multidisciplinary team to address chronic and comorbid conditions and prevent exacerbation or deterioration   Update acute care plan with appropriate goals if chronic or comorbid symptoms are exacerbated and prevent overall improvement and discharge     Problem: Confusion  Goal: Confusion, delirium, dementia, or psychosis is improved or at baseline  Description: INTERVENTIONS:  1. Assess for possible contributors to thought disturbance, including medications, impaired vision or hearing, underlying metabolic abnormalities, dehydration, psychiatric diagnoses, and notify attending LIP  2. [place] high risk fall precautions, as indicated  3. Provide frequent short contacts to provide reality reorientation, refocusing and direction  4. Decrease environmental stimuli, including noise as appropriate  5. Monitor and intervene to maintain adequate nutrition, hydration, elimination, sleep and activity  6. If unable to ensure safety without constant attention obtain sitter and review sitter guidelines with assigned personnel  7. Initiate Psychosocial CNS and Spiritual Care consult, as indicated  Outcome: Progressing  Flowsheets (Taken 1/21/2025 1624)  Effect of thought disturbance (confusion, delirium, dementia, or psychosis) are managed with adequate functional status:   Assess for contributors to thought disturbance, including medications, impaired vision or hearing, underlying metabolic abnormalities, dehydration, psychiatric diagnoses, notify LIP   [place] high risk fall

## 2025-01-21 NOTE — PROGRESS NOTES
Patient's family received prognosis and requested the presence of a  to pray with family and for patient. Final family member is on the way.  will follow up prior to leaving.     Chap. Khai Cerrato Jr., WAQAS(c), M.T.S., B.S., A.A.B.    01/20/25 0263   Encounter Summary   Encounter Overview/Reason Initial Encounter;Interdisciplinary rounds;Crisis;Spiritual/Emotional Needs;Grief, Loss, and Adjustments;Family Care   Service Provided For Patient and family together;Significant other   Referral/Consult From Family;Nursing Supervisor/Manager   Support System Spouse;Children;Family members   Last Encounter  01/20/25  (01/20 OnCal Consult EOL)   Complexity of Encounter High   Begin Time 1125   End Time  1150   Total Time Calculated 25 min   Crisis   Type Family Care   Spiritual/Emotional needs   Type Spiritual Support;Emotional Distress;Difficult news received   Grief, Loss, and Adjustments   Type End of Life;Anticipatory Grief;Life Adjustments   Assessment/Intervention/Outcome   Assessment Calm;Coping;Hopeful;Tearful   Intervention Active listening;Discussed belief system/Yazidism practices/taiwo;Discussed death, afterlife;Discussed illness injury and it’s impact;Discussed relationship with God;Empowerment;End of Life Care;Life review/Legacy;Prayer (assurance of)/[place];Read/Provided Scripture;Sustaining Presence/Ministry of presence   Outcome Acceptance;Comfort;Connection/Belonging;Encouraged;Engaged in conversation;Expressed feelings, needs, and concerns;Expressed feelings of [place], Peace and/or Love;Expressed Gratitude;Receptive

## 2025-01-21 NOTE — PROGRESS NOTES
Hospital Medicine Progress Note  V 1.6      Date of Admission: 1/20/2025    Hospital Day: 2      Chief Admission Complaint:  AMS, Respiratory Distress     Subjective:  Patient asleep on arrival. Spoke with family and they wish to avoid Lorazepam as patient has a history of delirium with use. Patient is predominantly non-verbal and sometimes responds with shaking head yes or no. Rest of ROS not assessable due to patient's condition. Exam showed left lower extremity edema, per family \"it comes and goes\". History obtained from Family and EMR.     Presenting Admission History:       79 y.o. male with past medical history of type 2 diabetes, hypertension, metastatic lung cancer, A-fib with RVR presented to the emergency department with chief complaint of altered mentation.  HPI is obtained from patient's wife and daughter at bedside as patient is lethargic and significantly conversationally dyspneic on exam.  Patient's family called EMS secondary to patient being in significant respiratory distress.  Upon EMSs arrival patient sats were in the 60s and patient was placed on nonrebreather.  On arrival to [facility] patient was in the 70s.  Chest x-ray obtained showed complete whiteout of the left lung secondary to probable mucous plugging in the left main bronchus.  During initial discussions with patient's family, plan was for patient to be a limited code for meds only and for patient to be bronched in a.m. with pulmonology as patient was relatively stable on 2 L.  Patient's family was also counseled on the potential situation of patient having acute decompensation overnight and given his CODE STATUS treatment team being unable to escalate care.  Patient's wife states that she was okay with this and wanted to see how the night went and was hopeful that patient will see pulmonology in the morning.  Shortly after this conversation, patient went into significant respiratory distress.  Patient's O2 sats dropped into

## 2025-01-21 NOTE — ACP (ADVANCE CARE PLANNING)
Advance Care Planning     General Advance Care Planning (ACP) Conversation    Date of Conversation: 1/21/2025  Conducted with: Patient with Decision Making Capacity and Legal next of kin  Other persons present: Spouse      Healthcare Decision Maker:   Primary Decision Maker: Miranda Schneider - Spouse - [phone]     Today we documented Decision Maker(s) consistent with Legal Next of Kin hierarchy.  Content/Action Overview:  Has NO ACP documents-Information provided          Length of Voluntary ACP Conversation in minutes:  <16 minutes (Non-Billable)    INDIANA Hussein

## 2025-01-22 VITALS
WEIGHT: 140 LBS | OXYGEN SATURATION: 95 % | HEART RATE: 100 BPM | SYSTOLIC BLOOD PRESSURE: 78 MMHG | TEMPERATURE: 98.7 F | BODY MASS INDEX: 21.93 KG/M2 | RESPIRATION RATE: 14 BRPM | DIASTOLIC BLOOD PRESSURE: 42 MMHG

## 2025-01-22 LAB
EKG ATRIAL RATE: 92 BPM
EKG DIAGNOSIS: NORMAL
EKG P AXIS: 34 DEGREES
EKG P-R INTERVAL: 182 MS
EKG Q-T INTERVAL: 322 MS
EKG QRS DURATION: 96 MS
EKG QTC CALCULATION (BAZETT): 398 MS
EKG R AXIS: 96 DEGREES
EKG T AXIS: -68 DEGREES
EKG VENTRICULAR RATE: 92 BPM

## 2025-01-22 PROCEDURE — 2700000000 HC OXYGEN THERAPY PER DAY

## 2025-01-22 PROCEDURE — 93010 ELECTROCARDIOGRAM REPORT: CPT | Performed by: INTERNAL MEDICINE

## 2025-01-22 PROCEDURE — 6360000002 HC RX W HCPCS: Performed by: STUDENT IN AN ORGANIZED HEALTH CARE EDUCATION/TRAINING PROGRAM

## 2025-01-22 PROCEDURE — 94761 N-INVAS EAR/PLS OXIMETRY MLT: CPT

## 2025-01-22 PROCEDURE — 2500000003 HC RX 250 WO HCPCS: Performed by: STUDENT IN AN ORGANIZED HEALTH CARE EDUCATION/TRAINING PROGRAM

## 2025-01-22 PROCEDURE — 6370000000 HC RX 637 (ALT 250 FOR IP)

## 2025-01-22 RX ORDER — ATROPINE SULFATE 10 MG/ML
2 SOLUTION/ DROPS OPHTHALMIC EVERY 4 HOURS PRN
Status: DISCONTINUED | OUTPATIENT
Start: 2025-01-22 | End: 2025-01-22 | Stop reason: HOSPADM

## 2025-01-22 RX ADMIN — HYDROMORPHONE HYDROCHLORIDE 0.5 MG: 1 INJECTION, SOLUTION INTRAMUSCULAR; INTRAVENOUS; SUBCUTANEOUS at 06:24

## 2025-01-22 RX ADMIN — ATROPINE SULFATE 2 DROP: 10 SOLUTION/ DROPS OPHTHALMIC at 12:05

## 2025-01-22 RX ADMIN — HYDROMORPHONE HYDROCHLORIDE 0.5 MG: 1 INJECTION, SOLUTION INTRAMUSCULAR; INTRAVENOUS; SUBCUTANEOUS at 02:13

## 2025-01-22 RX ADMIN — SODIUM CHLORIDE, PRESERVATIVE FREE 20 ML: 5 INJECTION INTRAVENOUS at 11:11

## 2025-01-22 RX ADMIN — HYDROMORPHONE HYDROCHLORIDE 0.5 MG: 1 INJECTION, SOLUTION INTRAMUSCULAR; INTRAVENOUS; SUBCUTANEOUS at 10:31

## 2025-01-22 RX ADMIN — HYDROMORPHONE HYDROCHLORIDE 0.5 MG: 1 INJECTION, SOLUTION INTRAMUSCULAR; INTRAVENOUS; SUBCUTANEOUS at 04:20

## 2025-01-22 ASSESSMENT — PAIN DESCRIPTION - LOCATION: LOCATION: GENERALIZED

## 2025-01-22 ASSESSMENT — PAIN DESCRIPTION - DESCRIPTORS: DESCRIPTORS: PATIENT UNABLE TO DESCRIBE

## 2025-01-22 NOTE — PROGRESS NOTES
25 1404   Encounter Summary   Encounter Overview/Reason Grief, Loss, and Adjustments;Spiritual/Emotional Needs;Family Care   Service Provided For Family   Referral/Consult From Other (comment)  (Urgent [facility]  call from bedside nurse)   Support System Spouse;Children;Family members   Last Encounter  25  ( responded to [facility]  call, Bill was in moment of passing. Provided Markos quilt, prayer with family, ministered with resources, physical needs and communicating designation of  home with nurse/KM)   Complexity of Encounter High   Begin Time 1250   End Time  1400   Total Time Calculated 70 min   Crisis   Type Family Care   Spiritual/Emotional needs   Type Emotional Distress;Spiritual Support;Difficult news received;Spiritual Distress   Rituals, Rites and Sacraments   Type Blessings  ([place] and prayer together with family bedside)   Grief, Loss, and Adjustments   Type End of Life;Life Adjustments;Grief and loss;Death;Bereavement Care   Assessment/Intervention/Outcome   Assessment Coping;Complicated grieving;Despair;Powerlessness;Sad;Stress overload;Tearful;Shock   Intervention Active listening;Discussed belief system/Restoration practices/taiwo;Discussed illness injury and it’s impact;Explored/Affirmed feelings, thoughts, concerns;Explored Coping Skills/Resources;Prayer (assurance of)/[place];Sustaining Presence/Ministry of presence;Read/Provided Scripture;End of Life Care   Outcome Comfort;Connection/Belonging;Expressed feelings, needs, and concerns;Expressed Gratitude;Venting emotion;Coping;Grieving      Nisha Monsalve EdD, [place] ([facility])    Thank you for consulting Spiritual Health    If you would like a 's presence for emotional, spiritual, grief or comfort care,   please dial \"0\" and ask for the  on-call to be paged.    For help with Advanced Care Planning, Power of  for Healthcare or Living Will forms, you may also call us

## 2025-01-22 NOTE — PROGRESS NOTES
[facility] Medicine Progress Note  V 1.6      Date of Admission: 1/20/2025    Hospital Day: 3      Chief Admission Complaint:  AMS, Respiratory Distress     Subjective:  NAEO. Spoke with family this am, patients observed respiratory rate has increased slightly. Reported c/f pressure ulcer but does not want wound care consult at this time. Patient continues to be predominantly non-verbal and sometimes responds with shaking head yes or no. Rest of ROS not assessable due to patient's condition. History obtained from Family.     Presenting Admission History:       79 y.o. male with past medical history of type 2 diabetes, hypertension, metastatic lung cancer, A-fib with RVR presented to the emergency department with chief complaint of altered mentation.  HPI is obtained from patient's wife and daughter at bedside as patient is lethargic and significantly conversationally dyspneic on exam.  Patient's family called EMS secondary to patient being in significant respiratory distress.  Upon EMSs arrival patient sats were in the 60s and patient was placed on nonrebreather.  On arrival to [facility] patient was in the 70s.  Chest x-ray obtained showed complete whiteout of the left lung secondary to probable mucous plugging in the left main bronchus.  During initial discussions with patient's family, plan was for patient to be a limited code for meds only and for patient to be bronched in a.m. with pulmonology as patient was relatively stable on 2 L.  Patient's family was also counseled on the potential situation of patient having acute decompensation overnight and given his CODE STATUS treatment team being unable to escalate care.  Patient's wife states that she was okay with this and wanted to see how the night went and was hopeful that patient will see pulmonology in the morning.  Shortly after this conversation, patient went into significant respiratory distress.  Patient's O2 sats dropped into the 50s.  During that

## 2025-01-22 NOTE — PROGRESS NOTES
01/22/25 1047   Encounter Summary   Encounter Overview/Reason Grief, Loss, and Adjustments;Spiritual/Emotional Needs;Family Care   Service Provided For Patient and family together   Referral/Consult From Multi-disciplinary team;Other    Support System Spouse;Children;Family members   Last Encounter  01/22/25  ( provided prayer and [place] for Da; active, empathetic listening, emotional/spiritual support for family member; prayer card, Spiritual Health contact card and scripture card given for spiritual comfort. Comfort cart restocked w/coffee/KM)   Complexity of Encounter High   Begin Time 1030   End Time  1058   Total Time Calculated 28 min   Crisis   Type Family Care   Spiritual/Emotional needs   Type Emotional Distress;Spiritual Support;Difficult news received;Spiritual Distress   Rituals, Rites and Sacraments   Type Blessings  ([place] ceremony and prayer for Da)   Grief, Loss, and Adjustments   Type End of Life;Anticipatory Grief;Life Adjustments   Assessment/Intervention/Outcome   Assessment Coping;Complicated grieving;Despair;Powerlessness;Sad;Stress overload;Tearful   Intervention Active listening;Discussed belief system/Taoism practices/taiwo;Discussed illness injury and it’s impact;Explored/Affirmed feelings, thoughts, concerns;Explored Coping Skills/Resources;Prayer (assurance of)/[place];Sustaining Presence/Ministry of presence;Read/Provided Scripture;End of Life Care   Outcome Comfort;Connection/Belonging;Coping;Encouraged;Engaged in conversation;Expressed feelings, needs, and concerns;Expressed Gratitude;Receptive;Venting emotion   Plan and Referrals   Plan/Referrals Continue to visit, (comment)  (Da's family is coming to be with him. Comfort cart in room. Please attend as family needs)     azul Monsalve EdD, [place] ([facility])    Thank you for consulting Spiritual Health    If you would like a 's presence for emotional, spiritual, grief or comfort care,

## 2025-01-22 NOTE — CARE COORDINATION
Hospital day 2: Patient on C3 re Septic shock care managed by IM. Patient from home with spouse. Plan is for comfort management, however family declines hospice. SW will support as tolerated.NOMAN Toribio

## 2025-01-22 NOTE — DISCHARGE SUMMARY
Hospital Medicine Discharge Summary  V 1.6    Patient: Wes Schneider   : 1945     Primary Care Provider: Jourdan Russ MD  Admitting Provider: DO Justin King Provider: DAVID BOCANEGRA MD     Hospital:  [facility]  Admit Date: 2025   Disposition:      Date of Death: 2025    Time of Death: 1247    Immediate Cause of Death: Sepsis  Underlying Conditions: Pneumonia, metastatic lung cancer  Other Contributing Conditions: Severe protein calorie malnutrition    Discharge Diagnoses:    Active Hospital Problems    Diagnosis     Septic shock (HCC) [A41.9, R65.21]        Presenting Admission History:   79 y.o. male with past medical history of type 2 diabetes, hypertension, metastatic lung cancer, A-fib with RVR presented to the emergency department with chief complaint of altered mentation. HPI is obtained from patient's wife and daughter at bedside as patient is lethargic and significantly conversationally dyspneic on exam. Patient's family called EMS secondary to patient being in significant respiratory distress. Upon EMSs arrival patient sats were in the 60s and patient was placed on nonrebreather. On arrival to [facility] patient was in the 70s. Chest x-ray obtained showed complete whiteout of the left lung secondary to probable mucous plugging in the left main bronchus. During initial discussions with patient's family, plan was for patient to be a limited code for meds only and for patient to be bronched in a.m. with pulmonology as patient was relatively stable on 2 L. Patient's family was also counseled on the potential situation of patient having acute decompensation overnight and given his CODE STATUS treatment team being unable to escalate care. Patient's wife states that she was okay with this and wanted to see how the night went and was hopeful that patient will see pulmonology in the morning. Shortly after this conversation, patient went

## 2025-01-22 NOTE — PLAN OF CARE
Problem: Safety - Adult  Goal: Free from fall injury  1/21/2025 2222 by Cecy Hansen RN  Outcome: Progressing  Flowsheets (Taken 1/21/2025 2222)  Free From Fall Injury:   Instruct family/caregiver on patient safety   Based on caregiver fall risk screen, instruct family/caregiver to ask for assistance with transferring infant if caregiver noted to have fall risk factors     Problem: Chronic Conditions and Co-morbidities  Goal: Patient's chronic conditions and co-morbidity symptoms are monitored and maintained or improved  1/21/2025 2222 by Cecy Hansen RN  Outcome: Progressing  Flowsheets (Taken 1/21/2025 2222)  Care Plan - Patient's Chronic Conditions and Co-Morbidity Symptoms are Monitored and Maintained or Improved:   Monitor and assess patient's chronic conditions and comorbid symptoms for stability, deterioration, or improvement   Collaborate with multidisciplinary team to address chronic and comorbid conditions and prevent exacerbation or deterioration   Update acute care plan with appropriate goals if chronic or comorbid symptoms are exacerbated and prevent overall improvement and discharge     Problem: Respiratory - Adult  Goal: Achieves optimal ventilation and oxygenation  Outcome: Progressing  Flowsheets (Taken 1/21/2025 2222)  Achieves optimal ventilation and oxygenation:   Assess for changes in respiratory status   Assess for changes in mentation and behavior   Initiate smoking cessation protocol as indicated   Oxygen supplementation based on oxygen saturation or arterial blood gases   Position to facilitate oxygenation and minimize respiratory effort   Encourage broncho-pulmonary hygiene including cough, deep breathe, incentive spirometry   Assess the need for suctioning and aspirate as needed   Assess and instruct to report shortness of breath or any respiratory difficulty   Respiratory therapy support as indicated     Problem: Cardiovascular - Adult  Goal: Maintains optimal cardiac output and

## 2025-01-22 NOTE — PROGRESS NOTES
Pt given atropine due to increased secretions. Family is at bedside. Pt is resting in bed. At times he has 5-10 sec of apnea.Archana Acuña RN

## 2025-01-22 NOTE — PROGRESS NOTES
Pt arrived to room 325 via stretcher by transport and ED RN. Pt is lethargic and moaning in pain. Pt is being admitted for comfort care. Pt made comfortable in bed and given IV Dilaudid for comfort. Pt with male purewick in place. Pt BP 75/4, HR 93, RR 12, SpO2 98% on 6 L NC. Call light within reach. Bed side table within reach. Wheels locked. Bed in lowest position. Bed check in place. All care per orders. Electronically signed by Mary Jane Urbano RN on 1/21/2025 at 9:08 PM

## 2025-01-22 NOTE — PROGRESS NOTES
Pt daughter ,Herberth, is at bedside.Pt with no heart rate or respirations. Verified with a second nurse Patricia EUGENE.Archana Acuña RN

## 2025-01-23 NOTE — PROGRESS NOTES
Physician Progress Note      PATIENT:               MERARI THURSTON  CSN #:                  [id number]  :                       1945  ADMIT DATE:       2025 7:23 PM  DISCH DATE:        2025 12:00 PM  RESPONDING  PROVIDER #:        YUDI AUSTIN          QUERY TEXT:    Pt admitted with sepsis.  Pt noted to have respiratory distress with O2 in   use. If possible, please document in the progress notes and discharge summary   if you are evaluating and/or treating any of the following:    The medical record reflects the following:  Risk Factors: PNA, metastatic lung cancer, shock, malnutrition    Clinical Indicators: Per H/P-\"patient went into significant respiratory   distress.  Patient's O2 sats dropped into the 50s\"  Per VS flow sheet- RR 16-26, 15 L O2 via NRM used until comfort care measures   placed after family discussion    Treatment: Imaging, pressors, O2, palliative care discussions,  Options provided:  -- Acute respiratory failure with hypoxia  -- Other - I will add my own diagnosis  -- Disagree - Not applicable / Not valid  -- Disagree - Clinically unable to determine / Unknown  -- Refer to Clinical Documentation Reviewer    PROVIDER RESPONSE TEXT:    This patient is in acute respiratory failure with hypoxia.    Query created by: Charley Collado on 2025 12:41 PM      Electronically signed by:  YUDI AUSTIN 2025 10:05 AM

## (undated) DEVICE — BOWL MED M 16OZ PLAS CAP GRAD

## (undated) DEVICE — DRAPE C ARM W46XL120IN XLN

## (undated) DEVICE — SUTURE MCRYL + SZ 4-0 L18IN ABSRB UD L19MM PS-2 3/8 CIR MCP496G

## (undated) DEVICE — SYRINGE MED 3ML CLR PLAS STD N CTRL LUERLOCK TIP DISP

## (undated) DEVICE — CONMED SCOPE SAVER BITE BLOCK, 20X27 MM: Brand: SCOPE SAVER

## (undated) DEVICE — GOWN,REINF,POLY,AURORA,XLNG/XXL,STRL: Brand: MEDLINE

## (undated) DEVICE — ADHESIVE SKIN CLOSURE TOP 36 CC HI VISC DERMBND MINI

## (undated) DEVICE — SYRINGE MED 10ML LUERLOCK TIP W/O SFTY DISP

## (undated) DEVICE — GLOVE SURG SZ 7 L12IN FNGR THK75MIL WHT LTX POLYMER BEAD

## (undated) DEVICE — ADHESIVE SKIN CLSR 0.7ML TOP DERMBND ADV

## (undated) DEVICE — SOLUTION IRRIG 2000ML STRL H2O UROMATIC PLAS CONT USP

## (undated) DEVICE — FLEX ADVANTAGE 3000CC: Brand: FLEX ADVANTAGE

## (undated) DEVICE — SHEET,DRAPE,53X77,STERILE: Brand: MEDLINE

## (undated) DEVICE — SUTURE MCRYL SZ 4-0 L18IN ABSRB UD L19MM PS-2 3/8 CIR PRIM Y496G

## (undated) DEVICE — SET ADMIN PRIMING 7ML L30IN 7.35LB 20 GTT 2ND RLER CLMP

## (undated) DEVICE — MINOR SET UP: Brand: MEDLINE INDUSTRIES, INC.

## (undated) DEVICE — CATHETER IV 20GA L1.25IN PNK FEP SFTY STR HUB RADPQ DISP

## (undated) DEVICE — CANNULA,OXY,ADULT,SUPERSOFT,W/7'TUB,SC: Brand: MEDLINE INDUSTRIES, INC.

## (undated) DEVICE — DRAPE,T,LAPARO,TRANS,STERILE: Brand: MEDLINE

## (undated) DEVICE — GOWN SIRUS NONREIN XL W/TWL: Brand: MEDLINE INDUSTRIES, INC.

## (undated) DEVICE — GLOVE SURG SZ 75 L12IN FNGR THK94MIL STD WHT LTX FREE

## (undated) DEVICE — CATHETER,URETHRAL,REDRUBBER,STRL,18FR: Brand: MEDLINE

## (undated) DEVICE — SUTURE PERMAHAND SZ 0 L30IN NONABSORBABLE BLK L26MM SH 1/2 K834H

## (undated) DEVICE — SOLUTION IV 1000ML LAC RINGERS PH 6.5 INJ USP VIAFLX PLAS

## (undated) DEVICE — BRONCHOSCOPE CYTOLOGY BRUSH: Brand: COOK

## (undated) DEVICE — SYRINGE MED 50ML LUERSLIP TIP

## (undated) DEVICE — PENCIL ES L3M ROCK SWCH S STL HEX LOK BLDE ELECTRD HOLSTER

## (undated) DEVICE — SKIN AFFIX SURG ADHESIVE 72/CS 0.55ML: Brand: MEDLINE

## (undated) DEVICE — INTENDED FOR TISSUE SEPARATION, AND OTHER PROCEDURES THAT REQUIRE A SHARP SURGICAL BLADE TO PUNCTURE OR CUT.: Brand: BARD-PARKER ® CARBON RIB-BACK BLADES

## (undated) DEVICE — PENCIL ES L3M BTTN SWCH S STL HEX LOK BLDE ELECTRD HOLSTER

## (undated) DEVICE — GLOVE ORANGE PI 8   MSG9080

## (undated) DEVICE — CHLORAPREP 26ML ORANGE

## (undated) DEVICE — PROVE COVER: Brand: UNBRANDED

## (undated) DEVICE — KIT PRB L340MM DIA34MM GRN W STONE CTCH DISPOSABLE SWISS

## (undated) DEVICE — SUTURE VCRL + SZ 3-0 L18IN ABSRB UD SH 1/2 CIR TAPERCUT NDL VCP864D

## (undated) DEVICE — STERILE POLYISOPRENE POWDER-FREE SURGICAL GLOVES: Brand: PROTEXIS

## (undated) DEVICE — DECANTER FLD 9IN ST BG FOR ASEP TRNSF OF FLD

## (undated) DEVICE — SUTURE PROL 2-0 L48IN NONABSORBABLE BLU SH L26MM 1/2 CIR 8533H

## (undated) DEVICE — STANDARD HYPODERMIC NEEDLE,POLYPROPYLENE HUB: Brand: MONOJECT

## (undated) DEVICE — CATCHER STONE TBNG ADPT SWISS LITHOCLAST

## (undated) DEVICE — SINGLE USE BIOPSY VALVE MAJ-210: Brand: SINGLE USE BIOPSY VALVE (STERILE)

## (undated) DEVICE — BAG DRNGE 2000ML ROUNDED TEARDROP W/ ANTIREFLX CHMBR DISP

## (undated) DEVICE — MINOR SET UP PK

## (undated) DEVICE — TUBING, SUCTION, 3/16" X 12', STRAIGHT: Brand: MEDLINE

## (undated) DEVICE — CYSTO: Brand: MEDLINE INDUSTRIES, INC.

## (undated) DEVICE — GLOVE,SURG,SENSICARE SLT,LF,PF,7.5: Brand: MEDLINE

## (undated) DEVICE — SET GRAV VENT NVENT CK VLV 3 NDL FREE PRT 10 GTT

## (undated) DEVICE — 3M™ TEGADERM™ TRANSPARENT FILM DRESSING FRAME STYLE, 1624W, 2-3/8 IN X 2-3/4 IN (6 CM X 7 CM), 100/CT 4CT/CASE: Brand: 3M™ TEGADERM™

## (undated) DEVICE — YANKAUER,BULB TIP,W/O VENT,RIGID,STERILE: Brand: MEDLINE

## (undated) DEVICE — 10FR FRAZIER SUCTION HANDLE: Brand: CARDINAL HEALTH

## (undated) DEVICE — CATHETER URETH 18FR BLLN 5CC STD LTX 2 W F TWO OPP DRNGE

## (undated) DEVICE — GAUZE,SPONGE,4"X4",8PLY,STRL,LF,10/TRAY: Brand: MEDLINE

## (undated) DEVICE — BAG DRNGE COMB PK

## (undated) DEVICE — SUTURE VCRL SZ 3-0 L18IN ABSRB UD L26MM SH 1/2 CIR J864D

## (undated) DEVICE — SOLUTION IV IRRIG POUR BRL 0.9% SODIUM CHL 2F7124

## (undated) DEVICE — TUBING, SUCTION, 3/16" X 6', STRAIGHT: Brand: MEDLINE

## (undated) DEVICE — CRADLE POS PRONE 24 X 5 X 3 IN ARM N COMPR NO CVR FOAM DISP

## (undated) DEVICE — PAD,ABDOMINAL,8"X10",ST,LF: Brand: MEDLINE

## (undated) DEVICE — GOWN,SIRUS,NON REINFRCD,LARGE,SET IN SL: Brand: MEDLINE

## (undated) DEVICE — Device: Brand: MEDEX

## (undated) DEVICE — SINGLE USE SUCTION VALVE MAJ-209: Brand: SINGLE USE SUCTION VALVE (STERILE)

## (undated) DEVICE — DRAPE,U/ SHT,SPLIT,PLAS,STERIL: Brand: MEDLINE

## (undated) DEVICE — Device: Brand: PILLOW, GENTLETOUCH, 7 INCH RT

## (undated) DEVICE — INTENDED FOR TISSUE SEPARATION, AND OTHER PROCEDURES THAT REQUIRE A SHARP SURGICAL BLADE TO PUNCTURE OR CUT.: Brand: BARD-PARKER ® STAINLESS STEEL BLADES

## (undated) DEVICE — 60 ML SYRINGE LUER-LOCK TIP: Brand: MONOJECT

## (undated) DEVICE — SYRINGE MED 10ML SLIP TIP BLNT FILL AND LUERLOCK DISP

## (undated) DEVICE — Y-TYPE TUR/BLADDER IRRIGATION SET, REGULATING CLAMP

## (undated) DEVICE — TRAP,MUCUS SPECIMEN, 80CC: Brand: MEDLINE

## (undated) DEVICE — LINER,SOFT,SUCTION CANISTER,1500CC: Brand: MEDLINE

## (undated) DEVICE — HIGH PRESSURE NEPHROSTOMY BALLOON CATHETER KIT: Brand: NEPHROMAX KIT

## (undated) DEVICE — SYRINGE 2OZ IRRIG EAR BULB ULCER DISP ST

## (undated) DEVICE — 1200CC HIFLOW SUCTION CANISTER WITH AEROSTAT FILTER, FLOAT VALVE SHUTOFF WITH GREEN LID: Brand: BEMIS

## (undated) DEVICE — ELECTRODE PT RET AD L9FT HI MOIST COND ADH HYDRGEL CORDED

## (undated) DEVICE — SOLUTION IV IRRIG 500ML 0.9% SODIUM CHL 2F7123

## (undated) DEVICE — DRAPE,NEPHROSCOPY,STERILE: Brand: MEDLINE

## (undated) DEVICE — DECANTER: Brand: UNBRANDED

## (undated) DEVICE — SURGICAL PROCEDURE PACK IV U-BAR

## (undated) DEVICE — MEDI-VAC NON-CONDUCTIVE SUCTION TUBING: Brand: CARDINAL HEALTH

## (undated) DEVICE — ELECTRODE,RADIOTRANSLUCENT,FOAM,3PK: Brand: MEDLINE

## (undated) DEVICE — 1016 S-DRAPE IRRIG POUCH 10/BOX: Brand: STERI-DRAPE™